# Patient Record
Sex: FEMALE | Race: WHITE | Employment: UNEMPLOYED | URBAN - METROPOLITAN AREA
[De-identification: names, ages, dates, MRNs, and addresses within clinical notes are randomized per-mention and may not be internally consistent; named-entity substitution may affect disease eponyms.]

---

## 2018-04-20 ENCOUNTER — HOSPITAL ENCOUNTER (OUTPATIENT)
Dept: WOMENS IMAGING | Age: 53
Discharge: HOME | End: 2018-04-20
Attending: NURSE PRACTITIONER | Admitting: NURSE PRACTITIONER

## 2018-04-20 ENCOUNTER — OFFICE VISIT (OUTPATIENT)
Dept: FAMILY MEDICINE CLINIC | Age: 53
End: 2018-04-20

## 2018-04-20 VITALS
SYSTOLIC BLOOD PRESSURE: 138 MMHG | OXYGEN SATURATION: 98 % | WEIGHT: 184 LBS | RESPIRATION RATE: 16 BRPM | HEIGHT: 61 IN | BODY MASS INDEX: 34.74 KG/M2 | DIASTOLIC BLOOD PRESSURE: 84 MMHG | HEART RATE: 144 BPM

## 2018-04-20 DIAGNOSIS — R92.8 ABNORMAL MAMMOGRAM: ICD-10-CM

## 2018-04-20 DIAGNOSIS — N64.59 ABNORMAL BREAST EXAM: Primary | ICD-10-CM

## 2018-04-20 DIAGNOSIS — N64.59 OTHER SIGNS AND SYMPTOMS IN BREAST (CODE): ICD-10-CM

## 2018-04-20 DIAGNOSIS — N64.59 ABNORMAL BREAST EXAM: ICD-10-CM

## 2018-04-20 PROCEDURE — 99203 OFFICE O/P NEW LOW 30 MIN: CPT | Performed by: NURSE PRACTITIONER

## 2018-04-20 ASSESSMENT — ENCOUNTER SYMPTOMS
VOMITING: 0
BACK PAIN: 0
SHORTNESS OF BREATH: 0
ABDOMINAL DISTENTION: 0
NAUSEA: 0

## 2018-04-20 ASSESSMENT — PATIENT HEALTH QUESTIONNAIRE - PHQ9
1. LITTLE INTEREST OR PLEASURE IN DOING THINGS: 0
SUM OF ALL RESPONSES TO PHQ QUESTIONS 1-9: 0
2. FEELING DOWN, DEPRESSED OR HOPELESS: 0
SUM OF ALL RESPONSES TO PHQ9 QUESTIONS 1 & 2: 0

## 2018-04-26 ENCOUNTER — OFFICE VISIT (OUTPATIENT)
Dept: SURGERY | Age: 53
End: 2018-04-26

## 2018-04-26 ENCOUNTER — TELEPHONE (OUTPATIENT)
Dept: SURGERY | Age: 53
End: 2018-04-26

## 2018-04-26 VITALS
HEART RATE: 126 BPM | RESPIRATION RATE: 31 BRPM | BODY MASS INDEX: 34.55 KG/M2 | WEIGHT: 183 LBS | SYSTOLIC BLOOD PRESSURE: 178 MMHG | HEIGHT: 61 IN | OXYGEN SATURATION: 99 % | DIASTOLIC BLOOD PRESSURE: 110 MMHG

## 2018-04-26 DIAGNOSIS — N63.0 LUMP OR MASS IN BREAST: Primary | ICD-10-CM

## 2018-04-26 DIAGNOSIS — C50.912 INFLAMMATORY BREAST CANCER, LEFT (HCC): Primary | ICD-10-CM

## 2018-04-26 PROCEDURE — 93702 BIS XTRACELL FLUID ANALYSIS: CPT | Performed by: SURGERY

## 2018-04-26 PROCEDURE — 99204 OFFICE O/P NEW MOD 45 MIN: CPT | Performed by: SURGERY

## 2018-04-26 RX ORDER — SODIUM CHLORIDE, SODIUM LACTATE, POTASSIUM CHLORIDE, CALCIUM CHLORIDE 600; 310; 30; 20 MG/100ML; MG/100ML; MG/100ML; MG/100ML
INJECTION, SOLUTION INTRAVENOUS CONTINUOUS
Status: CANCELLED | OUTPATIENT
Start: 2018-04-26

## 2018-04-27 ENCOUNTER — HOSPITAL ENCOUNTER (OUTPATIENT)
Dept: SURGERY | Age: 53
Discharge: HOME | End: 2018-05-26
Attending: SURGERY | Admitting: SURGERY

## 2018-04-27 ENCOUNTER — TELEPHONE (OUTPATIENT)
Dept: SURGERY | Age: 53
End: 2018-04-27

## 2018-04-27 RX ORDER — SODIUM CHLORIDE 9 MG/ML
INJECTION, SOLUTION INTRAVENOUS ONCE
Status: DISCONTINUED | OUTPATIENT
Start: 2018-04-27 | End: 2018-04-28 | Stop reason: HOSPADM

## 2018-05-01 ENCOUNTER — TELEPHONE (OUTPATIENT)
Dept: SURGERY | Age: 53
End: 2018-05-01

## 2018-05-01 DIAGNOSIS — C50.112 MALIGNANT NEOPLASM OF CENTRAL PORTION OF LEFT FEMALE BREAST, UNSPECIFIED ESTROGEN RECEPTOR STATUS (HCC): Primary | ICD-10-CM

## 2018-05-07 ENCOUNTER — HOSPITAL ENCOUNTER (OUTPATIENT)
Dept: GENERAL RADIOLOGY | Age: 53
Discharge: HOME | End: 2018-05-07
Attending: SURGERY | Admitting: SURGERY

## 2018-05-07 DIAGNOSIS — C50.112 MALIGNANT NEOPLASM OF CENTRAL PORTION OF LEFT FEMALE BREAST, UNSPECIFIED ESTROGEN RECEPTOR STATUS (HCC): ICD-10-CM

## 2018-05-11 ENCOUNTER — HOSPITAL ENCOUNTER (OUTPATIENT)
Dept: GENERAL RADIOLOGY | Age: 53
Discharge: HOME | End: 2018-05-11
Attending: INTERNAL MEDICINE | Admitting: INTERNAL MEDICINE

## 2018-05-11 LAB
ALBUMIN SERPL-MCNC: 3.8 GM/DL (ref 3.4–5)
ALP BLD-CCNC: 58 IU/L (ref 40–128)
ALT SERPL-CCNC: 14 U/L (ref 10–40)
ANION GAP SERPL CALCULATED.3IONS-SCNC: 11 MMOL/L (ref 4–16)
AST SERPL-CCNC: 17 IU/L (ref 15–37)
BANDED NEUTROPHILS ABSOLUTE COUNT: 0.09 K/CU MM
BANDED NEUTROPHILS RELATIVE PERCENT: 1 % (ref 5–11)
BASOPHILS ABSOLUTE: 0.1 K/CU MM
BASOPHILS RELATIVE PERCENT: 1 % (ref 0–1)
BILIRUB SERPL-MCNC: 0.4 MG/DL (ref 0–1)
BUN BLDV-MCNC: 14 MG/DL (ref 6–23)
CALCIUM SERPL-MCNC: 8.8 MG/DL (ref 8.3–10.6)
CHLORIDE BLD-SCNC: 104 MMOL/L (ref 99–110)
CO2: 26 MMOL/L (ref 21–32)
CREAT SERPL-MCNC: 0.6 MG/DL (ref 0.6–1.1)
DIFFERENTIAL TYPE: ABNORMAL
EOSINOPHILS ABSOLUTE: 0.4 K/CU MM
EOSINOPHILS RELATIVE PERCENT: 4 % (ref 0–3)
GFR AFRICAN AMERICAN: >60 ML/MIN/1.73M2
GFR NON-AFRICAN AMERICAN: >60 ML/MIN/1.73M2
GLUCOSE FASTING: 86 MG/DL (ref 70–99)
HCT VFR BLD CALC: 38.8 % (ref 37–47)
HEMOGLOBIN: 12.7 GM/DL (ref 12.5–16)
LYMPHOCYTES ABSOLUTE: 3.6 K/CU MM
LYMPHOCYTES RELATIVE PERCENT: 40 % (ref 24–44)
MCH RBC QN AUTO: 30.2 PG (ref 27–31)
MCHC RBC AUTO-ENTMCNC: 32.7 % (ref 32–36)
MCV RBC AUTO: 92.2 FL (ref 78–100)
MONOCYTES ABSOLUTE: 0.5 K/CU MM
MONOCYTES RELATIVE PERCENT: 6 % (ref 0–4)
PDW BLD-RTO: 13 % (ref 11.7–14.9)
PLATELET # BLD: 351 K/CU MM (ref 140–440)
PMV BLD AUTO: 9.3 FL (ref 7.5–11.1)
POLYCHROMASIA: ABNORMAL
POTASSIUM SERPL-SCNC: 4.4 MMOL/L (ref 3.5–5.1)
RBC # BLD: 4.21 M/CU MM (ref 4.2–5.4)
SEGMENTED NEUTROPHILS ABSOLUTE COUNT: 4.2 K/CU MM
SEGMENTED NEUTROPHILS RELATIVE PERCENT: 48 % (ref 36–66)
SODIUM BLD-SCNC: 141 MMOL/L (ref 135–145)
TOTAL PROTEIN: 6.6 GM/DL (ref 6.4–8.2)
WBC # BLD: 8.9 K/CU MM (ref 4–10.5)
WBC # BLD: ABNORMAL 10*3/UL

## 2018-05-13 LAB — CA 27.29: 61 U/ML

## 2018-05-17 ENCOUNTER — OFFICE VISIT (OUTPATIENT)
Dept: SURGERY | Age: 53
End: 2018-05-17

## 2018-05-17 VITALS — RESPIRATION RATE: 20 BRPM | DIASTOLIC BLOOD PRESSURE: 99 MMHG | SYSTOLIC BLOOD PRESSURE: 187 MMHG | HEART RATE: 110 BPM

## 2018-05-17 DIAGNOSIS — Z09 POSTOP CHECK: ICD-10-CM

## 2018-05-17 DIAGNOSIS — C50.912 LOBULAR CARCINOMA OF LEFT BREAST (HCC): Primary | ICD-10-CM

## 2018-05-17 PROCEDURE — 99024 POSTOP FOLLOW-UP VISIT: CPT | Performed by: SURGERY

## 2018-06-01 ENCOUNTER — HOSPITAL ENCOUNTER (OUTPATIENT)
Dept: WOMENS IMAGING | Age: 53
Discharge: HOME | End: 2018-06-01
Attending: INTERNAL MEDICINE | Admitting: INTERNAL MEDICINE

## 2018-06-01 DIAGNOSIS — Z78.0 ASYMPTOMATIC MENOPAUSAL STATE: ICD-10-CM

## 2018-06-01 DIAGNOSIS — Z78.0 ASYMPTOMATIC AGE-RELATED POSTMENOPAUSAL STATE: ICD-10-CM

## 2018-06-08 ENCOUNTER — HOSPITAL ENCOUNTER (OUTPATIENT)
Dept: INFUSION THERAPY | Age: 53
Discharge: HOME | End: 2018-06-30
Attending: INTERNAL MEDICINE | Admitting: INTERNAL MEDICINE

## 2018-06-08 VITALS — SYSTOLIC BLOOD PRESSURE: 170 MMHG | DIASTOLIC BLOOD PRESSURE: 87 MMHG | HEART RATE: 92 BPM | RESPIRATION RATE: 20 BRPM

## 2018-06-08 RX ORDER — ANASTROZOLE 1 MG/1
1 TABLET ORAL DAILY
COMMUNITY
End: 2020-01-17 | Stop reason: ALTCHOICE

## 2018-06-08 ASSESSMENT — PAIN SCALES - GENERAL: PAINLEVEL_OUTOF10: 0

## 2018-06-08 NOTE — DISCHARGE SUMMARY
Home instructions given with understanding. Discharged wqlkingto the front entrance per self in good condition to leave per private auto.

## 2018-06-08 NOTE — PLAN OF CARE
Arrived at Outpatient Infusion Unit for Lupron. Oriented to room #1 with understanding. Plan of care discussed and understood.

## 2018-06-18 ENCOUNTER — HOSPITAL ENCOUNTER (OUTPATIENT)
Dept: GENERAL RADIOLOGY | Age: 53
Discharge: HOME | End: 2018-06-30
Attending: INTERNAL MEDICINE | Admitting: INTERNAL MEDICINE

## 2018-06-18 LAB
BASOPHILS ABSOLUTE: 0 K/CU MM
BASOPHILS RELATIVE PERCENT: 1 % (ref 0–1)
DIFFERENTIAL TYPE: ABNORMAL
EOSINOPHILS ABSOLUTE: 0 K/CU MM
EOSINOPHILS RELATIVE PERCENT: 1 % (ref 0–3)
HCT VFR BLD CALC: 39.3 % (ref 37–47)
HEMOGLOBIN: 12.4 GM/DL (ref 12.5–16)
LYMPHOCYTES ABSOLUTE: 1.8 K/CU MM
LYMPHOCYTES RELATIVE PERCENT: 52 % (ref 24–44)
MCH RBC QN AUTO: 30 PG (ref 27–31)
MCHC RBC AUTO-ENTMCNC: 31.6 % (ref 32–36)
MCV RBC AUTO: 94.9 FL (ref 78–100)
MONOCYTES ABSOLUTE: 0.2 K/CU MM
MONOCYTES RELATIVE PERCENT: 7 % (ref 0–4)
PDW BLD-RTO: 12.1 % (ref 11.7–14.9)
PLATELET # BLD: 273 K/CU MM (ref 140–440)
PMV BLD AUTO: 9.1 FL (ref 7.5–11.1)
RBC # BLD: 4.14 M/CU MM (ref 4.2–5.4)
SEGMENTED NEUTROPHILS ABSOLUTE COUNT: 1.2 K/CU MM
SEGMENTED NEUTROPHILS RELATIVE PERCENT: 39 % (ref 36–66)
WBC # BLD: 3.2 K/CU MM (ref 4–10.5)
WBC # BLD: ABNORMAL 10*3/UL

## 2018-07-01 ENCOUNTER — HOSPITAL ENCOUNTER (OUTPATIENT)
Dept: GENERAL RADIOLOGY | Age: 53
Discharge: HOME | End: 2018-07-31
Attending: INTERNAL MEDICINE | Admitting: INTERNAL MEDICINE

## 2018-07-01 ENCOUNTER — HOSPITAL ENCOUNTER (OUTPATIENT)
Dept: OTHER | Age: 53
Discharge: HOME | End: 2018-07-31
Attending: INTERNAL MEDICINE | Admitting: INTERNAL MEDICINE

## 2018-07-02 LAB
BASOPHILS ABSOLUTE: 0 K/CU MM
BASOPHILS RELATIVE PERCENT: 1 % (ref 0–1)
DIFFERENTIAL TYPE: ABNORMAL
HCT VFR BLD CALC: 34.5 % (ref 37–47)
HEMOGLOBIN: 11.4 GM/DL (ref 12.5–16)
LYMPHOCYTES ABSOLUTE: 2.3 K/CU MM
LYMPHOCYTES RELATIVE PERCENT: 58 % (ref 24–44)
MCH RBC QN AUTO: 31.3 PG (ref 27–31)
MCHC RBC AUTO-ENTMCNC: 33 % (ref 32–36)
MCV RBC AUTO: 94.8 FL (ref 78–100)
MONOCYTES ABSOLUTE: 0.8 K/CU MM
MONOCYTES RELATIVE PERCENT: 21 % (ref 0–4)
PDW BLD-RTO: 13.4 % (ref 11.7–14.9)
PLATELET # BLD: 200 K/CU MM (ref 140–440)
PMV BLD AUTO: 8.6 FL (ref 7.5–11.1)
POLYCHROMASIA: ABNORMAL
RBC # BLD: 3.64 M/CU MM (ref 4.2–5.4)
SEGMENTED NEUTROPHILS ABSOLUTE COUNT: 0.8 K/CU MM
SEGMENTED NEUTROPHILS RELATIVE PERCENT: 20 % (ref 36–66)
WBC # BLD: 3.9 K/CU MM (ref 4–10.5)
WBC # BLD: ABNORMAL 10*3/UL

## 2018-07-06 ENCOUNTER — HOSPITAL ENCOUNTER (OUTPATIENT)
Dept: INFUSION THERAPY | Age: 53
Discharge: HOME | End: 2018-07-06
Attending: INTERNAL MEDICINE

## 2018-07-06 VITALS
TEMPERATURE: 97.5 F | DIASTOLIC BLOOD PRESSURE: 85 MMHG | RESPIRATION RATE: 14 BRPM | OXYGEN SATURATION: 100 % | SYSTOLIC BLOOD PRESSURE: 169 MMHG | HEART RATE: 106 BPM

## 2018-07-06 RX ORDER — AMLODIPINE BESYLATE 5 MG/1
10 TABLET ORAL DAILY
COMMUNITY
End: 2020-01-01 | Stop reason: SDUPTHER

## 2018-07-06 RX ORDER — LORATADINE 10 MG/1
10 TABLET ORAL DAILY
COMMUNITY
End: 2020-03-13 | Stop reason: ALTCHOICE

## 2018-07-06 ASSESSMENT — PAIN SCALES - GENERAL: PAINLEVEL_OUTOF10: 0

## 2018-07-06 NOTE — DISCHARGE SUMMARY
Tolerated njection well. Home instructions given with understanding. Discharged walking per self to the front entrance to leave per private auto.

## 2018-07-30 LAB
BANDED NEUTROPHILS ABSOLUTE COUNT: 0.14 K/CU MM
BANDED NEUTROPHILS RELATIVE PERCENT: 3 % (ref 5–11)
BASOPHILS ABSOLUTE: 0 K/CU MM
BASOPHILS RELATIVE PERCENT: 1 % (ref 0–1)
DIFFERENTIAL TYPE: ABNORMAL
EOSINOPHILS ABSOLUTE: 0.1 K/CU MM
EOSINOPHILS RELATIVE PERCENT: 2 % (ref 0–3)
HCT VFR BLD CALC: 34.4 % (ref 37–47)
HEMOGLOBIN: 11 GM/DL (ref 12.5–16)
LYMPHOCYTES ABSOLUTE: 2 K/CU MM
LYMPHOCYTES RELATIVE PERCENT: 41 % (ref 24–44)
MCH RBC QN AUTO: 31 PG (ref 27–31)
MCHC RBC AUTO-ENTMCNC: 32 % (ref 32–36)
MCV RBC AUTO: 96.9 FL (ref 78–100)
MONOCYTES ABSOLUTE: 0.6 K/CU MM
MONOCYTES RELATIVE PERCENT: 14 % (ref 0–4)
PDW BLD-RTO: 15 % (ref 11.7–14.9)
PLATELET # BLD: 245 K/CU MM (ref 140–440)
PMV BLD AUTO: 8.8 FL (ref 7.5–11.1)
POLYCHROMASIA: ABNORMAL
RBC # BLD: 3.55 M/CU MM (ref 4.2–5.4)
SEGMENTED NEUTROPHILS ABSOLUTE COUNT: 1.8 K/CU MM
SEGMENTED NEUTROPHILS RELATIVE PERCENT: 39 % (ref 36–66)
WBC # BLD: 4.6 K/CU MM (ref 4–10.5)

## 2018-08-01 ENCOUNTER — HOSPITAL ENCOUNTER (OUTPATIENT)
Dept: OTHER | Age: 53
Discharge: HOME | End: 2018-08-31
Attending: INTERNAL MEDICINE | Admitting: INTERNAL MEDICINE

## 2018-08-01 ENCOUNTER — HOSPITAL ENCOUNTER (OUTPATIENT)
Dept: GENERAL RADIOLOGY | Age: 53
Discharge: HOME | End: 2018-08-31
Attending: INTERNAL MEDICINE | Admitting: INTERNAL MEDICINE

## 2018-08-03 ENCOUNTER — HOSPITAL ENCOUNTER (OUTPATIENT)
Dept: INFUSION THERAPY | Age: 53
Discharge: HOME | End: 2018-08-03
Attending: INTERNAL MEDICINE

## 2018-08-03 VITALS
SYSTOLIC BLOOD PRESSURE: 143 MMHG | RESPIRATION RATE: 16 BRPM | BODY MASS INDEX: 33.99 KG/M2 | HEIGHT: 61 IN | WEIGHT: 180 LBS | DIASTOLIC BLOOD PRESSURE: 86 MMHG | HEART RATE: 104 BPM | TEMPERATURE: 99 F

## 2018-08-31 ENCOUNTER — HOSPITAL ENCOUNTER (OUTPATIENT)
Dept: INFUSION THERAPY | Age: 53
Discharge: HOME | End: 2018-08-31
Attending: INTERNAL MEDICINE

## 2018-08-31 LAB
BANDED NEUTROPHILS ABSOLUTE COUNT: 0.14 K/CU MM
BANDED NEUTROPHILS RELATIVE PERCENT: 4 % (ref 5–11)
BASOPHILS ABSOLUTE: 0 K/CU MM
BASOPHILS RELATIVE PERCENT: 1 % (ref 0–1)
DIFFERENTIAL TYPE: ABNORMAL
EOSINOPHILS ABSOLUTE: 0.1 K/CU MM
EOSINOPHILS RELATIVE PERCENT: 2 % (ref 0–3)
HCT VFR BLD CALC: 32.6 % (ref 37–47)
HEMOGLOBIN: 11 GM/DL (ref 12.5–16)
LYMPHOCYTES ABSOLUTE: 1.3 K/CU MM
LYMPHOCYTES RELATIVE PERCENT: 36 % (ref 24–44)
MCH RBC QN AUTO: 32.9 PG (ref 27–31)
MCHC RBC AUTO-ENTMCNC: 33.7 % (ref 32–36)
MCV RBC AUTO: 97.6 FL (ref 78–100)
MONOCYTES ABSOLUTE: 0.5 K/CU MM
MONOCYTES RELATIVE PERCENT: 13 % (ref 0–4)
PDW BLD-RTO: 15.3 % (ref 11.7–14.9)
PLATELET # BLD: 297 K/CU MM (ref 140–440)
PMV BLD AUTO: 9 FL (ref 7.5–11.1)
POLYCHROMASIA: ABNORMAL
RBC # BLD: 3.34 M/CU MM (ref 4.2–5.4)
RBC # BLD: ABNORMAL 10*6/UL
SEGMENTED NEUTROPHILS ABSOLUTE COUNT: 1.5 K/CU MM
SEGMENTED NEUTROPHILS RELATIVE PERCENT: 44 % (ref 36–66)
WBC # BLD: 3.5 K/CU MM (ref 4–10.5)
WBC # BLD: ABNORMAL 10*3/UL

## 2018-08-31 ASSESSMENT — PAIN SCALES - GENERAL: PAINLEVEL_OUTOF10: 0

## 2018-08-31 NOTE — PLAN OF CARE
Pt ambulated to unit for Outpatient lupron injection. Agreeable to receive injection today. Denies any pain or problems. Voiced understanding of plan of care.

## 2018-09-01 ENCOUNTER — HOSPITAL ENCOUNTER (OUTPATIENT)
Dept: OTHER | Age: 53
Discharge: HOME | End: 2018-09-01
Attending: INTERNAL MEDICINE | Admitting: INTERNAL MEDICINE

## 2018-09-07 ENCOUNTER — HOSPITAL ENCOUNTER (OUTPATIENT)
Dept: NUCLEAR MEDICINE | Age: 53
Discharge: HOME | End: 2018-09-07
Attending: INTERNAL MEDICINE | Admitting: INTERNAL MEDICINE

## 2018-09-07 DIAGNOSIS — C50.112 MALIGNANT NEOPLASM OF CENTRAL PORTION OF LEFT FEMALE BREAST (HCC): ICD-10-CM

## 2018-09-07 DIAGNOSIS — C50.112 MALIGNANT NEOPLASM OF CENTRAL PORTION OF LEFT FEMALE BREAST, UNSPECIFIED ESTROGEN RECEPTOR STATUS (HCC): ICD-10-CM

## 2018-09-07 LAB
ALBUMIN SERPL-MCNC: 4.2 GM/DL (ref 3.4–5)
ALP BLD-CCNC: 84 IU/L (ref 40–129)
ALT SERPL-CCNC: 15 U/L (ref 10–40)
ANION GAP SERPL CALCULATED.3IONS-SCNC: 11 MMOL/L (ref 4–16)
AST SERPL-CCNC: 24 IU/L (ref 15–37)
BASOPHILS ABSOLUTE: 0 K/CU MM
BASOPHILS RELATIVE PERCENT: 1.5 % (ref 0–1)
BILIRUB SERPL-MCNC: 0.6 MG/DL (ref 0–1)
BUN BLDV-MCNC: 13 MG/DL (ref 6–23)
CALCIUM SERPL-MCNC: 10 MG/DL (ref 8.3–10.6)
CHLORIDE BLD-SCNC: 105 MMOL/L (ref 99–110)
CO2: 26 MMOL/L (ref 21–32)
CREAT SERPL-MCNC: 0.7 MG/DL (ref 0.6–1.1)
DIFFERENTIAL TYPE: ABNORMAL
EOSINOPHILS ABSOLUTE: 0 K/CU MM
EOSINOPHILS RELATIVE PERCENT: 1.1 % (ref 0–3)
GFR AFRICAN AMERICAN: >60 ML/MIN/1.73M2
GFR NON-AFRICAN AMERICAN: >60 ML/MIN/1.73M2
GLUCOSE BLD-MCNC: 109 MG/DL (ref 70–99)
HCT VFR BLD CALC: 34.7 % (ref 37–47)
HEMOGLOBIN: 11.6 GM/DL (ref 12.5–16)
IMMATURE NEUTROPHIL %: 0.4 % (ref 0–0.43)
LYMPHOCYTES ABSOLUTE: 1.1 K/CU MM
LYMPHOCYTES RELATIVE PERCENT: 42.9 % (ref 24–44)
MCH RBC QN AUTO: 32.9 PG (ref 27–31)
MCHC RBC AUTO-ENTMCNC: 33.4 % (ref 32–36)
MCV RBC AUTO: 98.3 FL (ref 78–100)
MONOCYTES ABSOLUTE: 0.3 K/CU MM
MONOCYTES RELATIVE PERCENT: 9.4 % (ref 0–4)
NUCLEATED RBC %: 0 %
PDW BLD-RTO: 14.9 % (ref 11.7–14.9)
PLATELET # BLD: 245 K/CU MM (ref 140–440)
PMV BLD AUTO: 8.3 FL (ref 7.5–11.1)
POTASSIUM SERPL-SCNC: 4.1 MMOL/L (ref 3.5–5.1)
RBC # BLD: 3.53 M/CU MM (ref 4.2–5.4)
SEGMENTED NEUTROPHILS ABSOLUTE COUNT: 1.2 K/CU MM
SEGMENTED NEUTROPHILS RELATIVE PERCENT: 44.7 % (ref 36–66)
SODIUM BLD-SCNC: 142 MMOL/L (ref 135–145)
TOTAL IMMATURE NEUTOROPHIL: 0.01 K/CU MM
TOTAL NUCLEATED RBC: 0 K/CU MM
TOTAL PROTEIN: 7.4 GM/DL (ref 6.4–8.2)
WBC # BLD: 2.7 K/CU MM (ref 4–10.5)

## 2018-09-07 RX ORDER — TC 99M MEDRONATE 20 MG/10ML
28.1 INJECTION, POWDER, LYOPHILIZED, FOR SOLUTION INTRAVENOUS
Status: COMPLETED | OUTPATIENT
Start: 2018-09-07 | End: 2018-09-07

## 2018-09-07 RX ADMIN — TC 99M MEDRONATE 28.1 MILLICURIE: 20 INJECTION, POWDER, LYOPHILIZED, FOR SOLUTION INTRAVENOUS at 10:15

## 2018-09-09 LAB — CA 27.29: 109.1 U/ML

## 2018-09-28 ENCOUNTER — HOSPITAL ENCOUNTER (OUTPATIENT)
Dept: INFUSION THERAPY | Age: 53
Setting detail: INFUSION SERIES
Discharge: HOME | End: 2018-09-28
Payer: COMMERCIAL

## 2018-09-28 VITALS
RESPIRATION RATE: 16 BRPM | DIASTOLIC BLOOD PRESSURE: 86 MMHG | TEMPERATURE: 98.3 F | HEART RATE: 92 BPM | SYSTOLIC BLOOD PRESSURE: 131 MMHG

## 2018-09-28 PROCEDURE — 6360000002 HC RX W HCPCS: Performed by: INTERNAL MEDICINE

## 2018-09-28 PROCEDURE — 96401 CHEMO ANTI-NEOPL SQ/IM: CPT

## 2018-09-28 RX ADMIN — LEUPROLIDE ACETATE 7.5 MG: KIT at 09:35

## 2018-09-28 NOTE — DISCHARGE SUMMARY
Tolerated Leuprolide Injection well. Discharge instructions explained written copy given. Understanding verbalized. Discharged home. Down to private auto per self.

## 2018-09-28 NOTE — PLAN OF CARE
Ambulatory to unit room 6 for Injection. Orientated to unit. Procedure and plan of care explained. Questions answered. Understanding verbalized.

## 2018-10-12 ENCOUNTER — HOSPITAL ENCOUNTER (OUTPATIENT)
Age: 53
Discharge: HOME | End: 2018-10-12
Payer: COMMERCIAL

## 2018-10-12 LAB
ALBUMIN SERPL-MCNC: 3.8 GM/DL (ref 3.4–5)
ALP BLD-CCNC: 90 IU/L (ref 40–128)
ALT SERPL-CCNC: 13 U/L (ref 10–40)
ANION GAP SERPL CALCULATED.3IONS-SCNC: 14 MMOL/L (ref 4–16)
AST SERPL-CCNC: 24 IU/L (ref 15–37)
BASOPHILS ABSOLUTE: 0.1 K/CU MM
BASOPHILS RELATIVE PERCENT: 2 % (ref 0–1)
BILIRUB SERPL-MCNC: 0.4 MG/DL (ref 0–1)
BUN BLDV-MCNC: 15 MG/DL (ref 6–23)
CALCIUM SERPL-MCNC: 8.9 MG/DL (ref 8.3–10.6)
CHLORIDE BLD-SCNC: 102 MMOL/L (ref 99–110)
CO2: 24 MMOL/L (ref 21–32)
CREAT SERPL-MCNC: 0.7 MG/DL (ref 0.6–1.1)
DIFFERENTIAL TYPE: ABNORMAL
EOSINOPHILS ABSOLUTE: 0 K/CU MM
EOSINOPHILS RELATIVE PERCENT: 1 % (ref 0–3)
GFR AFRICAN AMERICAN: >60 ML/MIN/1.73M2
GFR NON-AFRICAN AMERICAN: >60 ML/MIN/1.73M2
GLUCOSE FASTING: 146 MG/DL (ref 70–99)
HCT VFR BLD CALC: 34.2 % (ref 37–47)
HEMOGLOBIN: 11.2 GM/DL (ref 12.5–16)
LYMPHOCYTES ABSOLUTE: 1.7 K/CU MM
LYMPHOCYTES RELATIVE PERCENT: 51 % (ref 24–44)
MAGNESIUM: 1.9 MG/DL (ref 1.8–2.4)
MCH RBC QN AUTO: 33.9 PG (ref 27–31)
MCHC RBC AUTO-ENTMCNC: 32.7 % (ref 32–36)
MCV RBC AUTO: 103.6 FL (ref 78–100)
MONOCYTES ABSOLUTE: 0.1 K/CU MM
MONOCYTES RELATIVE PERCENT: 3 % (ref 0–4)
PDW BLD-RTO: 14 % (ref 11.7–14.9)
PLATELET # BLD: 238 K/CU MM (ref 140–440)
PMV BLD AUTO: 8.7 FL (ref 7.5–11.1)
POTASSIUM SERPL-SCNC: 4 MMOL/L (ref 3.5–5.1)
RBC # BLD: 3.3 M/CU MM (ref 4.2–5.4)
SEGMENTED NEUTROPHILS ABSOLUTE COUNT: 1.4 K/CU MM
SEGMENTED NEUTROPHILS RELATIVE PERCENT: 43 % (ref 36–66)
SODIUM BLD-SCNC: 140 MMOL/L (ref 135–145)
TOTAL PROTEIN: 6.8 GM/DL (ref 6.4–8.2)
WBC # BLD: 3.3 K/CU MM (ref 4–10.5)

## 2018-10-12 PROCEDURE — 36415 COLL VENOUS BLD VENIPUNCTURE: CPT

## 2018-10-12 PROCEDURE — 83735 ASSAY OF MAGNESIUM: CPT

## 2018-10-12 PROCEDURE — 85027 COMPLETE CBC AUTOMATED: CPT

## 2018-10-12 PROCEDURE — 85007 BL SMEAR W/DIFF WBC COUNT: CPT

## 2018-10-12 PROCEDURE — 80053 COMPREHEN METABOLIC PANEL: CPT

## 2018-10-12 PROCEDURE — 86300 IMMUNOASSAY TUMOR CA 15-3: CPT

## 2018-10-14 LAB — CA 27.29: 111 U/ML

## 2018-10-26 ENCOUNTER — HOSPITAL ENCOUNTER (OUTPATIENT)
Dept: ULTRASOUND IMAGING | Age: 53
Discharge: HOME | End: 2018-10-26
Payer: COMMERCIAL

## 2018-10-26 ENCOUNTER — HOSPITAL ENCOUNTER (OUTPATIENT)
Dept: INFUSION THERAPY | Age: 53
Setting detail: INFUSION SERIES
Discharge: HOME | End: 2018-10-26
Payer: COMMERCIAL

## 2018-10-26 VITALS
DIASTOLIC BLOOD PRESSURE: 87 MMHG | RESPIRATION RATE: 16 BRPM | BODY MASS INDEX: 34.36 KG/M2 | WEIGHT: 175 LBS | HEART RATE: 100 BPM | HEIGHT: 60 IN | SYSTOLIC BLOOD PRESSURE: 142 MMHG | OXYGEN SATURATION: 98 %

## 2018-10-26 DIAGNOSIS — M79.89 SWELLING OF LIMB: ICD-10-CM

## 2018-10-26 DIAGNOSIS — C50.112 MALIGNANT NEOPLASM OF CENTRAL PORTION OF LEFT FEMALE BREAST, UNSPECIFIED ESTROGEN RECEPTOR STATUS (HCC): ICD-10-CM

## 2018-10-26 PROCEDURE — 93971 EXTREMITY STUDY: CPT

## 2018-10-26 PROCEDURE — 6360000002 HC RX W HCPCS: Performed by: INTERNAL MEDICINE

## 2018-10-26 PROCEDURE — 96402 CHEMO HORMON ANTINEOPL SQ/IM: CPT

## 2018-10-26 RX ADMIN — LEUPROLIDE ACETATE 7.5 MG: KIT at 08:40

## 2018-10-26 NOTE — DISCHARGE SUMMARY
Tolerated Leuprollide well. Discharge instructions explained written copy given. Understanding verbalized. Discharged home. Down to private auto per self.

## 2018-10-26 NOTE — PLAN OF CARE
Ambulatory to unit room 3 for LEOPROLIDE. Orientated to unit. Procedure and plan of care explained. Questions answered. Understanding verbalized.

## 2018-11-15 ENCOUNTER — HOSPITAL ENCOUNTER (OUTPATIENT)
Dept: PET IMAGING | Age: 53
Discharge: HOME | End: 2018-11-15
Payer: COMMERCIAL

## 2018-11-15 DIAGNOSIS — C50.112 MALIGNANT NEOPLASM OF CENTRAL PORTION OF LEFT FEMALE BREAST, UNSPECIFIED ESTROGEN RECEPTOR STATUS (HCC): ICD-10-CM

## 2018-11-15 PROCEDURE — 78815 PET IMAGE W/CT SKULL-THIGH: CPT

## 2018-11-15 PROCEDURE — 3430000000 HC RX DIAGNOSTIC RADIOPHARMACEUTICAL: Performed by: INTERNAL MEDICINE

## 2018-11-15 PROCEDURE — A9552 F18 FDG: HCPCS | Performed by: INTERNAL MEDICINE

## 2018-11-15 RX ORDER — FLUDEOXYGLUCOSE F 18 200 MCI/ML
13.02 INJECTION, SOLUTION INTRAVENOUS
Status: COMPLETED | OUTPATIENT
Start: 2018-11-15 | End: 2018-11-15

## 2018-11-15 RX ADMIN — FLUDEOXYGLUCOSE F 18 13.02 MILLICURIE: 200 INJECTION, SOLUTION INTRAVENOUS at 11:41

## 2018-11-23 ENCOUNTER — HOSPITAL ENCOUNTER (OUTPATIENT)
Dept: INFUSION THERAPY | Age: 53
Setting detail: INFUSION SERIES
Discharge: HOME | End: 2018-11-23
Payer: COMMERCIAL

## 2018-11-23 ENCOUNTER — HOSPITAL ENCOUNTER (OUTPATIENT)
Age: 53
Discharge: HOME | End: 2018-11-23
Payer: COMMERCIAL

## 2018-11-23 VITALS
TEMPERATURE: 98.7 F | HEART RATE: 97 BPM | DIASTOLIC BLOOD PRESSURE: 77 MMHG | RESPIRATION RATE: 16 BRPM | SYSTOLIC BLOOD PRESSURE: 141 MMHG

## 2018-11-23 LAB
ALBUMIN SERPL-MCNC: 3.9 GM/DL (ref 3.4–5)
ALP BLD-CCNC: 101 IU/L (ref 40–129)
ALT SERPL-CCNC: 46 U/L (ref 10–40)
ANION GAP SERPL CALCULATED.3IONS-SCNC: 10 MMOL/L (ref 4–16)
ANISOCYTOSIS: ABNORMAL
AST SERPL-CCNC: 28 IU/L (ref 15–37)
BANDED NEUTROPHILS ABSOLUTE COUNT: 0.07 K/CU MM
BANDED NEUTROPHILS RELATIVE PERCENT: 2 % (ref 5–11)
BASOPHILS ABSOLUTE: 0 K/CU MM
BASOPHILS RELATIVE PERCENT: 1 % (ref 0–1)
BILIRUB SERPL-MCNC: 0.1 MG/DL (ref 0–1)
BUN BLDV-MCNC: 12 MG/DL (ref 6–23)
CALCIUM SERPL-MCNC: 9 MG/DL (ref 8.3–10.6)
CHLORIDE BLD-SCNC: 106 MMOL/L (ref 99–110)
CO2: 26 MMOL/L (ref 21–32)
CREAT SERPL-MCNC: 0.7 MG/DL (ref 0.6–1.1)
DIFFERENTIAL TYPE: ABNORMAL
EOSINOPHILS ABSOLUTE: 0.1 K/CU MM
EOSINOPHILS RELATIVE PERCENT: 4 % (ref 0–3)
GFR AFRICAN AMERICAN: >60 ML/MIN/1.73M2
GFR NON-AFRICAN AMERICAN: >60 ML/MIN/1.73M2
GLUCOSE BLD-MCNC: 113 MG/DL (ref 70–99)
HCT VFR BLD CALC: 35.1 % (ref 37–47)
HEMOGLOBIN: 11.1 GM/DL (ref 12.5–16)
LYMPHOCYTES ABSOLUTE: 1.3 K/CU MM
LYMPHOCYTES RELATIVE PERCENT: 34 % (ref 24–44)
MACROCYTES: ABNORMAL
MAGNESIUM: 2.1 MG/DL (ref 1.8–2.4)
MCH RBC QN AUTO: 33.6 PG (ref 27–31)
MCHC RBC AUTO-ENTMCNC: 31.6 % (ref 32–36)
MCV RBC AUTO: 106.4 FL (ref 78–100)
MONOCYTES ABSOLUTE: 0.5 K/CU MM
MONOCYTES RELATIVE PERCENT: 13 % (ref 0–4)
PDW BLD-RTO: 14.7 % (ref 11.7–14.9)
PLATELET # BLD: 392 K/CU MM (ref 140–440)
PMV BLD AUTO: 8.7 FL (ref 7.5–11.1)
POLYCHROMASIA: ABNORMAL
POTASSIUM SERPL-SCNC: 4.9 MMOL/L (ref 3.5–5.1)
RBC # BLD: 3.3 M/CU MM (ref 4.2–5.4)
SEGMENTED NEUTROPHILS ABSOLUTE COUNT: 1.7 K/CU MM
SEGMENTED NEUTROPHILS RELATIVE PERCENT: 46 % (ref 36–66)
SODIUM BLD-SCNC: 142 MMOL/L (ref 135–145)
TOTAL PROTEIN: 7 GM/DL (ref 6.4–8.2)
WBC # BLD: 3.7 K/CU MM (ref 4–10.5)
WBC # BLD: ABNORMAL 10*3/UL

## 2018-11-23 PROCEDURE — 6360000002 HC RX W HCPCS: Performed by: INTERNAL MEDICINE

## 2018-11-23 PROCEDURE — 85007 BL SMEAR W/DIFF WBC COUNT: CPT

## 2018-11-23 PROCEDURE — 36415 COLL VENOUS BLD VENIPUNCTURE: CPT

## 2018-11-23 PROCEDURE — 80053 COMPREHEN METABOLIC PANEL: CPT

## 2018-11-23 PROCEDURE — 85027 COMPLETE CBC AUTOMATED: CPT

## 2018-11-23 PROCEDURE — 83735 ASSAY OF MAGNESIUM: CPT

## 2018-11-23 PROCEDURE — 96402 CHEMO HORMON ANTINEOPL SQ/IM: CPT

## 2018-11-23 PROCEDURE — 99211 OFF/OP EST MAY X REQ PHY/QHP: CPT

## 2018-11-23 RX ADMIN — LEUPROLIDE ACETATE 7.5 MG: KIT at 08:23

## 2018-11-23 NOTE — PROGRESS NOTES
Pt given Leupron injection. Tolerated without incidence. Monitored for 15 min post injection. No side effects noted.

## 2018-11-23 NOTE — PROGRESS NOTES
Pt taken to room 02 for injection. Pt oriented to room, call light, bed/chair controls, TV, pt voiced understanding. Plan of care explained to pt, pt voiced understanding.

## 2018-12-21 ENCOUNTER — HOSPITAL ENCOUNTER (OUTPATIENT)
Dept: INFUSION THERAPY | Age: 53
Setting detail: INFUSION SERIES
Discharge: HOME | End: 2018-12-21
Payer: COMMERCIAL

## 2018-12-21 ENCOUNTER — HOSPITAL ENCOUNTER (OUTPATIENT)
Age: 53
Discharge: HOME | End: 2018-12-21
Payer: COMMERCIAL

## 2018-12-21 VITALS
RESPIRATION RATE: 16 BRPM | DIASTOLIC BLOOD PRESSURE: 83 MMHG | OXYGEN SATURATION: 98 % | HEART RATE: 102 BPM | TEMPERATURE: 97.7 F | SYSTOLIC BLOOD PRESSURE: 151 MMHG

## 2018-12-21 LAB
ALBUMIN SERPL-MCNC: 4 GM/DL (ref 3.4–5)
ALP BLD-CCNC: 118 IU/L (ref 40–128)
ALT SERPL-CCNC: 24 U/L (ref 10–40)
ANION GAP SERPL CALCULATED.3IONS-SCNC: 14 MMOL/L (ref 4–16)
AST SERPL-CCNC: 29 IU/L (ref 15–37)
BASOPHILS ABSOLUTE: 0.1 K/CU MM
BASOPHILS RELATIVE PERCENT: 1.3 % (ref 0–1)
BILIRUB SERPL-MCNC: 0.2 MG/DL (ref 0–1)
BUN BLDV-MCNC: 14 MG/DL (ref 6–23)
CALCIUM SERPL-MCNC: 8.9 MG/DL (ref 8.3–10.6)
CHLORIDE BLD-SCNC: 102 MMOL/L (ref 99–110)
CO2: 24 MMOL/L (ref 21–32)
CREAT SERPL-MCNC: 0.7 MG/DL (ref 0.6–1.1)
DIFFERENTIAL TYPE: ABNORMAL
EOSINOPHILS ABSOLUTE: 0.1 K/CU MM
EOSINOPHILS RELATIVE PERCENT: 2.7 % (ref 0–3)
GFR AFRICAN AMERICAN: >60 ML/MIN/1.73M2
GFR NON-AFRICAN AMERICAN: >60 ML/MIN/1.73M2
GLUCOSE BLD-MCNC: 87 MG/DL (ref 70–99)
HCT VFR BLD CALC: 35.8 % (ref 37–47)
HEMOGLOBIN: 11.2 GM/DL (ref 12.5–16)
IMMATURE NEUTROPHIL %: 0.3 % (ref 0–0.43)
LYMPHOCYTES ABSOLUTE: 1.5 K/CU MM
LYMPHOCYTES RELATIVE PERCENT: 38.7 % (ref 24–44)
MCH RBC QN AUTO: 33.3 PG (ref 27–31)
MCHC RBC AUTO-ENTMCNC: 31.3 % (ref 32–36)
MCV RBC AUTO: 106.5 FL (ref 78–100)
MONOCYTES ABSOLUTE: 0.5 K/CU MM
MONOCYTES RELATIVE PERCENT: 13.1 % (ref 0–4)
PDW BLD-RTO: 14.3 % (ref 11.7–14.9)
PLATELET # BLD: 413 K/CU MM (ref 140–440)
PMV BLD AUTO: 8.5 FL (ref 7.5–11.1)
POTASSIUM SERPL-SCNC: 4.4 MMOL/L (ref 3.5–5.1)
RBC # BLD: 3.36 M/CU MM (ref 4.2–5.4)
SEGMENTED NEUTROPHILS ABSOLUTE COUNT: 1.6 K/CU MM
SEGMENTED NEUTROPHILS RELATIVE PERCENT: 43.9 % (ref 36–66)
SODIUM BLD-SCNC: 140 MMOL/L (ref 135–145)
TOTAL IMMATURE NEUTOROPHIL: 0.01 K/CU MM
TOTAL PROTEIN: 7 GM/DL (ref 6.4–8.2)
WBC # BLD: 3.8 K/CU MM (ref 4–10.5)

## 2018-12-21 PROCEDURE — 6360000002 HC RX W HCPCS: Performed by: INTERNAL MEDICINE

## 2018-12-21 PROCEDURE — 99211 OFF/OP EST MAY X REQ PHY/QHP: CPT

## 2018-12-21 PROCEDURE — 80053 COMPREHEN METABOLIC PANEL: CPT

## 2018-12-21 PROCEDURE — 96372 THER/PROPH/DIAG INJ SC/IM: CPT

## 2018-12-21 PROCEDURE — 96365 THER/PROPH/DIAG IV INF INIT: CPT

## 2018-12-21 PROCEDURE — 2580000003 HC RX 258: Performed by: INTERNAL MEDICINE

## 2018-12-21 PROCEDURE — 36415 COLL VENOUS BLD VENIPUNCTURE: CPT

## 2018-12-21 PROCEDURE — 85025 COMPLETE CBC W/AUTO DIFF WBC: CPT

## 2018-12-21 RX ADMIN — ZOLEDRONIC ACID 4 MG: 0.8 INJECTION, SOLUTION, CONCENTRATE INTRAVENOUS at 09:31

## 2018-12-21 RX ADMIN — LEUPROLIDE ACETATE 7.5 MG: KIT at 10:06

## 2018-12-21 NOTE — DISCHARGE SUMMARY
Pt arrived for Lupron injection and Zometa. Tolerated both without any side effects. Discharge instructions given and voiced understanding. To private auto per self.

## 2019-01-18 ENCOUNTER — HOSPITAL ENCOUNTER (OUTPATIENT)
Dept: INFUSION THERAPY | Age: 54
Setting detail: INFUSION SERIES
Discharge: HOME | End: 2019-01-18
Payer: COMMERCIAL

## 2019-01-18 VITALS
WEIGHT: 180 LBS | BODY MASS INDEX: 33.99 KG/M2 | RESPIRATION RATE: 14 BRPM | HEART RATE: 103 BPM | OXYGEN SATURATION: 99 % | DIASTOLIC BLOOD PRESSURE: 86 MMHG | TEMPERATURE: 98.7 F | SYSTOLIC BLOOD PRESSURE: 136 MMHG | HEIGHT: 61 IN

## 2019-01-18 PROCEDURE — 6360000002 HC RX W HCPCS: Performed by: INTERNAL MEDICINE

## 2019-01-18 PROCEDURE — 96402 CHEMO HORMON ANTINEOPL SQ/IM: CPT

## 2019-01-18 PROCEDURE — 96413 CHEMO IV INFUSION 1 HR: CPT

## 2019-01-18 PROCEDURE — 2580000003 HC RX 258: Performed by: INTERNAL MEDICINE

## 2019-01-18 PROCEDURE — 99211 OFF/OP EST MAY X REQ PHY/QHP: CPT

## 2019-01-18 RX ORDER — 0.9 % SODIUM CHLORIDE 0.9 %
10 VIAL (ML) INJECTION PRN
Status: DISCONTINUED | OUTPATIENT
Start: 2019-01-18 | End: 2019-01-19 | Stop reason: HOSPADM

## 2019-01-18 RX ADMIN — SODIUM CHLORIDE 4 MG: 9 INJECTION, SOLUTION INTRAVENOUS at 10:10

## 2019-01-18 RX ADMIN — LEUPROLIDE ACETATE 7.5 MG: KIT at 10:19

## 2019-01-18 ASSESSMENT — PAIN SCALES - GENERAL: PAINLEVEL_OUTOF10: 0

## 2019-01-25 ENCOUNTER — HOSPITAL ENCOUNTER (OUTPATIENT)
Age: 54
Discharge: HOME | End: 2019-01-25
Payer: COMMERCIAL

## 2019-01-25 LAB
ALBUMIN SERPL-MCNC: 4.2 GM/DL (ref 3.4–5)
ALP BLD-CCNC: 107 IU/L (ref 40–128)
ALT SERPL-CCNC: 14 U/L (ref 10–40)
ANION GAP SERPL CALCULATED.3IONS-SCNC: 12 MMOL/L (ref 4–16)
AST SERPL-CCNC: 28 IU/L (ref 15–37)
BANDED NEUTROPHILS ABSOLUTE COUNT: 0.03 K/CU MM
BANDED NEUTROPHILS RELATIVE PERCENT: 1 % (ref 5–11)
BASOPHILS ABSOLUTE: 0.1 K/CU MM
BASOPHILS RELATIVE PERCENT: 2 % (ref 0–1)
BILIRUB SERPL-MCNC: 0.3 MG/DL (ref 0–1)
BUN BLDV-MCNC: 15 MG/DL (ref 6–23)
CALCIUM SERPL-MCNC: 8.7 MG/DL (ref 8.3–10.6)
CHLORIDE BLD-SCNC: 107 MMOL/L (ref 99–110)
CO2: 26 MMOL/L (ref 21–32)
CREAT SERPL-MCNC: 0.8 MG/DL (ref 0.6–1.1)
DIFFERENTIAL TYPE: ABNORMAL
EOSINOPHILS ABSOLUTE: 0.1 K/CU MM
EOSINOPHILS RELATIVE PERCENT: 4 % (ref 0–3)
GFR AFRICAN AMERICAN: >60 ML/MIN/1.73M2
GFR NON-AFRICAN AMERICAN: >60 ML/MIN/1.73M2
GLUCOSE BLD-MCNC: 100 MG/DL (ref 70–99)
HCT VFR BLD CALC: 37.1 % (ref 37–47)
HEMOGLOBIN: 11.6 GM/DL (ref 12.5–16)
LYMPHOCYTES ABSOLUTE: 1.5 K/CU MM
LYMPHOCYTES RELATIVE PERCENT: 53 % (ref 24–44)
MCH RBC QN AUTO: 32.6 PG (ref 27–31)
MCHC RBC AUTO-ENTMCNC: 31.3 % (ref 32–36)
MCV RBC AUTO: 104.2 FL (ref 78–100)
MONOCYTES ABSOLUTE: 0.1 K/CU MM
MONOCYTES RELATIVE PERCENT: 5 % (ref 0–4)
PDW BLD-RTO: 13.6 % (ref 11.7–14.9)
PLATELET # BLD: 309 K/CU MM (ref 140–440)
PMV BLD AUTO: 8.5 FL (ref 7.5–11.1)
POTASSIUM SERPL-SCNC: 4.2 MMOL/L (ref 3.5–5.1)
RBC # BLD: 3.56 M/CU MM (ref 4.2–5.4)
RBC # BLD: ABNORMAL 10*6/UL
SEGMENTED NEUTROPHILS ABSOLUTE COUNT: 1 K/CU MM
SEGMENTED NEUTROPHILS RELATIVE PERCENT: 35 % (ref 36–66)
SODIUM BLD-SCNC: 145 MMOL/L (ref 135–145)
TOTAL PROTEIN: 7.1 GM/DL (ref 6.4–8.2)
WBC # BLD: 2.8 K/CU MM (ref 4–10.5)
WBC # BLD: ABNORMAL 10*3/UL

## 2019-01-25 PROCEDURE — 36415 COLL VENOUS BLD VENIPUNCTURE: CPT

## 2019-01-25 PROCEDURE — 80053 COMPREHEN METABOLIC PANEL: CPT

## 2019-01-25 PROCEDURE — 85007 BL SMEAR W/DIFF WBC COUNT: CPT

## 2019-01-25 PROCEDURE — 85027 COMPLETE CBC AUTOMATED: CPT

## 2019-02-15 ENCOUNTER — HOSPITAL ENCOUNTER (OUTPATIENT)
Dept: INFUSION THERAPY | Age: 54
Setting detail: INFUSION SERIES
Discharge: HOME | End: 2019-02-15
Payer: COMMERCIAL

## 2019-02-15 VITALS
HEART RATE: 99 BPM | RESPIRATION RATE: 16 BRPM | HEIGHT: 61 IN | TEMPERATURE: 98.1 F | OXYGEN SATURATION: 100 % | DIASTOLIC BLOOD PRESSURE: 92 MMHG | BODY MASS INDEX: 33.99 KG/M2 | WEIGHT: 180 LBS | SYSTOLIC BLOOD PRESSURE: 122 MMHG

## 2019-02-15 PROCEDURE — 96365 THER/PROPH/DIAG IV INF INIT: CPT

## 2019-02-15 PROCEDURE — 2580000003 HC RX 258: Performed by: INTERNAL MEDICINE

## 2019-02-15 PROCEDURE — 6360000002 HC RX W HCPCS: Performed by: INTERNAL MEDICINE

## 2019-02-15 PROCEDURE — 99211 OFF/OP EST MAY X REQ PHY/QHP: CPT

## 2019-02-15 PROCEDURE — 96372 THER/PROPH/DIAG INJ SC/IM: CPT

## 2019-02-15 RX ADMIN — ZOLEDRONIC ACID 4 MG: 0.8 INJECTION, SOLUTION, CONCENTRATE INTRAVENOUS at 09:20

## 2019-02-15 RX ADMIN — LEUPROLIDE ACETATE 7.5 MG: KIT at 09:55

## 2019-02-15 ASSESSMENT — PAIN SCALES - GENERAL: PAINLEVEL_OUTOF10: 0

## 2019-02-15 NOTE — DISCHARGE SUMMARY
Tolerated IV Zometa and Leupron injection. Discharge instructions given and voiced understanding. Will return on March 15th at 0900 for next injection and infusion. Pt agreeable for date and time.

## 2019-03-08 ENCOUNTER — HOSPITAL ENCOUNTER (OUTPATIENT)
Age: 54
Discharge: HOME | End: 2019-03-08
Payer: COMMERCIAL

## 2019-03-08 LAB
ALBUMIN SERPL-MCNC: 3.8 GM/DL (ref 3.4–5)
ALP BLD-CCNC: 93 IU/L (ref 40–128)
ALT SERPL-CCNC: 14 U/L (ref 10–40)
ANION GAP SERPL CALCULATED.3IONS-SCNC: 15 MMOL/L (ref 4–16)
AST SERPL-CCNC: 30 IU/L (ref 15–37)
BASOPHILS ABSOLUTE: 0.1 K/CU MM
BASOPHILS RELATIVE PERCENT: 2 % (ref 0–1)
BILIRUB SERPL-MCNC: 0.4 MG/DL (ref 0–1)
BUN BLDV-MCNC: 14 MG/DL (ref 6–23)
CALCIUM SERPL-MCNC: 8.7 MG/DL (ref 8.3–10.6)
CHLORIDE BLD-SCNC: 102 MMOL/L (ref 99–110)
CO2: 24 MMOL/L (ref 21–32)
CREAT SERPL-MCNC: 0.6 MG/DL (ref 0.6–1.1)
DIFFERENTIAL TYPE: ABNORMAL
EOSINOPHILS ABSOLUTE: 0.1 K/CU MM
EOSINOPHILS RELATIVE PERCENT: 2 % (ref 0–3)
GFR AFRICAN AMERICAN: >60 ML/MIN/1.73M2
GFR NON-AFRICAN AMERICAN: >60 ML/MIN/1.73M2
GLUCOSE BLD-MCNC: 169 MG/DL (ref 70–99)
HCT VFR BLD CALC: 37.1 % (ref 37–47)
HEMOGLOBIN: 11.6 GM/DL (ref 12.5–16)
LYMPHOCYTES ABSOLUTE: 1.2 K/CU MM
LYMPHOCYTES RELATIVE PERCENT: 29 % (ref 24–44)
MCH RBC QN AUTO: 32.3 PG (ref 27–31)
MCHC RBC AUTO-ENTMCNC: 31.3 % (ref 32–36)
MCV RBC AUTO: 103.3 FL (ref 78–100)
MONOCYTES ABSOLUTE: 0.1 K/CU MM
MONOCYTES RELATIVE PERCENT: 2 % (ref 0–4)
OVALOCYTES: ABNORMAL
PDW BLD-RTO: 13.4 % (ref 11.7–14.9)
PLATELET # BLD: 439 K/CU MM (ref 140–440)
PMV BLD AUTO: 8.7 FL (ref 7.5–11.1)
POTASSIUM SERPL-SCNC: 4.2 MMOL/L (ref 3.5–5.1)
RBC # BLD: 3.59 M/CU MM (ref 4.2–5.4)
SEGMENTED NEUTROPHILS ABSOLUTE COUNT: 2.6 K/CU MM
SEGMENTED NEUTROPHILS RELATIVE PERCENT: 65 % (ref 36–66)
SODIUM BLD-SCNC: 141 MMOL/L (ref 135–145)
TOTAL PROTEIN: 6.7 GM/DL (ref 6.4–8.2)
WBC # BLD: 4.1 K/CU MM (ref 4–10.5)

## 2019-03-08 PROCEDURE — 85027 COMPLETE CBC AUTOMATED: CPT

## 2019-03-08 PROCEDURE — 85007 BL SMEAR W/DIFF WBC COUNT: CPT

## 2019-03-08 PROCEDURE — 36415 COLL VENOUS BLD VENIPUNCTURE: CPT

## 2019-03-08 PROCEDURE — 86300 IMMUNOASSAY TUMOR CA 15-3: CPT

## 2019-03-08 PROCEDURE — 80053 COMPREHEN METABOLIC PANEL: CPT

## 2019-03-11 LAB
CA 27.29: 121.9 U/ML (ref 0–40)
CA 27.29: ABNORMAL U/ML (ref 0–40)

## 2019-03-15 ENCOUNTER — HOSPITAL ENCOUNTER (OUTPATIENT)
Dept: INFUSION THERAPY | Age: 54
Setting detail: INFUSION SERIES
Discharge: HOME | End: 2019-03-15
Payer: COMMERCIAL

## 2019-03-15 VITALS
RESPIRATION RATE: 16 BRPM | OXYGEN SATURATION: 100 % | SYSTOLIC BLOOD PRESSURE: 123 MMHG | DIASTOLIC BLOOD PRESSURE: 75 MMHG | HEART RATE: 87 BPM | TEMPERATURE: 97.9 F

## 2019-03-15 PROCEDURE — 96374 THER/PROPH/DIAG INJ IV PUSH: CPT

## 2019-03-15 PROCEDURE — 96372 THER/PROPH/DIAG INJ SC/IM: CPT

## 2019-03-15 PROCEDURE — 6360000002 HC RX W HCPCS: Performed by: INTERNAL MEDICINE

## 2019-03-15 PROCEDURE — 2580000003 HC RX 258: Performed by: INTERNAL MEDICINE

## 2019-03-15 RX ADMIN — LEUPROLIDE ACETATE 7.5 MG: KIT at 09:42

## 2019-03-15 RX ADMIN — ZOLEDRONIC ACID 4 MG: 0.8 INJECTION, SOLUTION, CONCENTRATE INTRAVENOUS at 09:52

## 2019-03-15 ASSESSMENT — PAIN SCALES - GENERAL: PAINLEVEL_OUTOF10: 0

## 2019-03-15 NOTE — PROGRESS NOTES
Reviewed discharge instructions, voiced understanding, and copies of AVS given to take home. Pt tolerated infusion and injection well, with no s/s of an allergic reaction. Pt to private auto via steady gait.

## 2019-04-05 ENCOUNTER — HOSPITAL ENCOUNTER (OUTPATIENT)
Age: 54
Discharge: HOME | End: 2019-04-05
Payer: COMMERCIAL

## 2019-04-05 LAB
BANDED NEUTROPHILS ABSOLUTE COUNT: 0.23 K/CU MM
BANDED NEUTROPHILS RELATIVE PERCENT: 5 % (ref 5–11)
DIFFERENTIAL TYPE: ABNORMAL
HCT VFR BLD CALC: 33.8 % (ref 37–47)
HEMOGLOBIN: 10.9 GM/DL (ref 12.5–16)
LYMPHOCYTES ABSOLUTE: 1.1 K/CU MM
LYMPHOCYTES RELATIVE PERCENT: 25 % (ref 24–44)
MCH RBC QN AUTO: 32.1 PG (ref 27–31)
MCHC RBC AUTO-ENTMCNC: 32.2 % (ref 32–36)
MCV RBC AUTO: 99.4 FL (ref 78–100)
MONOCYTES ABSOLUTE: 0.3 K/CU MM
MONOCYTES RELATIVE PERCENT: 7 % (ref 0–4)
PDW BLD-RTO: 14.1 % (ref 11.7–14.9)
PLATELET # BLD: 510 K/CU MM (ref 140–440)
PMV BLD AUTO: 8.8 FL (ref 7.5–11.1)
RBC # BLD: 3.4 M/CU MM (ref 4.2–5.4)
SEGMENTED NEUTROPHILS ABSOLUTE COUNT: 2.9 K/CU MM
SEGMENTED NEUTROPHILS RELATIVE PERCENT: 63 % (ref 36–66)
WBC # BLD: 4.5 K/CU MM (ref 4–10.5)

## 2019-04-05 PROCEDURE — 85027 COMPLETE CBC AUTOMATED: CPT

## 2019-04-05 PROCEDURE — 36415 COLL VENOUS BLD VENIPUNCTURE: CPT

## 2019-04-05 PROCEDURE — 85007 BL SMEAR W/DIFF WBC COUNT: CPT

## 2019-04-12 ENCOUNTER — HOSPITAL ENCOUNTER (OUTPATIENT)
Dept: INFUSION THERAPY | Age: 54
Setting detail: INFUSION SERIES
Discharge: HOME | End: 2019-04-12
Payer: COMMERCIAL

## 2019-04-12 VITALS
SYSTOLIC BLOOD PRESSURE: 136 MMHG | DIASTOLIC BLOOD PRESSURE: 79 MMHG | RESPIRATION RATE: 12 BRPM | TEMPERATURE: 98.9 F | BODY MASS INDEX: 33.99 KG/M2 | WEIGHT: 180 LBS | HEIGHT: 61 IN | HEART RATE: 86 BPM

## 2019-04-12 LAB
CREAT SERPL-MCNC: 0.6 MG/DL (ref 0.6–1.1)
GFR AFRICAN AMERICAN: >60 ML/MIN/1.73M2
GFR NON-AFRICAN AMERICAN: >60 ML/MIN/1.73M2
REASON FOR REJECTION: NORMAL
REJECTED TEST: NORMAL
REJECTED TEST: NORMAL

## 2019-04-12 PROCEDURE — 96402 CHEMO HORMON ANTINEOPL SQ/IM: CPT

## 2019-04-12 PROCEDURE — 6360000002 HC RX W HCPCS: Performed by: INTERNAL MEDICINE

## 2019-04-12 PROCEDURE — 2580000003 HC RX 258: Performed by: INTERNAL MEDICINE

## 2019-04-12 PROCEDURE — 80053 COMPREHEN METABOLIC PANEL: CPT

## 2019-04-12 PROCEDURE — 96413 CHEMO IV INFUSION 1 HR: CPT

## 2019-04-12 PROCEDURE — 82565 ASSAY OF CREATININE: CPT

## 2019-04-12 PROCEDURE — 99211 OFF/OP EST MAY X REQ PHY/QHP: CPT

## 2019-04-12 RX ORDER — SODIUM CHLORIDE 0.9 % (FLUSH) 0.9 %
10 SYRINGE (ML) INJECTION PRN
Status: DISCONTINUED | OUTPATIENT
Start: 2019-04-12 | End: 2019-04-13 | Stop reason: HOSPADM

## 2019-04-12 RX ADMIN — LEUPROLIDE ACETATE 7.5 MG: KIT at 09:35

## 2019-04-12 RX ADMIN — ZOLEDRONIC ACID 4 MG: 0.8 INJECTION, SOLUTION, CONCENTRATE INTRAVENOUS at 11:00

## 2019-04-12 RX ADMIN — SODIUM CHLORIDE, PRESERVATIVE FREE 10 ML: 5 INJECTION INTRAVENOUS at 11:25

## 2019-04-12 NOTE — PLAN OF CARE
Ambulatory to unit room 2 For Zometa and Leuprolide. Reorientated to unit. Procedure and plan of care explained. Questions answered. Understanding verbalized.

## 2019-04-12 NOTE — DISCHARGE SUMMARY
Tolerated Meds well. Discharge instructions explained written copy given. Understanding verbalized. Discharged home. Down to private auto per self.

## 2019-05-10 ENCOUNTER — HOSPITAL ENCOUNTER (OUTPATIENT)
Dept: INFUSION THERAPY | Age: 54
Setting detail: INFUSION SERIES
Discharge: HOME | End: 2019-05-10
Payer: COMMERCIAL

## 2019-05-10 VITALS
TEMPERATURE: 98.6 F | HEART RATE: 89 BPM | RESPIRATION RATE: 14 BRPM | DIASTOLIC BLOOD PRESSURE: 84 MMHG | SYSTOLIC BLOOD PRESSURE: 152 MMHG | OXYGEN SATURATION: 98 %

## 2019-05-10 PROCEDURE — 96401 CHEMO ANTI-NEOPL SQ/IM: CPT

## 2019-05-10 PROCEDURE — 2580000003 HC RX 258: Performed by: INTERNAL MEDICINE

## 2019-05-10 PROCEDURE — 6360000002 HC RX W HCPCS: Performed by: INTERNAL MEDICINE

## 2019-05-10 PROCEDURE — 96365 THER/PROPH/DIAG IV INF INIT: CPT

## 2019-05-10 PROCEDURE — 99211 OFF/OP EST MAY X REQ PHY/QHP: CPT

## 2019-05-10 RX ADMIN — ZOLEDRONIC ACID 4 MG: 4 INJECTION, SOLUTION, CONCENTRATE INTRAVENOUS at 09:34

## 2019-05-10 RX ADMIN — LEUPROLIDE ACETATE 7.5 MG: KIT at 09:33

## 2019-05-10 NOTE — DISCHARGE SUMMARY
Reviewed discharge instructions, voiced understanding, and copies of AVS given to take home. Pt tolerated Zometa infusion and Leupron injection well, with no s/s of an allergic reaction. Pt to private auto via ambulation per self.

## 2019-05-11 ENCOUNTER — HOSPITAL ENCOUNTER (OUTPATIENT)
Age: 54
Discharge: HOME | End: 2019-05-11
Payer: COMMERCIAL

## 2019-05-11 LAB
ATYPICAL LYMPHOCYTE ABSOLUTE COUNT: ABNORMAL
BASOPHILS ABSOLUTE: 0 K/CU MM
BASOPHILS RELATIVE PERCENT: 1 % (ref 0–1)
DIFFERENTIAL TYPE: ABNORMAL
EOSINOPHILS ABSOLUTE: 0.1 K/CU MM
EOSINOPHILS RELATIVE PERCENT: 5 % (ref 0–3)
HCT VFR BLD CALC: 33.4 % (ref 37–47)
HEMOGLOBIN: 10.7 GM/DL (ref 12.5–16)
LYMPHOCYTES ABSOLUTE: 1.5 K/CU MM
LYMPHOCYTES RELATIVE PERCENT: 63 % (ref 24–44)
MCH RBC QN AUTO: 32.6 PG (ref 27–31)
MCHC RBC AUTO-ENTMCNC: 32 % (ref 32–36)
MCV RBC AUTO: 101.8 FL (ref 78–100)
MONOCYTES ABSOLUTE: 0.1 K/CU MM
MONOCYTES RELATIVE PERCENT: 5 % (ref 0–4)
OVALOCYTES: ABNORMAL
PDW BLD-RTO: 14.7 % (ref 11.7–14.9)
PLATELET # BLD: 368 K/CU MM (ref 140–440)
PMV BLD AUTO: 8.7 FL (ref 7.5–11.1)
RBC # BLD: 3.28 M/CU MM (ref 4.2–5.4)
RBC # BLD: ABNORMAL 10*6/UL
SEGMENTED NEUTROPHILS ABSOLUTE COUNT: 0.6 K/CU MM
SEGMENTED NEUTROPHILS RELATIVE PERCENT: 26 % (ref 36–66)
WBC # BLD: 2.3 K/CU MM (ref 4–10.5)
WBC # BLD: ABNORMAL 10*3/UL

## 2019-05-11 PROCEDURE — 85027 COMPLETE CBC AUTOMATED: CPT

## 2019-05-11 PROCEDURE — 85007 BL SMEAR W/DIFF WBC COUNT: CPT

## 2019-05-11 PROCEDURE — 36415 COLL VENOUS BLD VENIPUNCTURE: CPT

## 2019-05-22 ENCOUNTER — HOSPITAL ENCOUNTER (OUTPATIENT)
Age: 54
Discharge: HOME | End: 2019-05-22
Payer: COMMERCIAL

## 2019-05-22 LAB
BASOPHILS ABSOLUTE: 0 K/CU MM
BASOPHILS RELATIVE PERCENT: 1.2 % (ref 0–1)
DIFFERENTIAL TYPE: ABNORMAL
EOSINOPHILS ABSOLUTE: 0.1 K/CU MM
EOSINOPHILS RELATIVE PERCENT: 1.5 % (ref 0–3)
HCT VFR BLD CALC: 34.5 % (ref 37–47)
HEMOGLOBIN: 11 GM/DL (ref 12.5–16)
IMMATURE NEUTROPHIL %: 0.3 % (ref 0–0.43)
LYMPHOCYTES ABSOLUTE: 1.3 K/CU MM
LYMPHOCYTES RELATIVE PERCENT: 37 % (ref 24–44)
MCH RBC QN AUTO: 32.4 PG (ref 27–31)
MCHC RBC AUTO-ENTMCNC: 31.9 % (ref 32–36)
MCV RBC AUTO: 101.8 FL (ref 78–100)
MONOCYTES ABSOLUTE: 0.5 K/CU MM
MONOCYTES RELATIVE PERCENT: 15.1 % (ref 0–4)
NUCLEATED RBC %: 0 %
PDW BLD-RTO: 15.5 % (ref 11.7–14.9)
PLATELET # BLD: 304 K/CU MM (ref 140–440)
PMV BLD AUTO: 8.8 FL (ref 7.5–11.1)
RBC # BLD: 3.39 M/CU MM (ref 4.2–5.4)
SEGMENTED NEUTROPHILS ABSOLUTE COUNT: 1.5 K/CU MM
SEGMENTED NEUTROPHILS RELATIVE PERCENT: 44.9 % (ref 36–66)
TOTAL IMMATURE NEUTOROPHIL: 0.01 K/CU MM
TOTAL NUCLEATED RBC: 0 K/CU MM
WBC # BLD: 3.4 K/CU MM (ref 4–10.5)

## 2019-05-22 PROCEDURE — 85025 COMPLETE CBC W/AUTO DIFF WBC: CPT

## 2019-05-22 PROCEDURE — 36415 COLL VENOUS BLD VENIPUNCTURE: CPT

## 2019-06-07 ENCOUNTER — HOSPITAL ENCOUNTER (OUTPATIENT)
Dept: INFUSION THERAPY | Age: 54
Setting detail: INFUSION SERIES
Discharge: HOME | End: 2019-06-07
Payer: COMMERCIAL

## 2019-06-07 VITALS
WEIGHT: 173 LBS | BODY MASS INDEX: 32.66 KG/M2 | DIASTOLIC BLOOD PRESSURE: 69 MMHG | HEIGHT: 61 IN | SYSTOLIC BLOOD PRESSURE: 125 MMHG | HEART RATE: 93 BPM | OXYGEN SATURATION: 100 % | TEMPERATURE: 98.9 F | RESPIRATION RATE: 14 BRPM

## 2019-06-07 PROCEDURE — 96372 THER/PROPH/DIAG INJ SC/IM: CPT

## 2019-06-07 PROCEDURE — 2580000003 HC RX 258: Performed by: INTERNAL MEDICINE

## 2019-06-07 PROCEDURE — 6360000002 HC RX W HCPCS: Performed by: INTERNAL MEDICINE

## 2019-06-07 PROCEDURE — 99211 OFF/OP EST MAY X REQ PHY/QHP: CPT

## 2019-06-07 PROCEDURE — 96365 THER/PROPH/DIAG IV INF INIT: CPT

## 2019-06-07 RX ORDER — SODIUM CHLORIDE 0.9 % (FLUSH) 0.9 %
10 SYRINGE (ML) INJECTION PRN
Status: ACTIVE | OUTPATIENT
Start: 2019-06-07 | End: 2019-06-07

## 2019-06-07 RX ADMIN — ZOLEDRONIC ACID 4 MG: 0.8 INJECTION, SOLUTION, CONCENTRATE INTRAVENOUS at 09:56

## 2019-06-07 RX ADMIN — SODIUM CHLORIDE, PRESERVATIVE FREE 10 ML: 5 INJECTION INTRAVENOUS at 10:35

## 2019-06-07 RX ADMIN — LEUPROLIDE ACETATE 7.5 MG: KIT at 10:15

## 2019-06-07 NOTE — DISCHARGE SUMMARY
Reviewed discharge instructions, voiced understanding, and copies of AVS given to take home. Pt tolerated Leupron/Zometa well, with no s/s of an allergic reaction. Pt to private auto via ambulation.

## 2019-06-21 ENCOUNTER — HOSPITAL ENCOUNTER (OUTPATIENT)
Dept: NUCLEAR MEDICINE | Age: 54
Discharge: HOME | End: 2019-06-21
Payer: COMMERCIAL

## 2019-06-21 DIAGNOSIS — C79.81 SECONDARY MALIGNANT NEOPLASM OF BREAST (HCC): ICD-10-CM

## 2019-06-21 PROCEDURE — 78306 BONE IMAGING WHOLE BODY: CPT

## 2019-06-21 PROCEDURE — 3430000000 HC RX DIAGNOSTIC RADIOPHARMACEUTICAL: Performed by: NURSE PRACTITIONER

## 2019-06-21 PROCEDURE — A9503 TC99M MEDRONATE: HCPCS | Performed by: NURSE PRACTITIONER

## 2019-06-21 RX ORDER — TC 99M MEDRONATE 20 MG/10ML
25 INJECTION, POWDER, LYOPHILIZED, FOR SOLUTION INTRAVENOUS
Status: COMPLETED | OUTPATIENT
Start: 2019-06-21 | End: 2019-06-21

## 2019-06-21 RX ADMIN — TC 99M MEDRONATE 25 MILLICURIE: 20 INJECTION, POWDER, LYOPHILIZED, FOR SOLUTION INTRAVENOUS at 09:50

## 2019-06-28 ENCOUNTER — HOSPITAL ENCOUNTER (OUTPATIENT)
Age: 54
Discharge: HOME | End: 2019-06-28
Payer: COMMERCIAL

## 2019-06-28 LAB
BASOPHILS ABSOLUTE: 0.1 K/CU MM
BASOPHILS RELATIVE PERCENT: 3 % (ref 0–1)
DIFFERENTIAL TYPE: ABNORMAL
EOSINOPHILS ABSOLUTE: 0 K/CU MM
EOSINOPHILS RELATIVE PERCENT: 1 % (ref 0–3)
HCT VFR BLD CALC: 34.9 % (ref 37–47)
HEMOGLOBIN: 11 GM/DL (ref 12.5–16)
LYMPHOCYTES ABSOLUTE: 0.7 K/CU MM
LYMPHOCYTES RELATIVE PERCENT: 17 % (ref 24–44)
MACROCYTES: ABNORMAL
MCH RBC QN AUTO: 32.8 PG (ref 27–31)
MCHC RBC AUTO-ENTMCNC: 31.5 % (ref 32–36)
MCV RBC AUTO: 104.2 FL (ref 78–100)
MONOCYTES ABSOLUTE: 0.2 K/CU MM
MONOCYTES RELATIVE PERCENT: 6 % (ref 0–4)
PDW BLD-RTO: 14.5 % (ref 11.7–14.9)
PLATELET # BLD: 467 K/CU MM (ref 140–440)
PMV BLD AUTO: 9 FL (ref 7.5–11.1)
RBC # BLD: 3.35 M/CU MM (ref 4.2–5.4)
SEGMENTED NEUTROPHILS ABSOLUTE COUNT: 3 K/CU MM
SEGMENTED NEUTROPHILS RELATIVE PERCENT: 73 % (ref 36–66)
WBC # BLD: 4 K/CU MM (ref 4–10.5)

## 2019-06-28 PROCEDURE — 85027 COMPLETE CBC AUTOMATED: CPT

## 2019-06-28 PROCEDURE — 85007 BL SMEAR W/DIFF WBC COUNT: CPT

## 2019-06-28 PROCEDURE — 36415 COLL VENOUS BLD VENIPUNCTURE: CPT

## 2019-07-05 ENCOUNTER — HOSPITAL ENCOUNTER (OUTPATIENT)
Dept: INFUSION THERAPY | Age: 54
Setting detail: INFUSION SERIES
Discharge: HOME | End: 2019-07-05
Payer: COMMERCIAL

## 2019-07-05 VITALS
SYSTOLIC BLOOD PRESSURE: 134 MMHG | DIASTOLIC BLOOD PRESSURE: 74 MMHG | HEART RATE: 99 BPM | OXYGEN SATURATION: 99 % | TEMPERATURE: 99.7 F | RESPIRATION RATE: 14 BRPM

## 2019-07-05 LAB
CREAT SERPL-MCNC: 0.6 MG/DL (ref 0.6–1.1)
GFR AFRICAN AMERICAN: >60 ML/MIN/1.73M2
GFR NON-AFRICAN AMERICAN: >60 ML/MIN/1.73M2

## 2019-07-05 PROCEDURE — 96365 THER/PROPH/DIAG IV INF INIT: CPT

## 2019-07-05 PROCEDURE — 96372 THER/PROPH/DIAG INJ SC/IM: CPT

## 2019-07-05 PROCEDURE — 99211 OFF/OP EST MAY X REQ PHY/QHP: CPT

## 2019-07-05 PROCEDURE — 82565 ASSAY OF CREATININE: CPT

## 2019-07-05 PROCEDURE — 6360000002 HC RX W HCPCS: Performed by: INTERNAL MEDICINE

## 2019-07-05 PROCEDURE — 2580000003 HC RX 258: Performed by: INTERNAL MEDICINE

## 2019-07-05 RX ADMIN — LEUPROLIDE ACETATE 7.5 MG: KIT at 09:45

## 2019-07-05 RX ADMIN — ZOLEDRONIC ACID 4 MG: 4 INJECTION, SOLUTION, CONCENTRATE INTRAVENOUS at 10:10

## 2019-07-05 NOTE — PROGRESS NOTES
Reviewed discharge instructions, voiced understanding, and copies of AVS given to take home. Pt tolerated Zometa and Leupron well, with no s/s of an allergic reaction. Pt to private auto via ambulation. Agreeable to return on Aug. 2nd, 2019.

## 2019-08-02 ENCOUNTER — HOSPITAL ENCOUNTER (OUTPATIENT)
Dept: INFUSION THERAPY | Age: 54
Setting detail: INFUSION SERIES
Discharge: HOME | End: 2019-08-02
Payer: COMMERCIAL

## 2019-08-02 VITALS
RESPIRATION RATE: 16 BRPM | OXYGEN SATURATION: 100 % | SYSTOLIC BLOOD PRESSURE: 143 MMHG | DIASTOLIC BLOOD PRESSURE: 77 MMHG | HEART RATE: 100 BPM | TEMPERATURE: 99.2 F

## 2019-08-02 PROCEDURE — 96372 THER/PROPH/DIAG INJ SC/IM: CPT

## 2019-08-02 PROCEDURE — 99211 OFF/OP EST MAY X REQ PHY/QHP: CPT

## 2019-08-02 PROCEDURE — 96365 THER/PROPH/DIAG IV INF INIT: CPT

## 2019-08-02 PROCEDURE — 6360000002 HC RX W HCPCS: Performed by: INTERNAL MEDICINE

## 2019-08-02 PROCEDURE — 2580000003 HC RX 258: Performed by: INTERNAL MEDICINE

## 2019-08-02 RX ORDER — SODIUM CHLORIDE 0.9 % (FLUSH) 0.9 %
10 SYRINGE (ML) INJECTION PRN
Status: ACTIVE | OUTPATIENT
Start: 2019-08-02 | End: 2019-08-02

## 2019-08-02 RX ADMIN — ZOLEDRONIC ACID 4 MG: 0.8 INJECTION, SOLUTION, CONCENTRATE INTRAVENOUS at 09:20

## 2019-08-02 RX ADMIN — LEUPROLIDE ACETATE 7.5 MG: KIT at 09:14

## 2019-08-02 NOTE — PROGRESS NOTES
Reviewed discharge instructions, voiced understanding, and copies of AVS given to take home. Pt tolerated Leupron injection and zometa infusion well, with no s/s of an allergic reaction. Pt to private auto via ambulation per self.

## 2019-08-14 ENCOUNTER — HOSPITAL ENCOUNTER (OUTPATIENT)
Age: 54
Discharge: HOME | End: 2019-08-14
Payer: COMMERCIAL

## 2019-08-14 LAB
ALBUMIN SERPL-MCNC: 4 GM/DL (ref 3.4–5)
ALP BLD-CCNC: 95 IU/L (ref 40–128)
ALT SERPL-CCNC: 11 U/L (ref 10–40)
ANION GAP SERPL CALCULATED.3IONS-SCNC: 11 MMOL/L (ref 4–16)
AST SERPL-CCNC: 36 IU/L (ref 15–37)
BASOPHILS ABSOLUTE: 0 K/CU MM
BASOPHILS RELATIVE PERCENT: 1.3 % (ref 0–1)
BILIRUB SERPL-MCNC: 0.3 MG/DL (ref 0–1)
BUN BLDV-MCNC: 15 MG/DL (ref 6–23)
CALCIUM SERPL-MCNC: 9 MG/DL (ref 8.3–10.6)
CHLORIDE BLD-SCNC: 106 MMOL/L (ref 99–110)
CO2: 25 MMOL/L (ref 21–32)
COMMENT: ABNORMAL
CREAT SERPL-MCNC: 0.6 MG/DL (ref 0.6–1.1)
DIFFERENTIAL TYPE: ABNORMAL
DIFFERENTIAL TYPE: ABNORMAL
EOSINOPHILS ABSOLUTE: 0.1 K/CU MM
EOSINOPHILS RELATIVE PERCENT: 2.9 % (ref 0–3)
GFR AFRICAN AMERICAN: >60 ML/MIN/1.73M2
GFR NON-AFRICAN AMERICAN: >60 ML/MIN/1.73M2
GLUCOSE BLD-MCNC: 103 MG/DL (ref 70–99)
HCT VFR BLD CALC: 32 % (ref 37–47)
HEMOGLOBIN: 10.2 GM/DL (ref 12.5–16)
IMMATURE NEUTROPHIL %: 0 % (ref 0–0.43)
LYMPHOCYTES ABSOLUTE: 1.2 K/CU MM
LYMPHOCYTES ABSOLUTE: ABNORMAL K/CU MM
LYMPHOCYTES RELATIVE PERCENT: 40.1 % (ref 24–44)
MACROCYTES: ABNORMAL
MCH RBC QN AUTO: 32.8 PG (ref 27–31)
MCHC RBC AUTO-ENTMCNC: 31.9 % (ref 32–36)
MCV RBC AUTO: 102.9 FL (ref 78–100)
MONOCYTES ABSOLUTE: 0.5 K/CU MM
MONOCYTES RELATIVE PERCENT: 15.4 % (ref 0–4)
NUCLEATED RBC %: 0 %
PDW BLD-RTO: 15.2 % (ref 11.7–14.9)
PLATELET # BLD: 267 K/CU MM (ref 140–440)
PMV BLD AUTO: 8.8 FL (ref 7.5–11.1)
POTASSIUM SERPL-SCNC: 3.9 MMOL/L (ref 3.5–5.1)
RBC # BLD: 3.11 M/CU MM (ref 4.2–5.4)
SEGMENTED NEUTROPHILS ABSOLUTE COUNT: 1.3 K/CU MM
SEGMENTED NEUTROPHILS RELATIVE PERCENT: 40.3 % (ref 36–66)
SODIUM BLD-SCNC: 142 MMOL/L (ref 135–145)
TOTAL IMMATURE NEUTOROPHIL: 0 K/CU MM
TOTAL NUCLEATED RBC: 0 K/CU MM
TOTAL PROTEIN: 6.9 GM/DL (ref 6.4–8.2)
WBC # BLD: 3.1 K/CU MM (ref 4–10.5)

## 2019-08-14 PROCEDURE — 85007 BL SMEAR W/DIFF WBC COUNT: CPT

## 2019-08-14 PROCEDURE — 86300 IMMUNOASSAY TUMOR CA 15-3: CPT

## 2019-08-14 PROCEDURE — 80053 COMPREHEN METABOLIC PANEL: CPT

## 2019-08-14 PROCEDURE — 36415 COLL VENOUS BLD VENIPUNCTURE: CPT

## 2019-08-14 PROCEDURE — 85027 COMPLETE CBC AUTOMATED: CPT

## 2019-08-16 LAB
CA 27.29: 166 U/ML (ref 0–40)
CA 27.29: ABNORMAL U/ML (ref 0–40)

## 2019-08-26 ENCOUNTER — HOSPITAL ENCOUNTER (OUTPATIENT)
Dept: PET IMAGING | Age: 54
Discharge: HOME | End: 2019-08-26
Payer: COMMERCIAL

## 2019-08-26 DIAGNOSIS — C50.112 CANCER OF CENTRAL PORTION OF LEFT BREAST (HCC): ICD-10-CM

## 2019-08-26 PROCEDURE — 3430000000 HC RX DIAGNOSTIC RADIOPHARMACEUTICAL: Performed by: NURSE PRACTITIONER

## 2019-08-26 PROCEDURE — 78815 PET IMAGE W/CT SKULL-THIGH: CPT

## 2019-08-26 PROCEDURE — A9552 F18 FDG: HCPCS | Performed by: NURSE PRACTITIONER

## 2019-08-26 PROCEDURE — 2580000003 HC RX 258: Performed by: NURSE PRACTITIONER

## 2019-08-26 RX ORDER — SODIUM CHLORIDE 0.9 % (FLUSH) 0.9 %
10 SYRINGE (ML) INJECTION PRN
Status: DISCONTINUED | OUTPATIENT
Start: 2019-08-26 | End: 2019-08-27 | Stop reason: HOSPADM

## 2019-08-26 RX ORDER — FLUDEOXYGLUCOSE F 18 200 MCI/ML
14.17 INJECTION, SOLUTION INTRAVENOUS
Status: COMPLETED | OUTPATIENT
Start: 2019-08-26 | End: 2019-08-26

## 2019-08-26 RX ADMIN — SODIUM CHLORIDE, PRESERVATIVE FREE 10 ML: 5 INJECTION INTRAVENOUS at 11:02

## 2019-08-26 RX ADMIN — FLUDEOXYGLUCOSE F 18 14.17 MILLICURIE: 200 INJECTION, SOLUTION INTRAVENOUS at 11:02

## 2019-08-30 ENCOUNTER — HOSPITAL ENCOUNTER (OUTPATIENT)
Dept: INFUSION THERAPY | Age: 54
Setting detail: INFUSION SERIES
Discharge: HOME | End: 2019-08-30
Payer: COMMERCIAL

## 2019-08-30 VITALS
DIASTOLIC BLOOD PRESSURE: 69 MMHG | SYSTOLIC BLOOD PRESSURE: 126 MMHG | HEART RATE: 92 BPM | RESPIRATION RATE: 14 BRPM | TEMPERATURE: 98.1 F | OXYGEN SATURATION: 99 %

## 2019-08-30 PROCEDURE — 6360000002 HC RX W HCPCS: Performed by: INTERNAL MEDICINE

## 2019-08-30 PROCEDURE — 2580000003 HC RX 258: Performed by: INTERNAL MEDICINE

## 2019-08-30 RX ORDER — SODIUM CHLORIDE 0.9 % (FLUSH) 0.9 %
10 SYRINGE (ML) INJECTION PRN
Status: DISCONTINUED | OUTPATIENT
Start: 2019-08-30 | End: 2019-08-31 | Stop reason: HOSPADM

## 2019-08-30 RX ADMIN — SODIUM CHLORIDE, PRESERVATIVE FREE 10 ML: 5 INJECTION INTRAVENOUS at 10:34

## 2019-08-30 RX ADMIN — ZOLEDRONIC ACID 4 MG: 0.8 INJECTION, SOLUTION, CONCENTRATE INTRAVENOUS at 10:09

## 2019-08-30 RX ADMIN — LEUPROLIDE ACETATE 7.5 MG: KIT at 09:32

## 2019-08-30 NOTE — PROGRESS NOTES
Reviewed discharge instructions, voiced understanding, and copies of AVS given to take home. Pt tolerated Zometa and Leupron injection well, with no s/s of an allergic reaction. Pt to private auto via ambulation.

## 2019-09-09 ENCOUNTER — HOSPITAL ENCOUNTER (OUTPATIENT)
Age: 54
Setting detail: SPECIMEN
Discharge: HOME | End: 2019-09-09
Payer: COMMERCIAL

## 2019-09-09 LAB
ALBUMIN SERPL-MCNC: 4 GM/DL (ref 3.4–5)
ALP BLD-CCNC: 100 IU/L (ref 40–128)
ALT SERPL-CCNC: 13 U/L (ref 10–40)
ANION GAP SERPL CALCULATED.3IONS-SCNC: 14 MMOL/L (ref 4–16)
AST SERPL-CCNC: 45 IU/L (ref 15–37)
BILIRUB SERPL-MCNC: 0.3 MG/DL (ref 0–1)
BUN BLDV-MCNC: 13 MG/DL (ref 6–23)
CALCIUM SERPL-MCNC: 9.2 MG/DL (ref 8.3–10.6)
CHLORIDE BLD-SCNC: 104 MMOL/L (ref 99–110)
CO2: 24 MMOL/L (ref 21–32)
CREAT SERPL-MCNC: 0.6 MG/DL (ref 0.6–1.1)
GFR AFRICAN AMERICAN: >60 ML/MIN/1.73M2
GFR NON-AFRICAN AMERICAN: >60 ML/MIN/1.73M2
GLUCOSE BLD-MCNC: 109 MG/DL (ref 70–99)
POTASSIUM SERPL-SCNC: 4.1 MMOL/L (ref 3.5–5.1)
SODIUM BLD-SCNC: 142 MMOL/L (ref 135–145)
TOTAL PROTEIN: 7.1 GM/DL (ref 6.4–8.2)

## 2019-09-09 PROCEDURE — 80053 COMPREHEN METABOLIC PANEL: CPT

## 2019-09-27 ENCOUNTER — HOSPITAL ENCOUNTER (OUTPATIENT)
Dept: INFUSION THERAPY | Age: 54
Setting detail: INFUSION SERIES
Discharge: HOME | End: 2019-09-27
Payer: COMMERCIAL

## 2019-09-27 VITALS
TEMPERATURE: 98.9 F | RESPIRATION RATE: 16 BRPM | DIASTOLIC BLOOD PRESSURE: 71 MMHG | SYSTOLIC BLOOD PRESSURE: 135 MMHG | HEART RATE: 95 BPM | OXYGEN SATURATION: 99 %

## 2019-09-27 PROCEDURE — 96365 THER/PROPH/DIAG IV INF INIT: CPT

## 2019-09-27 PROCEDURE — 96402 CHEMO HORMON ANTINEOPL SQ/IM: CPT

## 2019-09-27 PROCEDURE — 99211 OFF/OP EST MAY X REQ PHY/QHP: CPT

## 2019-09-27 PROCEDURE — 2580000003 HC RX 258: Performed by: INTERNAL MEDICINE

## 2019-09-27 PROCEDURE — 6360000002 HC RX W HCPCS: Performed by: INTERNAL MEDICINE

## 2019-09-27 RX ORDER — SODIUM CHLORIDE 0.9 % (FLUSH) 0.9 %
10 SYRINGE (ML) INJECTION PRN
Status: DISCONTINUED | OUTPATIENT
Start: 2019-09-27 | End: 2019-09-28 | Stop reason: HOSPADM

## 2019-09-27 RX ADMIN — Medication 10 ML: at 09:28

## 2019-09-27 RX ADMIN — Medication 10 ML: at 09:55

## 2019-09-27 RX ADMIN — LEUPROLIDE ACETATE 7.5 MG: KIT at 09:12

## 2019-09-27 RX ADMIN — ZOLEDRONIC ACID 4 MG: 0.8 INJECTION, SOLUTION, CONCENTRATE INTRAVENOUS at 09:32

## 2019-09-27 NOTE — PROGRESS NOTES
Reviewed discharge instructions, voiced understanding, and copies of AVS given to take home. Pt tolerated Zometa infusion along with Leupron injection well, with no s/s of an allergic reaction. Pt to private auto via ambulation.

## 2019-10-25 ENCOUNTER — HOSPITAL ENCOUNTER (OUTPATIENT)
Dept: INFUSION THERAPY | Age: 54
Setting detail: INFUSION SERIES
Discharge: HOME | End: 2019-10-25
Payer: COMMERCIAL

## 2019-10-25 VITALS
HEART RATE: 96 BPM | RESPIRATION RATE: 14 BRPM | OXYGEN SATURATION: 100 % | TEMPERATURE: 98.6 F | DIASTOLIC BLOOD PRESSURE: 75 MMHG | SYSTOLIC BLOOD PRESSURE: 118 MMHG

## 2019-10-25 PROCEDURE — 2580000003 HC RX 258: Performed by: INTERNAL MEDICINE

## 2019-10-25 PROCEDURE — 6360000002 HC RX W HCPCS: Performed by: INTERNAL MEDICINE

## 2019-10-25 PROCEDURE — 96413 CHEMO IV INFUSION 1 HR: CPT

## 2019-10-25 PROCEDURE — 99211 OFF/OP EST MAY X REQ PHY/QHP: CPT

## 2019-10-25 PROCEDURE — 96402 CHEMO HORMON ANTINEOPL SQ/IM: CPT

## 2019-10-25 RX ORDER — 0.9 % SODIUM CHLORIDE 0.9 %
10 VIAL (ML) INJECTION PRN
Status: DISCONTINUED | OUTPATIENT
Start: 2019-10-25 | End: 2019-10-26 | Stop reason: HOSPADM

## 2019-10-25 RX ADMIN — Medication 10 ML: at 09:55

## 2019-10-25 RX ADMIN — LEUPROLIDE ACETATE 7.5 MG: KIT at 10:00

## 2019-10-25 RX ADMIN — ZOLEDRONIC ACID 4 MG: 0.8 INJECTION, SOLUTION, CONCENTRATE INTRAVENOUS at 09:25

## 2019-10-25 NOTE — DISCHARGE SUMMARY
Tolerated Injection and Infuuion well. Discharge instructions explained written copy given. Understanding verbalized. Discharged home. Down to private auto per self.

## 2019-11-22 ENCOUNTER — HOSPITAL ENCOUNTER (OUTPATIENT)
Dept: INFUSION THERAPY | Age: 54
Setting detail: INFUSION SERIES
Discharge: HOME | End: 2019-11-22
Payer: COMMERCIAL

## 2019-11-22 VITALS
TEMPERATURE: 98.4 F | WEIGHT: 169 LBS | OXYGEN SATURATION: 100 % | BODY MASS INDEX: 29.95 KG/M2 | DIASTOLIC BLOOD PRESSURE: 63 MMHG | HEIGHT: 63 IN | HEART RATE: 87 BPM | SYSTOLIC BLOOD PRESSURE: 106 MMHG | RESPIRATION RATE: 14 BRPM

## 2019-11-22 LAB
ALBUMIN SERPL-MCNC: 3.6 GM/DL (ref 3.4–5)
ALP BLD-CCNC: 133 IU/L (ref 40–129)
ALT SERPL-CCNC: 9 U/L (ref 10–40)
ANION GAP SERPL CALCULATED.3IONS-SCNC: 14 MMOL/L (ref 4–16)
AST SERPL-CCNC: 37 IU/L (ref 15–37)
BILIRUB SERPL-MCNC: 0.3 MG/DL (ref 0–1)
BUN BLDV-MCNC: 15 MG/DL (ref 6–23)
CALCIUM SERPL-MCNC: 8.8 MG/DL (ref 8.3–10.6)
CHLORIDE BLD-SCNC: 103 MMOL/L (ref 99–110)
CO2: 23 MMOL/L (ref 21–32)
CREAT SERPL-MCNC: 0.6 MG/DL (ref 0.6–1.1)
GFR AFRICAN AMERICAN: >60 ML/MIN/1.73M2
GFR NON-AFRICAN AMERICAN: >60 ML/MIN/1.73M2
GLUCOSE BLD-MCNC: 100 MG/DL (ref 70–99)
POTASSIUM SERPL-SCNC: 3.9 MMOL/L (ref 3.5–5.1)
SODIUM BLD-SCNC: 140 MMOL/L (ref 135–145)
TOTAL PROTEIN: 6.9 GM/DL (ref 6.4–8.2)

## 2019-11-22 PROCEDURE — 6360000002 HC RX W HCPCS: Performed by: INTERNAL MEDICINE

## 2019-11-22 PROCEDURE — 96365 THER/PROPH/DIAG IV INF INIT: CPT

## 2019-11-22 PROCEDURE — 99211 OFF/OP EST MAY X REQ PHY/QHP: CPT

## 2019-11-22 PROCEDURE — 2580000003 HC RX 258: Performed by: INTERNAL MEDICINE

## 2019-11-22 PROCEDURE — 80053 COMPREHEN METABOLIC PANEL: CPT

## 2019-11-22 PROCEDURE — 96402 CHEMO HORMON ANTINEOPL SQ/IM: CPT

## 2019-11-22 RX ADMIN — ZOLEDRONIC ACID 4 MG: 0.8 INJECTION, SOLUTION, CONCENTRATE INTRAVENOUS at 10:25

## 2019-11-22 RX ADMIN — LEUPROLIDE ACETATE 7.5 MG: KIT at 10:40

## 2019-11-22 NOTE — PROGRESS NOTES
CALLED RESULTS OF CREATININE AND BUN TO [facility] IN PHARMACY. OK TO [address] [place] [address].

## 2019-12-06 ENCOUNTER — HOSPITAL ENCOUNTER (OUTPATIENT)
Age: 54
Setting detail: SPECIMEN
Discharge: HOME | End: 2019-12-06
Payer: COMMERCIAL

## 2019-12-06 LAB
ALBUMIN SERPL-MCNC: 3.9 GM/DL (ref 3.4–5)
ALP BLD-CCNC: 141 IU/L (ref 40–129)
ALT SERPL-CCNC: 9 U/L (ref 10–40)
ANION GAP SERPL CALCULATED.3IONS-SCNC: 14 MMOL/L (ref 4–16)
ANISOCYTOSIS: ABNORMAL
AST SERPL-CCNC: 41 IU/L (ref 15–37)
BASOPHILS ABSOLUTE: 0 K/CU MM
BASOPHILS RELATIVE PERCENT: 1 % (ref 0–1)
BILIRUB SERPL-MCNC: 0.3 MG/DL (ref 0–1)
BUN BLDV-MCNC: 9 MG/DL (ref 6–23)
CALCIUM SERPL-MCNC: 8.8 MG/DL (ref 8.3–10.6)
CHLORIDE BLD-SCNC: 102 MMOL/L (ref 99–110)
CO2: 25 MMOL/L (ref 21–32)
CREAT SERPL-MCNC: 0.5 MG/DL (ref 0.6–1.1)
DIFFERENTIAL TYPE: ABNORMAL
GFR AFRICAN AMERICAN: >60 ML/MIN/1.73M2
GFR NON-AFRICAN AMERICAN: >60 ML/MIN/1.73M2
GLUCOSE BLD-MCNC: 94 MG/DL (ref 70–99)
HCT VFR BLD CALC: 32.4 % (ref 37–47)
HEMOGLOBIN: 10.1 GM/DL (ref 12.5–16)
LYMPHOCYTES ABSOLUTE: 1.7 K/CU MM
LYMPHOCYTES RELATIVE PERCENT: 57 % (ref 24–44)
MCH RBC QN AUTO: 30.9 PG (ref 27–31)
MCHC RBC AUTO-ENTMCNC: 31.2 % (ref 32–36)
MCV RBC AUTO: 99.1 FL (ref 78–100)
MONOCYTES ABSOLUTE: 0.6 K/CU MM
MONOCYTES RELATIVE PERCENT: 20 % (ref 0–4)
PDW BLD-RTO: 17 % (ref 11.7–14.9)
PLATELET # BLD: 389 K/CU MM (ref 140–440)
PMV BLD AUTO: 9.3 FL (ref 7.5–11.1)
POLYCHROMASIA: ABNORMAL
POTASSIUM SERPL-SCNC: 4.4 MMOL/L (ref 3.5–5.1)
RBC # BLD: 3.27 M/CU MM (ref 4.2–5.4)
SEGMENTED NEUTROPHILS ABSOLUTE COUNT: 0.6 K/CU MM
SEGMENTED NEUTROPHILS RELATIVE PERCENT: 22 % (ref 36–66)
SODIUM BLD-SCNC: 141 MMOL/L (ref 135–145)
TOTAL PROTEIN: 7.2 GM/DL (ref 6.4–8.2)
WBC # BLD: 2.9 K/CU MM (ref 4–10.5)
WBC # BLD: ABNORMAL 10*3/UL

## 2019-12-06 PROCEDURE — 80053 COMPREHEN METABOLIC PANEL: CPT

## 2019-12-06 PROCEDURE — 85027 COMPLETE CBC AUTOMATED: CPT

## 2019-12-06 PROCEDURE — 85007 BL SMEAR W/DIFF WBC COUNT: CPT

## 2019-12-06 PROCEDURE — 86300 IMMUNOASSAY TUMOR CA 15-3: CPT

## 2019-12-08 LAB
CA 27.29: 199.9 U/ML (ref 0–40)
CA 27.29: ABNORMAL U/ML (ref 0–40)

## 2019-12-11 ENCOUNTER — HOSPITAL ENCOUNTER (OUTPATIENT)
Age: 54
Setting detail: SPECIMEN
Discharge: HOME | End: 2019-12-11
Payer: COMMERCIAL

## 2019-12-11 LAB
ALBUMIN SERPL-MCNC: 3.9 GM/DL (ref 3.4–5)
ALP BLD-CCNC: 148 IU/L (ref 40–128)
ALT SERPL-CCNC: 29 U/L (ref 10–40)
ANION GAP SERPL CALCULATED.3IONS-SCNC: 14 MMOL/L (ref 4–16)
AST SERPL-CCNC: 61 IU/L (ref 15–37)
BILIRUB SERPL-MCNC: 0.2 MG/DL (ref 0–1)
BUN BLDV-MCNC: 12 MG/DL (ref 6–23)
CALCIUM SERPL-MCNC: 8.6 MG/DL (ref 8.3–10.6)
CHLORIDE BLD-SCNC: 99 MMOL/L (ref 99–110)
CHOLESTEROL: 135 MG/DL
CO2: 23 MMOL/L (ref 21–32)
CREAT SERPL-MCNC: 0.5 MG/DL (ref 0.6–1.1)
GFR AFRICAN AMERICAN: >60 ML/MIN/1.73M2
GFR NON-AFRICAN AMERICAN: >60 ML/MIN/1.73M2
GLUCOSE BLD-MCNC: 118 MG/DL (ref 70–99)
HDLC SERPL-MCNC: 54 MG/DL
LDL CHOLESTEROL DIRECT: 65 MG/DL
POTASSIUM SERPL-SCNC: 4 MMOL/L (ref 3.5–5.1)
SODIUM BLD-SCNC: 136 MMOL/L (ref 135–145)
TOTAL PROTEIN: 7.3 GM/DL (ref 6.4–8.2)
TRIGL SERPL-MCNC: 74 MG/DL

## 2019-12-11 PROCEDURE — 83721 ASSAY OF BLOOD LIPOPROTEIN: CPT

## 2019-12-11 PROCEDURE — 80053 COMPREHEN METABOLIC PANEL: CPT

## 2019-12-11 PROCEDURE — 80061 LIPID PANEL: CPT

## 2019-12-20 ENCOUNTER — HOSPITAL ENCOUNTER (OUTPATIENT)
Dept: INFUSION THERAPY | Age: 54
Setting detail: INFUSION SERIES
Discharge: HOME | End: 2019-12-20
Payer: COMMERCIAL

## 2019-12-20 VITALS
OXYGEN SATURATION: 97 % | HEART RATE: 96 BPM | DIASTOLIC BLOOD PRESSURE: 73 MMHG | WEIGHT: 169 LBS | TEMPERATURE: 98.8 F | HEIGHT: 63 IN | RESPIRATION RATE: 12 BRPM | BODY MASS INDEX: 29.95 KG/M2 | SYSTOLIC BLOOD PRESSURE: 127 MMHG

## 2019-12-20 PROCEDURE — 6360000002 HC RX W HCPCS: Performed by: INTERNAL MEDICINE

## 2019-12-20 PROCEDURE — 99211 OFF/OP EST MAY X REQ PHY/QHP: CPT

## 2019-12-20 PROCEDURE — 2580000003 HC RX 258: Performed by: INTERNAL MEDICINE

## 2019-12-20 PROCEDURE — 96413 CHEMO IV INFUSION 1 HR: CPT

## 2019-12-20 PROCEDURE — 96402 CHEMO HORMON ANTINEOPL SQ/IM: CPT

## 2019-12-20 RX ORDER — 0.9 % SODIUM CHLORIDE 0.9 %
10 VIAL (ML) INJECTION PRN
Status: DISCONTINUED | OUTPATIENT
Start: 2019-12-20 | End: 2019-12-21 | Stop reason: HOSPADM

## 2019-12-20 RX ADMIN — ZOLEDRONIC ACID 4 MG: 0.8 INJECTION, SOLUTION, CONCENTRATE INTRAVENOUS at 10:50

## 2019-12-20 RX ADMIN — Medication 10 ML: at 11:20

## 2019-12-20 RX ADMIN — LEUPROLIDE ACETATE 7.5 MG: KIT at 10:20

## 2019-12-20 RX ADMIN — Medication 10 ML: at 09:10

## 2019-12-20 NOTE — PLAN OF CARE
Ambulatory to unit room 2. Reorientated to unit. Procedure and plan of care explained. Questions answered. Understanding verbalized.

## 2019-12-20 NOTE — DISCHARGE SUMMARY
Tolerated medications well. Reviewed discharge instructions, voiced understanding, and copies of AVS given to take home.     Orders Placed This Encounter   Medications    sodium chloride (PF) 0.9 % injection 10 mL    leuprolide (LUPRON) injection 7.5 mg    zoledronic acid (ZOMETA) 4 mg in sodium chloride 0.9 % 100 mL IVPB

## 2020-01-01 ENCOUNTER — APPOINTMENT (OUTPATIENT)
Dept: INFUSION THERAPY | Age: 55
End: 2020-01-01
Payer: COMMERCIAL

## 2020-01-01 ENCOUNTER — HOSPITAL ENCOUNTER (OUTPATIENT)
Dept: CT IMAGING | Age: 55
Discharge: HOME | End: 2020-10-05
Payer: COMMERCIAL

## 2020-01-01 ENCOUNTER — HOSPITAL ENCOUNTER (OUTPATIENT)
Dept: INFUSION THERAPY | Age: 55
Setting detail: INFUSION SERIES
End: 2020-01-01
Payer: COMMERCIAL

## 2020-01-01 ENCOUNTER — TELEPHONE (OUTPATIENT)
Dept: ONCOLOGY | Age: 55
End: 2020-01-01

## 2020-01-01 ENCOUNTER — HOSPITAL ENCOUNTER (OUTPATIENT)
Dept: INFUSION THERAPY | Age: 55
Setting detail: INFUSION SERIES
Discharge: HOME | End: 2020-11-25
Payer: COMMERCIAL

## 2020-01-01 ENCOUNTER — CLINICAL DOCUMENTATION (OUTPATIENT)
Dept: ONCOLOGY | Age: 55
End: 2020-01-01

## 2020-01-01 ENCOUNTER — HOSPITAL ENCOUNTER (OUTPATIENT)
Dept: INFUSION THERAPY | Age: 55
Setting detail: INFUSION SERIES
Discharge: HOME | End: 2020-09-25
Payer: COMMERCIAL

## 2020-01-01 ENCOUNTER — HOSPITAL ENCOUNTER (OUTPATIENT)
Dept: INFUSION THERAPY | Age: 55
Setting detail: INFUSION SERIES
Discharge: HOME | End: 2020-12-30
Payer: COMMERCIAL

## 2020-01-01 ENCOUNTER — OFFICE VISIT (OUTPATIENT)
Dept: ONCOLOGY | Age: 55
End: 2020-01-01
Payer: COMMERCIAL

## 2020-01-01 ENCOUNTER — CLINICAL DOCUMENTATION (OUTPATIENT)
Dept: CASE MANAGEMENT | Age: 55
End: 2020-01-01

## 2020-01-01 ENCOUNTER — HOSPITAL ENCOUNTER (OUTPATIENT)
Dept: INFUSION THERAPY | Age: 55
Setting detail: INFUSION SERIES
Discharge: HOME | End: 2020-11-20

## 2020-01-01 ENCOUNTER — HOSPITAL ENCOUNTER (OUTPATIENT)
Age: 55
Discharge: HOME | End: 2020-10-05
Payer: COMMERCIAL

## 2020-01-01 ENCOUNTER — HOSPITAL ENCOUNTER (OUTPATIENT)
Dept: INFUSION THERAPY | Age: 55
Discharge: HOME | End: 2020-10-13
Payer: COMMERCIAL

## 2020-01-01 ENCOUNTER — HOSPITAL ENCOUNTER (OUTPATIENT)
Dept: INFUSION THERAPY | Age: 55
Setting detail: INFUSION SERIES
Discharge: HOME | End: 2020-08-28
Payer: COMMERCIAL

## 2020-01-01 ENCOUNTER — HOSPITAL ENCOUNTER (OUTPATIENT)
Dept: INFUSION THERAPY | Age: 55
Setting detail: INFUSION SERIES
Discharge: HOME | End: 2020-10-23
Payer: COMMERCIAL

## 2020-01-01 ENCOUNTER — HOSPITAL ENCOUNTER (OUTPATIENT)
Dept: INFUSION THERAPY | Age: 55
Discharge: HOME | End: 2020-09-15
Payer: COMMERCIAL

## 2020-01-01 VITALS
OXYGEN SATURATION: 98 % | TEMPERATURE: 98.6 F | DIASTOLIC BLOOD PRESSURE: 72 MMHG | HEART RATE: 96 BPM | RESPIRATION RATE: 16 BRPM | SYSTOLIC BLOOD PRESSURE: 117 MMHG

## 2020-01-01 VITALS
OXYGEN SATURATION: 99 % | RESPIRATION RATE: 14 BRPM | TEMPERATURE: 97.9 F | HEART RATE: 128 BPM | DIASTOLIC BLOOD PRESSURE: 69 MMHG | SYSTOLIC BLOOD PRESSURE: 143 MMHG

## 2020-01-01 VITALS
HEIGHT: 61 IN | SYSTOLIC BLOOD PRESSURE: 127 MMHG | DIASTOLIC BLOOD PRESSURE: 82 MMHG | TEMPERATURE: 98.7 F | WEIGHT: 137.2 LBS | RESPIRATION RATE: 18 BRPM | BODY MASS INDEX: 25.9 KG/M2 | OXYGEN SATURATION: 95 % | HEART RATE: 147 BPM

## 2020-01-01 VITALS
TEMPERATURE: 100.2 F | DIASTOLIC BLOOD PRESSURE: 69 MMHG | HEART RATE: 131 BPM | SYSTOLIC BLOOD PRESSURE: 129 MMHG | OXYGEN SATURATION: 98 % | RESPIRATION RATE: 14 BRPM

## 2020-01-01 VITALS
BODY MASS INDEX: 24.35 KG/M2 | HEART RATE: 102 BPM | SYSTOLIC BLOOD PRESSURE: 120 MMHG | HEIGHT: 61 IN | OXYGEN SATURATION: 98 % | RESPIRATION RATE: 16 BRPM | WEIGHT: 129 LBS | TEMPERATURE: 98.8 F | DIASTOLIC BLOOD PRESSURE: 69 MMHG

## 2020-01-01 VITALS
OXYGEN SATURATION: 100 % | RESPIRATION RATE: 16 BRPM | DIASTOLIC BLOOD PRESSURE: 79 MMHG | HEART RATE: 131 BPM | SYSTOLIC BLOOD PRESSURE: 148 MMHG | TEMPERATURE: 98.2 F

## 2020-01-01 VITALS
SYSTOLIC BLOOD PRESSURE: 127 MMHG | RESPIRATION RATE: 14 BRPM | HEART RATE: 106 BPM | OXYGEN SATURATION: 100 % | TEMPERATURE: 98.4 F | DIASTOLIC BLOOD PRESSURE: 65 MMHG

## 2020-01-01 VITALS
DIASTOLIC BLOOD PRESSURE: 66 MMHG | WEIGHT: 132.8 LBS | TEMPERATURE: 98.9 F | RESPIRATION RATE: 20 BRPM | SYSTOLIC BLOOD PRESSURE: 146 MMHG | BODY MASS INDEX: 25.07 KG/M2 | HEART RATE: 145 BPM | HEIGHT: 61 IN | OXYGEN SATURATION: 96 %

## 2020-01-01 DIAGNOSIS — C79.81 SECONDARY MALIGNANT NEOPLASM OF BREAST (HCC): ICD-10-CM

## 2020-01-01 DIAGNOSIS — C50.112 MALIGNANT NEOPLASM OF CENTRAL PORTION OF LEFT FEMALE BREAST, UNSPECIFIED ESTROGEN RECEPTOR STATUS (HCC): Primary | ICD-10-CM

## 2020-01-01 DIAGNOSIS — C50.112 MALIGNANT NEOPLASM OF CENTRAL PORTION OF LEFT FEMALE BREAST, UNSPECIFIED ESTROGEN RECEPTOR STATUS (HCC): ICD-10-CM

## 2020-01-01 DIAGNOSIS — E86.0 DEHYDRATION: ICD-10-CM

## 2020-01-01 LAB
ALBUMIN SERPL-MCNC: 2.8 GM/DL (ref 3.4–5)
ALBUMIN SERPL-MCNC: 3.3 GM/DL (ref 3.4–5)
ALBUMIN SERPL-MCNC: 3.7 GM/DL (ref 3.4–5)
ALBUMIN SERPL-MCNC: 3.7 GM/DL (ref 3.4–5)
ALBUMIN SERPL-MCNC: 3.8 GM/DL (ref 3.4–5)
ALP BLD-CCNC: 101 IU/L (ref 40–129)
ALP BLD-CCNC: 66 IU/L (ref 40–129)
ALP BLD-CCNC: 66 IU/L (ref 40–129)
ALP BLD-CCNC: 68 IU/L (ref 40–129)
ALP BLD-CCNC: 82 IU/L (ref 40–128)
ALT SERPL-CCNC: 12 U/L (ref 10–40)
ALT SERPL-CCNC: 14 U/L (ref 10–40)
ALT SERPL-CCNC: 16 U/L (ref 10–40)
ALT SERPL-CCNC: 18 U/L (ref 10–40)
ALT SERPL-CCNC: 27 U/L (ref 10–40)
ANION GAP SERPL CALCULATED.3IONS-SCNC: 15 MMOL/L (ref 4–16)
ANION GAP SERPL CALCULATED.3IONS-SCNC: 16 MMOL/L (ref 4–16)
ANION GAP SERPL CALCULATED.3IONS-SCNC: 16 MMOL/L (ref 4–16)
ANION GAP SERPL CALCULATED.3IONS-SCNC: 19 MMOL/L (ref 4–16)
ANION GAP SERPL CALCULATED.3IONS-SCNC: 21 MMOL/L (ref 4–16)
AST SERPL-CCNC: 42 IU/L (ref 15–37)
AST SERPL-CCNC: 56 IU/L (ref 15–37)
AST SERPL-CCNC: 64 IU/L (ref 15–37)
AST SERPL-CCNC: 80 IU/L (ref 15–37)
AST SERPL-CCNC: 90 IU/L (ref 15–37)
BASOPHILS ABSOLUTE: 0 K/CU MM
BASOPHILS RELATIVE PERCENT: 0.3 % (ref 0–1)
BILIRUB SERPL-MCNC: 0.1 MG/DL (ref 0–1)
BILIRUB SERPL-MCNC: 0.2 MG/DL (ref 0–1)
BILIRUB SERPL-MCNC: 0.3 MG/DL (ref 0–1)
BUN BLDV-MCNC: 11 MG/DL (ref 6–23)
BUN BLDV-MCNC: 13 MG/DL (ref 6–23)
BUN BLDV-MCNC: 17 MG/DL (ref 6–23)
BUN BLDV-MCNC: 17 MG/DL (ref 6–23)
BUN BLDV-MCNC: 21 MG/DL (ref 6–23)
CA 27.29: 409.2 U/ML (ref 0–40)
CALCIUM SERPL-MCNC: 7.7 MG/DL (ref 8.3–10.6)
CALCIUM SERPL-MCNC: 8.1 MG/DL (ref 8.3–10.6)
CALCIUM SERPL-MCNC: 8.6 MG/DL (ref 8.3–10.6)
CALCIUM SERPL-MCNC: 8.7 MG/DL (ref 8.3–10.6)
CALCIUM SERPL-MCNC: 9.1 MG/DL (ref 8.3–10.6)
CHLORIDE BLD-SCNC: 100 MMOL/L (ref 99–110)
CHLORIDE BLD-SCNC: 103 MMOL/L (ref 99–110)
CHLORIDE BLD-SCNC: 105 MMOL/L (ref 99–110)
CHLORIDE BLD-SCNC: 105 MMOL/L (ref 99–110)
CHLORIDE BLD-SCNC: 94 MMOL/L (ref 99–110)
CO2: 20 MMOL/L (ref 21–32)
CO2: 22 MMOL/L (ref 21–32)
CO2: 22 MMOL/L (ref 21–32)
CO2: 23 MMOL/L (ref 21–32)
CO2: 23 MMOL/L (ref 21–32)
CREAT SERPL-MCNC: 0.7 MG/DL (ref 0.6–1.1)
CREAT SERPL-MCNC: 0.8 MG/DL (ref 0.6–1.1)
CREAT SERPL-MCNC: 1.2 MG/DL (ref 0.6–1.1)
CREAT SERPL-MCNC: 1.4 MG/DL (ref 0.6–1.1)
CREAT SERPL-MCNC: 1.5 MG/DL (ref 0.6–1.1)
DIFFERENTIAL TYPE: ABNORMAL
EOSINOPHILS ABSOLUTE: 0.1 K/CU MM
EOSINOPHILS RELATIVE PERCENT: 1 % (ref 0–3)
FERRITIN: 485 NG/ML (ref 15–150)
GFR AFRICAN AMERICAN: 44 ML/MIN/1.73M2
GFR AFRICAN AMERICAN: 48 ML/MIN/1.73M2
GFR AFRICAN AMERICAN: 57 ML/MIN/1.73M2
GFR AFRICAN AMERICAN: >60 ML/MIN/1.73M2
GFR AFRICAN AMERICAN: >60 ML/MIN/1.73M2
GFR NON-AFRICAN AMERICAN: 36 ML/MIN/1.73M2
GFR NON-AFRICAN AMERICAN: 39 ML/MIN/1.73M2
GFR NON-AFRICAN AMERICAN: 47 ML/MIN/1.73M2
GFR NON-AFRICAN AMERICAN: >60 ML/MIN/1.73M2
GFR NON-AFRICAN AMERICAN: >60 ML/MIN/1.73M2
GLUCOSE BLD-MCNC: 112 MG/DL (ref 70–99)
GLUCOSE BLD-MCNC: 116 MG/DL (ref 70–99)
GLUCOSE BLD-MCNC: 116 MG/DL (ref 70–99)
GLUCOSE BLD-MCNC: 137 MG/DL (ref 70–99)
GLUCOSE BLD-MCNC: 192 MG/DL (ref 70–99)
HCT VFR BLD CALC: 28 % (ref 37–47)
HEMOGLOBIN: 8.2 GM/DL (ref 12.5–16)
IMMATURE NEUTROPHIL %: 0.3 % (ref 0–0.43)
IRON: 25 UG/DL (ref 37–145)
LYMPHOCYTES ABSOLUTE: 1.8 K/CU MM
LYMPHOCYTES RELATIVE PERCENT: 30.3 % (ref 24–44)
MAGNESIUM: 2.2 MG/DL (ref 1.8–2.4)
MCH RBC QN AUTO: 21.5 PG (ref 27–31)
MCHC RBC AUTO-ENTMCNC: 29.3 % (ref 32–36)
MCV RBC AUTO: 73.3 FL (ref 78–100)
MONOCYTES ABSOLUTE: 0.5 K/CU MM
MONOCYTES RELATIVE PERCENT: 9 % (ref 0–4)
PCT TRANSFERRIN: 15 % (ref 10–44)
PDW BLD-RTO: 19.4 % (ref 11.7–14.9)
PLATELET # BLD: 542 K/CU MM (ref 140–440)
PMV BLD AUTO: 8.4 FL (ref 7.5–11.1)
POTASSIUM SERPL-SCNC: 2.8 MMOL/L (ref 3.5–5.1)
POTASSIUM SERPL-SCNC: 3.3 MMOL/L (ref 3.5–5.1)
POTASSIUM SERPL-SCNC: 3.6 MMOL/L (ref 3.5–5.1)
POTASSIUM SERPL-SCNC: 4 MMOL/L (ref 3.5–5.1)
POTASSIUM SERPL-SCNC: 4.1 MMOL/L (ref 3.5–5.1)
RBC # BLD: 3.82 M/CU MM (ref 4.2–5.4)
SEGMENTED NEUTROPHILS ABSOLUTE COUNT: 3.4 K/CU MM
SEGMENTED NEUTROPHILS RELATIVE PERCENT: 59.1 % (ref 36–66)
SODIUM BLD-SCNC: 135 MMOL/L (ref 135–145)
SODIUM BLD-SCNC: 139 MMOL/L (ref 135–145)
SODIUM BLD-SCNC: 140 MMOL/L (ref 135–145)
SODIUM BLD-SCNC: 144 MMOL/L (ref 135–145)
SODIUM BLD-SCNC: 146 MMOL/L (ref 135–145)
TOTAL IMMATURE NEUTOROPHIL: 0.02 K/CU MM
TOTAL IRON BINDING CAPACITY: 165 UG/DL (ref 250–450)
TOTAL PROTEIN: 5.7 GM/DL (ref 6.4–8.2)
TOTAL PROTEIN: 6.3 GM/DL (ref 6.4–8.2)
TOTAL PROTEIN: 6.6 GM/DL (ref 6.4–8.2)
TOTAL PROTEIN: 6.9 GM/DL (ref 6.4–8.2)
TOTAL PROTEIN: 7.5 GM/DL (ref 6.4–8.2)
UNSATURATED IRON BINDING CAPACITY: 140 UG/DL (ref 110–370)
WBC # BLD: 5.8 K/CU MM (ref 4–10.5)

## 2020-01-01 PROCEDURE — 2580000003 HC RX 258: Performed by: INTERNAL MEDICINE

## 2020-01-01 PROCEDURE — 86300 IMMUNOASSAY TUMOR CA 15-3: CPT

## 2020-01-01 PROCEDURE — 83550 IRON BINDING TEST: CPT

## 2020-01-01 PROCEDURE — 71250 CT THORAX DX C-: CPT

## 2020-01-01 PROCEDURE — 6360000002 HC RX W HCPCS: Performed by: INTERNAL MEDICINE

## 2020-01-01 PROCEDURE — 96417 CHEMO IV INFUS EACH ADDL SEQ: CPT

## 2020-01-01 PROCEDURE — 99213 OFFICE O/P EST LOW 20 MIN: CPT | Performed by: INTERNAL MEDICINE

## 2020-01-01 PROCEDURE — 96361 HYDRATE IV INFUSION ADD-ON: CPT

## 2020-01-01 PROCEDURE — 99211 OFF/OP EST MAY X REQ PHY/QHP: CPT

## 2020-01-01 PROCEDURE — 96375 TX/PRO/DX INJ NEW DRUG ADDON: CPT

## 2020-01-01 PROCEDURE — 96365 THER/PROPH/DIAG IV INF INIT: CPT

## 2020-01-01 PROCEDURE — 6360000004 HC RX CONTRAST MEDICATION: Performed by: INTERNAL MEDICINE

## 2020-01-01 PROCEDURE — 96402 CHEMO HORMON ANTINEOPL SQ/IM: CPT

## 2020-01-01 PROCEDURE — 83735 ASSAY OF MAGNESIUM: CPT

## 2020-01-01 PROCEDURE — 82728 ASSAY OF FERRITIN: CPT

## 2020-01-01 PROCEDURE — 99214 OFFICE O/P EST MOD 30 MIN: CPT | Performed by: INTERNAL MEDICINE

## 2020-01-01 PROCEDURE — 96372 THER/PROPH/DIAG INJ SC/IM: CPT

## 2020-01-01 PROCEDURE — 74176 CT ABD & PELVIS W/O CONTRAST: CPT

## 2020-01-01 PROCEDURE — 80053 COMPREHEN METABOLIC PANEL: CPT

## 2020-01-01 PROCEDURE — 96360 HYDRATION IV INFUSION INIT: CPT

## 2020-01-01 PROCEDURE — 83540 ASSAY OF IRON: CPT

## 2020-01-01 PROCEDURE — 36415 COLL VENOUS BLD VENIPUNCTURE: CPT

## 2020-01-01 PROCEDURE — 85025 COMPLETE CBC W/AUTO DIFF WBC: CPT

## 2020-01-01 PROCEDURE — 96374 THER/PROPH/DIAG INJ IV PUSH: CPT

## 2020-01-01 RX ORDER — DIPHENHYDRAMINE HYDROCHLORIDE 50 MG/ML
50 INJECTION INTRAMUSCULAR; INTRAVENOUS ONCE
Status: CANCELLED | OUTPATIENT
Start: 2020-01-01 | End: 2020-01-01

## 2020-01-01 RX ORDER — SODIUM CHLORIDE 0.9 % (FLUSH) 0.9 %
5 SYRINGE (ML) INJECTION PRN
Status: CANCELLED | OUTPATIENT
Start: 2020-01-01

## 2020-01-01 RX ORDER — DEXAMETHASONE SODIUM PHOSPHATE 10 MG/ML
8 INJECTION, SOLUTION INTRAMUSCULAR; INTRAVENOUS ONCE
Status: COMPLETED | OUTPATIENT
Start: 2020-01-01 | End: 2020-01-01

## 2020-01-01 RX ORDER — METHYLPREDNISOLONE SODIUM SUCCINATE 125 MG/2ML
125 INJECTION, POWDER, LYOPHILIZED, FOR SOLUTION INTRAMUSCULAR; INTRAVENOUS ONCE
Status: CANCELLED | OUTPATIENT
Start: 2020-01-01 | End: 2020-01-01

## 2020-01-01 RX ORDER — EPINEPHRINE 1 MG/ML
0.3 INJECTION, SOLUTION, CONCENTRATE INTRAVENOUS PRN
Status: CANCELLED | OUTPATIENT
Start: 2020-01-01

## 2020-01-01 RX ORDER — SODIUM CHLORIDE 9 MG/ML
INJECTION, SOLUTION INTRAVENOUS CONTINUOUS
Status: CANCELLED | OUTPATIENT
Start: 2020-01-01

## 2020-01-01 RX ORDER — SODIUM CHLORIDE 0.9 % (FLUSH) 0.9 %
10 SYRINGE (ML) INJECTION PRN
Status: CANCELLED | OUTPATIENT
Start: 2020-01-01

## 2020-01-01 RX ORDER — 0.9 % SODIUM CHLORIDE 0.9 %
1000 INTRAVENOUS SOLUTION INTRAVENOUS ONCE
Status: CANCELLED | OUTPATIENT
Start: 2020-01-01

## 2020-01-01 RX ORDER — PROMETHAZINE HYDROCHLORIDE 25 MG/ML
12.5 INJECTION, SOLUTION INTRAMUSCULAR; INTRAVENOUS ONCE
Status: COMPLETED | OUTPATIENT
Start: 2020-01-01 | End: 2020-01-01

## 2020-01-01 RX ORDER — EVEROLIMUS 10 MG/1
10 TABLET ORAL DAILY
Qty: 28 TABLET | Refills: 6 | Status: SHIPPED | OUTPATIENT
Start: 2020-01-01 | End: 2020-01-01 | Stop reason: SDUPTHER

## 2020-01-01 RX ORDER — HEPARIN SODIUM (PORCINE) LOCK FLUSH IV SOLN 100 UNIT/ML 100 UNIT/ML
500 SOLUTION INTRAVENOUS PRN
Status: CANCELLED | OUTPATIENT
Start: 2020-01-01

## 2020-01-01 RX ORDER — SODIUM CHLORIDE 9 MG/ML
INJECTION, SOLUTION INTRAVENOUS ONCE
Status: COMPLETED | OUTPATIENT
Start: 2020-01-01 | End: 2020-01-01

## 2020-01-01 RX ORDER — PROMETHAZINE HYDROCHLORIDE 50 MG/ML
12.5 INJECTION, SOLUTION INTRAMUSCULAR; INTRAVENOUS ONCE
Status: CANCELLED
Start: 2020-01-01

## 2020-01-01 RX ORDER — SODIUM CHLORIDE 9 MG/ML
20 INJECTION, SOLUTION INTRAVENOUS ONCE
Status: CANCELLED | OUTPATIENT
Start: 2020-01-01

## 2020-01-01 RX ORDER — SODIUM CHLORIDE 0.9 % (FLUSH) 0.9 %
10 SYRINGE (ML) INJECTION PRN
Status: DISCONTINUED | OUTPATIENT
Start: 2020-01-01 | End: 2020-01-01 | Stop reason: HOSPADM

## 2020-01-01 RX ORDER — TRAMADOL HYDROCHLORIDE 50 MG/1
50 TABLET ORAL EVERY 8 HOURS PRN
Qty: 45 TABLET | Refills: 0 | Status: SHIPPED | OUTPATIENT
Start: 2020-01-01 | End: 2021-01-01

## 2020-01-01 RX ORDER — PROMETHAZINE HYDROCHLORIDE 25 MG/1
25 TABLET ORAL EVERY 6 HOURS PRN
Qty: 30 TABLET | Refills: 0 | Status: SHIPPED | OUTPATIENT
Start: 2020-01-01 | End: 2020-01-01

## 2020-01-01 RX ORDER — LORAZEPAM 0.5 MG/1
0.5 TABLET ORAL NIGHTLY PRN
Qty: 30 TABLET | Refills: 0 | Status: SHIPPED | OUTPATIENT
Start: 2020-01-01 | End: 2020-01-01

## 2020-01-01 RX ORDER — METHYLPREDNISOLONE SODIUM SUCCINATE 125 MG/2ML
125 INJECTION, POWDER, LYOPHILIZED, FOR SOLUTION INTRAMUSCULAR; INTRAVENOUS ONCE
Status: CANCELLED | OUTPATIENT
Start: 2020-01-01

## 2020-01-01 RX ORDER — EVEROLIMUS 10 MG/1
10 TABLET ORAL DAILY
Qty: 28 TABLET | Refills: 6 | Status: SHIPPED
Start: 2020-01-01 | End: 2021-01-01 | Stop reason: ALTCHOICE

## 2020-01-01 RX ORDER — ONDANSETRON 4 MG/1
4 TABLET, ORALLY DISINTEGRATING ORAL EVERY 8 HOURS PRN
Qty: 30 TABLET | Refills: 2 | Status: SHIPPED | OUTPATIENT
Start: 2020-01-01 | End: 2021-01-01 | Stop reason: ALTCHOICE

## 2020-01-01 RX ORDER — DEXAMETHASONE SODIUM PHOSPHATE 10 MG/ML
8 INJECTION, SOLUTION INTRAMUSCULAR; INTRAVENOUS ONCE
Status: CANCELLED
Start: 2020-01-01

## 2020-01-01 RX ORDER — EXEMESTANE 25 MG/1
25 TABLET ORAL DAILY
Qty: 30 TABLET | Refills: 3 | Status: SHIPPED | OUTPATIENT
Start: 2020-01-01 | End: 2021-01-01 | Stop reason: ALTCHOICE

## 2020-01-01 RX ORDER — SODIUM CHLORIDE 9 MG/ML
20 INJECTION, SOLUTION INTRAVENOUS ONCE
Status: CANCELLED | OUTPATIENT
Start: 2020-01-01 | End: 2020-01-01

## 2020-01-01 RX ORDER — AMLODIPINE BESYLATE 5 MG/1
10 TABLET ORAL DAILY
Qty: 30 TABLET | Refills: 3 | Status: SHIPPED | OUTPATIENT
Start: 2020-01-01 | End: 2021-01-01

## 2020-01-01 RX ORDER — EVEROLIMUS 10 MG/1
TABLET ORAL
COMMUNITY
Start: 2020-01-01 | End: 2020-01-01 | Stop reason: SDUPTHER

## 2020-01-01 RX ORDER — DIPHENHYDRAMINE HYDROCHLORIDE 50 MG/ML
50 INJECTION INTRAMUSCULAR; INTRAVENOUS ONCE
Status: CANCELLED | OUTPATIENT
Start: 2020-01-01

## 2020-01-01 RX ORDER — LORAZEPAM 0.5 MG/1
0.5 TABLET ORAL NIGHTLY PRN
Qty: 30 TABLET | Refills: 0 | Status: SHIPPED | OUTPATIENT
Start: 2020-01-01 | End: 2021-01-01 | Stop reason: SDUPTHER

## 2020-01-01 RX ORDER — PROMETHAZINE HYDROCHLORIDE 25 MG/1
TABLET ORAL
Qty: 30 TABLET | Refills: 0 | Status: SHIPPED | OUTPATIENT
Start: 2020-01-01

## 2020-01-01 RX ORDER — 0.9 % SODIUM CHLORIDE 0.9 %
1000 INTRAVENOUS SOLUTION INTRAVENOUS ONCE
Status: COMPLETED | OUTPATIENT
Start: 2020-01-01 | End: 2020-01-01

## 2020-01-01 RX ORDER — POTASSIUM CHLORIDE 20 MEQ/1
20 TABLET, EXTENDED RELEASE ORAL 3 TIMES DAILY
Qty: 15 TABLET | Refills: 0 | Status: SHIPPED | OUTPATIENT
Start: 2020-01-01 | End: 2020-01-01 | Stop reason: ALTCHOICE

## 2020-01-01 RX ADMIN — ZOLEDRONIC ACID 4 MG: 4 INJECTION, SOLUTION, CONCENTRATE INTRAVENOUS at 09:16

## 2020-01-01 RX ADMIN — IOHEXOL 50 ML: 240 INJECTION, SOLUTION INTRATHECAL; INTRAVASCULAR; INTRAVENOUS; ORAL at 12:24

## 2020-01-01 RX ADMIN — SODIUM CHLORIDE: 9 INJECTION, SOLUTION INTRAVENOUS at 15:35

## 2020-01-01 RX ADMIN — LEUPROLIDE ACETATE 7.5 MG: KIT at 10:35

## 2020-01-01 RX ADMIN — ZOLEDRONIC ACID 4 MG: 0.8 INJECTION, SOLUTION, CONCENTRATE INTRAVENOUS at 10:47

## 2020-01-01 RX ADMIN — LEUPROLIDE ACETATE 7.5 MG: KIT at 09:28

## 2020-01-01 RX ADMIN — LEUPROLIDE ACETATE 7.5 MG: KIT at 10:50

## 2020-01-01 RX ADMIN — ZOLEDRONIC ACID 4 MG: 0.8 INJECTION, SOLUTION, CONCENTRATE INTRAVENOUS at 10:45

## 2020-01-01 RX ADMIN — DEXAMETHASONE SODIUM PHOSPHATE: 10 INJECTION, SOLUTION INTRAMUSCULAR; INTRAVENOUS at 11:08

## 2020-01-01 RX ADMIN — ZOLEDRONIC ACID 3 MG: 0.8 INJECTION, SOLUTION, CONCENTRATE INTRAVENOUS at 16:04

## 2020-01-01 RX ADMIN — LEUPROLIDE ACETATE 7.5 MG: KIT at 09:24

## 2020-01-01 RX ADMIN — LEUPROLIDE ACETATE 7.5 MG: KIT at 15:12

## 2020-01-01 RX ADMIN — SODIUM CHLORIDE, PRESERVATIVE FREE 10 ML: 5 INJECTION INTRAVENOUS at 09:38

## 2020-01-01 RX ADMIN — PROMETHAZINE HYDROCHLORIDE 12.5 MG: 25 INJECTION INTRAMUSCULAR; INTRAVENOUS at 11:10

## 2020-01-01 RX ADMIN — SODIUM CHLORIDE 1000 ML: 9 INJECTION, SOLUTION INTRAVENOUS at 10:10

## 2020-01-01 ASSESSMENT — PATIENT HEALTH QUESTIONNAIRE - PHQ9
SUM OF ALL RESPONSES TO PHQ QUESTIONS 1-9: 0
SUM OF ALL RESPONSES TO PHQ QUESTIONS 1-9: 0
1. LITTLE INTEREST OR PLEASURE IN DOING THINGS: 0
2. FEELING DOWN, DEPRESSED OR HOPELESS: 0
SUM OF ALL RESPONSES TO PHQ9 QUESTIONS 1 & 2: 0
SUM OF ALL RESPONSES TO PHQ9 QUESTIONS 1 & 2: 0
1. LITTLE INTEREST OR PLEASURE IN DOING THINGS: 0
2. FEELING DOWN, DEPRESSED OR HOPELESS: 0
SUM OF ALL RESPONSES TO PHQ QUESTIONS 1-9: 0
SUM OF ALL RESPONSES TO PHQ QUESTIONS 1-9: 0

## 2020-01-03 ENCOUNTER — HOSPITAL ENCOUNTER (OUTPATIENT)
Dept: CT IMAGING | Age: 55
Discharge: HOME | End: 2020-01-03
Payer: COMMERCIAL

## 2020-01-03 ENCOUNTER — HOSPITAL ENCOUNTER (OUTPATIENT)
Dept: NUCLEAR MEDICINE | Age: 55
Discharge: HOME | End: 2020-01-03
Payer: COMMERCIAL

## 2020-01-03 PROCEDURE — 3430000000 HC RX DIAGNOSTIC RADIOPHARMACEUTICAL

## 2020-01-03 PROCEDURE — 71260 CT THORAX DX C+: CPT

## 2020-01-03 PROCEDURE — 6360000004 HC RX CONTRAST MEDICATION: Performed by: INTERNAL MEDICINE

## 2020-01-03 PROCEDURE — 3430000000 HC RX DIAGNOSTIC RADIOPHARMACEUTICAL: Performed by: INTERNAL MEDICINE

## 2020-01-03 PROCEDURE — 78306 BONE IMAGING WHOLE BODY: CPT

## 2020-01-03 PROCEDURE — A9503 TC99M MEDRONATE: HCPCS | Performed by: INTERNAL MEDICINE

## 2020-01-03 PROCEDURE — 74160 CT ABDOMEN W/CONTRAST: CPT

## 2020-01-03 PROCEDURE — A9503 TC99M MEDRONATE: HCPCS

## 2020-01-03 RX ORDER — TC 99M MEDRONATE 20 MG/10ML
26.5 INJECTION, POWDER, LYOPHILIZED, FOR SOLUTION INTRAVENOUS
Status: COMPLETED | OUTPATIENT
Start: 2020-01-03 | End: 2020-01-03

## 2020-01-03 RX ADMIN — IOPAMIDOL 80 ML: 755 INJECTION, SOLUTION INTRAVENOUS at 12:25

## 2020-01-03 RX ADMIN — TC 99M MEDRONATE 26.5 MILLICURIE: 20 INJECTION, POWDER, LYOPHILIZED, FOR SOLUTION INTRAVENOUS at 09:58

## 2020-01-03 RX ADMIN — IOHEXOL 50 ML: 240 INJECTION, SOLUTION INTRATHECAL; INTRAVASCULAR; INTRAVENOUS; ORAL at 12:25

## 2020-01-10 ENCOUNTER — HOSPITAL ENCOUNTER (OUTPATIENT)
Dept: INFUSION THERAPY | Age: 55
Discharge: HOME | End: 2020-01-10
Payer: COMMERCIAL

## 2020-01-10 LAB
ALBUMIN SERPL-MCNC: 4 GM/DL (ref 3.4–5)
ALP BLD-CCNC: 168 IU/L (ref 40–128)
ALT SERPL-CCNC: 8 U/L (ref 10–40)
ANION GAP SERPL CALCULATED.3IONS-SCNC: 18 MMOL/L (ref 4–16)
AST SERPL-CCNC: 57 IU/L (ref 15–37)
BASOPHILS ABSOLUTE: 0.1 K/CU MM
BASOPHILS RELATIVE PERCENT: 1.8 % (ref 0–1)
BILIRUB SERPL-MCNC: 0.3 MG/DL (ref 0–1)
BUN BLDV-MCNC: 11 MG/DL (ref 6–23)
CALCIUM SERPL-MCNC: 9 MG/DL (ref 8.3–10.6)
CHLORIDE BLD-SCNC: 102 MMOL/L (ref 99–110)
CO2: 20 MMOL/L (ref 21–32)
CREAT SERPL-MCNC: 0.5 MG/DL (ref 0.6–1.1)
DIFFERENTIAL TYPE: ABNORMAL
EOSINOPHILS ABSOLUTE: 0.1 K/CU MM
EOSINOPHILS RELATIVE PERCENT: 1.2 % (ref 0–3)
GFR AFRICAN AMERICAN: >60 ML/MIN/1.73M2
GFR NON-AFRICAN AMERICAN: >60 ML/MIN/1.73M2
GLUCOSE BLD-MCNC: 108 MG/DL (ref 70–99)
HCT VFR BLD CALC: 32 % (ref 37–47)
HEMOGLOBIN: 10.3 GM/DL (ref 12.5–16)
LYMPHOCYTES ABSOLUTE: 1.7 K/CU MM
LYMPHOCYTES RELATIVE PERCENT: 32.9 % (ref 24–44)
MCH RBC QN AUTO: 30.3 PG (ref 27–31)
MCHC RBC AUTO-ENTMCNC: 32.2 % (ref 32–36)
MCV RBC AUTO: 94.1 FL (ref 78–100)
MONOCYTES ABSOLUTE: 1.3 K/CU MM
MONOCYTES RELATIVE PERCENT: 26 % (ref 0–4)
PDW BLD-RTO: 18 % (ref 11.7–14.9)
PLATELET # BLD: 454 K/CU MM (ref 140–440)
PMV BLD AUTO: 8.3 FL (ref 7.5–11.1)
POTASSIUM SERPL-SCNC: 4 MMOL/L (ref 3.5–5.1)
RBC # BLD: 3.4 M/CU MM (ref 4.2–5.4)
SEGMENTED NEUTROPHILS ABSOLUTE COUNT: 1.9 K/CU MM
SEGMENTED NEUTROPHILS RELATIVE PERCENT: 38.1 % (ref 36–66)
SODIUM BLD-SCNC: 140 MMOL/L (ref 135–145)
TOTAL PROTEIN: 7.6 GM/DL (ref 6.4–8.2)
WBC # BLD: 5.1 K/CU MM (ref 4–10.5)

## 2020-01-10 PROCEDURE — 85025 COMPLETE CBC W/AUTO DIFF WBC: CPT

## 2020-01-10 PROCEDURE — 80053 COMPREHEN METABOLIC PANEL: CPT

## 2020-01-10 PROCEDURE — 36415 COLL VENOUS BLD VENIPUNCTURE: CPT

## 2020-01-17 ENCOUNTER — HOSPITAL ENCOUNTER (OUTPATIENT)
Dept: INFUSION THERAPY | Age: 55
Setting detail: INFUSION SERIES
Discharge: HOME | End: 2020-01-17
Payer: COMMERCIAL

## 2020-01-17 VITALS
TEMPERATURE: 98.1 F | DIASTOLIC BLOOD PRESSURE: 72 MMHG | RESPIRATION RATE: 14 BRPM | WEIGHT: 165 LBS | HEIGHT: 61 IN | OXYGEN SATURATION: 100 % | HEART RATE: 102 BPM | SYSTOLIC BLOOD PRESSURE: 121 MMHG | BODY MASS INDEX: 31.15 KG/M2

## 2020-01-17 PROCEDURE — 6360000002 HC RX W HCPCS: Performed by: INTERNAL MEDICINE

## 2020-01-17 PROCEDURE — 99211 OFF/OP EST MAY X REQ PHY/QHP: CPT

## 2020-01-17 PROCEDURE — 96402 CHEMO HORMON ANTINEOPL SQ/IM: CPT

## 2020-01-17 PROCEDURE — 96365 THER/PROPH/DIAG IV INF INIT: CPT

## 2020-01-17 PROCEDURE — 2580000003 HC RX 258: Performed by: INTERNAL MEDICINE

## 2020-01-17 RX ORDER — EVEROLIMUS 10 MG/1
10 TABLET ORAL DAILY
COMMUNITY
Start: 2020-01-10 | End: 2020-02-07

## 2020-01-17 RX ORDER — EXEMESTANE 25 MG/1
25 TABLET ORAL DAILY
COMMUNITY
End: 2020-01-01 | Stop reason: SDUPTHER

## 2020-01-17 RX ADMIN — ZOLEDRONIC ACID 4 MG: 0.8 INJECTION, SOLUTION, CONCENTRATE INTRAVENOUS at 09:30

## 2020-01-17 RX ADMIN — LEUPROLIDE ACETATE 7.5 MG: KIT at 09:41

## 2020-01-17 NOTE — PROGRESS NOTES
Tolerated Zometa and Leupron injection well. Reviewed discharge instructions, voiced understanding, and copies of AVS given to take home. Next appt given. Pt to exit via ambulation.     Orders Placed This Encounter   Medications    zoledronic acid (ZOMETA) 4 mg in sodium chloride 0.9 % 100 mL IVPB    leuprolide (LUPRON) injection 7.5 mg

## 2020-02-14 ENCOUNTER — HOSPITAL ENCOUNTER (OUTPATIENT)
Dept: INFUSION THERAPY | Age: 55
Setting detail: INFUSION SERIES
Discharge: HOME | End: 2020-02-14
Payer: COMMERCIAL

## 2020-02-14 VITALS
RESPIRATION RATE: 14 BRPM | SYSTOLIC BLOOD PRESSURE: 106 MMHG | TEMPERATURE: 98.1 F | DIASTOLIC BLOOD PRESSURE: 68 MMHG | OXYGEN SATURATION: 99 % | HEART RATE: 95 BPM

## 2020-02-14 LAB
ALBUMIN SERPL-MCNC: 4.1 GM/DL (ref 3.4–5)
ALP BLD-CCNC: 135 IU/L (ref 40–129)
ALT SERPL-CCNC: 120 U/L (ref 10–40)
ANION GAP SERPL CALCULATED.3IONS-SCNC: 15 MMOL/L (ref 4–16)
AST SERPL-CCNC: 74 IU/L (ref 15–37)
BILIRUB SERPL-MCNC: 0.3 MG/DL (ref 0–1)
BUN BLDV-MCNC: 11 MG/DL (ref 6–23)
CALCIUM SERPL-MCNC: 8.4 MG/DL (ref 8.3–10.6)
CHLORIDE BLD-SCNC: 107 MMOL/L (ref 99–110)
CO2: 22 MMOL/L (ref 21–32)
CREAT SERPL-MCNC: 0.5 MG/DL (ref 0.6–1.1)
GFR AFRICAN AMERICAN: >60 ML/MIN/1.73M2
GFR NON-AFRICAN AMERICAN: >60 ML/MIN/1.73M2
GLUCOSE BLD-MCNC: 128 MG/DL (ref 70–99)
POTASSIUM SERPL-SCNC: 3.8 MMOL/L (ref 3.5–5.1)
SODIUM BLD-SCNC: 144 MMOL/L (ref 135–145)
TOTAL PROTEIN: 7.2 GM/DL (ref 6.4–8.2)

## 2020-02-14 PROCEDURE — 6360000002 HC RX W HCPCS: Performed by: INTERNAL MEDICINE

## 2020-02-14 PROCEDURE — 96360 HYDRATION IV INFUSION INIT: CPT

## 2020-02-14 PROCEDURE — 96402 CHEMO HORMON ANTINEOPL SQ/IM: CPT

## 2020-02-14 PROCEDURE — 2580000003 HC RX 258: Performed by: INTERNAL MEDICINE

## 2020-02-14 PROCEDURE — 99211 OFF/OP EST MAY X REQ PHY/QHP: CPT

## 2020-02-14 PROCEDURE — 80053 COMPREHEN METABOLIC PANEL: CPT

## 2020-02-14 RX ORDER — SODIUM CHLORIDE 0.9 % (FLUSH) 0.9 %
10 SYRINGE (ML) INJECTION PRN
Status: DISCONTINUED | OUTPATIENT
Start: 2020-02-14 | End: 2020-02-15 | Stop reason: HOSPADM

## 2020-02-14 RX ADMIN — ZOLEDRONIC ACID 4 MG: 0.8 INJECTION, SOLUTION, CONCENTRATE INTRAVENOUS at 09:59

## 2020-02-14 RX ADMIN — LEUPROLIDE ACETATE 7.5 MG: KIT at 10:01

## 2020-02-14 NOTE — PROGRESS NOTES
Prior to discharge, the After Visit Summary Discharge Instructions were reviewed with the patient. She was offered a printed version of the AVS, but declined the offer.      Orders Placed This Encounter   Medications    leuprolide (LUPRON) injection 7.5 mg    zoledronic acid (ZOMETA) 4 mg in sodium chloride 0.9 % 100 mL IVPB    sodium chloride flush 0.9 % injection 10 mL

## 2020-02-28 ENCOUNTER — HOSPITAL ENCOUNTER (OUTPATIENT)
Dept: INFUSION THERAPY | Age: 55
Discharge: HOME | End: 2020-02-28
Payer: COMMERCIAL

## 2020-02-28 LAB
ALBUMIN SERPL-MCNC: 4.1 GM/DL (ref 3.4–5)
ALP BLD-CCNC: 101 IU/L (ref 40–128)
ALT SERPL-CCNC: 59 U/L (ref 10–40)
ANION GAP SERPL CALCULATED.3IONS-SCNC: 16 MMOL/L (ref 4–16)
AST SERPL-CCNC: 49 IU/L (ref 15–37)
BASOPHILS ABSOLUTE: 0 K/CU MM
BASOPHILS RELATIVE PERCENT: 0.6 % (ref 0–1)
BILIRUB SERPL-MCNC: 0.2 MG/DL (ref 0–1)
BUN BLDV-MCNC: 15 MG/DL (ref 6–23)
CALCIUM SERPL-MCNC: 8.8 MG/DL (ref 8.3–10.6)
CHLORIDE BLD-SCNC: 105 MMOL/L (ref 99–110)
CO2: 22 MMOL/L (ref 21–32)
CREAT SERPL-MCNC: 0.7 MG/DL (ref 0.6–1.1)
DIFFERENTIAL TYPE: ABNORMAL
EOSINOPHILS ABSOLUTE: 0.2 K/CU MM
EOSINOPHILS RELATIVE PERCENT: 3.4 % (ref 0–3)
GFR AFRICAN AMERICAN: >60 ML/MIN/1.73M2
GFR NON-AFRICAN AMERICAN: >60 ML/MIN/1.73M2
GLUCOSE BLD-MCNC: 142 MG/DL (ref 70–99)
HCT VFR BLD CALC: 35 % (ref 37–47)
HEMOGLOBIN: 11 GM/DL (ref 12.5–16)
LYMPHOCYTES ABSOLUTE: 1.4 K/CU MM
LYMPHOCYTES RELATIVE PERCENT: 28.8 % (ref 24–44)
MCH RBC QN AUTO: 27.2 PG (ref 27–31)
MCHC RBC AUTO-ENTMCNC: 31.4 % (ref 32–36)
MCV RBC AUTO: 86.4 FL (ref 78–100)
MONOCYTES ABSOLUTE: 0.6 K/CU MM
MONOCYTES RELATIVE PERCENT: 12.3 % (ref 0–4)
PDW BLD-RTO: 18.1 % (ref 11.7–14.9)
PLATELET # BLD: 356 K/CU MM (ref 140–440)
PMV BLD AUTO: 8.7 FL (ref 7.5–11.1)
POTASSIUM SERPL-SCNC: 4.1 MMOL/L (ref 3.5–5.1)
RBC # BLD: 4.05 M/CU MM (ref 4.2–5.4)
SEGMENTED NEUTROPHILS ABSOLUTE COUNT: 2.7 K/CU MM
SEGMENTED NEUTROPHILS RELATIVE PERCENT: 54.9 % (ref 36–66)
SODIUM BLD-SCNC: 143 MMOL/L (ref 135–145)
TOTAL PROTEIN: 7.1 GM/DL (ref 6.4–8.2)
WBC # BLD: 5 K/CU MM (ref 4–10.5)

## 2020-02-28 PROCEDURE — 36415 COLL VENOUS BLD VENIPUNCTURE: CPT

## 2020-02-28 PROCEDURE — 80053 COMPREHEN METABOLIC PANEL: CPT

## 2020-02-28 PROCEDURE — 85025 COMPLETE CBC W/AUTO DIFF WBC: CPT

## 2020-03-13 ENCOUNTER — HOSPITAL ENCOUNTER (OUTPATIENT)
Dept: INFUSION THERAPY | Age: 55
Setting detail: INFUSION SERIES
Discharge: HOME | End: 2020-03-13
Payer: COMMERCIAL

## 2020-03-13 VITALS
OXYGEN SATURATION: 100 % | RESPIRATION RATE: 16 BRPM | HEART RATE: 94 BPM | DIASTOLIC BLOOD PRESSURE: 70 MMHG | SYSTOLIC BLOOD PRESSURE: 130 MMHG | TEMPERATURE: 97.4 F

## 2020-03-13 PROCEDURE — 6360000002 HC RX W HCPCS: Performed by: INTERNAL MEDICINE

## 2020-03-13 RX ORDER — CETIRIZINE HYDROCHLORIDE 10 MG/1
10 TABLET ORAL DAILY
COMMUNITY

## 2020-03-13 RX ORDER — ZOLEDRONIC ACID 0.04 MG/ML
4 INJECTION, SOLUTION INTRAVENOUS ONCE
Status: COMPLETED | OUTPATIENT
Start: 2020-03-13 | End: 2020-03-13

## 2020-03-13 RX ADMIN — ZOLEDRONIC ACID 4 MG: 0.04 INJECTION, SOLUTION INTRAVENOUS at 09:58

## 2020-03-13 RX ADMIN — LEUPROLIDE ACETATE 7.5 MG: KIT at 09:22

## 2020-03-13 NOTE — PROGRESS NOTES
Prior to discharge, the After Visit Summary Discharge Instructions were reviewed with the patient. She was offered a printed version of the AVS, but declined the offer. Recurrent appointment, pt tolerated infusion well. Pt discharged home. Pt to exit via ambulation.     Orders Placed This Encounter   Medications    leuprolide (LUPRON) injection 7.5 mg    DISCONTD: zoledronic acid (ZOMETA) 4 mg in sodium chloride 0.9 % 100 mL IVPB    zoledronic acid (ZOMETA) 4 mg/100 mL infusion

## 2020-04-01 PROBLEM — I10 ESSENTIAL HYPERTENSION: Status: ACTIVE | Noted: 2020-04-01

## 2020-04-01 PROBLEM — C79.51 SECONDARY CANCER OF BONE (HCC): Status: ACTIVE | Noted: 2020-04-01

## 2020-04-10 ENCOUNTER — HOSPITAL ENCOUNTER (OUTPATIENT)
Dept: INFUSION THERAPY | Age: 55
Setting detail: INFUSION SERIES
Discharge: HOME | End: 2020-04-10
Payer: COMMERCIAL

## 2020-04-10 VITALS
RESPIRATION RATE: 14 BRPM | TEMPERATURE: 96.4 F | HEART RATE: 91 BPM | SYSTOLIC BLOOD PRESSURE: 138 MMHG | DIASTOLIC BLOOD PRESSURE: 72 MMHG | OXYGEN SATURATION: 99 %

## 2020-04-10 LAB
ALBUMIN SERPL-MCNC: 4.2 GM/DL (ref 3.4–5)
ALP BLD-CCNC: 77 IU/L (ref 40–129)
ALT SERPL-CCNC: 48 U/L (ref 10–40)
ANION GAP SERPL CALCULATED.3IONS-SCNC: 16 MMOL/L (ref 4–16)
AST SERPL-CCNC: 50 IU/L (ref 15–37)
BILIRUB SERPL-MCNC: 0.3 MG/DL (ref 0–1)
BUN BLDV-MCNC: 13 MG/DL (ref 6–23)
CALCIUM SERPL-MCNC: 8.6 MG/DL (ref 8.3–10.6)
CHLORIDE BLD-SCNC: 97 MMOL/L (ref 99–110)
CO2: 20 MMOL/L (ref 21–32)
CREAT SERPL-MCNC: 0.6 MG/DL (ref 0.6–1.1)
GFR AFRICAN AMERICAN: >60 ML/MIN/1.73M2
GFR NON-AFRICAN AMERICAN: >60 ML/MIN/1.73M2
GLUCOSE BLD-MCNC: 116 MG/DL (ref 70–99)
POTASSIUM SERPL-SCNC: 3.6 MMOL/L (ref 3.5–5.1)
SODIUM BLD-SCNC: 133 MMOL/L (ref 135–145)
TOTAL PROTEIN: 7.1 GM/DL (ref 6.4–8.2)

## 2020-04-10 PROCEDURE — 2580000003 HC RX 258: Performed by: INTERNAL MEDICINE

## 2020-04-10 PROCEDURE — 96402 CHEMO HORMON ANTINEOPL SQ/IM: CPT

## 2020-04-10 PROCEDURE — 80053 COMPREHEN METABOLIC PANEL: CPT

## 2020-04-10 PROCEDURE — 6360000002 HC RX W HCPCS: Performed by: INTERNAL MEDICINE

## 2020-04-10 PROCEDURE — 99211 OFF/OP EST MAY X REQ PHY/QHP: CPT

## 2020-04-10 PROCEDURE — 96365 THER/PROPH/DIAG IV INF INIT: CPT

## 2020-04-10 RX ORDER — SODIUM CHLORIDE 0.9 % (FLUSH) 0.9 %
10 SYRINGE (ML) INJECTION PRN
Status: DISCONTINUED | OUTPATIENT
Start: 2020-04-10 | End: 2020-04-11 | Stop reason: HOSPADM

## 2020-04-10 RX ADMIN — ZOLEDRONIC ACID 4 MG: 4 INJECTION, SOLUTION, CONCENTRATE INTRAVENOUS at 09:56

## 2020-04-10 RX ADMIN — LEUPROLIDE ACETATE 7.5 MG: KIT at 10:40

## 2020-04-10 NOTE — DISCHARGE SUMMARY
Tolerated Transfusion well. Reviewed discharge instructions, understanding verbalized. Report called to Catherine at [facility]. Copies of AVS given to take home. Patient discharged with Med Trans. To exit per wheelchair.     Orders Placed This Encounter   Medications    zoledronic acid (ZOMETA) 4 mg in sodium chloride 0.9 % 100 mL IVPB    leuprolide (LUPRON) injection 7.5 mg    sodium chloride flush 0.9 % injection 10 mL

## 2020-05-07 ENCOUNTER — HOSPITAL ENCOUNTER (OUTPATIENT)
Dept: PET IMAGING | Age: 55
Discharge: HOME | End: 2020-05-07
Payer: COMMERCIAL

## 2020-05-07 PROCEDURE — 78815 PET IMAGE W/CT SKULL-THIGH: CPT

## 2020-05-07 PROCEDURE — A9552 F18 FDG: HCPCS | Performed by: INTERNAL MEDICINE

## 2020-05-07 PROCEDURE — 2580000003 HC RX 258: Performed by: INTERNAL MEDICINE

## 2020-05-07 PROCEDURE — 3430000000 HC RX DIAGNOSTIC RADIOPHARMACEUTICAL: Performed by: INTERNAL MEDICINE

## 2020-05-07 RX ORDER — SODIUM CHLORIDE 0.9 % (FLUSH) 0.9 %
10 SYRINGE (ML) INJECTION PRN
Status: COMPLETED | OUTPATIENT
Start: 2020-05-07 | End: 2020-05-07

## 2020-05-07 RX ORDER — FLUDEOXYGLUCOSE F 18 200 MCI/ML
14.3 INJECTION, SOLUTION INTRAVENOUS
Status: COMPLETED | OUTPATIENT
Start: 2020-05-07 | End: 2020-05-07

## 2020-05-07 RX ADMIN — SODIUM CHLORIDE, PRESERVATIVE FREE 10 ML: 5 INJECTION INTRAVENOUS at 08:20

## 2020-05-07 RX ADMIN — FLUDEOXYGLUCOSE F 18 14.3 MILLICURIE: 200 INJECTION, SOLUTION INTRAVENOUS at 08:20

## 2020-05-08 ENCOUNTER — HOSPITAL ENCOUNTER (OUTPATIENT)
Dept: INFUSION THERAPY | Age: 55
Setting detail: INFUSION SERIES
Discharge: HOME | End: 2020-05-08
Payer: COMMERCIAL

## 2020-05-08 VITALS
HEART RATE: 85 BPM | TEMPERATURE: 97.5 F | SYSTOLIC BLOOD PRESSURE: 117 MMHG | DIASTOLIC BLOOD PRESSURE: 62 MMHG | RESPIRATION RATE: 14 BRPM

## 2020-05-08 LAB
ALBUMIN SERPL-MCNC: 3.7 GM/DL (ref 3.4–5)
ALP BLD-CCNC: 65 IU/L (ref 40–128)
ALT SERPL-CCNC: 40 U/L (ref 10–40)
ANION GAP SERPL CALCULATED.3IONS-SCNC: 13 MMOL/L (ref 4–16)
AST SERPL-CCNC: 40 IU/L (ref 15–37)
BILIRUB SERPL-MCNC: 0.2 MG/DL (ref 0–1)
BUN BLDV-MCNC: 16 MG/DL (ref 6–23)
CALCIUM SERPL-MCNC: 8.9 MG/DL (ref 8.3–10.6)
CHLORIDE BLD-SCNC: 107 MMOL/L (ref 99–110)
CO2: 22 MMOL/L (ref 21–32)
CREAT SERPL-MCNC: 0.7 MG/DL (ref 0.6–1.1)
GFR AFRICAN AMERICAN: >60 ML/MIN/1.73M2
GFR NON-AFRICAN AMERICAN: >60 ML/MIN/1.73M2
GLUCOSE BLD-MCNC: 120 MG/DL (ref 70–99)
POTASSIUM SERPL-SCNC: 4 MMOL/L (ref 3.5–5.1)
SODIUM BLD-SCNC: 142 MMOL/L (ref 135–145)
TOTAL PROTEIN: 6.5 GM/DL (ref 6.4–8.2)

## 2020-05-08 PROCEDURE — 6360000002 HC RX W HCPCS: Performed by: INTERNAL MEDICINE

## 2020-05-08 PROCEDURE — 2580000003 HC RX 258: Performed by: INTERNAL MEDICINE

## 2020-05-08 PROCEDURE — 96365 THER/PROPH/DIAG IV INF INIT: CPT

## 2020-05-08 PROCEDURE — 99211 OFF/OP EST MAY X REQ PHY/QHP: CPT

## 2020-05-08 PROCEDURE — 96402 CHEMO HORMON ANTINEOPL SQ/IM: CPT

## 2020-05-08 PROCEDURE — 80053 COMPREHEN METABOLIC PANEL: CPT

## 2020-05-08 RX ADMIN — ZOLEDRONIC ACID 4 MG: 4 INJECTION, SOLUTION, CONCENTRATE INTRAVENOUS at 09:40

## 2020-05-08 RX ADMIN — LEUPROLIDE ACETATE 7.5 MG: KIT at 10:00

## 2020-05-08 NOTE — PROGRESS NOTES
IV discontinued. DSD applied. Pt tolerated well. Discharged instructions given to pt, pt voiced understanding. Pt discharged via ambulaory by self to exit.

## 2020-05-08 NOTE — PROGRESS NOTES
Pt taken to room 01 for chemo meds. Pt oriented to room, call light, bed/chair controls, TV, pt voiced understanding. Plan of care explained to pt, pt voiced understanding.

## 2020-05-15 ENCOUNTER — HOSPITAL ENCOUNTER (OUTPATIENT)
Dept: INFUSION THERAPY | Age: 55
Discharge: HOME | End: 2020-05-15
Payer: COMMERCIAL

## 2020-05-15 PROCEDURE — 99211 OFF/OP EST MAY X REQ PHY/QHP: CPT

## 2020-06-05 ENCOUNTER — HOSPITAL ENCOUNTER (OUTPATIENT)
Dept: INFUSION THERAPY | Age: 55
Setting detail: INFUSION SERIES
Discharge: HOME | End: 2020-06-05
Payer: COMMERCIAL

## 2020-06-05 VITALS
OXYGEN SATURATION: 99 % | TEMPERATURE: 98 F | RESPIRATION RATE: 16 BRPM | SYSTOLIC BLOOD PRESSURE: 125 MMHG | BODY MASS INDEX: 26.43 KG/M2 | DIASTOLIC BLOOD PRESSURE: 67 MMHG | HEIGHT: 61 IN | WEIGHT: 140 LBS | HEART RATE: 88 BPM

## 2020-06-05 LAB
ALBUMIN SERPL-MCNC: 4.1 GM/DL (ref 3.4–5)
ALP BLD-CCNC: 70 IU/L (ref 40–129)
ALT SERPL-CCNC: 51 U/L (ref 10–40)
ANION GAP SERPL CALCULATED.3IONS-SCNC: 17 MMOL/L (ref 4–16)
AST SERPL-CCNC: 54 IU/L (ref 15–37)
BASOPHILS ABSOLUTE: 0 K/CU MM
BASOPHILS RELATIVE PERCENT: 0.5 % (ref 0–1)
BILIRUB SERPL-MCNC: 0.4 MG/DL (ref 0–1)
BUN BLDV-MCNC: 9 MG/DL (ref 6–23)
CALCIUM SERPL-MCNC: 8.3 MG/DL (ref 8.3–10.6)
CHLORIDE BLD-SCNC: 104 MMOL/L (ref 99–110)
CO2: 22 MMOL/L (ref 21–32)
CREAT SERPL-MCNC: 0.7 MG/DL (ref 0.6–1.1)
DIFFERENTIAL TYPE: ABNORMAL
EOSINOPHILS ABSOLUTE: 0.1 K/CU MM
EOSINOPHILS RELATIVE PERCENT: 1.5 % (ref 0–3)
GFR AFRICAN AMERICAN: >60 ML/MIN/1.73M2
GFR NON-AFRICAN AMERICAN: >60 ML/MIN/1.73M2
GLUCOSE BLD-MCNC: 107 MG/DL (ref 70–99)
HCT VFR BLD CALC: 32.5 % (ref 37–47)
HEMOGLOBIN: 9.8 GM/DL (ref 12.5–16)
IMMATURE NEUTROPHIL %: 0.2 % (ref 0–0.43)
LYMPHOCYTES ABSOLUTE: 1.9 K/CU MM
LYMPHOCYTES RELATIVE PERCENT: 31.6 % (ref 24–44)
MCH RBC QN AUTO: 23.2 PG (ref 27–31)
MCHC RBC AUTO-ENTMCNC: 30.2 % (ref 32–36)
MCV RBC AUTO: 76.8 FL (ref 78–100)
MONOCYTES ABSOLUTE: 0.6 K/CU MM
MONOCYTES RELATIVE PERCENT: 9.7 % (ref 0–4)
NUCLEATED RBC %: 0 %
PDW BLD-RTO: 15.9 % (ref 11.7–14.9)
PLATELET # BLD: 375 K/CU MM (ref 140–440)
PMV BLD AUTO: 8.7 FL (ref 7.5–11.1)
POTASSIUM SERPL-SCNC: 3.8 MMOL/L (ref 3.5–5.1)
RBC # BLD: 4.23 M/CU MM (ref 4.2–5.4)
SEGMENTED NEUTROPHILS ABSOLUTE COUNT: 3.3 K/CU MM
SEGMENTED NEUTROPHILS RELATIVE PERCENT: 56.5 % (ref 36–66)
SODIUM BLD-SCNC: 143 MMOL/L (ref 135–145)
TOTAL IMMATURE NEUTOROPHIL: 0.01 K/CU MM
TOTAL NUCLEATED RBC: 0 K/CU MM
TOTAL PROTEIN: 7.2 GM/DL (ref 6.4–8.2)
WBC # BLD: 5.9 K/CU MM (ref 4–10.5)

## 2020-06-05 PROCEDURE — 96402 CHEMO HORMON ANTINEOPL SQ/IM: CPT

## 2020-06-05 PROCEDURE — 2580000003 HC RX 258: Performed by: INTERNAL MEDICINE

## 2020-06-05 PROCEDURE — 99211 OFF/OP EST MAY X REQ PHY/QHP: CPT

## 2020-06-05 PROCEDURE — 80053 COMPREHEN METABOLIC PANEL: CPT

## 2020-06-05 PROCEDURE — 6360000002 HC RX W HCPCS: Performed by: INTERNAL MEDICINE

## 2020-06-05 PROCEDURE — 96365 THER/PROPH/DIAG IV INF INIT: CPT

## 2020-06-05 PROCEDURE — 85025 COMPLETE CBC W/AUTO DIFF WBC: CPT

## 2020-06-05 PROCEDURE — 96372 THER/PROPH/DIAG INJ SC/IM: CPT

## 2020-06-05 RX ORDER — SODIUM CHLORIDE 0.9 % (FLUSH) 0.9 %
10 SYRINGE (ML) INJECTION PRN
Status: DISCONTINUED | OUTPATIENT
Start: 2020-06-05 | End: 2020-06-06 | Stop reason: HOSPADM

## 2020-06-05 RX ADMIN — SODIUM CHLORIDE, PRESERVATIVE FREE 10 ML: 5 INJECTION INTRAVENOUS at 09:10

## 2020-06-05 RX ADMIN — LEUPROLIDE ACETATE 7.5 MG: KIT at 10:15

## 2020-06-05 RX ADMIN — ZOLEDRONIC ACID 4 MG: 4 INJECTION, SOLUTION, CONCENTRATE INTRAVENOUS at 09:15

## 2020-06-05 RX ADMIN — SODIUM CHLORIDE, PRESERVATIVE FREE 10 ML: 5 INJECTION INTRAVENOUS at 10:10

## 2020-06-05 NOTE — DISCHARGE SUMMARY
Tolerated Infusion and Injection well. Reviewed discharge instructions, understanding verbalized. Copies of AVS given to take home. Patient discharged home. Down to exit per self.     Orders Placed This Encounter   Medications    sodium chloride flush 0.9 % injection 10 mL    leuprolide (LUPRON) injection 7.5 mg    zoledronic acid (ZOMETA) 4 mg in sodium chloride 0.9 % 100 mL IVPB

## 2020-06-24 ENCOUNTER — HOSPITAL ENCOUNTER (OUTPATIENT)
Age: 55
Discharge: HOME | End: 2020-06-24
Payer: COMMERCIAL

## 2020-06-24 LAB
DIFFERENTIAL TYPE: ABNORMAL
EOSINOPHILS ABSOLUTE: 0.2 K/CU MM
EOSINOPHILS RELATIVE PERCENT: 3 % (ref 0–3)
FERRITIN: 278 NG/ML (ref 15–150)
HCT VFR BLD CALC: 32.1 % (ref 37–47)
HEMOGLOBIN: 9.5 GM/DL (ref 12.5–16)
IRON: 38 UG/DL (ref 37–145)
LYMPHOCYTES ABSOLUTE: 1.3 K/CU MM
LYMPHOCYTES RELATIVE PERCENT: 22 % (ref 24–44)
MCH RBC QN AUTO: 23 PG (ref 27–31)
MCHC RBC AUTO-ENTMCNC: 29.6 % (ref 32–36)
MCV RBC AUTO: 77.7 FL (ref 78–100)
MONOCYTES ABSOLUTE: 0.5 K/CU MM
MONOCYTES RELATIVE PERCENT: 8 % (ref 0–4)
PCT TRANSFERRIN: 18 % (ref 10–44)
PDW BLD-RTO: 16.6 % (ref 11.7–14.9)
PLATELET # BLD: 494 K/CU MM (ref 140–440)
PMV BLD AUTO: 8.6 FL (ref 7.5–11.1)
RBC # BLD: 4.13 M/CU MM (ref 4.2–5.4)
SEGMENTED NEUTROPHILS ABSOLUTE COUNT: 4.1 K/CU MM
SEGMENTED NEUTROPHILS RELATIVE PERCENT: 67 % (ref 36–66)
TOTAL IRON BINDING CAPACITY: 211 UG/DL (ref 250–450)
UNSATURATED IRON BINDING CAPACITY: 173 UG/DL (ref 110–370)
WBC # BLD: 6.1 K/CU MM (ref 4–10.5)

## 2020-06-24 PROCEDURE — 82728 ASSAY OF FERRITIN: CPT

## 2020-06-24 PROCEDURE — 83550 IRON BINDING TEST: CPT

## 2020-06-24 PROCEDURE — 36415 COLL VENOUS BLD VENIPUNCTURE: CPT

## 2020-06-24 PROCEDURE — 83540 ASSAY OF IRON: CPT

## 2020-06-24 PROCEDURE — 85007 BL SMEAR W/DIFF WBC COUNT: CPT

## 2020-06-24 PROCEDURE — 85027 COMPLETE CBC AUTOMATED: CPT

## 2020-07-03 ENCOUNTER — HOSPITAL ENCOUNTER (OUTPATIENT)
Dept: INFUSION THERAPY | Age: 55
Setting detail: INFUSION SERIES
Discharge: HOME | End: 2020-07-03
Payer: COMMERCIAL

## 2020-07-03 LAB
ALBUMIN SERPL-MCNC: 3.8 GM/DL (ref 3.4–5)
ALP BLD-CCNC: 54 IU/L (ref 40–129)
ALT SERPL-CCNC: 24 U/L (ref 10–40)
ANION GAP SERPL CALCULATED.3IONS-SCNC: 13 MMOL/L (ref 4–16)
AST SERPL-CCNC: 41 IU/L (ref 15–37)
BILIRUB SERPL-MCNC: 0.2 MG/DL (ref 0–1)
BUN BLDV-MCNC: 15 MG/DL (ref 6–23)
CALCIUM SERPL-MCNC: 8.9 MG/DL (ref 8.3–10.6)
CHLORIDE BLD-SCNC: 103 MMOL/L (ref 99–110)
CO2: 22 MMOL/L (ref 21–32)
CREAT SERPL-MCNC: 0.7 MG/DL (ref 0.6–1.1)
GFR AFRICAN AMERICAN: >60 ML/MIN/1.73M2
GFR NON-AFRICAN AMERICAN: >60 ML/MIN/1.73M2
GLUCOSE BLD-MCNC: 107 MG/DL (ref 70–99)
POTASSIUM SERPL-SCNC: 4.3 MMOL/L (ref 3.5–5.1)
SODIUM BLD-SCNC: 138 MMOL/L (ref 135–145)
TOTAL PROTEIN: 7.1 GM/DL (ref 6.4–8.2)

## 2020-07-03 PROCEDURE — 6360000002 HC RX W HCPCS: Performed by: INTERNAL MEDICINE

## 2020-07-03 PROCEDURE — 99211 OFF/OP EST MAY X REQ PHY/QHP: CPT

## 2020-07-03 PROCEDURE — 80053 COMPREHEN METABOLIC PANEL: CPT

## 2020-07-03 PROCEDURE — 2580000003 HC RX 258: Performed by: INTERNAL MEDICINE

## 2020-07-03 PROCEDURE — 96365 THER/PROPH/DIAG IV INF INIT: CPT

## 2020-07-03 PROCEDURE — 96402 CHEMO HORMON ANTINEOPL SQ/IM: CPT

## 2020-07-03 RX ADMIN — ZOLEDRONIC ACID 4 MG: 0.8 INJECTION, SOLUTION, CONCENTRATE INTRAVENOUS at 09:25

## 2020-07-03 RX ADMIN — LEUPROLIDE ACETATE 7.5 MG: KIT at 09:28

## 2020-07-03 NOTE — PROGRESS NOTES
Pt taken to room 01 for injection and zometa. Pt oriented to room, call light, bed/chair controls, TV, pt voiced understanding. Plan of care explained to pt, pt voiced understanding.

## 2020-07-31 ENCOUNTER — HOSPITAL ENCOUNTER (OUTPATIENT)
Dept: INFUSION THERAPY | Age: 55
Setting detail: INFUSION SERIES
Discharge: HOME | End: 2020-07-31
Payer: COMMERCIAL

## 2020-07-31 VITALS
TEMPERATURE: 98.2 F | OXYGEN SATURATION: 100 % | SYSTOLIC BLOOD PRESSURE: 136 MMHG | DIASTOLIC BLOOD PRESSURE: 76 MMHG | HEART RATE: 112 BPM | RESPIRATION RATE: 16 BRPM

## 2020-07-31 DIAGNOSIS — C79.81 SECONDARY MALIGNANT NEOPLASM OF BREAST (HCC): ICD-10-CM

## 2020-07-31 DIAGNOSIS — C50.112 MALIGNANT NEOPLASM OF CENTRAL PORTION OF LEFT FEMALE BREAST, UNSPECIFIED ESTROGEN RECEPTOR STATUS (HCC): ICD-10-CM

## 2020-07-31 LAB
ALBUMIN SERPL-MCNC: 3.9 GM/DL (ref 3.4–5)
ALP BLD-CCNC: 74 IU/L (ref 40–129)
ALT SERPL-CCNC: 19 U/L (ref 10–40)
ANION GAP SERPL CALCULATED.3IONS-SCNC: 17 MMOL/L (ref 4–16)
AST SERPL-CCNC: 70 IU/L (ref 15–37)
BILIRUB SERPL-MCNC: 0.2 MG/DL (ref 0–1)
BUN BLDV-MCNC: 15 MG/DL (ref 6–23)
CALCIUM SERPL-MCNC: 8.8 MG/DL (ref 8.3–10.6)
CHLORIDE BLD-SCNC: 104 MMOL/L (ref 99–110)
CO2: 21 MMOL/L (ref 21–32)
CREAT SERPL-MCNC: 0.7 MG/DL (ref 0.6–1.1)
GFR AFRICAN AMERICAN: >60 ML/MIN/1.73M2
GFR NON-AFRICAN AMERICAN: >60 ML/MIN/1.73M2
GLUCOSE BLD-MCNC: 121 MG/DL (ref 70–99)
POTASSIUM SERPL-SCNC: 3.7 MMOL/L (ref 3.5–5.1)
SODIUM BLD-SCNC: 142 MMOL/L (ref 135–145)
TOTAL PROTEIN: 7.3 GM/DL (ref 6.4–8.2)

## 2020-07-31 PROCEDURE — 96402 CHEMO HORMON ANTINEOPL SQ/IM: CPT

## 2020-07-31 PROCEDURE — 99211 OFF/OP EST MAY X REQ PHY/QHP: CPT

## 2020-07-31 PROCEDURE — 96365 THER/PROPH/DIAG IV INF INIT: CPT

## 2020-07-31 PROCEDURE — 2580000003 HC RX 258: Performed by: INTERNAL MEDICINE

## 2020-07-31 PROCEDURE — 80053 COMPREHEN METABOLIC PANEL: CPT

## 2020-07-31 PROCEDURE — 6360000002 HC RX W HCPCS: Performed by: INTERNAL MEDICINE

## 2020-07-31 RX ORDER — SODIUM CHLORIDE 0.9 % (FLUSH) 0.9 %
10 SYRINGE (ML) INJECTION PRN
Status: DISCONTINUED | OUTPATIENT
Start: 2020-07-31 | End: 2020-08-01 | Stop reason: HOSPADM

## 2020-07-31 RX ADMIN — ZOLEDRONIC ACID 4 MG: 0.8 INJECTION, SOLUTION, CONCENTRATE INTRAVENOUS at 09:15

## 2020-07-31 RX ADMIN — LEUPROLIDE ACETATE 7.5 MG: KIT at 09:24

## 2020-08-28 NOTE — PROGRESS NOTES
Prior to discharge, the After Visit Summary Discharge Instructions were reviewed with the patient. She was offered a printed version of the AVS, but declined the offer. Recurrent appointment, pt tolerated infusion well. Pt discharged home. Pt to exit via ambulation.     Orders Placed This Encounter   Medications    leuprolide (LUPRON) injection 7.5 mg    zoledronic acid (ZOMETA) 4 mg in sodium chloride 0.9 % 100 mL IVPB

## 2020-09-14 NOTE — PROGRESS NOTES
Patient Name: Ahmet Castro  Patient : 1965  Patient MRN: [id number]     Primary Oncologist: Kal Del Toro MD  Referring Provider: CINDY Uriostegui CNP     Date of Service: 9/15/2020      Chief Complaint:   Chief Complaint   Patient presents with    Follow-up     She came in for follow up visit. Patient Active Problem List:     Left breast mass     Secondary cancer of bone [facility])     Essential hypertension     Malignant neoplasm of central portion of left female breast [facility])     Secondary malignant neoplasm of breast ([facility] [address]     HPI:   Jose Colmenares is a pleasant 53-year-old  female patient who initially noted left breast swelling and pain for a few months. She had mammogram at age of 30. She went for mammogram which showed abnormal left breast. Diagnostic bilateral mammogram on 2018 with ultrasound showed  findings concerning for inflammatory breast cancer with prominent left axillary lymph nodes, benign right mammogram.  Baseline CBC and CMP were within normal limits. CA 27-29 was 61. PET CT scan in May 27, 2457 showed metabolic activity within left breast with metabolically active skin thickening concerning for inflammatory breast cancer, metabolically active left axillary lymph node metastases and skeletal metastasis. 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 She did not have visceral disease. We discussed about potential therapy. I put her on Lupron plus Arimidex and Palbociclib. She had menstruation in May 2018. She started lupron, arimidex and Ibrance on May 30, 2018.   Bone density in 2018 showed  LUMBAR SPINE: The bone mineral density in the lumbar spine including the L1 through L4 levels is measured at 1.355 g/cm2, which corresponds to a T-score of 1.5 and a Z-score of 1.4. This is within the normal range by WHO criteria. LEFT HIP: The bone mineral density in the total hip is measured at 0.972 g/cm2 corresponding to a T-score of -0.3 and a Z-score of -0.2. This is within the normal range by WHO criteria. The bone mineral density of the femoral neck is measured at 0.802 g/cm2 corresponding to a T-score of -1.7 and a Z-score of -1.2. This is within the osteopenia range by WHO criteria. IMPRESSION: Osteopenia by WHO criteria. Bone scan in September 2018 showed stable findings. On October 26, 2018 she was found to have RLE DVT. PET CT scan in November 2018 revealed:  1. Overall improvement of left axillary adenopathy and FDG avidity. There is persistent skin thickening of the left breast with associated FDG uptake in the retroareolar region and left breast tissue which is not significantly changed since the prior exam. 2. Overall progression of hypermetabolic skeletal osseous metastases when compared to the PET-CT from 05/07/2018. We discussed about the potential side effects of bisphosphonates including renal failure and Jaw necrosis. She switched lovenox to xarelto in January 2019. Bone scan in June 2019 compared to bone scan in September 2018 showed progression of disease. CA 27-29 has continued to increase. We will obtain PET CT to evaluate. Clinically she felt left breast lump continuing to get smaller. PET CT scan on August 26, 2019 showed :  1. New focus of intense uptake lateral to the left axilla and anterior to the shoulder musculature. 2. However there is improvement of disease within the left breast and left axilla and decreased uptake within the widespread osseous metastatic disease. In December 2019 CA 17-29 continues to rise. CT chest, abdomen and bone scan in January 2020 showed progression local and bone disease. She will start lupron, aromasin and afinitor on January 10, 2020. I advise to monitor blood pressure.   May 7, 2020 PET CT scan showed favorable response to chemotherapy. The mass anterior to the left shoulder musculature has significantly decreased in size and degree of uptake. Small nodules anterior to the mass extending to the skin demonstrates mild uptake and likely represents residual disease. Left axillary lymph nodes have decreased in size. Uptake in the left subareolar region has decreased. Diffuse osseous metastatic disease demonstrates decreased uptake. On September 15, 2020 she came in for follow-up visit. Labs in 2020 were reviewed. The concern of the COVID-19 and her job, she requests a letter of work until 2021. She is agreeable to a follow-up imaging study, CT chest and abdomen prior to next office visit. I will have repeat blood tests including iron study prior to next office visit. She complains of mouth sores in the last 2 weeks which has gotten better with mouth rinsing. She is feeling more tired. She has intermittent nausea. I will give a prescription of Zofran. She denied any headache or dizziness. Denied any melena or hematochezia. No chest pain or shortness of breath. She denied any acute bone pain. No fever or chills. No depression. She has panoramic study of the jaw which was negative for osteonecrosis. She has mild numbness to the right lower jaw and she will pay attention to this. Past Medical History  Allergies, asthma, depression, abnormal left breast mammogram. DVT. Surgical History  Appendectomy in .  . Medications   Reviewed as per chart. Allergies:  Reviewed as per chart. Social History  Denied any history of smoking. She drinks alcohol occasionally. No illicit drug use. She has 2 children and one sister. Family History  Mother: Diabetes, Hypertension. 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 \"     Vital Signs:  /82 (Site: Right Upper Arm, Position: Sitting, Cuff Size: Medium Adult)   Pulse 147   Temp 98.7 °F (37.1 °C) (Infrared)   Resp 18   Ht 5' 1\" (1.549 m)   Wt 137 lb 3.2 oz (62.2 kg)   SpO2 95%   BMI 25.92 kg/m²     Physical Exam:  CONSTITUTIONAL: awake, alert, cooperative, no apparent distress   EYES: pupils equal, round and reactive to light, sclera clear and conjunctiva normal  ENT: Normocephalic, without obvious abnormality, atraumatic  NECK: supple, symmetrical, no jugular venous distension and no carotid bruits   HEMATOLOGIC/LYMPHATIC: no cervical, supraclavicular or axillary lymphadenopathy   LUNGS: no increased work of breathing and clear to auscultation   BREAST: thick skin of left breast with induration. No signs of infection. No lumps to right breast or right axilla. CARDIOVASCULAR: regular rate and rhythm, normal S1 and S2, no murmur noted  ABDOMEN: normal bowel sounds x 4, soft, non-distended, non-tender, no masses palpated, no hepatosplenomegaly   MUSCULOSKELETAL: full range of motion noted, tone is normal  NEUROLOGIC: awake, alert, oriented to name, place and time. Motor skills grossly intact. SKIN: Normal skin color, texture, turgor and no jaundice. appears intact   EXTREMITIES: no LE edema. No Cyanosis.     Labs:  Hematology:  Lab Results   Component Value Date    WBC 6.1 06/24/2020    RBC 4.13 (L) 06/24/2020    HGB 9.5 (L) 06/24/2020    HCT 32.1 (L) 2020    MCV 77.7 (L) 2020    MCH 23.0 (L) 2020    MCHC 29.6 (L) 2020    RDW 16.6 (H) 2020     (H) 2020    MPV 8.6 2020    BANDSPCT 5 2019    SEGSPCT 67.0 (H) 2020    EOSRELPCT 3.0 2020    BASOPCT 0.5 2020    LYMPHOPCT 22.0 (L) 2020    MONOPCT 8.0 (H) 2020    BANDABS 0.23 2019    SEGSABS 4.1 2020    EOSABS 0.2 2020    BASOSABS 0.0 2020    LYMPHSABS 1.3 2020    MONOSABS 0.5 2020    DIFFTYPE MANUAL DIFFERENTIAL 2020    ANISOCYTOSIS 1+ 2019    POLYCHROM 1+ 2019    WBCMORP OCCASIONAL  ATYPICAL LYMPH(S) NOTED   2019     Chemistry:  Lab Results   Component Value Date     2020    K 4.1 2020     2020    CO2 23 2020    BUN 13 2020    CREATININE 0.7 2020    GLUCOSE 137 (H) 2020    CALCIUM 8.6 2020    PROT 6.6 2020    LABALBU 3.7 2020    BILITOT 0.1 2020    ALKPHOS 66 2020    AST 42 (H) 2020    ALT 16 2020    LABGLOM >60 2020    GFRAA >60 2020    MG 2.1 2018     Immunology:  Lab Results   Component Value Date    PROT 6.6 2020     Tumor Markers:  Lab Results   Component Value Date    LABCA2 199.9 (H) 2019    LABCA2 (H) 2019     (NOTE)  INTERPRETIVE INFORMATION: Cancer Antigen 27.29  The CA 27.29 assay is intended for use in monitorin) disease   progression and/or response to therapy in patients with metastatic   disease, and 2) disease recurrence in patients treated previously   for stages II or III breast carcinoma who are clinically free of   the disease. Serial testing in patients who are clinically free of   disease should be used in conjunction with other clinical methods   for early detection of cancer recurrence.   Limitations: Patients with confirmed breast carcinoma frequently   have CA 27.29 assay values in the same range as healthy individuals. Elevations may also be observed in patients with non   malignant disease. Results of this test must always be interpreted   in the context of morphologic and other relevant data, and should   not be used alone for a diagnosis of malignancy. Methodology: Siemens Advia Centaur CA 27.29 chemiluminescent   immunoassay was used. Results obtained with different assay   methods or kits cannot be used interchangeably. Performed by [facility] [address], [address] [phone]  www. Linh Guy MD, Lab. Director        Observations:  No data recorded      Assessment & Plan:    1. She has inflammatory left breast cancer. PET/CT scan showed metastatic disease to bone, left axillary lymph node, and left breasts with the skin involvement. At present time she does not have physical disease. CBC and CMP were within normal limits. CA 27-29 was elevated. I put her on palliative hormonal therapy plus Palbociclib. She started arimidex and Ibrance on June 4, 2018 and monthly lupron injection on June 8, 2018. Bone scan in September 2018 showed stable findings. After reviewing the PET CT scan in November 2018, I put her on Zometa monthly. Bone scan in July 2019 was reviewed and showed progression of the disease. PET scan in August 2019 showed grossly response to the therapy. CT chest/abdomen and bone scan due to rising CA27-29 in January 2020 showed progression of disease. She started aromasin, afinitor and lupron on January 10, 2020. She understands the potential side effects of afinitor. PET CT scan May 2020 showed treatment response. We will continue with the current regimen. I plan to have follow-up imaging study prior to next office visit. I gave refill of her medication. 2. Hypertension: I advise to monitor BP and has low sodium diet. I gave her refill of Norvasc. Blood pressure is better controlled. 3. Seasonal allergy.  I advise to take Claritin and gave her flonase. 4. She was found to have right lower extremity DVT in October 2019. She is on Xarelto. no signs of bleeding. RTC in 4 weeks or sooner. All of her question has been answered for today. Recent imaging and labs were reviewed and discussed with the patient.       Electronically signed by Burke Cuevas MD on 9/14/20 at 7:23 AM EDT

## 2020-09-22 NOTE — TELEPHONE ENCOUNTER
Spoke with patient regarding her CT scan on 10.05.2020 at [facility] arr 0930. Patient was given prep and stated understanding.

## 2020-10-07 NOTE — PROGRESS NOTES
Patient Name: Jeannine Delaney  Patient : 1965  Patient MRN: [id number]     Primary Oncologist: Whitney Hilliard MD  Referring Provider: CINDY Sarabia CNP     Date of Service: 10/13/2020      Chief Complaint:   Chief Complaint   Patient presents with    Follow-up    Results     ct scan     She came in for follow up visit. Patient Active Problem List:     Left breast mass     Secondary cancer of bone [facility])     Essential hypertension     Malignant neoplasm of central portion of left female breast [facility])     Secondary malignant neoplasm of breast ([address]     HPI:   Taurus Paez is a pleasant 22-year-old  female patient who initially noted left breast swelling and pain for a few months. She had mammogram at age of 30. She went for mammogram which showed abnormal left breast. Diagnostic bilateral mammogram on 2018 with ultrasound showed  findings concerning for inflammatory breast cancer with prominent left axillary lymph nodes, benign right mammogram.  Baseline CBC and CMP were within normal limits. CA 27-29 was 61. PET CT scan in May 27, 6066 showed metabolic activity within left breast with metabolically active skin thickening concerning for inflammatory breast cancer, metabolically active left axillary lymph node metastases and skeletal metastasis. Pathology report from the left breast biopsy in May 2018 showed infiltrating pleomorphic lobular carcinoma involving skin and breast tissue nuclear grade 2, ER 90+% moderate intensity, NE 8% with positive, HER-2/brandon negative by FISH and IHC. She did not have visceral disease. We discussed about potential therapy. I put her on Lupron plus Arimidex and Palbociclib. She had menstruation in May 2018. She started lupron, arimidex and Ibrance on May 30, 2018.   Bone density in 2018 showed  LUMBAR SPINE: The bone mineral density in the lumbar spine including the L1 through L4 levels is measured at 1.355 g/cm2, which corresponds to a T-score of 1.5 and a Z-score of 1.4. This is within the normal range by WHO criteria. LEFT HIP: The bone mineral density in the total hip is measured at 0.972 g/cm2 corresponding to a T-score of -0.3 and a Z-score of -0.2. This is within the normal range by WHO criteria. The bone mineral density of the femoral neck is measured at 0.802 g/cm2 corresponding to a T-score of -1.7 and a Z-score of -1.2. This is within the osteopenia range by WHO criteria. IMPRESSION: Osteopenia by WHO criteria. Bone scan in September 2018 showed stable findings. On October 26, 2018 she was found to have RLE DVT. PET CT scan in November 2018 revealed:  1. Overall improvement of left axillary adenopathy and FDG avidity. There is persistent skin thickening of the left breast with associated FDG uptake in the retroareolar region and left breast tissue which is not significantly changed since the prior exam. 2. Overall progression of hypermetabolic skeletal osseous metastases when compared to the PET-CT from 05/07/2018. We discussed about the potential side effects of bisphosphonates including renal failure and Jaw necrosis. She switched lovenox to xarelto in January 2019. Bone scan in June 2019 compared to bone scan in September 2018 showed progression of disease. CA 27-29 has continued to increase. We will obtain PET CT to evaluate. Clinically she felt left breast lump continuing to get smaller. PET CT scan on August 26, 2019 showed :  1. New focus of intense uptake lateral to the left axilla and anterior to the shoulder musculature. 2. However there is improvement of disease within the left breast and left axilla and decreased uptake within the widespread osseous metastatic disease. In December 2019 CA 17-29 continued to rise. CT chest, abdomen and bone scan in January 2020 showed progression local and bone disease. She will start lupron, aromasin and afinitor on January 10, 2020. I advise to monitor blood pressure.   May 7, 2020 PET CT scan showed favorable response to chemotherapy. The mass anterior to the left shoulder musculature has significantly decreased in size and degree of uptake. Small nodules anterior to the mass extending to the skin demonstrates mild uptake and likely represents residual disease. Left axillary lymph nodes have decreased in size. Uptake in the left subareolar region has decreased. Diffuse osseous metastatic disease demonstrates decreased uptake. Labs in 2020 were reviewed. On 2020 she came in for follow-up visit. CT chest, abdomen pelvis on 2020 showed  Decreased size of the soft tissue mass anterior to the left shoulder    musculature compared to prior PET-CT and CT suggesting response to therapy.         Decreased size of left axillary and subpectoral lymphadenopathy also    suggesting response to therapy.         Stable sclerotic metastatic disease noted throughout the visualized osseous    skeleton.         No evidence of new metastatic disease in the chest, abdomen or pelvis. CA 27-29 on 2020 was 409.2. Agreeable to continue with the present treatment. She denied any headache or dizziness. Denied any melena or hematochezia. No chest pain or shortness of breath. She denied any acute bone pain. No fever or chills. No depression. She has panoramic study of the jaw which was negative for osteonecrosis. She has insomnia and anxiety. She is agreeable to restart low-dose Ativan as needed. She had ingrowing toenail of the left toe with has improved with antibiotic. Past Medical History  Allergies, asthma, depression, abnormal left breast mammogram. DVT. Surgical History  Appendectomy in .  . Medications   Reviewed as per chart. Allergies:  Reviewed as per chart. Social History  Denied any history of smoking. She drinks alcohol occasionally. No illicit drug use. She has 2 children and one sister.     Family History  Mother: turgor and no jaundice. appears intact   EXTREMITIES: no LE edema. No Cyanosis. Labs:  Hematology:  Lab Results   Component Value Date    WBC 5.8 10/05/2020    RBC 3.82 (L) 10/05/2020    HGB 8.2 (L) 10/05/2020    HCT 28.0 (L) 10/05/2020    MCV 73.3 (L) 10/05/2020    MCH 21.5 (L) 10/05/2020    MCHC 29.3 (L) 10/05/2020    RDW 19.4 (H) 10/05/2020     (H) 10/05/2020    MPV 8.4 10/05/2020    BANDSPCT 5 04/05/2019    SEGSPCT 59.1 10/05/2020    EOSRELPCT 1.0 10/05/2020    BASOPCT 0.3 10/05/2020    LYMPHOPCT 30.3 10/05/2020    MONOPCT 9.0 (H) 10/05/2020    BANDABS 0.23 04/05/2019    SEGSABS 3.4 10/05/2020    EOSABS 0.1 10/05/2020    BASOSABS 0.0 10/05/2020    LYMPHSABS 1.8 10/05/2020    MONOSABS 0.5 10/05/2020    DIFFTYPE AUTOMATED DIFFERENTIAL 10/05/2020    ANISOCYTOSIS 1+ 12/06/2019    POLYCHROM 1+ 12/06/2019    WBCMORP OCCASIONAL  ATYPICAL LYMPH(S) NOTED   12/06/2019     Chemistry:  Lab Results   Component Value Date     (H) 10/05/2020    K 3.6 10/05/2020     10/05/2020    CO2 22 10/05/2020    BUN 17 10/05/2020    CREATININE 0.8 10/05/2020    GLUCOSE 112 (H) 10/05/2020    CALCIUM 9.1 10/05/2020    PROT 6.9 10/05/2020    LABALBU 3.8 10/05/2020    BILITOT 0.2 10/05/2020    ALKPHOS 68 10/05/2020    AST 64 (H) 10/05/2020    ALT 18 10/05/2020    LABGLOM >60 10/05/2020    GFRAA >60 10/05/2020    MG 2.1 11/23/2018     Immunology:  Lab Results   Component Value Date    PROT 6.9 10/05/2020     Tumor Markers:  Lab Results   Component Value Date    LABCA2 409.2 (H) 10/05/2020      Observations:  PHQ-9 Total Score: 0 (10/13/2020  1:14 PM)        Assessment & Plan:    1. She has inflammatory left breast cancer. PET/CT scan showed metastatic disease to bone, left axillary lymph node, and left breasts with the skin involvement. At present time she does not have physical disease. CBC and CMP were within normal limits. CA 27-29 was elevated. I put her on palliative hormonal therapy plus Palbociclib.  She started arimidex and Ibrance on June 4, 2018 and monthly lupron injection on June 8, 2018. Bone scan in September 2018 showed stable findings. After reviewing the PET CT scan in November 2018, I put her on Zometa monthly. Bone scan in July 2019 was reviewed and showed progression of the disease. PET scan in August 2019 showed grossly response to the therapy. CT chest/abdomen and bone scan due to rising CA27-29 in January 2020 showed progression of disease. She started aromasin, afinitor and lupron on January 10, 2020. She understands the potential side effects of afinitor. PET CT scan May 2020 showed treatment response. CT chest, abdomen pelvis in October 2020 showed response and no evidence of new metastatic disease. Agreeable to continue with Lupron, exemestane, Zometa and Afinitor. May consider follow-up imaging study in 3 to 4 months. 2. Hypertension: I advise to monitor BP and has low sodium diet. I gave her refill of Norvasc. Blood pressure is stable. 3. Seasonal allergy. I advise to take Claritin and gave her flonase. I recommend flu shot. 4. She was found to have right lower extremity DVT in October 2019. She is on Xarelto. no signs of bleeding. RTC in 8 weeks or sooner. All of her question has been answered for today. Recent imaging and labs were reviewed and discussed with the patient.       Electronically signed by Michelle Madden MD on 9/14/20 at 7:23 AM EDT

## 2020-10-13 NOTE — PROGRESS NOTES
MA Rooming Questions  Patient: Jes Hopkins  MRN: [id number]    Date: 10/13/2020        1. Do you have any new issues?   no         2. Do you need any refills on medications?    no    3. Have you had any imaging done since your last visit? yes - ct scan    4. Have you been hospitalized or seen in the emergency room since your last visit here?   no    5. Did the patient have a depression screening completed today?  Yes    PHQ-9 Total Score: 0 (10/13/2020  1:14 PM)       PHQ-9 Given to (if applicable):               PHQ-9 Score (if applicable):                     [] Positive     []  Negative              Does question #9 need addressed (if applicable)                     [] Yes    []  No               Dalia Pitts MA

## 2020-10-23 NOTE — PROGRESS NOTES
Prior to discharge, the After Visit Summary Discharge Instructions were reviewed with the patient. She was offered a printed version of the AVS, but declined the offer. Recurrent appointment, pt tolerated infusion well. Pt discharged home. Pt to exit via steady gait.     Orders Placed This Encounter   Medications    leuprolide (LUPRON) injection 7.5 mg    zoledronic acid (ZOMETA) 4 mg in sodium chloride 0.9 % 100 mL IVPB

## 2020-11-17 NOTE — TELEPHONE ENCOUNTER
Called to let patient's daughter know that her [place] paperwork was completed, spoke to patient, she asked if we could fax it, paperwork faxed and confirm rec'd

## 2020-11-18 NOTE — PROGRESS NOTES
Received a PA denial for patient's Afinitor 10 mg tablets from [facility]. [address] to discuss and informed that patient's insurance had . [facility] stated that they reached out to patient and were informed that she now has [employer] health benefit insurance but this card/information is not entered into Epic. [facility] states that this insurance is out of network for them. Rx sent to [facility] pharmacy. Spoke to staff at [address] and they are going to call patient to obtain current insurance information.

## 2020-11-20 PROBLEM — E86.0 DEHYDRATION: Status: ACTIVE | Noted: 2020-01-01

## 2020-11-20 NOTE — TELEPHONE ENCOUNTER
Early Number from [facility] [place] called to report that Pt is having a lot of nausea and unable to keep anything down. She reported that the zofran isn't helping. Consulted Dr Randy Bhatti who ordered a liter of IV hydration, 8 mg of dex and 12.5 mg of promethazine. Verbal order reviewed with Sawyer Roberto RN at [facility] [place]. Asked to relay message to patient to increase zofran to 8 mg tid prn and increase fluid intake. Pt to call on Monday if not any better.

## 2020-11-20 NOTE — PROGRESS NOTES
Pt taken to room 3, oriented to room, bed/chair controls, and call light. Needs met at present. Call light in reach. Pt not feeling well today. Denies cough at present. C/o nausea at present and is tachycardic. Reports unable to keep anything down d/t nausea. Called Dr. Nella Hampton and orders received to give IV fluids along with dex and phenergan IV. Pt agreeable for POC.

## 2020-11-23 NOTE — TELEPHONE ENCOUNTER
Returned call to patient. She states that she has been able to keep fluids down but is still having nausea at times and Zofran is not effective. States that she is also having increased bone pain and that tylenol is not effective. Informed Dr Jose Cruz Mehta and he ordered phenergan 25 mg every 6 hours as needed for N/V and ordered Tramadol for pain. Informed patient and she verbalized understanding of orders and plan of care.

## 2020-11-25 NOTE — PROGRESS NOTES
Discharge instructions given to pt, pt denies wanting a copy. pts was instructed to  pot tables asap and start them. Script being called in from cancer center to [facility] on [address]. Pt voiced understanding of info given.

## 2020-11-25 NOTE — PROGRESS NOTES
IV discontinued. DSD applied. Pt tolerated well. Discharged instructions given to pt, pt voiced understanding. Pt discharged via wheelchair by nurse to exit.

## 2020-11-25 NOTE — PROGRESS NOTES
Pt taken to room 02 for chemo. Pt oriented to room, call light, bed/chair controls, TV, pt voiced understanding. Plan of care explained to pt, pt voiced understanding.

## 2020-11-25 NOTE — TELEPHONE ENCOUNTER
Call received from outpt infusion center [facility]- RN) that pt has an alert K+ of 2.8. Dr Anusha Rob notified and ordered 20 meq of KCL TID x 5 days. Deena Elliott will review with pt. Script sent to pharmacy.

## 2020-11-25 NOTE — PROGRESS NOTES
K+ 2.8, Creatinine: 1.5. April, RN, at Dr. Ana Iniguez office notified.      Pt notified that she will need to  RX for K+ at [facility] and take TID x 5 days

## 2020-12-11 NOTE — PROGRESS NOTES
Spoke with several people today in regards to patient's prior auth for the Afinitor. Spoke with [facility] who could not fill and then sent to Ruth Hermelinda (spoke with Ashley Su) who then switched me to [facility] at [employer]. She states that Ruben Corona, one of their pharmacist, was able to get international assistance for patient through [facility] based out of [place] Their  is Hosea Vega phone number [phone] and fax [phone]. Script printed and signed per Dr. Dereck Allan and faxed to [facility] at [facility]. She stated they would be able to send out a one-month supply to patient immediately. Kindra from [employer] called and asked if a form could be filled out and submitted to the [employer] which would provide the patient with one year supply of the drug with no co-pays if it is approved. Form faxed to [employer] at [phone] - confirmation number is [id number].

## 2020-12-30 NOTE — PROGRESS NOTES
Talked with Dr. Harvey Barajas re: pt's last creatinine level. Per Dr. Harvey Barajas, order received to draw CMP and Mag level. If pt's creatinine level still elevated, give 500 ml 0.9 normal saline over 1 hour.

## 2020-12-30 NOTE — PROGRESS NOTES
Creatinine level relayed to Roro Barroso, [facility]. Zometa dose adjusted. Pharmacy called for mixing, Kym Bahena notified per this nurse.

## 2020-12-30 NOTE — PROGRESS NOTES
Prior to discharge, the After Visit Summary Discharge Instructions were reviewed with the patient. She was offered a printed version of the AVS, but declined the offer. Recurrent appointment, pt tolerated infusion well. Pt discharged HOME. Pt to exit via [facility].     Orders Placed This Encounter   Medications    0.9 % sodium chloride infusion    zoledronic acid (ZOMETA) 3 mg in sodium chloride 0.9 % 100 mL IVPB    sodium chloride flush 0.9 % injection 10 mL

## 2020-12-31 NOTE — PROGRESS NOTES
Patient Name: Diana Arcos  Patient : 1965  Patient MRN: [id number]     Primary Oncologist: Edmundo Lynne MD  Referring Provider: CINDY Franz CNP     Date of Service: 2021      Chief Complaint:   Chief Complaint   Patient presents with   [address]     She came in for follow up visit. Patient Active Problem List:     Left breast mass     Secondary cancer of bone [facility])     Essential hypertension     Malignant neoplasm of central portion of left female breast ([facility] [address]     Secondary malignant neoplasm of breast ([facility] [address]     HPI:   Diana Arcos is a pleasant 61-year-old  female patient who initially noted left breast swelling and pain for a few months. She had mammogram at age of 30. She went for mammogram which showed abnormal left breast. Diagnostic bilateral mammogram on 2018 with ultrasound showed  findings concerning for inflammatory breast cancer with prominent left axillary lymph nodes, benign right mammogram.  Baseline CBC and CMP were within normal limits. CA 27-29 was 61. PET CT scan in May 27, 9525 showed metabolic activity within left breast with metabolically active skin thickening concerning for inflammatory breast cancer, metabolically active left axillary lymph node metastases and skeletal metastasis. 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 She did not have visceral disease. We discussed about potential therapy. I put her on Lupron plus Arimidex and Palbociclib. She had menstruation in May 2018. She started lupron, arimidex and Ibrance on May 30, 2018.   Bone density in 2018 showed  LUMBAR SPINE: The bone mineral density in the lumbar spine including the L1 through L4 levels is measured at 1.355 g/cm2, which corresponds to a T-score of 1.5 and a Z-score of 1.4. This is within the normal range by WHO criteria. LEFT HIP: The bone mineral density in the total hip is measured at 0.972 g/cm2 corresponding to a T-score of -0.3 and a Z-score of -0.2. This is within the normal range by WHO criteria. The bone mineral density of the femoral neck is measured at 0.802 g/cm2 corresponding to a T-score of -1.7 and a Z-score of -1.2. This is within the osteopenia range by WHO criteria. IMPRESSION: Osteopenia by WHO criteria. Bone scan in September 2018 showed stable findings. On October 26, 2018 she was found to have RLE DVT. PET CT scan in November 2018 revealed:  1. Overall improvement of left axillary adenopathy and FDG avidity. There is persistent skin thickening of the left breast with associated FDG uptake in the retroareolar region and left breast tissue which is not significantly changed since the prior exam. 2. Overall progression of hypermetabolic skeletal osseous metastases when compared to the PET-CT from 05/07/2018. We discussed about the potential side effects of bisphosphonates including renal failure and Jaw necrosis. She switched lovenox to xarelto in January 2019. Bone scan in June 2019 compared to bone scan in September 2018 showed progression of disease. CA 27-29 has continued to increase. We will obtain PET CT to evaluate. Clinically she felt left breast lump continuing to get smaller. PET CT scan on August 26, 2019 showed :  1. New focus of intense uptake lateral to the left axilla and anterior to the shoulder musculature. 2. However there is improvement of disease within the left breast and left axilla and decreased uptake within the widespread osseous metastatic disease. In December 2019 CA 17-29 continued to rise. CT chest, abdomen and bone scan in January 2020 showed progression local and bone disease. She started lupron, aromasin and afinitor on January 10, 2020. I advise to monitor blood pressure.   On May 7, 2020 PET CT scan showed breast mammogram. DVT. Surgical History  Appendectomy in .  . Social History  Denied any history of smoking. She drinks alcohol occasionally. No illicit drug use. She has 2 children and one sister. Family History  Mother: Diabetes, Hypertension. Current Therapy   Palbociclib + Anastrozole + Leuprolide Q28D Cycle Length: 28 Number Cycles: 20 Start: C1D1 on 2018 Assoc Dx: Female inflammatory breast cancer LOT: 1st Line Metastatic or Recurrent Stage: IV 2018 C1 D1  Zoledronic acid (Zometa) Q28D Cycle Length: 28 Number Cycles: 24 Start: Cindi London on 2018 Assoc Dx: Secondary bone cancer LOT: 1st Line Metastatic or Recurrent 2020 C8 D1  Everolimus + Exemestane + Leuprolide Q28D Cycle Length: 28 Number Cycles: 6 Start: C1D1 on 2019 Assoc Dx: Female inflammatory breast cancer LOT: 2nd Line Metastatic Stage: IV Treatment Intent: [id number]/11/2019 C1 D1     Review of Systems: \"Per interval history; otherwise 10 point ROS is negative. \"     Vital Signs:  /65 (Site: Right Upper Arm, Position: Sitting, Cuff Size: Large Adult)   Pulse 100   Temp 97.9 °F (36.6 °C) (Infrared)   Resp 16   Ht 5' 1\" (1.549 m)   Wt 113 lb 6.4 oz (51.4 kg)   SpO2 98%   BMI 21.43 kg/m²     Physical Exam:  CONSTITUTIONAL: awake, alert, cooperative, no apparent distress   EYES: pupils equal, round and reactive to light, sclera clear and conjunctiva pallor. ENT: Normocephalic, without obvious abnormality, atraumatic  NECK: supple, symmetrical, no jugular venous distension and no carotid bruits   HEMATOLOGIC/LYMPHATIC: no cervical, supraclavicular or axillary lymphadenopathy   LUNGS: no increased work of breathing and clear to auscultation   BREAST: thick skin of left breast with induration. No signs of infection.  No lumps to right breast.  CARDIOVASCULAR: regular rate and rhythm, normal S1 and S2, no murmur noted  ABDOMEN: normal bowel sounds x 4, soft, non-distended, non-tender, no masses palpated, no hepatosplenomegaly   MUSCULOSKELETAL: full range of motion noted, tone is normal  NEUROLOGIC: awake, alert, oriented to name, place and time. Motor skills grossly intact. CN II - XII grossly intact. SKIN: No jaundice. appears intact   EXTREMITIES: no LE edema. No cyanosis. Labs:  Hematology:  Lab Results   Component Value Date    WBC 5.4 01/07/2021    RBC 3.00 (L) 01/07/2021    HGB 6.6 (LL) 01/07/2021    HCT 22.8 (L) 01/07/2021    MCV 76.0 (L) 01/07/2021    MCH 22.0 (L) 01/07/2021    MCHC 28.9 (L) 01/07/2021    RDW 23.8 (H) 01/07/2021     (H) 01/07/2021    MPV 8.1 01/07/2021    BANDSPCT 5 04/05/2019    SEGSPCT 63.2 01/07/2021    EOSRELPCT 1.1 01/07/2021    BASOPCT 0.4 01/07/2021    LYMPHOPCT 27.5 01/07/2021    MONOPCT 7.8 (H) 01/07/2021    BANDABS 0.23 04/05/2019    SEGSABS 3.4 01/07/2021    EOSABS 0.1 01/07/2021    BASOSABS 0.0 01/07/2021    LYMPHSABS 1.5 01/07/2021    MONOSABS 0.4 01/07/2021    DIFFTYPE AUTOMATED DIFFERENTIAL 01/07/2021    ANISOCYTOSIS 1+ 12/06/2019    POLYCHROM 1+ 12/06/2019    WBCMORP OCCASIONAL  ATYPICAL LYMPH(S) NOTED   12/06/2019     Chemistry:  Lab Results   Component Value Date     12/30/2020    K 4.0 12/30/2020     12/30/2020    CO2 20 (L) 12/30/2020    BUN 17 12/30/2020    CREATININE 1.4 (H) 12/30/2020    GLUCOSE 116 (H) 12/30/2020    CALCIUM 8.7 12/30/2020    PROT 7.5 12/30/2020    LABALBU 3.7 12/30/2020    BILITOT 0.1 12/30/2020    ALKPHOS 101 12/30/2020    AST 80 (H) 12/30/2020    ALT 27 12/30/2020    LABGLOM 39 (L) 12/30/2020    GFRAA 48 (L) 12/30/2020    MG 2.2 12/30/2020     Immunology:  Lab Results   Component Value Date    PROT 7.5 12/30/2020     Tumor Markers:  Lab Results   Component Value Date    LABCA2 409.2 (H) 10/05/2020      Observations:  PHQ-9 Total Score: 0 (1/7/2021 10:42 AM)        Assessment & Plan:    1. She has inflammatory left breast cancer.  PET/CT scan showed metastatic disease to bone, left axillary lymph node, and left breasts with the skin involvement. At present time she does not have physical disease. CBC and CMP were within normal limits. CA 27-29 was elevated. I put her on palliative hormonal therapy plus Palbociclib. She started arimidex and Ibrance on June 4, 2018 and monthly lupron injection on June 8, 2018. Bone scan in September 2018 showed stable findings. After reviewing the PET CT scan in November 2018, I put her on Zometa monthly. Bone scan in July 2019 was reviewed and showed progression of the disease. PET scan in August 2019 showed grossly response to the therapy. CT chest/abdomen and bone scan due to rising CA27-29 in January 2020 showed progression of disease. She started aromasin, afinitor and lupron on January 10, 2020. She understands the potential side effects of afinitor. PET CT scan May 2020 showed treatment response. CT chest, abdomen pelvis in October 2020 showed response and no evidence of new metastatic disease. She is agreeable to continue with Lupron, exemestane, Zometa and Afinitor. I ordered follow up imaging study. I changed zometa to every 3 months. 2. Hypertension: I advise to monitor BP and has low sodium diet. I gave her refill of Norvasc. Blood pressure is stable. 3. Seasonal allergy. I advise to take Claritin and gave her flonase. I recommend flu shot. 4. She was found to have right lower extremity DVT in October 2019. She is on Eliquis. no signs of bleeding. 5. She has multifactorial anemia. I will check FOB, give one unit PRBC. She likely reactive thrombocytosis. I will order JAK2 with reflex. RTC in 2 weeks or sooner. All of her question has been answered for today. Recent imaging and labs were reviewed and discussed with the patient.       Electronically signed by Vivienne Armenta MD on 9/14/20 at 7:23 AM EDT

## 2021-01-01 ENCOUNTER — CLINICAL DOCUMENTATION (OUTPATIENT)
Dept: RADIATION ONCOLOGY | Age: 56
End: 2021-01-01

## 2021-01-01 ENCOUNTER — TELEPHONE (OUTPATIENT)
Dept: ONCOLOGY | Age: 56
End: 2021-01-01

## 2021-01-01 ENCOUNTER — CLINICAL DOCUMENTATION (OUTPATIENT)
Dept: CASE MANAGEMENT | Age: 56
End: 2021-01-01

## 2021-01-01 ENCOUNTER — HOSPITAL ENCOUNTER (OUTPATIENT)
Dept: CT IMAGING | Age: 56
Discharge: HOME | End: 2021-07-28
Payer: COMMERCIAL

## 2021-01-01 ENCOUNTER — CARE COORDINATION (OUTPATIENT)
Dept: CARE COORDINATION | Age: 56
End: 2021-01-01

## 2021-01-01 ENCOUNTER — HOSPITAL ENCOUNTER (INPATIENT)
Age: 56
LOS: 4 days | Discharge: HOME | DRG: 871 | End: 2021-05-14
Attending: EMERGENCY MEDICINE | Admitting: INTERNAL MEDICINE
Payer: COMMERCIAL

## 2021-01-01 ENCOUNTER — HOSPITAL ENCOUNTER (OUTPATIENT)
Dept: INFUSION THERAPY | Age: 56
Discharge: HOME | End: 2021-04-19
Payer: COMMERCIAL

## 2021-01-01 ENCOUNTER — OFFICE VISIT (OUTPATIENT)
Dept: ONCOLOGY | Age: 56
End: 2021-01-01
Payer: COMMERCIAL

## 2021-01-01 ENCOUNTER — APPOINTMENT (OUTPATIENT)
Dept: GENERAL RADIOLOGY | Age: 56
DRG: 982 | End: 2021-01-01
Attending: INTERNAL MEDICINE
Payer: COMMERCIAL

## 2021-01-01 ENCOUNTER — HOSPITAL ENCOUNTER (OUTPATIENT)
Dept: INFUSION THERAPY | Age: 56
Discharge: HOME | End: 2021-01-25
Payer: COMMERCIAL

## 2021-01-01 ENCOUNTER — ANESTHESIA EVENT (OUTPATIENT)
Dept: OPERATING ROOM | Age: 56
End: 2021-01-01
Payer: COMMERCIAL

## 2021-01-01 ENCOUNTER — HOSPITAL ENCOUNTER (OUTPATIENT)
Dept: INFUSION THERAPY | Age: 56
Discharge: HOME | End: 2021-06-23
Payer: COMMERCIAL

## 2021-01-01 ENCOUNTER — HOSPITAL ENCOUNTER (OUTPATIENT)
Dept: INFUSION THERAPY | Age: 56
Discharge: HOME | End: 2021-07-07
Payer: COMMERCIAL

## 2021-01-01 ENCOUNTER — HOSPITAL ENCOUNTER (OUTPATIENT)
Dept: INFUSION THERAPY | Age: 56
Discharge: HOME | End: 2021-08-02
Payer: COMMERCIAL

## 2021-01-01 ENCOUNTER — HOSPITAL ENCOUNTER (INPATIENT)
Age: 56
LOS: 11 days | Discharge: HOME | DRG: 982 | End: 2021-08-14
Attending: INTERNAL MEDICINE | Admitting: INTERNAL MEDICINE
Payer: COMMERCIAL

## 2021-01-01 ENCOUNTER — TELEPHONE (OUTPATIENT)
Dept: FAMILY MEDICINE CLINIC | Age: 56
End: 2021-01-01

## 2021-01-01 ENCOUNTER — APPOINTMENT (OUTPATIENT)
Dept: CT IMAGING | Age: 56
DRG: 660 | End: 2021-01-01
Payer: COMMERCIAL

## 2021-01-01 ENCOUNTER — HOSPITAL ENCOUNTER (OUTPATIENT)
Age: 56
Setting detail: SPECIMEN
Discharge: HOME | End: 2021-03-11
Payer: COMMERCIAL

## 2021-01-01 ENCOUNTER — HOSPITAL ENCOUNTER (OUTPATIENT)
Dept: INFUSION THERAPY | Age: 56
Setting detail: INFUSION SERIES
Discharge: HOME | End: 2021-07-01
Payer: COMMERCIAL

## 2021-01-01 ENCOUNTER — TELEPHONE (OUTPATIENT)
Dept: SURGERY | Age: 56
End: 2021-01-01

## 2021-01-01 ENCOUNTER — APPOINTMENT (OUTPATIENT)
Dept: MRI IMAGING | Age: 56
DRG: 597 | End: 2021-01-01
Payer: COMMERCIAL

## 2021-01-01 ENCOUNTER — APPOINTMENT (OUTPATIENT)
Dept: INTERVENTIONAL RADIOLOGY/VASCULAR | Age: 56
DRG: 853 | End: 2021-01-01
Payer: COMMERCIAL

## 2021-01-01 ENCOUNTER — HOSPITAL ENCOUNTER (INPATIENT)
Age: 56
LOS: 3 days | Discharge: HOSPICE | DRG: 597 | End: 2021-08-25
Attending: EMERGENCY MEDICINE | Admitting: INTERNAL MEDICINE
Payer: COMMERCIAL

## 2021-01-01 ENCOUNTER — APPOINTMENT (OUTPATIENT)
Dept: GENERAL RADIOLOGY | Age: 56
End: 2021-01-01
Attending: SURGERY
Payer: COMMERCIAL

## 2021-01-01 ENCOUNTER — HOSPITAL ENCOUNTER (OUTPATIENT)
Dept: GENERAL RADIOLOGY | Age: 56
Discharge: HOME | End: 2021-08-03
Payer: COMMERCIAL

## 2021-01-01 ENCOUNTER — HOSPITAL ENCOUNTER (OUTPATIENT)
Age: 56
Setting detail: OUTPATIENT SURGERY
DRG: 982 | End: 2021-01-01
Attending: SPECIALIST | Admitting: SPECIALIST
Payer: COMMERCIAL

## 2021-01-01 ENCOUNTER — ANESTHESIA (OUTPATIENT)
Dept: OPERATING ROOM | Age: 56
DRG: 982 | End: 2021-01-01
Payer: COMMERCIAL

## 2021-01-01 ENCOUNTER — ANESTHESIA EVENT (OUTPATIENT)
Dept: OPERATING ROOM | Age: 56
DRG: 982 | End: 2021-01-01
Payer: COMMERCIAL

## 2021-01-01 ENCOUNTER — CLINICAL DOCUMENTATION (OUTPATIENT)
Dept: ONCOLOGY | Age: 56
End: 2021-01-01

## 2021-01-01 ENCOUNTER — ANESTHESIA EVENT (OUTPATIENT)
Dept: OPERATING ROOM | Age: 56
DRG: 853 | End: 2021-01-01
Payer: COMMERCIAL

## 2021-01-01 ENCOUNTER — CARE COORDINATION (OUTPATIENT)
Dept: CASE MANAGEMENT | Age: 56
End: 2021-01-01

## 2021-01-01 ENCOUNTER — HOSPITAL ENCOUNTER (OUTPATIENT)
Dept: INFUSION THERAPY | Age: 56
Discharge: HOME | End: 2021-02-15
Payer: COMMERCIAL

## 2021-01-01 ENCOUNTER — HOSPITAL ENCOUNTER (OUTPATIENT)
Dept: INFUSION THERAPY | Age: 56
Discharge: HOME | End: 2021-06-16
Payer: COMMERCIAL

## 2021-01-01 ENCOUNTER — HOSPITAL ENCOUNTER (OUTPATIENT)
Dept: INFUSION THERAPY | Age: 56
Discharge: HOME | End: 2021-03-16
Payer: COMMERCIAL

## 2021-01-01 ENCOUNTER — APPOINTMENT (OUTPATIENT)
Dept: GENERAL RADIOLOGY | Age: 56
DRG: 871 | End: 2021-01-01
Payer: COMMERCIAL

## 2021-01-01 ENCOUNTER — APPOINTMENT (OUTPATIENT)
Dept: CT IMAGING | Age: 56
DRG: 597 | End: 2021-01-01
Payer: COMMERCIAL

## 2021-01-01 ENCOUNTER — HOSPITAL ENCOUNTER (OUTPATIENT)
Dept: INFUSION THERAPY | Age: 56
Discharge: HOME | End: 2021-06-28
Payer: COMMERCIAL

## 2021-01-01 ENCOUNTER — APPOINTMENT (OUTPATIENT)
Dept: GENERAL RADIOLOGY | Age: 56
DRG: 853 | End: 2021-01-01
Payer: COMMERCIAL

## 2021-01-01 ENCOUNTER — ANESTHESIA (OUTPATIENT)
Dept: OPERATING ROOM | Age: 56
DRG: 853 | End: 2021-01-01
Payer: COMMERCIAL

## 2021-01-01 ENCOUNTER — APPOINTMENT (OUTPATIENT)
Dept: INTERVENTIONAL RADIOLOGY/VASCULAR | Age: 56
DRG: 871 | End: 2021-01-01
Payer: COMMERCIAL

## 2021-01-01 ENCOUNTER — HOSPITAL ENCOUNTER (OUTPATIENT)
Dept: INFUSION THERAPY | Age: 56
Setting detail: INFUSION SERIES
Discharge: HOME | End: 2021-06-03
Payer: COMMERCIAL

## 2021-01-01 ENCOUNTER — APPOINTMENT (OUTPATIENT)
Dept: GENERAL RADIOLOGY | Age: 56
DRG: 660 | End: 2021-01-01
Payer: COMMERCIAL

## 2021-01-01 ENCOUNTER — HOSPITAL ENCOUNTER (OUTPATIENT)
Dept: INFUSION THERAPY | Age: 56
Discharge: HOME | End: 2021-05-24
Payer: COMMERCIAL

## 2021-01-01 ENCOUNTER — HOSPITAL ENCOUNTER (OUTPATIENT)
Dept: INFUSION THERAPY | Age: 56
Discharge: HOME | End: 2021-07-28
Payer: COMMERCIAL

## 2021-01-01 ENCOUNTER — HOSPITAL ENCOUNTER (OUTPATIENT)
Dept: NON INVASIVE DIAGNOSTICS | Age: 56
Discharge: HOME | End: 2021-03-10
Payer: COMMERCIAL

## 2021-01-01 ENCOUNTER — HOSPITAL ENCOUNTER (OUTPATIENT)
Dept: SURGERY | Age: 56
Discharge: HOME | End: 2021-07-29
Payer: COMMERCIAL

## 2021-01-01 ENCOUNTER — HOSPITAL ENCOUNTER (INPATIENT)
Age: 56
LOS: 5 days | Discharge: HOME HEALTH | DRG: 853 | End: 2021-03-27
Attending: EMERGENCY MEDICINE | Admitting: INTERNAL MEDICINE
Payer: COMMERCIAL

## 2021-01-01 ENCOUNTER — HOSPITAL ENCOUNTER (OUTPATIENT)
Dept: INFUSION THERAPY | Age: 56
Discharge: HOME | End: 2021-07-21
Payer: COMMERCIAL

## 2021-01-01 ENCOUNTER — HOSPITAL ENCOUNTER (INPATIENT)
Age: 56
LOS: 1 days | DRG: 951 | End: 2021-08-26
Attending: FAMILY MEDICINE | Admitting: FAMILY MEDICINE
Payer: COMMERCIAL

## 2021-01-01 ENCOUNTER — HOSPITAL ENCOUNTER (OUTPATIENT)
Dept: INFUSION THERAPY | Age: 56
Discharge: HOME | End: 2021-03-29
Payer: COMMERCIAL

## 2021-01-01 ENCOUNTER — APPOINTMENT (OUTPATIENT)
Dept: GENERAL RADIOLOGY | Age: 56
DRG: 597 | End: 2021-01-01
Payer: COMMERCIAL

## 2021-01-01 ENCOUNTER — APPOINTMENT (OUTPATIENT)
Dept: ULTRASOUND IMAGING | Age: 56
DRG: 597 | End: 2021-01-01
Payer: COMMERCIAL

## 2021-01-01 ENCOUNTER — HOSPITAL ENCOUNTER (OUTPATIENT)
Dept: INFUSION THERAPY | Age: 56
Discharge: HOME | End: 2021-04-26
Payer: COMMERCIAL

## 2021-01-01 ENCOUNTER — HOSPITAL ENCOUNTER (OUTPATIENT)
Age: 56
Setting detail: OUTPATIENT SURGERY
Discharge: HOME | End: 2021-03-15
Attending: SURGERY | Admitting: SURGERY
Payer: COMMERCIAL

## 2021-01-01 ENCOUNTER — ANESTHESIA EVENT (OUTPATIENT)
Dept: OPERATING ROOM | Age: 56
DRG: 660 | End: 2021-01-01
Payer: COMMERCIAL

## 2021-01-01 ENCOUNTER — HOSPITAL ENCOUNTER (OUTPATIENT)
Dept: INFUSION THERAPY | Age: 56
Discharge: HOME | End: 2021-04-05
Payer: COMMERCIAL

## 2021-01-01 ENCOUNTER — ANESTHESIA (OUTPATIENT)
Dept: OPERATING ROOM | Age: 56
End: 2021-01-01
Payer: COMMERCIAL

## 2021-01-01 ENCOUNTER — TELEPHONE (OUTPATIENT)
Dept: INFUSION THERAPY | Age: 56
End: 2021-01-01

## 2021-01-01 ENCOUNTER — HOSPITAL ENCOUNTER (EMERGENCY)
Age: 56
Discharge: HOME | End: 2021-03-20
Payer: COMMERCIAL

## 2021-01-01 ENCOUNTER — HOSPITAL ENCOUNTER (OUTPATIENT)
Dept: INTERVENTIONAL RADIOLOGY/VASCULAR | Age: 56
Discharge: HOME | End: 2021-08-03
Payer: COMMERCIAL

## 2021-01-01 ENCOUNTER — HOSPITAL ENCOUNTER (OUTPATIENT)
Dept: INFUSION THERAPY | Age: 56
Discharge: HOME | End: 2021-04-12
Payer: COMMERCIAL

## 2021-01-01 ENCOUNTER — APPOINTMENT (OUTPATIENT)
Dept: ULTRASOUND IMAGING | Age: 56
DRG: 853 | End: 2021-01-01
Payer: COMMERCIAL

## 2021-01-01 ENCOUNTER — HOSPITAL ENCOUNTER (INPATIENT)
Age: 56
LOS: 4 days | Discharge: HOME | DRG: 660 | End: 2021-06-14
Attending: INTERNAL MEDICINE | Admitting: INTERNAL MEDICINE
Payer: COMMERCIAL

## 2021-01-01 ENCOUNTER — HOSPITAL ENCOUNTER (OUTPATIENT)
Dept: INFUSION THERAPY | Age: 56
Discharge: HOME | End: 2021-03-22
Payer: COMMERCIAL

## 2021-01-01 ENCOUNTER — HOSPITAL ENCOUNTER (OUTPATIENT)
Dept: INFUSION THERAPY | Age: 56
Discharge: HOME | End: 2021-01-07
Payer: COMMERCIAL

## 2021-01-01 ENCOUNTER — HOSPITAL ENCOUNTER (OUTPATIENT)
Dept: CT IMAGING | Age: 56
Discharge: HOME | End: 2021-08-03
Payer: COMMERCIAL

## 2021-01-01 ENCOUNTER — HOSPITAL ENCOUNTER (OUTPATIENT)
Dept: INFUSION THERAPY | Age: 56
Discharge: HOME | End: 2021-07-14
Payer: COMMERCIAL

## 2021-01-01 ENCOUNTER — APPOINTMENT (OUTPATIENT)
Dept: CT IMAGING | Age: 56
DRG: 871 | End: 2021-01-01
Payer: COMMERCIAL

## 2021-01-01 ENCOUNTER — VIRTUAL VISIT (OUTPATIENT)
Dept: FAMILY MEDICINE CLINIC | Age: 56
End: 2021-01-01
Payer: COMMERCIAL

## 2021-01-01 ENCOUNTER — HOSPITAL ENCOUNTER (OUTPATIENT)
Dept: INFUSION THERAPY | Age: 56
Setting detail: INFUSION SERIES
Discharge: HOME | End: 2021-03-17
Payer: COMMERCIAL

## 2021-01-01 ENCOUNTER — HOSPITAL ENCOUNTER (OUTPATIENT)
Dept: INFUSION THERAPY | Age: 56
Setting detail: INFUSION SERIES
Discharge: HOME | End: 2021-02-17
Payer: COMMERCIAL

## 2021-01-01 ENCOUNTER — ANESTHESIA (OUTPATIENT)
Dept: OPERATING ROOM | Age: 56
DRG: 660 | End: 2021-01-01
Payer: COMMERCIAL

## 2021-01-01 ENCOUNTER — HOSPITAL ENCOUNTER (OUTPATIENT)
Dept: INFUSION THERAPY | Age: 56
Discharge: HOME | End: 2021-06-09
Payer: COMMERCIAL

## 2021-01-01 ENCOUNTER — APPOINTMENT (OUTPATIENT)
Dept: CT IMAGING | Age: 56
DRG: 853 | End: 2021-01-01
Payer: COMMERCIAL

## 2021-01-01 ENCOUNTER — OFFICE VISIT (OUTPATIENT)
Dept: SURGERY | Age: 56
End: 2021-01-01
Payer: COMMERCIAL

## 2021-01-01 ENCOUNTER — NURSE ONLY (OUTPATIENT)
Dept: SURGERY | Age: 56
End: 2021-01-01

## 2021-01-01 ENCOUNTER — HOSPITAL ENCOUNTER (OUTPATIENT)
Dept: INFUSION THERAPY | Age: 56
Setting detail: INFUSION SERIES
Discharge: HOME | End: 2021-03-30
Payer: COMMERCIAL

## 2021-01-01 ENCOUNTER — HOSPITAL ENCOUNTER (OUTPATIENT)
Age: 56
Setting detail: SPECIMEN
Discharge: HOME | End: 2021-01-08
Payer: COMMERCIAL

## 2021-01-01 ENCOUNTER — HOSPITAL ENCOUNTER (OUTPATIENT)
Dept: INFUSION THERAPY | Age: 56
Setting detail: INFUSION SERIES
Discharge: HOME | End: 2021-01-08
Payer: COMMERCIAL

## 2021-01-01 ENCOUNTER — HOSPITAL ENCOUNTER (OUTPATIENT)
Dept: INFUSION THERAPY | Age: 56
Setting detail: INFUSION SERIES
Discharge: HOME | End: 2021-01-27
Payer: COMMERCIAL

## 2021-01-01 ENCOUNTER — HOSPITAL ENCOUNTER (OUTPATIENT)
Dept: INFUSION THERAPY | Age: 56
Discharge: HOME | End: 2021-05-17
Payer: COMMERCIAL

## 2021-01-01 ENCOUNTER — HOSPITAL ENCOUNTER (OUTPATIENT)
Dept: INFUSION THERAPY | Age: 56
Discharge: HOME | End: 2021-06-02
Payer: COMMERCIAL

## 2021-01-01 ENCOUNTER — APPOINTMENT (OUTPATIENT)
Dept: CT IMAGING | Age: 56
DRG: 982 | End: 2021-01-01
Attending: INTERNAL MEDICINE
Payer: COMMERCIAL

## 2021-01-01 ENCOUNTER — HOSPITAL ENCOUNTER (OUTPATIENT)
Dept: CT IMAGING | Age: 56
Discharge: HOME | End: 2021-01-20
Payer: COMMERCIAL

## 2021-01-01 ENCOUNTER — HOSPITAL ENCOUNTER (OUTPATIENT)
Dept: INFUSION THERAPY | Age: 56
Discharge: HOME | End: 2021-06-30
Payer: COMMERCIAL

## 2021-01-01 VITALS
DIASTOLIC BLOOD PRESSURE: 69 MMHG | WEIGHT: 131 LBS | TEMPERATURE: 99.6 F | SYSTOLIC BLOOD PRESSURE: 134 MMHG | HEIGHT: 61 IN | BODY MASS INDEX: 24.73 KG/M2 | HEART RATE: 105 BPM | OXYGEN SATURATION: 99 %

## 2021-01-01 VITALS
HEART RATE: 101 BPM | BODY MASS INDEX: 27.08 KG/M2 | TEMPERATURE: 98.1 F | DIASTOLIC BLOOD PRESSURE: 88 MMHG | WEIGHT: 143.3 LBS | OXYGEN SATURATION: 97 % | SYSTOLIC BLOOD PRESSURE: 128 MMHG

## 2021-01-01 VITALS
RESPIRATION RATE: 18 BRPM | TEMPERATURE: 98.3 F | DIASTOLIC BLOOD PRESSURE: 78 MMHG | SYSTOLIC BLOOD PRESSURE: 148 MMHG | WEIGHT: 120 LBS | HEIGHT: 61 IN | OXYGEN SATURATION: 99 % | HEART RATE: 120 BPM | BODY MASS INDEX: 22.66 KG/M2

## 2021-01-01 VITALS
RESPIRATION RATE: 20 BRPM | HEART RATE: 132 BPM | WEIGHT: 122.6 LBS | TEMPERATURE: 97.6 F | OXYGEN SATURATION: 96 % | HEIGHT: 61 IN | BODY MASS INDEX: 23.15 KG/M2 | SYSTOLIC BLOOD PRESSURE: 141 MMHG | DIASTOLIC BLOOD PRESSURE: 85 MMHG

## 2021-01-01 VITALS
OXYGEN SATURATION: 93 % | WEIGHT: 142.7 LBS | HEIGHT: 61 IN | RESPIRATION RATE: 16 BRPM | SYSTOLIC BLOOD PRESSURE: 131 MMHG | TEMPERATURE: 98.6 F | DIASTOLIC BLOOD PRESSURE: 79 MMHG | BODY MASS INDEX: 26.94 KG/M2 | HEART RATE: 105 BPM

## 2021-01-01 VITALS
OXYGEN SATURATION: 98 % | RESPIRATION RATE: 16 BRPM | WEIGHT: 111.2 LBS | HEIGHT: 61 IN | SYSTOLIC BLOOD PRESSURE: 154 MMHG | BODY MASS INDEX: 20.99 KG/M2 | DIASTOLIC BLOOD PRESSURE: 86 MMHG | TEMPERATURE: 96.7 F | HEART RATE: 168 BPM

## 2021-01-01 VITALS
BODY MASS INDEX: 26.43 KG/M2 | WEIGHT: 140 LBS | SYSTOLIC BLOOD PRESSURE: 92 MMHG | RESPIRATION RATE: 16 BRPM | DIASTOLIC BLOOD PRESSURE: 44 MMHG | HEART RATE: 108 BPM | TEMPERATURE: 103 F | OXYGEN SATURATION: 92 % | HEIGHT: 61 IN

## 2021-01-01 VITALS
DIASTOLIC BLOOD PRESSURE: 79 MMHG | HEART RATE: 101 BPM | SYSTOLIC BLOOD PRESSURE: 131 MMHG | OXYGEN SATURATION: 97 % | RESPIRATION RATE: 20 BRPM | WEIGHT: 132.5 LBS | TEMPERATURE: 98.1 F | BODY MASS INDEX: 25.02 KG/M2 | HEIGHT: 61 IN

## 2021-01-01 VITALS
TEMPERATURE: 97.3 F | OXYGEN SATURATION: 93 % | SYSTOLIC BLOOD PRESSURE: 132 MMHG | DIASTOLIC BLOOD PRESSURE: 71 MMHG | WEIGHT: 127.4 LBS | HEART RATE: 105 BPM | BODY MASS INDEX: 24.05 KG/M2 | RESPIRATION RATE: 16 BRPM | HEIGHT: 61 IN

## 2021-01-01 VITALS
SYSTOLIC BLOOD PRESSURE: 115 MMHG | HEART RATE: 90 BPM | RESPIRATION RATE: 16 BRPM | TEMPERATURE: 97.7 F | DIASTOLIC BLOOD PRESSURE: 73 MMHG | OXYGEN SATURATION: 98 %

## 2021-01-01 VITALS
RESPIRATION RATE: 23 BRPM | OXYGEN SATURATION: 92 % | HEIGHT: 61 IN | WEIGHT: 148.4 LBS | BODY MASS INDEX: 28.02 KG/M2 | HEART RATE: 103 BPM | TEMPERATURE: 97.9 F | DIASTOLIC BLOOD PRESSURE: 51 MMHG | SYSTOLIC BLOOD PRESSURE: 105 MMHG

## 2021-01-01 VITALS
HEART RATE: 101 BPM | HEART RATE: 136 BPM | DIASTOLIC BLOOD PRESSURE: 89 MMHG | OXYGEN SATURATION: 100 % | BODY MASS INDEX: 26.43 KG/M2 | WEIGHT: 120 LBS | TEMPERATURE: 98.1 F | RESPIRATION RATE: 17 BRPM | OXYGEN SATURATION: 99 % | BODY MASS INDEX: 22.66 KG/M2 | DIASTOLIC BLOOD PRESSURE: 77 MMHG | WEIGHT: 140 LBS | HEIGHT: 61 IN | SYSTOLIC BLOOD PRESSURE: 157 MMHG | HEIGHT: 61 IN | SYSTOLIC BLOOD PRESSURE: 147 MMHG | TEMPERATURE: 98.2 F

## 2021-01-01 VITALS
HEART RATE: 102 BPM | SYSTOLIC BLOOD PRESSURE: 116 MMHG | DIASTOLIC BLOOD PRESSURE: 48 MMHG | OXYGEN SATURATION: 97 % | BODY MASS INDEX: 26.43 KG/M2 | HEIGHT: 61 IN | RESPIRATION RATE: 18 BRPM | TEMPERATURE: 98.4 F | WEIGHT: 140 LBS

## 2021-01-01 VITALS
WEIGHT: 113.4 LBS | TEMPERATURE: 97.9 F | DIASTOLIC BLOOD PRESSURE: 65 MMHG | HEIGHT: 61 IN | OXYGEN SATURATION: 98 % | HEART RATE: 100 BPM | BODY MASS INDEX: 21.41 KG/M2 | RESPIRATION RATE: 16 BRPM | SYSTOLIC BLOOD PRESSURE: 129 MMHG

## 2021-01-01 VITALS
HEART RATE: 122 BPM | OXYGEN SATURATION: 99 % | DIASTOLIC BLOOD PRESSURE: 74 MMHG | SYSTOLIC BLOOD PRESSURE: 140 MMHG | TEMPERATURE: 98.2 F | RESPIRATION RATE: 17 BRPM

## 2021-01-01 VITALS
WEIGHT: 120 LBS | HEIGHT: 61 IN | BODY MASS INDEX: 22.66 KG/M2 | RESPIRATION RATE: 18 BRPM | SYSTOLIC BLOOD PRESSURE: 114 MMHG | HEART RATE: 112 BPM | OXYGEN SATURATION: 98 % | TEMPERATURE: 98.6 F | DIASTOLIC BLOOD PRESSURE: 71 MMHG

## 2021-01-01 VITALS
BODY MASS INDEX: 26.17 KG/M2 | RESPIRATION RATE: 16 BRPM | DIASTOLIC BLOOD PRESSURE: 88 MMHG | HEIGHT: 61 IN | TEMPERATURE: 98.4 F | WEIGHT: 138.6 LBS | OXYGEN SATURATION: 98 % | SYSTOLIC BLOOD PRESSURE: 143 MMHG | HEART RATE: 101 BPM

## 2021-01-01 VITALS
HEART RATE: 139 BPM | WEIGHT: 109.4 LBS | DIASTOLIC BLOOD PRESSURE: 93 MMHG | TEMPERATURE: 97 F | RESPIRATION RATE: 20 BRPM | BODY MASS INDEX: 20.67 KG/M2 | SYSTOLIC BLOOD PRESSURE: 161 MMHG

## 2021-01-01 VITALS
OXYGEN SATURATION: 99 % | HEART RATE: 87 BPM | BODY MASS INDEX: 23.41 KG/M2 | WEIGHT: 124 LBS | TEMPERATURE: 99.4 F | HEIGHT: 61 IN | SYSTOLIC BLOOD PRESSURE: 125 MMHG | DIASTOLIC BLOOD PRESSURE: 68 MMHG | RESPIRATION RATE: 16 BRPM

## 2021-01-01 VITALS
HEIGHT: 61 IN | HEIGHT: 61 IN | RESPIRATION RATE: 16 BRPM | TEMPERATURE: 97.9 F | WEIGHT: 124 LBS | BODY MASS INDEX: 23.41 KG/M2 | SYSTOLIC BLOOD PRESSURE: 108 MMHG | DIASTOLIC BLOOD PRESSURE: 64 MMHG | DIASTOLIC BLOOD PRESSURE: 87 MMHG | HEART RATE: 92 BPM | SYSTOLIC BLOOD PRESSURE: 159 MMHG | BODY MASS INDEX: 26.76 KG/M2 | OXYGEN SATURATION: 96 % | RESPIRATION RATE: 18 BRPM | OXYGEN SATURATION: 97 % | TEMPERATURE: 96.8 F | HEART RATE: 94 BPM

## 2021-01-01 VITALS
TEMPERATURE: 96.9 F | OXYGEN SATURATION: 98 % | WEIGHT: 132 LBS | DIASTOLIC BLOOD PRESSURE: 65 MMHG | SYSTOLIC BLOOD PRESSURE: 132 MMHG | BODY MASS INDEX: 24.92 KG/M2 | HEIGHT: 61 IN | HEART RATE: 94 BPM

## 2021-01-01 VITALS
BODY MASS INDEX: 22.31 KG/M2 | HEART RATE: 72 BPM | RESPIRATION RATE: 16 BRPM | OXYGEN SATURATION: 100 % | DIASTOLIC BLOOD PRESSURE: 65 MMHG | HEIGHT: 61 IN | TEMPERATURE: 98.2 F | WEIGHT: 118.2 LBS | SYSTOLIC BLOOD PRESSURE: 114 MMHG

## 2021-01-01 VITALS
SYSTOLIC BLOOD PRESSURE: 137 MMHG | OXYGEN SATURATION: 100 % | RESPIRATION RATE: 13 BRPM | DIASTOLIC BLOOD PRESSURE: 77 MMHG

## 2021-01-01 VITALS
OXYGEN SATURATION: 100 % | TEMPERATURE: 98.6 F | SYSTOLIC BLOOD PRESSURE: 155 MMHG | DIASTOLIC BLOOD PRESSURE: 99 MMHG | RESPIRATION RATE: 17 BRPM

## 2021-01-01 VITALS
SYSTOLIC BLOOD PRESSURE: 133 MMHG | OXYGEN SATURATION: 100 % | RESPIRATION RATE: 18 BRPM | DIASTOLIC BLOOD PRESSURE: 87 MMHG

## 2021-01-01 VITALS
SYSTOLIC BLOOD PRESSURE: 144 MMHG | TEMPERATURE: 98.3 F | HEART RATE: 139 BPM | DIASTOLIC BLOOD PRESSURE: 80 MMHG | RESPIRATION RATE: 16 BRPM | OXYGEN SATURATION: 95 %

## 2021-01-01 VITALS — SYSTOLIC BLOOD PRESSURE: 42 MMHG | TEMPERATURE: 100.7 F | DIASTOLIC BLOOD PRESSURE: 24 MMHG | OXYGEN SATURATION: 99 %

## 2021-01-01 VITALS
TEMPERATURE: 98.2 F | OXYGEN SATURATION: 99 % | DIASTOLIC BLOOD PRESSURE: 77 MMHG | SYSTOLIC BLOOD PRESSURE: 147 MMHG | HEART RATE: 136 BPM

## 2021-01-01 VITALS
OXYGEN SATURATION: 95 % | DIASTOLIC BLOOD PRESSURE: 55 MMHG | WEIGHT: 118 LBS | HEART RATE: 102 BPM | TEMPERATURE: 97.9 F | SYSTOLIC BLOOD PRESSURE: 107 MMHG | HEIGHT: 61 IN | BODY MASS INDEX: 22.28 KG/M2

## 2021-01-01 VITALS
SYSTOLIC BLOOD PRESSURE: 139 MMHG | TEMPERATURE: 98.6 F | OXYGEN SATURATION: 100 % | DIASTOLIC BLOOD PRESSURE: 83 MMHG | HEART RATE: 104 BPM | RESPIRATION RATE: 16 BRPM

## 2021-01-01 VITALS
SYSTOLIC BLOOD PRESSURE: 141 MMHG | TEMPERATURE: 97.6 F | RESPIRATION RATE: 20 BRPM | HEART RATE: 132 BPM | HEIGHT: 61 IN | BODY MASS INDEX: 23.03 KG/M2 | DIASTOLIC BLOOD PRESSURE: 85 MMHG | OXYGEN SATURATION: 96 % | WEIGHT: 122 LBS

## 2021-01-01 VITALS
SYSTOLIC BLOOD PRESSURE: 144 MMHG | DIASTOLIC BLOOD PRESSURE: 73 MMHG | HEART RATE: 110 BPM | OXYGEN SATURATION: 91 % | TEMPERATURE: 98 F

## 2021-01-01 VITALS
DIASTOLIC BLOOD PRESSURE: 55 MMHG | OXYGEN SATURATION: 97 % | SYSTOLIC BLOOD PRESSURE: 103 MMHG | RESPIRATION RATE: 9 BRPM

## 2021-01-01 VITALS
HEART RATE: 98 BPM | TEMPERATURE: 98.2 F | RESPIRATION RATE: 18 BRPM | OXYGEN SATURATION: 98 % | DIASTOLIC BLOOD PRESSURE: 81 MMHG | SYSTOLIC BLOOD PRESSURE: 119 MMHG

## 2021-01-01 VITALS
WEIGHT: 118 LBS | OXYGEN SATURATION: 99 % | RESPIRATION RATE: 16 BRPM | DIASTOLIC BLOOD PRESSURE: 67 MMHG | HEIGHT: 61 IN | BODY MASS INDEX: 22.28 KG/M2 | SYSTOLIC BLOOD PRESSURE: 130 MMHG | HEART RATE: 90 BPM | TEMPERATURE: 99.7 F

## 2021-01-01 VITALS
DIASTOLIC BLOOD PRESSURE: 87 MMHG | RESPIRATION RATE: 16 BRPM | HEIGHT: 61 IN | SYSTOLIC BLOOD PRESSURE: 159 MMHG | BODY MASS INDEX: 23.41 KG/M2 | HEART RATE: 92 BPM | TEMPERATURE: 97.9 F | OXYGEN SATURATION: 97 % | WEIGHT: 124 LBS

## 2021-01-01 VITALS
BODY MASS INDEX: 24.39 KG/M2 | DIASTOLIC BLOOD PRESSURE: 81 MMHG | WEIGHT: 129.19 LBS | HEART RATE: 87 BPM | OXYGEN SATURATION: 95 % | TEMPERATURE: 97.9 F | HEIGHT: 61 IN | RESPIRATION RATE: 16 BRPM | SYSTOLIC BLOOD PRESSURE: 154 MMHG

## 2021-01-01 VITALS
OXYGEN SATURATION: 98 % | WEIGHT: 109 LBS | HEIGHT: 61 IN | RESPIRATION RATE: 16 BRPM | SYSTOLIC BLOOD PRESSURE: 156 MMHG | TEMPERATURE: 99 F | DIASTOLIC BLOOD PRESSURE: 102 MMHG | HEART RATE: 126 BPM | BODY MASS INDEX: 20.58 KG/M2

## 2021-01-01 VITALS
HEART RATE: 101 BPM | OXYGEN SATURATION: 96 % | SYSTOLIC BLOOD PRESSURE: 138 MMHG | TEMPERATURE: 98 F | DIASTOLIC BLOOD PRESSURE: 68 MMHG | WEIGHT: 130 LBS | BODY MASS INDEX: 24.55 KG/M2 | HEIGHT: 61 IN

## 2021-01-01 VITALS
HEIGHT: 61 IN | BODY MASS INDEX: 21.79 KG/M2 | DIASTOLIC BLOOD PRESSURE: 80 MMHG | SYSTOLIC BLOOD PRESSURE: 141 MMHG | TEMPERATURE: 98.9 F | WEIGHT: 115.4 LBS | RESPIRATION RATE: 16 BRPM | OXYGEN SATURATION: 99 % | HEART RATE: 150 BPM

## 2021-01-01 VITALS
OXYGEN SATURATION: 99 % | SYSTOLIC BLOOD PRESSURE: 130 MMHG | HEART RATE: 90 BPM | BODY MASS INDEX: 22.28 KG/M2 | DIASTOLIC BLOOD PRESSURE: 67 MMHG | RESPIRATION RATE: 16 BRPM | HEIGHT: 61 IN | WEIGHT: 118 LBS | TEMPERATURE: 99.7 F

## 2021-01-01 VITALS
TEMPERATURE: 99.3 F | HEART RATE: 123 BPM | OXYGEN SATURATION: 93 % | DIASTOLIC BLOOD PRESSURE: 96 MMHG | RESPIRATION RATE: 16 BRPM | SYSTOLIC BLOOD PRESSURE: 150 MMHG

## 2021-01-01 VITALS
RESPIRATION RATE: 16 BRPM | SYSTOLIC BLOOD PRESSURE: 142 MMHG | DIASTOLIC BLOOD PRESSURE: 76 MMHG | TEMPERATURE: 98.2 F | OXYGEN SATURATION: 100 % | HEART RATE: 118 BPM

## 2021-01-01 VITALS
DIASTOLIC BLOOD PRESSURE: 69 MMHG | OXYGEN SATURATION: 97 % | SYSTOLIC BLOOD PRESSURE: 106 MMHG | RESPIRATION RATE: 1 BRPM

## 2021-01-01 VITALS
RESPIRATION RATE: 16 BRPM | OXYGEN SATURATION: 95 % | DIASTOLIC BLOOD PRESSURE: 103 MMHG | SYSTOLIC BLOOD PRESSURE: 160 MMHG | TEMPERATURE: 98.8 F | HEART RATE: 95 BPM

## 2021-01-01 VITALS
SYSTOLIC BLOOD PRESSURE: 143 MMHG | RESPIRATION RATE: 16 BRPM | OXYGEN SATURATION: 99 % | HEART RATE: 102 BPM | DIASTOLIC BLOOD PRESSURE: 88 MMHG | TEMPERATURE: 97.3 F

## 2021-01-01 VITALS
HEART RATE: 120 BPM | OXYGEN SATURATION: 99 % | RESPIRATION RATE: 16 BRPM | SYSTOLIC BLOOD PRESSURE: 143 MMHG | TEMPERATURE: 98.7 F | DIASTOLIC BLOOD PRESSURE: 80 MMHG

## 2021-01-01 DIAGNOSIS — U07.1 COVID-19: Primary | ICD-10-CM

## 2021-01-01 DIAGNOSIS — R65.20 SEVERE SEPSIS WITH ACUTE ORGAN DYSFUNCTION (HCC): ICD-10-CM

## 2021-01-01 DIAGNOSIS — E87.5 HYPERKALEMIA: ICD-10-CM

## 2021-01-01 DIAGNOSIS — Z17.0 MALIGNANT NEOPLASM OF CENTRAL PORTION OF LEFT BREAST IN FEMALE, ESTROGEN RECEPTOR POSITIVE (HCC): ICD-10-CM

## 2021-01-01 DIAGNOSIS — D50.9 IRON DEFICIENCY ANEMIA, UNSPECIFIED IRON DEFICIENCY ANEMIA TYPE: ICD-10-CM

## 2021-01-01 DIAGNOSIS — C50.112 MALIGNANT NEOPLASM OF CENTRAL PORTION OF LEFT BREAST IN FEMALE, ESTROGEN RECEPTOR POSITIVE (HCC): ICD-10-CM

## 2021-01-01 DIAGNOSIS — Z17.0 MALIGNANT NEOPLASM OF CENTRAL PORTION OF LEFT BREAST IN FEMALE, ESTROGEN RECEPTOR POSITIVE (HCC): Primary | ICD-10-CM

## 2021-01-01 DIAGNOSIS — N18.9 CHRONIC KIDNEY DISEASE, UNSPECIFIED CKD STAGE: Primary | ICD-10-CM

## 2021-01-01 DIAGNOSIS — I10 ESSENTIAL HYPERTENSION: ICD-10-CM

## 2021-01-01 DIAGNOSIS — C79.51 SECONDARY CANCER OF BONE (HCC): Primary | ICD-10-CM

## 2021-01-01 DIAGNOSIS — C50.919 MALIGNANT NEOPLASM OF BREAST IN FEMALE, ESTROGEN RECEPTOR POSITIVE, UNSPECIFIED LATERALITY, UNSPECIFIED SITE OF BREAST (HCC): Primary | ICD-10-CM

## 2021-01-01 DIAGNOSIS — C50.919 MALIGNANT NEOPLASM OF BREAST IN FEMALE, ESTROGEN RECEPTOR POSITIVE, UNSPECIFIED LATERALITY, UNSPECIFIED SITE OF BREAST (HCC): ICD-10-CM

## 2021-01-01 DIAGNOSIS — C50.112 MALIGNANT NEOPLASM OF CENTRAL PORTION OF LEFT FEMALE BREAST, UNSPECIFIED ESTROGEN RECEPTOR STATUS (HCC): ICD-10-CM

## 2021-01-01 DIAGNOSIS — C50.919 METASTATIC BREAST CARCINOMA (HCC): ICD-10-CM

## 2021-01-01 DIAGNOSIS — M79.89 SWELLING OF BOTH LOWER EXTREMITIES: ICD-10-CM

## 2021-01-01 DIAGNOSIS — A41.9 SEVERE SEPSIS WITH ACUTE ORGAN DYSFUNCTION (HCC): ICD-10-CM

## 2021-01-01 DIAGNOSIS — E86.0 DEHYDRATION: ICD-10-CM

## 2021-01-01 DIAGNOSIS — C50.919 METASTATIC BREAST CANCER (HCC): ICD-10-CM

## 2021-01-01 DIAGNOSIS — C50.112 MALIGNANT NEOPLASM OF CENTRAL PORTION OF LEFT BREAST IN FEMALE, ESTROGEN RECEPTOR POSITIVE (HCC): Primary | ICD-10-CM

## 2021-01-01 DIAGNOSIS — C50.919 METASTATIC BREAST CARCINOMA (HCC): Primary | ICD-10-CM

## 2021-01-01 DIAGNOSIS — C79.81 SECONDARY MALIGNANT NEOPLASM OF BREAST (HCC): ICD-10-CM

## 2021-01-01 DIAGNOSIS — J96.01 ACUTE RESPIRATORY FAILURE WITH HYPOXIA (HCC): Primary | ICD-10-CM

## 2021-01-01 DIAGNOSIS — Z17.0 MALIGNANT NEOPLASM OF BREAST IN FEMALE, ESTROGEN RECEPTOR POSITIVE, UNSPECIFIED LATERALITY, UNSPECIFIED SITE OF BREAST (HCC): Primary | ICD-10-CM

## 2021-01-01 DIAGNOSIS — Z17.0 MALIGNANT NEOPLASM OF BREAST IN FEMALE, ESTROGEN RECEPTOR POSITIVE, UNSPECIFIED LATERALITY, UNSPECIFIED SITE OF BREAST (HCC): ICD-10-CM

## 2021-01-01 DIAGNOSIS — Z79.899 IMMUNOCOMPROMISED STATE DUE TO DRUG THERAPY (HCC): ICD-10-CM

## 2021-01-01 DIAGNOSIS — R00.0 SINUS TACHYCARDIA: ICD-10-CM

## 2021-01-01 DIAGNOSIS — D64.9 ANEMIA, UNSPECIFIED TYPE: ICD-10-CM

## 2021-01-01 DIAGNOSIS — D50.9 IRON DEFICIENCY ANEMIA, UNSPECIFIED IRON DEFICIENCY ANEMIA TYPE: Primary | ICD-10-CM

## 2021-01-01 DIAGNOSIS — R53.83 FATIGUE, UNSPECIFIED TYPE: ICD-10-CM

## 2021-01-01 DIAGNOSIS — N17.0 ACUTE RENAL FAILURE WITH TUBULAR NECROSIS (HCC): ICD-10-CM

## 2021-01-01 DIAGNOSIS — R00.0 TACHYCARDIA: Primary | ICD-10-CM

## 2021-01-01 DIAGNOSIS — N17.9 AKI (ACUTE KIDNEY INJURY) (HCC): Primary | ICD-10-CM

## 2021-01-01 DIAGNOSIS — C79.51 SECONDARY CANCER OF BONE (HCC): ICD-10-CM

## 2021-01-01 DIAGNOSIS — J90 PLEURAL EFFUSION ON LEFT: Primary | ICD-10-CM

## 2021-01-01 DIAGNOSIS — J30.2 SEASONAL ALLERGIES: ICD-10-CM

## 2021-01-01 DIAGNOSIS — C50.112 MALIGNANT NEOPLASM OF CENTRAL PORTION OF LEFT FEMALE BREAST, UNSPECIFIED ESTROGEN RECEPTOR STATUS (HCC): Primary | ICD-10-CM

## 2021-01-01 DIAGNOSIS — N17.9 AKI (ACUTE KIDNEY INJURY) (HCC): ICD-10-CM

## 2021-01-01 DIAGNOSIS — I21.4 NSTEMI (NON-ST ELEVATED MYOCARDIAL INFARCTION) (HCC): ICD-10-CM

## 2021-01-01 DIAGNOSIS — Z71.89 ADVANCE DIRECTIVE DISCUSSED WITH PATIENT: ICD-10-CM

## 2021-01-01 DIAGNOSIS — R53.81 DEBILITY: ICD-10-CM

## 2021-01-01 DIAGNOSIS — N30.01 ACUTE CYSTITIS WITH HEMATURIA: ICD-10-CM

## 2021-01-01 DIAGNOSIS — Z01.818 PRE-OP TESTING: Primary | ICD-10-CM

## 2021-01-01 DIAGNOSIS — R06.02 SHORTNESS OF BREATH: Primary | ICD-10-CM

## 2021-01-01 DIAGNOSIS — R77.8 ELEVATED TROPONIN: ICD-10-CM

## 2021-01-01 DIAGNOSIS — J90 PLEURAL EFFUSION: Primary | ICD-10-CM

## 2021-01-01 DIAGNOSIS — N13.30 BILATERAL HYDRONEPHROSIS: ICD-10-CM

## 2021-01-01 DIAGNOSIS — D64.9 ANEMIA, UNSPECIFIED TYPE: Primary | ICD-10-CM
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
## 2021-01-01 DIAGNOSIS — J90 PLEURAL EFFUSION: ICD-10-CM

## 2021-01-01 DIAGNOSIS — R00.0 TACHYCARDIA: ICD-10-CM

## 2021-01-01 DIAGNOSIS — J18.9 MULTIFOCAL PNEUMONIA: ICD-10-CM

## 2021-01-01 DIAGNOSIS — R73.9 HYPERGLYCEMIA: ICD-10-CM

## 2021-01-01 DIAGNOSIS — K21.9 GASTROESOPHAGEAL REFLUX DISEASE, UNSPECIFIED WHETHER ESOPHAGITIS PRESENT: ICD-10-CM

## 2021-01-01 DIAGNOSIS — N13.30 HYDRONEPHROSIS, UNSPECIFIED HYDRONEPHROSIS TYPE: ICD-10-CM

## 2021-01-01 DIAGNOSIS — N63.20 LEFT BREAST MASS: Primary | ICD-10-CM

## 2021-01-01 DIAGNOSIS — R65.10 SIRS (SYSTEMIC INFLAMMATORY RESPONSE SYNDROME) (HCC): ICD-10-CM

## 2021-01-01 DIAGNOSIS — U07.1 COVID-19: ICD-10-CM

## 2021-01-01 DIAGNOSIS — N63.20 LEFT BREAST MASS: ICD-10-CM

## 2021-01-01 DIAGNOSIS — D84.821 IMMUNOCOMPROMISED STATE DUE TO DRUG THERAPY (HCC): ICD-10-CM

## 2021-01-01 DIAGNOSIS — R31.9 URINARY TRACT INFECTION WITH HEMATURIA, SITE UNSPECIFIED: ICD-10-CM

## 2021-01-01 DIAGNOSIS — J90 PLEURAL EFFUSION ON LEFT: ICD-10-CM

## 2021-01-01 DIAGNOSIS — N83.8 OVARIAN MASS, LEFT: ICD-10-CM

## 2021-01-01 DIAGNOSIS — D72.829 LEUKOCYTOSIS, UNSPECIFIED TYPE: ICD-10-CM

## 2021-01-01 DIAGNOSIS — E87.20 LACTIC ACIDOSIS: ICD-10-CM

## 2021-01-01 DIAGNOSIS — N39.0 URINARY TRACT INFECTION WITH HEMATURIA, SITE UNSPECIFIED: ICD-10-CM

## 2021-01-01 DIAGNOSIS — K90.9 INTESTINAL MALABSORPTION, UNSPECIFIED TYPE: ICD-10-CM

## 2021-01-01 DIAGNOSIS — Z96.0 STATUS POST PLACEMENT OF URETERAL STENT: ICD-10-CM

## 2021-01-01 LAB
ABO/RH: NORMAL
ALBUMIN FLUID: 2.3 GM/DL
ALBUMIN SERPL-MCNC: 2.3 GM/DL (ref 3.4–5)
ALBUMIN SERPL-MCNC: 2.3 GM/DL (ref 3.4–5)
ALBUMIN SERPL-MCNC: 2.5 GM/DL (ref 3.4–5)
ALBUMIN SERPL-MCNC: 2.6 GM/DL (ref 3.4–5)
ALBUMIN SERPL-MCNC: 2.7 GM/DL (ref 3.4–5)
ALBUMIN SERPL-MCNC: 2.8 GM/DL (ref 3.4–5)
ALBUMIN SERPL-MCNC: 2.9 GM/DL (ref 3.4–5)
ALBUMIN SERPL-MCNC: 3 GM/DL (ref 3.4–5)
ALBUMIN SERPL-MCNC: 3.1 GM/DL (ref 3.4–5)
ALBUMIN SERPL-MCNC: 3.2 GM/DL (ref 3.4–5)
ALBUMIN SERPL-MCNC: 3.3 GM/DL (ref 3.4–5)
ALBUMIN SERPL-MCNC: 3.4 GM/DL (ref 3.4–5)
ALBUMIN SERPL-MCNC: 3.4 GM/DL (ref 3.4–5)
ALBUMIN SERPL-MCNC: 3.5 GM/DL (ref 3.4–5)
ALBUMIN SERPL-MCNC: 3.5 GM/DL (ref 3.4–5)
ALBUMIN SERPL-MCNC: 3.6 GM/DL (ref 3.4–5)
ALBUMIN SERPL-MCNC: 3.6 GM/DL (ref 3.4–5)
ALP BLD-CCNC: 100 IU/L (ref 40–129)
ALP BLD-CCNC: 103 IU/L (ref 40–128)
ALP BLD-CCNC: 108 IU/L (ref 40–128)
ALP BLD-CCNC: 113 IU/L (ref 40–129)
ALP BLD-CCNC: 120 IU/L (ref 40–129)
ALP BLD-CCNC: 124 IU/L (ref 40–128)
ALP BLD-CCNC: 125 IU/L (ref 40–129)
ALP BLD-CCNC: 128 IU/L (ref 40–128)
ALP BLD-CCNC: 159 IU/L (ref 40–129)
ALP BLD-CCNC: 192 IU/L (ref 40–128)
ALP BLD-CCNC: 193 IU/L (ref 40–128)
ALP BLD-CCNC: 61 IU/L (ref 40–128)
ALP BLD-CCNC: 62 IU/L (ref 40–129)
ALP BLD-CCNC: 62 IU/L (ref 40–129)
ALP BLD-CCNC: 63 IU/L (ref 40–128)
ALP BLD-CCNC: 63 IU/L (ref 40–128)
ALP BLD-CCNC: 64 IU/L (ref 40–128)
ALP BLD-CCNC: 65 IU/L (ref 40–128)
ALP BLD-CCNC: 66 IU/L (ref 40–128)
ALP BLD-CCNC: 70 IU/L (ref 40–128)
ALP BLD-CCNC: 71 IU/L (ref 40–128)
ALP BLD-CCNC: 72 IU/L (ref 40–128)
ALP BLD-CCNC: 72 IU/L (ref 40–129)
ALP BLD-CCNC: 73 IU/L (ref 40–129)
ALP BLD-CCNC: 77 IU/L (ref 40–128)
ALP BLD-CCNC: 80 IU/L (ref 40–128)
ALP BLD-CCNC: 80 IU/L (ref 40–128)
ALP BLD-CCNC: 81 IU/L (ref 40–128)
ALP BLD-CCNC: 82 IU/L (ref 40–129)
ALP BLD-CCNC: 83 IU/L (ref 40–128)
ALP BLD-CCNC: 87 IU/L (ref 40–128)
ALP BLD-CCNC: 87 IU/L (ref 40–128)
ALP BLD-CCNC: 92 IU/L (ref 40–128)
ALP BLD-CCNC: 94 IU/L (ref 40–128)
ALP BLD-CCNC: 98 IU/L (ref 40–128)
ALT SERPL-CCNC: 11 U/L (ref 10–40)
ALT SERPL-CCNC: 12 U/L (ref 10–40)
ALT SERPL-CCNC: 12 U/L (ref 10–40)
ALT SERPL-CCNC: 14 U/L (ref 10–40)
ALT SERPL-CCNC: 5 U/L (ref 10–40)
ALT SERPL-CCNC: 6 U/L (ref 10–40)
ALT SERPL-CCNC: 7 U/L (ref 10–40)
ALT SERPL-CCNC: 8 U/L (ref 10–40)
ALT SERPL-CCNC: 9 U/L (ref 10–40)
ALT SERPL-CCNC: 9 U/L (ref 10–40)
ALT SERPL-CCNC: <5 U/L (ref 10–40)
ANION GAP SERPL CALCULATED.3IONS-SCNC: 10 MMOL/L (ref 4–16)
ANION GAP SERPL CALCULATED.3IONS-SCNC: 12 MMOL/L (ref 4–16)
ANION GAP SERPL CALCULATED.3IONS-SCNC: 14 MMOL/L (ref 4–16)
ANION GAP SERPL CALCULATED.3IONS-SCNC: 15 MMOL/L (ref 4–16)
ANION GAP SERPL CALCULATED.3IONS-SCNC: 16 MMOL/L (ref 4–16)
ANION GAP SERPL CALCULATED.3IONS-SCNC: 17 MMOL/L (ref 4–16)
ANION GAP SERPL CALCULATED.3IONS-SCNC: 18 MMOL/L (ref 4–16)
ANION GAP SERPL CALCULATED.3IONS-SCNC: 19 MMOL/L (ref 4–16)
ANION GAP SERPL CALCULATED.3IONS-SCNC: 20 MMOL/L (ref 4–16)
ANION GAP SERPL CALCULATED.3IONS-SCNC: 20 MMOL/L (ref 4–16)
ANION GAP SERPL CALCULATED.3IONS-SCNC: 21 MMOL/L (ref 4–16)
ANION GAP SERPL CALCULATED.3IONS-SCNC: 21 MMOL/L (ref 4–16)
ANION GAP SERPL CALCULATED.3IONS-SCNC: 22 MMOL/L (ref 4–16)
ANION GAP SERPL CALCULATED.3IONS-SCNC: 22 MMOL/L (ref 4–16)
ANION GAP SERPL CALCULATED.3IONS-SCNC: 23 MMOL/L (ref 4–16)
ANION GAP SERPL CALCULATED.3IONS-SCNC: 25 MMOL/L (ref 4–16)
ANISOCYTOSIS: ABNORMAL
ANTIBODY SCREEN: NEGATIVE
APTT: 102.9 SECONDS (ref 25.1–37.1)
APTT: 107.1 SECONDS (ref 25.1–37.1)
APTT: 29 SECONDS (ref 25.1–37.1)
APTT: 32.6 SECONDS (ref 25.1–37.1)
APTT: 34.8 SECONDS (ref 25.1–37.1)
AST SERPL-CCNC: 29 IU/L (ref 15–37)
AST SERPL-CCNC: 30 IU/L (ref 15–37)
AST SERPL-CCNC: 32 IU/L (ref 15–37)
AST SERPL-CCNC: 34 IU/L (ref 15–37)
AST SERPL-CCNC: 35 IU/L (ref 15–37)
AST SERPL-CCNC: 37 IU/L (ref 15–37)
AST SERPL-CCNC: 37 IU/L (ref 15–37)
AST SERPL-CCNC: 38 IU/L (ref 15–37)
AST SERPL-CCNC: 38 IU/L (ref 15–37)
AST SERPL-CCNC: 39 IU/L (ref 15–37)
AST SERPL-CCNC: 40 IU/L (ref 15–37)
AST SERPL-CCNC: 42 IU/L (ref 15–37)
AST SERPL-CCNC: 44 IU/L (ref 15–37)
AST SERPL-CCNC: 46 IU/L (ref 15–37)
AST SERPL-CCNC: 47 IU/L (ref 15–37)
AST SERPL-CCNC: 48 IU/L (ref 15–37)
AST SERPL-CCNC: 49 IU/L (ref 15–37)
AST SERPL-CCNC: 50 IU/L (ref 15–37)
AST SERPL-CCNC: 50 IU/L (ref 15–37)
AST SERPL-CCNC: 51 IU/L (ref 15–37)
AST SERPL-CCNC: 51 IU/L (ref 15–37)
AST SERPL-CCNC: 53 IU/L (ref 15–37)
AST SERPL-CCNC: 54 IU/L (ref 15–37)
AST SERPL-CCNC: 55 IU/L (ref 15–37)
AST SERPL-CCNC: 60 IU/L (ref 15–37)
AST SERPL-CCNC: 63 IU/L (ref 15–37)
AST SERPL-CCNC: 65 IU/L (ref 15–37)
AST SERPL-CCNC: 68 IU/L (ref 15–37)
AST SERPL-CCNC: 73 IU/L (ref 15–37)
AST SERPL-CCNC: 86 IU/L (ref 15–37)
ATYPICAL LYMPHOCYTE ABSOLUTE COUNT: ABNORMAL
BACTERIA: ABNORMAL /HPF
BACTERIA: NEGATIVE /HPF
BANDED NEUTROPHILS ABSOLUTE COUNT: 0.04 K/CU MM
BANDED NEUTROPHILS ABSOLUTE COUNT: 0.05 K/CU MM
BANDED NEUTROPHILS ABSOLUTE COUNT: 0.13 K/CU MM
BANDED NEUTROPHILS ABSOLUTE COUNT: 0.32 K/CU MM
BANDED NEUTROPHILS ABSOLUTE COUNT: 0.33 K/CU MM
BANDED NEUTROPHILS ABSOLUTE COUNT: 0.39 K/CU MM
BANDED NEUTROPHILS ABSOLUTE COUNT: 0.49 K/CU MM
BANDED NEUTROPHILS ABSOLUTE COUNT: 0.61 K/CU MM
BANDED NEUTROPHILS RELATIVE PERCENT: 1 % (ref 5–11)
BANDED NEUTROPHILS RELATIVE PERCENT: 1 % (ref 5–11)
BANDED NEUTROPHILS RELATIVE PERCENT: 11 % (ref 5–11)
BANDED NEUTROPHILS RELATIVE PERCENT: 17 % (ref 5–11)
BANDED NEUTROPHILS RELATIVE PERCENT: 2 % (ref 5–11)
BANDED NEUTROPHILS RELATIVE PERCENT: 5 % (ref 5–11)
BANDED NEUTROPHILS RELATIVE PERCENT: 5 % (ref 5–11)
BANDED NEUTROPHILS RELATIVE PERCENT: 7 % (ref 5–11)
BASE EXCESS: 10 (ref 0–2.4)
BASE EXCESS: 13 (ref 0–2.4)
BASE EXCESS: 9 (ref 0–2.4)
BASOPHILS ABSOLUTE: 0 K/CU MM
BASOPHILS ABSOLUTE: 0.1 K/CU MM
BASOPHILS RELATIVE PERCENT: 0.1 % (ref 0–1)
BASOPHILS RELATIVE PERCENT: 0.1 % (ref 0–1)
BASOPHILS RELATIVE PERCENT: 0.2 % (ref 0–1)
BASOPHILS RELATIVE PERCENT: 0.3 % (ref 0–1)
BASOPHILS RELATIVE PERCENT: 0.4 % (ref 0–1)
BASOPHILS RELATIVE PERCENT: 0.4 % (ref 0–1)
BASOPHILS RELATIVE PERCENT: 0.5 % (ref 0–1)
BASOPHILS RELATIVE PERCENT: 0.5 % (ref 0–1)
BASOPHILS RELATIVE PERCENT: 0.6 % (ref 0–1)
BASOPHILS RELATIVE PERCENT: 0.6 % (ref 0–1)
BASOPHILS RELATIVE PERCENT: 0.8 % (ref 0–1)
BASOPHILS RELATIVE PERCENT: 1 % (ref 0–1)
BASOPHILS RELATIVE PERCENT: 1 % (ref 0–1)
BASOPHILS RELATIVE PERCENT: 1.1 % (ref 0–1)
BASOPHILS RELATIVE PERCENT: 1.1 % (ref 0–1)
BASOPHILS RELATIVE PERCENT: 1.4 % (ref 0–1)
BASOPHILS RELATIVE PERCENT: 1.6 % (ref 0–1)
BASOPHILS RELATIVE PERCENT: 1.8 % (ref 0–1)
BASOPHILS RELATIVE PERCENT: 2.2 % (ref 0–1)
BETA-HYDROXYBUTYRATE: 2.2 MG/DL (ref 0–3)
BILIRUB SERPL-MCNC: 0.1 MG/DL (ref 0–1)
BILIRUB SERPL-MCNC: 0.2 MG/DL (ref 0–1)
BILIRUB SERPL-MCNC: 0.3 MG/DL (ref 0–1)
BILIRUB SERPL-MCNC: 0.3 MG/DL (ref 0–1)
BILIRUBIN URINE: NEGATIVE MG/DL
BLOOD, URINE: ABNORMAL
BUN BLDV-MCNC: 132 MG/DL (ref 6–23)
BUN BLDV-MCNC: 132 MG/DL (ref 6–23)
BUN BLDV-MCNC: 17 MG/DL (ref 6–23)
BUN BLDV-MCNC: 20 MG/DL (ref 6–23)
BUN BLDV-MCNC: 21 MG/DL (ref 6–23)
BUN BLDV-MCNC: 26 MG/DL (ref 6–23)
BUN BLDV-MCNC: 28 MG/DL (ref 6–23)
BUN BLDV-MCNC: 29 MG/DL (ref 6–23)
BUN BLDV-MCNC: 29 MG/DL (ref 6–23)
BUN BLDV-MCNC: 35 MG/DL (ref 6–23)
BUN BLDV-MCNC: 41 MG/DL (ref 6–23)
BUN BLDV-MCNC: 41 MG/DL (ref 6–23)
BUN BLDV-MCNC: 44 MG/DL (ref 6–23)
BUN BLDV-MCNC: 46 MG/DL (ref 6–23)
BUN BLDV-MCNC: 47 MG/DL (ref 6–23)
BUN BLDV-MCNC: 48 MG/DL (ref 6–23)
BUN BLDV-MCNC: 51 MG/DL (ref 6–23)
BUN BLDV-MCNC: 51 MG/DL (ref 6–23)
BUN BLDV-MCNC: 52 MG/DL (ref 6–23)
BUN BLDV-MCNC: 52 MG/DL (ref 6–23)
BUN BLDV-MCNC: 53 MG/DL (ref 6–23)
BUN BLDV-MCNC: 55 MG/DL (ref 6–23)
BUN BLDV-MCNC: 56 MG/DL (ref 6–23)
BUN BLDV-MCNC: 57 MG/DL (ref 6–23)
BUN BLDV-MCNC: 59 MG/DL (ref 6–23)
BUN BLDV-MCNC: 59 MG/DL (ref 6–23)
BUN BLDV-MCNC: 61 MG/DL (ref 6–23)
BUN BLDV-MCNC: 61 MG/DL (ref 6–23)
BUN BLDV-MCNC: 64 MG/DL (ref 6–23)
BUN BLDV-MCNC: 64 MG/DL (ref 6–23)
BUN BLDV-MCNC: 65 MG/DL (ref 6–23)
BUN BLDV-MCNC: 65 MG/DL (ref 6–23)
BUN BLDV-MCNC: 68 MG/DL (ref 6–23)
BUN BLDV-MCNC: 68 MG/DL (ref 6–23)
BUN BLDV-MCNC: 69 MG/DL (ref 6–23)
BUN BLDV-MCNC: 69 MG/DL (ref 6–23)
BUN BLDV-MCNC: 70 MG/DL (ref 6–23)
BUN BLDV-MCNC: 71 MG/DL (ref 6–23)
BUN BLDV-MCNC: 72 MG/DL (ref 6–23)
BUN BLDV-MCNC: 74 MG/DL (ref 6–23)
BUN BLDV-MCNC: 75 MG/DL (ref 6–23)
BUN BLDV-MCNC: 76 MG/DL (ref 6–23)
BUN BLDV-MCNC: 77 MG/DL (ref 6–23)
BUN BLDV-MCNC: 77 MG/DL (ref 6–23)
BUN BLDV-MCNC: 79 MG/DL (ref 6–23)
BUN BLDV-MCNC: 82 MG/DL (ref 6–23)
BUN BLDV-MCNC: 82 MG/DL (ref 6–23)
C-REACTIVE PROTEIN, HIGH SENSITIVITY: >300 MG/L
CALCIUM IONIZED: 3.24 MG/DL (ref 4.48–5.28)
CALCIUM IONIZED: 3.28 MG/DL (ref 4.48–5.28)
CALCIUM IONIZED: 3.32 MG/DL (ref 4.48–5.28)
CALCIUM IONIZED: 3.4 MG/DL (ref 4.48–5.28)
CALCIUM IONIZED: 3.4 MG/DL (ref 4.48–5.28)
CALCIUM IONIZED: 4.24 MG/DL (ref 4.48–5.28)
CALCIUM IONIZED: 4.56 MG/DL (ref 4.48–5.28)
CALCIUM SERPL-MCNC: 6.1 MG/DL (ref 8.3–10.6)
CALCIUM SERPL-MCNC: 6.2 MG/DL (ref 8.3–10.6)
CALCIUM SERPL-MCNC: 6.3 MG/DL (ref 8.3–10.6)
CALCIUM SERPL-MCNC: 6.3 MG/DL (ref 8.3–10.6)
CALCIUM SERPL-MCNC: 6.4 MG/DL (ref 8.3–10.6)
CALCIUM SERPL-MCNC: 6.5 MG/DL (ref 8.3–10.6)
CALCIUM SERPL-MCNC: 6.6 MG/DL (ref 8.3–10.6)
CALCIUM SERPL-MCNC: 6.9 MG/DL (ref 8.3–10.6)
CALCIUM SERPL-MCNC: 7 MG/DL (ref 8.3–10.6)
CALCIUM SERPL-MCNC: 7.1 MG/DL (ref 8.3–10.6)
CALCIUM SERPL-MCNC: 7.2 MG/DL (ref 8.3–10.6)
CALCIUM SERPL-MCNC: 7.2 MG/DL (ref 8.3–10.6)
CALCIUM SERPL-MCNC: 7.3 MG/DL (ref 8.3–10.6)
CALCIUM SERPL-MCNC: 7.3 MG/DL (ref 8.3–10.6)
CALCIUM SERPL-MCNC: 7.4 MG/DL (ref 8.3–10.6)
CALCIUM SERPL-MCNC: 7.4 MG/DL (ref 8.3–10.6)
CALCIUM SERPL-MCNC: 7.5 MG/DL (ref 8.3–10.6)
CALCIUM SERPL-MCNC: 7.5 MG/DL (ref 8.3–10.6)
CALCIUM SERPL-MCNC: 7.6 MG/DL (ref 8.3–10.6)
CALCIUM SERPL-MCNC: 7.7 MG/DL (ref 8.3–10.6)
CALCIUM SERPL-MCNC: 7.8 MG/DL (ref 8.3–10.6)
CALCIUM SERPL-MCNC: 7.9 MG/DL (ref 8.3–10.6)
CALCIUM SERPL-MCNC: 7.9 MG/DL (ref 8.3–10.6)
CALCIUM SERPL-MCNC: 8 MG/DL (ref 8.3–10.6)
CALCIUM SERPL-MCNC: 8.1 MG/DL (ref 8.3–10.6)
CALCIUM SERPL-MCNC: 8.1 MG/DL (ref 8.3–10.6)
CALCIUM SERPL-MCNC: 8.2 MG/DL (ref 8.3–10.6)
CALCIUM SERPL-MCNC: 8.3 MG/DL (ref 8.3–10.6)
CALCIUM SERPL-MCNC: 8.5 MG/DL (ref 8.3–10.6)
CALCIUM SERPL-MCNC: 8.5 MG/DL (ref 8.3–10.6)
CALCIUM SERPL-MCNC: 8.6 MG/DL (ref 8.3–10.6)
CALCIUM SERPL-MCNC: 8.6 MG/DL (ref 8.3–10.6)
CALCIUM SERPL-MCNC: 8.7 MG/DL (ref 8.3–10.6)
CALCIUM SERPL-MCNC: 8.8 MG/DL (ref 8.3–10.6)
CALCIUM SERPL-MCNC: 8.9 MG/DL (ref 8.3–10.6)
CALCIUM SERPL-MCNC: 9.3 MG/DL (ref 8.3–10.6)
CALCIUM SERPL-MCNC: 9.6 MG/DL (ref 8.3–10.6)
CALR MUTATION: NORMAL
CARBON MONOXIDE, BLOOD: 1.9 % (ref 0–5)
CHLORIDE BLD-SCNC: 100 MMOL/L (ref 99–110)
CHLORIDE BLD-SCNC: 101 MMOL/L (ref 99–110)
CHLORIDE BLD-SCNC: 101 MMOL/L (ref 99–110)
CHLORIDE BLD-SCNC: 102 MMOL/L (ref 99–110)
CHLORIDE BLD-SCNC: 103 MMOL/L (ref 99–110)
CHLORIDE BLD-SCNC: 104 MMOL/L (ref 99–110)
CHLORIDE BLD-SCNC: 105 MMOL/L (ref 99–110)
CHLORIDE BLD-SCNC: 106 MMOL/L (ref 99–110)
CHLORIDE BLD-SCNC: 107 MMOL/L (ref 99–110)
CHLORIDE BLD-SCNC: 108 MMOL/L (ref 99–110)
CHLORIDE BLD-SCNC: 108 MMOL/L (ref 99–110)
CHLORIDE BLD-SCNC: 109 MMOL/L (ref 99–110)
CHLORIDE BLD-SCNC: 109 MMOL/L (ref 99–110)
CHLORIDE BLD-SCNC: 110 MMOL/L (ref 99–110)
CHLORIDE BLD-SCNC: 92 MMOL/L (ref 99–110)
CHLORIDE BLD-SCNC: 92 MMOL/L (ref 99–110)
CHLORIDE BLD-SCNC: 96 MMOL/L (ref 99–110)
CHLORIDE BLD-SCNC: 97 MMOL/L (ref 99–110)
CHLORIDE BLD-SCNC: 98 MMOL/L (ref 99–110)
CHLORIDE BLD-SCNC: 98 MMOL/L (ref 99–110)
CHLORIDE BLD-SCNC: 99 MMOL/L (ref 99–110)
CLARITY: ABNORMAL
CLARITY: ABNORMAL
CLARITY: CLEAR
CLARITY: CLEAR
CO2 CONTENT: 16.7 MMOL/L (ref 19–24)
CO2: 12 MMOL/L (ref 21–32)
CO2: 13 MMOL/L (ref 21–32)
CO2: 14 MMOL/L (ref 21–32)
CO2: 14 MMOL/L (ref 21–32)
CO2: 16 MMOL/L (ref 21–32)
CO2: 17 MMOL/L (ref 21–32)
CO2: 18 MMOL/L (ref 21–32)
CO2: 19 MMOL/L (ref 21–32)
CO2: 20 MMOL/L (ref 21–32)
CO2: 21 MMOL/L (ref 21–32)
CO2: 22 MMOL/L (ref 21–32)
CO2: 23 MMOL/L (ref 21–32)
CO2: 24 MMOL/L (ref 21–32)
CO2: 25 MMOL/L (ref 21–32)
CO2: 26 MMOL/L (ref 21–32)
COLOR: ABNORMAL
COLOR: YELLOW
COMMENT: ABNORMAL
COMPONENT: NORMAL
CREAT SERPL-MCNC: 0.9 MG/DL (ref 0.6–1.1)
CREAT SERPL-MCNC: 1 MG/DL (ref 0.6–1.1)
CREAT SERPL-MCNC: 1 MG/DL (ref 0.6–1.1)
CREAT SERPL-MCNC: 1.1 MG/DL (ref 0.6–1.1)
CREAT SERPL-MCNC: 1.3 MG/DL (ref 0.6–1.1)
CREAT SERPL-MCNC: 1.3 MG/DL (ref 0.6–1.1)
CREAT SERPL-MCNC: 1.6 MG/DL (ref 0.6–1.1)
CREAT SERPL-MCNC: 1.6 MG/DL (ref 0.6–1.1)
CREAT SERPL-MCNC: 1.8 MG/DL (ref 0.6–1.1)
CREAT SERPL-MCNC: 2.2 MG/DL (ref 0.6–1.1)
CREAT SERPL-MCNC: 2.5 MG/DL (ref 0.6–1.1)
CREAT SERPL-MCNC: 2.6 MG/DL (ref 0.6–1.1)
CREAT SERPL-MCNC: 2.6 MG/DL (ref 0.6–1.1)
CREAT SERPL-MCNC: 2.8 MG/DL (ref 0.6–1.1)
CREAT SERPL-MCNC: 3 MG/DL (ref 0.6–1.1)
CREAT SERPL-MCNC: 3.1 MG/DL (ref 0.6–1.1)
CREAT SERPL-MCNC: 3.2 MG/DL (ref 0.6–1.1)
CREAT SERPL-MCNC: 3.3 MG/DL (ref 0.6–1.1)
CREAT SERPL-MCNC: 3.4 MG/DL (ref 0.6–1.1)
CREAT SERPL-MCNC: 3.5 MG/DL (ref 0.6–1.1)
CREAT SERPL-MCNC: 3.6 MG/DL (ref 0.6–1.1)
CREAT SERPL-MCNC: 3.7 MG/DL (ref 0.6–1.1)
CREAT SERPL-MCNC: 3.8 MG/DL (ref 0.6–1.1)
CREAT SERPL-MCNC: 3.9 MG/DL (ref 0.6–1.1)
CREAT SERPL-MCNC: 4.1 MG/DL (ref 0.6–1.1)
CREAT SERPL-MCNC: 4.3 MG/DL (ref 0.6–1.1)
CREAT SERPL-MCNC: 4.7 MG/DL (ref 0.6–1.1)
CREAT SERPL-MCNC: 5.1 MG/DL (ref 0.6–1.1)
CREAT SERPL-MCNC: 5.2 MG/DL (ref 0.6–1.1)
CREAT SERPL-MCNC: 5.7 MG/DL (ref 0.6–1.1)
CREAT SERPL-MCNC: 5.9 MG/DL (ref 0.6–1.1)
CREAT SERPL-MCNC: 6.2 MG/DL (ref 0.6–1.1)
CREAT SERPL-MCNC: 7.5 MG/DL (ref 0.6–1.1)
CREAT SERPL-MCNC: 7.5 MG/DL (ref 0.6–1.1)
CREATININE URINE: 55.1 MG/DL
CROSSMATCH RESULT: NORMAL
CULTURE: NORMAL
D DIMER: 1771 NG/ML(DDU)
D DIMER: 1876 NG/ML(DDU)
D DIMER: 2537 NG/ML(DDU)
D DIMER: 960 NG/ML(DDU)
DIFFERENTIAL TYPE: ABNORMAL
DOSE AMOUNT: NORMAL
DOSE TIME: NORMAL
EKG ATRIAL RATE: 103 BPM
EKG ATRIAL RATE: 109 BPM
EKG ATRIAL RATE: 115 BPM
EKG ATRIAL RATE: 118 BPM
EKG ATRIAL RATE: 131 BPM
EKG ATRIAL RATE: 220 BPM
EKG ATRIAL RATE: 258 BPM
EKG DIAGNOSIS: NORMAL
EKG P AXIS: 51 DEGREES
EKG P AXIS: 54 DEGREES
EKG P AXIS: 56 DEGREES
EKG P AXIS: 60 DEGREES
EKG P AXIS: 61 DEGREES
EKG P-R INTERVAL: 126 MS
EKG P-R INTERVAL: 130 MS
EKG P-R INTERVAL: 142 MS
EKG P-R INTERVAL: 144 MS
EKG P-R INTERVAL: 146 MS
EKG P-R INTERVAL: 96 MS
EKG Q-T INTERVAL: 136 MS
EKG Q-T INTERVAL: 182 MS
EKG Q-T INTERVAL: 300 MS
EKG Q-T INTERVAL: 310 MS
EKG Q-T INTERVAL: 334 MS
EKG Q-T INTERVAL: 342 MS
EKG Q-T INTERVAL: 370 MS
EKG QRS DURATION: 148 MS
EKG QRS DURATION: 66 MS
EKG QRS DURATION: 68 MS
EKG QRS DURATION: 68 MS
EKG QRS DURATION: 70 MS
EKG QTC CALCULATION (BAZETT): 279 MS
EKG QTC CALCULATION (BAZETT): 370 MS
EKG QTC CALCULATION (BAZETT): 434 MS
EKG QTC CALCULATION (BAZETT): 443 MS
EKG QTC CALCULATION (BAZETT): 460 MS
EKG QTC CALCULATION (BAZETT): 462 MS
EKG QTC CALCULATION (BAZETT): 484 MS
EKG R AXIS: 40 DEGREES
EKG R AXIS: 46 DEGREES
EKG R AXIS: 49 DEGREES
EKG R AXIS: 56 DEGREES
EKG R AXIS: 66 DEGREES
EKG R AXIS: 77 DEGREES
EKG R AXIS: 78 DEGREES
EKG T AXIS: 21 DEGREES
EKG T AXIS: 32 DEGREES
EKG T AXIS: 40 DEGREES
EKG T AXIS: 43 DEGREES
EKG T AXIS: 45 DEGREES
EKG T AXIS: 50 DEGREES
EKG T AXIS: 73 DEGREES
EKG VENTRICULAR RATE: 103 BPM
EKG VENTRICULAR RATE: 109 BPM
EKG VENTRICULAR RATE: 115 BPM
EKG VENTRICULAR RATE: 118 BPM
EKG VENTRICULAR RATE: 131 BPM
EKG VENTRICULAR RATE: 249 BPM
EKG VENTRICULAR RATE: 254 BPM
EOSINOPHILS ABSOLUTE: 0 K/CU MM
EOSINOPHILS ABSOLUTE: 0.1 K/CU MM
EOSINOPHILS ABSOLUTE: 0.2 K/CU MM
EOSINOPHILS ABSOLUTE: 0.2 K/CU MM
EOSINOPHILS RELATIVE PERCENT: 0 % (ref 0–3)
EOSINOPHILS RELATIVE PERCENT: 0.2 % (ref 0–3)
EOSINOPHILS RELATIVE PERCENT: 0.3 % (ref 0–3)
EOSINOPHILS RELATIVE PERCENT: 0.4 % (ref 0–3)
EOSINOPHILS RELATIVE PERCENT: 0.5 % (ref 0–3)
EOSINOPHILS RELATIVE PERCENT: 0.5 % (ref 0–3)
EOSINOPHILS RELATIVE PERCENT: 0.7 % (ref 0–3)
EOSINOPHILS RELATIVE PERCENT: 0.7 % (ref 0–3)
EOSINOPHILS RELATIVE PERCENT: 0.8 % (ref 0–3)
EOSINOPHILS RELATIVE PERCENT: 0.9 % (ref 0–3)
EOSINOPHILS RELATIVE PERCENT: 1 % (ref 0–3)
EOSINOPHILS RELATIVE PERCENT: 1.1 % (ref 0–3)
EOSINOPHILS RELATIVE PERCENT: 1.1 % (ref 0–3)
EOSINOPHILS RELATIVE PERCENT: 1.3 % (ref 0–3)
EOSINOPHILS RELATIVE PERCENT: 1.3 % (ref 0–3)
EOSINOPHILS RELATIVE PERCENT: 1.4 % (ref 0–3)
EOSINOPHILS RELATIVE PERCENT: 1.5 % (ref 0–3)
EOSINOPHILS RELATIVE PERCENT: 1.6 % (ref 0–3)
EOSINOPHILS RELATIVE PERCENT: 1.6 % (ref 0–3)
EOSINOPHILS RELATIVE PERCENT: 1.7 % (ref 0–3)
EOSINOPHILS RELATIVE PERCENT: 2.5 % (ref 0–3)
EOSINOPHILS RELATIVE PERCENT: 4.3 % (ref 0–3)
ERYTHROCYTE SEDIMENTATION RATE: >120 MM/HR (ref 0–30)
ESTIMATED AVERAGE GLUCOSE: 126 MG/DL
FERRITIN: 2211 NG/ML (ref 15–150)
FERRITIN: 711 NG/ML (ref 15–150)
FERRITIN: 730 NG/ML (ref 15–150)
FERRITIN: 878 NG/ML (ref 15–150)
FOLATE: 3.7 NG/ML (ref 3.1–17.5)
FOLATE: 6 NG/ML (ref 3.1–17.5)
GFR AFRICAN AMERICAN: 10 ML/MIN/1.73M2
GFR AFRICAN AMERICAN: 11 ML/MIN/1.73M2
GFR AFRICAN AMERICAN: 12 ML/MIN/1.73M2
GFR AFRICAN AMERICAN: 12 ML/MIN/1.73M2
GFR AFRICAN AMERICAN: 13 ML/MIN/1.73M2
GFR AFRICAN AMERICAN: 14 ML/MIN/1.73M2
GFR AFRICAN AMERICAN: 14 ML/MIN/1.73M2
GFR AFRICAN AMERICAN: 15 ML/MIN/1.73M2
GFR AFRICAN AMERICAN: 16 ML/MIN/1.73M2
GFR AFRICAN AMERICAN: 17 ML/MIN/1.73M2
GFR AFRICAN AMERICAN: 18 ML/MIN/1.73M2
GFR AFRICAN AMERICAN: 19 ML/MIN/1.73M2
GFR AFRICAN AMERICAN: 20 ML/MIN/1.73M2
GFR AFRICAN AMERICAN: 21 ML/MIN/1.73M2
GFR AFRICAN AMERICAN: 23 ML/MIN/1.73M2
GFR AFRICAN AMERICAN: 23 ML/MIN/1.73M2
GFR AFRICAN AMERICAN: 24 ML/MIN/1.73M2
GFR AFRICAN AMERICAN: 28 ML/MIN/1.73M2
GFR AFRICAN AMERICAN: 36 ML/MIN/1.73M2
GFR AFRICAN AMERICAN: 41 ML/MIN/1.73M2
GFR AFRICAN AMERICAN: 41 ML/MIN/1.73M2
GFR AFRICAN AMERICAN: 52 ML/MIN/1.73M2
GFR AFRICAN AMERICAN: 52 ML/MIN/1.73M2
GFR AFRICAN AMERICAN: 56 ML/MIN/1.73M2
GFR AFRICAN AMERICAN: 7 ML/MIN/1.73M2
GFR AFRICAN AMERICAN: 7 ML/MIN/1.73M2
GFR AFRICAN AMERICAN: 9 ML/MIN/1.73M2
GFR AFRICAN AMERICAN: >60 ML/MIN/1.73M2
GFR NON-AFRICAN AMERICAN: 10 ML/MIN/1.73M2
GFR NON-AFRICAN AMERICAN: 10 ML/MIN/1.73M2
GFR NON-AFRICAN AMERICAN: 11 ML/MIN/1.73M2
GFR NON-AFRICAN AMERICAN: 11 ML/MIN/1.73M2
GFR NON-AFRICAN AMERICAN: 12 ML/MIN/1.73M2
GFR NON-AFRICAN AMERICAN: 13 ML/MIN/1.73M2
GFR NON-AFRICAN AMERICAN: 14 ML/MIN/1.73M2
GFR NON-AFRICAN AMERICAN: 15 ML/MIN/1.73M2
GFR NON-AFRICAN AMERICAN: 16 ML/MIN/1.73M2
GFR NON-AFRICAN AMERICAN: 17 ML/MIN/1.73M2
GFR NON-AFRICAN AMERICAN: 18 ML/MIN/1.73M2
GFR NON-AFRICAN AMERICAN: 19 ML/MIN/1.73M2
GFR NON-AFRICAN AMERICAN: 19 ML/MIN/1.73M2
GFR NON-AFRICAN AMERICAN: 20 ML/MIN/1.73M2
GFR NON-AFRICAN AMERICAN: 23 ML/MIN/1.73M2
GFR NON-AFRICAN AMERICAN: 29 ML/MIN/1.73M2
GFR NON-AFRICAN AMERICAN: 34 ML/MIN/1.73M2
GFR NON-AFRICAN AMERICAN: 34 ML/MIN/1.73M2
GFR NON-AFRICAN AMERICAN: 43 ML/MIN/1.73M2
GFR NON-AFRICAN AMERICAN: 43 ML/MIN/1.73M2
GFR NON-AFRICAN AMERICAN: 46 ML/MIN/1.73M2
GFR NON-AFRICAN AMERICAN: 52 ML/MIN/1.73M2
GFR NON-AFRICAN AMERICAN: 58 ML/MIN/1.73M2
GFR NON-AFRICAN AMERICAN: 58 ML/MIN/1.73M2
GFR NON-AFRICAN AMERICAN: 6 ML/MIN/1.73M2
GFR NON-AFRICAN AMERICAN: 6 ML/MIN/1.73M2
GFR NON-AFRICAN AMERICAN: 7 ML/MIN/1.73M2
GFR NON-AFRICAN AMERICAN: 8 ML/MIN/1.73M2
GFR NON-AFRICAN AMERICAN: 9 ML/MIN/1.73M2
GFR NON-AFRICAN AMERICAN: 9 ML/MIN/1.73M2
GFR NON-AFRICAN AMERICAN: >60 ML/MIN/1.73M2
GLUCOSE BLD-MCNC: 100 MG/DL (ref 70–99)
GLUCOSE BLD-MCNC: 101 MG/DL (ref 70–99)
GLUCOSE BLD-MCNC: 102 MG/DL (ref 70–99)
GLUCOSE BLD-MCNC: 102 MG/DL (ref 70–99)
GLUCOSE BLD-MCNC: 104 MG/DL (ref 70–99)
GLUCOSE BLD-MCNC: 104 MG/DL (ref 70–99)
GLUCOSE BLD-MCNC: 106 MG/DL (ref 70–99)
GLUCOSE BLD-MCNC: 108 MG/DL (ref 70–99)
GLUCOSE BLD-MCNC: 109 MG/DL (ref 70–99)
GLUCOSE BLD-MCNC: 110 MG/DL (ref 70–99)
GLUCOSE BLD-MCNC: 110 MG/DL (ref 70–99)
GLUCOSE BLD-MCNC: 111 MG/DL (ref 70–99)
GLUCOSE BLD-MCNC: 111 MG/DL (ref 70–99)
GLUCOSE BLD-MCNC: 113 MG/DL (ref 70–99)
GLUCOSE BLD-MCNC: 119 MG/DL (ref 70–99)
GLUCOSE BLD-MCNC: 120 MG/DL (ref 70–99)
GLUCOSE BLD-MCNC: 120 MG/DL (ref 70–99)
GLUCOSE BLD-MCNC: 121 MG/DL (ref 70–99)
GLUCOSE BLD-MCNC: 121 MG/DL (ref 70–99)
GLUCOSE BLD-MCNC: 123 MG/DL (ref 70–99)
GLUCOSE BLD-MCNC: 123 MG/DL (ref 70–99)
GLUCOSE BLD-MCNC: 125 MG/DL (ref 70–99)
GLUCOSE BLD-MCNC: 126 MG/DL (ref 70–99)
GLUCOSE BLD-MCNC: 127 MG/DL (ref 70–99)
GLUCOSE BLD-MCNC: 128 MG/DL (ref 70–99)
GLUCOSE BLD-MCNC: 129 MG/DL (ref 70–99)
GLUCOSE BLD-MCNC: 132 MG/DL (ref 70–99)
GLUCOSE BLD-MCNC: 133 MG/DL (ref 70–99)
GLUCOSE BLD-MCNC: 135 MG/DL
GLUCOSE BLD-MCNC: 135 MG/DL (ref 70–99)
GLUCOSE BLD-MCNC: 135 MG/DL (ref 70–99)
GLUCOSE BLD-MCNC: 136 MG/DL (ref 70–99)
GLUCOSE BLD-MCNC: 138 MG/DL (ref 70–99)
GLUCOSE BLD-MCNC: 139 MG/DL (ref 70–99)
GLUCOSE BLD-MCNC: 139 MG/DL (ref 70–99)
GLUCOSE BLD-MCNC: 150 MG/DL (ref 70–99)
GLUCOSE BLD-MCNC: 151 MG/DL (ref 70–99)
GLUCOSE BLD-MCNC: 152 MG/DL (ref 70–99)
GLUCOSE BLD-MCNC: 152 MG/DL (ref 70–99)
GLUCOSE BLD-MCNC: 158 MG/DL (ref 70–99)
GLUCOSE BLD-MCNC: 165 MG/DL (ref 70–99)
GLUCOSE BLD-MCNC: 166 MG/DL (ref 70–99)
GLUCOSE BLD-MCNC: 187 MG/DL (ref 70–99)
GLUCOSE BLD-MCNC: 243 MG/DL (ref 70–99)
GLUCOSE BLD-MCNC: 263 MG/DL (ref 70–99)
GLUCOSE BLD-MCNC: 77 MG/DL (ref 70–99)
GLUCOSE BLD-MCNC: 80 MG/DL (ref 70–99)
GLUCOSE BLD-MCNC: 84 MG/DL (ref 70–99)
GLUCOSE BLD-MCNC: 86 MG/DL (ref 70–99)
GLUCOSE BLD-MCNC: 88 MG/DL (ref 70–99)
GLUCOSE BLD-MCNC: 89 MG/DL (ref 70–99)
GLUCOSE BLD-MCNC: 92 MG/DL (ref 70–99)
GLUCOSE BLD-MCNC: 92 MG/DL (ref 70–99)
GLUCOSE BLD-MCNC: 93 MG/DL (ref 70–99)
GLUCOSE BLD-MCNC: 93 MG/DL (ref 70–99)
GLUCOSE BLD-MCNC: 94 MG/DL (ref 70–99)
GLUCOSE BLD-MCNC: 95 MG/DL (ref 70–99)
GLUCOSE BLD-MCNC: 96 MG/DL (ref 70–99)
GLUCOSE BLD-MCNC: 96 MG/DL (ref 70–99)
GLUCOSE, FLUID: 164 MG/DL
GLUCOSE, URINE: >500 MG/DL
GLUCOSE, URINE: NEGATIVE MG/DL
GRAM SMEAR: NORMAL
GRANULAR CASTS: 109 /LPF
HBA1C MFR BLD: 6 % (ref 4.2–6.3)
HBV SURFACE AB TITR SER: 26.69 {TITER}
HBV SURFACE AB TITR SER: 68.12 {TITER}
HCO3 ARTERIAL: 15.8 MMOL/L (ref 18–23)
HCO3 VENOUS: 13.9 MMOL/L (ref 19–25)
HCO3 VENOUS: 14.8 MMOL/L (ref 19–25)
HCT VFR BLD CALC: 21.7 % (ref 37–47)
HCT VFR BLD CALC: 22.3 % (ref 37–47)
HCT VFR BLD CALC: 22.8 % (ref 37–47)
HCT VFR BLD CALC: 23.2 % (ref 37–47)
HCT VFR BLD CALC: 23.3 % (ref 37–47)
HCT VFR BLD CALC: 23.9 % (ref 37–47)
HCT VFR BLD CALC: 24.6 % (ref 37–47)
HCT VFR BLD CALC: 24.8 % (ref 37–47)
HCT VFR BLD CALC: 24.9 % (ref 37–47)
HCT VFR BLD CALC: 25 % (ref 37–47)
HCT VFR BLD CALC: 25.2 % (ref 37–47)
HCT VFR BLD CALC: 25.5 % (ref 37–47)
HCT VFR BLD CALC: 26.1 % (ref 37–47)
HCT VFR BLD CALC: 26.2 % (ref 37–47)
HCT VFR BLD CALC: 26.4 % (ref 37–47)
HCT VFR BLD CALC: 26.5 % (ref 37–47)
HCT VFR BLD CALC: 26.6 % (ref 37–47)
HCT VFR BLD CALC: 26.6 % (ref 37–47)
HCT VFR BLD CALC: 26.8 % (ref 37–47)
HCT VFR BLD CALC: 26.9 % (ref 37–47)
HCT VFR BLD CALC: 27.1 % (ref 37–47)
HCT VFR BLD CALC: 27.2 % (ref 37–47)
HCT VFR BLD CALC: 27.3 % (ref 37–47)
HCT VFR BLD CALC: 27.3 % (ref 37–47)
HCT VFR BLD CALC: 27.4 % (ref 37–47)
HCT VFR BLD CALC: 27.5 % (ref 37–47)
HCT VFR BLD CALC: 28 % (ref 37–47)
HCT VFR BLD CALC: 28.8 % (ref 37–47)
HCT VFR BLD CALC: 28.8 % (ref 37–47)
HCT VFR BLD CALC: 29.1 % (ref 37–47)
HCT VFR BLD CALC: 29.9 % (ref 37–47)
HCT VFR BLD CALC: 30.6 % (ref 37–47)
HCT VFR BLD CALC: 30.6 % (ref 37–47)
HCT VFR BLD CALC: 31.1 % (ref 37–47)
HCT VFR BLD CALC: 31.9 % (ref 37–47)
HCT VFR BLD CALC: 32.6 % (ref 37–47)
HCT VFR BLD CALC: 33.6 % (ref 37–47)
HCT VFR BLD CALC: 33.7 % (ref 37–47)
HEMOCCULT SP1 STL QL: NEGATIVE
HEMOGLOBIN: 10 GM/DL (ref 12.5–16)
HEMOGLOBIN: 10.2 GM/DL (ref 12.5–16)
HEMOGLOBIN: 10.4 GM/DL (ref 12.5–16)
HEMOGLOBIN: 10.6 GM/DL (ref 12.5–16)
HEMOGLOBIN: 6.6 GM/DL (ref 12.5–16)
HEMOGLOBIN: 6.9 GM/DL (ref 12.5–16)
HEMOGLOBIN: 6.9 GM/DL (ref 12.5–16)
HEMOGLOBIN: 7.1 GM/DL (ref 12.5–16)
HEMOGLOBIN: 7.1 GM/DL (ref 12.5–16)
HEMOGLOBIN: 7.3 GM/DL (ref 12.5–16)
HEMOGLOBIN: 7.6 GM/DL (ref 12.5–16)
HEMOGLOBIN: 7.8 GM/DL (ref 12.5–16)
HEMOGLOBIN: 7.9 GM/DL (ref 12.5–16)
HEMOGLOBIN: 8 GM/DL (ref 12.5–16)
HEMOGLOBIN: 8 GM/DL (ref 12.5–16)
HEMOGLOBIN: 8.1 GM/DL (ref 12.5–16)
HEMOGLOBIN: 8.1 GM/DL (ref 12.5–16)
HEMOGLOBIN: 8.2 GM/DL (ref 12.5–16)
HEMOGLOBIN: 8.3 GM/DL (ref 12.5–16)
HEMOGLOBIN: 8.4 GM/DL (ref 12.5–16)
HEMOGLOBIN: 8.5 GM/DL (ref 12.5–16)
HEMOGLOBIN: 8.6 GM/DL (ref 12.5–16)
HEMOGLOBIN: 8.7 GM/DL (ref 12.5–16)
HEMOGLOBIN: 8.8 GM/DL (ref 12.5–16)
HEMOGLOBIN: 9.4 GM/DL (ref 12.5–16)
HEMOGLOBIN: 9.8 GM/DL (ref 12.5–16)
HEMOGLOBIN: 9.9 GM/DL (ref 12.5–16)
HEPATITIS B CORE TOTAL ANTIBODY: NEGATIVE
HEPATITIS B SURFACE ANTIGEN: NON REACTIVE
HEPATITIS B SURFACE ANTIGEN: NON REACTIVE
HIGH SENSITIVE C-REACTIVE PROTEIN: 121.5 MG/L
HIGH SENSITIVE C-REACTIVE PROTEIN: 256.9 MG/L
HIGH SENSITIVE C-REACTIVE PROTEIN: 62.5 MG/L
HYPOCHROMIA: ABNORMAL
IMMATURE NEUTROPHIL %: 1.7 % (ref 0–0.43)
IMMATURE NEUTROPHIL %: 1.8 % (ref 0–0.43)
IMMATURE NEUTROPHIL %: 2.3 % (ref 0–0.43)
IMMATURE NEUTROPHIL %: 2.6 % (ref 0–0.43)
IMMATURE NEUTROPHIL %: 2.8 % (ref 0–0.43)
IMMATURE NEUTROPHIL %: 5.1 % (ref 0–0.43)
IMMATURE NEUTROPHIL %: 8.5 % (ref 0–0.43)
INR BLD: 0.99 INDEX
INR BLD: 1.01 INDEX
INR BLD: 1.02 INDEX
IONIZED CA: 0.81 MMOL/L (ref 1.12–1.32)
IONIZED CA: 0.82 MMOL/L (ref 1.12–1.32)
IONIZED CA: 0.83 MMOL/L (ref 1.12–1.32)
IONIZED CA: 0.85 MMOL/L (ref 1.12–1.32)
IONIZED CA: 0.85 MMOL/L (ref 1.12–1.32)
IONIZED CA: 1.06 MMOL/L (ref 1.12–1.32)
IONIZED CA: 1.14 MMOL/L (ref 1.12–1.32)
IRON: 20 UG/DL (ref 37–145)
IRON: 22 UG/DL (ref 37–145)
IRON: 23 UG/DL (ref 37–145)
JAK2 EXONS 12-15 MUTATION: NORMAL
JAK2 GENE MUTATION QUAL: NORMAL
KETONES, URINE: NEGATIVE MG/DL
LACTATE DEHYDROGENASE, FLUID: 1141 IU/L
LACTATE DEHYDROGENASE: 819 IU/L (ref 120–246)
LACTATE: 1.1 MMOL/L (ref 0.4–2)
LACTATE: 1.3 MMOL/L (ref 0.4–2)
LACTATE: 1.4 MMOL/L (ref 0.4–2)
LACTATE: 2.6 MMOL/L (ref 0.4–2)
LACTATE: 3.5 MMOL/L (ref 0.4–2)
LACTIC ACID, SEPSIS: 3.3 MMOL/L (ref 0.5–1.9)
LACTIC ACID, SEPSIS: 3.5 MMOL/L (ref 0.5–1.9)
LEGIONELLA URINARY AG: NEGATIVE
LEUKOCYTE ESTERASE, URINE: ABNORMAL
LEUKOCYTE ESTERASE, URINE: NEGATIVE
LV EF: 60 %
LVEF MODALITY: NORMAL
LYMPHOCYTES ABSOLUTE: 0.4 K/CU MM
LYMPHOCYTES ABSOLUTE: 0.5 K/CU MM
LYMPHOCYTES ABSOLUTE: 0.6 K/CU MM
LYMPHOCYTES ABSOLUTE: 0.6 K/CU MM
LYMPHOCYTES ABSOLUTE: 0.7 K/CU MM
LYMPHOCYTES ABSOLUTE: 0.8 K/CU MM
LYMPHOCYTES ABSOLUTE: 0.9 K/CU MM
LYMPHOCYTES ABSOLUTE: 1 K/CU MM
LYMPHOCYTES ABSOLUTE: 1.1 K/CU MM
LYMPHOCYTES ABSOLUTE: 1.2 K/CU MM
LYMPHOCYTES ABSOLUTE: 1.3 K/CU MM
LYMPHOCYTES ABSOLUTE: 1.3 K/CU MM
LYMPHOCYTES ABSOLUTE: 1.4 K/CU MM
LYMPHOCYTES ABSOLUTE: 1.5 K/CU MM
LYMPHOCYTES ABSOLUTE: 1.6 K/CU MM
LYMPHOCYTES ABSOLUTE: 1.7 K/CU MM
LYMPHOCYTES ABSOLUTE: 2 K/CU MM
LYMPHOCYTES ABSOLUTE: 2 K/CU MM
LYMPHOCYTES ABSOLUTE: 2.1 K/CU MM
LYMPHOCYTES ABSOLUTE: 2.1 K/CU MM
LYMPHOCYTES ABSOLUTE: 2.4 K/CU MM
LYMPHOCYTES ABSOLUTE: 2.5 K/CU MM
LYMPHOCYTES ABSOLUTE: 3.3 K/CU MM
LYMPHOCYTES RELATIVE PERCENT: 10.1 % (ref 24–44)
LYMPHOCYTES RELATIVE PERCENT: 14.8 % (ref 24–44)
LYMPHOCYTES RELATIVE PERCENT: 16.4 % (ref 24–44)
LYMPHOCYTES RELATIVE PERCENT: 17.1 % (ref 24–44)
LYMPHOCYTES RELATIVE PERCENT: 17.9 % (ref 24–44)
LYMPHOCYTES RELATIVE PERCENT: 18 % (ref 24–44)
LYMPHOCYTES RELATIVE PERCENT: 18.2 % (ref 24–44)
LYMPHOCYTES RELATIVE PERCENT: 19 % (ref 24–44)
LYMPHOCYTES RELATIVE PERCENT: 19.3 % (ref 24–44)
LYMPHOCYTES RELATIVE PERCENT: 20 % (ref 24–44)
LYMPHOCYTES RELATIVE PERCENT: 21.6 % (ref 24–44)
LYMPHOCYTES RELATIVE PERCENT: 22.5 % (ref 24–44)
LYMPHOCYTES RELATIVE PERCENT: 23.1 % (ref 24–44)
LYMPHOCYTES RELATIVE PERCENT: 23.4 % (ref 24–44)
LYMPHOCYTES RELATIVE PERCENT: 24.5 % (ref 24–44)
LYMPHOCYTES RELATIVE PERCENT: 25 % (ref 24–44)
LYMPHOCYTES RELATIVE PERCENT: 25 % (ref 24–44)
LYMPHOCYTES RELATIVE PERCENT: 26 % (ref 24–44)
LYMPHOCYTES RELATIVE PERCENT: 27.5 % (ref 24–44)
LYMPHOCYTES RELATIVE PERCENT: 28 % (ref 24–44)
LYMPHOCYTES RELATIVE PERCENT: 28.4 % (ref 24–44)
LYMPHOCYTES RELATIVE PERCENT: 29.4 % (ref 24–44)
LYMPHOCYTES RELATIVE PERCENT: 29.9 % (ref 24–44)
LYMPHOCYTES RELATIVE PERCENT: 32.5 % (ref 24–44)
LYMPHOCYTES RELATIVE PERCENT: 36.6 % (ref 24–44)
LYMPHOCYTES RELATIVE PERCENT: 37.1 % (ref 24–44)
LYMPHOCYTES RELATIVE PERCENT: 37.9 % (ref 24–44)
LYMPHOCYTES RELATIVE PERCENT: 4.9 % (ref 24–44)
LYMPHOCYTES RELATIVE PERCENT: 42.4 % (ref 24–44)
LYMPHOCYTES RELATIVE PERCENT: 5 % (ref 24–44)
LYMPHOCYTES RELATIVE PERCENT: 5.3 % (ref 24–44)
LYMPHOCYTES RELATIVE PERCENT: 8 % (ref 24–44)
LYMPHOCYTES RELATIVE PERCENT: 9 % (ref 24–44)
LYMPHOCYTES RELATIVE PERCENT: 9.2 % (ref 24–44)
LYMPHOCYTES RELATIVE PERCENT: 9.2 % (ref 24–44)
Lab: NORMAL
MACROCYTES: ABNORMAL
MAGNESIUM: 1.8 MG/DL (ref 1.8–2.4)
MCH RBC QN AUTO: 22 PG (ref 27–31)
MCH RBC QN AUTO: 22 PG (ref 27–31)
MCH RBC QN AUTO: 23 PG (ref 27–31)
MCH RBC QN AUTO: 24.3 PG (ref 27–31)
MCH RBC QN AUTO: 24.6 PG (ref 27–31)
MCH RBC QN AUTO: 24.9 PG (ref 27–31)
MCH RBC QN AUTO: 25.3 PG (ref 27–31)
MCH RBC QN AUTO: 26.4 PG (ref 27–31)
MCH RBC QN AUTO: 27.1 PG (ref 27–31)
MCH RBC QN AUTO: 27.3 PG (ref 27–31)
MCH RBC QN AUTO: 27.4 PG (ref 27–31)
MCH RBC QN AUTO: 27.7 PG (ref 27–31)
MCH RBC QN AUTO: 27.7 PG (ref 27–31)
MCH RBC QN AUTO: 27.9 PG (ref 27–31)
MCH RBC QN AUTO: 27.9 PG (ref 27–31)
MCH RBC QN AUTO: 28 PG (ref 27–31)
MCH RBC QN AUTO: 28 PG (ref 27–31)
MCH RBC QN AUTO: 28.1 PG (ref 27–31)
MCH RBC QN AUTO: 28.1 PG (ref 27–31)
MCH RBC QN AUTO: 28.2 PG (ref 27–31)
MCH RBC QN AUTO: 28.3 PG (ref 27–31)
MCH RBC QN AUTO: 28.4 PG (ref 27–31)
MCH RBC QN AUTO: 28.4 PG (ref 27–31)
MCH RBC QN AUTO: 28.5 PG (ref 27–31)
MCH RBC QN AUTO: 28.6 PG (ref 27–31)
MCH RBC QN AUTO: 28.7 PG (ref 27–31)
MCH RBC QN AUTO: 28.8 PG (ref 27–31)
MCH RBC QN AUTO: 28.9 PG (ref 27–31)
MCH RBC QN AUTO: 29 PG (ref 27–31)
MCH RBC QN AUTO: 29 PG (ref 27–31)
MCH RBC QN AUTO: 29.3 PG (ref 27–31)
MCHC RBC AUTO-ENTMCNC: 28 % (ref 32–36)
MCHC RBC AUTO-ENTMCNC: 28.8 % (ref 32–36)
MCHC RBC AUTO-ENTMCNC: 28.9 % (ref 32–36)
MCHC RBC AUTO-ENTMCNC: 29.2 % (ref 32–36)
MCHC RBC AUTO-ENTMCNC: 29.2 % (ref 32–36)
MCHC RBC AUTO-ENTMCNC: 29.7 % (ref 32–36)
MCHC RBC AUTO-ENTMCNC: 29.7 % (ref 32–36)
MCHC RBC AUTO-ENTMCNC: 29.8 % (ref 32–36)
MCHC RBC AUTO-ENTMCNC: 30 % (ref 32–36)
MCHC RBC AUTO-ENTMCNC: 30.2 % (ref 32–36)
MCHC RBC AUTO-ENTMCNC: 30.3 % (ref 32–36)
MCHC RBC AUTO-ENTMCNC: 30.3 % (ref 32–36)
MCHC RBC AUTO-ENTMCNC: 30.5 % (ref 32–36)
MCHC RBC AUTO-ENTMCNC: 30.6 % (ref 32–36)
MCHC RBC AUTO-ENTMCNC: 30.7 % (ref 32–36)
MCHC RBC AUTO-ENTMCNC: 30.9 % (ref 32–36)
MCHC RBC AUTO-ENTMCNC: 31 % (ref 32–36)
MCHC RBC AUTO-ENTMCNC: 31.2 % (ref 32–36)
MCHC RBC AUTO-ENTMCNC: 31.3 % (ref 32–36)
MCHC RBC AUTO-ENTMCNC: 31.4 % (ref 32–36)
MCHC RBC AUTO-ENTMCNC: 31.5 % (ref 32–36)
MCHC RBC AUTO-ENTMCNC: 31.5 % (ref 32–36)
MCHC RBC AUTO-ENTMCNC: 31.6 % (ref 32–36)
MCHC RBC AUTO-ENTMCNC: 31.7 % (ref 32–36)
MCHC RBC AUTO-ENTMCNC: 31.7 % (ref 32–36)
MCHC RBC AUTO-ENTMCNC: 31.8 % (ref 32–36)
MCHC RBC AUTO-ENTMCNC: 31.8 % (ref 32–36)
MCHC RBC AUTO-ENTMCNC: 32 % (ref 32–36)
MCHC RBC AUTO-ENTMCNC: 32 % (ref 32–36)
MCHC RBC AUTO-ENTMCNC: 32.3 % (ref 32–36)
MCHC RBC AUTO-ENTMCNC: 32.4 % (ref 32–36)
MCHC RBC AUTO-ENTMCNC: 32.9 % (ref 32–36)
MCV RBC AUTO: 72.3 FL (ref 78–100)
MCV RBC AUTO: 73.7 FL (ref 78–100)
MCV RBC AUTO: 74 FL (ref 78–100)
MCV RBC AUTO: 76 FL (ref 78–100)
MCV RBC AUTO: 76 FL (ref 78–100)
MCV RBC AUTO: 79.3 FL (ref 78–100)
MCV RBC AUTO: 81.7 FL (ref 78–100)
MCV RBC AUTO: 85.9 FL (ref 78–100)
MCV RBC AUTO: 87.6 FL (ref 78–100)
MCV RBC AUTO: 88.3 FL (ref 78–100)
MCV RBC AUTO: 88.7 FL (ref 78–100)
MCV RBC AUTO: 89.5 FL (ref 78–100)
MCV RBC AUTO: 89.7 FL (ref 78–100)
MCV RBC AUTO: 89.7 FL (ref 78–100)
MCV RBC AUTO: 90 FL (ref 78–100)
MCV RBC AUTO: 90 FL (ref 78–100)
MCV RBC AUTO: 90.2 FL (ref 78–100)
MCV RBC AUTO: 90.3 FL (ref 78–100)
MCV RBC AUTO: 90.5 FL (ref 78–100)
MCV RBC AUTO: 90.6 FL (ref 78–100)
MCV RBC AUTO: 90.6 FL (ref 78–100)
MCV RBC AUTO: 90.8 FL (ref 78–100)
MCV RBC AUTO: 90.9 FL (ref 78–100)
MCV RBC AUTO: 91.2 FL (ref 78–100)
MCV RBC AUTO: 91.3 FL (ref 78–100)
MCV RBC AUTO: 92.1 FL (ref 78–100)
MCV RBC AUTO: 92.2 FL (ref 78–100)
MCV RBC AUTO: 92.3 FL (ref 78–100)
MCV RBC AUTO: 92.5 FL (ref 78–100)
MCV RBC AUTO: 93.1 FL (ref 78–100)
MCV RBC AUTO: 93.4 FL (ref 78–100)
MCV RBC AUTO: 93.8 FL (ref 78–100)
MCV RBC AUTO: 93.8 FL (ref 78–100)
MCV RBC AUTO: 94.1 FL (ref 78–100)
MCV RBC AUTO: 94.1 FL (ref 78–100)
MCV RBC AUTO: 94.5 FL (ref 78–100)
MCV RBC AUTO: 95 FL (ref 78–100)
MCV RBC AUTO: 95.6 FL (ref 78–100)
MCV RBC AUTO: 96.4 FL (ref 78–100)
MCV RBC AUTO: 96.9 FL (ref 78–100)
METAMYELOCYTES ABSOLUTE COUNT: 0.03 K/CU MM
METAMYELOCYTES PERCENT: 1 %
METHEMOGLOBIN ARTERIAL: 1.2 %
MICROCYTES: ABNORMAL
MONOCYTES ABSOLUTE: 0 K/CU MM
MONOCYTES ABSOLUTE: 0 K/CU MM
MONOCYTES ABSOLUTE: 0.1 K/CU MM
MONOCYTES ABSOLUTE: 0.2 K/CU MM
MONOCYTES ABSOLUTE: 0.2 K/CU MM
MONOCYTES ABSOLUTE: 0.3 K/CU MM
MONOCYTES ABSOLUTE: 0.4 K/CU MM
MONOCYTES ABSOLUTE: 0.5 K/CU MM
MONOCYTES ABSOLUTE: 0.6 K/CU MM
MONOCYTES ABSOLUTE: 0.8 K/CU MM
MONOCYTES ABSOLUTE: 0.9 K/CU MM
MONOCYTES ABSOLUTE: 0.9 K/CU MM
MONOCYTES ABSOLUTE: 1.2 K/CU MM
MONOCYTES ABSOLUTE: 1.4 K/CU MM
MONOCYTES ABSOLUTE: 1.5 K/CU MM
MONOCYTES ABSOLUTE: 1.6 K/CU MM
MONOCYTES RELATIVE PERCENT: 1 % (ref 0–4)
MONOCYTES RELATIVE PERCENT: 1.8 % (ref 0–4)
MONOCYTES RELATIVE PERCENT: 10.2 % (ref 0–4)
MONOCYTES RELATIVE PERCENT: 10.4 % (ref 0–4)
MONOCYTES RELATIVE PERCENT: 10.4 % (ref 0–4)
MONOCYTES RELATIVE PERCENT: 11.4 % (ref 0–4)
MONOCYTES RELATIVE PERCENT: 11.8 % (ref 0–4)
MONOCYTES RELATIVE PERCENT: 11.9 % (ref 0–4)
MONOCYTES RELATIVE PERCENT: 12.3 % (ref 0–4)
MONOCYTES RELATIVE PERCENT: 12.4 % (ref 0–4)
MONOCYTES RELATIVE PERCENT: 13.5 % (ref 0–4)
MONOCYTES RELATIVE PERCENT: 13.9 % (ref 0–4)
MONOCYTES RELATIVE PERCENT: 15.1 % (ref 0–4)
MONOCYTES RELATIVE PERCENT: 15.1 % (ref 0–4)
MONOCYTES RELATIVE PERCENT: 16.9 % (ref 0–4)
MONOCYTES RELATIVE PERCENT: 17.7 % (ref 0–4)
MONOCYTES RELATIVE PERCENT: 2 % (ref 0–4)
MONOCYTES RELATIVE PERCENT: 2 % (ref 0–4)
MONOCYTES RELATIVE PERCENT: 20.7 % (ref 0–4)
MONOCYTES RELATIVE PERCENT: 21.7 % (ref 0–4)
MONOCYTES RELATIVE PERCENT: 3 % (ref 0–4)
MONOCYTES RELATIVE PERCENT: 3.7 % (ref 0–4)
MONOCYTES RELATIVE PERCENT: 4 % (ref 0–4)
MONOCYTES RELATIVE PERCENT: 4.3 % (ref 0–4)
MONOCYTES RELATIVE PERCENT: 4.6 % (ref 0–4)
MONOCYTES RELATIVE PERCENT: 5 % (ref 0–4)
MONOCYTES RELATIVE PERCENT: 7 % (ref 0–4)
MONOCYTES RELATIVE PERCENT: 7 % (ref 0–4)
MONOCYTES RELATIVE PERCENT: 7.2 % (ref 0–4)
MONOCYTES RELATIVE PERCENT: 7.6 % (ref 0–4)
MONOCYTES RELATIVE PERCENT: 7.8 % (ref 0–4)
MONOCYTES RELATIVE PERCENT: 8 % (ref 0–4)
MONOCYTES RELATIVE PERCENT: 8.5 % (ref 0–4)
MONOCYTES RELATIVE PERCENT: 9.3 % (ref 0–4)
MONOCYTES RELATIVE PERCENT: 9.8 % (ref 0–4)
MPL 515 MUTATION: NORMAL
MYELOCYTE PERCENT: 1 %
MYELOCYTE PERCENT: 1 %
MYELOCYTES ABSOLUTE COUNT: 0.03 K/CU MM
MYELOCYTES ABSOLUTE COUNT: 0.05 K/CU MM
NITRITE URINE, QUANTITATIVE: NEGATIVE
NUCLEATED RBC %: 0 %
NUCLEATED RBC %: 0 %
NUCLEATED RBC %: 0.2 %
NUCLEATED RBC %: 0.3 %
NUCLEATED RBC %: 0.7 %
NUCLEATED RBC %: 2.3 %
NUCLEATED RBC %: 2.5 %
NUCLEATED RED BLOOD CELLS: 1
NUCLEATED RED BLOOD CELLS: 2
NUCLEATED RED BLOOD CELLS: 4
O2 SAT, VEN: 91.5 % (ref 50–70)
O2 SAT, VEN: 94.6 % (ref 50–70)
O2 SATURATION: 91.6 % (ref 96–97)
OSMOLALITY URINE: 389 MOSM/KG (ref 50–800)
OVALOCYTES: ABNORMAL
PARATHYROID HORMONE INTACT: 459 PG/ML (ref 15–65)
PCO2 ARTERIAL: 30 MMHG (ref 32–45)
PCO2, VEN: 30 MMHG (ref 38–52)
PCO2, VEN: 34 MMHG (ref 38–52)
PCT TRANSFERRIN: 13 % (ref 10–44)
PCT TRANSFERRIN: 15 % (ref 10–44)
PCT TRANSFERRIN: 19 % (ref 10–44)
PDW BLD-RTO: 18.6 % (ref 11.7–14.9)
PDW BLD-RTO: 19 % (ref 11.7–14.9)
PDW BLD-RTO: 19.2 % (ref 11.7–14.9)
PDW BLD-RTO: 19.3 % (ref 11.7–14.9)
PDW BLD-RTO: 19.4 % (ref 11.7–14.9)
PDW BLD-RTO: 19.4 % (ref 11.7–14.9)
PDW BLD-RTO: 19.5 % (ref 11.7–14.9)
PDW BLD-RTO: 19.6 % (ref 11.7–14.9)
PDW BLD-RTO: 19.7 % (ref 11.7–14.9)
PDW BLD-RTO: 19.8 % (ref 11.7–14.9)
PDW BLD-RTO: 19.9 % (ref 11.7–14.9)
PDW BLD-RTO: 20 % (ref 11.7–14.9)
PDW BLD-RTO: 20.1 % (ref 11.7–14.9)
PDW BLD-RTO: 20.2 % (ref 11.7–14.9)
PDW BLD-RTO: 20.2 % (ref 11.7–14.9)
PDW BLD-RTO: 20.3 % (ref 11.7–14.9)
PDW BLD-RTO: 20.7 % (ref 11.7–14.9)
PDW BLD-RTO: 20.7 % (ref 11.7–14.9)
PDW BLD-RTO: 20.8 % (ref 11.7–14.9)
PDW BLD-RTO: 21 % (ref 11.7–14.9)
PDW BLD-RTO: 21.2 % (ref 11.7–14.9)
PDW BLD-RTO: 22 % (ref 11.7–14.9)
PDW BLD-RTO: 22.1 % (ref 11.7–14.9)
PDW BLD-RTO: 22.2 % (ref 11.7–14.9)
PDW BLD-RTO: 22.6 % (ref 11.7–14.9)
PDW BLD-RTO: 23.1 % (ref 11.7–14.9)
PDW BLD-RTO: 23.2 % (ref 11.7–14.9)
PDW BLD-RTO: 23.8 % (ref 11.7–14.9)
PDW BLD-RTO: 24.4 % (ref 11.7–14.9)
PDW BLD-RTO: 24.6 % (ref 11.7–14.9)
PDW BLD-RTO: 25.2 % (ref 11.7–14.9)
PDW BLD-RTO: 25.2 % (ref 11.7–14.9)
PDW BLD-RTO: 25.4 % (ref 11.7–14.9)
PDW BLD-RTO: 26.2 % (ref 11.7–14.9)
PDW BLD-RTO: 27.2 % (ref 11.7–14.9)
PDW BLD-RTO: 27.6 % (ref 11.7–14.9)
PDW BLD-RTO: 28.1 % (ref 11.7–14.9)
PH BLOOD: 7.33 (ref 7.34–7.45)
PH VENOUS: 7.22 (ref 7.32–7.42)
PH VENOUS: 7.3 (ref 7.32–7.42)
PH, URINE: 5 (ref 5–8)
PH, URINE: 5 (ref 5–8)
PH, URINE: 6 (ref 5–8)
PH, URINE: 6 (ref 5–8)
PHOSPHORUS: 5.2 MG/DL (ref 2.5–4.9)
PLATELET # BLD: 263 K/CU MM (ref 140–440)
PLATELET # BLD: 268 K/CU MM (ref 140–440)
PLATELET # BLD: 283 K/CU MM (ref 140–440)
PLATELET # BLD: 289 K/CU MM (ref 140–440)
PLATELET # BLD: 291 K/CU MM (ref 140–440)
PLATELET # BLD: 294 K/CU MM (ref 140–440)
PLATELET # BLD: 296 K/CU MM (ref 140–440)
PLATELET # BLD: 300 K/CU MM (ref 140–440)
PLATELET # BLD: 303 K/CU MM (ref 140–440)
PLATELET # BLD: 311 K/CU MM (ref 140–440)
PLATELET # BLD: 311 K/CU MM (ref 140–440)
PLATELET # BLD: 322 K/CU MM (ref 140–440)
PLATELET # BLD: 324 K/CU MM (ref 140–440)
PLATELET # BLD: 339 K/CU MM (ref 140–440)
PLATELET # BLD: 342 K/CU MM (ref 140–440)
PLATELET # BLD: 344 K/CU MM (ref 140–440)
PLATELET # BLD: 350 K/CU MM (ref 140–440)
PLATELET # BLD: 354 K/CU MM (ref 140–440)
PLATELET # BLD: 363 K/CU MM (ref 140–440)
PLATELET # BLD: 388 K/CU MM (ref 140–440)
PLATELET # BLD: 394 K/CU MM (ref 140–440)
PLATELET # BLD: 396 K/CU MM (ref 140–440)
PLATELET # BLD: 397 K/CU MM (ref 140–440)
PLATELET # BLD: 419 K/CU MM (ref 140–440)
PLATELET # BLD: 421 K/CU MM (ref 140–440)
PLATELET # BLD: 429 K/CU MM (ref 140–440)
PLATELET # BLD: 437 K/CU MM (ref 140–440)
PLATELET # BLD: 442 K/CU MM (ref 140–440)
PLATELET # BLD: 446 K/CU MM (ref 140–440)
PLATELET # BLD: 447 K/CU MM (ref 140–440)
PLATELET # BLD: 451 K/CU MM (ref 140–440)
PLATELET # BLD: 457 K/CU MM (ref 140–440)
PLATELET # BLD: 465 K/CU MM (ref 140–440)
PLATELET # BLD: 471 K/CU MM (ref 140–440)
PLATELET # BLD: 494 K/CU MM (ref 140–440)
PLATELET # BLD: 495 K/CU MM (ref 140–440)
PLATELET # BLD: 548 K/CU MM (ref 140–440)
PLATELET # BLD: 571 K/CU MM (ref 140–440)
PLATELET # BLD: 612 K/CU MM (ref 140–440)
PLATELET # BLD: 666 K/CU MM (ref 140–440)
PLATELET # BLD: 692 K/CU MM (ref 140–440)
PLATELET # BLD: 697 K/CU MM (ref 140–440)
PLATELET # BLD: 760 K/CU MM (ref 140–440)
PLATELET # BLD: 921 K/CU MM (ref 140–440)
PLT MORPHOLOGY: ABNORMAL
PLT MORPHOLOGY: ADEQUATE
PMV BLD AUTO: 7.5 FL (ref 7.5–11.1)
PMV BLD AUTO: 7.8 FL (ref 7.5–11.1)
PMV BLD AUTO: 7.8 FL (ref 7.5–11.1)
PMV BLD AUTO: 7.9 FL (ref 7.5–11.1)
PMV BLD AUTO: 8 FL (ref 7.5–11.1)
PMV BLD AUTO: 8.1 FL (ref 7.5–11.1)
PMV BLD AUTO: 8.2 FL (ref 7.5–11.1)
PMV BLD AUTO: 8.3 FL (ref 7.5–11.1)
PMV BLD AUTO: 8.4 FL (ref 7.5–11.1)
PMV BLD AUTO: 8.5 FL (ref 7.5–11.1)
PMV BLD AUTO: 8.6 FL (ref 7.5–11.1)
PMV BLD AUTO: 8.9 FL (ref 7.5–11.1)
PMV BLD AUTO: 8.9 FL (ref 7.5–11.1)
PMV BLD AUTO: 9 FL (ref 7.5–11.1)
PMV BLD AUTO: 9.1 FL (ref 7.5–11.1)
PMV BLD AUTO: 9.2 FL (ref 7.5–11.1)
PMV BLD AUTO: 9.3 FL (ref 7.5–11.1)
PMV BLD AUTO: 9.3 FL (ref 7.5–11.1)
PMV BLD AUTO: 9.4 FL (ref 7.5–11.1)
PO2 ARTERIAL: 65 MMHG (ref 75–100)
PO2, VEN: 169 MMHG (ref 28–48)
PO2, VEN: 77 MMHG (ref 28–48)
POC BUN: 18 MG/DL (ref 8–26)
POC BUN: 39 MG/DL (ref 8–26)
POC BUN: 47 MG/DL (ref 8–26)
POC BUN: 48 MG/DL (ref 8–26)
POC BUN: 51 MG/DL (ref 8–26)
POC BUN: 57 MG/DL (ref 8–26)
POC CALCIUM: 0.99 MMOL/L (ref 1.12–1.32)
POC CALCIUM: 1.01 MMOL/L (ref 1.12–1.32)
POC CALCIUM: 1.1 MMOL/L (ref 1.12–1.32)
POC CALCIUM: 1.11 MMOL/L (ref 1.12–1.32)
POC CALCIUM: 1.15 MMOL/L (ref 1.12–1.32)
POC CALCIUM: 1.16 MMOL/L (ref 1.12–1.32)
POC CHLORIDE: 108 MMOL/L (ref 98–109)
POC CHLORIDE: 108 MMOL/L (ref 98–109)
POC CHLORIDE: 109 MMOL/L (ref 98–109)
POC CHLORIDE: 110 MMOL/L (ref 98–109)
POC CHLORIDE: 111 MMOL/L (ref 98–109)
POC CHLORIDE: 115 MMOL/L (ref 98–109)
POC CO2: 18 MMOL/L (ref 21–32)
POC CO2: 19 MMOL/L (ref 21–32)
POC CO2: 20 MMOL/L (ref 21–32)
POC CO2: 21 MMOL/L (ref 21–32)
POC CO2: 21 MMOL/L (ref 21–32)
POC CO2: 23 MMOL/L (ref 21–32)
POC CREATININE: 1.2 MG/DL (ref 0.6–1.1)
POC CREATININE: 2.9 MG/DL (ref 0.6–1.1)
POC CREATININE: 3.6 MG/DL (ref 0.6–1.1)
POC CREATININE: 3.7 MG/DL (ref 0.6–1.1)
POC CREATININE: 4.1 MG/DL (ref 0.6–1.1)
POC CREATININE: 4.7 MG/DL (ref 0.6–1.1)
POLYCHROMASIA: ABNORMAL
POTASSIUM SERPL-SCNC: 3.5 MMOL/L (ref 3.5–5.1)
POTASSIUM SERPL-SCNC: 3.6 MMOL/L (ref 3.5–5.1)
POTASSIUM SERPL-SCNC: 3.7 MMOL/L (ref 3.5–5.1)
POTASSIUM SERPL-SCNC: 3.8 MMOL/L (ref 3.5–4.5)
POTASSIUM SERPL-SCNC: 3.9 MMOL/L (ref 3.5–4.5)
POTASSIUM SERPL-SCNC: 3.9 MMOL/L (ref 3.5–5.1)
POTASSIUM SERPL-SCNC: 3.9 MMOL/L (ref 3.5–5.1)
POTASSIUM SERPL-SCNC: 4 MMOL/L (ref 3.5–5.1)
POTASSIUM SERPL-SCNC: 4.1 MMOL/L (ref 3.5–5.1)
POTASSIUM SERPL-SCNC: 4.2 MMOL/L (ref 3.5–5.1)
POTASSIUM SERPL-SCNC: 4.3 MMOL/L (ref 3.5–5.1)
POTASSIUM SERPL-SCNC: 4.4 MMOL/L (ref 3.5–5.1)
POTASSIUM SERPL-SCNC: 4.5 MMOL/L (ref 3.5–4.5)
POTASSIUM SERPL-SCNC: 4.5 MMOL/L (ref 3.5–5.1)
POTASSIUM SERPL-SCNC: 4.6 MMOL/L (ref 3.5–5.1)
POTASSIUM SERPL-SCNC: 4.7 MMOL/L (ref 3.5–4.5)
POTASSIUM SERPL-SCNC: 4.7 MMOL/L (ref 3.5–5.1)
POTASSIUM SERPL-SCNC: 4.8 MMOL/L (ref 3.5–5.1)
POTASSIUM SERPL-SCNC: 4.9 MMOL/L (ref 3.5–5.1)
POTASSIUM SERPL-SCNC: 5 MMOL/L (ref 3.5–5.1)
POTASSIUM SERPL-SCNC: 5 MMOL/L (ref 3.5–5.1)
POTASSIUM SERPL-SCNC: 5.1 MMOL/L (ref 3.5–5.1)
POTASSIUM SERPL-SCNC: 5.2 MMOL/L (ref 3.5–5.1)
POTASSIUM SERPL-SCNC: 5.3 MMOL/L (ref 3.5–5.1)
POTASSIUM SERPL-SCNC: 5.4 MMOL/L (ref 3.5–5.1)
POTASSIUM SERPL-SCNC: 5.5 MMOL/L (ref 3.5–5.1)
POTASSIUM SERPL-SCNC: 5.6 MMOL/L (ref 3.5–5.1)
POTASSIUM SERPL-SCNC: 5.7 MMOL/L (ref 3.5–5.1)
POTASSIUM SERPL-SCNC: 6.2 MMOL/L (ref 3.5–5.1)
POTASSIUM SERPL-SCNC: 6.2 MMOL/L (ref 3.5–5.1)
POTASSIUM SERPL-SCNC: 6.6 MMOL/L (ref 3.5–5.1)
PREALBUMIN: 18 MG/DL (ref 20–40)
PRO-BNP: 1511 PG/ML
PRO-BNP: 4215 PG/ML
PRO-BNP: 4230 PG/ML
PROCALCITONIN: 0.47
PROCALCITONIN: 0.47
PROCALCITONIN: 0.84
PROCALCITONIN: 4.87
PROMYELOCYTES ABSOLUTE COUNT: 0.05 K/CU MM
PROMYELOCYTES PERCENT: 1 %
PROTEIN FLUID: 3.5 GM/DL
PROTEIN UA: 100 MG/DL
PROTEIN UA: 30 MG/DL
PROTHROMBIN TIME: 12 SECONDS (ref 11.7–14.5)
PROTHROMBIN TIME: 12.3 SECONDS (ref 11.7–14.5)
PROTHROMBIN TIME: 13 SECONDS (ref 11.7–14.5)
RBC # BLD: 2.53 M/CU MM (ref 4.2–5.4)
RBC # BLD: 2.56 M/CU MM (ref 4.2–5.4)
RBC # BLD: 2.71 M/CU MM (ref 4.2–5.4)
RBC # BLD: 2.72 M/CU MM (ref 4.2–5.4)
RBC # BLD: 2.73 M/CU MM (ref 4.2–5.4)
RBC # BLD: 2.74 M/CU MM (ref 4.2–5.4)
RBC # BLD: 2.76 M/CU MM (ref 4.2–5.4)
RBC # BLD: 2.77 M/CU MM (ref 4.2–5.4)
RBC # BLD: 2.78 M/CU MM (ref 4.2–5.4)
RBC # BLD: 2.8 M/CU MM (ref 4.2–5.4)
RBC # BLD: 2.81 M/CU MM (ref 4.2–5.4)
RBC # BLD: 2.88 M/CU MM (ref 4.2–5.4)
RBC # BLD: 2.89 M/CU MM (ref 4.2–5.4)
RBC # BLD: 2.9 M/CU MM (ref 4.2–5.4)
RBC # BLD: 2.9 M/CU MM (ref 4.2–5.4)
RBC # BLD: 2.93 M/CU MM (ref 4.2–5.4)
RBC # BLD: 2.94 M/CU MM (ref 4.2–5.4)
RBC # BLD: 2.95 M/CU MM (ref 4.2–5.4)
RBC # BLD: 2.96 M/CU MM (ref 4.2–5.4)
RBC # BLD: 2.96 M/CU MM (ref 4.2–5.4)
RBC # BLD: 3 M/CU MM (ref 4.2–5.4)
RBC # BLD: 3.02 M/CU MM (ref 4.2–5.4)
RBC # BLD: 3.06 M/CU MM (ref 4.2–5.4)
RBC # BLD: 3.07 M/CU MM (ref 4.2–5.4)
RBC # BLD: 3.08 M/CU MM (ref 4.2–5.4)
RBC # BLD: 3.14 M/CU MM (ref 4.2–5.4)
RBC # BLD: 3.21 M/CU MM (ref 4.2–5.4)
RBC # BLD: 3.21 M/CU MM (ref 4.2–5.4)
RBC # BLD: 3.23 M/CU MM (ref 4.2–5.4)
RBC # BLD: 3.26 M/CU MM (ref 4.2–5.4)
RBC # BLD: 3.29 M/CU MM (ref 4.2–5.4)
RBC # BLD: 3.34 M/CU MM (ref 4.2–5.4)
RBC # BLD: 3.38 M/CU MM (ref 4.2–5.4)
RBC # BLD: 3.4 M/CU MM (ref 4.2–5.4)
RBC # BLD: 3.45 M/CU MM (ref 4.2–5.4)
RBC # BLD: 3.52 M/CU MM (ref 4.2–5.4)
RBC # BLD: 3.54 M/CU MM (ref 4.2–5.4)
RBC # BLD: 3.65 M/CU MM (ref 4.2–5.4)
RBC # BLD: 3.65 M/CU MM (ref 4.2–5.4)
RBC URINE: 1 /HPF (ref 0–6)
RBC URINE: 1984 /HPF (ref 0–6)
RBC URINE: 2185 /HPF (ref 0–6)
RBC URINE: 26 /HPF (ref 0–6)
SARS-COV-2, NAAT: NOT DETECTED
SARS-COV-2: NOT DETECTED
SEGMENTED NEUTROPHILS ABSOLUTE COUNT: 0.9 K/CU MM
SEGMENTED NEUTROPHILS ABSOLUTE COUNT: 1.8 K/CU MM
SEGMENTED NEUTROPHILS ABSOLUTE COUNT: 10.5 K/CU MM
SEGMENTED NEUTROPHILS ABSOLUTE COUNT: 10.8 K/CU MM
SEGMENTED NEUTROPHILS ABSOLUTE COUNT: 14 K/CU MM
SEGMENTED NEUTROPHILS ABSOLUTE COUNT: 15.7 K/CU MM
SEGMENTED NEUTROPHILS ABSOLUTE COUNT: 17.6 K/CU MM
SEGMENTED NEUTROPHILS ABSOLUTE COUNT: 2.1 K/CU MM
SEGMENTED NEUTROPHILS ABSOLUTE COUNT: 2.3 K/CU MM
SEGMENTED NEUTROPHILS ABSOLUTE COUNT: 2.4 K/CU MM
SEGMENTED NEUTROPHILS ABSOLUTE COUNT: 2.6 K/CU MM
SEGMENTED NEUTROPHILS ABSOLUTE COUNT: 2.7 K/CU MM
SEGMENTED NEUTROPHILS ABSOLUTE COUNT: 2.7 K/CU MM
SEGMENTED NEUTROPHILS ABSOLUTE COUNT: 2.8 K/CU MM
SEGMENTED NEUTROPHILS ABSOLUTE COUNT: 3 K/CU MM
SEGMENTED NEUTROPHILS ABSOLUTE COUNT: 3.4 K/CU MM
SEGMENTED NEUTROPHILS ABSOLUTE COUNT: 3.4 K/CU MM
SEGMENTED NEUTROPHILS ABSOLUTE COUNT: 3.5 K/CU MM
SEGMENTED NEUTROPHILS ABSOLUTE COUNT: 3.6 K/CU MM
SEGMENTED NEUTROPHILS ABSOLUTE COUNT: 3.6 K/CU MM
SEGMENTED NEUTROPHILS ABSOLUTE COUNT: 3.7 K/CU MM
SEGMENTED NEUTROPHILS ABSOLUTE COUNT: 3.7 K/CU MM
SEGMENTED NEUTROPHILS ABSOLUTE COUNT: 3.8 K/CU MM
SEGMENTED NEUTROPHILS ABSOLUTE COUNT: 3.9 K/CU MM
SEGMENTED NEUTROPHILS ABSOLUTE COUNT: 4.5 K/CU MM
SEGMENTED NEUTROPHILS ABSOLUTE COUNT: 4.8 K/CU MM
SEGMENTED NEUTROPHILS ABSOLUTE COUNT: 5.1 K/CU MM
SEGMENTED NEUTROPHILS ABSOLUTE COUNT: 5.5 K/CU MM
SEGMENTED NEUTROPHILS ABSOLUTE COUNT: 5.8 K/CU MM
SEGMENTED NEUTROPHILS ABSOLUTE COUNT: 6 K/CU MM
SEGMENTED NEUTROPHILS ABSOLUTE COUNT: 7.4 K/CU MM
SEGMENTED NEUTROPHILS ABSOLUTE COUNT: 8.2 K/CU MM
SEGMENTED NEUTROPHILS ABSOLUTE COUNT: 9 K/CU MM
SEGMENTED NEUTROPHILS RELATIVE PERCENT: 34.5 % (ref 36–66)
SEGMENTED NEUTROPHILS RELATIVE PERCENT: 41 % (ref 36–66)
SEGMENTED NEUTROPHILS RELATIVE PERCENT: 43.9 % (ref 36–66)
SEGMENTED NEUTROPHILS RELATIVE PERCENT: 44 % (ref 36–66)
SEGMENTED NEUTROPHILS RELATIVE PERCENT: 51.7 % (ref 36–66)
SEGMENTED NEUTROPHILS RELATIVE PERCENT: 52.6 % (ref 36–66)
SEGMENTED NEUTROPHILS RELATIVE PERCENT: 53.5 % (ref 36–66)
SEGMENTED NEUTROPHILS RELATIVE PERCENT: 54.8 % (ref 36–66)
SEGMENTED NEUTROPHILS RELATIVE PERCENT: 57.6 % (ref 36–66)
SEGMENTED NEUTROPHILS RELATIVE PERCENT: 59.9 % (ref 36–66)
SEGMENTED NEUTROPHILS RELATIVE PERCENT: 60.3 % (ref 36–66)
SEGMENTED NEUTROPHILS RELATIVE PERCENT: 61 % (ref 36–66)
SEGMENTED NEUTROPHILS RELATIVE PERCENT: 62.4 % (ref 36–66)
SEGMENTED NEUTROPHILS RELATIVE PERCENT: 63.2 % (ref 36–66)
SEGMENTED NEUTROPHILS RELATIVE PERCENT: 64.8 % (ref 36–66)
SEGMENTED NEUTROPHILS RELATIVE PERCENT: 66 % (ref 36–66)
SEGMENTED NEUTROPHILS RELATIVE PERCENT: 67 % (ref 36–66)
SEGMENTED NEUTROPHILS RELATIVE PERCENT: 68 % (ref 36–66)
SEGMENTED NEUTROPHILS RELATIVE PERCENT: 68.8 % (ref 36–66)
SEGMENTED NEUTROPHILS RELATIVE PERCENT: 69 % (ref 36–66)
SEGMENTED NEUTROPHILS RELATIVE PERCENT: 69.2 % (ref 36–66)
SEGMENTED NEUTROPHILS RELATIVE PERCENT: 73 % (ref 36–66)
SEGMENTED NEUTROPHILS RELATIVE PERCENT: 73 % (ref 36–66)
SEGMENTED NEUTROPHILS RELATIVE PERCENT: 74.3 % (ref 36–66)
SEGMENTED NEUTROPHILS RELATIVE PERCENT: 75 % (ref 36–66)
SEGMENTED NEUTROPHILS RELATIVE PERCENT: 75.3 % (ref 36–66)
SEGMENTED NEUTROPHILS RELATIVE PERCENT: 76.2 % (ref 36–66)
SEGMENTED NEUTROPHILS RELATIVE PERCENT: 76.9 % (ref 36–66)
SEGMENTED NEUTROPHILS RELATIVE PERCENT: 78.6 % (ref 36–66)
SEGMENTED NEUTROPHILS RELATIVE PERCENT: 79.6 % (ref 36–66)
SEGMENTED NEUTROPHILS RELATIVE PERCENT: 80 % (ref 36–66)
SEGMENTED NEUTROPHILS RELATIVE PERCENT: 82.9 % (ref 36–66)
SEGMENTED NEUTROPHILS RELATIVE PERCENT: 88.5 % (ref 36–66)
SEGMENTED NEUTROPHILS RELATIVE PERCENT: 90.4 % (ref 36–66)
SEGMENTED NEUTROPHILS RELATIVE PERCENT: 91 % (ref 36–66)
SODIUM BLD-SCNC: 129 MMOL/L (ref 135–145)
SODIUM BLD-SCNC: 129 MMOL/L (ref 135–145)
SODIUM BLD-SCNC: 132 MMOL/L (ref 135–145)
SODIUM BLD-SCNC: 133 MMOL/L (ref 135–145)
SODIUM BLD-SCNC: 134 MMOL/L (ref 135–145)
SODIUM BLD-SCNC: 135 MMOL/L (ref 135–145)
SODIUM BLD-SCNC: 136 MMOL/L (ref 135–145)
SODIUM BLD-SCNC: 137 MMOL/L (ref 135–145)
SODIUM BLD-SCNC: 138 MMOL/L (ref 135–145)
SODIUM BLD-SCNC: 139 MMOL/L (ref 135–145)
SODIUM BLD-SCNC: 139 MMOL/L (ref 138–146)
SODIUM BLD-SCNC: 140 MMOL/L (ref 135–145)
SODIUM BLD-SCNC: 140 MMOL/L (ref 138–146)
SODIUM BLD-SCNC: 141 MMOL/L (ref 135–145)
SODIUM BLD-SCNC: 141 MMOL/L (ref 138–146)
SODIUM BLD-SCNC: 142 MMOL/L (ref 135–145)
SODIUM BLD-SCNC: 143 MMOL/L (ref 135–145)
SODIUM BLD-SCNC: 144 MMOL/L (ref 135–145)
SODIUM BLD-SCNC: 145 MMOL/L (ref 135–145)
SODIUM BLD-SCNC: 146 MMOL/L (ref 138–146)
SODIUM URINE: 28 MMOL/L (ref 35–167)
SOURCE FLUID: NORMAL
SOURCE, BLOOD GAS: ABNORMAL
SOURCE: NORMAL
SPECIFIC GRAVITY UA: 1.01 (ref 1–1.03)
SPECIFIC GRAVITY UA: 1.02 (ref 1–1.03)
SPECIMEN: NORMAL
SQUAMOUS EPITHELIAL: <1 /HPF
STATUS: NORMAL
STREP PNEUMONIAE ANTIGEN: NORMAL
TOTAL CK: 128 IU/L (ref 26–140)
TOTAL CK: 143 IU/L (ref 26–140)
TOTAL CK: 60 IU/L (ref 26–140)
TOTAL CK: 79 IU/L (ref 26–140)
TOTAL IMMATURE NEUTOROPHIL: 0.08 K/CU MM
TOTAL IMMATURE NEUTOROPHIL: 0.12 K/CU MM
TOTAL IMMATURE NEUTOROPHIL: 0.16 K/CU MM
TOTAL IMMATURE NEUTOROPHIL: 0.19 K/CU MM
TOTAL IMMATURE NEUTOROPHIL: 0.34 K/CU MM
TOTAL IMMATURE NEUTOROPHIL: 0.35 K/CU MM
TOTAL IMMATURE NEUTOROPHIL: 0.43 K/CU MM
TOTAL IRON BINDING CAPACITY: 124 UG/DL (ref 250–450)
TOTAL IRON BINDING CAPACITY: 148 UG/DL (ref 250–450)
TOTAL IRON BINDING CAPACITY: 159 UG/DL (ref 250–450)
TOTAL NUCLEATED RBC: 0 K/CU MM
TOTAL NUCLEATED RBC: 0.1 K/CU MM
TOTAL PROTEIN: 4.4 GM/DL (ref 6.4–8.2)
TOTAL PROTEIN: 4.6 GM/DL (ref 6.4–8.2)
TOTAL PROTEIN: 4.7 GM/DL (ref 6.4–8.2)
TOTAL PROTEIN: 4.7 GM/DL (ref 6.4–8.2)
TOTAL PROTEIN: 4.9 GM/DL (ref 6.4–8.2)
TOTAL PROTEIN: 5 GM/DL (ref 6.4–8.2)
TOTAL PROTEIN: 5.1 GM/DL (ref 6.4–8.2)
TOTAL PROTEIN: 5.3 GM/DL (ref 6.4–8.2)
TOTAL PROTEIN: 5.4 GM/DL (ref 6.4–8.2)
TOTAL PROTEIN: 5.5 GM/DL (ref 6.4–8.2)
TOTAL PROTEIN: 5.6 GM/DL (ref 6.4–8.2)
TOTAL PROTEIN: 5.6 GM/DL (ref 6.4–8.2)
TOTAL PROTEIN: 5.7 GM/DL (ref 6.4–8.2)
TOTAL PROTEIN: 5.8 GM/DL (ref 6.4–8.2)
TOTAL PROTEIN: 5.9 GM/DL (ref 6.4–8.2)
TOTAL PROTEIN: 6 GM/DL (ref 6.4–8.2)
TOTAL PROTEIN: 6.1 GM/DL (ref 6.4–8.2)
TOTAL PROTEIN: 6.7 GM/DL (ref 6.4–8.2)
TOTAL PROTEIN: 6.9 GM/DL (ref 6.4–8.2)
TOTAL PROTEIN: 7.1 GM/DL (ref 6.4–8.2)
TOTAL PROTEIN: 7.2 GM/DL (ref 6.4–8.2)
TOTAL PROTEIN: 7.4 GM/DL (ref 6.4–8.2)
TRANSFUSION STATUS: NORMAL
TRANSITIONAL EPITHELIAL: <1 /HPF
TRICHOMONAS: ABNORMAL /HPF
TROPONIN T: 0.01 NG/ML
TROPONIN T: 0.01 NG/ML
TROPONIN T: 0.04 NG/ML
TROPONIN T: 0.04 NG/ML
TROPONIN T: <0.01 NG/ML
TROPONIN T: <0.01 NG/ML
TSH HIGH SENSITIVITY: <0.01 UIU/ML (ref 0.27–4.2)
UNIT DIVISION: 0
UNIT NUMBER: NORMAL
UNSATURATED IRON BINDING CAPACITY: 101 UG/DL (ref 110–370)
UNSATURATED IRON BINDING CAPACITY: 126 UG/DL (ref 110–370)
UNSATURATED IRON BINDING CAPACITY: 139 UG/DL (ref 110–370)
UROBILINOGEN, URINE: NEGATIVE MG/DL (ref 0.2–1)
VANCOMYCIN RANDOM: 16.1 UG/ML
VANCOMYCIN RANDOM: 20.9 UG/ML
VANCOMYCIN TROUGH: 13.7 UG/ML (ref 10–20)
VITAMIN B-12: 364.6 PG/ML (ref 211–911)
VITAMIN B-12: 549.7 PG/ML (ref 211–911)
VITAMIN D 25-HYDROXY: 19.08 NG/ML
VITAMIN D 25-HYDROXY: 21.8 NG/ML
WBC # BLD: 10 K/CU MM (ref 4–10.5)
WBC # BLD: 10 K/CU MM (ref 4–10.5)
WBC # BLD: 12 K/CU MM (ref 4–10.5)
WBC # BLD: 13 K/CU MM (ref 4–10.5)
WBC # BLD: 13.6 K/CU MM (ref 4–10.5)
WBC # BLD: 13.7 K/CU MM (ref 4–10.5)
WBC # BLD: 15.1 K/CU MM (ref 4–10.5)
WBC # BLD: 15.5 K/CU MM (ref 4–10.5)
WBC # BLD: 15.9 K/CU MM (ref 4–10.5)
WBC # BLD: 18.9 K/CU MM (ref 4–10.5)
WBC # BLD: 19.4 K/CU MM (ref 4–10.5)
WBC # BLD: 2.5 K/CU MM (ref 4–10.5)
WBC # BLD: 2.9 K/CU MM (ref 4–10.5)
WBC # BLD: 3.5 K/CU MM (ref 4–10.5)
WBC # BLD: 3.8 K/CU MM (ref 4–10.5)
WBC # BLD: 4 K/CU MM (ref 4–10.5)
WBC # BLD: 4 K/CU MM (ref 4–10.5)
WBC # BLD: 4.1 K/CU MM (ref 4–10.5)
WBC # BLD: 4.3 K/CU MM (ref 4–10.5)
WBC # BLD: 4.5 K/CU MM (ref 4–10.5)
WBC # BLD: 4.6 K/CU MM (ref 4–10.5)
WBC # BLD: 4.7 K/CU MM (ref 4–10.5)
WBC # BLD: 5.2 K/CU MM (ref 4–10.5)
WBC # BLD: 5.4 K/CU MM (ref 4–10.5)
WBC # BLD: 5.4 K/CU MM (ref 4–10.5)
WBC # BLD: 5.5 K/CU MM (ref 4–10.5)
WBC # BLD: 5.8 K/CU MM (ref 4–10.5)
WBC # BLD: 6.3 K/CU MM (ref 4–10.5)
WBC # BLD: 6.5 K/CU MM (ref 4–10.5)
WBC # BLD: 6.6 K/CU MM (ref 4–10.5)
WBC # BLD: 6.6 K/CU MM (ref 4–10.5)
WBC # BLD: 6.7 K/CU MM (ref 4–10.5)
WBC # BLD: 6.8 K/CU MM (ref 4–10.5)
WBC # BLD: 7 K/CU MM (ref 4–10.5)
WBC # BLD: 7.2 K/CU MM (ref 4–10.5)
WBC # BLD: 7.7 K/CU MM (ref 4–10.5)
WBC # BLD: 7.9 K/CU MM (ref 4–10.5)
WBC # BLD: 8.9 K/CU MM (ref 4–10.5)
WBC # BLD: 9.3 K/CU MM (ref 4–10.5)
WBC # BLD: 9.3 K/CU MM (ref 4–10.5)
WBC UA: 116 /HPF (ref 0–5)
WBC UA: 2 /HPF (ref 0–5)
WBC UA: 50 /HPF (ref 0–5)
WBC UA: 8 /HPF (ref 0–5)

## 2021-01-01 PROCEDURE — 93005 ELECTROCARDIOGRAM TRACING: CPT | Performed by: INTERNAL MEDICINE

## 2021-01-01 PROCEDURE — 6370000000 HC RX 637 (ALT 250 FOR IP): Performed by: INTERNAL MEDICINE

## 2021-01-01 PROCEDURE — 36415 COLL VENOUS BLD VENIPUNCTURE: CPT

## 2021-01-01 PROCEDURE — 80048 BASIC METABOLIC PNL TOTAL CA: CPT

## 2021-01-01 PROCEDURE — 86900 BLOOD TYPING SEROLOGIC ABO: CPT

## 2021-01-01 PROCEDURE — 6360000002 HC RX W HCPCS: Performed by: UROLOGY

## 2021-01-01 PROCEDURE — 6360000002 HC RX W HCPCS: Performed by: INTERNAL MEDICINE

## 2021-01-01 PROCEDURE — 86140 C-REACTIVE PROTEIN: CPT

## 2021-01-01 PROCEDURE — 6360000002 HC RX W HCPCS: Performed by: PHYSICIAN ASSISTANT

## 2021-01-01 PROCEDURE — 93306 TTE W/DOPPLER COMPLETE: CPT

## 2021-01-01 PROCEDURE — 99254 IP/OBS CNSLTJ NEW/EST MOD 60: CPT | Performed by: INTERNAL MEDICINE

## 2021-01-01 PROCEDURE — P9047 ALBUMIN (HUMAN), 25%, 50ML: HCPCS | Performed by: INTERNAL MEDICINE

## 2021-01-01 PROCEDURE — 96374 THER/PROPH/DIAG INJ IV PUSH: CPT

## 2021-01-01 PROCEDURE — 80053 COMPREHEN METABOLIC PANEL: CPT

## 2021-01-01 PROCEDURE — 6360000002 HC RX W HCPCS: Performed by: CLINICAL NURSE SPECIALIST

## 2021-01-01 PROCEDURE — 36591 DRAW BLOOD OFF VENOUS DEVICE: CPT

## 2021-01-01 PROCEDURE — 83036 HEMOGLOBIN GLYCOSYLATED A1C: CPT

## 2021-01-01 PROCEDURE — 2580000003 HC RX 258: Performed by: INTERNAL MEDICINE

## 2021-01-01 PROCEDURE — 82330 ASSAY OF CALCIUM: CPT

## 2021-01-01 PROCEDURE — 94761 N-INVAS EAR/PLS OXIMETRY MLT: CPT

## 2021-01-01 PROCEDURE — 82945 GLUCOSE OTHER FLUID: CPT

## 2021-01-01 PROCEDURE — 82010 KETONE BODYS QUAN: CPT

## 2021-01-01 PROCEDURE — 99222 1ST HOSP IP/OBS MODERATE 55: CPT | Performed by: INTERNAL MEDICINE

## 2021-01-01 PROCEDURE — 85025 COMPLETE CBC W/AUTO DIFF WBC: CPT

## 2021-01-01 PROCEDURE — 84134 ASSAY OF PREALBUMIN: CPT

## 2021-01-01 PROCEDURE — 85379 FIBRIN DEGRADATION QUANT: CPT

## 2021-01-01 PROCEDURE — 6370000000 HC RX 637 (ALT 250 FOR IP): Performed by: FAMILY MEDICINE

## 2021-01-01 PROCEDURE — 84132 ASSAY OF SERUM POTASSIUM: CPT

## 2021-01-01 PROCEDURE — 96413 CHEMO IV INFUSION 1 HR: CPT

## 2021-01-01 PROCEDURE — 2140000000 HC CCU INTERMEDIATE R&B

## 2021-01-01 PROCEDURE — 2709999900 HC NON-CHARGEABLE SUPPLY: Performed by: UROLOGY

## 2021-01-01 PROCEDURE — 3700000001 HC ADD 15 MINUTES (ANESTHESIA): Performed by: SURGERY

## 2021-01-01 PROCEDURE — 7100000001 HC PACU RECOVERY - ADDTL 15 MIN: Performed by: SURGERY

## 2021-01-01 PROCEDURE — 86922 COMPATIBILITY TEST ANTIGLOB: CPT

## 2021-01-01 PROCEDURE — 2709999900 HC NON-CHARGEABLE SUPPLY: Performed by: SURGERY

## 2021-01-01 PROCEDURE — 84484 ASSAY OF TROPONIN QUANT: CPT

## 2021-01-01 PROCEDURE — 32551 INSERTION OF CHEST TUBE: CPT

## 2021-01-01 PROCEDURE — 93005 ELECTROCARDIOGRAM TRACING: CPT | Performed by: PHYSICIAN ASSISTANT

## 2021-01-01 PROCEDURE — 36561 INSERT TUNNELED CV CATH: CPT | Performed by: NURSE PRACTITIONER

## 2021-01-01 PROCEDURE — 6360000002 HC RX W HCPCS: Performed by: FAMILY MEDICINE

## 2021-01-01 PROCEDURE — 6360000004 HC RX CONTRAST MEDICATION: Performed by: SPECIALIST

## 2021-01-01 PROCEDURE — 93010 ELECTROCARDIOGRAM REPORT: CPT | Performed by: INTERNAL MEDICINE

## 2021-01-01 PROCEDURE — 99283 EMERGENCY DEPT VISIT LOW MDM: CPT

## 2021-01-01 PROCEDURE — 71045 X-RAY EXAM CHEST 1 VIEW: CPT

## 2021-01-01 PROCEDURE — 1200000000 HC SEMI PRIVATE

## 2021-01-01 PROCEDURE — 2580000003 HC RX 258: Performed by: PHYSICIAN ASSISTANT

## 2021-01-01 PROCEDURE — 96375 TX/PRO/DX INJ NEW DRUG ADDON: CPT

## 2021-01-01 PROCEDURE — 83540 ASSAY OF IRON: CPT

## 2021-01-01 PROCEDURE — 84145 PROCALCITONIN (PCT): CPT

## 2021-01-01 PROCEDURE — 6370000000 HC RX 637 (ALT 250 FOR IP): Performed by: SPECIALIST

## 2021-01-01 PROCEDURE — 2500000003 HC RX 250 WO HCPCS: Performed by: INTERNAL MEDICINE

## 2021-01-01 PROCEDURE — 0T788DZ DILATION OF BILATERAL URETERS WITH INTRALUMINAL DEVICE, VIA NATURAL OR ARTIFICIAL OPENING ENDOSCOPIC: ICD-10-PCS | Performed by: UROLOGY

## 2021-01-01 PROCEDURE — 83615 LACTATE (LD) (LDH) ENZYME: CPT

## 2021-01-01 PROCEDURE — 3700000000 HC ANESTHESIA ATTENDED CARE: Performed by: UROLOGY

## 2021-01-01 PROCEDURE — 51702 INSERT TEMP BLADDER CATH: CPT

## 2021-01-01 PROCEDURE — 82962 GLUCOSE BLOOD TEST: CPT

## 2021-01-01 PROCEDURE — 87340 HEPATITIS B SURFACE AG IA: CPT

## 2021-01-01 PROCEDURE — 71250 CT THORAX DX C-: CPT

## 2021-01-01 PROCEDURE — 87116 MYCOBACTERIA CULTURE: CPT

## 2021-01-01 PROCEDURE — 1250000000 HC SEMI PRIVATE HOSPICE R&B

## 2021-01-01 PROCEDURE — 86850 RBC ANTIBODY SCREEN: CPT

## 2021-01-01 PROCEDURE — 99232 SBSQ HOSP IP/OBS MODERATE 35: CPT | Performed by: INTERNAL MEDICINE

## 2021-01-01 PROCEDURE — 6370000000 HC RX 637 (ALT 250 FOR IP): Performed by: CLINICAL NURSE SPECIALIST

## 2021-01-01 PROCEDURE — 85027 COMPLETE CBC AUTOMATED: CPT

## 2021-01-01 PROCEDURE — 2580000003 HC RX 258: Performed by: ANESTHESIOLOGY

## 2021-01-01 PROCEDURE — 86901 BLOOD TYPING SEROLOGIC RH(D): CPT

## 2021-01-01 PROCEDURE — 94010 BREATHING CAPACITY TEST: CPT

## 2021-01-01 PROCEDURE — 6360000002 HC RX W HCPCS: Performed by: SPECIALIST

## 2021-01-01 PROCEDURE — 87040 BLOOD CULTURE FOR BACTERIA: CPT

## 2021-01-01 PROCEDURE — 5A1D70Z PERFORMANCE OF URINARY FILTRATION, INTERMITTENT, LESS THAN 6 HOURS PER DAY: ICD-10-PCS | Performed by: INTERNAL MEDICINE

## 2021-01-01 PROCEDURE — 74176 CT ABD & PELVIS W/O CONTRAST: CPT

## 2021-01-01 PROCEDURE — 87635 SARS-COV-2 COVID-19 AMP PRB: CPT

## 2021-01-01 PROCEDURE — 99223 1ST HOSP IP/OBS HIGH 75: CPT | Performed by: INTERNAL MEDICINE

## 2021-01-01 PROCEDURE — 6360000002 HC RX W HCPCS: Performed by: NURSE ANESTHETIST, CERTIFIED REGISTERED

## 2021-01-01 PROCEDURE — C1758 CATHETER, URETERAL: HCPCS | Performed by: UROLOGY

## 2021-01-01 PROCEDURE — 99231 SBSQ HOSP IP/OBS SF/LOW 25: CPT | Performed by: INTERNAL MEDICINE

## 2021-01-01 PROCEDURE — 05HM33Z INSERTION OF INFUSION DEVICE INTO RIGHT INTERNAL JUGULAR VEIN, PERCUTANEOUS APPROACH: ICD-10-PCS | Performed by: UROLOGY

## 2021-01-01 PROCEDURE — 6370000000 HC RX 637 (ALT 250 FOR IP): Performed by: UROLOGY

## 2021-01-01 PROCEDURE — 99214 OFFICE O/P EST MOD 30 MIN: CPT | Performed by: NURSE PRACTITIONER

## 2021-01-01 PROCEDURE — 6360000002 HC RX W HCPCS

## 2021-01-01 PROCEDURE — 86141 C-REACTIVE PROTEIN HS: CPT

## 2021-01-01 PROCEDURE — 2580000003 HC RX 258: Performed by: EMERGENCY MEDICINE

## 2021-01-01 PROCEDURE — 6370000000 HC RX 637 (ALT 250 FOR IP): Performed by: PHYSICIAN ASSISTANT

## 2021-01-01 PROCEDURE — 3600000003 HC SURGERY LEVEL 3 BASE: Performed by: UROLOGY

## 2021-01-01 PROCEDURE — 36430 TRANSFUSION BLD/BLD COMPNT: CPT

## 2021-01-01 PROCEDURE — 3600000004 HC SURGERY LEVEL 4 BASE: Performed by: SURGERY

## 2021-01-01 PROCEDURE — 85007 BL SMEAR W/DIFF WBC COUNT: CPT

## 2021-01-01 PROCEDURE — 6360000002 HC RX W HCPCS: Performed by: EMERGENCY MEDICINE

## 2021-01-01 PROCEDURE — 2709999900 HC NON-CHARGEABLE SUPPLY

## 2021-01-01 PROCEDURE — 82570 ASSAY OF URINE CREATININE: CPT

## 2021-01-01 PROCEDURE — 99211 OFF/OP EST MAY X REQ PHY/QHP: CPT

## 2021-01-01 PROCEDURE — 82803 BLOOD GASES ANY COMBINATION: CPT

## 2021-01-01 PROCEDURE — 88108 CYTOPATH CONCENTRATE TECH: CPT | Performed by: PATHOLOGY

## 2021-01-01 PROCEDURE — 80202 ASSAY OF VANCOMYCIN: CPT

## 2021-01-01 PROCEDURE — 6370000000 HC RX 637 (ALT 250 FOR IP)

## 2021-01-01 PROCEDURE — 99212 OFFICE O/P EST SF 10 MIN: CPT | Performed by: SURGERY

## 2021-01-01 PROCEDURE — 2580000003 HC RX 258: Performed by: UROLOGY

## 2021-01-01 PROCEDURE — 3700000001 HC ADD 15 MINUTES (ANESTHESIA): Performed by: UROLOGY

## 2021-01-01 PROCEDURE — 7100000011 HC PHASE II RECOVERY - ADDTL 15 MIN

## 2021-01-01 PROCEDURE — 2700000000 HC OXYGEN THERAPY PER DAY

## 2021-01-01 PROCEDURE — 99285 EMERGENCY DEPT VISIT HI MDM: CPT

## 2021-01-01 PROCEDURE — BT1F1ZZ FLUOROSCOPY OF LEFT KIDNEY, URETER AND BLADDER USING LOW OSMOLAR CONTRAST: ICD-10-PCS | Performed by: SPECIALIST

## 2021-01-01 PROCEDURE — 6360000002 HC RX W HCPCS: Performed by: SURGERY

## 2021-01-01 PROCEDURE — 96367 TX/PROPH/DG ADDL SEQ IV INF: CPT

## 2021-01-01 PROCEDURE — 99255 IP/OBS CONSLTJ NEW/EST HI 80: CPT | Performed by: INTERNAL MEDICINE

## 2021-01-01 PROCEDURE — 6370000000 HC RX 637 (ALT 250 FOR IP): Performed by: NURSE PRACTITIONER

## 2021-01-01 PROCEDURE — 96365 THER/PROPH/DIAG IV INF INIT: CPT

## 2021-01-01 PROCEDURE — P9047 ALBUMIN (HUMAN), 25%, 50ML: HCPCS | Performed by: SPECIALIST

## 2021-01-01 PROCEDURE — 86704 HEP B CORE ANTIBODY TOTAL: CPT

## 2021-01-01 PROCEDURE — 32555 ASPIRATE PLEURA W/ IMAGING: CPT

## 2021-01-01 PROCEDURE — 2580000003 HC RX 258: Performed by: SPECIALIST

## 2021-01-01 PROCEDURE — 7100000010 HC PHASE II RECOVERY - FIRST 15 MIN

## 2021-01-01 PROCEDURE — 3600000014 HC SURGERY LEVEL 4 ADDTL 15MIN: Performed by: SURGERY

## 2021-01-01 PROCEDURE — 85730 THROMBOPLASTIN TIME PARTIAL: CPT

## 2021-01-01 PROCEDURE — 2580000003 HC RX 258: Performed by: CLINICAL NURSE SPECIALIST

## 2021-01-01 PROCEDURE — 2580000003 HC RX 258: Performed by: STUDENT IN AN ORGANIZED HEALTH CARE EDUCATION/TRAINING PROGRAM

## 2021-01-01 PROCEDURE — 87086 URINE CULTURE/COLONY COUNT: CPT

## 2021-01-01 PROCEDURE — 82607 VITAMIN B-12: CPT

## 2021-01-01 PROCEDURE — 84443 ASSAY THYROID STIM HORMONE: CPT

## 2021-01-01 PROCEDURE — 6360000004 HC RX CONTRAST MEDICATION: Performed by: INTERNAL MEDICINE

## 2021-01-01 PROCEDURE — C1769 GUIDE WIRE: HCPCS | Performed by: SPECIALIST

## 2021-01-01 PROCEDURE — 97535 SELF CARE MNGMENT TRAINING: CPT

## 2021-01-01 PROCEDURE — 82746 ASSAY OF FOLIC ACID SERUM: CPT

## 2021-01-01 PROCEDURE — 81001 URINALYSIS AUTO W/SCOPE: CPT

## 2021-01-01 PROCEDURE — 90935 HEMODIALYSIS ONE EVALUATION: CPT

## 2021-01-01 PROCEDURE — 82550 ASSAY OF CK (CPK): CPT

## 2021-01-01 PROCEDURE — 2580000003 HC RX 258: Performed by: NURSE ANESTHETIST, CERTIFIED REGISTERED

## 2021-01-01 PROCEDURE — 2500000003 HC RX 250 WO HCPCS: Performed by: NURSE ANESTHETIST, CERTIFIED REGISTERED

## 2021-01-01 PROCEDURE — 88360 TUMOR IMMUNOHISTOCHEM/MANUAL: CPT | Performed by: PATHOLOGY

## 2021-01-01 PROCEDURE — 3700000000 HC ANESTHESIA ATTENDED CARE: Performed by: SURGERY

## 2021-01-01 PROCEDURE — C1769 GUIDE WIRE: HCPCS | Performed by: UROLOGY

## 2021-01-01 PROCEDURE — P9016 RBC LEUKOCYTES REDUCED: HCPCS

## 2021-01-01 PROCEDURE — 0TJB8ZZ INSPECTION OF BLADDER, VIA NATURAL OR ARTIFICIAL OPENING ENDOSCOPIC: ICD-10-PCS | Performed by: UROLOGY

## 2021-01-01 PROCEDURE — 99233 SBSQ HOSP IP/OBS HIGH 50: CPT | Performed by: INTERNAL MEDICINE

## 2021-01-01 PROCEDURE — 0TP98DZ REMOVAL OF INTRALUMINAL DEVICE FROM URETER, VIA NATURAL OR ARTIFICIAL OPENING ENDOSCOPIC: ICD-10-PCS | Performed by: UROLOGY

## 2021-01-01 PROCEDURE — C2617 STENT, NON-COR, TEM W/O DEL: HCPCS | Performed by: SPECIALIST

## 2021-01-01 PROCEDURE — 76775 US EXAM ABDO BACK WALL LIM: CPT

## 2021-01-01 PROCEDURE — 97530 THERAPEUTIC ACTIVITIES: CPT

## 2021-01-01 PROCEDURE — 2720000010 HC SURG SUPPLY STERILE: Performed by: UROLOGY

## 2021-01-01 PROCEDURE — 96377 APPLICATON ON-BODY INJECTOR: CPT

## 2021-01-01 PROCEDURE — 99214 OFFICE O/P EST MOD 30 MIN: CPT | Performed by: INTERNAL MEDICINE

## 2021-01-01 PROCEDURE — APPSS60 APP SPLIT SHARED TIME 46-60 MINUTES: Performed by: NURSE PRACTITIONER

## 2021-01-01 PROCEDURE — 82805 BLOOD GASES W/O2 SATURATION: CPT

## 2021-01-01 PROCEDURE — 83605 ASSAY OF LACTIC ACID: CPT

## 2021-01-01 PROCEDURE — 82306 VITAMIN D 25 HYDROXY: CPT

## 2021-01-01 PROCEDURE — 70553 MRI BRAIN STEM W/O & W/DYE: CPT

## 2021-01-01 PROCEDURE — 7100000000 HC PACU RECOVERY - FIRST 15 MIN: Performed by: SURGERY

## 2021-01-01 PROCEDURE — C1788 PORT, INDWELLING, IMP: HCPCS | Performed by: SURGERY

## 2021-01-01 PROCEDURE — 3600000013 HC SURGERY LEVEL 3 ADDTL 15MIN: Performed by: UROLOGY

## 2021-01-01 PROCEDURE — 36561 INSERT TUNNELED CV CATH: CPT | Performed by: SURGERY

## 2021-01-01 PROCEDURE — 85610 PROTHROMBIN TIME: CPT

## 2021-01-01 PROCEDURE — 88305 TISSUE EXAM BY PATHOLOGIST: CPT | Performed by: PATHOLOGY

## 2021-01-01 PROCEDURE — 84157 ASSAY OF PROTEIN OTHER: CPT

## 2021-01-01 PROCEDURE — 83935 ASSAY OF URINE OSMOLALITY: CPT

## 2021-01-01 PROCEDURE — C1729 CATH, DRAINAGE: HCPCS

## 2021-01-01 PROCEDURE — A9579 GAD-BASE MR CONTRAST NOS,1ML: HCPCS | Performed by: INTERNAL MEDICINE

## 2021-01-01 PROCEDURE — 2000000000 HC ICU R&B

## 2021-01-01 PROCEDURE — 84155 ASSAY OF PROTEIN SERUM: CPT

## 2021-01-01 PROCEDURE — 83735 ASSAY OF MAGNESIUM: CPT

## 2021-01-01 PROCEDURE — 87070 CULTURE OTHR SPECIMN AEROBIC: CPT

## 2021-01-01 PROCEDURE — 3600000013 HC SURGERY LEVEL 3 ADDTL 15MIN: Performed by: SURGERY

## 2021-01-01 PROCEDURE — C2617 STENT, NON-COR, TEM W/O DEL: HCPCS | Performed by: UROLOGY

## 2021-01-01 PROCEDURE — C1769 GUIDE WIRE: HCPCS

## 2021-01-01 PROCEDURE — 7100000000 HC PACU RECOVERY - FIRST 15 MIN: Performed by: SPECIALIST

## 2021-01-01 PROCEDURE — C1729 CATH, DRAINAGE: HCPCS | Performed by: SURGERY

## 2021-01-01 PROCEDURE — 88341 IMHCHEM/IMCYTCHM EA ADD ANTB: CPT | Performed by: PATHOLOGY

## 2021-01-01 PROCEDURE — 94664 DEMO&/EVAL PT USE INHALER: CPT

## 2021-01-01 PROCEDURE — 99223 1ST HOSP IP/OBS HIGH 75: CPT | Performed by: OBSTETRICS & GYNECOLOGY

## 2021-01-01 PROCEDURE — 93970 EXTREMITY STUDY: CPT

## 2021-01-01 PROCEDURE — 83880 ASSAY OF NATRIURETIC PEPTIDE: CPT

## 2021-01-01 PROCEDURE — 97166 OT EVAL MOD COMPLEX 45 MIN: CPT

## 2021-01-01 PROCEDURE — 2500000003 HC RX 250 WO HCPCS: Performed by: SURGERY

## 2021-01-01 PROCEDURE — 82728 ASSAY OF FERRITIN: CPT

## 2021-01-01 PROCEDURE — 87205 SMEAR GRAM STAIN: CPT

## 2021-01-01 PROCEDURE — 82040 ASSAY OF SERUM ALBUMIN: CPT

## 2021-01-01 PROCEDURE — 3600000013 HC SURGERY LEVEL 3 ADDTL 15MIN: Performed by: SPECIALIST

## 2021-01-01 PROCEDURE — 6360000002 HC RX W HCPCS: Performed by: HOSPITALIST

## 2021-01-01 PROCEDURE — 2709999900 HC NON-CHARGEABLE SUPPLY: Performed by: SPECIALIST

## 2021-01-01 PROCEDURE — 6360000002 HC RX W HCPCS: Performed by: NURSE PRACTITIONER

## 2021-01-01 PROCEDURE — 7100000011 HC PHASE II RECOVERY - ADDTL 15 MIN: Performed by: SURGERY

## 2021-01-01 PROCEDURE — C1758 CATHETER, URETERAL: HCPCS | Performed by: SPECIALIST

## 2021-01-01 PROCEDURE — 90935 HEMODIALYSIS ONE EVALUATION: CPT | Performed by: INTERNAL MEDICINE

## 2021-01-01 PROCEDURE — 36592 COLLECT BLOOD FROM PICC: CPT

## 2021-01-01 PROCEDURE — 96360 HYDRATION IV INFUSION INIT: CPT

## 2021-01-01 PROCEDURE — 7100000001 HC PACU RECOVERY - ADDTL 15 MIN: Performed by: SPECIALIST

## 2021-01-01 PROCEDURE — 0T788DZ DILATION OF BILATERAL URETERS WITH INTRALUMINAL DEVICE, VIA NATURAL OR ARTIFICIAL OPENING ENDOSCOPIC: ICD-10-PCS | Performed by: SPECIALIST

## 2021-01-01 PROCEDURE — 76937 US GUIDE VASCULAR ACCESS: CPT

## 2021-01-01 PROCEDURE — 76000 FLUOROSCOPY <1 HR PHYS/QHP: CPT

## 2021-01-01 PROCEDURE — 7100000000 HC PACU RECOVERY - FIRST 15 MIN: Performed by: UROLOGY

## 2021-01-01 PROCEDURE — 87075 CULTR BACTERIA EXCEPT BLOOD: CPT

## 2021-01-01 PROCEDURE — C1894 INTRO/SHEATH, NON-LASER: HCPCS

## 2021-01-01 PROCEDURE — 36600 WITHDRAWAL OF ARTERIAL BLOOD: CPT

## 2021-01-01 PROCEDURE — 97167 OT EVAL HIGH COMPLEX 60 MIN: CPT

## 2021-01-01 PROCEDURE — 3700000001 HC ADD 15 MINUTES (ANESTHESIA): Performed by: SPECIALIST

## 2021-01-01 PROCEDURE — 83970 ASSAY OF PARATHORMONE: CPT

## 2021-01-01 PROCEDURE — 0TP98DZ REMOVAL OF INTRALUMINAL DEVICE FROM URETER, VIA NATURAL OR ARTIFICIAL OPENING ENDOSCOPIC: ICD-10-PCS | Performed by: SPECIALIST

## 2021-01-01 PROCEDURE — 96411 CHEMO IV PUSH ADDL DRUG: CPT

## 2021-01-01 PROCEDURE — 2580000003 HC RX 258: Performed by: SURGERY

## 2021-01-01 PROCEDURE — 99215 OFFICE O/P EST HI 40 MIN: CPT | Performed by: NURSE PRACTITIONER

## 2021-01-01 PROCEDURE — 7100000001 HC PACU RECOVERY - ADDTL 15 MIN: Performed by: UROLOGY

## 2021-01-01 PROCEDURE — B543ZZA ULTRASONOGRAPHY OF RIGHT JUGULAR VEINS, GUIDANCE: ICD-10-PCS | Performed by: UROLOGY

## 2021-01-01 PROCEDURE — 71275 CT ANGIOGRAPHY CHEST: CPT

## 2021-01-01 PROCEDURE — 0BNP4ZZ RELEASE LEFT PLEURA, PERCUTANEOUS ENDOSCOPIC APPROACH: ICD-10-PCS | Performed by: SURGERY

## 2021-01-01 PROCEDURE — 83550 IRON BINDING TEST: CPT

## 2021-01-01 PROCEDURE — 85652 RBC SED RATE AUTOMATED: CPT

## 2021-01-01 PROCEDURE — 3600000003 HC SURGERY LEVEL 3 BASE: Performed by: SPECIALIST

## 2021-01-01 PROCEDURE — 82042 OTHER SOURCE ALBUMIN QUAN EA: CPT

## 2021-01-01 PROCEDURE — 0TJ98ZZ INSPECTION OF URETER, VIA NATURAL OR ARTIFICIAL OPENING ENDOSCOPIC: ICD-10-PCS | Performed by: SPECIALIST

## 2021-01-01 PROCEDURE — 96361 HYDRATE IV INFUSION ADD-ON: CPT

## 2021-01-01 PROCEDURE — 99213 OFFICE O/P EST LOW 20 MIN: CPT

## 2021-01-01 PROCEDURE — C1752 CATH,HEMODIALYSIS,SHORT-TERM: HCPCS

## 2021-01-01 PROCEDURE — 86706 HEP B SURFACE ANTIBODY: CPT

## 2021-01-01 PROCEDURE — 0W9B3ZZ DRAINAGE OF LEFT PLEURAL CAVITY, PERCUTANEOUS APPROACH: ICD-10-PCS | Performed by: SURGERY

## 2021-01-01 PROCEDURE — C1713 ANCHOR/SCREW BN/BN,TIS/BN: HCPCS | Performed by: UROLOGY

## 2021-01-01 PROCEDURE — 6360000002 HC RX W HCPCS: Performed by: STUDENT IN AN ORGANIZED HEALTH CARE EDUCATION/TRAINING PROGRAM

## 2021-01-01 PROCEDURE — 80069 RENAL FUNCTION PANEL: CPT

## 2021-01-01 PROCEDURE — 0W9B3ZZ DRAINAGE OF LEFT PLEURAL CAVITY, PERCUTANEOUS APPROACH: ICD-10-PCS | Performed by: RADIOLOGY

## 2021-01-01 PROCEDURE — 2500000003 HC RX 250 WO HCPCS: Performed by: PHYSICIAN ASSISTANT

## 2021-01-01 PROCEDURE — 36556 INSERT NON-TUNNEL CV CATH: CPT

## 2021-01-01 PROCEDURE — 2060000000 HC ICU INTERMEDIATE R&B

## 2021-01-01 PROCEDURE — 3600000003 HC SURGERY LEVEL 3 BASE: Performed by: SURGERY

## 2021-01-01 PROCEDURE — 87102 FUNGUS ISOLATION CULTURE: CPT

## 2021-01-01 PROCEDURE — 93308 TTE F-UP OR LMTD: CPT

## 2021-01-01 PROCEDURE — 3700000000 HC ANESTHESIA ATTENDED CARE: Performed by: SPECIALIST

## 2021-01-01 PROCEDURE — 87449 NOS EACH ORGANISM AG IA: CPT

## 2021-01-01 PROCEDURE — 88342 IMHCHEM/IMCYTCHM 1ST ANTB: CPT | Performed by: PATHOLOGY

## 2021-01-01 PROCEDURE — 0W9B30Z DRAINAGE OF LEFT PLEURAL CAVITY WITH DRAINAGE DEVICE, PERCUTANEOUS APPROACH: ICD-10-PCS | Performed by: SURGERY

## 2021-01-01 PROCEDURE — 93971 EXTREMITY STUDY: CPT

## 2021-01-01 PROCEDURE — U0002 COVID-19 LAB TEST NON-CDC: HCPCS

## 2021-01-01 PROCEDURE — 93005 ELECTROCARDIOGRAM TRACING: CPT | Performed by: EMERGENCY MEDICINE

## 2021-01-01 PROCEDURE — 70450 CT HEAD/BRAIN W/O DYE: CPT

## 2021-01-01 PROCEDURE — 97162 PT EVAL MOD COMPLEX 30 MIN: CPT

## 2021-01-01 PROCEDURE — 82310 ASSAY OF CALCIUM: CPT

## 2021-01-01 PROCEDURE — G0328 FECAL BLOOD SCRN IMMUNOASSAY: HCPCS

## 2021-01-01 PROCEDURE — 96372 THER/PROPH/DIAG INJ SC/IM: CPT

## 2021-01-01 PROCEDURE — 99212 OFFICE O/P EST SF 10 MIN: CPT | Performed by: INTERNAL MEDICINE

## 2021-01-01 PROCEDURE — 87899 AGENT NOS ASSAY W/OPTIC: CPT

## 2021-01-01 PROCEDURE — 96402 CHEMO HORMON ANTINEOPL SQ/IM: CPT

## 2021-01-01 PROCEDURE — 7100000010 HC PHASE II RECOVERY - FIRST 15 MIN: Performed by: SURGERY

## 2021-01-01 PROCEDURE — 99284 EMERGENCY DEPT VISIT MOD MDM: CPT

## 2021-01-01 PROCEDURE — C1894 INTRO/SHEATH, NON-LASER: HCPCS | Performed by: UROLOGY

## 2021-01-01 PROCEDURE — 84300 ASSAY OF URINE SODIUM: CPT

## 2021-01-01 PROCEDURE — 94150 VITAL CAPACITY TEST: CPT

## 2021-01-01 DEVICE — VARIABLE LENGTH URETERAL STENT
Type: IMPLANTABLE DEVICE | Site: URETER | Status: FUNCTIONAL
Brand: CONTOUR VL™

## 2021-01-01 DEVICE — PORT INFUS OD2.7MM ID1.5MM INTRO 8FR TI POLYUR CATH DETACH CT80STPD] ANGIODYNAMICS INC]: Type: IMPLANTABLE DEVICE | Site: CHEST | Status: FUNCTIONAL

## 2021-01-01 DEVICE — VARIABLE LENGTH URETERAL STENT
Type: IMPLANTABLE DEVICE | Status: FUNCTIONAL
Brand: CONTOUR VL™

## 2021-01-01 RX ORDER — CALCITRIOL 0.25 UG/1
0.5 CAPSULE, LIQUID FILLED ORAL DAILY
Status: DISCONTINUED | OUTPATIENT
Start: 2021-01-01 | End: 2021-01-01

## 2021-01-01 RX ORDER — MEPERIDINE HYDROCHLORIDE 50 MG/ML
12.5 INJECTION INTRAMUSCULAR; INTRAVENOUS; SUBCUTANEOUS ONCE
Status: CANCELLED | OUTPATIENT
Start: 2021-01-01 | End: 2021-01-01

## 2021-01-01 RX ORDER — CALCIUM CARBONATE 200(500)MG
1000 TABLET,CHEWABLE ORAL 3 TIMES DAILY
Status: DISCONTINUED | OUTPATIENT
Start: 2021-01-01 | End: 2021-01-01

## 2021-01-01 RX ORDER — HEPARIN SODIUM (PORCINE) LOCK FLUSH IV SOLN 100 UNIT/ML 100 UNIT/ML
500 SOLUTION INTRAVENOUS PRN
Status: CANCELLED | OUTPATIENT
Start: 2021-01-01

## 2021-01-01 RX ORDER — DIPHENHYDRAMINE HCL 25 MG
25 TABLET ORAL ONCE
Status: COMPLETED | OUTPATIENT
Start: 2021-01-01 | End: 2021-01-01

## 2021-01-01 RX ORDER — SODIUM CHLORIDE 9 MG/ML
INJECTION, SOLUTION INTRAVENOUS CONTINUOUS
Status: CANCELLED | OUTPATIENT
Start: 2021-01-01

## 2021-01-01 RX ORDER — EPINEPHRINE 1 MG/ML
0.3 INJECTION, SOLUTION, CONCENTRATE INTRAVENOUS PRN
Status: CANCELLED | OUTPATIENT
Start: 2021-01-01

## 2021-01-01 RX ORDER — ONDANSETRON 4 MG/1
4 TABLET, ORALLY DISINTEGRATING ORAL EVERY 8 HOURS PRN
Status: DISCONTINUED | OUTPATIENT
Start: 2021-01-01 | End: 2021-01-01 | Stop reason: HOSPADM

## 2021-01-01 RX ORDER — CALCIUM CARBONATE 200(500)MG
500 TABLET,CHEWABLE ORAL 3 TIMES DAILY
Status: DISCONTINUED | OUTPATIENT
Start: 2021-01-01 | End: 2021-01-01

## 2021-01-01 RX ORDER — LIDOCAINE HYDROCHLORIDE 10 MG/ML
INJECTION, SOLUTION EPIDURAL; INFILTRATION; INTRACAUDAL; PERINEURAL
Status: COMPLETED | OUTPATIENT
Start: 2021-01-01 | End: 2021-01-01

## 2021-01-01 RX ORDER — SODIUM CHLORIDE 9 MG/ML
20 INJECTION, SOLUTION INTRAVENOUS ONCE
Status: CANCELLED | OUTPATIENT
Start: 2021-01-01 | End: 2021-01-01

## 2021-01-01 RX ORDER — SODIUM CHLORIDE 0.9 % (FLUSH) 0.9 %
10 SYRINGE (ML) INJECTION PRN
Status: DISCONTINUED | OUTPATIENT
Start: 2021-01-01 | End: 2021-01-01 | Stop reason: HOSPADM

## 2021-01-01 RX ORDER — METHYLPREDNISOLONE SODIUM SUCCINATE 125 MG/2ML
125 INJECTION, POWDER, LYOPHILIZED, FOR SOLUTION INTRAMUSCULAR; INTRAVENOUS ONCE
Status: CANCELLED | OUTPATIENT
Start: 2021-01-01 | End: 2021-01-01

## 2021-01-01 RX ORDER — SODIUM CHLORIDE 0.9 % (FLUSH) 0.9 %
5 SYRINGE (ML) INJECTION PRN
Status: CANCELLED | OUTPATIENT
Start: 2021-01-01

## 2021-01-01 RX ORDER — METHYLPREDNISOLONE SODIUM SUCCINATE 40 MG/ML
20 INJECTION, POWDER, LYOPHILIZED, FOR SOLUTION INTRAMUSCULAR; INTRAVENOUS ONCE
Status: CANCELLED
Start: 2021-01-01 | End: 2021-01-01

## 2021-01-01 RX ORDER — SODIUM CHLORIDE 9 MG/ML
25 INJECTION, SOLUTION INTRAVENOUS PRN
Status: DISCONTINUED | OUTPATIENT
Start: 2021-01-01 | End: 2021-01-01 | Stop reason: HOSPADM

## 2021-01-01 RX ORDER — HEPARIN SODIUM 10000 [USP'U]/100ML
5-30 INJECTION, SOLUTION INTRAVENOUS CONTINUOUS
Status: DISCONTINUED | OUTPATIENT
Start: 2021-01-01 | End: 2021-01-01

## 2021-01-01 RX ORDER — DIPHENHYDRAMINE HYDROCHLORIDE 50 MG/ML
50 INJECTION INTRAMUSCULAR; INTRAVENOUS ONCE
Status: CANCELLED | OUTPATIENT
Start: 2021-01-01 | End: 2021-01-01

## 2021-01-01 RX ORDER — CALCITRIOL 0.25 UG/1
0.5 CAPSULE, LIQUID FILLED ORAL 2 TIMES DAILY
Status: COMPLETED | OUTPATIENT
Start: 2021-01-01 | End: 2021-01-01

## 2021-01-01 RX ORDER — HEPARIN SODIUM,PORCINE/PF 1 UNIT/ML
1 SYRINGE (ML) INTRAVENOUS ONCE
Status: CANCELLED
Start: 2021-01-01 | End: 2021-01-01

## 2021-01-01 RX ORDER — SODIUM CHLORIDE 9 MG/ML
INJECTION, SOLUTION INTRAVENOUS CONTINUOUS PRN
Status: DISCONTINUED | OUTPATIENT
Start: 2021-01-01 | End: 2021-01-01 | Stop reason: SDUPTHER

## 2021-01-01 RX ORDER — LEVOFLOXACIN 250 MG/1
250 TABLET ORAL DAILY
Qty: 4 TABLET | Refills: 0 | Status: SHIPPED | OUTPATIENT
Start: 2021-01-01 | End: 2021-01-01

## 2021-01-01 RX ORDER — HYDRALAZINE HYDROCHLORIDE 20 MG/ML
5 INJECTION INTRAMUSCULAR; INTRAVENOUS EVERY 10 MIN PRN
Status: DISCONTINUED | OUTPATIENT
Start: 2021-01-01 | End: 2021-01-01 | Stop reason: HOSPADM

## 2021-01-01 RX ORDER — METOPROLOL SUCCINATE 100 MG/1
100 TABLET, EXTENDED RELEASE ORAL DAILY
Status: CANCELLED | OUTPATIENT
Start: 2021-01-01

## 2021-01-01 RX ORDER — HYDROCODONE BITARTRATE AND ACETAMINOPHEN 5; 325 MG/1; MG/1
1 TABLET ORAL EVERY 6 HOURS PRN
Status: DISCONTINUED | OUTPATIENT
Start: 2021-01-01 | End: 2021-01-01 | Stop reason: HOSPADM

## 2021-01-01 RX ORDER — B-COMPLEX WITH VITAMIN C
1 TABLET ORAL DAILY
Qty: 30 TABLET | Refills: 0 | Status: ON HOLD
Start: 2021-01-01 | End: 2021-01-01 | Stop reason: HOSPADM

## 2021-01-01 RX ORDER — FUROSEMIDE 40 MG/1
80 TABLET ORAL 2 TIMES DAILY
Status: DISCONTINUED | OUTPATIENT
Start: 2021-01-01 | End: 2021-01-01

## 2021-01-01 RX ORDER — ATROPA BELLADONNA AND OPIUM 16.2; 6 MG/1; MG/1
60 SUPPOSITORY RECTAL EVERY 8 HOURS PRN
Status: DISCONTINUED | OUTPATIENT
Start: 2021-01-01 | End: 2021-01-01 | Stop reason: HOSPADM

## 2021-01-01 RX ORDER — 0.9 % SODIUM CHLORIDE 0.9 %
250 INTRAVENOUS SOLUTION INTRAVENOUS ONCE
Status: COMPLETED | OUTPATIENT
Start: 2021-01-01 | End: 2021-01-01

## 2021-01-01 RX ORDER — SODIUM CHLORIDE, SODIUM LACTATE, POTASSIUM CHLORIDE, CALCIUM CHLORIDE 600; 310; 30; 20 MG/100ML; MG/100ML; MG/100ML; MG/100ML
INJECTION, SOLUTION INTRAVENOUS CONTINUOUS
Status: DISCONTINUED | OUTPATIENT
Start: 2021-01-01 | End: 2021-01-01 | Stop reason: HOSPADM

## 2021-01-01 RX ORDER — FUROSEMIDE 10 MG/ML
20 INJECTION INTRAMUSCULAR; INTRAVENOUS ONCE
Status: COMPLETED | OUTPATIENT
Start: 2021-01-01 | End: 2021-01-01

## 2021-01-01 RX ORDER — DIPHENHYDRAMINE HYDROCHLORIDE 50 MG/ML
50 INJECTION INTRAMUSCULAR; INTRAVENOUS ONCE
Status: CANCELLED | OUTPATIENT
Start: 2021-01-01

## 2021-01-01 RX ORDER — LORAZEPAM 2 MG/ML
0.5 INJECTION INTRAMUSCULAR ONCE
Status: COMPLETED | OUTPATIENT
Start: 2021-01-01 | End: 2021-01-01

## 2021-01-01 RX ORDER — SODIUM CHLORIDE 0.9 % (FLUSH) 0.9 %
10 SYRINGE (ML) INJECTION PRN
Status: CANCELLED | OUTPATIENT
Start: 2021-01-01

## 2021-01-01 RX ORDER — 0.9 % SODIUM CHLORIDE 0.9 %
250 INTRAVENOUS SOLUTION INTRAVENOUS ONCE
Status: CANCELLED | OUTPATIENT
Start: 2021-01-01 | End: 2021-01-01

## 2021-01-01 RX ORDER — METHYLPREDNISOLONE SODIUM SUCCINATE 125 MG/2ML
20 INJECTION, POWDER, LYOPHILIZED, FOR SOLUTION INTRAMUSCULAR; INTRAVENOUS ONCE
Status: CANCELLED
Start: 2021-01-01 | End: 2021-01-01

## 2021-01-01 RX ORDER — HEPARIN SODIUM (PORCINE) LOCK FLUSH IV SOLN 100 UNIT/ML 100 UNIT/ML
500 SOLUTION INTRAVENOUS PRN
Status: DISCONTINUED | OUTPATIENT
Start: 2021-01-01 | End: 2021-01-01 | Stop reason: HOSPADM

## 2021-01-01 RX ORDER — DEXTROSE MONOHYDRATE 25 G/50ML
12.5 INJECTION, SOLUTION INTRAVENOUS PRN
Status: DISCONTINUED | OUTPATIENT
Start: 2021-01-01 | End: 2021-01-01 | Stop reason: HOSPADM

## 2021-01-01 RX ORDER — SODIUM CHLORIDE 9 MG/ML
20 INJECTION, SOLUTION INTRAVENOUS ONCE
Status: DISCONTINUED | OUTPATIENT
Start: 2021-01-01 | End: 2021-01-01 | Stop reason: HOSPADM

## 2021-01-01 RX ORDER — METOPROLOL SUCCINATE 100 MG/1
100 TABLET, EXTENDED RELEASE ORAL DAILY
Status: DISCONTINUED | OUTPATIENT
Start: 2021-01-01 | End: 2021-01-01 | Stop reason: HOSPADM

## 2021-01-01 RX ORDER — BUMETANIDE 0.25 MG/ML
2 INJECTION, SOLUTION INTRAMUSCULAR; INTRAVENOUS ONCE
Status: COMPLETED | OUTPATIENT
Start: 2021-01-01 | End: 2021-01-01

## 2021-01-01 RX ORDER — DIPHENHYDRAMINE HCL 25 MG
25 TABLET ORAL ONCE
Status: CANCELLED | OUTPATIENT
Start: 2021-01-01 | End: 2021-01-01

## 2021-01-01 RX ORDER — METOPROLOL SUCCINATE 100 MG/1
100 TABLET, EXTENDED RELEASE ORAL DAILY
Qty: 30 TABLET | Refills: 0 | Status: SHIPPED | OUTPATIENT
Start: 2021-01-01 | End: 2021-01-01 | Stop reason: SDUPTHER

## 2021-01-01 RX ORDER — ACETAMINOPHEN 325 MG/1
650 TABLET ORAL EVERY 6 HOURS PRN
Status: DISCONTINUED | OUTPATIENT
Start: 2021-01-01 | End: 2021-01-01 | Stop reason: HOSPADM

## 2021-01-01 RX ORDER — HYDRALAZINE HYDROCHLORIDE 20 MG/ML
5 INJECTION INTRAMUSCULAR; INTRAVENOUS EVERY 10 MIN PRN
Status: DISCONTINUED | OUTPATIENT
Start: 2021-01-01 | End: 2021-01-01

## 2021-01-01 RX ORDER — METHYLPREDNISOLONE SODIUM SUCCINATE 40 MG/ML
20 INJECTION, POWDER, LYOPHILIZED, FOR SOLUTION INTRAMUSCULAR; INTRAVENOUS ONCE
Status: COMPLETED | OUTPATIENT
Start: 2021-01-01 | End: 2021-01-01

## 2021-01-01 RX ORDER — FENTANYL CITRATE 50 UG/ML
INJECTION, SOLUTION INTRAMUSCULAR; INTRAVENOUS PRN
Status: DISCONTINUED | OUTPATIENT
Start: 2021-01-01 | End: 2021-01-01 | Stop reason: SDUPTHER

## 2021-01-01 RX ORDER — DIPHENHYDRAMINE HCL 25 MG
TABLET ORAL
Status: COMPLETED
Start: 2021-01-01 | End: 2021-01-01

## 2021-01-01 RX ORDER — HYDROCODONE BITARTRATE AND ACETAMINOPHEN 5; 325 MG/1; MG/1
1 TABLET ORAL EVERY 6 HOURS PRN
Status: DISPENSED | OUTPATIENT
Start: 2021-01-01 | End: 2021-01-01

## 2021-01-01 RX ORDER — DIPHENHYDRAMINE HYDROCHLORIDE 50 MG/ML
50 INJECTION INTRAMUSCULAR; INTRAVENOUS ONCE
Status: COMPLETED | OUTPATIENT
Start: 2021-01-01 | End: 2021-01-01

## 2021-01-01 RX ORDER — LORAZEPAM 2 MG/ML
0.5 INJECTION INTRAMUSCULAR
Status: DISCONTINUED | OUTPATIENT
Start: 2021-01-01 | End: 2021-01-01 | Stop reason: HOSPADM

## 2021-01-01 RX ORDER — SODIUM CHLORIDE 0.9 % (FLUSH) 0.9 %
5-40 SYRINGE (ML) INJECTION PRN
Status: DISCONTINUED | OUTPATIENT
Start: 2021-01-01 | End: 2021-01-01

## 2021-01-01 RX ORDER — SODIUM CHLORIDE 0.9 % (FLUSH) 0.9 %
20 SYRINGE (ML) INJECTION PRN
Status: DISCONTINUED | OUTPATIENT
Start: 2021-01-01 | End: 2021-01-01 | Stop reason: HOSPADM

## 2021-01-01 RX ORDER — HYDROCODONE BITARTRATE AND ACETAMINOPHEN 5; 325 MG/1; MG/1
1 TABLET ORAL PRN
Status: DISCONTINUED | OUTPATIENT
Start: 2021-01-01 | End: 2021-01-01

## 2021-01-01 RX ORDER — 0.9 % SODIUM CHLORIDE 0.9 %
1000 INTRAVENOUS SOLUTION INTRAVENOUS ONCE
Status: COMPLETED | OUTPATIENT
Start: 2021-01-01 | End: 2021-01-01

## 2021-01-01 RX ORDER — SODIUM BICARBONATE 650 MG/1
1300 TABLET ORAL 3 TIMES DAILY
Status: DISCONTINUED | OUTPATIENT
Start: 2021-01-01 | End: 2021-01-01

## 2021-01-01 RX ORDER — POLYETHYLENE GLYCOL 3350 17 G/17G
17 POWDER, FOR SOLUTION ORAL DAILY PRN
Status: DISCONTINUED | OUTPATIENT
Start: 2021-01-01 | End: 2021-01-01 | Stop reason: HOSPADM

## 2021-01-01 RX ORDER — ROCURONIUM BROMIDE 10 MG/ML
INJECTION, SOLUTION INTRAVENOUS PRN
Status: DISCONTINUED | OUTPATIENT
Start: 2021-01-01 | End: 2021-01-01 | Stop reason: SDUPTHER

## 2021-01-01 RX ORDER — LORAZEPAM 0.5 MG/1
1 TABLET ORAL 2 TIMES DAILY
Qty: 60 TABLET | Refills: 0 | Status: SHIPPED | OUTPATIENT
Start: 2021-01-01 | End: 2021-01-01 | Stop reason: SDUPTHER

## 2021-01-01 RX ORDER — HYDROMORPHONE HCL 110MG/55ML
0.5 PATIENT CONTROLLED ANALGESIA SYRINGE INTRAVENOUS EVERY 5 MIN PRN
Status: DISCONTINUED | OUTPATIENT
Start: 2021-01-01 | End: 2021-01-01

## 2021-01-01 RX ORDER — LIDOCAINE HYDROCHLORIDE 20 MG/ML
5 INJECTION, SOLUTION EPIDURAL; INFILTRATION; INTRACAUDAL; PERINEURAL ONCE
Status: COMPLETED | OUTPATIENT
Start: 2021-01-01 | End: 2021-01-01

## 2021-01-01 RX ORDER — ONDANSETRON 2 MG/ML
4 INJECTION INTRAMUSCULAR; INTRAVENOUS
Status: DISCONTINUED | OUTPATIENT
Start: 2021-01-01 | End: 2021-01-01 | Stop reason: HOSPADM

## 2021-01-01 RX ORDER — NICOTINE POLACRILEX 4 MG
15 LOZENGE BUCCAL PRN
Status: DISCONTINUED | OUTPATIENT
Start: 2021-01-01 | End: 2021-01-01 | Stop reason: HOSPADM

## 2021-01-01 RX ORDER — DEXTROSE MONOHYDRATE 50 MG/ML
100 INJECTION, SOLUTION INTRAVENOUS PRN
Status: DISCONTINUED | OUTPATIENT
Start: 2021-01-01 | End: 2021-01-01 | Stop reason: HOSPADM

## 2021-01-01 RX ORDER — DEXAMETHASONE SODIUM PHOSPHATE 4 MG/ML
INJECTION, SOLUTION INTRA-ARTICULAR; INTRALESIONAL; INTRAMUSCULAR; INTRAVENOUS; SOFT TISSUE PRN
Status: DISCONTINUED | OUTPATIENT
Start: 2021-01-01 | End: 2021-01-01 | Stop reason: SDUPTHER

## 2021-01-01 RX ORDER — ONDANSETRON 2 MG/ML
4 INJECTION INTRAMUSCULAR; INTRAVENOUS ONCE
Status: COMPLETED | OUTPATIENT
Start: 2021-01-01 | End: 2021-01-01

## 2021-01-01 RX ORDER — FENTANYL CITRATE 50 UG/ML
25 INJECTION, SOLUTION INTRAMUSCULAR; INTRAVENOUS EVERY 5 MIN PRN
Status: DISCONTINUED | OUTPATIENT
Start: 2021-01-01 | End: 2021-01-01 | Stop reason: HOSPADM

## 2021-01-01 RX ORDER — CETIRIZINE HYDROCHLORIDE 10 MG/1
10 TABLET ORAL DAILY
Status: DISCONTINUED | OUTPATIENT
Start: 2021-01-01 | End: 2021-01-01 | Stop reason: HOSPADM

## 2021-01-01 RX ORDER — ACETAMINOPHEN 325 MG/1
650 TABLET ORAL ONCE
Status: CANCELLED | OUTPATIENT
Start: 2021-01-01 | End: 2021-01-01

## 2021-01-01 RX ORDER — SODIUM CHLORIDE 9 MG/ML
INJECTION, SOLUTION INTRAVENOUS CONTINUOUS
Status: DISCONTINUED | OUTPATIENT
Start: 2021-01-01 | End: 2021-01-01

## 2021-01-01 RX ORDER — HEPARIN SODIUM 5000 [USP'U]/ML
5000 INJECTION, SOLUTION INTRAVENOUS; SUBCUTANEOUS EVERY 8 HOURS SCHEDULED
Status: DISCONTINUED | OUTPATIENT
Start: 2021-01-01 | End: 2021-01-01 | Stop reason: HOSPADM

## 2021-01-01 RX ORDER — ACETAMINOPHEN 650 MG/1
650 SUPPOSITORY RECTAL EVERY 4 HOURS PRN
Status: DISCONTINUED | OUTPATIENT
Start: 2021-01-01 | End: 2021-01-01 | Stop reason: HOSPADM

## 2021-01-01 RX ORDER — FENTANYL CITRATE 50 UG/ML
50 INJECTION, SOLUTION INTRAMUSCULAR; INTRAVENOUS EVERY 5 MIN PRN
Status: DISCONTINUED | OUTPATIENT
Start: 2021-01-01 | End: 2021-01-01 | Stop reason: HOSPADM

## 2021-01-01 RX ORDER — LIDOCAINE HYDROCHLORIDE 20 MG/ML
INJECTION, SOLUTION INTRAVENOUS PRN
Status: DISCONTINUED | OUTPATIENT
Start: 2021-01-01 | End: 2021-01-01 | Stop reason: SDUPTHER

## 2021-01-01 RX ORDER — LORAZEPAM 2 MG/ML
0.5 INJECTION INTRAMUSCULAR
Status: CANCELLED | OUTPATIENT
Start: 2021-01-01

## 2021-01-01 RX ORDER — ACETAMINOPHEN 325 MG/1
650 TABLET ORAL ONCE
Status: COMPLETED | OUTPATIENT
Start: 2021-01-01 | End: 2021-01-01

## 2021-01-01 RX ORDER — DIPHENHYDRAMINE HYDROCHLORIDE 50 MG/ML
INJECTION INTRAMUSCULAR; INTRAVENOUS PRN
Status: DISCONTINUED | OUTPATIENT
Start: 2021-01-01 | End: 2021-01-01 | Stop reason: SDUPTHER

## 2021-01-01 RX ORDER — CALCIUM CARBONATE 200(500)MG
1000 TABLET,CHEWABLE ORAL
Qty: 180 TABLET | Refills: 0 | Status: SHIPPED | OUTPATIENT
Start: 2021-01-01 | End: 2021-09-13

## 2021-01-01 RX ORDER — CALCIUM CARBONATE 200(500)MG
500 TABLET,CHEWABLE ORAL EVERY 6 HOURS
Status: DISCONTINUED | OUTPATIENT
Start: 2021-01-01 | End: 2021-01-01

## 2021-01-01 RX ORDER — SODIUM CHLORIDE 0.9 % (FLUSH) 0.9 %
5-40 SYRINGE (ML) INJECTION EVERY 12 HOURS SCHEDULED
Status: DISCONTINUED | OUTPATIENT
Start: 2021-01-01 | End: 2021-01-01 | Stop reason: HOSPADM

## 2021-01-01 RX ORDER — TRISODIUM CITRATE DIHYDRATE AND CITRIC ACID MONOHYDRATE 500; 334 MG/5ML; MG/5ML
30 SOLUTION ORAL 4 TIMES DAILY
Status: DISPENSED | OUTPATIENT
Start: 2021-01-01 | End: 2021-01-01

## 2021-01-01 RX ORDER — ONDANSETRON 2 MG/ML
INJECTION INTRAMUSCULAR; INTRAVENOUS PRN
Status: DISCONTINUED | OUTPATIENT
Start: 2021-01-01 | End: 2021-01-01 | Stop reason: SDUPTHER

## 2021-01-01 RX ORDER — MAGNESIUM OXIDE 400 MG/1
400 TABLET ORAL DAILY
Status: DISCONTINUED | OUTPATIENT
Start: 2021-01-01 | End: 2021-01-01

## 2021-01-01 RX ORDER — OMEPRAZOLE 20 MG/1
20 CAPSULE, DELAYED RELEASE ORAL
Status: DISCONTINUED | OUTPATIENT
Start: 2021-01-01 | End: 2021-01-01 | Stop reason: CLARIF

## 2021-01-01 RX ORDER — DOCUSATE SODIUM 100 MG/1
100 CAPSULE, LIQUID FILLED ORAL DAILY PRN
Status: DISCONTINUED | OUTPATIENT
Start: 2021-01-01 | End: 2021-01-01 | Stop reason: HOSPADM

## 2021-01-01 RX ORDER — DEXAMETHASONE 4 MG/1
TABLET ORAL
Qty: 16 TABLET | Refills: 0 | Status: ON HOLD
Start: 2021-01-01 | End: 2021-01-01 | Stop reason: HOSPADM

## 2021-01-01 RX ORDER — SODIUM CHLORIDE 0.9 % (FLUSH) 0.9 %
20 SYRINGE (ML) INJECTION PRN
Status: CANCELLED | OUTPATIENT
Start: 2021-01-01

## 2021-01-01 RX ORDER — CALCIUM GLUCONATE 94 MG/ML
1000 INJECTION, SOLUTION INTRAVENOUS ONCE
Status: COMPLETED | OUTPATIENT
Start: 2021-01-01 | End: 2021-01-01

## 2021-01-01 RX ORDER — FUROSEMIDE 10 MG/ML
20 INJECTION INTRAMUSCULAR; INTRAVENOUS ONCE
Status: DISCONTINUED | OUTPATIENT
Start: 2021-01-01 | End: 2021-01-01

## 2021-01-01 RX ORDER — FUROSEMIDE 10 MG/ML
20 INJECTION INTRAMUSCULAR; INTRAVENOUS ONCE
Status: CANCELLED | OUTPATIENT
Start: 2021-01-01 | End: 2021-01-01

## 2021-01-01 RX ORDER — METOPROLOL SUCCINATE 50 MG/1
50 TABLET, EXTENDED RELEASE ORAL DAILY
Status: DISCONTINUED | OUTPATIENT
Start: 2021-01-01 | End: 2021-01-01

## 2021-01-01 RX ORDER — DEXAMETHASONE 4 MG/1
TABLET ORAL
Qty: 16 TABLET | Refills: 0 | Status: SHIPPED | OUTPATIENT
Start: 2021-01-01

## 2021-01-01 RX ORDER — CALCIUM GLUCONATE 20 MG/ML
2000 INJECTION, SOLUTION INTRAVENOUS ONCE
Status: COMPLETED | OUTPATIENT
Start: 2021-01-01 | End: 2021-01-01

## 2021-01-01 RX ORDER — CALCITRIOL 0.25 UG/1
1 CAPSULE, LIQUID FILLED ORAL 2 TIMES DAILY
Status: DISCONTINUED | OUTPATIENT
Start: 2021-01-01 | End: 2021-01-01 | Stop reason: HOSPADM

## 2021-01-01 RX ORDER — CALCIUM GLUCONATE 20 MG/ML
2000 INJECTION, SOLUTION INTRAVENOUS 2 TIMES DAILY
Status: DISCONTINUED | OUTPATIENT
Start: 2021-01-01 | End: 2021-01-01

## 2021-01-01 RX ORDER — FUROSEMIDE 10 MG/ML
20 INJECTION INTRAMUSCULAR; INTRAVENOUS ONCE
Status: DISCONTINUED | OUTPATIENT
Start: 2021-01-01 | End: 2021-01-01 | Stop reason: HOSPADM

## 2021-01-01 RX ORDER — CALCIUM CARBONATE 200(500)MG
1 TABLET,CHEWABLE ORAL 3 TIMES DAILY
Qty: 90 TABLET | Refills: 0
Start: 2021-01-01 | End: 2021-01-01 | Stop reason: SDUPTHER

## 2021-01-01 RX ORDER — DEXAMETHASONE SODIUM PHOSPHATE 10 MG/ML
6 INJECTION, SOLUTION INTRAMUSCULAR; INTRAVENOUS EVERY 24 HOURS
Status: DISCONTINUED | OUTPATIENT
Start: 2021-01-01 | End: 2021-01-01 | Stop reason: HOSPADM

## 2021-01-01 RX ORDER — HEPARIN SODIUM (PORCINE) LOCK FLUSH IV SOLN 100 UNIT/ML 100 UNIT/ML
500 SOLUTION INTRAVENOUS ONCE
Status: DISCONTINUED | OUTPATIENT
Start: 2021-01-01 | End: 2021-01-01 | Stop reason: HOSPADM

## 2021-01-01 RX ORDER — HYDROCODONE BITARTRATE AND ACETAMINOPHEN 5; 325 MG/1; MG/1
2 TABLET ORAL PRN
Status: DISCONTINUED | OUTPATIENT
Start: 2021-01-01 | End: 2021-01-01

## 2021-01-01 RX ORDER — MORPHINE SULFATE 2 MG/ML
2 INJECTION, SOLUTION INTRAMUSCULAR; INTRAVENOUS ONCE
Status: COMPLETED | OUTPATIENT
Start: 2021-01-01 | End: 2021-01-01

## 2021-01-01 RX ORDER — PROPOFOL 10 MG/ML
INJECTION, EMULSION INTRAVENOUS PRN
Status: DISCONTINUED | OUTPATIENT
Start: 2021-01-01 | End: 2021-01-01 | Stop reason: SDUPTHER

## 2021-01-01 RX ORDER — DOXORUBICIN HYDROCHLORIDE 2 MG/ML
60 INJECTION, SOLUTION INTRAVENOUS ONCE
Status: CANCELLED | OUTPATIENT
Start: 2021-01-01

## 2021-01-01 RX ORDER — CALCIUM GLUCONATE 20 MG/ML
2000 INJECTION, SOLUTION INTRAVENOUS ONCE
Status: DISCONTINUED | OUTPATIENT
Start: 2021-01-01 | End: 2021-01-01

## 2021-01-01 RX ORDER — DEXAMETHASONE SODIUM PHOSPHATE 10 MG/ML
10 INJECTION, SOLUTION INTRAMUSCULAR; INTRAVENOUS ONCE
Status: COMPLETED | OUTPATIENT
Start: 2021-01-01 | End: 2021-01-01

## 2021-01-01 RX ORDER — SODIUM CHLORIDE 0.9 % (FLUSH) 0.9 %
10 SYRINGE (ML) INJECTION PRN
Status: DISCONTINUED | OUTPATIENT
Start: 2021-01-01 | End: 2021-01-01 | Stop reason: SDUPTHER

## 2021-01-01 RX ORDER — OLANZAPINE 2.5 MG/1
TABLET ORAL
Qty: 16 TABLET | Refills: 0 | Status: SHIPPED | OUTPATIENT
Start: 2021-01-01 | End: 2021-01-01

## 2021-01-01 RX ORDER — CALCIUM GLUCONATE 20 MG/ML
2000 INJECTION, SOLUTION INTRAVENOUS 3 TIMES DAILY
Status: DISCONTINUED | OUTPATIENT
Start: 2021-01-01 | End: 2021-01-01

## 2021-01-01 RX ORDER — ZINC OXIDE 216 MG/ML
1 LOTION TOPICAL 2 TIMES DAILY
Status: DISCONTINUED | OUTPATIENT
Start: 2021-01-01 | End: 2021-01-01 | Stop reason: RX

## 2021-01-01 RX ORDER — LORAZEPAM 0.5 MG/1
0.5 TABLET ORAL 2 TIMES DAILY PRN
Qty: 30 TABLET | Refills: 0 | Status: SHIPPED | OUTPATIENT
Start: 2021-01-01 | End: 2021-01-01

## 2021-01-01 RX ORDER — BUMETANIDE 0.25 MG/ML
2 INJECTION, SOLUTION INTRAMUSCULAR; INTRAVENOUS EVERY 8 HOURS
Status: COMPLETED | OUTPATIENT
Start: 2021-01-01 | End: 2021-01-01

## 2021-01-01 RX ORDER — FLUTICASONE PROPIONATE 50 MCG
1 SPRAY, SUSPENSION (ML) NASAL DAILY
Status: DISCONTINUED | OUTPATIENT
Start: 2021-01-01 | End: 2021-01-01 | Stop reason: HOSPADM

## 2021-01-01 RX ORDER — SODIUM BICARBONATE 650 MG/1
1300 TABLET ORAL ONCE
Status: COMPLETED | OUTPATIENT
Start: 2021-01-01 | End: 2021-01-01

## 2021-01-01 RX ORDER — FUROSEMIDE 40 MG/1
40 TABLET ORAL DAILY
Qty: 7 TABLET | Refills: 0 | Status: SHIPPED | OUTPATIENT
Start: 2021-01-01 | End: 2021-01-01 | Stop reason: ALTCHOICE

## 2021-01-01 RX ORDER — DIPHENHYDRAMINE HCL 25 MG
25 CAPSULE ORAL ONCE
Status: COMPLETED | OUTPATIENT
Start: 2021-01-01 | End: 2021-01-01

## 2021-01-01 RX ORDER — HEPARIN SODIUM (PORCINE) LOCK FLUSH IV SOLN 100 UNIT/ML 100 UNIT/ML
500 SOLUTION INTRAVENOUS ONCE
Status: COMPLETED | OUTPATIENT
Start: 2021-01-01 | End: 2021-01-01

## 2021-01-01 RX ORDER — MAGNESIUM OXIDE 400 MG/1
400 TABLET ORAL DAILY
Status: DISCONTINUED | OUTPATIENT
Start: 2021-01-01 | End: 2021-01-01 | Stop reason: HOSPADM

## 2021-01-01 RX ORDER — NALOXONE HYDROCHLORIDE 0.4 MG/ML
INJECTION, SOLUTION INTRAMUSCULAR; INTRAVENOUS; SUBCUTANEOUS
Status: DISPENSED
Start: 2021-01-01 | End: 2021-01-01

## 2021-01-01 RX ORDER — PANTOPRAZOLE SODIUM 40 MG/1
40 TABLET, DELAYED RELEASE ORAL
Status: DISCONTINUED | OUTPATIENT
Start: 2021-01-01 | End: 2021-01-01 | Stop reason: HOSPADM

## 2021-01-01 RX ORDER — GLYCOPYRROLATE 1 MG/5 ML
0.2 SYRINGE (ML) INTRAVENOUS EVERY 4 HOURS PRN
Status: DISCONTINUED | OUTPATIENT
Start: 2021-01-01 | End: 2021-01-01 | Stop reason: HOSPADM

## 2021-01-01 RX ORDER — MAGNESIUM OXIDE 400 MG/1
400 TABLET ORAL DAILY
Qty: 30 TABLET | Refills: 1 | Status: SHIPPED | OUTPATIENT
Start: 2021-01-01

## 2021-01-01 RX ORDER — HYDROCODONE BITARTRATE AND ACETAMINOPHEN 5; 325 MG/1; MG/1
1 TABLET ORAL EVERY 4 HOURS PRN
Status: DISCONTINUED | OUTPATIENT
Start: 2021-01-01 | End: 2021-01-01 | Stop reason: HOSPADM

## 2021-01-01 RX ORDER — MEPERIDINE HYDROCHLORIDE 25 MG/ML
12.5 INJECTION INTRAMUSCULAR; INTRAVENOUS; SUBCUTANEOUS ONCE
Status: CANCELLED | OUTPATIENT
Start: 2021-01-01 | End: 2021-01-01

## 2021-01-01 RX ORDER — CALCITRIOL 0.25 UG/1
1 CAPSULE, LIQUID FILLED ORAL ONCE
Status: COMPLETED | OUTPATIENT
Start: 2021-01-01 | End: 2021-01-01

## 2021-01-01 RX ORDER — PROCHLORPERAZINE MALEATE 5 MG/1
10 TABLET ORAL EVERY 8 HOURS PRN
Status: DISCONTINUED | OUTPATIENT
Start: 2021-01-01 | End: 2021-01-01 | Stop reason: HOSPADM

## 2021-01-01 RX ORDER — PROMETHAZINE HYDROCHLORIDE 25 MG/ML
6.25 INJECTION, SOLUTION INTRAMUSCULAR; INTRAVENOUS
Status: DISCONTINUED | OUTPATIENT
Start: 2021-01-01 | End: 2021-01-01

## 2021-01-01 RX ORDER — METOPROLOL SUCCINATE 50 MG/1
100 TABLET, EXTENDED RELEASE ORAL DAILY
Status: DISCONTINUED | OUTPATIENT
Start: 2021-01-01 | End: 2021-01-01 | Stop reason: HOSPADM

## 2021-01-01 RX ORDER — HALOPERIDOL 5 MG/ML
1 INJECTION INTRAMUSCULAR EVERY 4 HOURS PRN
Status: CANCELLED | OUTPATIENT
Start: 2021-01-01

## 2021-01-01 RX ORDER — ACETAMINOPHEN 650 MG/1
650 SUPPOSITORY RECTAL EVERY 6 HOURS PRN
Status: DISCONTINUED | OUTPATIENT
Start: 2021-01-01 | End: 2021-01-01 | Stop reason: HOSPADM

## 2021-01-01 RX ORDER — TRAMADOL HYDROCHLORIDE 50 MG/1
100 TABLET ORAL EVERY 6 HOURS PRN
Qty: 60 TABLET | Refills: 0 | Status: SHIPPED | OUTPATIENT
Start: 2021-01-01 | End: 2021-01-01

## 2021-01-01 RX ORDER — SODIUM CHLORIDE 0.9 % (FLUSH) 0.9 %
10 SYRINGE (ML) INJECTION EVERY 12 HOURS SCHEDULED
Status: CANCELLED | OUTPATIENT
Start: 2021-01-01

## 2021-01-01 RX ORDER — ONDANSETRON 2 MG/ML
4 INJECTION INTRAMUSCULAR; INTRAVENOUS EVERY 6 HOURS PRN
Status: DISCONTINUED | OUTPATIENT
Start: 2021-01-01 | End: 2021-01-01 | Stop reason: HOSPADM

## 2021-01-01 RX ORDER — SODIUM CHLORIDE 0.9 % (FLUSH) 0.9 %
10 SYRINGE (ML) INJECTION EVERY 12 HOURS SCHEDULED
Status: DISCONTINUED | OUTPATIENT
Start: 2021-01-01 | End: 2021-01-01 | Stop reason: HOSPADM

## 2021-01-01 RX ORDER — DEXAMETHASONE 6 MG/1
6 TABLET ORAL
Qty: 5 TABLET | Refills: 0 | Status: SHIPPED | OUTPATIENT
Start: 2021-01-01 | End: 2021-01-01

## 2021-01-01 RX ORDER — DEXTROSE MONOHYDRATE 25 G/50ML
25 INJECTION, SOLUTION INTRAVENOUS ONCE
Status: DISCONTINUED | OUTPATIENT
Start: 2021-01-01 | End: 2021-01-01

## 2021-01-01 RX ORDER — DOXORUBICIN HYDROCHLORIDE 2 MG/ML
50 INJECTION, SOLUTION INTRAVENOUS ONCE
Status: CANCELLED | OUTPATIENT
Start: 2021-01-01

## 2021-01-01 RX ORDER — METHYLPREDNISOLONE SODIUM SUCCINATE 40 MG/ML
INJECTION, POWDER, LYOPHILIZED, FOR SOLUTION INTRAMUSCULAR; INTRAVENOUS
Status: COMPLETED
Start: 2021-01-01 | End: 2021-01-01

## 2021-01-01 RX ORDER — LABETALOL HYDROCHLORIDE 5 MG/ML
5 INJECTION, SOLUTION INTRAVENOUS EVERY 10 MIN PRN
Status: DISCONTINUED | OUTPATIENT
Start: 2021-01-01 | End: 2021-01-01 | Stop reason: HOSPADM

## 2021-01-01 RX ORDER — MIDAZOLAM HYDROCHLORIDE 1 MG/ML
INJECTION INTRAMUSCULAR; INTRAVENOUS PRN
Status: DISCONTINUED | OUTPATIENT
Start: 2021-01-01 | End: 2021-01-01 | Stop reason: SDUPTHER

## 2021-01-01 RX ORDER — HEPARIN SODIUM (PORCINE) LOCK FLUSH IV SOLN 100 UNIT/ML 100 UNIT/ML
1 SOLUTION INTRAVENOUS ONCE
Status: DISCONTINUED | OUTPATIENT
Start: 2021-01-01 | End: 2021-01-01 | Stop reason: HOSPADM

## 2021-01-01 RX ORDER — TRAMADOL HYDROCHLORIDE 50 MG/1
100 TABLET ORAL EVERY 6 HOURS PRN
Qty: 60 TABLET | Refills: 0 | Status: SHIPPED | OUTPATIENT
Start: 2021-01-01 | End: 2021-01-01 | Stop reason: SDUPTHER

## 2021-01-01 RX ORDER — SODIUM CHLORIDE 9 MG/ML
INJECTION, SOLUTION INTRAVENOUS CONTINUOUS
Status: CANCELLED
Start: 2021-01-01

## 2021-01-01 RX ORDER — SODIUM CHLORIDE 0.9 % (FLUSH) 0.9 %
10 SYRINGE (ML) INJECTION EVERY 12 HOURS SCHEDULED
Status: DISCONTINUED | OUTPATIENT
Start: 2021-01-01 | End: 2021-01-01 | Stop reason: SDUPTHER

## 2021-01-01 RX ORDER — LORAZEPAM 0.5 MG/1
0.5 TABLET ORAL 2 TIMES DAILY PRN
Status: DISCONTINUED | OUTPATIENT
Start: 2021-01-01 | End: 2021-01-01 | Stop reason: HOSPADM

## 2021-01-01 RX ORDER — ZINC OXIDE 216 MG/ML
1 LOTION TOPICAL 2 TIMES DAILY
Qty: 60 CAN | Refills: 5 | Status: SHIPPED | OUTPATIENT
Start: 2021-01-01 | End: 2021-01-01 | Stop reason: SDUPTHER

## 2021-01-01 RX ORDER — SODIUM BICARBONATE 650 MG/1
650 TABLET ORAL DAILY
Qty: 30 TABLET | Refills: 0 | Status: SHIPPED | OUTPATIENT
Start: 2021-01-01 | End: 2021-01-01

## 2021-01-01 RX ORDER — AMLODIPINE BESYLATE 5 MG/1
5 TABLET ORAL DAILY
Status: DISCONTINUED | OUTPATIENT
Start: 2021-01-01 | End: 2021-01-01

## 2021-01-01 RX ORDER — HALOPERIDOL 5 MG/ML
1 INJECTION INTRAMUSCULAR EVERY 4 HOURS PRN
Status: DISCONTINUED | OUTPATIENT
Start: 2021-01-01 | End: 2021-01-01 | Stop reason: HOSPADM

## 2021-01-01 RX ORDER — FLUTICASONE PROPIONATE 50 MCG
1 SPRAY, SUSPENSION (ML) NASAL DAILY
Status: DISCONTINUED | OUTPATIENT
Start: 2021-01-01 | End: 2021-01-01

## 2021-01-01 RX ORDER — DIPHENHYDRAMINE HCL 25 MG
25 TABLET ORAL ONCE
Status: DISCONTINUED | OUTPATIENT
Start: 2021-01-01 | End: 2021-01-01

## 2021-01-01 RX ORDER — HYDROCODONE BITARTRATE AND ACETAMINOPHEN 5; 325 MG/1; MG/1
1 TABLET ORAL EVERY 6 HOURS PRN
Qty: 56 TABLET | Refills: 0 | Status: SHIPPED | OUTPATIENT
Start: 2021-01-01 | End: 2021-01-01 | Stop reason: ALTCHOICE

## 2021-01-01 RX ORDER — HEPARIN SODIUM (PORCINE) LOCK FLUSH IV SOLN 100 UNIT/ML 100 UNIT/ML
1 SOLUTION INTRAVENOUS ONCE
Status: DISCONTINUED | OUTPATIENT
Start: 2021-01-01 | End: 2021-01-01

## 2021-01-01 RX ORDER — METOPROLOL SUCCINATE 100 MG/1
100 TABLET, EXTENDED RELEASE ORAL DAILY
Qty: 30 TABLET | Refills: 3 | Status: SHIPPED | OUTPATIENT
Start: 2021-01-01

## 2021-01-01 RX ORDER — SODIUM CHLORIDE 0.9 % (FLUSH) 0.9 %
20 SYRINGE (ML) INJECTION PRN
Status: DISCONTINUED | OUTPATIENT
Start: 2021-01-01 | End: 2021-01-01

## 2021-01-01 RX ORDER — DEXTROSE MONOHYDRATE 25 G/50ML
25 INJECTION, SOLUTION INTRAVENOUS ONCE
Status: COMPLETED | OUTPATIENT
Start: 2021-01-01 | End: 2021-01-01

## 2021-01-01 RX ORDER — MIDODRINE HYDROCHLORIDE 5 MG/1
10 TABLET ORAL ONCE
Status: COMPLETED | OUTPATIENT
Start: 2021-01-01 | End: 2021-01-01

## 2021-01-01 RX ORDER — TRAMADOL HYDROCHLORIDE 50 MG/1
TABLET ORAL
Qty: 60 TABLET | Refills: 0 | Status: SHIPPED | OUTPATIENT
Start: 2021-01-01 | End: 2021-01-01 | Stop reason: SDUPTHER

## 2021-01-01 RX ORDER — FENTANYL CITRATE 50 UG/ML
50 INJECTION, SOLUTION INTRAMUSCULAR; INTRAVENOUS EVERY 5 MIN PRN
Status: DISCONTINUED | OUTPATIENT
Start: 2021-01-01 | End: 2021-01-01

## 2021-01-01 RX ORDER — OMEPRAZOLE 20 MG/1
20 CAPSULE, DELAYED RELEASE ORAL
Qty: 30 CAPSULE | Refills: 0 | Status: SHIPPED | OUTPATIENT
Start: 2021-01-01 | End: 2021-01-01

## 2021-01-01 RX ORDER — SODIUM CHLORIDE 0.9 % (FLUSH) 0.9 %
5-40 SYRINGE (ML) INJECTION PRN
Status: DISCONTINUED | OUTPATIENT
Start: 2021-01-01 | End: 2021-01-01 | Stop reason: HOSPADM

## 2021-01-01 RX ORDER — SODIUM CHLORIDE, SODIUM LACTATE, POTASSIUM CHLORIDE, CALCIUM CHLORIDE 600; 310; 30; 20 MG/100ML; MG/100ML; MG/100ML; MG/100ML
INJECTION, SOLUTION INTRAVENOUS CONTINUOUS PRN
Status: DISCONTINUED | OUTPATIENT
Start: 2021-01-01 | End: 2021-01-01 | Stop reason: SDUPTHER

## 2021-01-01 RX ORDER — AMLODIPINE BESYLATE 5 MG/1
TABLET ORAL
Qty: 30 TABLET | Refills: 2 | Status: SHIPPED | OUTPATIENT
Start: 2021-01-01 | End: 2021-01-01

## 2021-01-01 RX ORDER — SODIUM BICARBONATE 650 MG/1
650 TABLET ORAL 2 TIMES DAILY
Status: DISCONTINUED | OUTPATIENT
Start: 2021-01-01 | End: 2021-01-01 | Stop reason: HOSPADM

## 2021-01-01 RX ORDER — HEPARIN SODIUM (PORCINE) LOCK FLUSH IV SOLN 100 UNIT/ML 100 UNIT/ML
SOLUTION INTRAVENOUS
Status: COMPLETED | OUTPATIENT
Start: 2021-01-01 | End: 2021-01-01

## 2021-01-01 RX ORDER — DIPHENHYDRAMINE HYDROCHLORIDE 50 MG/ML
12.5 INJECTION INTRAMUSCULAR; INTRAVENOUS
Status: DISCONTINUED | OUTPATIENT
Start: 2021-01-01 | End: 2021-01-01

## 2021-01-01 RX ORDER — CALCIUM CARBONATE 200(500)MG
750 TABLET,CHEWABLE ORAL
Status: DISCONTINUED | OUTPATIENT
Start: 2021-01-01 | End: 2021-01-01

## 2021-01-01 RX ORDER — CALCIUM CARBONATE 200(500)MG
1000 TABLET,CHEWABLE ORAL
Status: DISCONTINUED | OUTPATIENT
Start: 2021-01-01 | End: 2021-01-01 | Stop reason: HOSPADM

## 2021-01-01 RX ORDER — 0.9 % SODIUM CHLORIDE 0.9 %
1000 INTRAVENOUS SOLUTION INTRAVENOUS ONCE
Status: DISCONTINUED | OUTPATIENT
Start: 2021-01-01 | End: 2021-01-01

## 2021-01-01 RX ORDER — CIPROFLOXACIN 250 MG/1
250 TABLET, FILM COATED ORAL DAILY
Qty: 10 TABLET | Refills: 0 | Status: ON HOLD
Start: 2021-01-01 | End: 2021-01-01 | Stop reason: HOSPADM

## 2021-01-01 RX ORDER — MEPERIDINE HYDROCHLORIDE 25 MG/ML
12.5 INJECTION INTRAMUSCULAR; INTRAVENOUS; SUBCUTANEOUS EVERY 5 MIN PRN
Status: DISCONTINUED | OUTPATIENT
Start: 2021-01-01 | End: 2021-01-01

## 2021-01-01 RX ORDER — CEFAZOLIN SODIUM 2 G/100ML
2000 INJECTION, SOLUTION INTRAVENOUS ONCE
Status: CANCELLED | OUTPATIENT
Start: 2021-01-01

## 2021-01-01 RX ORDER — LABETALOL HYDROCHLORIDE 5 MG/ML
5 INJECTION, SOLUTION INTRAVENOUS EVERY 10 MIN PRN
Status: DISCONTINUED | OUTPATIENT
Start: 2021-01-01 | End: 2021-01-01

## 2021-01-01 RX ORDER — ACETAMINOPHEN 325 MG/1
TABLET ORAL
Status: COMPLETED
Start: 2021-01-01 | End: 2021-01-01

## 2021-01-01 RX ORDER — CEFAZOLIN SODIUM 1 G/3ML
INJECTION, POWDER, FOR SOLUTION INTRAMUSCULAR; INTRAVENOUS PRN
Status: DISCONTINUED | OUTPATIENT
Start: 2021-01-01 | End: 2021-01-01 | Stop reason: SDUPTHER

## 2021-01-01 RX ORDER — PALONOSETRON 0.05 MG/ML
0.25 INJECTION, SOLUTION INTRAVENOUS ONCE
Status: CANCELLED | OUTPATIENT
Start: 2021-01-01

## 2021-01-01 RX ORDER — FUROSEMIDE 10 MG/ML
40 INJECTION INTRAMUSCULAR; INTRAVENOUS ONCE
Status: COMPLETED | OUTPATIENT
Start: 2021-01-01 | End: 2021-01-01

## 2021-01-01 RX ORDER — TRISODIUM CITRATE DIHYDRATE AND CITRIC ACID MONOHYDRATE 500; 334 MG/5ML; MG/5ML
30 SOLUTION ORAL EVERY 6 HOURS
Status: DISCONTINUED | OUTPATIENT
Start: 2021-01-01 | End: 2021-01-01

## 2021-01-01 RX ORDER — DOCUSATE SODIUM 100 MG/1
100 CAPSULE, LIQUID FILLED ORAL DAILY PRN
Status: DISCONTINUED | OUTPATIENT
Start: 2021-01-01 | End: 2021-01-01

## 2021-01-01 RX ORDER — CALCITRIOL 0.25 UG/1
0.5 CAPSULE, LIQUID FILLED ORAL 2 TIMES DAILY
Status: DISCONTINUED | OUTPATIENT
Start: 2021-01-01 | End: 2021-01-01

## 2021-01-01 RX ORDER — PROPOFOL 10 MG/ML
INJECTION, EMULSION INTRAVENOUS CONTINUOUS PRN
Status: DISCONTINUED | OUTPATIENT
Start: 2021-01-01 | End: 2021-01-01 | Stop reason: SDUPTHER

## 2021-01-01 RX ORDER — SODIUM CHLORIDE 0.9 % (FLUSH) 0.9 %
10 SYRINGE (ML) INJECTION EVERY 12 HOURS SCHEDULED
Status: DISCONTINUED | OUTPATIENT
Start: 2021-01-01 | End: 2021-01-01

## 2021-01-01 RX ORDER — ONDANSETRON 2 MG/ML
4 INJECTION INTRAMUSCULAR; INTRAVENOUS EVERY 6 HOURS PRN
Status: CANCELLED | OUTPATIENT
Start: 2021-01-01

## 2021-01-01 RX ORDER — FUROSEMIDE 10 MG/ML
80 INJECTION INTRAMUSCULAR; INTRAVENOUS 2 TIMES DAILY
Status: DISCONTINUED | OUTPATIENT
Start: 2021-01-01 | End: 2021-01-01

## 2021-01-01 RX ORDER — ACETAMINOPHEN 325 MG/1
650 TABLET ORAL ONCE
Status: DISCONTINUED | OUTPATIENT
Start: 2021-01-01 | End: 2021-01-01

## 2021-01-01 RX ORDER — FUROSEMIDE 80 MG
80 TABLET ORAL 2 TIMES DAILY
Qty: 60 TABLET | Refills: 0 | Status: SHIPPED | OUTPATIENT
Start: 2021-01-01

## 2021-01-01 RX ORDER — FUROSEMIDE 40 MG/1
80 TABLET ORAL 2 TIMES DAILY
Status: DISCONTINUED | OUTPATIENT
Start: 2021-01-01 | End: 2021-01-01 | Stop reason: HOSPADM

## 2021-01-01 RX ORDER — ACETAMINOPHEN 650 MG/1
650 SUPPOSITORY RECTAL EVERY 6 HOURS PRN
Status: DISCONTINUED | OUTPATIENT
Start: 2021-01-01 | End: 2021-01-01

## 2021-01-01 RX ORDER — SODIUM BICARBONATE 650 MG/1
650 TABLET ORAL 3 TIMES DAILY
Status: DISCONTINUED | OUTPATIENT
Start: 2021-01-01 | End: 2021-01-01

## 2021-01-01 RX ORDER — HEPARIN SODIUM 1000 [USP'U]/ML
2600 INJECTION, SOLUTION INTRAVENOUS; SUBCUTANEOUS
Status: COMPLETED | OUTPATIENT
Start: 2021-01-01 | End: 2021-01-01

## 2021-01-01 RX ORDER — SODIUM CHLORIDE 9 MG/ML
25 INJECTION, SOLUTION INTRAVENOUS PRN
Status: DISCONTINUED | OUTPATIENT
Start: 2021-01-01 | End: 2021-01-01 | Stop reason: SDUPTHER

## 2021-01-01 RX ORDER — SODIUM BICARBONATE 650 MG/1
650 TABLET ORAL 2 TIMES DAILY
Status: DISCONTINUED | OUTPATIENT
Start: 2021-01-01 | End: 2021-01-01

## 2021-01-01 RX ORDER — ACETAMINOPHEN 325 MG/1
650 TABLET ORAL EVERY 4 HOURS PRN
Status: DISCONTINUED | OUTPATIENT
Start: 2021-01-01 | End: 2021-01-01 | Stop reason: HOSPADM

## 2021-01-01 RX ORDER — LIDOCAINE HYDROCHLORIDE 20 MG/ML
20 INJECTION, SOLUTION EPIDURAL; INFILTRATION; INTRACAUDAL; PERINEURAL ONCE
Status: COMPLETED | OUTPATIENT
Start: 2021-01-01 | End: 2021-01-01

## 2021-01-01 RX ORDER — 0.9 % SODIUM CHLORIDE 0.9 %
250 INTRAVENOUS SOLUTION INTRAVENOUS ONCE
Status: DISCONTINUED | OUTPATIENT
Start: 2021-01-01 | End: 2021-01-01

## 2021-01-01 RX ORDER — ZINC OXIDE 216 MG/ML
1 LOTION TOPICAL 2 TIMES DAILY
Qty: 60 CAN | Refills: 5 | Status: SHIPPED | OUTPATIENT
Start: 2021-01-01

## 2021-01-01 RX ORDER — HEPARIN SODIUM 1000 [USP'U]/ML
80 INJECTION, SOLUTION INTRAVENOUS; SUBCUTANEOUS PRN
Status: DISCONTINUED | OUTPATIENT
Start: 2021-01-01 | End: 2021-01-01 | Stop reason: ALTCHOICE

## 2021-01-01 RX ORDER — ONDANSETRON 4 MG/1
4 TABLET, ORALLY DISINTEGRATING ORAL EVERY 8 HOURS PRN
Status: DISCONTINUED | OUTPATIENT
Start: 2021-01-01 | End: 2021-01-01

## 2021-01-01 RX ORDER — FUROSEMIDE 20 MG/1
20 TABLET ORAL DAILY
Qty: 30 TABLET | Refills: 0 | Status: ON HOLD
Start: 2021-01-01 | End: 2021-01-01 | Stop reason: HOSPADM

## 2021-01-01 RX ORDER — PROMETHAZINE HYDROCHLORIDE 25 MG/1
12.5 TABLET ORAL EVERY 6 HOURS PRN
Status: DISCONTINUED | OUTPATIENT
Start: 2021-01-01 | End: 2021-01-01 | Stop reason: HOSPADM

## 2021-01-01 RX ORDER — SODIUM CHLORIDE 9 MG/ML
INJECTION, SOLUTION INTRAVENOUS CONTINUOUS
Status: DISCONTINUED | OUTPATIENT
Start: 2021-01-01 | End: 2021-01-01 | Stop reason: HOSPADM

## 2021-01-01 RX ORDER — METOPROLOL SUCCINATE 100 MG/1
100 TABLET, EXTENDED RELEASE ORAL ONCE
Status: COMPLETED | OUTPATIENT
Start: 2021-01-01 | End: 2021-01-01

## 2021-01-01 RX ORDER — AMLODIPINE BESYLATE 5 MG/1
5 TABLET ORAL DAILY
Qty: 30 TABLET | Refills: 2 | Status: ON HOLD
Start: 2021-01-01 | End: 2021-01-01 | Stop reason: HOSPADM

## 2021-01-01 RX ORDER — ACETAMINOPHEN 325 MG/1
650 TABLET ORAL EVERY 6 HOURS PRN
Status: DISCONTINUED | OUTPATIENT
Start: 2021-01-01 | End: 2021-01-01

## 2021-01-01 RX ORDER — FUROSEMIDE 40 MG/1
80 TABLET ORAL DAILY
Status: DISCONTINUED | OUTPATIENT
Start: 2021-01-01 | End: 2021-01-01

## 2021-01-01 RX ORDER — HEPARIN SODIUM 5000 [USP'U]/ML
5000 INJECTION, SOLUTION INTRAVENOUS; SUBCUTANEOUS ONCE
Status: COMPLETED | OUTPATIENT
Start: 2021-01-01 | End: 2021-01-01

## 2021-01-01 RX ORDER — FLUTICASONE PROPIONATE 50 MCG
1 SPRAY, SUSPENSION (ML) NASAL DAILY
COMMUNITY

## 2021-01-01 RX ORDER — CEFAZOLIN SODIUM 2 G/100ML
2000 INJECTION, SOLUTION INTRAVENOUS ONCE
Status: COMPLETED | OUTPATIENT
Start: 2021-01-01 | End: 2021-01-01

## 2021-01-01 RX ORDER — DIPHENHYDRAMINE HYDROCHLORIDE 50 MG/ML
50 INJECTION INTRAMUSCULAR; INTRAVENOUS ONCE
Status: DISCONTINUED | OUTPATIENT
Start: 2021-01-01 | End: 2021-01-01

## 2021-01-01 RX ORDER — DOCUSATE SODIUM 100 MG/1
100 CAPSULE, LIQUID FILLED ORAL DAILY PRN
Qty: 30 CAPSULE | Refills: 0 | Status: SHIPPED | OUTPATIENT
Start: 2021-01-01

## 2021-01-01 RX ORDER — ACETAMINOPHEN 500 MG
500 TABLET ORAL EVERY 6 HOURS PRN
COMMUNITY

## 2021-01-01 RX ORDER — DEXAMETHASONE 2 MG/1
2 TABLET ORAL DAILY
Status: ON HOLD | COMMUNITY
End: 2021-01-01

## 2021-01-01 RX ORDER — PROCHLORPERAZINE MALEATE 10 MG
10 TABLET ORAL EVERY 8 HOURS PRN
Qty: 60 TABLET | Refills: 1 | Status: SHIPPED | OUTPATIENT
Start: 2021-01-01

## 2021-01-01 RX ORDER — CALCIUM GLUCONATE 94 MG/ML
1000 INJECTION, SOLUTION INTRAVENOUS ONCE
Status: DISCONTINUED | OUTPATIENT
Start: 2021-01-01 | End: 2021-01-01 | Stop reason: CLARIF

## 2021-01-01 RX ORDER — HYDROCODONE BITARTRATE AND ACETAMINOPHEN 5; 325 MG/1; MG/1
1 TABLET ORAL EVERY 6 HOURS PRN
Status: DISCONTINUED | OUTPATIENT
Start: 2021-01-01 | End: 2021-01-01

## 2021-01-01 RX ORDER — CALCITRIOL 0.5 UG/1
1 CAPSULE, LIQUID FILLED ORAL 2 TIMES DAILY
Qty: 30 CAPSULE | Refills: 3 | Status: SHIPPED | OUTPATIENT
Start: 2021-01-01

## 2021-01-01 RX ORDER — LORAZEPAM 0.5 MG/1
0.5 TABLET ORAL EVERY 12 HOURS PRN
Status: DISCONTINUED | OUTPATIENT
Start: 2021-01-01 | End: 2021-01-01

## 2021-01-01 RX ORDER — CALCIUM GLUCONATE 20 MG/ML
1000 INJECTION, SOLUTION INTRAVENOUS ONCE
Status: COMPLETED | OUTPATIENT
Start: 2021-01-01 | End: 2021-01-01

## 2021-01-01 RX ORDER — LORAZEPAM 0.5 MG/1
1 TABLET ORAL 2 TIMES DAILY
Qty: 60 TABLET | Refills: 0 | Status: ON HOLD | OUTPATIENT
Start: 2021-01-01 | End: 2021-01-01 | Stop reason: HOSPADM

## 2021-01-01 RX ORDER — ONDANSETRON HYDROCHLORIDE 8 MG/1
TABLET, FILM COATED ORAL
Qty: 30 TABLET | Refills: 0 | Status: SHIPPED | OUTPATIENT
Start: 2021-01-01

## 2021-01-01 RX ORDER — FUROSEMIDE 10 MG/ML
40 INJECTION INTRAMUSCULAR; INTRAVENOUS 2 TIMES DAILY
Status: DISCONTINUED | OUTPATIENT
Start: 2021-01-01 | End: 2021-01-01

## 2021-01-01 RX ORDER — SODIUM CHLORIDE, SODIUM LACTATE, POTASSIUM CHLORIDE, CALCIUM CHLORIDE 600; 310; 30; 20 MG/100ML; MG/100ML; MG/100ML; MG/100ML
INJECTION, SOLUTION INTRAVENOUS CONTINUOUS
Status: DISCONTINUED | OUTPATIENT
Start: 2021-01-01 | End: 2021-01-01

## 2021-01-01 RX ORDER — 0.9 % SODIUM CHLORIDE 0.9 %
250 INTRAVENOUS SOLUTION INTRAVENOUS ONCE
Status: DISCONTINUED | OUTPATIENT
Start: 2021-01-01 | End: 2021-01-01 | Stop reason: HOSPADM

## 2021-01-01 RX ORDER — METHYLPREDNISOLONE SODIUM SUCCINATE 40 MG/ML
20 INJECTION, POWDER, LYOPHILIZED, FOR SOLUTION INTRAMUSCULAR; INTRAVENOUS ONCE
Status: DISCONTINUED | OUTPATIENT
Start: 2021-01-01 | End: 2021-01-01

## 2021-01-01 RX ORDER — HYDROCODONE BITARTRATE AND ACETAMINOPHEN 5; 325 MG/1; MG/1
1 TABLET ORAL EVERY 6 HOURS PRN
COMMUNITY

## 2021-01-01 RX ORDER — ZINC OXIDE 216 MG/ML
1 LOTION TOPICAL 2 TIMES DAILY
Status: DISCONTINUED | OUTPATIENT
Start: 2021-01-01 | End: 2021-01-01 | Stop reason: SDUPTHER

## 2021-01-01 RX ORDER — TRISODIUM CITRATE DIHYDRATE AND CITRIC ACID MONOHYDRATE 500; 334 MG/5ML; MG/5ML
30 SOLUTION ORAL 4 TIMES DAILY
Status: COMPLETED | OUTPATIENT
Start: 2021-01-01 | End: 2021-01-01

## 2021-01-01 RX ORDER — SODIUM CHLORIDE 9 MG/ML
25 INJECTION, SOLUTION INTRAVENOUS PRN
Status: CANCELLED | OUTPATIENT
Start: 2021-01-01

## 2021-01-01 RX ORDER — CALCIUM CARBONATE 200(500)MG
1000 TABLET,CHEWABLE ORAL
Status: DISCONTINUED | OUTPATIENT
Start: 2021-01-01 | End: 2021-01-01 | Stop reason: ALTCHOICE

## 2021-01-01 RX ORDER — GLYCOPYRROLATE 0.2 MG/ML
0.2 INJECTION INTRAMUSCULAR; INTRAVENOUS EVERY 4 HOURS PRN
Status: CANCELLED | OUTPATIENT
Start: 2021-01-01

## 2021-01-01 RX ORDER — HEPARIN SODIUM 1000 [USP'U]/ML
40 INJECTION, SOLUTION INTRAVENOUS; SUBCUTANEOUS PRN
Status: DISCONTINUED | OUTPATIENT
Start: 2021-01-01 | End: 2021-01-01 | Stop reason: ALTCHOICE

## 2021-01-01 RX ORDER — CALCITRIOL 0.25 UG/1
1 CAPSULE, LIQUID FILLED ORAL 2 TIMES DAILY
Status: DISCONTINUED | OUTPATIENT
Start: 2021-01-01 | End: 2021-01-01

## 2021-01-01 RX ORDER — ACETAMINOPHEN 650 MG/1
650 SUPPOSITORY RECTAL EVERY 6 HOURS PRN
Status: CANCELLED | OUTPATIENT
Start: 2021-01-01

## 2021-01-01 RX ORDER — CALCIUM CARBONATE 200(500)MG
1000 TABLET,CHEWABLE ORAL ONCE
Status: COMPLETED | OUTPATIENT
Start: 2021-01-01 | End: 2021-01-01

## 2021-01-01 RX ORDER — ALBUMIN (HUMAN) 12.5 G/50ML
25 SOLUTION INTRAVENOUS EVERY 8 HOURS
Status: COMPLETED | OUTPATIENT
Start: 2021-01-01 | End: 2021-01-01

## 2021-01-01 RX ORDER — ACETAMINOPHEN 650 MG/1
650 SUPPOSITORY RECTAL EVERY 4 HOURS PRN
Status: CANCELLED | OUTPATIENT
Start: 2021-01-01

## 2021-01-01 RX ORDER — AMLODIPINE BESYLATE 5 MG/1
5 TABLET ORAL DAILY
Status: DISCONTINUED | OUTPATIENT
Start: 2021-01-01 | End: 2021-01-01 | Stop reason: HOSPADM

## 2021-01-01 RX ORDER — CETIRIZINE HYDROCHLORIDE 10 MG/1
10 TABLET ORAL DAILY
Status: DISCONTINUED | OUTPATIENT
Start: 2021-01-01 | End: 2021-01-01

## 2021-01-01 RX ORDER — HEPARIN SODIUM 1000 [USP'U]/ML
2600 INJECTION, SOLUTION INTRAVENOUS; SUBCUTANEOUS
Status: DISPENSED | OUTPATIENT
Start: 2021-01-01 | End: 2021-01-01

## 2021-01-01 RX ORDER — HYDROMORPHONE HCL 110MG/55ML
PATIENT CONTROLLED ANALGESIA SYRINGE INTRAVENOUS PRN
Status: DISCONTINUED | OUTPATIENT
Start: 2021-01-01 | End: 2021-01-01 | Stop reason: SDUPTHER

## 2021-01-01 RX ORDER — HEPARIN SODIUM 1000 [USP'U]/ML
80 INJECTION, SOLUTION INTRAVENOUS; SUBCUTANEOUS ONCE
Status: COMPLETED | OUTPATIENT
Start: 2021-01-01 | End: 2021-01-01

## 2021-01-01 RX ADMIN — CEFEPIME HYDROCHLORIDE 1000 MG: 1 INJECTION, POWDER, FOR SOLUTION INTRAMUSCULAR; INTRAVENOUS at 04:00

## 2021-01-01 RX ADMIN — FENTANYL CITRATE 100 MCG: 50 INJECTION, SOLUTION INTRAMUSCULAR; INTRAVENOUS at 07:42

## 2021-01-01 RX ADMIN — CALCIUM CARBONATE 750 MG: 500 TABLET, CHEWABLE ORAL at 10:26

## 2021-01-01 RX ADMIN — HYDROMORPHONE HYDROCHLORIDE 0.5 MG: 1 INJECTION, SOLUTION INTRAMUSCULAR; INTRAVENOUS; SUBCUTANEOUS at 09:01

## 2021-01-01 RX ADMIN — HEPARIN SODIUM 18 UNITS/KG/HR: 10000 INJECTION, SOLUTION INTRAVENOUS at 08:48

## 2021-01-01 RX ADMIN — SODIUM CHLORIDE: 9 INJECTION, SOLUTION INTRAVENOUS at 12:18

## 2021-01-01 RX ADMIN — ALBUMIN (HUMAN) 25 G: 0.25 INJECTION, SOLUTION INTRAVENOUS at 16:17

## 2021-01-01 RX ADMIN — CEFAZOLIN SODIUM 2000 MG: 2 INJECTION, SOLUTION INTRAVENOUS at 10:17

## 2021-01-01 RX ADMIN — SODIUM BICARBONATE: 84 INJECTION, SOLUTION INTRAVENOUS at 15:46

## 2021-01-01 RX ADMIN — ZOLEDRONIC ACID 3 MG: 4 INJECTION, SOLUTION, CONCENTRATE INTRAVENOUS at 12:44

## 2021-01-01 RX ADMIN — DIPHENHYDRAMINE HYDROCHLORIDE 25 MG: 25 CAPSULE ORAL at 13:27

## 2021-01-01 RX ADMIN — SODIUM CHLORIDE, PRESERVATIVE FREE 10 ML: 5 INJECTION INTRAVENOUS at 20:47

## 2021-01-01 RX ADMIN — CEFEPIME HYDROCHLORIDE 1000 MG: 1 INJECTION, POWDER, FOR SOLUTION INTRAMUSCULAR; INTRAVENOUS at 04:33

## 2021-01-01 RX ADMIN — CALCITRIOL CAPSULES 0.25 MCG 0.5 MCG: 0.25 CAPSULE ORAL at 22:31

## 2021-01-01 RX ADMIN — MIDAZOLAM 2 MG: 1 INJECTION INTRAMUSCULAR; INTRAVENOUS at 10:16

## 2021-01-01 RX ADMIN — METOPROLOL SUCCINATE 100 MG: 100 TABLET, EXTENDED RELEASE ORAL at 20:04

## 2021-01-01 RX ADMIN — PACLITAXEL 100 MG: 6 INJECTION, SOLUTION, CONCENTRATE INTRAVENOUS at 12:05

## 2021-01-01 RX ADMIN — HEPARIN 500 UNITS: 100 SYRINGE at 13:55

## 2021-01-01 RX ADMIN — PROPOFOL 80 MG: 10 INJECTION, EMULSION INTRAVENOUS at 07:45

## 2021-01-01 RX ADMIN — PROPOFOL 50 MG: 10 INJECTION, EMULSION INTRAVENOUS at 16:40

## 2021-01-01 RX ADMIN — FUROSEMIDE 80 MG: 10 INJECTION, SOLUTION INTRAVENOUS at 18:00

## 2021-01-01 RX ADMIN — FAMOTIDINE 20 MG: 10 INJECTION, SOLUTION INTRAVENOUS at 10:02

## 2021-01-01 RX ADMIN — HEPARIN SODIUM 5000 UNITS: 5000 INJECTION INTRAVENOUS; SUBCUTANEOUS at 06:14

## 2021-01-01 RX ADMIN — PANTOPRAZOLE SODIUM 40 MG: 40 TABLET, DELAYED RELEASE ORAL at 06:26

## 2021-01-01 RX ADMIN — LEUPROLIDE ACETATE 7.5 MG: KIT at 10:29

## 2021-01-01 RX ADMIN — PACLITAXEL 114 MG: 6 INJECTION, SOLUTION INTRAVENOUS at 10:51

## 2021-01-01 RX ADMIN — SODIUM CHLORIDE, PRESERVATIVE FREE 10 ML: 5 INJECTION INTRAVENOUS at 23:00

## 2021-01-01 RX ADMIN — CETIRIZINE HYDROCHLORIDE 10 MG: 10 TABLET, FILM COATED ORAL at 10:28

## 2021-01-01 RX ADMIN — SODIUM CHLORIDE 20 ML/HR: 900 INJECTION, SOLUTION INTRAVENOUS at 11:43

## 2021-01-01 RX ADMIN — FAMOTIDINE 20 MG: 10 INJECTION, SOLUTION INTRAVENOUS at 10:40

## 2021-01-01 RX ADMIN — ONDANSETRON 4 MG: 2 INJECTION INTRAMUSCULAR; INTRAVENOUS at 12:21

## 2021-01-01 RX ADMIN — CALCITRIOL CAPSULES 0.25 MCG 1 MCG: 0.25 CAPSULE ORAL at 09:52

## 2021-01-01 RX ADMIN — Medication 500 UNITS: at 13:34

## 2021-01-01 RX ADMIN — HEPARIN SODIUM 5000 UNITS: 5000 INJECTION INTRAVENOUS; SUBCUTANEOUS at 14:01

## 2021-01-01 RX ADMIN — DEXAMETHASONE SODIUM PHOSPHATE 20 MG: 10 INJECTION, SOLUTION INTRAMUSCULAR; INTRAVENOUS at 10:19

## 2021-01-01 RX ADMIN — SODIUM CITRATE AND CITRIC ACID MONOHYDRATE 30 ML: 500; 334 SOLUTION ORAL at 16:18

## 2021-01-01 RX ADMIN — HEPARIN SODIUM 5000 UNITS: 5000 INJECTION INTRAVENOUS; SUBCUTANEOUS at 06:30

## 2021-01-01 RX ADMIN — HYDROMORPHONE HYDROCHLORIDE 0.5 MG: 1 INJECTION, SOLUTION INTRAMUSCULAR; INTRAVENOUS; SUBCUTANEOUS at 08:39

## 2021-01-01 RX ADMIN — CALCITRIOL CAPSULES 0.25 MCG 0.5 MCG: 0.25 CAPSULE ORAL at 10:11

## 2021-01-01 RX ADMIN — HEPARIN 500 UNITS: 100 SYRINGE at 14:24

## 2021-01-01 RX ADMIN — BUMETANIDE 2 MG/HR: 0.25 INJECTION INTRAMUSCULAR; INTRAVENOUS at 03:39

## 2021-01-01 RX ADMIN — FAMOTIDINE 20 MG: 10 INJECTION, SOLUTION INTRAVENOUS at 14:41

## 2021-01-01 RX ADMIN — CALCIUM CARBONATE 1000 MG: 500 TABLET, CHEWABLE ORAL at 17:57

## 2021-01-01 RX ADMIN — DEXTROSE MONOHYDRATE 25 G: 25 INJECTION, SOLUTION INTRAVENOUS at 00:12

## 2021-01-01 RX ADMIN — SODIUM CHLORIDE: 9 INJECTION, SOLUTION INTRAVENOUS at 16:31

## 2021-01-01 RX ADMIN — HYDROCODONE BITARTRATE AND ACETAMINOPHEN 1 TABLET: 5; 325 TABLET ORAL at 06:10

## 2021-01-01 RX ADMIN — SODIUM BICARBONATE 650 MG: 650 TABLET ORAL at 20:27

## 2021-01-01 RX ADMIN — DIPHENHYDRAMINE HYDROCHLORIDE 50 MG: 50 INJECTION INTRAMUSCULAR; INTRAVENOUS at 12:41

## 2021-01-01 RX ADMIN — CEFTRIAXONE 1000 MG: 1 INJECTION, POWDER, FOR SOLUTION INTRAMUSCULAR; INTRAVENOUS at 21:51

## 2021-01-01 RX ADMIN — CETIRIZINE HYDROCHLORIDE 10 MG: 10 TABLET, FILM COATED ORAL at 13:52

## 2021-01-01 RX ADMIN — MAGNESIUM OXIDE 400 MG (241.3 MG MAGNESIUM) TABLET 400 MG: TABLET at 10:09

## 2021-01-01 RX ADMIN — CALCIUM CARBONATE 1000 MG: 500 TABLET, CHEWABLE ORAL at 09:52

## 2021-01-01 RX ADMIN — DEXAMETHASONE SODIUM PHOSPHATE 10 MG: 10 INJECTION, SOLUTION INTRAMUSCULAR; INTRAVENOUS at 11:47

## 2021-01-01 RX ADMIN — FLUTICASONE PROPIONATE 1 SPRAY: 50 SPRAY, METERED NASAL at 09:54

## 2021-01-01 RX ADMIN — MIDODRINE HYDROCHLORIDE 10 MG: 5 TABLET ORAL at 02:24

## 2021-01-01 RX ADMIN — SODIUM CHLORIDE, PRESERVATIVE FREE 10 ML: 5 INJECTION INTRAVENOUS at 08:26

## 2021-01-01 RX ADMIN — SODIUM CHLORIDE, PRESERVATIVE FREE 10 ML: 5 INJECTION INTRAVENOUS at 13:32

## 2021-01-01 RX ADMIN — SODIUM CHLORIDE, PRESERVATIVE FREE 10 ML: 5 INJECTION INTRAVENOUS at 09:24

## 2021-01-01 RX ADMIN — MAGNESIUM OXIDE 400 MG (241.3 MG MAGNESIUM) TABLET 400 MG: TABLET at 10:26

## 2021-01-01 RX ADMIN — FENTANYL CITRATE 50 MCG: 50 INJECTION, SOLUTION INTRAMUSCULAR; INTRAVENOUS at 11:02

## 2021-01-01 RX ADMIN — PACLITAXEL 114 MG: 6 INJECTION, SOLUTION INTRAVENOUS at 12:41

## 2021-01-01 RX ADMIN — CALCITRIOL CAPSULES 0.25 MCG 0.5 MCG: 0.25 CAPSULE ORAL at 20:04

## 2021-01-01 RX ADMIN — HEPARIN 500 UNITS: 100 SYRINGE at 16:10

## 2021-01-01 RX ADMIN — HYDROCODONE BITARTRATE AND ACETAMINOPHEN 1 TABLET: 5; 325 TABLET ORAL at 20:27

## 2021-01-01 RX ADMIN — CALCIUM CARBONATE 1000 MG: 500 TABLET, CHEWABLE ORAL at 22:31

## 2021-01-01 RX ADMIN — METOPROLOL SUCCINATE 100 MG: 100 TABLET, EXTENDED RELEASE ORAL at 10:59

## 2021-01-01 RX ADMIN — SODIUM CHLORIDE 1000 ML: 9 INJECTION, SOLUTION INTRAVENOUS at 10:24

## 2021-01-01 RX ADMIN — METOPROLOL SUCCINATE 100 MG: 100 TABLET, EXTENDED RELEASE ORAL at 11:07

## 2021-01-01 RX ADMIN — SODIUM CHLORIDE, PRESERVATIVE FREE 10 ML: 5 INJECTION INTRAVENOUS at 09:16

## 2021-01-01 RX ADMIN — SODIUM BICARBONATE 650 MG TABLET 1300 MG: at 13:41

## 2021-01-01 RX ADMIN — FLUTICASONE PROPIONATE 1 SPRAY: 50 SPRAY, METERED NASAL at 09:22

## 2021-01-01 RX ADMIN — PACLITAXEL 100 MG: 6 INJECTION, SOLUTION, CONCENTRATE INTRAVENOUS at 15:04

## 2021-01-01 RX ADMIN — Medication 25 MG: at 09:18

## 2021-01-01 RX ADMIN — CALCIUM CARBONATE 1000 MG: 500 TABLET, CHEWABLE ORAL at 14:01

## 2021-01-01 RX ADMIN — HYDROCODONE BITARTRATE AND ACETAMINOPHEN 1 TABLET: 5; 325 TABLET ORAL at 09:51

## 2021-01-01 RX ADMIN — DIPHENHYDRAMINE HYDROCHLORIDE 50 MG: 50 INJECTION INTRAMUSCULAR; INTRAVENOUS at 10:40

## 2021-01-01 RX ADMIN — HYDROMORPHONE HYDROCHLORIDE 0.5 MG: 1 INJECTION, SOLUTION INTRAMUSCULAR; INTRAVENOUS; SUBCUTANEOUS at 05:04

## 2021-01-01 RX ADMIN — SODIUM CHLORIDE, POTASSIUM CHLORIDE, SODIUM LACTATE AND CALCIUM CHLORIDE: 600; 310; 30; 20 INJECTION, SOLUTION INTRAVENOUS at 15:30

## 2021-01-01 RX ADMIN — PROMETHAZINE HYDROCHLORIDE 12.5 MG: 25 TABLET ORAL at 21:05

## 2021-01-01 RX ADMIN — FAMOTIDINE 20 MG: 10 INJECTION, SOLUTION INTRAVENOUS at 11:02

## 2021-01-01 RX ADMIN — FUROSEMIDE 20 MG: 10 INJECTION, SOLUTION INTRAMUSCULAR; INTRAVENOUS at 16:47

## 2021-01-01 RX ADMIN — SODIUM BICARBONATE: 84 INJECTION, SOLUTION INTRAVENOUS at 03:07

## 2021-01-01 RX ADMIN — DIPHENHYDRAMINE HYDROCHLORIDE 25 MG: 25 TABLET ORAL at 12:09

## 2021-01-01 RX ADMIN — MAGNESIUM OXIDE 400 MG (241.3 MG MAGNESIUM) TABLET 400 MG: TABLET at 12:18

## 2021-01-01 RX ADMIN — PANTOPRAZOLE SODIUM 40 MG: 40 TABLET, DELAYED RELEASE ORAL at 06:04

## 2021-01-01 RX ADMIN — ACETAMINOPHEN 650 MG: 325 TABLET ORAL at 08:11

## 2021-01-01 RX ADMIN — FUROSEMIDE 40 MG: 10 INJECTION, SOLUTION INTRAVENOUS at 11:08

## 2021-01-01 RX ADMIN — SODIUM CHLORIDE: 9 INJECTION, SOLUTION INTRAVENOUS at 20:04

## 2021-01-01 RX ADMIN — SODIUM CHLORIDE, PRESERVATIVE FREE 20 ML: 5 INJECTION INTRAVENOUS at 12:10

## 2021-01-01 RX ADMIN — METOPROLOL SUCCINATE 100 MG: 50 TABLET, EXTENDED RELEASE ORAL at 09:29

## 2021-01-01 RX ADMIN — HYDROCODONE BITARTRATE AND ACETAMINOPHEN 1 TABLET: 5; 325 TABLET ORAL at 16:15

## 2021-01-01 RX ADMIN — CALCITRIOL CAPSULES 0.25 MCG 1 MCG: 0.25 CAPSULE ORAL at 20:28

## 2021-01-01 RX ADMIN — CALCITRIOL CAPSULES 0.25 MCG 0.5 MCG: 0.25 CAPSULE ORAL at 20:54

## 2021-01-01 RX ADMIN — CETIRIZINE HYDROCHLORIDE 10 MG: 10 TABLET, FILM COATED ORAL at 09:52

## 2021-01-01 RX ADMIN — HEPARIN 500 UNITS: 100 SYRINGE at 12:11

## 2021-01-01 RX ADMIN — SODIUM CHLORIDE, PRESERVATIVE FREE 10 ML: 5 INJECTION INTRAVENOUS at 10:11

## 2021-01-01 RX ADMIN — CALCITRIOL CAPSULES 0.25 MCG 0.5 MCG: 0.25 CAPSULE ORAL at 21:42

## 2021-01-01 RX ADMIN — SODIUM CHLORIDE, PRESERVATIVE FREE 10 ML: 5 INJECTION INTRAVENOUS at 20:18

## 2021-01-01 RX ADMIN — AMLODIPINE BESYLATE 5 MG: 5 TABLET ORAL at 09:05

## 2021-01-01 RX ADMIN — LIDOCAINE HYDROCHLORIDE 60 MG: 20 INJECTION, SOLUTION INTRAVENOUS at 16:40

## 2021-01-01 RX ADMIN — HYDROCODONE BITARTRATE AND ACETAMINOPHEN 1 TABLET: 5; 325 TABLET ORAL at 15:17

## 2021-01-01 RX ADMIN — DIPHENHYDRAMINE HYDROCHLORIDE 50 MG: 50 INJECTION INTRAMUSCULAR; INTRAVENOUS at 11:54

## 2021-01-01 RX ADMIN — CETIRIZINE HYDROCHLORIDE 10 MG: 10 TABLET, FILM COATED ORAL at 09:08

## 2021-01-01 RX ADMIN — BUMETANIDE 2 MG: 0.25 INJECTION INTRAMUSCULAR; INTRAVENOUS at 21:06

## 2021-01-01 RX ADMIN — CETIRIZINE HYDROCHLORIDE 10 MG: 10 TABLET, FILM COATED ORAL at 11:13

## 2021-01-01 RX ADMIN — HEPARIN SODIUM 5000 UNITS: 5000 INJECTION INTRAVENOUS; SUBCUTANEOUS at 23:06

## 2021-01-01 RX ADMIN — HEPARIN SODIUM 5080 UNITS: 1000 INJECTION INTRAVENOUS; SUBCUTANEOUS at 13:56

## 2021-01-01 RX ADMIN — HEPARIN SODIUM 5000 UNITS: 5000 INJECTION INTRAVENOUS; SUBCUTANEOUS at 06:06

## 2021-01-01 RX ADMIN — HEPARIN SODIUM 5000 UNITS: 5000 INJECTION INTRAVENOUS; SUBCUTANEOUS at 14:43

## 2021-01-01 RX ADMIN — ACETAMINOPHEN 650 MG: 325 TABLET ORAL at 09:16

## 2021-01-01 RX ADMIN — CETIRIZINE HYDROCHLORIDE 10 MG: 10 TABLET, FILM COATED ORAL at 09:54

## 2021-01-01 RX ADMIN — SODIUM BICARBONATE 650 MG: 650 TABLET ORAL at 09:18

## 2021-01-01 RX ADMIN — SODIUM CHLORIDE, PRESERVATIVE FREE 10 ML: 5 INJECTION INTRAVENOUS at 20:19

## 2021-01-01 RX ADMIN — DIPHENHYDRAMINE HYDROCHLORIDE 50 MG: 50 INJECTION INTRAMUSCULAR; INTRAVENOUS at 10:13

## 2021-01-01 RX ADMIN — LIDOCAINE HYDROCHLORIDE 20 MG: 20 INJECTION, SOLUTION INTRAVENOUS at 07:42

## 2021-01-01 RX ADMIN — CALCITRIOL CAPSULES 0.25 MCG 1 MCG: 0.25 CAPSULE ORAL at 20:18

## 2021-01-01 RX ADMIN — SODIUM BICARBONATE 1300 MG: 650 TABLET ORAL at 22:47

## 2021-01-01 RX ADMIN — ALBUMIN (HUMAN) 25 G: 0.25 INJECTION, SOLUTION INTRAVENOUS at 20:34

## 2021-01-01 RX ADMIN — FUROSEMIDE 20 MG: 10 INJECTION, SOLUTION INTRAVENOUS at 11:04

## 2021-01-01 RX ADMIN — FAMOTIDINE 20 MG: 10 INJECTION, SOLUTION INTRAVENOUS at 12:40

## 2021-01-01 RX ADMIN — HEPARIN 500 UNITS: 100 SYRINGE at 12:09

## 2021-01-01 RX ADMIN — LIDOCAINE HYDROCHLORIDE 20 MG: 20 INJECTION, SOLUTION INTRAVENOUS at 10:43

## 2021-01-01 RX ADMIN — CALCIUM GLUCONATE 2000 MG: 20 INJECTION, SOLUTION INTRAVENOUS at 21:00

## 2021-01-01 RX ADMIN — CALCITRIOL CAPSULES 0.25 MCG 0.5 MCG: 0.25 CAPSULE ORAL at 09:06

## 2021-01-01 RX ADMIN — PROMETHAZINE HYDROCHLORIDE 12.5 MG: 25 TABLET ORAL at 23:42

## 2021-01-01 RX ADMIN — CALCIUM CARBONATE 500 MG: 500 TABLET, CHEWABLE ORAL at 08:36

## 2021-01-01 RX ADMIN — CEFAZOLIN 1 G: 1 INJECTION, POWDER, FOR SOLUTION INTRAMUSCULAR; INTRAVENOUS; PARENTERAL at 07:33

## 2021-01-01 RX ADMIN — FUROSEMIDE 40 MG: 10 INJECTION, SOLUTION INTRAVENOUS at 08:04

## 2021-01-01 RX ADMIN — CETIRIZINE HYDROCHLORIDE 10 MG: 10 TABLET, FILM COATED ORAL at 11:28

## 2021-01-01 RX ADMIN — SODIUM CHLORIDE, PRESERVATIVE FREE 10 ML: 5 INJECTION INTRAVENOUS at 11:09

## 2021-01-01 RX ADMIN — SODIUM CHLORIDE 20 ML/HR: 9 INJECTION, SOLUTION INTRAVENOUS at 11:47

## 2021-01-01 RX ADMIN — PROPOFOL 120 MG: 10 INJECTION, EMULSION INTRAVENOUS at 12:12

## 2021-01-01 RX ADMIN — CALCITRIOL CAPSULES 0.25 MCG 0.5 MCG: 0.25 CAPSULE ORAL at 09:53

## 2021-01-01 RX ADMIN — CALCIUM GLUCONATE 2000 MG: 20 INJECTION, SOLUTION INTRAVENOUS at 02:35

## 2021-01-01 RX ADMIN — MAGNESIUM OXIDE 400 MG (241.3 MG MAGNESIUM) TABLET 400 MG: TABLET at 09:08

## 2021-01-01 RX ADMIN — CEFEPIME HYDROCHLORIDE 1000 MG: 1 INJECTION, POWDER, FOR SOLUTION INTRAMUSCULAR; INTRAVENOUS at 06:26

## 2021-01-01 RX ADMIN — SODIUM BICARBONATE 650 MG: 650 TABLET ORAL at 17:32

## 2021-01-01 RX ADMIN — SODIUM CHLORIDE, PRESERVATIVE FREE 10 ML: 5 INJECTION INTRAVENOUS at 09:19

## 2021-01-01 RX ADMIN — HYDROCODONE BITARTRATE AND ACETAMINOPHEN 1 TABLET: 5; 325 TABLET ORAL at 17:22

## 2021-01-01 RX ADMIN — CALCIUM GLUCONATE 2000 MG: 20 INJECTION, SOLUTION INTRAVENOUS at 11:54

## 2021-01-01 RX ADMIN — MIDAZOLAM 2 MG: 1 INJECTION INTRAMUSCULAR; INTRAVENOUS at 07:40

## 2021-01-01 RX ADMIN — CALCIUM GLUCONATE 2000 MG: 20 INJECTION, SOLUTION INTRAVENOUS at 09:18

## 2021-01-01 RX ADMIN — CALCIUM CARBONATE 500 MG: 500 TABLET, CHEWABLE ORAL at 09:00

## 2021-01-01 RX ADMIN — ALBUMIN (HUMAN) 25 G: 0.25 INJECTION, SOLUTION INTRAVENOUS at 05:28

## 2021-01-01 RX ADMIN — ALBUMIN (HUMAN) 25 G: 0.25 INJECTION, SOLUTION INTRAVENOUS at 20:54

## 2021-01-01 RX ADMIN — SODIUM BICARBONATE 650 MG: 650 TABLET ORAL at 08:37

## 2021-01-01 RX ADMIN — PROPOFOL 240 MG: 10 INJECTION, EMULSION INTRAVENOUS at 17:12

## 2021-01-01 RX ADMIN — DEXAMETHASONE SODIUM PHOSPHATE 10 MG: 10 INJECTION, SOLUTION INTRAMUSCULAR; INTRAVENOUS at 11:58

## 2021-01-01 RX ADMIN — BUMETANIDE 2 MG: 0.25 INJECTION, SOLUTION INTRAMUSCULAR; INTRAVENOUS at 23:07

## 2021-01-01 RX ADMIN — PACLITAXEL 100 MG: 6 INJECTION, SOLUTION, CONCENTRATE INTRAVENOUS at 12:21

## 2021-01-01 RX ADMIN — FAMOTIDINE 20 MG: 10 INJECTION, SOLUTION INTRAVENOUS at 11:47

## 2021-01-01 RX ADMIN — DEXAMETHASONE SODIUM PHOSPHATE 10 MG: 10 INJECTION, SOLUTION INTRAMUSCULAR; INTRAVENOUS at 10:04

## 2021-01-01 RX ADMIN — CALCITRIOL CAPSULES 0.25 MCG 0.5 MCG: 0.25 CAPSULE ORAL at 22:12

## 2021-01-01 RX ADMIN — HEPARIN SODIUM 5000 UNITS: 5000 INJECTION INTRAVENOUS; SUBCUTANEOUS at 06:51

## 2021-01-01 RX ADMIN — CALCITRIOL CAPSULES 0.25 MCG 1 MCG: 0.25 CAPSULE ORAL at 09:33

## 2021-01-01 RX ADMIN — PANTOPRAZOLE SODIUM 40 MG: 40 TABLET, DELAYED RELEASE ORAL at 06:51

## 2021-01-01 RX ADMIN — HEPARIN SODIUM 5000 UNITS: 5000 INJECTION INTRAVENOUS; SUBCUTANEOUS at 22:48

## 2021-01-01 RX ADMIN — SODIUM CHLORIDE 20 ML/HR: 900 INJECTION, SOLUTION INTRAVENOUS at 11:53

## 2021-01-01 RX ADMIN — CALCITRIOL CAPSULES 0.25 MCG 0.5 MCG: 0.25 CAPSULE ORAL at 19:48

## 2021-01-01 RX ADMIN — SODIUM CITRATE AND CITRIC ACID MONOHYDRATE 30 ML: 500; 334 SOLUTION ORAL at 02:00

## 2021-01-01 RX ADMIN — ACETAMINOPHEN 650 MG: 325 TABLET ORAL at 19:55

## 2021-01-01 RX ADMIN — HYDROCODONE BITARTRATE AND ACETAMINOPHEN 1 TABLET: 5; 325 TABLET ORAL at 20:18

## 2021-01-01 RX ADMIN — CEFEPIME HYDROCHLORIDE 2000 MG: 2 INJECTION, POWDER, FOR SOLUTION INTRAVENOUS at 15:33

## 2021-01-01 RX ADMIN — SODIUM CHLORIDE, PRESERVATIVE FREE 10 ML: 5 INJECTION INTRAVENOUS at 22:53

## 2021-01-01 RX ADMIN — DEXAMETHASONE SODIUM PHOSPHATE 10 MG: 10 INJECTION, SOLUTION INTRAMUSCULAR; INTRAVENOUS at 10:35

## 2021-01-01 RX ADMIN — DENOSUMAB 120 MG: 120 INJECTION SUBCUTANEOUS at 14:53

## 2021-01-01 RX ADMIN — CALCIUM CARBONATE 500 MG: 500 TABLET, CHEWABLE ORAL at 14:43

## 2021-01-01 RX ADMIN — HEPARIN SODIUM 5000 UNITS: 5000 INJECTION INTRAVENOUS; SUBCUTANEOUS at 05:58

## 2021-01-01 RX ADMIN — Medication 500 UNITS: at 11:06

## 2021-01-01 RX ADMIN — METOPROLOL SUCCINATE 100 MG: 50 TABLET, EXTENDED RELEASE ORAL at 10:26

## 2021-01-01 RX ADMIN — CALCIUM GLUCONATE 2000 MG: 20 INJECTION, SOLUTION INTRAVENOUS at 08:38

## 2021-01-01 RX ADMIN — CEFEPIME HYDROCHLORIDE 2000 MG: 2 INJECTION, POWDER, FOR SOLUTION INTRAVENOUS at 01:06

## 2021-01-01 RX ADMIN — SODIUM CHLORIDE, PRESERVATIVE FREE 10 ML: 5 INJECTION INTRAVENOUS at 22:12

## 2021-01-01 RX ADMIN — SODIUM CHLORIDE, PRESERVATIVE FREE 10 ML: 5 INJECTION INTRAVENOUS at 11:57

## 2021-01-01 RX ADMIN — CALCITRIOL CAPSULES 0.25 MCG 1 MCG: 0.25 CAPSULE ORAL at 09:18

## 2021-01-01 RX ADMIN — FUROSEMIDE 80 MG: 40 TABLET ORAL at 10:25

## 2021-01-01 RX ADMIN — HEPARIN SODIUM 5000 UNITS: 5000 INJECTION INTRAVENOUS; SUBCUTANEOUS at 05:52

## 2021-01-01 RX ADMIN — FUROSEMIDE 80 MG: 40 TABLET ORAL at 10:09

## 2021-01-01 RX ADMIN — METHYLPREDNISOLONE SODIUM SUCCINATE 20 MG: 40 INJECTION, POWDER, FOR SOLUTION INTRAMUSCULAR; INTRAVENOUS at 08:11

## 2021-01-01 RX ADMIN — FUROSEMIDE 80 MG: 40 TABLET ORAL at 20:18

## 2021-01-01 RX ADMIN — SODIUM BICARBONATE 650 MG: 650 TABLET ORAL at 20:55

## 2021-01-01 RX ADMIN — DIPHENHYDRAMINE HYDROCHLORIDE 50 MG: 50 INJECTION INTRAMUSCULAR; INTRAVENOUS at 12:40

## 2021-01-01 RX ADMIN — SODIUM CHLORIDE, PRESERVATIVE FREE 10 ML: 5 INJECTION INTRAVENOUS at 09:14

## 2021-01-01 RX ADMIN — PROPOFOL 50 MCG/KG/MIN: 10 INJECTION, EMULSION INTRAVENOUS at 10:21

## 2021-01-01 RX ADMIN — ONDANSETRON 4 MG: 2 INJECTION INTRAMUSCULAR; INTRAVENOUS at 00:49

## 2021-01-01 RX ADMIN — SODIUM CHLORIDE, PRESERVATIVE FREE 10 ML: 5 INJECTION INTRAVENOUS at 20:05

## 2021-01-01 RX ADMIN — DIPHENHYDRAMINE HYDROCHLORIDE 50 MG: 50 INJECTION INTRAMUSCULAR; INTRAVENOUS at 11:47

## 2021-01-01 RX ADMIN — CALCIUM CARBONATE 1000 MG: 500 TABLET, CHEWABLE ORAL at 12:16

## 2021-01-01 RX ADMIN — FUROSEMIDE 40 MG: 10 INJECTION, SOLUTION INTRAVENOUS at 10:22

## 2021-01-01 RX ADMIN — DEXAMETHASONE SODIUM PHOSPHATE 6 MG: 10 INJECTION INTRAMUSCULAR; INTRAVENOUS at 09:02

## 2021-01-01 RX ADMIN — BUMETANIDE 2 MG/HR: 0.25 INJECTION INTRAMUSCULAR; INTRAVENOUS at 20:26

## 2021-01-01 RX ADMIN — SODIUM CHLORIDE 20 ML/HR: 9 INJECTION, SOLUTION INTRAVENOUS at 12:40

## 2021-01-01 RX ADMIN — SODIUM CHLORIDE: 9 INJECTION, SOLUTION INTRAVENOUS at 01:38

## 2021-01-01 RX ADMIN — DEXAMETHASONE SODIUM PHOSPHATE 8 MG: 4 INJECTION, SOLUTION INTRAMUSCULAR; INTRAVENOUS at 07:49

## 2021-01-01 RX ADMIN — PANTOPRAZOLE SODIUM 40 MG: 40 TABLET, DELAYED RELEASE ORAL at 05:52

## 2021-01-01 RX ADMIN — LORAZEPAM 0.5 MG: 0.5 TABLET ORAL at 09:33

## 2021-01-01 RX ADMIN — METHYLPREDNISOLONE SODIUM SUCCINATE 20 MG: 40 INJECTION, POWDER, LYOPHILIZED, FOR SOLUTION INTRAMUSCULAR; INTRAVENOUS at 08:11

## 2021-01-01 RX ADMIN — LIDOCAINE HYDROCHLORIDE 20 MG: 20 INJECTION, SOLUTION INTRAVENOUS at 10:16

## 2021-01-01 RX ADMIN — SODIUM CHLORIDE, PRESERVATIVE FREE 10 ML: 5 INJECTION INTRAVENOUS at 13:34

## 2021-01-01 RX ADMIN — SODIUM CHLORIDE 250 ML: 9 INJECTION, SOLUTION INTRAVENOUS at 09:04

## 2021-01-01 RX ADMIN — METOPROLOL SUCCINATE 100 MG: 50 TABLET, EXTENDED RELEASE ORAL at 09:55

## 2021-01-01 RX ADMIN — SODIUM CHLORIDE, PRESERVATIVE FREE 20 ML: 5 INJECTION INTRAVENOUS at 11:54

## 2021-01-01 RX ADMIN — FUROSEMIDE 80 MG: 40 TABLET ORAL at 00:30

## 2021-01-01 RX ADMIN — HYDROMORPHONE HYDROCHLORIDE 0.2 MG: 2 INJECTION INTRAMUSCULAR; INTRAVENOUS; SUBCUTANEOUS at 08:34

## 2021-01-01 RX ADMIN — DEXAMETHASONE SODIUM PHOSPHATE 10 MG: 10 INJECTION INTRAMUSCULAR; INTRAVENOUS at 12:41

## 2021-01-01 RX ADMIN — HEPARIN SODIUM 5000 UNITS: 5000 INJECTION INTRAVENOUS; SUBCUTANEOUS at 00:10

## 2021-01-01 RX ADMIN — HYDROCODONE BITARTRATE AND ACETAMINOPHEN 1 TABLET: 5; 325 TABLET ORAL at 23:35

## 2021-01-01 RX ADMIN — HEPARIN SODIUM 5000 UNITS: 5000 INJECTION INTRAVENOUS; SUBCUTANEOUS at 06:54

## 2021-01-01 RX ADMIN — CALCITRIOL CAPSULES 0.25 MCG 0.5 MCG: 0.25 CAPSULE ORAL at 20:18

## 2021-01-01 RX ADMIN — DIPHENHYDRAMINE HYDROCHLORIDE 50 MG: 50 INJECTION, SOLUTION INTRAMUSCULAR; INTRAVENOUS at 14:37

## 2021-01-01 RX ADMIN — CETIRIZINE HYDROCHLORIDE 10 MG: 10 TABLET, FILM COATED ORAL at 21:05

## 2021-01-01 RX ADMIN — SODIUM CHLORIDE, PRESERVATIVE FREE 10 ML: 5 INJECTION INTRAVENOUS at 21:05

## 2021-01-01 RX ADMIN — SODIUM CHLORIDE, PRESERVATIVE FREE 10 ML: 5 INJECTION INTRAVENOUS at 20:27

## 2021-01-01 RX ADMIN — CETIRIZINE HYDROCHLORIDE 10 MG: 10 TABLET, FILM COATED ORAL at 20:52

## 2021-01-01 RX ADMIN — METOPROLOL SUCCINATE 100 MG: 50 TABLET, EXTENDED RELEASE ORAL at 21:05

## 2021-01-01 RX ADMIN — ONDANSETRON 4 MG: 2 INJECTION INTRAMUSCULAR; INTRAVENOUS at 10:16

## 2021-01-01 RX ADMIN — ONDANSETRON 4 MG: 2 INJECTION INTRAMUSCULAR; INTRAVENOUS at 08:08

## 2021-01-01 RX ADMIN — METOPROLOL SUCCINATE 100 MG: 100 TABLET, EXTENDED RELEASE ORAL at 08:04

## 2021-01-01 RX ADMIN — HEPARIN SODIUM 5000 UNITS: 5000 INJECTION INTRAVENOUS; SUBCUTANEOUS at 23:59

## 2021-01-01 RX ADMIN — FUROSEMIDE 80 MG: 40 TABLET ORAL at 20:26

## 2021-01-01 RX ADMIN — SODIUM BICARBONATE 1300 MG: 650 TABLET ORAL at 08:04

## 2021-01-01 RX ADMIN — CALCIUM CARBONATE 750 MG: 500 TABLET, CHEWABLE ORAL at 09:08

## 2021-01-01 RX ADMIN — SODIUM CHLORIDE: 9 INJECTION, SOLUTION INTRAVENOUS at 10:25

## 2021-01-01 RX ADMIN — HEPARIN SODIUM 5000 UNITS: 5000 INJECTION, SOLUTION INTRAVENOUS; SUBCUTANEOUS at 15:51

## 2021-01-01 RX ADMIN — SODIUM BICARBONATE 1300 MG: 650 TABLET ORAL at 11:08

## 2021-01-01 RX ADMIN — HEPARIN SODIUM 5000 UNITS: 5000 INJECTION INTRAVENOUS; SUBCUTANEOUS at 22:41

## 2021-01-01 RX ADMIN — INSULIN HUMAN 5 UNITS: 100 INJECTION, SOLUTION PARENTERAL at 00:10

## 2021-01-01 RX ADMIN — HEPARIN SODIUM 5000 UNITS: 5000 INJECTION INTRAVENOUS; SUBCUTANEOUS at 13:52

## 2021-01-01 RX ADMIN — CEFAZOLIN 1000 MG: 1 INJECTION, POWDER, FOR SOLUTION INTRAMUSCULAR; INTRAVENOUS; PARENTERAL at 08:12

## 2021-01-01 RX ADMIN — SODIUM BICARBONATE 650 MG: 650 TABLET ORAL at 00:29

## 2021-01-01 RX ADMIN — LIDOCAINE HYDROCHLORIDE 100 MG: 20 INJECTION, SOLUTION INTRAVENOUS at 07:45

## 2021-01-01 RX ADMIN — SODIUM CHLORIDE, PRESERVATIVE FREE 10 ML: 5 INJECTION INTRAVENOUS at 16:15

## 2021-01-01 RX ADMIN — LORAZEPAM 0.5 MG: 2 INJECTION INTRAMUSCULAR; INTRAVENOUS at 23:04

## 2021-01-01 RX ADMIN — DEXTROSE MONOHYDRATE 750 MG: 50 INJECTION, SOLUTION INTRAVENOUS at 17:11

## 2021-01-01 RX ADMIN — METOPROLOL SUCCINATE 100 MG: 50 TABLET, EXTENDED RELEASE ORAL at 08:26

## 2021-01-01 RX ADMIN — CALCIUM CARBONATE 750 MG: 500 TABLET, CHEWABLE ORAL at 16:38

## 2021-01-01 RX ADMIN — PANTOPRAZOLE SODIUM 40 MG: 40 TABLET, DELAYED RELEASE ORAL at 05:26

## 2021-01-01 RX ADMIN — CALCITRIOL CAPSULES 0.25 MCG 1 MCG: 0.25 CAPSULE ORAL at 00:30

## 2021-01-01 RX ADMIN — HEPARIN SODIUM 5000 UNITS: 5000 INJECTION INTRAVENOUS; SUBCUTANEOUS at 21:20

## 2021-01-01 RX ADMIN — DIPHENHYDRAMINE HYDROCHLORIDE 50 MG: 50 INJECTION INTRAMUSCULAR; INTRAVENOUS at 14:41

## 2021-01-01 RX ADMIN — FUROSEMIDE 80 MG: 10 INJECTION, SOLUTION INTRAVENOUS at 09:05

## 2021-01-01 RX ADMIN — FUROSEMIDE 80 MG: 40 TABLET ORAL at 09:57

## 2021-01-01 RX ADMIN — SODIUM BICARBONATE 1300 MG: 650 TABLET ORAL at 19:47

## 2021-01-01 RX ADMIN — SODIUM CHLORIDE, PRESERVATIVE FREE 10 ML: 5 INJECTION INTRAVENOUS at 09:59

## 2021-01-01 RX ADMIN — HYDROCODONE BITARTRATE AND ACETAMINOPHEN 1 TABLET: 5; 325 TABLET ORAL at 03:21

## 2021-01-01 RX ADMIN — HYDROCODONE BITARTRATE AND ACETAMINOPHEN 1 TABLET: 5; 325 TABLET ORAL at 22:08

## 2021-01-01 RX ADMIN — SODIUM CITRATE AND CITRIC ACID MONOHYDRATE 30 ML: 500; 334 SOLUTION ORAL at 22:07

## 2021-01-01 RX ADMIN — SODIUM BICARBONATE 650 MG: 650 TABLET ORAL at 20:18

## 2021-01-01 RX ADMIN — CEFEPIME HYDROCHLORIDE 2000 MG: 2 INJECTION, POWDER, FOR SOLUTION INTRAVENOUS at 00:37

## 2021-01-01 RX ADMIN — FAMOTIDINE 20 MG: 10 INJECTION, SOLUTION INTRAVENOUS at 11:13

## 2021-01-01 RX ADMIN — CALCIUM GLUCONATE 2000 MG: 20 INJECTION, SOLUTION INTRAVENOUS at 22:59

## 2021-01-01 RX ADMIN — PROPOFOL 120 MCG/KG/MIN: 10 INJECTION, EMULSION INTRAVENOUS at 07:41

## 2021-01-01 RX ADMIN — SODIUM CHLORIDE, PRESERVATIVE FREE 10 ML: 5 INJECTION INTRAVENOUS at 11:49

## 2021-01-01 RX ADMIN — SODIUM BICARBONATE 650 MG: 650 TABLET ORAL at 21:42

## 2021-01-01 RX ADMIN — CALCIUM GLUCONATE 1000 MG: 20 INJECTION, SOLUTION INTRAVENOUS at 17:24

## 2021-01-01 RX ADMIN — PACLITAXEL 114 MG: 6 INJECTION, SOLUTION, CONCENTRATE INTRAVENOUS at 10:59

## 2021-01-01 RX ADMIN — SODIUM CHLORIDE, PRESERVATIVE FREE 10 ML: 5 INJECTION INTRAVENOUS at 11:28

## 2021-01-01 RX ADMIN — CALCITRIOL CAPSULES 0.25 MCG 1 MCG: 0.25 CAPSULE ORAL at 20:26

## 2021-01-01 RX ADMIN — BUMETANIDE 2 MG: 0.25 INJECTION, SOLUTION INTRAMUSCULAR; INTRAVENOUS at 11:08

## 2021-01-01 RX ADMIN — SODIUM CHLORIDE 20 ML/HR: 9 INJECTION, SOLUTION INTRAVENOUS at 14:38

## 2021-01-01 RX ADMIN — DEXAMETHASONE SODIUM PHOSPHATE 6 MG: 10 INJECTION INTRAMUSCULAR; INTRAVENOUS at 09:54

## 2021-01-01 RX ADMIN — ACETAMINOPHEN 650 MG: 325 TABLET ORAL at 09:28

## 2021-01-01 RX ADMIN — SODIUM CHLORIDE: 9 INJECTION, SOLUTION INTRAVENOUS at 18:52

## 2021-01-01 RX ADMIN — SODIUM CHLORIDE 20 ML/HR: 9 INJECTION, SOLUTION INTRAVENOUS at 10:40

## 2021-01-01 RX ADMIN — METOPROLOL SUCCINATE 100 MG: 50 TABLET, EXTENDED RELEASE ORAL at 09:07

## 2021-01-01 RX ADMIN — CETIRIZINE HYDROCHLORIDE 10 MG: 10 TABLET, FILM COATED ORAL at 08:22

## 2021-01-01 RX ADMIN — CALCITRIOL CAPSULES 0.25 MCG 1 MCG: 0.25 CAPSULE ORAL at 08:37

## 2021-01-01 RX ADMIN — METOPROLOL SUCCINATE 100 MG: 50 TABLET, EXTENDED RELEASE ORAL at 09:33

## 2021-01-01 RX ADMIN — CALCIUM CARBONATE 500 MG: 500 TABLET, CHEWABLE ORAL at 20:28

## 2021-01-01 RX ADMIN — CALCIUM GLUCONATE: 98 INJECTION, SOLUTION INTRAVENOUS at 12:45

## 2021-01-01 RX ADMIN — SODIUM CITRATE AND CITRIC ACID MONOHYDRATE 30 ML: 500; 334 SOLUTION ORAL at 21:00

## 2021-01-01 RX ADMIN — SODIUM CHLORIDE, PRESERVATIVE FREE 10 ML: 5 INJECTION INTRAVENOUS at 22:08

## 2021-01-01 RX ADMIN — CEFEPIME 2000 MG: 2 INJECTION, POWDER, FOR SOLUTION INTRAVENOUS at 16:25

## 2021-01-01 RX ADMIN — ALBUMIN (HUMAN) 25 G: 0.25 INJECTION, SOLUTION INTRAVENOUS at 05:11

## 2021-01-01 RX ADMIN — SODIUM BICARBONATE 650 MG: 650 TABLET ORAL at 09:51

## 2021-01-01 RX ADMIN — MIDAZOLAM 2 MG: 1 INJECTION INTRAMUSCULAR; INTRAVENOUS at 07:41

## 2021-01-01 RX ADMIN — SODIUM BICARBONATE 1300 MG: 650 TABLET ORAL at 14:01

## 2021-01-01 RX ADMIN — HYDROMORPHONE HYDROCHLORIDE 0.2 MG: 2 INJECTION INTRAMUSCULAR; INTRAVENOUS; SUBCUTANEOUS at 08:30

## 2021-01-01 RX ADMIN — SODIUM CHLORIDE 1000 ML: 9 INJECTION, SOLUTION INTRAVENOUS at 16:25

## 2021-01-01 RX ADMIN — DEXAMETHASONE SODIUM PHOSPHATE 8 MG: 4 INJECTION, SOLUTION INTRAMUSCULAR; INTRAVENOUS at 08:20

## 2021-01-01 RX ADMIN — SODIUM BICARBONATE 650 MG: 650 TABLET ORAL at 09:24

## 2021-01-01 RX ADMIN — METHYLPREDNISOLONE SODIUM SUCCINATE 20 MG: 40 INJECTION, POWDER, LYOPHILIZED, FOR SOLUTION INTRAMUSCULAR; INTRAVENOUS at 09:18

## 2021-01-01 RX ADMIN — AZITHROMYCIN MONOHYDRATE 500 MG: 500 INJECTION, POWDER, LYOPHILIZED, FOR SOLUTION INTRAVENOUS at 01:11

## 2021-01-01 RX ADMIN — LORAZEPAM 0.5 MG: 0.5 TABLET ORAL at 06:39

## 2021-01-01 RX ADMIN — MAGNESIUM OXIDE 400 MG (241.3 MG MAGNESIUM) TABLET 400 MG: TABLET at 09:18

## 2021-01-01 RX ADMIN — HEPARIN 500 UNITS: 100 SYRINGE at 16:15

## 2021-01-01 RX ADMIN — SODIUM CHLORIDE, PRESERVATIVE FREE 20 ML: 5 INJECTION INTRAVENOUS at 08:20

## 2021-01-01 RX ADMIN — SODIUM CHLORIDE, PRESERVATIVE FREE 10 ML: 5 INJECTION INTRAVENOUS at 21:00

## 2021-01-01 RX ADMIN — HEPARIN 500 UNITS: 100 SYRINGE at 13:17

## 2021-01-01 RX ADMIN — FAMOTIDINE 20 MG: 10 INJECTION, SOLUTION INTRAVENOUS at 11:44

## 2021-01-01 RX ADMIN — HEPARIN SODIUM 5000 UNITS: 5000 INJECTION INTRAVENOUS; SUBCUTANEOUS at 05:30

## 2021-01-01 RX ADMIN — HEPARIN 500 UNITS: 100 SYRINGE at 11:49

## 2021-01-01 RX ADMIN — CALCIUM GLUCONATE 2000 MG: 20 INJECTION, SOLUTION INTRAVENOUS at 09:54

## 2021-01-01 RX ADMIN — ACETAMINOPHEN 650 MG: 325 TABLET ORAL at 13:26

## 2021-01-01 RX ADMIN — LORAZEPAM 0.5 MG: 0.5 TABLET ORAL at 20:28

## 2021-01-01 RX ADMIN — CALCIUM CARBONATE 750 MG: 500 TABLET, CHEWABLE ORAL at 16:15

## 2021-01-01 RX ADMIN — SODIUM BICARBONATE: 84 INJECTION, SOLUTION INTRAVENOUS at 08:39

## 2021-01-01 RX ADMIN — CALCIUM CARBONATE 750 MG: 500 TABLET, CHEWABLE ORAL at 18:47

## 2021-01-01 RX ADMIN — VANCOMYCIN HYDROCHLORIDE 1000 MG: 1 INJECTION, POWDER, LYOPHILIZED, FOR SOLUTION INTRAVENOUS at 19:06

## 2021-01-01 RX ADMIN — CALCITRIOL CAPSULES 0.25 MCG 1 MCG: 0.25 CAPSULE ORAL at 22:07

## 2021-01-01 RX ADMIN — FAMOTIDINE 20 MG: 10 INJECTION, SOLUTION INTRAVENOUS at 10:12

## 2021-01-01 RX ADMIN — SODIUM CHLORIDE, PRESERVATIVE FREE 10 ML: 5 INJECTION INTRAVENOUS at 08:23

## 2021-01-01 RX ADMIN — METOPROLOL SUCCINATE 100 MG: 50 TABLET, EXTENDED RELEASE ORAL at 10:08

## 2021-01-01 RX ADMIN — ACETAMINOPHEN 650 MG: 325 TABLET ORAL at 09:04

## 2021-01-01 RX ADMIN — SUGAMMADEX 200 MG: 100 INJECTION, SOLUTION INTRAVENOUS at 08:57

## 2021-01-01 RX ADMIN — SODIUM BICARBONATE 650 MG: 650 TABLET ORAL at 22:12

## 2021-01-01 RX ADMIN — METOPROLOL SUCCINATE 100 MG: 50 TABLET, EXTENDED RELEASE ORAL at 09:19

## 2021-01-01 RX ADMIN — ONDANSETRON 4 MG: 2 INJECTION INTRAMUSCULAR; INTRAVENOUS at 17:42

## 2021-01-01 RX ADMIN — CALCIUM CARBONATE 500 MG: 500 TABLET, CHEWABLE ORAL at 19:46

## 2021-01-01 RX ADMIN — CALCITRIOL CAPSULES 0.25 MCG 0.5 MCG: 0.25 CAPSULE ORAL at 16:16

## 2021-01-01 RX ADMIN — SODIUM CHLORIDE, POTASSIUM CHLORIDE, SODIUM LACTATE AND CALCIUM CHLORIDE: 600; 310; 30; 20 INJECTION, SOLUTION INTRAVENOUS at 12:03

## 2021-01-01 RX ADMIN — Medication 500 UNITS: at 11:04

## 2021-01-01 RX ADMIN — CALCIUM CARBONATE 750 MG: 500 TABLET, CHEWABLE ORAL at 13:53

## 2021-01-01 RX ADMIN — FENTANYL CITRATE 50 MCG: 50 INJECTION, SOLUTION INTRAMUSCULAR; INTRAVENOUS at 11:00

## 2021-01-01 RX ADMIN — CALCITRIOL CAPSULES 0.25 MCG 0.5 MCG: 0.25 CAPSULE ORAL at 08:04

## 2021-01-01 RX ADMIN — HEPARIN SODIUM 2600 UNITS: 1000 INJECTION INTRAVENOUS; SUBCUTANEOUS at 11:25

## 2021-01-01 RX ADMIN — METOPROLOL SUCCINATE 100 MG: 50 TABLET, EXTENDED RELEASE ORAL at 08:18

## 2021-01-01 RX ADMIN — SODIUM CHLORIDE 1000 ML: 9 INJECTION, SOLUTION INTRAVENOUS at 12:42

## 2021-01-01 RX ADMIN — MAGNESIUM OXIDE 400 MG (241.3 MG MAGNESIUM) TABLET 400 MG: TABLET at 09:51

## 2021-01-01 RX ADMIN — CALCIUM CARBONATE 750 MG: 500 TABLET, CHEWABLE ORAL at 12:39

## 2021-01-01 RX ADMIN — SODIUM CITRATE AND CITRIC ACID MONOHYDRATE 30 ML: 500; 334 SOLUTION ORAL at 12:16

## 2021-01-01 RX ADMIN — CEFEPIME HYDROCHLORIDE 1000 MG: 1 INJECTION, POWDER, FOR SOLUTION INTRAMUSCULAR; INTRAVENOUS at 15:54

## 2021-01-01 RX ADMIN — HYDROMORPHONE HYDROCHLORIDE 0.5 MG: 1 INJECTION, SOLUTION INTRAMUSCULAR; INTRAVENOUS; SUBCUTANEOUS at 03:53

## 2021-01-01 RX ADMIN — DEXAMETHASONE SODIUM PHOSPHATE 10 MG: 10 INJECTION, SOLUTION INTRAMUSCULAR; INTRAVENOUS at 10:40

## 2021-01-01 RX ADMIN — LORAZEPAM 0.5 MG: 0.5 TABLET ORAL at 00:30

## 2021-01-01 RX ADMIN — SODIUM BICARBONATE: 84 INJECTION, SOLUTION INTRAVENOUS at 08:22

## 2021-01-01 RX ADMIN — METOPROLOL SUCCINATE 100 MG: 50 TABLET, EXTENDED RELEASE ORAL at 09:57

## 2021-01-01 RX ADMIN — CALCIUM CARBONATE 750 MG: 500 TABLET, CHEWABLE ORAL at 10:11

## 2021-01-01 RX ADMIN — SODIUM CITRATE AND CITRIC ACID MONOHYDRATE 30 ML: 500; 334 SOLUTION ORAL at 00:12

## 2021-01-01 RX ADMIN — SODIUM CHLORIDE: 9 INJECTION, SOLUTION INTRAVENOUS at 10:28

## 2021-01-01 RX ADMIN — SODIUM ZIRCONIUM CYCLOSILICATE 10 G: 10 POWDER, FOR SUSPENSION ORAL at 10:27

## 2021-01-01 RX ADMIN — CALCIUM GLUCONATE 2000 MG: 20 INJECTION, SOLUTION INTRAVENOUS at 06:30

## 2021-01-01 RX ADMIN — SODIUM CHLORIDE, PRESERVATIVE FREE 10 ML: 5 INJECTION INTRAVENOUS at 09:52

## 2021-01-01 RX ADMIN — ACETAMINOPHEN 650 MG: 650 SUPPOSITORY RECTAL at 08:59

## 2021-01-01 RX ADMIN — SODIUM CHLORIDE, PRESERVATIVE FREE 10 ML: 5 INJECTION INTRAVENOUS at 08:38

## 2021-01-01 RX ADMIN — HYDROCODONE BITARTRATE AND ACETAMINOPHEN 1 TABLET: 5; 325 TABLET ORAL at 09:07

## 2021-01-01 RX ADMIN — HYDROMORPHONE HYDROCHLORIDE 0.5 MG: 1 INJECTION, SOLUTION INTRAMUSCULAR; INTRAVENOUS; SUBCUTANEOUS at 23:22

## 2021-01-01 RX ADMIN — DEXAMETHASONE SODIUM PHOSPHATE 10 MG: 10 INJECTION, SOLUTION INTRAMUSCULAR; INTRAVENOUS at 11:03

## 2021-01-01 RX ADMIN — CEFEPIME HYDROCHLORIDE 2000 MG: 2 INJECTION, POWDER, FOR SOLUTION INTRAVENOUS at 01:19

## 2021-01-01 RX ADMIN — DIPHENHYDRAMINE HYDROCHLORIDE 50 MG: 50 INJECTION INTRAMUSCULAR; INTRAVENOUS at 10:31

## 2021-01-01 RX ADMIN — SODIUM BICARBONATE 650 MG: 650 TABLET ORAL at 14:43

## 2021-01-01 RX ADMIN — PACLITAXEL 120 MG: 6 INJECTION, SOLUTION INTRAVENOUS at 14:53

## 2021-01-01 RX ADMIN — PANTOPRAZOLE SODIUM 40 MG: 40 TABLET, DELAYED RELEASE ORAL at 06:03

## 2021-01-01 RX ADMIN — SODIUM CHLORIDE, PRESERVATIVE FREE 10 ML: 5 INJECTION INTRAVENOUS at 12:06

## 2021-01-01 RX ADMIN — METOPROLOL SUCCINATE 100 MG: 50 TABLET, EXTENDED RELEASE ORAL at 09:23

## 2021-01-01 RX ADMIN — SODIUM CHLORIDE, PRESERVATIVE FREE 10 ML: 5 INJECTION INTRAVENOUS at 21:52

## 2021-01-01 RX ADMIN — SODIUM ZIRCONIUM CYCLOSILICATE 10 G: 10 POWDER, FOR SUSPENSION ORAL at 13:10

## 2021-01-01 RX ADMIN — DIPHENHYDRAMINE HYDROCHLORIDE 25 MG: 25 TABLET ORAL at 08:11

## 2021-01-01 RX ADMIN — DEXAMETHASONE SODIUM PHOSPHATE 10 MG: 10 INJECTION, SOLUTION INTRAMUSCULAR; INTRAVENOUS at 10:12

## 2021-01-01 RX ADMIN — PANTOPRAZOLE SODIUM 40 MG: 40 TABLET, DELAYED RELEASE ORAL at 06:54

## 2021-01-01 RX ADMIN — SODIUM CHLORIDE 20 ML/HR: 9 INJECTION, SOLUTION INTRAVENOUS at 10:31

## 2021-01-01 RX ADMIN — CALCITRIOL CAPSULES 0.25 MCG 0.5 MCG: 0.25 CAPSULE ORAL at 21:41

## 2021-01-01 RX ADMIN — MORPHINE SULFATE 2 MG: 2 INJECTION, SOLUTION INTRAMUSCULAR; INTRAVENOUS at 17:47

## 2021-01-01 RX ADMIN — CALCIUM GLUCONATE 1000 MG: 98 INJECTION, SOLUTION INTRAVENOUS at 16:32

## 2021-01-01 RX ADMIN — METOPROLOL SUCCINATE 100 MG: 50 TABLET, EXTENDED RELEASE ORAL at 09:51

## 2021-01-01 RX ADMIN — HEPARIN 500 UNITS: 100 SYRINGE at 14:15

## 2021-01-01 RX ADMIN — FLUTICASONE PROPIONATE 1 SPRAY: 50 SPRAY, METERED NASAL at 09:33

## 2021-01-01 RX ADMIN — DIPHENHYDRAMINE HYDROCHLORIDE 50 MG: 50 INJECTION INTRAMUSCULAR; INTRAVENOUS at 10:04

## 2021-01-01 RX ADMIN — FUROSEMIDE 80 MG: 40 TABLET ORAL at 09:08

## 2021-01-01 RX ADMIN — PACLITAXEL 100 MG: 6 INJECTION, SOLUTION, CONCENTRATE INTRAVENOUS at 13:07

## 2021-01-01 RX ADMIN — DEXAMETHASONE SODIUM PHOSPHATE 6 MG: 10 INJECTION INTRAMUSCULAR; INTRAVENOUS at 08:26

## 2021-01-01 RX ADMIN — SODIUM BICARBONATE 650 MG: 650 TABLET ORAL at 10:26

## 2021-01-01 RX ADMIN — PANTOPRAZOLE SODIUM 40 MG: 40 TABLET, DELAYED RELEASE ORAL at 06:30

## 2021-01-01 RX ADMIN — SODIUM CHLORIDE, PRESERVATIVE FREE 10 ML: 5 INJECTION INTRAVENOUS at 21:43

## 2021-01-01 RX ADMIN — DIPHENHYDRAMINE HYDROCHLORIDE 50 MG: 50 INJECTION INTRAMUSCULAR; INTRAVENOUS at 11:43

## 2021-01-01 RX ADMIN — DEXAMETHASONE SODIUM PHOSPHATE 10 MG: 10 INJECTION, SOLUTION INTRAMUSCULAR; INTRAVENOUS at 10:31

## 2021-01-01 RX ADMIN — PROPOFOL 30 MG: 10 INJECTION, EMULSION INTRAVENOUS at 10:16

## 2021-01-01 RX ADMIN — METOPROLOL TARTRATE 25 MG: 25 TABLET, FILM COATED ORAL at 09:00

## 2021-01-01 RX ADMIN — SODIUM CHLORIDE 250 ML: 9 INJECTION, SOLUTION INTRAVENOUS at 08:21

## 2021-01-01 RX ADMIN — SODIUM CHLORIDE: 900 INJECTION INTRAVENOUS at 10:09

## 2021-01-01 RX ADMIN — CALCIUM GLUCONATE 2000 MG: 20 INJECTION, SOLUTION INTRAVENOUS at 20:28

## 2021-01-01 RX ADMIN — FUROSEMIDE 80 MG: 40 TABLET ORAL at 09:33

## 2021-01-01 RX ADMIN — SODIUM CHLORIDE, POTASSIUM CHLORIDE, SODIUM LACTATE AND CALCIUM CHLORIDE: 600; 310; 30; 20 INJECTION, SOLUTION INTRAVENOUS at 07:30

## 2021-01-01 RX ADMIN — HEPARIN SODIUM 18 UNITS/KG/HR: 10000 INJECTION, SOLUTION INTRAVENOUS at 13:57

## 2021-01-01 RX ADMIN — CETIRIZINE HYDROCHLORIDE 10 MG: 10 TABLET, FILM COATED ORAL at 08:36

## 2021-01-01 RX ADMIN — CALCIUM CARBONATE 750 MG: 500 TABLET, CHEWABLE ORAL at 13:04

## 2021-01-01 RX ADMIN — ACETAMINOPHEN 650 MG: 650 SUPPOSITORY RECTAL at 13:44

## 2021-01-01 RX ADMIN — FENTANYL CITRATE 50 MCG: 50 INJECTION INTRAMUSCULAR; INTRAVENOUS at 07:56

## 2021-01-01 RX ADMIN — METHYLPREDNISOLONE SODIUM SUCCINATE 20 MG: 40 INJECTION, POWDER, FOR SOLUTION INTRAMUSCULAR; INTRAVENOUS at 09:04

## 2021-01-01 RX ADMIN — HEPARIN SODIUM 5000 UNITS: 5000 INJECTION INTRAVENOUS; SUBCUTANEOUS at 15:33

## 2021-01-01 RX ADMIN — ACETAMINOPHEN 650 MG: 650 SUPPOSITORY RECTAL at 21:06

## 2021-01-01 RX ADMIN — PROPOFOL 20 MG: 10 INJECTION, EMULSION INTRAVENOUS at 10:26

## 2021-01-01 RX ADMIN — BUMETANIDE 2 MG/HR: 0.25 INJECTION INTRAMUSCULAR; INTRAVENOUS at 12:53

## 2021-01-01 RX ADMIN — SODIUM BICARBONATE 650 MG: 650 TABLET ORAL at 09:08

## 2021-01-01 RX ADMIN — HEPARIN SODIUM 5000 UNITS: 5000 INJECTION INTRAVENOUS; SUBCUTANEOUS at 01:06

## 2021-01-01 RX ADMIN — SODIUM CHLORIDE 20 ML/HR: 9 INJECTION, SOLUTION INTRAVENOUS at 11:13

## 2021-01-01 RX ADMIN — SODIUM CHLORIDE, PRESERVATIVE FREE 10 ML: 5 INJECTION INTRAVENOUS at 09:02

## 2021-01-01 RX ADMIN — SODIUM CITRATE AND CITRIC ACID MONOHYDRATE 30 ML: 500; 334 SOLUTION ORAL at 14:27

## 2021-01-01 RX ADMIN — Medication 500 UNITS: at 11:49

## 2021-01-01 RX ADMIN — SODIUM BICARBONATE 650 MG TABLET 1300 MG: at 21:52

## 2021-01-01 RX ADMIN — CALCITRIOL CAPSULES 0.25 MCG 0.5 MCG: 0.25 CAPSULE ORAL at 10:26

## 2021-01-01 RX ADMIN — HYDROCODONE BITARTRATE AND ACETAMINOPHEN 1 TABLET: 5; 325 TABLET ORAL at 10:08

## 2021-01-01 RX ADMIN — CALCITRIOL CAPSULES 0.25 MCG 0.5 MCG: 0.25 CAPSULE ORAL at 09:23

## 2021-01-01 RX ADMIN — CALCIUM GLUCONATE 2000 MG: 20 INJECTION, SOLUTION INTRAVENOUS at 08:22

## 2021-01-01 RX ADMIN — Medication 500 UNITS: at 16:47

## 2021-01-01 RX ADMIN — SODIUM BICARBONATE 650 MG: 650 TABLET ORAL at 21:05

## 2021-01-01 RX ADMIN — PACLITAXEL 100 MG: 6 INJECTION, SOLUTION, CONCENTRATE INTRAVENOUS at 12:19

## 2021-01-01 RX ADMIN — FLUTICASONE PROPIONATE 1 SPRAY: 50 SPRAY, METERED NASAL at 20:05

## 2021-01-01 RX ADMIN — SODIUM CHLORIDE, PRESERVATIVE FREE 20 ML: 5 INJECTION INTRAVENOUS at 11:04

## 2021-01-01 RX ADMIN — HEPARIN SODIUM 5000 UNITS: 5000 INJECTION INTRAVENOUS; SUBCUTANEOUS at 05:26

## 2021-01-01 RX ADMIN — FUROSEMIDE 20 MG: 10 INJECTION, SOLUTION INTRAMUSCULAR; INTRAVENOUS at 11:48

## 2021-01-01 RX ADMIN — HYDROCODONE BITARTRATE AND ACETAMINOPHEN 1 TABLET: 5; 325 TABLET ORAL at 15:32

## 2021-01-01 RX ADMIN — FLUTICASONE PROPIONATE 1 SPRAY: 50 SPRAY, METERED NASAL at 08:40

## 2021-01-01 RX ADMIN — CALCITRIOL CAPSULES 0.25 MCG 0.5 MCG: 0.25 CAPSULE ORAL at 09:08

## 2021-01-01 RX ADMIN — MAGNESIUM OXIDE 400 MG (241.3 MG MAGNESIUM) TABLET 400 MG: TABLET at 09:00

## 2021-01-01 RX ADMIN — METOPROLOL SUCCINATE 50 MG: 50 TABLET, EXTENDED RELEASE ORAL at 13:52

## 2021-01-01 RX ADMIN — SODIUM CHLORIDE: 9 INJECTION, SOLUTION INTRAVENOUS at 11:30

## 2021-01-01 RX ADMIN — PACLITAXEL 100 MG: 6 INJECTION, SOLUTION INTRAVENOUS at 10:39

## 2021-01-01 RX ADMIN — LIDOCAINE HYDROCHLORIDE 5 ML: 20 INJECTION, SOLUTION EPIDURAL; INFILTRATION; INTRACAUDAL; PERINEURAL at 18:44

## 2021-01-01 RX ADMIN — HYDROMORPHONE HYDROCHLORIDE 0.5 MG: 1 INJECTION, SOLUTION INTRAMUSCULAR; INTRAVENOUS; SUBCUTANEOUS at 18:28

## 2021-01-01 RX ADMIN — CALCITRIOL CAPSULES 0.25 MCG 1 MCG: 0.25 CAPSULE ORAL at 10:22

## 2021-01-01 RX ADMIN — METHYLPREDNISOLONE SODIUM SUCCINATE 20 MG: 40 INJECTION, POWDER, FOR SOLUTION INTRAMUSCULAR; INTRAVENOUS at 13:26

## 2021-01-01 RX ADMIN — ACETAMINOPHEN 650 MG: 650 SUPPOSITORY RECTAL at 21:12

## 2021-01-01 RX ADMIN — MAGNESIUM GLUCONATE 500 MG ORAL TABLET 400 MG: 500 TABLET ORAL at 10:39

## 2021-01-01 RX ADMIN — HEPARIN SODIUM 5000 UNITS: 5000 INJECTION INTRAVENOUS; SUBCUTANEOUS at 14:17

## 2021-01-01 RX ADMIN — FAMOTIDINE 20 MG: 10 INJECTION, SOLUTION INTRAVENOUS at 10:13

## 2021-01-01 RX ADMIN — FAMOTIDINE 20 MG: 10 INJECTION, SOLUTION INTRAVENOUS at 10:32

## 2021-01-01 RX ADMIN — SODIUM CHLORIDE, PRESERVATIVE FREE 10 ML: 5 INJECTION INTRAVENOUS at 13:17

## 2021-01-01 RX ADMIN — SODIUM CHLORIDE 20 ML/HR: 900 INJECTION, SOLUTION INTRAVENOUS at 14:45

## 2021-01-01 RX ADMIN — Medication 1 TABLET: at 13:39

## 2021-01-01 RX ADMIN — CEFEPIME HYDROCHLORIDE 1000 MG: 1 INJECTION, POWDER, FOR SOLUTION INTRAMUSCULAR; INTRAVENOUS at 16:10

## 2021-01-01 RX ADMIN — SODIUM CITRATE AND CITRIC ACID MONOHYDRATE 30 ML: 500; 334 SOLUTION ORAL at 08:21

## 2021-01-01 RX ADMIN — FUROSEMIDE 40 MG: 10 INJECTION, SOLUTION INTRAVENOUS at 17:57

## 2021-01-01 RX ADMIN — DEXAMETHASONE SODIUM PHOSPHATE 10 MG: 10 INJECTION, SOLUTION INTRAMUSCULAR; INTRAVENOUS at 11:13

## 2021-01-01 RX ADMIN — DIPHENHYDRAMINE HYDROCHLORIDE 25 MG: 25 TABLET ORAL at 09:18

## 2021-01-01 RX ADMIN — SODIUM CHLORIDE: 9 INJECTION, SOLUTION INTRAVENOUS at 07:31

## 2021-01-01 RX ADMIN — HEPARIN 500 UNITS: 100 SYRINGE at 11:50

## 2021-01-01 RX ADMIN — METHYLPREDNISOLONE SODIUM SUCCINATE 20 MG: 40 INJECTION, POWDER, FOR SOLUTION INTRAMUSCULAR; INTRAVENOUS at 09:25

## 2021-01-01 RX ADMIN — Medication 500 UNITS: at 13:10

## 2021-01-01 RX ADMIN — SODIUM CHLORIDE 250 ML: 9 INJECTION, SOLUTION INTRAVENOUS at 09:20

## 2021-01-01 RX ADMIN — FAMOTIDINE 20 MG: 10 INJECTION, SOLUTION INTRAVENOUS at 10:31

## 2021-01-01 RX ADMIN — HEPARIN SODIUM 5000 UNITS: 5000 INJECTION INTRAVENOUS; SUBCUTANEOUS at 22:39

## 2021-01-01 RX ADMIN — SODIUM BICARBONATE 650 MG: 650 TABLET ORAL at 19:55

## 2021-01-01 RX ADMIN — CEFTRIAXONE 1000 MG: 1 INJECTION, POWDER, FOR SOLUTION INTRAMUSCULAR; INTRAVENOUS at 11:08

## 2021-01-01 RX ADMIN — Medication 25 MG: at 08:11

## 2021-01-01 RX ADMIN — SODIUM CHLORIDE, PRESERVATIVE FREE 10 ML: 5 INJECTION INTRAVENOUS at 13:30

## 2021-01-01 RX ADMIN — BUMETANIDE 2 MG: 0.25 INJECTION, SOLUTION INTRAMUSCULAR; INTRAVENOUS at 09:51

## 2021-01-01 RX ADMIN — HEPARIN SODIUM 5000 UNITS: 5000 INJECTION INTRAVENOUS; SUBCUTANEOUS at 20:48

## 2021-01-01 RX ADMIN — ACETAMINOPHEN 650 MG: 325 TABLET ORAL at 08:21

## 2021-01-01 RX ADMIN — AMLODIPINE BESYLATE 5 MG: 5 TABLET ORAL at 08:22

## 2021-01-01 RX ADMIN — HEPARIN 500 UNITS: 100 SYRINGE at 10:55

## 2021-01-01 RX ADMIN — BUMETANIDE 2 MG: 0.25 INJECTION, SOLUTION INTRAMUSCULAR; INTRAVENOUS at 14:26

## 2021-01-01 RX ADMIN — SODIUM CHLORIDE 1000 ML: 9 INJECTION, SOLUTION INTRAVENOUS at 16:30

## 2021-01-01 RX ADMIN — CALCIUM GLUCONATE 2000 MG: 20 INJECTION, SOLUTION INTRAVENOUS at 17:23

## 2021-01-01 RX ADMIN — DEXAMETHASONE SODIUM PHOSPHATE 10 MG: 10 INJECTION, SOLUTION INTRAMUSCULAR; INTRAVENOUS at 14:50

## 2021-01-01 RX ADMIN — SODIUM CHLORIDE, PRESERVATIVE FREE 10 ML: 5 INJECTION INTRAVENOUS at 21:42

## 2021-01-01 RX ADMIN — SODIUM CHLORIDE, PRESERVATIVE FREE 20 ML: 5 INJECTION INTRAVENOUS at 15:05

## 2021-01-01 RX ADMIN — METOPROLOL SUCCINATE 100 MG: 100 TABLET, EXTENDED RELEASE ORAL at 16:29

## 2021-01-01 RX ADMIN — HYDROMORPHONE HYDROCHLORIDE 0.5 MG: 1 INJECTION, SOLUTION INTRAMUSCULAR; INTRAVENOUS; SUBCUTANEOUS at 12:39

## 2021-01-01 RX ADMIN — HYDROCODONE BITARTRATE AND ACETAMINOPHEN 1 TABLET: 5; 325 TABLET ORAL at 11:55

## 2021-01-01 RX ADMIN — HYDROCODONE BITARTRATE AND ACETAMINOPHEN 1 TABLET: 5; 325 TABLET ORAL at 09:22

## 2021-01-01 RX ADMIN — HEPARIN SODIUM 5000 UNITS: 5000 INJECTION INTRAVENOUS; SUBCUTANEOUS at 22:53

## 2021-01-01 RX ADMIN — HEPARIN SODIUM 5000 UNITS: 5000 INJECTION INTRAVENOUS; SUBCUTANEOUS at 06:03

## 2021-01-01 RX ADMIN — CETIRIZINE HYDROCHLORIDE 10 MG: 10 TABLET, FILM COATED ORAL at 08:26

## 2021-01-01 RX ADMIN — HYDROCODONE BITARTRATE AND ACETAMINOPHEN 1 TABLET: 5; 325 TABLET ORAL at 16:31

## 2021-01-01 RX ADMIN — ACETAMINOPHEN 650 MG: 325 TABLET ORAL at 12:09

## 2021-01-01 RX ADMIN — SODIUM CHLORIDE, PRESERVATIVE FREE 10 ML: 5 INJECTION INTRAVENOUS at 09:00

## 2021-01-01 RX ADMIN — PACLITAXEL 100 MG: 6 INJECTION, SOLUTION INTRAVENOUS at 10:38

## 2021-01-01 RX ADMIN — CALCIUM CARBONATE (ANTACID) CHEW TAB 500 MG 1000 MG: 500 CHEW TAB at 10:22

## 2021-01-01 RX ADMIN — SODIUM CHLORIDE, PRESERVATIVE FREE 20 ML: 5 INJECTION INTRAVENOUS at 11:48

## 2021-01-01 RX ADMIN — HYDROCODONE BITARTRATE AND ACETAMINOPHEN 1 TABLET: 5; 325 TABLET ORAL at 11:14

## 2021-01-01 RX ADMIN — ONDANSETRON 4 MG: 2 INJECTION INTRAMUSCULAR; INTRAVENOUS at 08:00

## 2021-01-01 RX ADMIN — FENTANYL CITRATE 50 MCG: 50 INJECTION, SOLUTION INTRAMUSCULAR; INTRAVENOUS at 16:40

## 2021-01-01 RX ADMIN — BUMETANIDE 2 MG: 0.25 INJECTION, SOLUTION INTRAMUSCULAR; INTRAVENOUS at 16:18

## 2021-01-01 RX ADMIN — HEPARIN SODIUM 5000 UNITS: 5000 INJECTION INTRAVENOUS; SUBCUTANEOUS at 21:45

## 2021-01-01 RX ADMIN — SODIUM CHLORIDE 250 ML: 9 INJECTION, SOLUTION INTRAVENOUS at 08:10

## 2021-01-01 RX ADMIN — HEPARIN SODIUM 5000 UNITS: 5000 INJECTION INTRAVENOUS; SUBCUTANEOUS at 21:52

## 2021-01-01 RX ADMIN — PACLITAXEL 100 MG: 6 INJECTION, SOLUTION, CONCENTRATE INTRAVENOUS at 13:01

## 2021-01-01 RX ADMIN — PANTOPRAZOLE SODIUM 40 MG: 40 TABLET, DELAYED RELEASE ORAL at 06:14

## 2021-01-01 RX ADMIN — DIPHENHYDRAMINE HYDROCHLORIDE 50 MG: 50 INJECTION INTRAMUSCULAR; INTRAVENOUS at 10:35

## 2021-01-01 RX ADMIN — DIPHENHYDRAMINE HYDROCHLORIDE 25 MG: 25 TABLET ORAL at 08:21

## 2021-01-01 RX ADMIN — HYDROCODONE BITARTRATE AND ACETAMINOPHEN 1 TABLET: 5; 325 TABLET ORAL at 01:08

## 2021-01-01 RX ADMIN — CETIRIZINE HYDROCHLORIDE 10 MG: 10 TABLET, FILM COATED ORAL at 09:24

## 2021-01-01 RX ADMIN — CEFTRIAXONE 1000 MG: 1 INJECTION, POWDER, FOR SOLUTION INTRAMUSCULAR; INTRAVENOUS at 19:31

## 2021-01-01 RX ADMIN — HYDROCODONE BITARTRATE AND ACETAMINOPHEN 1 TABLET: 5; 325 TABLET ORAL at 15:27

## 2021-01-01 RX ADMIN — SODIUM CHLORIDE, PRESERVATIVE FREE 10 ML: 5 INJECTION INTRAVENOUS at 09:33

## 2021-01-01 RX ADMIN — CETIRIZINE HYDROCHLORIDE 10 MG: 10 TABLET, FILM COATED ORAL at 10:10

## 2021-01-01 RX ADMIN — SODIUM CHLORIDE, PRESERVATIVE FREE 10 ML: 5 INJECTION INTRAVENOUS at 12:09

## 2021-01-01 RX ADMIN — HEPARIN SODIUM 5000 UNITS: 5000 INJECTION INTRAVENOUS; SUBCUTANEOUS at 13:10

## 2021-01-01 RX ADMIN — SODIUM CHLORIDE: 9 INJECTION, SOLUTION INTRAVENOUS at 01:05

## 2021-01-01 RX ADMIN — DIPHENHYDRAMINE HYDROCHLORIDE 25 MG: 25 TABLET ORAL at 09:28

## 2021-01-01 RX ADMIN — SODIUM CHLORIDE: 9 INJECTION, SOLUTION INTRAVENOUS at 21:05

## 2021-01-01 RX ADMIN — SODIUM CHLORIDE, PRESERVATIVE FREE 10 ML: 5 INJECTION INTRAVENOUS at 09:20

## 2021-01-01 RX ADMIN — CALCIUM GLUCONATE 2000 MG: 20 INJECTION, SOLUTION INTRAVENOUS at 13:10

## 2021-01-01 RX ADMIN — ROCURONIUM BROMIDE 50 MG: 10 INJECTION INTRAVENOUS at 07:45

## 2021-01-01 RX ADMIN — SODIUM CHLORIDE 20 ML/HR: 9 INJECTION, SOLUTION INTRAVENOUS at 11:06

## 2021-01-01 RX ADMIN — HEPARIN SODIUM 5000 UNITS: 5000 INJECTION INTRAVENOUS; SUBCUTANEOUS at 21:43

## 2021-01-01 RX ADMIN — CALCIUM GLUCONATE: 98 INJECTION, SOLUTION INTRAVENOUS at 17:11

## 2021-01-01 RX ADMIN — METOPROLOL SUCCINATE 100 MG: 100 TABLET, EXTENDED RELEASE ORAL at 10:23

## 2021-01-01 RX ADMIN — DEXTROSE MONOHYDRATE 750 MG: 50 INJECTION, SOLUTION INTRAVENOUS at 11:04

## 2021-01-01 RX ADMIN — DEXAMETHASONE SODIUM PHOSPHATE 8 MG: 4 INJECTION, SOLUTION INTRAMUSCULAR; INTRAVENOUS at 12:21

## 2021-01-01 RX ADMIN — HYDROCODONE BITARTRATE AND ACETAMINOPHEN 1 TABLET: 5; 325 TABLET ORAL at 20:52

## 2021-01-01 RX ADMIN — DEXAMETHASONE SODIUM PHOSPHATE 6 MG: 10 INJECTION INTRAMUSCULAR; INTRAVENOUS at 08:22

## 2021-01-01 RX ADMIN — CEFTRIAXONE 1000 MG: 1 INJECTION, POWDER, FOR SOLUTION INTRAMUSCULAR; INTRAVENOUS at 11:53

## 2021-01-01 RX ADMIN — SODIUM CHLORIDE, PRESERVATIVE FREE 10 ML: 5 INJECTION INTRAVENOUS at 08:21

## 2021-01-01 RX ADMIN — PANTOPRAZOLE SODIUM 40 MG: 40 TABLET, DELAYED RELEASE ORAL at 06:00

## 2021-01-01 RX ADMIN — ALBUMIN (HUMAN) 25 G: 0.25 INJECTION, SOLUTION INTRAVENOUS at 12:45

## 2021-01-01 RX ADMIN — SODIUM CHLORIDE 20 ML/HR: 9 INJECTION, SOLUTION INTRAVENOUS at 12:30

## 2021-01-01 RX ADMIN — Medication 1 TABLET: at 08:26

## 2021-01-01 RX ADMIN — CETIRIZINE HYDROCHLORIDE 10 MG: 10 TABLET, FILM COATED ORAL at 09:00

## 2021-01-01 RX ADMIN — SODIUM BICARBONATE 650 MG: 650 TABLET ORAL at 20:54

## 2021-01-01 RX ADMIN — PACLITAXEL 114 MG: 6 INJECTION, SOLUTION, CONCENTRATE INTRAVENOUS at 10:58

## 2021-01-01 RX ADMIN — CETIRIZINE HYDROCHLORIDE 10 MG: 10 TABLET, FILM COATED ORAL at 17:37

## 2021-01-01 RX ADMIN — SODIUM CHLORIDE: 9 INJECTION, SOLUTION INTRAVENOUS at 07:34

## 2021-01-01 RX ADMIN — SODIUM BICARBONATE 650 MG: 650 TABLET ORAL at 09:20

## 2021-01-01 RX ADMIN — SODIUM CHLORIDE 20 ML/HR: 9 INJECTION, SOLUTION INTRAVENOUS at 10:12

## 2021-01-01 RX ADMIN — DEXAMETHASONE SODIUM PHOSPHATE 10 MG: 10 INJECTION INTRAMUSCULAR; INTRAVENOUS at 15:21

## 2021-01-01 RX ADMIN — Medication 500 UNITS: at 12:06

## 2021-01-01 RX ADMIN — SODIUM CHLORIDE 250 ML: 9 INJECTION, SOLUTION INTRAVENOUS at 09:25

## 2021-01-01 RX ADMIN — Medication 1 TABLET: at 09:02

## 2021-01-01 RX ADMIN — SODIUM BICARBONATE 50 MEQ: 84 INJECTION, SOLUTION INTRAVENOUS at 00:12

## 2021-01-01 RX ADMIN — DIPHENHYDRAMINE HYDROCHLORIDE 50 MG: 50 INJECTION INTRAMUSCULAR; INTRAVENOUS at 11:02

## 2021-01-01 RX ADMIN — MAGNESIUM OXIDE 400 MG (241.3 MG MAGNESIUM) TABLET 400 MG: TABLET at 08:36

## 2021-01-01 RX ADMIN — FAMOTIDINE 20 MG: 10 INJECTION, SOLUTION INTRAVENOUS at 14:47

## 2021-01-01 RX ADMIN — ACETAMINOPHEN 650 MG: 325 TABLET ORAL at 06:25

## 2021-01-01 RX ADMIN — FAMOTIDINE 20 MG: 10 INJECTION, SOLUTION INTRAVENOUS at 11:56

## 2021-01-01 RX ADMIN — FENTANYL CITRATE 100 MCG: 50 INJECTION, SOLUTION INTRAMUSCULAR; INTRAVENOUS at 12:12

## 2021-01-01 RX ADMIN — METOPROLOL SUCCINATE 100 MG: 50 TABLET, EXTENDED RELEASE ORAL at 08:59

## 2021-01-01 RX ADMIN — CALCIUM GLUCONATE 2000 MG: 20 INJECTION, SOLUTION INTRAVENOUS at 11:13

## 2021-01-01 RX ADMIN — SODIUM CHLORIDE 25 ML: 9 INJECTION, SOLUTION INTRAVENOUS at 14:36

## 2021-01-01 RX ADMIN — SODIUM CHLORIDE 20 ML/HR: 9 INJECTION, SOLUTION INTRAVENOUS at 10:02

## 2021-01-01 RX ADMIN — HEPARIN SODIUM 5000 UNITS: 5000 INJECTION INTRAVENOUS; SUBCUTANEOUS at 05:29

## 2021-01-01 RX ADMIN — CETIRIZINE HYDROCHLORIDE 10 MG: 10 TABLET, FILM COATED ORAL at 10:08

## 2021-01-01 RX ADMIN — ALTEPLASE 1 MG: 2.2 INJECTION, POWDER, LYOPHILIZED, FOR SOLUTION INTRAVENOUS at 09:14

## 2021-01-01 RX ADMIN — CEFTRIAXONE 1000 MG: 1 INJECTION, POWDER, FOR SOLUTION INTRAMUSCULAR; INTRAVENOUS at 10:22

## 2021-01-01 RX ADMIN — SODIUM CHLORIDE 1000 ML: 9 INJECTION, SOLUTION INTRAVENOUS at 18:04

## 2021-01-01 RX ADMIN — Medication 1 TABLET: at 09:52

## 2021-01-01 RX ADMIN — SODIUM CHLORIDE, PRESERVATIVE FREE 20 ML: 5 INJECTION INTRAVENOUS at 10:55

## 2021-01-01 RX ADMIN — LIDOCAINE HYDROCHLORIDE 20 ML: 20 INJECTION, SOLUTION EPIDURAL; INFILTRATION; INTRACAUDAL; PERINEURAL at 18:43

## 2021-01-01 RX ADMIN — METOPROLOL TARTRATE 25 MG: 25 TABLET, FILM COATED ORAL at 20:29

## 2021-01-01 RX ADMIN — FUROSEMIDE 80 MG: 10 INJECTION, SOLUTION INTRAVENOUS at 11:54

## 2021-01-01 RX ADMIN — SODIUM CHLORIDE, PRESERVATIVE FREE 10 ML: 5 INJECTION INTRAVENOUS at 10:23

## 2021-01-01 RX ADMIN — LORAZEPAM 0.5 MG: 2 INJECTION INTRAMUSCULAR; INTRAVENOUS at 03:17

## 2021-01-01 RX ADMIN — CALCIUM CARBONATE 1000 MG: 500 TABLET, CHEWABLE ORAL at 16:19

## 2021-01-01 RX ADMIN — PACLITAXEL 114 MG: 6 INJECTION, SOLUTION INTRAVENOUS at 11:27

## 2021-01-01 RX ADMIN — METOPROLOL SUCCINATE 100 MG: 50 TABLET, EXTENDED RELEASE ORAL at 08:22

## 2021-01-01 RX ADMIN — SODIUM BICARBONATE 650 MG: 650 TABLET ORAL at 09:33

## 2021-01-01 RX ADMIN — CALCIUM CARBONATE 1000 MG: 500 TABLET, CHEWABLE ORAL at 09:22

## 2021-01-01 RX ADMIN — METHYLPREDNISOLONE SODIUM SUCCINATE 20 MG: 40 INJECTION, POWDER, FOR SOLUTION INTRAMUSCULAR; INTRAVENOUS at 08:21

## 2021-01-01 RX ADMIN — METOPROLOL SUCCINATE 100 MG: 50 TABLET, EXTENDED RELEASE ORAL at 09:02

## 2021-01-01 RX ADMIN — FUROSEMIDE 80 MG: 40 TABLET ORAL at 09:23

## 2021-01-01 RX ADMIN — AMLODIPINE BESYLATE 5 MG: 5 TABLET ORAL at 21:05

## 2021-01-01 RX ADMIN — SODIUM CITRATE AND CITRIC ACID MONOHYDRATE 30 ML: 500; 334 SOLUTION ORAL at 08:36

## 2021-01-01 RX ADMIN — DEXAMETHASONE SODIUM PHOSPHATE 10 MG: 4 INJECTION, SOLUTION INTRAMUSCULAR; INTRAVENOUS at 14:36

## 2021-01-01 RX ADMIN — FUROSEMIDE 80 MG: 40 TABLET ORAL at 17:37

## 2021-01-01 RX ADMIN — METHYLPREDNISOLONE SODIUM SUCCINATE 20 MG: 40 INJECTION, POWDER, FOR SOLUTION INTRAMUSCULAR; INTRAVENOUS at 09:18

## 2021-01-01 RX ADMIN — GADOTERIDOL 12 ML: 279.3 INJECTION, SOLUTION INTRAVENOUS at 16:04

## 2021-01-01 RX ADMIN — HYDROMORPHONE HYDROCHLORIDE 0.5 MG: 1 INJECTION, SOLUTION INTRAMUSCULAR; INTRAVENOUS; SUBCUTANEOUS at 18:30

## 2021-01-01 RX ADMIN — SODIUM CHLORIDE, PRESERVATIVE FREE 10 ML: 5 INJECTION INTRAVENOUS at 00:49

## 2021-01-01 RX ADMIN — CEFEPIME HYDROCHLORIDE 1000 MG: 1 INJECTION, POWDER, FOR SOLUTION INTRAMUSCULAR; INTRAVENOUS at 04:23

## 2021-01-01 RX ADMIN — SODIUM CHLORIDE 250 ML: 9 INJECTION, SOLUTION INTRAVENOUS at 12:09

## 2021-01-01 RX ADMIN — SODIUM CHLORIDE, PRESERVATIVE FREE 10 ML: 5 INJECTION INTRAVENOUS at 12:11

## 2021-01-01 RX ADMIN — FUROSEMIDE 80 MG: 40 TABLET ORAL at 09:18

## 2021-01-01 RX ADMIN — ROCURONIUM BROMIDE 20 MG: 10 INJECTION INTRAVENOUS at 08:32

## 2021-01-01 RX ADMIN — CALCITRIOL CAPSULES 0.25 MCG 0.5 MCG: 0.25 CAPSULE ORAL at 14:17

## 2021-01-01 RX ADMIN — SODIUM BICARBONATE 650 MG TABLET 1300 MG: at 10:23

## 2021-01-01 RX ADMIN — DIPHENHYDRAMINE HYDROCHLORIDE 25 MG: 25 TABLET ORAL at 09:04

## 2021-01-01 RX ADMIN — SODIUM CHLORIDE, PRESERVATIVE FREE 10 ML: 5 INJECTION INTRAVENOUS at 20:53

## 2021-01-01 RX ADMIN — METOPROLOL SUCCINATE 100 MG: 50 TABLET, EXTENDED RELEASE ORAL at 10:10

## 2021-01-01 RX ADMIN — HEPARIN SODIUM 5000 UNITS: 5000 INJECTION INTRAVENOUS; SUBCUTANEOUS at 14:30

## 2021-01-01 RX ADMIN — CEFEPIME HYDROCHLORIDE 2000 MG: 2 INJECTION, POWDER, FOR SOLUTION INTRAVENOUS at 00:53

## 2021-01-01 RX ADMIN — DIPHENHYDRAMINE HYDROCHLORIDE 12.5 MG: 50 INJECTION INTRAMUSCULAR; INTRAVENOUS at 07:40

## 2021-01-01 RX ADMIN — FUROSEMIDE 80 MG: 40 TABLET ORAL at 09:17

## 2021-01-01 RX ADMIN — DIPHENHYDRAMINE HYDROCHLORIDE 50 MG: 50 INJECTION INTRAMUSCULAR; INTRAVENOUS at 14:48

## 2021-01-01 RX ADMIN — HYDROCODONE BITARTRATE AND ACETAMINOPHEN 1 TABLET: 5; 325 TABLET ORAL at 05:41

## 2021-01-01 RX ADMIN — HEPARIN SODIUM 5000 UNITS: 5000 INJECTION INTRAVENOUS; SUBCUTANEOUS at 14:29

## 2021-01-01 RX ADMIN — HEPARIN SODIUM 5000 UNITS: 5000 INJECTION INTRAVENOUS; SUBCUTANEOUS at 13:41

## 2021-01-01 RX ADMIN — SODIUM BICARBONATE 650 MG: 650 TABLET ORAL at 09:07

## 2021-01-01 RX ADMIN — IOPAMIDOL 70 ML: 755 INJECTION, SOLUTION INTRAVENOUS at 22:28

## 2021-01-01 RX ADMIN — SODIUM BICARBONATE 650 MG: 650 TABLET ORAL at 20:29

## 2021-01-01 RX ADMIN — CETIRIZINE HYDROCHLORIDE 10 MG: 10 TABLET, FILM COATED ORAL at 08:59

## 2021-01-01 RX ADMIN — DIPHENHYDRAMINE HYDROCHLORIDE 50 MG: 50 INJECTION INTRAMUSCULAR; INTRAVENOUS at 11:12

## 2021-01-01 RX ADMIN — CETIRIZINE HYDROCHLORIDE 10 MG: 10 TABLET, FILM COATED ORAL at 09:57

## 2021-01-01 RX ADMIN — HEPARIN 500 UNITS: 100 SYRINGE at 13:30

## 2021-01-01 RX ADMIN — SODIUM BICARBONATE 650 MG: 650 TABLET ORAL at 22:08

## 2021-01-01 RX ADMIN — SODIUM CITRATE AND CITRIC ACID MONOHYDRATE 30 ML: 500; 334 SOLUTION ORAL at 23:07

## 2021-01-01 RX ADMIN — HEPARIN 500 UNITS: 100 SYRINGE at 13:32

## 2021-01-01 RX ADMIN — HYDROCODONE BITARTRATE AND ACETAMINOPHEN 1 TABLET: 5; 325 TABLET ORAL at 10:20

## 2021-01-01 RX ADMIN — AZITHROMYCIN MONOHYDRATE 500 MG: 500 INJECTION, POWDER, LYOPHILIZED, FOR SOLUTION INTRAVENOUS at 17:26

## 2021-01-01 RX ADMIN — HYDROCODONE BITARTRATE AND ACETAMINOPHEN 1 TABLET: 5; 325 TABLET ORAL at 21:42

## 2021-01-01 RX ADMIN — CEFTRIAXONE SODIUM 1000 MG: 1 INJECTION, POWDER, FOR SOLUTION INTRAMUSCULAR; INTRAVENOUS at 23:49

## 2021-01-01 RX ADMIN — CETIRIZINE HYDROCHLORIDE 10 MG: 10 TABLET, FILM COATED ORAL at 09:02

## 2021-01-01 RX ADMIN — SODIUM CHLORIDE, PRESERVATIVE FREE 10 ML: 5 INJECTION INTRAVENOUS at 10:27

## 2021-01-01 RX ADMIN — SODIUM BICARBONATE: 84 INJECTION, SOLUTION INTRAVENOUS at 23:41

## 2021-01-01 RX ADMIN — SODIUM BICARBONATE 650 MG: 650 TABLET ORAL at 10:10

## 2021-01-01 RX ADMIN — CALCITRIOL CAPSULES 0.25 MCG 0.5 MCG: 0.25 CAPSULE ORAL at 08:22

## 2021-01-01 RX ADMIN — CALCITRIOL CAPSULES 0.25 MCG 0.5 MCG: 0.25 CAPSULE ORAL at 20:55

## 2021-01-01 RX ADMIN — FENTANYL CITRATE 50 MCG: 50 INJECTION INTRAMUSCULAR; INTRAVENOUS at 07:40

## 2021-01-01 RX ADMIN — CETIRIZINE HYDROCHLORIDE 10 MG: 10 TABLET, FILM COATED ORAL at 09:18

## 2021-01-01 RX ADMIN — PACLITAXEL 100 MG: 6 INJECTION, SOLUTION INTRAVENOUS at 10:51

## 2021-01-01 RX ADMIN — FENTANYL CITRATE 50 MCG: 50 INJECTION, SOLUTION INTRAMUSCULAR; INTRAVENOUS at 16:50

## 2021-01-01 RX ADMIN — IOHEXOL 50 ML: 240 INJECTION, SOLUTION INTRATHECAL; INTRAVASCULAR; INTRAVENOUS; ORAL at 09:35

## 2021-01-01 RX ADMIN — MAGNESIUM OXIDE 400 MG (241.3 MG MAGNESIUM) TABLET 400 MG: TABLET at 09:23

## 2021-01-01 RX ADMIN — CALCITRIOL CAPSULES 0.25 MCG 1 MCG: 0.25 CAPSULE ORAL at 09:00

## 2021-01-01 RX ADMIN — HEPARIN SODIUM 5000 UNITS: 5000 INJECTION INTRAVENOUS; SUBCUTANEOUS at 14:09

## 2021-01-01 RX ADMIN — FUROSEMIDE 80 MG: 10 INJECTION, SOLUTION INTRAVENOUS at 17:13

## 2021-01-01 RX ADMIN — FUROSEMIDE 40 MG: 10 INJECTION, SOLUTION INTRAVENOUS at 18:55

## 2021-01-01 RX ADMIN — DEXAMETHASONE SODIUM PHOSPHATE 10 MG: 10 INJECTION, SOLUTION INTRAMUSCULAR; INTRAVENOUS at 12:40

## 2021-01-01 RX ADMIN — INSULIN HUMAN 10 UNITS: 100 INJECTION, SOLUTION PARENTERAL at 17:04

## 2021-01-01 RX ADMIN — CALCIUM CARBONATE 750 MG: 500 TABLET, CHEWABLE ORAL at 16:36

## 2021-01-01 RX ADMIN — CEFEPIME HYDROCHLORIDE 1000 MG: 1 INJECTION, POWDER, FOR SOLUTION INTRAMUSCULAR; INTRAVENOUS at 18:34

## 2021-01-01 RX ADMIN — SODIUM BICARBONATE 650 MG: 650 TABLET ORAL at 09:57

## 2021-01-01 RX ADMIN — FUROSEMIDE 20 MG: 10 INJECTION, SOLUTION INTRAVENOUS at 11:57

## 2021-01-01 RX ADMIN — SODIUM CHLORIDE: 9 INJECTION, SOLUTION INTRAVENOUS at 21:49

## 2021-01-01 RX ADMIN — HYDROMORPHONE HYDROCHLORIDE 0.5 MG: 1 INJECTION, SOLUTION INTRAMUSCULAR; INTRAVENOUS; SUBCUTANEOUS at 01:44

## 2021-01-01 RX ADMIN — SODIUM BICARBONATE 1300 MG: 650 TABLET ORAL at 14:18

## 2021-01-01 RX ADMIN — SODIUM CHLORIDE, PRESERVATIVE FREE 10 ML: 5 INJECTION INTRAVENOUS at 23:22

## 2021-01-01 RX ADMIN — DEXTROSE MONOHYDRATE 25 G: 25 INJECTION, SOLUTION INTRAVENOUS at 17:04

## 2021-01-01 RX ADMIN — CEFTRIAXONE 1000 MG: 1 INJECTION, POWDER, FOR SOLUTION INTRAMUSCULAR; INTRAVENOUS at 08:03

## 2021-01-01 RX ADMIN — CEFEPIME HYDROCHLORIDE 2000 MG: 2 INJECTION, POWDER, FOR SOLUTION INTRAVENOUS at 01:14

## 2021-01-01 RX ADMIN — SODIUM CITRATE AND CITRIC ACID MONOHYDRATE 30 ML: 500; 334 SOLUTION ORAL at 13:10

## 2021-01-01 RX ADMIN — HYDROCODONE BITARTRATE AND ACETAMINOPHEN 1 TABLET: 5; 325 TABLET ORAL at 01:34

## 2021-01-01 RX ADMIN — SODIUM CHLORIDE 20 ML/HR: 9 INJECTION, SOLUTION INTRAVENOUS at 10:29

## 2021-01-01 RX ADMIN — DEXAMETHASONE SODIUM PHOSPHATE 10 MG: 10 INJECTION INTRAMUSCULAR; INTRAVENOUS at 14:41

## 2021-01-01 RX ADMIN — SODIUM BICARBONATE 1300 MG: 650 TABLET ORAL at 22:31

## 2021-01-01 RX ADMIN — SODIUM CHLORIDE, PRESERVATIVE FREE 10 ML: 5 INJECTION INTRAVENOUS at 08:03

## 2021-01-01 RX ADMIN — CALCITRIOL CAPSULES 0.25 MCG 0.5 MCG: 0.25 CAPSULE ORAL at 23:42

## 2021-01-01 RX ADMIN — SODIUM BICARBONATE 650 MG: 650 TABLET ORAL at 20:04

## 2021-01-01 RX ADMIN — SODIUM CITRATE AND CITRIC ACID MONOHYDRATE 30 ML: 500; 334 SOLUTION ORAL at 09:54

## 2021-01-01 RX ADMIN — SODIUM CHLORIDE 1000 ML: 9 INJECTION, SOLUTION INTRAVENOUS at 13:55

## 2021-01-01 RX ADMIN — HEPARIN SODIUM 5000 UNITS: 5000 INJECTION INTRAVENOUS; SUBCUTANEOUS at 06:01

## 2021-01-01 RX ADMIN — HEPARIN SODIUM 5000 UNITS: 5000 INJECTION INTRAVENOUS; SUBCUTANEOUS at 16:09

## 2021-01-01 RX ADMIN — SODIUM ZIRCONIUM CYCLOSILICATE 10 G: 10 POWDER, FOR SUSPENSION ORAL at 23:42

## 2021-01-01 RX ADMIN — DEXAMETHASONE SODIUM PHOSPHATE 10 MG: 10 INJECTION, SOLUTION INTRAMUSCULAR; INTRAVENOUS at 11:49

## 2021-01-01 RX ADMIN — LIDOCAINE HYDROCHLORIDE 60 MG: 20 INJECTION, SOLUTION INTRAVENOUS at 12:12

## 2021-01-01 RX ADMIN — METHYLPREDNISOLONE SODIUM SUCCINATE 20 MG: 40 INJECTION, POWDER, LYOPHILIZED, FOR SOLUTION INTRAMUSCULAR; INTRAVENOUS at 12:09

## 2021-01-01 RX ADMIN — PROPOFOL 40 MG: 10 INJECTION, EMULSION INTRAVENOUS at 10:55

## 2021-01-01 RX ADMIN — SODIUM BICARBONATE: 84 INJECTION, SOLUTION INTRAVENOUS at 09:34

## 2021-01-01 ASSESSMENT — PAIN DESCRIPTION - LOCATION
LOCATION: FLANK
LOCATION: LEG
LOCATION: FLANK
LOCATION: BACK
LOCATION: FLANK
LOCATION: BACK
LOCATION: CHEST
LOCATION: BACK
LOCATION: OTHER (COMMENT)

## 2021-01-01 ASSESSMENT — PAIN SCALES - WONG BAKER
WONGBAKER_NUMERICALRESPONSE: 0
WONGBAKER_NUMERICALRESPONSE: 8
WONGBAKER_NUMERICALRESPONSE: 0
WONGBAKER_NUMERICALRESPONSE: 2
WONGBAKER_NUMERICALRESPONSE: 0

## 2021-01-01 ASSESSMENT — PULMONARY FUNCTION TESTS
PIF_VALUE: 28
PIF_VALUE: 2
PIF_VALUE: 0
PIF_VALUE: 7
PIF_VALUE: 1
PIF_VALUE: 33
PIF_VALUE: 0
PIF_VALUE: 34
PIF_VALUE: 6
PIF_VALUE: 0
PIF_VALUE: 12
PIF_VALUE: 0
PIF_VALUE: 0
PIF_VALUE: 27
PIF_VALUE: 0
PIF_VALUE: 13
PIF_VALUE: 35
PIF_VALUE: 22
PIF_VALUE: 0
PIF_VALUE: 1
PIF_VALUE: 1
PIF_VALUE: 36
PIF_VALUE: 0
PIF_VALUE: 28
PIF_VALUE: 35
PIF_VALUE: 0
PIF_VALUE: 1
PIF_VALUE: 1
PIF_VALUE: 27
PIF_VALUE: 19
PIF_VALUE: 34
PIF_VALUE: 0
PIF_VALUE: 6
PIF_VALUE: 32
PIF_VALUE: 0
PIF_VALUE: 32
PIF_VALUE: 0
PIF_VALUE: 14
PIF_VALUE: 34
PIF_VALUE: 0
PIF_VALUE: 1
PIF_VALUE: 26
PIF_VALUE: 1
PIF_VALUE: 36
PIF_VALUE: 0
PIF_VALUE: 34
PIF_VALUE: 28
PIF_VALUE: 4
PIF_VALUE: 0
PIF_VALUE: 27
PIF_VALUE: 6
PIF_VALUE: 1
PIF_VALUE: 36
PIF_VALUE: 42
PIF_VALUE: 29
PIF_VALUE: 1
PIF_VALUE: 20
PIF_VALUE: 30
PIF_VALUE: 33
PIF_VALUE: 0
PIF_VALUE: 26
PIF_VALUE: 0
PIF_VALUE: 17
PIF_VALUE: 0
PIF_VALUE: 32
PIF_VALUE: 0
PIF_VALUE: 1
PIF_VALUE: 11
PIF_VALUE: 0
PIF_VALUE: 27
PIF_VALUE: 1
PIF_VALUE: 0
PIF_VALUE: 2
PIF_VALUE: 0
PIF_VALUE: 4
PIF_VALUE: 0
PIF_VALUE: 2
PIF_VALUE: 0
PIF_VALUE: 30
PIF_VALUE: 0
PIF_VALUE: 0
PIF_VALUE: 35
PIF_VALUE: 1
PIF_VALUE: 0
PIF_VALUE: 12
PIF_VALUE: 36
PIF_VALUE: 1
PIF_VALUE: 0
PIF_VALUE: 1
PIF_VALUE: 31
PIF_VALUE: 35
PIF_VALUE: 0
PIF_VALUE: 17
PIF_VALUE: 1
PIF_VALUE: 15
PIF_VALUE: 29
PIF_VALUE: 35
PIF_VALUE: 33
PIF_VALUE: 0
PIF_VALUE: 0
PIF_VALUE: 1
PIF_VALUE: 24
PIF_VALUE: 0
PIF_VALUE: 1
PIF_VALUE: 2
PIF_VALUE: 0
PIF_VALUE: 33
PIF_VALUE: 1
PIF_VALUE: 0
PIF_VALUE: 29
PIF_VALUE: 1
PIF_VALUE: 35
PIF_VALUE: 0
PIF_VALUE: 0
PIF_VALUE: 30
PIF_VALUE: 12
PIF_VALUE: 35
PIF_VALUE: 27
PIF_VALUE: 33
PIF_VALUE: 17
PIF_VALUE: 1
PIF_VALUE: 4
PIF_VALUE: 0
PIF_VALUE: 36
PIF_VALUE: 34
PIF_VALUE: 4
PIF_VALUE: 13
PIF_VALUE: 0
PIF_VALUE: 36
PIF_VALUE: 0
PIF_VALUE: 1
PIF_VALUE: 0
PIF_VALUE: 17
PIF_VALUE: 36
PIF_VALUE: 1
PIF_VALUE: 0
PIF_VALUE: 35
PIF_VALUE: 0
PIF_VALUE: 0
PIF_VALUE: 35
PIF_VALUE: 0
PIF_VALUE: 36
PIF_VALUE: 1
PIF_VALUE: 29
PIF_VALUE: 0
PIF_VALUE: 34
PIF_VALUE: 28
PIF_VALUE: 35
PIF_VALUE: 0
PIF_VALUE: 0
PIF_VALUE: 31
PIF_VALUE: 8
PIF_VALUE: 33
PIF_VALUE: 0
PIF_VALUE: 0
PIF_VALUE: 28
PIF_VALUE: 34
PIF_VALUE: 42
PIF_VALUE: 8
PIF_VALUE: 0
PIF_VALUE: 0
PIF_VALUE: 35
PIF_VALUE: 0
PIF_VALUE: 33
PIF_VALUE: 0
PIF_VALUE: 18
PIF_VALUE: 0
PIF_VALUE: 18
PIF_VALUE: 0
PIF_VALUE: 36
PIF_VALUE: 2
PIF_VALUE: 34
PIF_VALUE: 0
PIF_VALUE: 0
PIF_VALUE: 32
PIF_VALUE: 0
PIF_VALUE: 29
PIF_VALUE: 0
PIF_VALUE: 35
PIF_VALUE: 2
PIF_VALUE: 0
PIF_VALUE: 31
PIF_VALUE: 1
PIF_VALUE: 3
PIF_VALUE: 1
PIF_VALUE: 0
PIF_VALUE: 35
PIF_VALUE: 1
PIF_VALUE: 0
PIF_VALUE: 22

## 2021-01-01 ASSESSMENT — PAIN DESCRIPTION - ORIENTATION
ORIENTATION: LEFT

## 2021-01-01 ASSESSMENT — PATIENT HEALTH QUESTIONNAIRE - PHQ9
SUM OF ALL RESPONSES TO PHQ QUESTIONS 1-9: 0
SUM OF ALL RESPONSES TO PHQ9 QUESTIONS 1 & 2: 0
2. FEELING DOWN, DEPRESSED OR HOPELESS: 0
1. LITTLE INTEREST OR PLEASURE IN DOING THINGS: 0
1. LITTLE INTEREST OR PLEASURE IN DOING THINGS: 0
SUM OF ALL RESPONSES TO PHQ QUESTIONS 1-9: 0
2. FEELING DOWN, DEPRESSED OR HOPELESS: 0
2. FEELING DOWN, DEPRESSED OR HOPELESS: 0
SUM OF ALL RESPONSES TO PHQ QUESTIONS 1-9: 0
SUM OF ALL RESPONSES TO PHQ QUESTIONS 1-9: 0
SUM OF ALL RESPONSES TO PHQ9 QUESTIONS 1 & 2: 0
SUM OF ALL RESPONSES TO PHQ9 QUESTIONS 1 & 2: 0
SUM OF ALL RESPONSES TO PHQ QUESTIONS 1-9: 0
SUM OF ALL RESPONSES TO PHQ9 QUESTIONS 1 & 2: 0
1. LITTLE INTEREST OR PLEASURE IN DOING THINGS: 0

## 2021-01-01 ASSESSMENT — PAIN SCALES - GENERAL
PAINLEVEL_OUTOF10: 0
PAINLEVEL_OUTOF10: 4
PAINLEVEL_OUTOF10: 8
PAINLEVEL_OUTOF10: 8
PAINLEVEL_OUTOF10: 0
PAINLEVEL_OUTOF10: 7
PAINLEVEL_OUTOF10: 0
PAINLEVEL_OUTOF10: 7
PAINLEVEL_OUTOF10: 0
PAINLEVEL_OUTOF10: 7
PAINLEVEL_OUTOF10: 5
PAINLEVEL_OUTOF10: 5
PAINLEVEL_OUTOF10: 8
PAINLEVEL_OUTOF10: 0
PAINLEVEL_OUTOF10: 0
PAINLEVEL_OUTOF10: 8
PAINLEVEL_OUTOF10: 0
PAINLEVEL_OUTOF10: 0
PAINLEVEL_OUTOF10: 6
PAINLEVEL_OUTOF10: 0
PAINLEVEL_OUTOF10: 0
PAINLEVEL_OUTOF10: 3
PAINLEVEL_OUTOF10: 0
PAINLEVEL_OUTOF10: 7
PAINLEVEL_OUTOF10: 7
PAINLEVEL_OUTOF10: 0
PAINLEVEL_OUTOF10: 4
PAINLEVEL_OUTOF10: 0
PAINLEVEL_OUTOF10: 3
PAINLEVEL_OUTOF10: 0
PAINLEVEL_OUTOF10: 3
PAINLEVEL_OUTOF10: 10
PAINLEVEL_OUTOF10: 0
PAINLEVEL_OUTOF10: 2
PAINLEVEL_OUTOF10: 0
PAINLEVEL_OUTOF10: 7
PAINLEVEL_OUTOF10: 0
PAINLEVEL_OUTOF10: 3
PAINLEVEL_OUTOF10: 0
PAINLEVEL_OUTOF10: 7
PAINLEVEL_OUTOF10: 0
PAINLEVEL_OUTOF10: 2
PAINLEVEL_OUTOF10: 0
PAINLEVEL_OUTOF10: 9
PAINLEVEL_OUTOF10: 0
PAINLEVEL_OUTOF10: 7
PAINLEVEL_OUTOF10: 0
PAINLEVEL_OUTOF10: 4
PAINLEVEL_OUTOF10: 1
PAINLEVEL_OUTOF10: 0
PAINLEVEL_OUTOF10: 0
PAINLEVEL_OUTOF10: 4
PAINLEVEL_OUTOF10: 0
PAINLEVEL_OUTOF10: 0
PAINLEVEL_OUTOF10: 6
PAINLEVEL_OUTOF10: 3
PAINLEVEL_OUTOF10: 0
PAINLEVEL_OUTOF10: 0
PAINLEVEL_OUTOF10: 8
PAINLEVEL_OUTOF10: 0
PAINLEVEL_OUTOF10: 0
PAINLEVEL_OUTOF10: 8
PAINLEVEL_OUTOF10: 8
PAINLEVEL_OUTOF10: 0
PAINLEVEL_OUTOF10: 2
PAINLEVEL_OUTOF10: 0
PAINLEVEL_OUTOF10: 5
PAINLEVEL_OUTOF10: 2
PAINLEVEL_OUTOF10: 0
PAINLEVEL_OUTOF10: 7
PAINLEVEL_OUTOF10: 0
PAINLEVEL_OUTOF10: 0
PAINLEVEL_OUTOF10: 9
PAINLEVEL_OUTOF10: 0
PAINLEVEL_OUTOF10: 4
PAINLEVEL_OUTOF10: 0
PAINLEVEL_OUTOF10: 0
PAINLEVEL_OUTOF10: 2
PAINLEVEL_OUTOF10: 3
PAINLEVEL_OUTOF10: 0
PAINLEVEL_OUTOF10: 9
PAINLEVEL_OUTOF10: 0
PAINLEVEL_OUTOF10: 7
PAINLEVEL_OUTOF10: 0
PAINLEVEL_OUTOF10: 0
PAINLEVEL_OUTOF10: 5
PAINLEVEL_OUTOF10: 5
PAINLEVEL_OUTOF10: 0

## 2021-01-01 ASSESSMENT — ENCOUNTER SYMPTOMS
RHINORRHEA: 0
NAUSEA: 0
EYE REDNESS: 0
CONSTIPATION: 0
NAUSEA: 0
SHORTNESS OF BREATH: 0
BACK PAIN: 0
SHORTNESS OF BREATH: 0
DIARRHEA: 0
SORE THROAT: 0
BACK PAIN: 0
VOMITING: 0
SHORTNESS OF BREATH: 0
BLOOD IN STOOL: 0
DIARRHEA: 0
DIARRHEA: 0
COUGH: 0
COUGH: 1
VOMITING: 0
DIARRHEA: 0
SHORTNESS OF BREATH: 1
ABDOMINAL PAIN: 0
SHORTNESS OF BREATH: 0
ABDOMINAL PAIN: 0

## 2021-01-01 ASSESSMENT — PAIN DESCRIPTION - DESCRIPTORS
DESCRIPTORS: ACHING
DESCRIPTORS: ACHING
DESCRIPTORS: ACHING;PRESSURE
DESCRIPTORS: SPASM
DESCRIPTORS: DISCOMFORT
DESCRIPTORS: SORE
DESCRIPTORS: ACHING

## 2021-01-01 ASSESSMENT — PAIN DESCRIPTION - PAIN TYPE
TYPE: ACUTE PAIN
TYPE: SURGICAL PAIN
TYPE: CHRONIC PAIN
TYPE: OTHER (COMMENT)
TYPE: ACUTE PAIN;SURGICAL PAIN
TYPE: ACUTE PAIN
TYPE: ACUTE PAIN
TYPE: CHRONIC PAIN
TYPE: ACUTE PAIN

## 2021-01-01 ASSESSMENT — PAIN - FUNCTIONAL ASSESSMENT
PAIN_FUNCTIONAL_ASSESSMENT: PREVENTS OR INTERFERES SOME ACTIVE ACTIVITIES AND ADLS
PAIN_FUNCTIONAL_ASSESSMENT: 0-10
PAIN_FUNCTIONAL_ASSESSMENT: PREVENTS OR INTERFERES SOME ACTIVE ACTIVITIES AND ADLS
PAIN_FUNCTIONAL_ASSESSMENT: 0-10

## 2021-01-01 ASSESSMENT — PAIN DESCRIPTION - PROGRESSION
CLINICAL_PROGRESSION: GRADUALLY IMPROVING
CLINICAL_PROGRESSION: GRADUALLY WORSENING
CLINICAL_PROGRESSION: RESOLVED
CLINICAL_PROGRESSION: NOT CHANGED
CLINICAL_PROGRESSION: NOT CHANGED

## 2021-01-01 ASSESSMENT — PAIN DESCRIPTION - ONSET
ONSET: GRADUAL
ONSET: ON-GOING
ONSET: ON-GOING

## 2021-01-01 ASSESSMENT — PAIN DESCRIPTION - FREQUENCY
FREQUENCY: CONTINUOUS
FREQUENCY: INTERMITTENT
FREQUENCY: CONTINUOUS

## 2021-01-04 NOTE — PROGRESS NOTES
Patient called stating that the pharmacy didn't receive her RX on 12/22, can you please submit again

## 2021-01-07 PROBLEM — K90.9 MALABSORPTION: Status: ACTIVE | Noted: 2021-01-01

## 2021-01-07 PROBLEM — D50.9 IRON DEFICIENCY ANEMIA: Status: ACTIVE | Noted: 2021-01-01

## 2021-01-07 NOTE — PROGRESS NOTES
MA Rooming Questions  Patient: Corazon Snyder  MRN: [id number]    Date: 1/7/2021        1. Do you have any new issues?   no         2. Do you need any refills on medications?    no    3. Have you had any imaging done since your last visit?   no    4. Have you been hospitalized or seen in the emergency room since your last visit here?   no    5. Did the patient have a depression screening completed today?  Yes    PHQ-9 Total Score: 0 (1/7/2021 10:42 AM)       PHQ-9 Given to (if applicable):               PHQ-9 Score (if applicable):                     [] Positive     []  Negative              Does question #9 need addressed (if applicable)                     [] Yes    []  No               Gage Quevedo MA

## 2021-01-07 NOTE — PROGRESS NOTES
hgb 6.6 one unit of prbc 01/08/2021 at [facility]. Blood banded and sent for type and screen, band with patient. Patient advised and voiced understanding.

## 2021-01-07 NOTE — TELEPHONE ENCOUNTER
LVM for pt to return my call on my direct line as I need to know where she'd like her zometa & lupron injections scheduled as [facility] seems to think pt needs these done at the hospital.

## 2021-01-08 NOTE — PLAN OF CARE
Ambulatory to unit room 6 for Blood Transfusion. Reorientated to unit. Procedure and plan of care explained. Questions answered. Understanding verbalized.

## 2021-01-08 NOTE — DISCHARGE SUMMARY
Tolerated Transfusion well. Reviewed discharge instructions, understanding verbalized. Copies of AVS given to take home. Patient discharged home. Down to exit per self  .     Orders Placed This Encounter   Medications    0.9 % sodium chloride infusion 250 mL    sodium chloride flush 0.9 % injection 20 mL    acetaminophen (TYLENOL) tablet 650 mg    diphenhydrAMINE (BENADRYL) tablet 25 mg    furosemide (LASIX) injection 20 mg    methylPREDNISolone sodium (SOLU-MEDROL) injection 20 mg    heparin flush 100 UNIT/ML injection 500 Units

## 2021-01-21 NOTE — PROGRESS NOTES
Z-score of 1.4. This is within the normal range by WHO criteria. LEFT HIP: The bone mineral density in the total hip is measured at 0.972 g/cm2 corresponding to a T-score of -0.3 and a Z-score of -0.2. This is within the normal range by WHO criteria. The bone mineral density of the femoral neck is measured at 0.802 g/cm2 corresponding to a T-score of -1.7 and a Z-score of -1.2. This is within the osteopenia range by WHO criteria. IMPRESSION: Osteopenia by WHO criteria. Bone scan in September 2018 showed stable findings. On October 26, 2018 she was found to have RLE DVT. PET CT scan in November 2018 revealed:  1. Overall improvement of left axillary adenopathy and FDG avidity. There is persistent skin thickening of the left breast with associated FDG uptake in the retroareolar region and left breast tissue which is not significantly changed since the prior exam. 2. Overall progression of hypermetabolic skeletal osseous metastases when compared to the PET-CT from 05/07/2018. We discussed about the potential side effects of bisphosphonates including renal failure and Jaw necrosis. She switched lovenox to xarelto in January 2019. Bone scan in June 2019 compared to bone scan in September 2018 showed progression of disease. CA 27-29 has continued to increase. We will obtain PET CT to evaluate. Clinically she felt left breast lump continuing to get smaller. PET CT scan on August 26, 2019 showed :  1. New focus of intense uptake lateral to the left axilla and anterior to the shoulder musculature. 2. However there is improvement of disease within the left breast and left axilla and decreased uptake within the widespread osseous metastatic disease. In December 2019 CA 17-29 continued to rise. CT chest, abdomen and bone scan in January 2020 showed progression local and bone disease. She started lupron, aromasin and afinitor on January 10, 2020. I advise to monitor blood pressure.   On May 7, 2020 PET CT scan showed favorable response to chemotherapy. The mass anterior to the left shoulder musculature has significantly decreased in size and degree of uptake. Small nodules anterior to the mass extending to the skin demonstrates mild uptake and likely represents residual disease. Left axillary lymph nodes have decreased in size. Uptake in the left subareolar region has decreased. Diffuse osseous metastatic disease demonstrates decreased uptake. Labs in August 2020 were reviewed. CT chest, abdomen pelvis on October 5, 2020 showed  Decreased size of the soft tissue mass anterior to the left shoulder    musculature compared to prior PET-CT and CT suggesting response to therapy.         Decreased size of left axillary and subpectoral lymphadenopathy also    suggesting response to therapy.         Stable sclerotic metastatic disease noted throughout the visualized osseous    skeleton.         No evidence of new metastatic disease in the chest, abdomen or pelvis. CA 27-29 on October 5, 2020 was 409.2. She has panoramic study of the jaw which was negative for osteonecrosis. She has insomnia and anxiety. She is agreeable to restart low-dose Ativan as needed. She had ingrowing toenail of the left toe with has improved with antibiotic. She was sick and started to feel better. Labs in January 2021 were reviewed. Anemic w/u is consistent with chronic disease. She is on eliquis. I will check sed rate, FOB and give one unit PRBC today. On January 25, 2021 she came in for follow-up visit. CT chest, abdomen pelvis in January 2021 showed  1. Increasing skin thickening of the breast bilaterally with increasing   subcutaneous edema throughout the chest wall, which may be related to post   treatment changes. 2. No interval change in multifocal sclerotic osseous metastases.    3. Stable to slight interval decrease in size of soft tissue nodules in the   left chest wall/anterior to the left shoulder.  No significant interval change in left axillary adenopathy. 4. There is increasing size of the left ovary as well as new pelvic   adenopathy suspicious for malignancy either primary neoplastic process   involving the left ovary versus metastatic disease. 5. There is also mild wall thickening of the left posterolateral bladder wall   with mild left hydronephrosis.  Would recommend further evaluation with   cystoscopy as well. 6. New 3.7 x 2.0 cm partially calcified mass in the right adductor   musculature suspicious for metastatic disease as well.  Alternately this   could reflect chronic process such as myositis ossificans in the appropriate   clinical setting. She had hematoma before to the right thigh. I will cut zometa to every 3 months. I advised her to maintain her body weight and eat frequent meals. Is agreeable to see urologist for potential cystoscopy and GYN oncologist at [facility] for further work-up of the left ovary mass and pelvic adenopathy. Clinically she could have metastatic disease from the breast cancer. If that is the case, I will consider to change her treatment. She denied any headache or dizziness. She denied any chest pain or shortness of breath. She denied any acute bone pain. No fever or chills. No depression. Past Medical History  Allergies, asthma, depression, abnormal left breast mammogram. DVT. Surgical History  Appendectomy in .  . Social History  Denied any history of smoking. She drinks alcohol occasionally. No illicit drug use. She has 2 children and one sister. Family History  Mother: Diabetes, Hypertension. Current Therapy   Palbociclib + Anastrozole + Leuprolide Q28D Cycle Length: 28 Number Cycles: 20 Start: C1D1 on 2018 Assoc Dx: Female inflammatory breast cancer LOT: 1st Line Metastatic or Recurrent Stage: IV 2018 C1 D1  Zoledronic acid (Zometa) Q28D Cycle Length: 28 Number Cycles: 24 Start: Fauzia Camejo on 2018 Assoc Dx:  Secondary bone BANDSPCT 5 04/05/2019    SEGSPCT 63.2 01/07/2021    EOSRELPCT 1.1 01/07/2021    BASOPCT 0.4 01/07/2021    LYMPHOPCT 27.5 01/07/2021    MONOPCT 7.8 (H) 01/07/2021    BANDABS 0.23 04/05/2019    SEGSABS 3.4 01/07/2021    EOSABS 0.1 01/07/2021    BASOSABS 0.0 01/07/2021    LYMPHSABS 1.5 01/07/2021    MONOSABS 0.4 01/07/2021    DIFFTYPE AUTOMATED DIFFERENTIAL 01/07/2021    ANISOCYTOSIS 1+ 12/06/2019    POLYCHROM 1+ 12/06/2019    WBCMORP OCCASIONAL  ATYPICAL LYMPH(S) NOTED   12/06/2019     Chemistry:  Lab Results   Component Value Date     01/07/2021    K 3.9 01/07/2021     01/07/2021    CO2 19 (L) 01/07/2021    BUN 17 01/07/2021    CREATININE 1.6 (H) 01/07/2021    GLUCOSE 187 (H) 01/07/2021    CALCIUM 8.6 01/07/2021    PROT 7.4 01/07/2021    LABALBU 3.5 01/07/2021    BILITOT 0.1 01/07/2021    ALKPHOS 100 01/07/2021    AST 48 (H) 01/07/2021    ALT 14 01/07/2021    LABGLOM 34 (L) 01/07/2021    GFRAA 41 (L) 01/07/2021    MG 2.2 12/30/2020     Immunology:  Lab Results   Component Value Date    PROT 7.4 01/07/2021     Tumor Markers:  Lab Results   Component Value Date    LABCA2 409.2 (H) 10/05/2020      Observations:  PHQ-9 Total Score: 0 (1/25/2021 11:37 AM)        Assessment & Plan:    1. She has inflammatory left breast cancer. PET/CT scan showed metastatic disease to bone, left axillary lymph node, and left breasts with the skin involvement. At present time she does not have physical disease. CBC and CMP were within normal limits. CA 27-29 was elevated. I put her on palliative hormonal therapy plus Palbociclib. She started arimidex and Ibrance on June 4, 2018 and monthly lupron injection on June 8, 2018. Bone scan in September 2018 showed stable findings. After reviewing the PET CT scan in November 2018, I put her on Zometa monthly. Bone scan in July 2019 was reviewed and showed progression of the disease. PET scan in August 2019 showed grossly response to the therapy.   CT chest/abdomen and bone scan due to rising CA27-29 in January 2020 showed progression of disease. She started aromasin, afinitor and lupron on January 10, 2020. She understands the potential side effects of afinitor. PET CT scan May 2020 showed treatment response. CT chest, abdomen pelvis in October 2020 showed response and no evidence of new metastatic disease. She is agreeable to continue with Lupron, exemestane, Zometa and Afinitor. I changed zometa to every 3 months. CT chest, abdomen pelvis in January 2021 was reviewed. I compared to the previous study. She is able to see GYN oncologist at [facility] and urologist for further work-up. 2. Hypertension: I advise to monitor BP and has low sodium diet. I gave her refill of Norvasc. Blood pressure is stable. 3. Seasonal allergy. I advise to take Claritin and gave her flonase. I recommend flu shot. 4. She was found to have right lower extremity DVT in October 2019. She is on Eliquis. no signs of bleeding. 5. She has multifactorial anemia. JAK2 with reflex in January 2021 was negative. Anemic work-up in January 2021 was reviewed. 6.  I recommend to maintain body weight. I recommend to eat frequent meals. 7.  Prescribed compression for intermittent nausea. She stated Zofran does not seem to help. I also gave refill of Ativan for the anxiety and insomnia. RTC in 3 weeks or sooner. All of her question has been answered for today. Recent imaging and labs were reviewed and discussed with the patient.       Electronically signed by Marry Scott MD on 9/14/20 at 7:23 AM EDT

## 2021-01-21 NOTE — PROGRESS NOTES
Dr Brenna Jorge asked that patient was scheduled for OV to review CT scans. Patient is currently schedueld for OV on 01/25/21. [facility] called patient and she confirmed that appointment.

## 2021-01-25 NOTE — PROGRESS NOTES
MA Rooming Questions  Patient: Diana Arcos  MRN: [id number]    Date: 1/25/2021        1. Do you have any new issues?   no         2. Do you need any refills on medications?    no    3. Have you had any imaging done since your last visit? yes - Ct    4. Have you been hospitalized or seen in the emergency room since your last visit here?   no    5. Did the patient have a depression screening completed today?  No    PHQ-9 Total Score: 0 (1/25/2021 11:37 AM)       PHQ-9 Given to (if applicable):               PHQ-9 Score (if applicable):                     [] Positive     []  Negative              Does question #9 need addressed (if applicable)                     [] Yes    []  No               Avtar Schultz MA

## 2021-01-26 PROBLEM — N83.8 LEFT OVARIAN ENLARGEMENT: Status: ACTIVE | Noted: 2021-01-01

## 2021-01-27 NOTE — PROGRESS NOTES
Tolerated Leupron injection well. Reviewed discharge instruction, voiced understanding. Copies of AVS given. Pt discharged home. Pt to exit via ambulation.     Orders Placed This Encounter   Medications    leuprolide (LUPRON) injection 7.5 mg

## 2021-02-15 NOTE — PROGRESS NOTES
of 1.4. This is within the normal range by WHO criteria. LEFT HIP: The bone mineral density in the total hip is measured at 0.972 g/cm2 corresponding to a T-score of -0.3 and a Z-score of -0.2. This is within the normal range by WHO criteria. The bone mineral density of the femoral neck is measured at 0.802 g/cm2 corresponding to a T-score of -1.7 and a Z-score of -1.2. This is within the osteopenia range by WHO criteria. IMPRESSION: Osteopenia by WHO criteria. Bone scan in September 2018 showed stable findings. On October 26, 2018 she was found to have RLE DVT. PET CT scan in November 2018 revealed:  1. Overall improvement of left axillary adenopathy and FDG avidity. There is persistent skin thickening of the left breast with associated FDG uptake in the retroareolar region and left breast tissue which is not significantly changed since the prior exam. 2. Overall progression of hypermetabolic skeletal osseous metastases when compared to the PET-CT from 05/07/2018. We discussed about the potential side effects of bisphosphonates including renal failure and Jaw necrosis. She switched lovenox to xarelto in January 2019. Bone scan in June 2019 compared to bone scan in September 2018 showed progression of disease. CA 27-29 has continued to increase. We will obtain PET CT to evaluate. Clinically she felt left breast lump continuing to get smaller. PET CT scan on August 26, 2019 showed :  1. New focus of intense uptake lateral to the left axilla and anterior to the shoulder musculature. 2. However there is improvement of disease within the left breast and left axilla and decreased uptake within the widespread osseous metastatic disease. In December 2019 CA 17-29 continued to rise. CT chest, abdomen and bone scan in January 2020 showed progression local and bone disease. She started lupron, aromasin and afinitor on January 10, 2020. I advise to monitor blood pressure.   On May 7, 2020 PET CT scan showed favorable response to chemotherapy. The mass anterior to the left shoulder musculature has significantly decreased in size and degree of uptake. Small nodules anterior to the mass extending to the skin demonstrates mild uptake and likely represents residual disease. Left axillary lymph nodes have decreased in size. Uptake in the left subareolar region has decreased. Diffuse osseous metastatic disease demonstrates decreased uptake. Labs in August 2020 were reviewed. CT chest, abdomen pelvis on October 5, 2020 showed  Decreased size of the soft tissue mass anterior to the left shoulder    musculature compared to prior PET-CT and CT suggesting response to therapy.         Decreased size of left axillary and subpectoral lymphadenopathy also    suggesting response to therapy.         Stable sclerotic metastatic disease noted throughout the visualized osseous    skeleton.         No evidence of new metastatic disease in the chest, abdomen or pelvis. CA 27-29 on October 5, 2020 was 409.2. She has panoramic study of the jaw which was negative for osteonecrosis. She has insomnia and anxiety. She is agreeable to restart low-dose Ativan as needed. She had ingrowing toenail of the left toe with has improved with antibiotic. Labs in January 2021 were reviewed. Anemic w/u is consistent with chronic disease. She is on eliquis. CT chest, abdomen pelvis in January 2021 showed  1. Increasing skin thickening of the breast bilaterally with increasing   subcutaneous edema throughout the chest wall, which may be related to post   treatment changes. 2. No interval change in multifocal sclerotic osseous metastases. 3. Stable to slight interval decrease in size of soft tissue nodules in the   left chest wall/anterior to the left shoulder.  No significant interval   change in left axillary adenopathy.    4. There is increasing size of the left ovary as well as new pelvic   adenopathy suspicious for malignancy either primary neoplastic process   involving the left ovary versus metastatic disease. 5. There is also mild wall thickening of the left posterolateral bladder wall   with mild left hydronephrosis.  Would recommend further evaluation with   cystoscopy as well. 6. New 3.7 x 2.0 cm partially calcified mass in the right adductor   musculature suspicious for metastatic disease as well.  Alternately this   could reflect chronic process such as myositis ossificans in the appropriate   clinical setting. She had hematoma before to the right thigh. I will cut zometa to every 3 months. I advised her to maintain her body weight and eat frequent meals. She is agreeable to see urologist for potential cystoscopy and GYN oncologist at [facility] for further work-up of the left ovary mass and pelvic adenopathy. Clinically she could have metastatic disease from the breast cancer. If that is the case, I will consider to change her treatment. On February 15, 2021 she came in for follow-up visit. I increase tramadol to 100 mg every 6 hours. She claimed that she has a good bowel movement. She was seen by Dr. Leandro Henderson at [facility] who believe clinically she has metastatic breast cancer to the pelvis. She has been seen by urologist who will do cystoscopy and possible follow-up CT abdomen and pelvis. She is agreeable to change her regimen to chemotherapy. I will put her on AC x4 every 3 weeks followed by weekly Taxol. We discussed the risk and benefit of chemotherapy. She understands well the the goal of the therapy is palliative. I will schedule echocardiogram and refer to Dr. Karma Hudson for Mediport placement. A schedule for chemo education. She received blood transfusion. Iron study was reviewed. She is on Eliquis. I will check stool occult. Sed rate in January 2020 was more than 120. She has pain to the right lower extremity. She denied any headache or dizziness. She denied any chest pain or shortness of breath.   She denied any acute bone pain. No fever or chills. No depression. Past Medical History  Allergies, asthma, depression, abnormal left breast mammogram. DVT. Surgical History  Appendectomy in .  . Social History  Denied any history of smoking. She drinks alcohol occasionally. No illicit drug use. She has 2 children and one sister. Family History  Mother: Diabetes, Hypertension. 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 \"     Vital Signs:  BP (!) 141/80 (Site: Right Upper Arm, Position: Sitting, Cuff Size: Medium Adult)   Pulse 150   Temp 98.9 °F (37.2 °C) (Infrared)   Resp 16   Ht 5' 1\" (1.549 m)   Wt 115 lb 6.4 oz (52.3 kg)   SpO2 99%   BMI 21.80 kg/m²     Physical Exam:  CONSTITUTIONAL: awake, alert, cooperative, no apparent distress   EYES: pupils equal, round and reactive to light, sclera clear and conjunctiva pallor. ENT: Normocephalic, without obvious abnormality, atraumatic  NECK: supple, symmetrical, no jugular venous distension and no carotid bruits   HEMATOLOGIC/LYMPHATIC: no cervical, supraclavicular or axillary lymphadenopathy   LUNGS: no increased work of breathing and clear to auscultation   BREAST: thick skin of left breast with induration. No signs of infection.  No lumps to right breast.  CARDIOVASCULAR: regular rate and rhythm, normal S1 and S2, no murmur noted  ABDOMEN: normal bowel sounds x 4, soft, non-distended, non-tender, no masses palpated, no hepatosplenomegaly   MUSCULOSKELETAL: full range of motion noted, tone is normal  NEUROLOGIC: awake, alert, oriented to name, place and time. Grossly there is no neurofocal deficit. SKIN: No jaundice. appears intact. No petechial rash. EXTREMITIES: no LE edema. No cyanosis.     Labs:  Hematology:  Lab Results   Component Value Date    WBC 7.9 02/15/2021    RBC 3.23 (L) 02/15/2021    HGB 7.1 (L) 02/15/2021    HCT 23.9 (L) 02/15/2021    MCV 74.0 (L) 02/15/2021    MCH 22.0 (L) 02/15/2021    MCHC 29.7 (L) 02/15/2021    RDW 22.2 (H) 02/15/2021     (H) 02/15/2021    MPV 7.9 02/15/2021    BANDSPCT 5 04/05/2019    SEGSPCT 69.2 (H) 02/15/2021    EOSRELPCT 1.4 02/15/2021    BASOPCT 0.3 02/15/2021    LYMPHOPCT 19.3 (L) 02/15/2021    MONOPCT 9.8 (H) 02/15/2021    BANDABS 0.23 04/05/2019    SEGSABS 5.5 02/15/2021    EOSABS 0.1 02/15/2021    BASOSABS 0.0 02/15/2021    LYMPHSABS 1.5 02/15/2021    MONOSABS 0.8 02/15/2021    DIFFTYPE AUTOMATED DIFFERENTIAL 02/15/2021    ANISOCYTOSIS 1+ 12/06/2019    POLYCHROM 1+ 12/06/2019    WBCMORP OCCASIONAL  ATYPICAL LYMPH(S) NOTED   12/06/2019     Chemistry:  Lab Results   Component Value Date     01/07/2021    K 3.9 01/07/2021     01/07/2021    CO2 19 (L) 01/07/2021    BUN 17 01/07/2021    CREATININE 1.6 (H) 01/07/2021    GLUCOSE 187 (H) 01/07/2021    CALCIUM 8.6 01/07/2021    PROT 7.4 01/07/2021    LABALBU 3.5 01/07/2021    BILITOT 0.1 01/07/2021    ALKPHOS 100 01/07/2021    AST 48 (H) 01/07/2021    ALT 14 01/07/2021    LABGLOM 34 (L) 01/07/2021    GFRAA 41 (L) 01/07/2021    MG 2.2 12/30/2020     Immunology:  Lab Results   Component Value Date    PROT 7.4 01/07/2021     Tumor Markers:  Lab Results   Component Value Date    LABCA2 409.2 (H) 10/05/2020      Observations:  No data recorded Assessment & Plan:    1. She has inflammatory left breast cancer. PET/CT scan showed metastatic disease to bone, left axillary lymph node, and left breasts with the skin involvement. At present time she does not have physical disease. CBC and CMP were within normal limits. CA 27-29 was elevated. I put her on palliative hormonal therapy plus Palbociclib. She started arimidex and Ibrance on June 4, 2018 and monthly lupron injection on June 8, 2018. Bone scan in September 2018 showed stable findings. After reviewing the PET CT scan in November 2018, I put her on Zometa monthly. Bone scan in July 2019 was reviewed and showed progression of the disease. PET scan in August 2019 showed grossly response to the therapy. CT chest/abdomen and bone scan due to rising CA27-29 in January 2020 showed progression of disease. She started aromasin, afinitor and lupron on January 10, 2020. She understands the potential side effects of afinitor. PET CT scan May 2020 showed treatment response. CT chest, abdomen pelvis in October 2020 showed response and no evidence of new metastatic disease. She was on Lupron, exemestane, Zometa and Afinitor. I changed zometa to every 3 months. CT chest, abdomen pelvis in January 2021 was reviewed. Clinically she has metastatic disease from the breast cancer. She is agreeable to change to palliative chemotherapy. I scheduled for the chemo education. I will schedule echocardiogram and Mediport placement. 2. Hypertension: I advise to monitor BP and has low sodium diet. I gave her refill of Norvasc. Blood pressure is stable. 3. Seasonal allergy. I advise to take Claritin and gave her flonase. I recommend flu shot. 4. She was found to have right lower extremity DVT in October 2019. She is on Eliquis. no signs of bleeding. 5. She has multifactorial anemia. JAK2 with reflex in January 2021 was negative. Anemic work-up in January 2021 was reviewed.   I will order fecal occult blood.    6.  I recommend to maintain body weight. I recommend to eat frequent meals. 7.  Prescribed compression for intermittent nausea. She stated Zofran does not seem to help. I also gave refill of Ativan for the anxiety and insomnia. RTC in 3 weeks or sooner. All of her question has been answered for today. Recent imaging and labs were reviewed and discussed with the patient.       Electronically signed by Erin Braga MD on 9/14/20 at 7:23 AM EDT

## 2021-02-15 NOTE — PROGRESS NOTES
Hgb 7.1; per Dr Mukund Arzate 1 unit of PRBC, called infusion dept, spoke to [facility], patient scheduled for tomorrow, Tues. 02/16 @0900, patient advised and states understanding, blood banded and sent for T&S, band on patient (verifed) and patient signed consent.

## 2021-02-15 NOTE — PROGRESS NOTES
MA Rooming Questions  Patient: Diego Smith  MRN: [id number]    Date: 2/15/2021        1. Do you have any new issues? yes - pain in right leg         2. Do you need any refills on medications?    no    3. Have you had any imaging done since your last visit?   no    4. Have you been hospitalized or seen in the emergency room since your last visit here?   no    5. Did the patient have a depression screening completed today?  No    No data recorded     PHQ-9 Given to (if applicable):               PHQ-9 Score (if applicable):                     [] Positive     []  Negative              Does question #9 need addressed (if applicable)                     [] Yes    []  No               Karley Swift MA

## 2021-02-17 NOTE — PROGRESS NOTES
Tolerated Blood Transfusion well. Reviewed discharge instruction, voiced understanding. Copies of AVS given. Pt discharged home. Pt to exit via [address] per this nurse.     Orders Placed This Encounter   Medications    0.9 % sodium chloride infusion 250 mL    sodium chloride flush 0.9 % injection 20 mL    acetaminophen (TYLENOL) tablet 650 mg    diphenhydrAMINE (BENADRYL) tablet 25 mg    furosemide (LASIX) injection 20 mg    methylPREDNISolone sodium (SOLU-MEDROL) injection 20 mg    heparin flush 100 UNIT/ML injection 1 Units    DISCONTD: 0.9 % sodium chloride infusion 250 mL    DISCONTD: sodium chloride flush 0.9 % injection 20 mL    DISCONTD: acetaminophen (TYLENOL) tablet 650 mg    DISCONTD: diphenhydrAMINE (BENADRYL) tablet 25 mg    DISCONTD: furosemide (LASIX) injection 20 mg    DISCONTD: methylPREDNISolone sodium (SOLU-MEDROL) injection 20 mg    DISCONTD: heparin flush 100 UNIT/ML injection 1 Units

## 2021-02-17 NOTE — PROGRESS NOTES
Pt taken to room 04 for blood transfusion. Pt oriented to room, call light, bed/chair controls, TV, pt voiced understanding. Plan of care explained to pt, pt voiced understanding.

## 2021-03-02 NOTE — TELEPHONE ENCOUNTER
Called pt to schedule her education & tx dates fro 3/16 @ 1:00 & tx on 3/17 @ 9:45; pt voiced understanding.

## 2021-03-04 NOTE — PROGRESS NOTES
Department of [address] José Miguel Linares MD   Consult Note      Reason for Consult:  Port placement    Referring Physician:  Dr. Paris Myers:  No chief complaint on file. History Obtained From:  patient    HISTORY OF PRESENTILLNESS:                The patient is a 50 y.o. female who presents with the need for a port for ongoing chemotherapy for metastatic breast cancer. Past Medical History:    Past Medical History:   Diagnosis Date    Anxiety     Asthma     last flare up 5 yrs ago    Breast lesion     \"have spot on left breast going to remove- at this time not sure if cancer or not\"    Essential hypertension 4/1/2020    Secondary cancer of bone ([facility] [address] 4/1/2020    Thyroid nodule     \"have thyroid nodules-      Past Surgical History:    Past Surgical History:   Procedure Laterality Date    APPENDECTOMY  age 26    [address] [place]  age 5    T & A     Current Medications:   Current Outpatient Medications   Medication Sig Dispense Refill    amLODIPine (NORVASC) 5 MG tablet TAKE TWO TABLETS BY MOUTH DAILY 30 tablet 2    prochlorperazine (COMPAZINE) 10 MG tablet Take 1 tablet by mouth every 8 hours as needed (nausea) 60 tablet 1    apixaban (ELIQUIS) 5 MG TABS tablet Take 1 tablet by mouth 2 times daily 60 tablet 5    promethazine (PHENERGAN) 25 MG tablet TAKE ONE TABLET BY MOUTH EVERY 6 HOURS AS NEEDED FOR NAUSEA 30 tablet 0    AFINITOR 10 MG TABS chemo tablet Take 1 tablet by mouth daily 28 tablet 6    exemestane (AROMASIN) 25 MG tablet Take 1 tablet by mouth daily 30 tablet 3    cetirizine (ZYRTEC) 10 MG tablet Take 10 mg by mouth daily       No current facility-administered medications for this visit.        Allergies:  Latex    Social History:   Social History     Socioeconomic History    Marital status: Single     Spouse name: Not on file    Number of children: 2    Years of education: 15    Highest education level: Not on file   Occupational History    Occupation: dental assistant     Comment: [address] Needs    Financial resource strain: Not on file    Food insecurity     Worry: Not on file     Inability: Not on file    Transportation needs     Medical: Not on file     Non-medical: Not on file   Tobacco Use    Smoking status: Never Smoker    Smokeless tobacco: Never Used   Substance and Sexual Activity    Alcohol use: Yes     Comment: average\"one time per week\"    Drug use: No    Sexual activity: Yes     Partners: Male   Lifestyle    Physical activity     Days per week: Not on file     Minutes per session: Not on file    Stress: Not on file   Relationships    Social connections     Talks on phone: Not on file     Gets together: Not on file     Attends Synagogue service: Not on file     Active member of club or organization: Not on file     Attends meetings of clubs or organizations: Not on file     Relationship status: Not on file    Intimate partner violence     Fear of current or ex partner: Not on file     Emotionally abused: Not on file     Physically abused: Not on file     Forced sexual activity: Not on file   Other Topics Concern    Not on file   Social History Narrative    Lives in her own home with her children     Family History:   Family History   Problem Relation Age of Onset    Diabetes Mother     Stroke Mother     High Blood Pressure Mother         REVIEW OF SYSTEMS:    CONSTITUTIONAL:  negative  HEENT:  negative  CARDIOVASCULAR:  negative  GASTROINTESTINAL:  negative  GENITOURINARY:  negative  HEMATOLOGIC/LYMPHATIC:  negative  ENDOCRINE:  negative    PHYSICAL EXAM:    VITALS:  There were no vitals taken for this visit.   CONSTITUTIONAL:  awake, alert, no apparent distress and thin  ENT:  normocepalic, without obvious abnormality  NECK:  supple, symmetrical, trachea midline   LUNGS:  clear to auscultation  CARDIOVASCULAR:  regular rate and rhythm   MUSCULOSKELETAL:  1+ pitting edema lower extremities  NEUROLOGIC:  Mental Status Exam:  Level of Alertness:   awake  Orientation:   person, place, time      ASSESSMENT/RECOMMENDATIONS:  Metastatic breast cancer, needs port for chemotherapy. The risks, benefits and alternatives to the planned procedure were discussed. Patient expressed an understanding and is willing to proceed. The patient was counseled at length about the risks of blayne Covid-19 during their perioperative period and any recovery window from their procedure. The patient was made aware that blayne Covid-19  may worsen their prognosis for recovering from their procedure  and lend to a higher morbidity and/or mortality risk. All material risks, benefits, and reasonable alternatives including postponing the procedure were discussed. The patient does wish to proceed with the procedure at this time. Usha Erickson.

## 2021-03-05 NOTE — PROGRESS NOTES
Called patient and reviewed all upcoming appointments with her:      Echocardiogram - 3/10/21  Mediport placement - 3/15/21 at [facility]  Chemo treatment planning - 3/16/21  [facility] chemo [facility] - 3/22/21    Patient will stop Lupron injections but continue with Zometa. To stay on oral meds (Afinitor and Aromasin) until she starts chemo. Patient requesting refill of Tramadol - per Dr. Sabrina Falcon e-scripted to [facility]. Patient voiced understanding of all above. Instructed to call with any questions/concerns.

## 2021-03-12 NOTE — PROGRESS NOTES
Surgery:    [email or web address]                      Arrival time: 0600  Nothing to eat or drink after midnight unless instructed to take certain medications by the doctor or the nurse the am of surgery  Arrive at the front information desk -[facility] is on [address] [facility]  Please leave money and all other valuables at home. Wear comfortable clothing. If you wear contacts please bring a case. No make up. You may be asked to remove rings or body piercing. Please bring insurance cards and picture ID am of procedure. Please bring any consent or paper work from your doctor. If you become ill,such as a cold, sore throat or fever contact your doctor. Please bathe or shower am of procedure.   Medications to take AM of procedure:     Any questions call [facility]  [phone]     Pt had COVID 3/11/21 in process

## 2021-03-12 NOTE — ANESTHESIA PRE PROCEDURE
Department of Anesthesiology  Preprocedure Note       Name:  Alicia Bello   Age:  50 y.o.  :  1965                                          MRN:  [id number]         Date:  3/12/2021      Surgeon: Bertram Parrish):  Mau Del Real MD    Procedure: Procedure(s):  PORT INSERTION    Medications prior to admission:   Prior to Admission medications    Medication Sig Start Date End Date Taking? Authorizing Provider   acetaminophen (TYLENOL) 500 MG tablet Take 500 mg by mouth every 6 hours as needed for Pain   Yes Historical Provider, MD   traMADol (ULTRAM) 50 MG tablet Take 2 tablets by mouth every 6 hours as needed for Pain for up to 60 doses. 3/5/21 3/19/21  Vanna Alvarado MD   amLODIPine (NORVASC) 5 MG tablet TAKE TWO TABLETS BY MOUTH DAILY 21   Vanna Alvarado MD   prochlorperazine (COMPAZINE) 10 MG tablet Take 1 tablet by mouth every 8 hours as needed (nausea) 21   Vanna Alvarado MD   apixaban (ELIQUIS) 5 MG TABS tablet Take 1 tablet by mouth 2 times daily 21   Vanna Alvarado MD   promethazine (PHENERGAN) 25 MG tablet TAKE ONE TABLET BY MOUTH EVERY 6 HOURS AS NEEDED FOR NAUSEA 20   Vanna Alvarado MD   AFINITOR 10 MG TABS chemo tablet Take 1 tablet by mouth daily 20   Vanna Alvarado MD   exemestane (AROMASIN) 25 MG tablet Take 1 tablet by mouth daily 9/15/20 10/15/20  Vanna Alvarado MD   cetirizine (ZYRTEC) 10 MG tablet Take 10 mg by mouth daily    Historical Provider, MD       Current medications:    No current facility-administered medications for this encounter. Current Outpatient Medications   Medication Sig Dispense Refill    acetaminophen (TYLENOL) 500 MG tablet Take 500 mg by mouth every 6 hours as needed for Pain      traMADol (ULTRAM) 50 MG tablet Take 2 tablets by mouth every 6 hours as needed for Pain for up to 60 doses.  60 tablet 0    amLODIPine (NORVASC) 5 MG tablet TAKE TWO TABLETS BY MOUTH DAILY 30 tablet 2    prochlorperazine (COMPAZINE) 10 MG tablet Take 1 tablet by mouth every 8 hours as needed (nausea) 60 tablet 1    apixaban (ELIQUIS) 5 MG TABS tablet Take 1 tablet by mouth 2 times daily 60 tablet 5    promethazine (PHENERGAN) 25 MG tablet TAKE ONE TABLET BY MOUTH EVERY 6 HOURS AS NEEDED FOR NAUSEA 30 tablet 0    AFINITOR 10 MG TABS chemo tablet Take 1 tablet by mouth daily 28 tablet 6    exemestane (AROMASIN) 25 MG tablet Take 1 tablet by mouth daily 30 tablet 3    cetirizine (ZYRTEC) 10 MG tablet Take 10 mg by mouth daily         Allergies:     Allergies   Allergen Reactions    Latex Anaphylaxis     \"have trouble with banana's kiwi, avocados- Have trouble breathing with these and trouble breathig - get asthmatic from latex gloves, bandage, anything latex     Banana Anaphylaxis    Cinnamon Anaphylaxis       Problem List:    Patient Active Problem List   Diagnosis Code    Left breast mass N63.20    Secondary cancer of bone ([facility] [address] C79.51    Essential hypertension I10    Malignant neoplasm of central portion of left female breast ([address] C50.112    Secondary malignant neoplasm of breast ([address] C79.81    Dehydration E86.0    Iron deficiency anemia D50.9    Malabsorption K90.9    Left ovarian enlargement N83.8       Past Medical History:        Diagnosis Date    Anxiety     Asthma     last flare up 5 yrs ago    Breast lesion     \"have spot on left breast going to remove- at this time not sure if cancer or not\"    Essential hypertension 2020    Secondary cancer of bone ([address] 2020    Thyroid nodule     \"have thyroid nodules-        Past Surgical History:        Procedure Laterality Date    APPENDECTOMY  age 27   [facility]  SECTION  0   [facility] TONSILLECTOMY  age 5    [facility]       Social History:    Social History     Tobacco Use    Smoking status: Never Smoker    Smokeless tobacco: Never Used   Substance Use Topics    Alcohol use: Yes     Comment: average\"one time per week\"                                Counseling given: Not Answered      Vital Signs (Current):   Vitals:    03/12/21 1314   Weight: 109 lb (49.4 kg)   Height: 5' 1\" (1.549 m)                                              BP Readings from Last 3 Encounters:   03/04/21 (!) 161/93   02/17/21 (!) 143/80   02/15/21 (!) 141/80       NPO Status:                                                                                 BMI:   Wt Readings from Last 3 Encounters:   03/04/21 109 lb 6.4 oz (49.6 kg)   02/15/21 115 lb 6.4 oz (52.3 kg)   01/25/21 111 lb 3.2 oz (50.4 kg)     Body mass index is 20.6 kg/m². CBC:   Lab Results   Component Value Date    WBC 7.9 02/15/2021    RBC 3.23 02/15/2021    HGB 7.1 02/15/2021    HCT 23.9 02/15/2021    MCV 74.0 02/15/2021    RDW 22.2 02/15/2021     02/15/2021       CMP:   Lab Results   Component Value Date     02/15/2021    K 4.1 02/15/2021     02/15/2021    CO2 18 02/15/2021    BUN 29 02/15/2021    CREATININE 1.3 02/15/2021    GFRAA 52 02/15/2021    LABGLOM 43 02/15/2021    GLUCOSE 120 02/15/2021    PROT 6.9 02/15/2021    CALCIUM 8.6 02/15/2021    BILITOT 0.2 02/15/2021    ALKPHOS 66 02/15/2021    AST 65 02/15/2021    ALT 12 02/15/2021       POC Tests: No results for input(s): POCGLU, POCNA, POCK, POCCL, POCBUN, POCHEMO, POCHCT in the last 72 hours.     Coags: No results found for: PROTIME, INR, APTT    HCG (If Applicable): No results found for: PREGTESTUR, PREGSERUM, HCG, HCGQUANT     ABGs: No results found for: PHART, PO2ART, TAM9IHC, RPG6IEN, BEART, I1KVGOCX     Type & Screen (If Applicable):  No results found for: LABABO, LABRH    Drug/Infectious Status (If Applicable):  No results found for: HIV, HEPCAB    COVID-19 Screening (If Applicable): No results found for: COVID19      Anesthesia Evaluation  Patient summary reviewed  Airway:         Dental:          Pulmonary:   (+) asthma:                            Cardiovascular:    (+) hypertension:,                   Neuro/Psych:               GI/Hepatic/Renal:             Endo/Other:    (+) blood dyscrasia: anemia:., malignancy/cancer. ROS comment: Breast ca to Bone ca Abdominal:           Vascular:                                        Anesthesia Plan      MAC     ASA 3     ( Pre Anesthesia Assessment complete.  Chart reviewed on 3/12/2021)                            CINDY Gentile - CRNA   3/12/2021

## 2021-03-15 PROBLEM — C50.919 METASTATIC BREAST CANCER (HCC): Status: ACTIVE | Noted: 2020-07-31

## 2021-03-15 NOTE — ANESTHESIA POSTPROCEDURE EVALUATION
Department of Anesthesiology  Postprocedure Note    Patient: Radha Merino  MRN: [id number]  YOB: 1965  Date of evaluation: 3/15/2021  Time:  8:30 AM     Procedure Summary     Date: 03/15/21 Room / Location: [address]    Anesthesia Start: 0730 Anesthesia Stop: 0830    Procedure: RIGHT PORT INSERTION (Right Chest) Diagnosis: (MALIGNANT NEOPLASM OF CENTRAL PORTION OF LEFT BREAST OF FEMALE)    Surgeons: Sophia Dixon MD Responsible Provider: Santy Reed DO    Anesthesia Type: MAC ASA Status: 3          Anesthesia Type: MAC    Daniel Phase I:  10    Daniel Phase II:  10    Last vitals: Reviewed and per EMR flowsheets.        Anesthesia Post Evaluation    Patient location during evaluation: PACU  Patient participation: complete - patient participated  Level of consciousness: awake and alert  Pain score: 0  Airway patency: patent  Nausea & Vomiting: no nausea and no vomiting  Complications: no  Cardiovascular status: hemodynamically stable  Respiratory status: room air and spontaneous ventilation  Hydration status: stable

## 2021-03-15 NOTE — OP NOTE
Operative Note      Patient: Radha Oliver  YOB: 1965  MRN: [id number]    Date of Procedure: 3/15/2021    Pre-Op Diagnosis: MALIGNANT NEOPLASM OF CENTRAL PORTION OF LEFT BREAST OF FEMALE    Post-Op Diagnosis: Same       Procedure(s):  RIGHT PORT INSERTION    Surgeon(s):  Sotero Russell MD    First Assistant: CINDY Meredith-CNP  The use of a first assistant was necessary for the proper positioning, prepping, and draping of the patient, intraoperative retraction and suctioning for visualization, passing sutures and implants, stapling bowel and vessels using devises when necessary. Anesthesia: Monitor Anesthesia Care    PROCEDURE NOTES: Pt was brought to the OR and placed supine on the OR table. The  chest and neck was prepped and draped in the usual fashion. A right subclavian puncture was performed. The wire was inserted through he needle and the needle was removed. She has metastatic disease over most of the chest wall so placement of the port was in the upper right arm. The port pocket was made by anesthetizing the skin and incising through the skin and subqu tissue. The pocket was made to accommodate the port. The sheath and dilator was placed over the wire and the wire and dilator was removed. The catheter was inserted through the sheath and the sheath was peeled away. The catheter was attached to the trocar and tunneled under the skin into the pocket. It was cut to size and attached to the port and secured with the flange. The port was secured to the chest wall with 2 2-0 prolene sutures. It was aspirated and flushed. The incisions were closed with 3-0 and 5-0 vicryl. Dermabond was applied. A chest xray was done and confirmed the catheter in good position and no pneumothorax. Estimated Blood Loss (mL): Minimal    Complications: None    Specimens:   * No specimens in log *    Implants:  Implant Name Type Inv.  Item Serial No.  Lot No. LRB No. Used Action   PORT INFUS OD2.7MM ID1. 5MM INTRO 8FR TI POLYUR CATH DETACH [[id number]] [[employer]]  PORT INFUS OD2.7MM ID1. 5MM INTRO 8FR TI POLYUR CATH DETACH [[id number]] [[employer]]  [employer] [id number] Right 1 Implanted         Drains: * No LDAs found *    Findings: Metastatic disease covers most of chest necessitating port placement on upper right arm    Postoperatively the patient did well and is allowed to go home. They are to follow up per their appointment. They can shower. They are to do no lifting or driving until seen in the office. They can walk, climb stairs and ride in a car. If they have any problems, they are to give us a call.     Electronically signed by Nicolette Rankin MD on 3/15/2021 at 8:15 AM

## 2021-03-15 NOTE — H&P
PATIENT NAME: Javier Jones   YOB: 1965    ADMISSION DATE: 3/15/2021. TODAY'S DATE: 3/15/2021    CHIEF COMPLAINT:  Metastatic breast cancer    HISTORY OF PRESENT ILLNESS:  The patient is a 50 y.o. female  who presents with metastatic breast cancer. She was first diagnosed in 2018. She was seen at [facility] who believe she clinically has metastatic breast cancer to the pelvis. She presents today for mediport placement for chemotherapy. Past Medical History:        Diagnosis Date    Anxiety     Asthma     last flare up 5 yrs ago    Breast lesion     \"have spot on left breast going to remove- at this time not sure if cancer or not\"    Essential hypertension 4/1/2020    Secondary cancer of bone ([facility] [address] 4/1/2020    Thyroid nodule     \"have thyroid nodules-        Past Surgical History:        Procedure Laterality Date    APPENDECTOMY  age 26    [address]  age 5    T [facility] A       Medications Prior to Admission:   Medications Prior to Admission: acetaminophen (TYLENOL) 500 MG tablet, Take 500 mg by mouth every 6 hours as needed for Pain  traMADol (ULTRAM) 50 MG tablet, Take 2 tablets by mouth every 6 hours as needed for Pain for up to 60 doses.   amLODIPine (NORVASC) 5 MG tablet, TAKE TWO TABLETS BY MOUTH DAILY  prochlorperazine (COMPAZINE) 10 MG tablet, Take 1 tablet by mouth every 8 hours as needed (nausea)  promethazine (PHENERGAN) 25 MG tablet, TAKE ONE TABLET BY MOUTH EVERY 6 HOURS AS NEEDED FOR NAUSEA  AFINITOR 10 MG TABS chemo tablet, Take 1 tablet by mouth daily  cetirizine (ZYRTEC) 10 MG tablet, Take 10 mg by mouth daily  apixaban (ELIQUIS) 5 MG TABS tablet, Take 1 tablet by mouth 2 times daily  exemestane (AROMASIN) 25 MG tablet, Take 1 tablet by mouth daily    Allergies:  Latex, Banana, and Cinnamon    Social History:   Social History     Socioeconomic History    Marital status: Single     Spouse name: Not on file    Number of children: 2    Years of education: 15    Highest education level: Not on file   Occupational History    Occupation: dental assistant     Comment: [address] [employer] [address] Financial resource strain: Not on file    Food insecurity     Worry: Not on file     Inability: Not on file    Transportation needs     Medical: Not on file     Non-medical: Not on file   Tobacco Use    Smoking status: Never Smoker    Smokeless tobacco: Never Used   Substance and Sexual Activity    Alcohol use: Yes     Comment: average\"one time per week\"    Drug use: No    Sexual activity: Yes     Partners: Male   Lifestyle    Physical activity     Days per week: Not on file     Minutes per session: Not on file    Stress: Not on file   Relationships    Social connections     Talks on phone: Not on file     Gets together: Not on file     Attends [employer] service: Not on file     Active member of club or organization: Not on file     Attends meetings of clubs or organizations: Not on file     Relationship status: Not on file    Intimate partner violence     Fear of current or ex partner: Not on file     Emotionally abused: Not on file     Physically abused: Not on file     Forced sexual activity: Not on file   Other Topics Concern    Not on file   Social History Narrative    Lives in her own home with her children       Family History:       Problem Relation Age of Onset    Diabetes Mother     Stroke Mother     High Blood Pressure Mother        REVIEW OF SYSTEMS:    Pertinent items noted in HPI    PHYSICAL EXAM:    VITALS:  BP (!) 171/85   Pulse 138   Temp 99.5 °F (37.5 °C) (Temporal)   Resp 16   Ht 5' 1\" (1.549 m)   Wt 109 lb (49.4 kg)   LMP 04/08/2018   SpO2 96%   BMI 20.60 kg/m²   CONSTITUTIONAL:  awake, alert, no apparent distress  ENT:  normocepalic, without obvious abnormality  NECK:  supple, symmetrical, trachea midline  LUNGS:  clear to auscultation  CARDIOVASCULAR:  regular rate and rhythm  GASTROINTESTINAL;   soft, non-distended, non-tender  MUSCULOSKELETAL:  0+ pitting edema lower extremities  NEUROLOGIC:  Mental Status Exam:  Level of Alertness:   awake  Orientation:   person, place, time    DATA:  CBC with Differential:    Lab Results   Component Value Date    WBC 7.9 02/15/2021    RBC 3.23 02/15/2021    HGB 7.1 02/15/2021    HCT 23.9 02/15/2021     02/15/2021    MCV 74.0 02/15/2021    MCH 22.0 02/15/2021    MCHC 29.7 02/15/2021    RDW 22.2 02/15/2021    SEGSPCT 69.2 02/15/2021    BANDSPCT 5 04/05/2019    LYMPHOPCT 19.3 02/15/2021    MONOPCT 9.8 02/15/2021    BASOPCT 0.3 02/15/2021    MONOSABS 0.8 02/15/2021    LYMPHSABS 1.5 02/15/2021    EOSABS 0.1 02/15/2021    BASOSABS 0.0 02/15/2021    DIFFTYPE AUTOMATED DIFFERENTIAL 02/15/2021     CMP:    Lab Results   Component Value Date     02/15/2021    K 4.1 02/15/2021     02/15/2021    CO2 18 02/15/2021    BUN 29 02/15/2021    CREATININE 1.3 02/15/2021    GFRAA 52 02/15/2021    LABGLOM 43 02/15/2021    GLUCOSE 120 02/15/2021    PROT 6.9 02/15/2021    LABALBU 3.4 02/15/2021    CALCIUM 8.6 02/15/2021    BILITOT 0.2 02/15/2021    ALKPHOS 66 02/15/2021    AST 65 02/15/2021    ALT 12 02/15/2021       Radiology Reviewed    ASSESSMENT AND PLAN: Metastatic breast cancer,will plan port placement for chemotherapy. The risks, benefits and alternatives to the planned procedure were discussed. Patient expressed an understanding and is willing to proceed. The patient was counseled at length about the risks of blayne Covid-19 during their perioperative period and any recovery window from their procedure. The patient was made aware that blayne Covid-19  may worsen their prognosis for recovering from their procedure  and lend to a higher morbidity and/or mortality risk. All material risks, benefits, and reasonable alternatives including postponing the procedure were discussed. The patient does wish to proceed with the procedure at this time.     Terrel Frankel, APRN-CNP

## 2021-03-15 NOTE — PROGRESS NOTES
Patient Name: Radha Merino    Patient : 1965     Patient MRN: [id number]       Date: 3/15/2021                                                                                                   CHEMOTHERAPY TREATMENT PLAN    Care Team  Medical Oncologist: Jazzy Camejo MD  Surgeon:   Radiation Oncologist:   Primary Care Physician: CINDY Dykes CNP     Learning Needs Assessment  Before the treatment plan was discussed and the consent was signed, the care team assessed the patient's learning needs. This includes their ability to assume responsibility for managing their therapy. Diagnosis       metastatic breast cancer    The Goal of My Therapy is    (  ) To cure my cancer  (  ) To shrink the tumor prior to surgery  (  ) Increase my chance of cure after surgery  (x) Help me live as long as possible with the highest quality of life. I know that a cure is not possible.      Prognosis    (  ) Curable  (x) Palliative    Plan Of Treatment    Intent:  (  ) Neoadjuvant   (  ) Adjuvant   (x) Control    Treatment Regimen  (including supportive meds)                             Frequency                                               Duration     Adriamycin/cytoxan  taxol    Expected Response to Treatment    (  ) This therapy could cure your disease  (x) This therapy has a good chance of helping you live longer                                                                                        (  ) This therapy has a good chance of helping you feel better or making you live months longer compared to without it     Quality Of Life    (  ) Expect that you will be able to continue all normal activities  (  ) Expect that you will have some side effects but should be able to maintain normal activities  (x) Expect that you will be unable to work but able to perform light duties at home  (  ) Expect that you will be able to perform light duties and will need assistance with self care    Treatment Benefits and Harms     1. Patient taught on all common and rare toxicities regarding their treatment, disease process and drug regimen. This includes the short and long-         term effects of therapy (including infertility risks when appropriate). This also includes drug-drug and drug-food interactions when appropriate. Patient was educated when to call [facility] with adverse effects and symptoms. 2.    Patient was taught safe handling and storage of medications in the home (when appropriate) and procedures for handling body sections and             waste in the home. 3.    Patient was taught on planned for missed doses and follow-up plans during treatment, including checo of provider visits and labs. 4.    Patient signed consent on treatment and drug information sheets were given. Alternative To Recommended Treatment    (  ) Palliative or Hospice  (  ) Second Opinion  (x) Other    Patient Care Management     Advance Care Planning completed: will bring in once completed   (x) Yes   (  ) No   Pain Care Plan entered (as applicable): Norco   (x) Yes   (  ) No   Comments:  PHQ-9 completed (Follow-up plan as applicable):   (x) Yes   (  ) No   Comments:  Distress needs addressed with the patient: pan, bathing, worry, nervousness   (  ) Yes   (  ) No   Distress areas needing addressed further:     Patient was educated on the expectations and their responsibility to schedule their follow-up appointments. The patient was also educated that if they refuse to show-up to their appointment or refuse to schedule a follow-up appointment that it is their responsibility to call [facility] in a timely manner to schedule their next appointment. The patient was educated on [facility] policy and that the patient is ultimately responsible for monitoring their appointments and adhering to the schedule. ( X) Yes   (   ) No    Estimated Out of Pocket Costs discussed per the patient per financial navigator.    Patient Consented and taught per Nurse Navigator. .  Today, time spent with the patient discussing the intent and schedule of their treatment. The patient was given a copy of their treatment summary. Questions and concerns were addressed with the patient. Time spent with the patient face to face today was 60 minutes, counseling and coordination of care dominated more than 50% of this patient encounter.

## 2021-03-15 NOTE — ANESTHESIA PRE PROCEDURE
Department of Anesthesiology  Preprocedure Note       Name:  Mouna Topete   Age:  50 y.o.  :  1965                                          MRN:  [id number]         Date:  3/15/2021      Surgeon: Rossy Abdul):  Carolina Crow MD    Procedure: Procedure(s):  PORT INSERTION    Medications prior to admission:   Prior to Admission medications    Medication Sig Start Date End Date Taking? Authorizing Provider   acetaminophen (TYLENOL) 500 MG tablet Take 500 mg by mouth every 6 hours as needed for Pain   Yes Historical Provider, MD   amLODIPine (NORVASC) 5 MG tablet TAKE TWO TABLETS BY MOUTH DAILY 21  Yes Ry Patrick MD   traMADol (ULTRAM) 50 MG tablet Take 2 tablets by mouth every 6 hours as needed for Pain for up to 60 doses. 3/5/21 3/19/21  Ry Patrick MD   prochlorperazine (COMPAZINE) 10 MG tablet Take 1 tablet by mouth every 8 hours as needed (nausea) 21   Ry Patrick MD   apixaban (ELIQUIS) 5 MG TABS tablet Take 1 tablet by mouth 2 times daily 21   Ry Patrick MD   promethazine (PHENERGAN) 25 MG tablet TAKE ONE TABLET BY MOUTH EVERY 6 HOURS AS NEEDED FOR NAUSEA 20   Ry Patrick MD   AFINITOR 10 MG TABS chemo tablet Take 1 tablet by mouth daily 20   Ry Patrick MD   exemestane (AROMASIN) 25 MG tablet Take 1 tablet by mouth daily 9/15/20 10/15/20  Ry Patrick MD   cetirizine (ZYRTEC) 10 MG tablet Take 10 mg by mouth daily    Historical Provider, MD       Current medications:    Current Facility-Administered Medications   Medication Dose Route Frequency Provider Last Rate Last Admin    lactated ringers infusion   Intravenous Continuous Salazar Guerrier MD        ceFAZolin (ANCEF) 1,000 mg in dextrose 5 % 50 mL IVPB (mini-bag)  1,000 mg Intravenous Once Carolina Crow MD           Allergies:     Allergies   Allergen Reactions    Latex Anaphylaxis     \"have trouble with banana's kiwi, avocados- Have trouble breathing with these and trouble breathig - get asthmatic from latex gloves, bandage, anything latex     Banana Anaphylaxis    Cinnamon Anaphylaxis       Problem List:    Patient Active Problem List   Diagnosis Code    Left breast mass N63.20    Secondary cancer of bone ([facility] [address] C79.51    Essential hypertension I10    Malignant neoplasm of central portion of left female breast ([facility] [address] C50.112    Secondary malignant neoplasm of breast ([facility] [address] C79.81    Dehydration E86.0    Iron deficiency anemia D50.9    Malabsorption K90.9    Left ovarian enlargement N83.8       Past Medical History:        Diagnosis Date    Anxiety     Asthma     last flare up 5 yrs ago    Breast lesion     \"have spot on left breast going to remove- at this time not sure if cancer or not\"    Essential hypertension 2020    Secondary cancer of bone ([facility] [address] 2020    Thyroid nodule     \"have thyroid nodules-        Past Surgical History:        Procedure Laterality Date    APPENDECTOMY  age 27   [facility]  SECTION  0   [facility] TONSILLECTOMY  age 5    [facility]       Social History:    Social History     Tobacco Use    Smoking status: Never Smoker    Smokeless tobacco: Never Used   Substance Use Topics    Alcohol use: Yes     Comment: average\"one time per week\"                                Counseling given: Not Answered      Vital Signs (Current):   Vitals:    21 1314 03/15/21 0633   BP:  (!) 171/85   Pulse:  138   Resp:  16   Temp:  99.5 °F (37.5 °C)   TempSrc:  Temporal   SpO2:  96%   Weight: 109 lb (49.4 kg)    Height: 5' 1\" (1.549 m)                                               BP Readings from Last 3 Encounters:   03/15/21 (!) 171/85   21 (!) 161/93   21 (!) 143/80       NPO Status:                                                                                 BMI:   Wt Readings from Last 3 Encounters:   21 109 lb (49.4 kg)   21 109 lb 6.4 oz (49.6 kg)   02/15/21 115 lb 6.4 oz (52.3 kg)     Body mass index is 20.6 kg/m².     CBC:   Lab Results   Component Value Date    WBC 7.9 02/15/2021    RBC 3.23 02/15/2021    HGB 7.1 02/15/2021    HCT 23.9 02/15/2021    MCV 74.0 02/15/2021    RDW 22.2 02/15/2021     02/15/2021       CMP:   Lab Results   Component Value Date     02/15/2021    K 4.1 02/15/2021     02/15/2021    CO2 18 02/15/2021    BUN 29 02/15/2021    CREATININE 1.3 02/15/2021    GFRAA 52 02/15/2021    LABGLOM 43 02/15/2021    GLUCOSE 120 02/15/2021    PROT 6.9 02/15/2021    CALCIUM 8.6 02/15/2021    BILITOT 0.2 02/15/2021    ALKPHOS 66 02/15/2021    AST 65 02/15/2021    ALT 12 02/15/2021       POC Tests: No results for input(s): POCGLU, POCNA, POCK, POCCL, POCBUN, POCHEMO, POCHCT in the last 72 hours. Coags: No results found for: PROTIME, INR, APTT    HCG (If Applicable): No results found for: PREGTESTUR, PREGSERUM, HCG, HCGQUANT     ABGs: No results found for: PHART, PO2ART, AXB9YZP, MQD1JPW, BEART, T4SSMEKZ     Type & Screen (If Applicable):  No results found for: LABABO, LABRH    Drug/Infectious Status (If Applicable):  No results found for: HIV, HEPCAB    COVID-19 Screening (If Applicable):   Lab Results   Component Value Date    COVID19 NOT DETECTED 03/11/2021         Anesthesia Evaluation  Patient summary reviewed no history of anesthetic complications:   Airway: Mallampati: II  TM distance: >3 FB   Neck ROM: full  Mouth opening: > = 3 FB Dental: normal exam         Pulmonary: breath sounds clear to auscultation  (+) asthma:                            Cardiovascular:  Exercise tolerance: good (>4 METS),   (+) hypertension:,         Rhythm: regular  Rate: normal           Beta Blocker:  Not on Beta Blocker         Neuro/Psych:   Negative Neuro/Psych ROS              GI/Hepatic/Renal: Neg GI/Hepatic/Renal ROS            Endo/Other:    (+) blood dyscrasia: anemia:., malignancy/cancer (breast). ROS comment: Breast ca to Bone ca Abdominal:           Vascular: negative vascular ROS. Anesthesia Plan      MAC     ASA 3       Induction: intravenous. Anesthetic plan and risks discussed with patient. Plan discussed with CRNA.                   Vela Angelucci,    3/15/2021

## 2021-03-16 NOTE — PROGRESS NOTES
Patient arrived with daughter for treatment planning and chemotherapy education. Discussed treatment plan, potential side effects, prevention, and symptom management. Reviewed chemotherapy education folder and drug monograph. Patient's regimen will be AC every three weeks x4 cycles then Taxol weekly x12 cycles    Patient signed chemotherapy consent for Adriamycin, Cytoxan and Taxol. Copy of consent given to patient. Reviewed chemotherapy schedule/calendar. Rx for Zofran, Olanzapine and Dexamethasone sent to pharmacy per Jaycee Jain, APRN-CNP. Patient given calendar and written instructions for these medications and how/when to take. Reviewed ECHO EF 60%. Mediport to right axilla area intact without redness or drainage. Patient given on-call phone number for after office hours and weekends. CBC drawn today. Hemoglobin 6.9 - patient will receive one unit of PRBC tomorrow at 0900 at [facility]. Confirmed first chemotherapy appointment for [facility] on Monday, 3/22/21 at 0900.

## 2021-03-16 NOTE — PROGRESS NOTES
requires a shower chair and for increased stability and due to shortness of breath and fatigue  in order to participate in or complete daily living tasks of: bathing. The patient is able to safely use the shower chair and the functional mobility deficit can be sufficiently resolved. To return for ToxCheck/interim counts in two weeks.

## 2021-03-17 NOTE — PROGRESS NOTES
Pt taken to room 03 for blood transfusion. Pt oriented to room, call light, bed/chair controls, TV, pt voiced understanding. Plan of care explained to pt, pt voiced understanding.

## 2021-03-17 NOTE — PROGRESS NOTES
Tolerated Blood Transfusion well. Reviewed discharge instruction, voiced understanding. Copies of AVS given. Pt discharged home. Pt to exit via [facility] [place].     Orders Placed This Encounter   Medications    0.9 % sodium chloride infusion 250 mL    sodium chloride flush 0.9 % injection 20 mL    acetaminophen (TYLENOL) tablet 650 mg    diphenhydrAMINE (BENADRYL) tablet 25 mg    furosemide (LASIX) injection 20 mg    methylPREDNISolone sodium (SOLU-MEDROL) injection 20 mg    heparin flush 100 UNIT/ML injection 500 Units

## 2021-03-20 NOTE — ED NOTES
Patient discharged to home at this time. Discharge instructions and follow up care discussed, patient voices understanding.       Emmett Dancer, RN  03/20/21 0762

## 2021-03-20 NOTE — ED NOTES
Bed: 02TR-02  Expected date:   Expected time:   Means of arrival:   Comments:  EMS- Fall, CA patient, weakness     Angie Perez  03/20/21 2639

## 2021-03-20 NOTE — ED PROVIDER NOTES
Patient Identification  Derrick Lares is a 50 y.o. female    Chief Complaint  Fall (Denies pain, denies hitting head, oncologist wanted her to come and be checked)      HPI  (History provided by patient)  This is a 50 y.o. female who was brought in by self for chief complaint of fall. Onset was 6 AM today. Patient reports that the handle on her toilet broke and she slipped and fell. She landed on her abdomen. Denies any injuries from fall. Specifically denies hitting her head. She is on Eliquis. Called and spoke with her oncologist office who recommended she come to the ED for evaluation. She denies any new pain from fall, states she chronically has generalized pain secondary to breast cancer with bony metastasis. Patient noted to be tachycardic here, states that \"it is always been that way\". She notes that her oncologist got an echocardiogram due to the tachycardia which was unremarkable. She has never seen a cardiologist that she is aware of. She denies any chest pain or shortness of breath or palpitations. Review of records shows that is far back as a year patient has had significant tachycardia during multiple outpatient office visits. REVIEW OF SYSTEMS    Constitutional:  Denies fever, chills  HENT:  Denies sore throat or ear pain   Eyes: Denies vision changes, eye pain  Cardiovascular:  Denies chest pain, syncope  Respiratory:  Denies shortness of breath, cough   GI:  Denies abdominal pain, nausea, vomiting  :  Denies dysuria, discharge  Musculoskeletal:  + back and joint pain, chronic and unchanged. Skin:  Denies rash, pruritis  Neurologic:  Denies headache, focal weakness, or sensory changes     See HPI and nursing notes for additional information     I have reviewed the following nursing documentation:  Allergies:    Allergies   Allergen Reactions    Latex Anaphylaxis     \"have trouble with banana's kiwi, avocados- Have trouble breathing with these and trouble breathig - get asthmatic from latex gloves, bandage, anything latex     Banana Anaphylaxis    Cinnamon Anaphylaxis       Past medical history:  has a past medical history of Anxiety, Asthma, Breast lesion, Essential hypertension (2020), Secondary cancer of bone ([facility] [address] (2020), and Thyroid nodule. Past surgical history:  has a past surgical history that includes  section (); Appendectomy (age 27); Tonsillectomy (age 5); and Port Surgery (Right, 3/15/2021). Home medications:   Prior to Admission medications    Medication Sig Start Date End Date Taking? Authorizing Provider   ondansetron (ZOFRAN) 8 MG tablet 1 tab po q 8 hours PRN for chemo induced nausea/vomting 3/16/21   CINDY Bautista CNP   OLANZapine (ZYPREXA) 2.5 MG tablet One tablet HS for four nights starting the night before chemotherapy 3/16/21   CINDY Bautista CNP   dexamethasone (DECADRON) 4 MG tablet 1 tablet PO daily for 4 days starting the day after chemotherapy. 3/16/21   CINDY Bautista CNP   docusate sodium (COLACE) 100 MG capsule Take 1 capsule by mouth daily as needed for Constipation 3/16/21   CINDY Bautista CNP   HYDROcodone-acetaminophen (NORCO) 5-325 MG per tablet Take 1 tablet by mouth every 6 hours as needed for Pain for up to 14 days.  3/16/21 3/30/21  CINDY Bautista CNP   acetaminophen (TYLENOL) 500 MG tablet Take 500 mg by mouth every 6 hours as needed for Pain    Historical Provider, MD   amLODIPine (NORVASC) 5 MG tablet TAKE TWO TABLETS BY MOUTH DAILY 21   Erin Braga MD   prochlorperazine (COMPAZINE) 10 MG tablet Take 1 tablet by mouth every 8 hours as needed (nausea) 21   Erin Braga MD   apixaban (ELIQUIS) 5 MG TABS tablet Take 1 tablet by mouth 2 times daily 21   Erin Braga MD   promethazine (PHENERGAN) 25 MG tablet TAKE ONE TABLET BY MOUTH EVERY 6 HOURS AS NEEDED FOR NAUSEA 20   Erin Braga MD   AFINITOR 10 MG TABS chemo tablet Take 1 tablet by mouth daily 11/18/20   Carol Hendricks MD   exemestane (AROMASIN) 25 MG tablet Take 1 tablet by mouth daily 9/15/20 10/15/20  Carol Hendricks MD   cetirizine (ZYRTEC) 10 MG tablet Take 10 mg by mouth daily    Historical Provider, MD       Social history:  reports that she has never smoked. She has never used smokeless tobacco. She reports current alcohol use. She reports that she does not use drugs. Family history:    Family History   Problem Relation Age of Onset    Diabetes Mother     Stroke Mother     High Blood Pressure Mother          Exam  BP (!) 159/99   Pulse 136   Temp 98.3 °F (36.8 °C) (Oral)   Resp 16   LMP 04/08/2018   SpO2 97%   Nursing note and vitals reviewed. Constitutional: Well developed, well nourished. No acute distress. HENT:      Head: Normocephalic and atraumatic. Ears: External ears normal.      Nose: Nose normal.     Mouth: Membrane mucosa moist and pink. No posterior oropharynx erythema or tonsillar edema  Eyes: Anicteric sclera. No discharge, PERRL  Neck: Supple. Trachea midline. Cardiovascular: Tachycardic, regular rhythm, no murmurs, rubs, or gallops, radial pulses 2+ bilaterally. Pulmonary/Chest: Effort normal. No respiratory distress. CTAB. No stridor. No wheezes. No rales. Abdominal: Soft. Nontender to palpation. No distension. No guarding, rebound tenderness, or evidence of ascites. : No CVA tenderness. Musculoskeletal: Moves all extremities. No gross deformity. No tenderness palpation of the cervical, thoracic, lumbar spine or paraspinal muscles. Pelvis stable and nontender. No tenderness to palpation of the bilateral upper and lower extremities. NEUROLOGICAL: Awake and alert. GCS 15. Cranial nerves 2-12 grossly intact. Strength 5/5 throughout. Light touch sensation intact throughout. Finger to nose WNL. Skin: Warm and dry. No rash. Psychiatric: Normal mood and affect.  Behavior is normal.      Radiographs (if obtained):  [] The following radiograph was interpreted by myself in the absence of a radiologist:   [x] Radiologist's Report Reviewed:  No orders to display          Labs  No results found for this visit on 03/20/21. MDM  Patient presents for a fall, does not appear to have any injury, exam profile is overall benign. She is noted to be significantly tachycardic, she reports this is been ongoing for some time, I went through her records all the way back to 2019 which do show that she has been tachycardic on almost every single outpatient visit. She does not appear to have ever had a work-up for this and she thinks that it has been going on chronically for most of her life. I offered to perform tachycardia work-up in ED and patient declined. Recommended she follow-up with outpatient cardiology which she is comfortable with. She is comfortable with discharge home at this time. Recommended returning to ED for any new or worsening symptoms. I have independently evaluated this patient. Final Impression  1. Fall, initial encounter    2. Tachycardia        Blood pressure (!) 159/99, pulse 136, temperature 98.3 °F (36.8 °C), temperature source Oral, resp. rate 16, last menstrual period 04/08/2018, SpO2 97 %, not currently breastfeeding. Disposition:  Discharge to home in stable condition. Patient was given scripts for the following medications. I counseled patient how to take these medications. New Prescriptions    No medications on file       This chart was generated using the [address] [place] [address] [address]  [employer] system. I created this record but it may contain dictation errors given the limitations of this technology.        Milly Tapia PA-C  03/20/21 1874

## 2021-03-20 NOTE — ED NOTES
Patient up to bathroom via wheelchair at this time. Patient able to stand and pivot per self without difficulty.       Liu Macdonald RN  03/20/21 7405

## 2021-03-20 NOTE — ED TRIAGE NOTES
Patient to the ED s/p fall at home around 0600 this am. Patient reports that she has breast cancer with naseem to the bones. Patient to begin chemo next Monday. Patient denies hitting her head when she fell, denies pain, other than chronic pain. Patient reports that oncologist wanted patient sent to the ED to \"be checked out\".

## 2021-03-22 PROBLEM — N17.9 AKI (ACUTE KIDNEY INJURY) (HCC): Status: ACTIVE | Noted: 2021-01-01

## 2021-03-22 NOTE — ED PROVIDER NOTES
EMERGENCY DEPARTMENT ENCOUNTER      PCP: CINDY Chaves - CNP    CHIEF COMPLAINT    Chief Complaint   Patient presents with    Other     abnormal labs        Of note, this patient was also evaluated by the attending physician, Dr. Serafin Jo. HPI    Jim Montanez is a 50 y.o. female who presents with abnormal labs. Onset today, patient was at cancer center and notified she had elevated potassium, possibly elevated creatinine. Patient states she is scheduled to begin chemotherapy for left breast cancer with metastasis to her spine. She states she has not yet started this treatment as she was notified of the above lab abnormalities and sent to the emergency department for evaluation. She notes no new symptoms recently, but does admit to feeling dehydrated.       REVIEW OF SYSTEMS    Constitutional:  Denies fever, chills, weight loss or weakness   HENT:  Denies sore throat or ear pain   Cardiovascular:  Denies chest pain, palpitations   Respiratory:  Denies new cough or shortness of breath    GI:  Denies abdominal pain, nausea, vomiting, or diarrhea  :  Denies any urinary symptoms   Musculoskeletal:  Denies new back pain  Skin:  Denies rash  Neurologic:  Denies headache, focal weakness or sensory changes   Endocrine:  Denies polyuria or polydypsia   Lymphatic:  Denies swollen glands     All other review of systems are negative  See HPI and nursing notes for additional information     PAST MEDICAL AND SURGICAL HISTORY    Past Medical History:   Diagnosis Date    Anxiety     Asthma     last flare up 5 yrs ago    Breast lesion     \"have spot on left breast going to remove- at this time not sure if cancer or not\"    Essential hypertension 4/1/2020    Secondary cancer of bone ([address] 4/1/2020    Thyroid nodule     \"have thyroid nodules-      Past Surgical History:   Procedure Laterality Date    APPENDECTOMY  age 27   [address] Right 3/15/2021    RIGHT PORT INSERTION performed by Keiry Negron MD at [facility]  age 5    T & A       CURRENT MEDICATIONS    Current Outpatient Rx   Medication Sig Dispense Refill    ondansetron (ZOFRAN) 8 MG tablet 1 tab po q 8 hours PRN for chemo induced nausea/vomting 30 tablet 0    OLANZapine (ZYPREXA) 2.5 MG tablet One tablet HS for four nights starting the night before chemotherapy 16 tablet 0    dexamethasone (DECADRON) 4 MG tablet 1 tablet PO daily for 4 days starting the day after chemotherapy. 16 tablet 0    docusate sodium (COLACE) 100 MG capsule Take 1 capsule by mouth daily as needed for Constipation 30 capsule 0    HYDROcodone-acetaminophen (NORCO) 5-325 MG per tablet Take 1 tablet by mouth every 6 hours as needed for Pain for up to 14 days.  56 tablet 0    acetaminophen (TYLENOL) 500 MG tablet Take 500 mg by mouth every 6 hours as needed for Pain      amLODIPine (NORVASC) 5 MG tablet TAKE TWO TABLETS BY MOUTH DAILY 30 tablet 2    prochlorperazine (COMPAZINE) 10 MG tablet Take 1 tablet by mouth every 8 hours as needed (nausea) 60 tablet 1    apixaban (ELIQUIS) 5 MG TABS tablet Take 1 tablet by mouth 2 times daily 60 tablet 5    promethazine (PHENERGAN) 25 MG tablet TAKE ONE TABLET BY MOUTH EVERY 6 HOURS AS NEEDED FOR NAUSEA 30 tablet 0    AFINITOR 10 MG TABS chemo tablet Take 1 tablet by mouth daily 28 tablet 6    exemestane (AROMASIN) 25 MG tablet Take 1 tablet by mouth daily 30 tablet 3    cetirizine (ZYRTEC) 10 MG tablet Take 10 mg by mouth daily         ALLERGIES    Allergies   Allergen Reactions    Latex Anaphylaxis     \"have trouble with banana's kiwi, avocados- Have trouble breathing with these and trouble breathig - get asthmatic from latex gloves, bandage, anything latex     Banana Anaphylaxis    Cinnamon Anaphylaxis       SOCIAL AND FAMILY HISTORY    Social History     Socioeconomic History    Marital status:      Spouse name: Not on file    Number of children: 2    Years of education: 15    Highest education level: Not on file   Occupational History    Occupation: dental assistant     Comment: [address] [employer] [address] Financial resource strain: Not on file    Food insecurity     Worry: Not on file     Inability: Not on file    Transportation needs     Medical: Not on file     Non-medical: Not on file   Tobacco Use    Smoking status: Never Smoker    Smokeless tobacco: Never Used   Substance and Sexual Activity    Alcohol use: Yes     Comment: average\"one time per week\"    Drug use: No    Sexual activity: Yes     Partners: Male   Lifestyle    Physical activity     Days per week: Not on file     Minutes per session: Not on file    Stress: Not on file   Relationships    Social connections     Talks on phone: Not on file     Gets together: Not on file     Attends [employer] service: Not on file     Active member of club or organization: Not on file     Attends meetings of clubs or organizations: Not on file     Relationship status: Not on file    Intimate partner violence     Fear of current or ex partner: Not on file     Emotionally abused: Not on file     Physically abused: Not on file     Forced sexual activity: Not on file   Other Topics Concern    Not on file   Social History Narrative    Lives in her own home with her children     Family History   Problem Relation Age of Onset    Diabetes Mother     Stroke Mother     High Blood Pressure Mother          PHYSICAL EXAM    VITAL SIGNS: BP (!) 147/89   Pulse 117   Temp 97.8 °F (36.6 °C) (Oral)   Resp 20   Ht 5' 1\" (1.549 m)   Wt 122 lb (55.3 kg)   LMP 04/08/2018   SpO2 96%   BMI 23.05 kg/m²    Constitutional:  Well developed, Well nourished. Patient does appear to be dehydrated with dry oral mucosa. HENT:  Normocephalic, Atraumatic, PERRL. EOMI. Sclera clear. Conjunctiva normal, No discharge. Dry appearing palpebral conjunctiva.   Neck/Lymphatics: supple, no JVD, no swollen nodes  Cardiovascular: Rate 120, regular,  no murmurs/rubs/gallops. No JVD      Respiratory:  Nonlabored breathing. Normal breath sounds, No wheezing  Abdomen: Bowel sounds normal, Soft, No tenderness, no masses. Musculoskeletal:    There is no edema, asymmetry, or calf / thigh tenderness bilaterally. No cyanosis. No cool or pale-appearing limb. Distal cap refill and pulses intact bilateral upper and lower extremities  Bilateral upper and lower extremity ROM intact without pain or obvious deficit  Integument:  Warm, Dry  Neurologic: Alert & oriented , No focal deficits noted. Cranial nerves II through XII grossly intact. Normal gross motor coordination & motor strength bilateral upper and lower extremities  Sensation intact.   Psychiatric:  Affect normal, Mood normal.       Labs:  Results for orders placed or performed during the hospital encounter of 03/22/21   CBC Auto Differential   Result Value Ref Range    WBC 19.4 (H) 4.0 - 10.5 K/CU MM    RBC 3.34 (L) 4.2 - 5.4 M/CU MM    Hemoglobin 8.3 (L) 12.5 - 16.0 GM/DL    Hematocrit 26.5 (L) 37 - 47 %    MCV 79.3 78 - 100 FL    MCH 24.9 (L) 27 - 31 PG    MCHC 31.3 (L) 32.0 - 36.0 %    RDW 24.6 (H) 11.7 - 14.9 %    Platelets 791 (H) 180 - 440 K/CU MM    MPV 8.4 7.5 - 11.1 FL    Bands Relative 2 (L) 5 - 11 %    Segs Relative 91.0 (H) 36 - 66 %    Eosinophils % 1.0 0 - 3 %    Lymphocytes % 5.0 (L) 24 - 44 %    Monocytes % 1.0 0 - 4 %    Bands Absolute 0.39 K/CU MM    Segs Absolute 17.6 K/CU MM    Eosinophils Absolute 0.2 K/CU MM    Lymphocytes Absolute 1.0 K/CU MM    Monocytes Absolute 0.2 K/CU MM    Differential Type MANUAL DIFFERENTIAL     Polychromasia 1+     PLT Morphology RARE    Comprehensive Metabolic Panel   Result Value Ref Range    Sodium 129 (L) 135 - 145 MMOL/L    Potassium 6.6 (HH) 3.5 - 5.1 MMOL/L    Chloride 92 (L) 99 - 110 mMol/L    CO2 14 (L) 21 - 32 MMOL/L     (H) 6 - 23 MG/DL    CREATININE 7.5 (H) 0.6 - 1.1 MG/DL    Glucose 139 (H) 70 - 99 MG/DL    Calcium 8.5 8.3 - 10.6 MG/DL    Albumin 2.3 (L) 3.4 - 5.0 GM/DL    Total Protein 7.1 6.4 - 8.2 GM/DL    Total Bilirubin 0.3 0.0 - 1.0 MG/DL    ALT 8 (L) 10 - 40 U/L    AST 48 (H) 15 - 37 IU/L    Alkaline Phosphatase 120 40 - 129 IU/L    GFR Non-African American 6 (L) >60 mL/min/1.73m2    GFR  7 (L) >60 mL/min/1.73m2    Anion Gap 23 (H) 4 - 16   Troponin   Result Value Ref Range    Troponin T 0.015 (H) <0.01 NG/ML   Urinalysis   Result Value Ref Range    Color, UA STRAW (A) YELLOW    Clarity, UA CLEAR CLEAR    Glucose, Urine NEGATIVE NEGATIVE MG/DL    Bilirubin Urine NEGATIVE NEGATIVE MG/DL    Ketones, Urine NEGATIVE NEGATIVE MG/DL    Specific Gravity, UA 1.010 1.001 - 1.035    Blood, Urine SMALL (A) NEGATIVE    pH, Urine 6.0 5.0 - 8.0    Protein, UA 30 (A) NEGATIVE MG/DL    Urobilinogen, Urine NEGATIVE 0.2 - 1.0 MG/DL    Nitrite Urine, Quantitative NEGATIVE NEGATIVE    Leukocyte Esterase, Urine NEGATIVE NEGATIVE    RBC, UA 1 0 - 6 /HPF    WBC, UA 2 0 - 5 /HPF    Bacteria, UA NEGATIVE NEGATIVE /HPF    Trichomonas, UA NONE SEEN NONE SEEN /HPF   Lactic Acid, Plasma   Result Value Ref Range    Lactate 1.1 0.4 - 2.0 mMOL/L   POCT Glucose   Result Value Ref Range    POC Glucose 150 (H) 70 - 99 MG/DL   EKG 12 Lead   Result Value Ref Range    Ventricular Rate 249 BPM    Atrial Rate 220 BPM    P-R Interval 96 ms    QRS Duration 148 ms    Q-T Interval 182 ms    QTc Calculation (Bazett) 370 ms    R Axis 77 degrees    T Axis 45 degrees    Diagnosis       Undetermined rhythm  Nonspecific intraventricular block  Cannot rule out Septal infarct , age undetermined  Abnormal ECG  No previous ECGs available               EKG Interpretation  Please see ED physician's note for EKG interpretation       RADIOLOGY    XR CHEST PORTABLE   Final Result   Increased moderate left pleural effusion with left lung opacity that may   reflect atelectasis or pneumonia if the patient has fever or leukocytosis. ED COURSE & MEDICAL DECISION MAKING       -----------------------------------------------------------      CRITICAL CARE NOTE:  There was a high probability of clinically significant life-threatening deterioration of the patient's condition requiring my urgent intervention due to hyper kalemia, ELIDIA, dehydration. .    IV fluids, calcium gluconate, protocol orders for hyperkalemia (order set) was performed to address this. I discussed patient case with on-call nephrologist, Dr. Trudy Mejia who recommends I discussed with IR to place temporary dialysis catheter and she will plan to dialyze. Assement of the patient, including exam, was done simultaneously during critical care actions. Total critical care time is at least  35 minutes. This includes vital sign monitoring, pulse oximetry monitoring, telemetry monitoring, clinical response to the IV medications, reviewing the nursing notes, consultation time, dictation/documentation time, and interpretation of the lab work. This time excludes family discussion time and time spent performing separately billable procedure(s) (see EMR/chart for more details)    If applicable, Any/all of the below separately billable procedures that were performed during this critical care intervention are documented separately - please see the appropriate note for details:        -ECG interpretation       ----------------------------------------------------------               With fluids, patient's tachycardia is improving. Given active cancer, I considered PE as underlying cause of tachycardia, however patient is severely dehydrated with ELIDIA which is likely the cause of her tachycardia. Additionally she is anticoagulated. I discussed CTA chest with her at bedside and she would like to hold on this as she has had multiple CTs. She understands I am unable to fully evaluate for PE or other cardiopulmonary abnormalities.     1725-I discussed patient case with hospitalist who agrees to admit patient. Clinical  IMPRESSION    1. ELIDIA (acute kidney injury) ([facility]    2. Hyperkalemia    3. Leukocytosis, unspecified type    4. Metastatic breast cancer ([facility]          Pt admitted. Comment: Please note this report has been produced using speech recognition software and may contain errors related to that system including errors in grammar, punctuation, and spelling, as well as words and phrases that may be inappropriate. If there are any questions or concerns please feel free to contact the dictating provider for clarification.          Rock Salazar, [place]  03/22/21 1955

## 2021-03-22 NOTE — ED NOTES
Attempted to call report at this time. Transferred to Xray.  Called back and then put on hold     Ghada Elder, [address] [facility]  03/22/21 [id number]

## 2021-03-22 NOTE — PROGRESS NOTES
MA Rooming Questions  Patient: Richie Contreras  MRN: [id number]    Date: 3/22/2021        1. Do you have any new issues? yes - pt fell at home         2. Do you need any refills on medications?    no    3. Have you had any imaging done since your last visit?   no    4. Have you been hospitalized or seen in the emergency room since your last visit here?   no    5. Did the patient have a depression screening completed today?  No    No data recorded     PHQ-9 Given to (if applicable):               PHQ-9 Score (if applicable):                     [] Positive     []  Negative              Does question #9 need addressed (if applicable)                     [] Yes    []  No               Benito Armijo MA

## 2021-03-22 NOTE — ED NOTES
Attempted to call report at this time again.  Kept going back to the Charge RN who informed this RN she is passing medications and will have to find the RN taking the patient     Jasmyne Figueroa RN  03/22/21 1954

## 2021-03-22 NOTE — ED NOTES
Dahiana from IR called and pt will be transported by them for procedure and then taken to her room after.       Michael Gr RN  03/22/21 1955

## 2021-03-22 NOTE — ED NOTES
bowen from dialysis called and would like pt taken directly to dialysis after DMITRY Lopez, RN  03/22/21 1958

## 2021-03-22 NOTE — H&P
[facility] Hospitalist History & Physical      Name: Radha Oliver  PCP: CINDY Cuellar CNP  Date of Admission: 3/22/2021  Date of Service: Pt seen/examined on 3/22/2021     Chief Complaint:  Other (abnormal labs )    History Of Present Illness:      Radha Oliver is a 50 y.o. female that  has a past medical history of Anxiety, Asthma, Breast lesion, Essential hypertension (4/1/2020), Secondary cancer of bone ([address] (4/1/2020), and Thyroid nodule., who presented to ED with the above chief complaint. Patient with metastatic breast CA starting chemotherapy today, went to the infusion center today, labs done at the infusion center showed severe ELIDIA and hyperkalemia, was sent to the ED for admission. Per patient, has been weak in the last couple of days, had a fall yesterday, did not hit her head, no shortness of breath or dizziness prior to fall. Appetite has been poor, with poor oral intake, no nausea or vomiting or diarrhea. She denies any fever, chills, shortness of breath, abdominal pain, urinary symptoms GI symptoms. She stays with her daughter. ED course summary:   The case was discussed with the ED provider   Allergies:  Latex, Banana, and Cinnamon    Medications Prior to Admission Reviewed:    Prior to Admission medications    Medication Sig Start Date End Date Taking? Authorizing Provider   ondansetron (ZOFRAN) 8 MG tablet 1 tab po q 8 hours PRN for chemo induced nausea/vomting 3/16/21   CINDY Bautista CNP   OLANZapine (ZYPREXA) 2.5 MG tablet One tablet HS for four nights starting the night before chemotherapy 3/16/21   CINDY Bautista CNP   dexamethasone (DECADRON) 4 MG tablet 1 tablet PO daily for 4 days starting the day after chemotherapy.  3/16/21   CINDY Bautista CNP   docusate sodium (COLACE) 100 MG capsule Take 1 capsule by mouth daily as needed for Constipation 3/16/21   CINDY Bautista CNP   HYDROcodone-acetaminophen (NORCO) 5-325 MG per tablet Take 1 tablet by mouth every 6 hours as needed for Pain for up to 14 days. 3/16/21 3/30/21  CINDY Mock CNP   acetaminophen (TYLENOL) 500 MG tablet Take 500 mg by mouth every 6 hours as needed for Pain    Historical Provider, MD   amLODIPine (NORVASC) 5 MG tablet TAKE TWO TABLETS BY MOUTH DAILY 21   Aakash Rollins MD   prochlorperazine (COMPAZINE) 10 MG tablet Take 1 tablet by mouth every 8 hours as needed (nausea) 21   Aakash Rollins MD   apixaban (ELIQUIS) 5 MG TABS tablet Take 1 tablet by mouth 2 times daily 21   Aakash Rollins MD   promethazine (PHENERGAN) 25 MG tablet TAKE ONE TABLET BY MOUTH EVERY 6 HOURS AS NEEDED FOR NAUSEA 20   Aakash Rollins MD   AFINITOR 10 MG TABS chemo tablet Take 1 tablet by mouth daily 20   Aakash Rollins MD   exemestane (AROMASIN) 25 MG tablet Take 1 tablet by mouth daily 9/15/20 10/15/20  Aakash Rollins MD   cetirizine (ZYRTEC) 10 MG tablet Take 10 mg by mouth daily    Historical Provider, MD       Past Medical History Reviewed:    Patient  has a past medical history of Anxiety, Asthma, Breast lesion, Essential hypertension, Secondary cancer of bone ([address] and Thyroid nodule. Past Surgical History Reviewed:    Patient  has a past surgical history that includes  section (); Appendectomy (age 27); Tonsillectomy (age 5); and Port Surgery (Right, 3/15/2021). Family History:  Reviewed in detail. Positive as follows:      Problem Relation Age of Onset    Diabetes Mother     Stroke Mother     High Blood Pressure Mother      Social History:  The patient  reports that she has never smoked. She has never used smokeless tobacco. She reports current alcohol use. She reports that she does not use drugs. TOBACCO:   reports that she has never smoked. She has never used smokeless tobacco.  ETOH:   reports current alcohol use. Drugs:  reports no history of drug use.       ROS:  At least 10-point review of systems reviewed and were negative except as stated above in HPI. Physical Exam:  Vitals:  BP (!) 140/89   Pulse 116   Temp 97.8 °F (36.6 °C) (Oral)   Resp 22   Ht 5' 1\" (1.549 m)   Wt 122 lb (55.3 kg)   LMP 04/08/2018   SpO2 98%   BMI 23.05 kg/m²   General: In mild respiratory distress, chronic ill looking  Eyes: RAMIN. Normal conjunctiva. extraocular motors are intact, sclerae white, no injection, no icterus. ENT/Mouth: Normal appearing jaw and neck, no cervical lymphadenopathy or sinus tenderness. Clear oropharynx with moist mucous membrane. Cardiovascular:  normal rate, regular rhythm, normal S1, S2, no murmurs, rubs, clicks or gallops. There is no tenderness to palpation over the chest wall or over ribs. No peripheral edema. Pedal pulses 2+ bilaterally. Respiratory:CTA, diminished breath sounds bilaterally, rhonchi present , symmetric air movement. There is no use of accessory muscles no nasal flaring identified. Mediport in right pectoral area  Gastrointestinal: Soft, nontender, nondistended, no masses or organomegaly. Suprapubic: there is no tenderness to palpation over the external bladder    Genitourinary:  No CVA tenderness. Musculoskletal: No joint swelling, warmth, or tenderness. 5 out of 5 strength in all 4 extremities. No obvious muscle atrophy is noted. No focal muscle deficits are appreciated. Mild pitting edema +1 up to mid shin  Skin: Warm, Dry, Normal coloration and turgor, no rashes, no suspicious skin lesions noted. Neurologic: Normal speech, No focal findings or movement disorder noted. No evidence of incontinence or loss of bowel or bladder.   Mental status:  Level of consciousness is normal. Alert, Oriented x3, Coherent, Appropriate affect, No agitation. Hematologic/Lymphatic: No cervical lymphadenopathy.      Data  (4-points for high level)  Laboratory this visit:  Reviewed  Recent Labs     03/22/21  0904 03/22/21  1353   WBC 18.9* 19.4*   HGB 8.2* 8.3*   HCT 24.9* 26.5*   * 666*      Recent Labs     03/22/21  0904 03/22/21  1353   * 129*   K 6.2* 6.6*   CL 92* 92*   CO2 14* 14*   * 132*   CREATININE 7.5* 7.5*     Recent Labs     03/22/21  0904 03/22/21  1353   AST 53* 48*   ALT 8* 8*   BILITOT 0.2 0.3   ALKPHOS 125 120     No results for input(s): INR in the last 72 hours. No results for input(s): CKTOTAL, CKMB, CKMBINDEX in the last 72 hours. Invalid input(s): Kristian Georges input(s): PRO-BNP      Radiology this visit:  Reviewed. Xr Chest Portable    Result Date: 3/22/2021  EXAMINATION: ONE XRAY VIEW OF THE CHEST 3/22/2021 3:02 pm COMPARISON: 03/15/2021 HISTORY: ORDERING SYSTEM PROVIDED HISTORY: Chest pain TECHNOLOGIST PROVIDED HISTORY: Reason for exam:->Chest pain Reason for Exam: chest pain Acuity: Acute Type of Exam: Initial FINDINGS: Right chest port tip is been retracted and now projects over the superior cavoatrial junction. Increased moderate left pleural effusion with left lung base opacity. Right lung clear. No pneumothorax or right pleural effusion. Cardiac and mediastinal contours stable. No acute osseous abnormality. Increased moderate left pleural effusion with left lung opacity that may reflect atelectasis or pneumonia if the patient has fever or leukocytosis. Xr Chest Portable    Result Date: 3/15/2021  EXAMINATION: ONE XRAY VIEW OF THE CHEST 3/15/2021 7:33 am COMPARISON: CT scan of the chest 01/20/2021 HISTORY: ORDERING SYSTEM PROVIDED HISTORY: port TECHNOLOGIST PROVIDED HISTORY: Reason for exam:->port Reason for Exam: port placement in OR Acuity: Acute Type of Exam: Initial FINDINGS: Right-sided central venous catheter tip projects inferior to the right atrium at the IVC. Opacity in the left lung base. Hazy opacity in the bilateral upper lungs. Cardiac silhouette is poorly assessed. The bones are grossly unchanged. Right-sided central venous catheter tip projects inferior to the right atrium at the IVC.  Bilateral opacities could be due to atelectasis or other airspace opacity. Consider repeat upright chest x-ray in PA and lateral views. EKG this visit:  Reviewed   EKG: Sinus tachycardia, heart rate about 115, no hyperacute T waves noted    Documents: Recent previous records, labs, imaging in the EMR were reviewed. Assessments and Plans:    · ELIDIA on CKD 3, due to dehydration and poor oral intake  · High anion gap with metabolic acidosis likely due to uremia    Meet SIRS criteria  Labs with severe ELIDIA, hyperkalemia  EKG with no hyperacute T waves  UA negative    Plan  Admit to stepdown, cardiac telemetry  Serial potassium check  Repeat EKG   IV hydration, antiemetics  Supportive treatment as needed  Insert Mart  On hyperkalemia protocol with insulin   Nephrology on board, plans for dialysis, spoke with nephro    · Leukocytosis, likely due to severe dehydration or sepsis due to probable CAP  Check procalcitonin CRP  Chest x-ray with possible pneumonia  UA negative  Blood cultures ordered  On cefepime empirically, de-escalate as needed    · Metastatic breast CA  Chemotherapy on hold, hold cancer medications, consult oncology    · Chronic blood loss anemia  Monitor, transfuse for hemoglobin less than 7    · Thrombocytemia, possibly reactive  Oncology on board    · Elevated troponin likely due to demand ischemia    · Severe protein energy malnutrition  Nutritional supplements, dietician    DVT Prophylaxis: Heparin  Code Status -Full  ANNY-Anai Carvajal    Patient notes trying for a while and if proves abortive let her go, will be left full code for now, will need further discussion on CODE STATUS, possibly hospice consult    Consult to  placed    The above assessments and plans were explained to the patient and family in lay language, who indicated understanding.         MD Jethro Brantleye Hospitalist at [facility]  Office Phone: [phone]

## 2021-03-22 NOTE — ED TRIAGE NOTES
Pt presents to ED by EMS from the cancer center for abnormal labs ( elevated potassium and creatine ). Pt is currently being tx for stage 4 breast cancer. Pt began a new treatment this morning.

## 2021-03-22 NOTE — CONSULTS
Nephrology Service Consultation      [address], [address] [address]  Phone: [phone]  Office Hours: 8:30AM - 4:30PM  Monday - Friday            Patient:  Ledy Mcneal  MRN: [id number]  Consulting physician:  Jacinto Lawler MD  Reason for Consult: elevated creatinine      PCP: CINDY Fernandez CNP    HISTORY OF PRESENT ILLNESS:   The patient is a 50 y.o. female with breast cancer presented due to cr of 7 and K of 6.6  Renal consult for those labs findings. Cr was 1.3 in feb  She reports decreased oral intake  She denies new meds  Received 3L NS bolus in the ER due to low BP    REVIEW OF SYSTEMS:  14 point ROS is Negative. See positive ROS per HPI    Past Medical History:        Diagnosis Date    Anxiety     Asthma     last flare up 5 yrs ago    Breast lesion     \"have spot on left breast going to remove- at this time not sure if cancer or not\"    Essential hypertension 2020    Secondary cancer of bone ([facility] [address] 2020    Thyroid nodule     \"have thyroid nodules-        Past Surgical History:        Procedure Laterality Date    APPENDECTOMY  age 27   [de-identified]  SECTION  0    PORT SURGERY Right 3/15/2021    RIGHT PORT INSERTION performed by Betty Hunt MD at [facility]  age 4    T & A       Medications:   Prior to Admission medications    Medication Sig Start Date End Date Taking? Authorizing Provider   ondansetron (ZOFRAN) 8 MG tablet 1 tab po q 8 hours PRN for chemo induced nausea/vomting 3/16/21   CINDY Rios CNP   OLANZapine (ZYPREXA) 2.5 MG tablet One tablet HS for four nights starting the night before chemotherapy 3/16/21   CINDY Rios CNP   dexamethasone (DECADRON) 4 MG tablet 1 tablet PO daily for 4 days starting the day after chemotherapy.  3/16/21   CINDY Rios CNP   docusate sodium (COLACE) 100 MG capsule Take 1 capsule by mouth daily as needed for Constipation 3/16/21   CINDY Rios CNP Extremities: 1+ pitting ble edema, lue swelling  Neurologic: Mental status: alert, oriented, interactive, following commands  Psychiatric: mood and affect appropriate    CBC:   Recent Labs     03/22/21  0904 03/22/21  1353   WBC 18.9* 19.4*   HGB 8.2* 8.3*   * 666*     BMP:    Recent Labs     03/22/21  0904 03/22/21  1353   * 129*   K 6.2* 6.6*   CL 92* 92*   CO2 14* 14*   * 132*   CREATININE 7.5* 7.5*   GLUCOSE 125* 139*     Hepatic:   Recent Labs     03/22/21  0904 03/22/21  1353   AST 53* 48*   ALT 8* 8*   BILITOT 0.2 0.3   ALKPHOS 125 120         Assessment and Recommendations     Patient Active Problem List    Diagnosis Date Noted    [facility] 03/22/2021    Left ovarian enlargement 01/26/2021    Iron deficiency anemia 01/07/2021    Malabsorption 01/07/2021    Dehydration 11/20/2020    Malignant neoplasm of central portion of left female breast ([facility] [address] 07/31/2020    Metastatic breast cancer ([facility] [address] 07/31/2020    Secondary cancer of bone ([facility] [address] 04/01/2020    Essential hypertension 04/01/2020    Left breast mass      -S/p medical therapy for the hyperkalemia  -Unsure if pt is oliguric or not, no benítez in place  -Awaiting a repeat potassium level, will have to HD if K is not 5.5 or less//discussed HD and HD cath placement with her and she was agreeable      Electronically signed by Arianna Medina DO on 3/22/2021 at 6:41 PM    [address], MD Gorden Dancer, DO  [address]  [place], [place] [address]  PHONE: [phone]  FAX: [phone]

## 2021-03-22 NOTE — PROGRESS NOTES
Patient here for first chemo treatment. Daughter and ex- in with patient. Family concerned about patient becoming more weak and unable to be alone at home. Dr. Deboraha Lennox in to see patient and daughter. Decision made to not give Adriamycin/Cytoxan and just do weekly palliative Taxol for now. Heart rate today 133. Was in ED on Saturday after a fall and was tachy then. Cardiology appt made for patient for tomorrow morning with Dr. Zack Carey at 8520. Referral made for home care/PT last week and they were to go out Saturday to evaluate patient when she fell and went to ED. Patient voicing she would like to try PT prior to going with possible hospice care which family was inquiring about. Results of CMP back towards end of treatment - potassium and creatinine up and patient now with temp of 100.4 - patient sent to ED by ana luisa. Family called to notify.

## 2021-03-22 NOTE — ED PROVIDER NOTES
ATTENDING NOTE:    I discussed this patient's history and physical findings and reviewed the PA's findings with them, as well as performed an independent assessment and coordinated care with them. My additional findings are:    HISTORY OF PRESENT ILLNESS:  Chief Complaint   Patient presents with    Other     abnormal labs    . Pravin Hutson is a 50 y.o. female who presents with reports of elevated creatinine and potassium on outpatient labs. PHYSICAL EXAM:  VITAL SIGNS:   ED Triage Vitals [03/22/21 1241]   Enc Vitals Group      BP (!) 167/94      Pulse 123      Resp 18      Temp 98.3 °F (36.8 °C)      Temp Source Oral      SpO2 98 %      Weight 122 lb (55.3 kg)      Height 5' 1\" (1.549 m)      Head Circumference       Peak Flow       Pain Score       Pain Loc       Pain Edu? Excl. in [address]? Vitals during ED course were reviewed and are as charted. Key Physical Exam Findings:    Constitutional: Minimal distress, Non-toxic appearance    Eyes:  Conjunctiva normal, No discharge    HENT: Normocephalic, Atraumatic    Neck: Supple, no stridor, no grossly visible or palpable masses    Cardiovascular: Regular rate and rhythm, No murmurs, No rubs, No gallops    Pulmonary/Chest:  Normal breath sounds, No respiratory distress or accessory muscle use, No wheezing, crackles or rhonchi.      Abdomen:  Soft, nondistended and nonrigid, No tenderness or peritoneal signs, No masses, normal bowel sounds    Extremities:  No gross deformities, no edema, no tenderness    Neurologic:  Normal motor function, Normal sensory function, No focal deficits    Skin:  Warm, Dry, No erythema, No rash, No cyanosis, No mottling          RADIOLOGY/PROCEDURES/LABS/MEDICATIONS ADMINISTERED:    I have reviewed and interpreted all of the currently available lab results from this visit (if applicable):  Results for orders placed or performed during the hospital encounter of 03/22/21   CBC Auto Differential   Result Value Ref Range WBC 19.4 (H) 4.0 - 10.5 K/CU MM    RBC 3.34 (L) 4.2 - 5.4 M/CU MM    Hemoglobin 8.3 (L) 12.5 - 16.0 GM/DL    Hematocrit 26.5 (L) 37 - 47 %    MCV 79.3 78 - 100 FL    MCH 24.9 (L) 27 - 31 PG    MCHC 31.3 (L) 32.0 - 36.0 %    RDW 24.6 (H) 11.7 - 14.9 %    Platelets 177 (H) 715 - 440 K/CU MM    MPV 8.4 7.5 - 11.1 FL    Bands Relative 2 (L) 5 - 11 %    Segs Relative 91.0 (H) 36 - 66 %    Eosinophils % 1.0 0 - 3 %    Lymphocytes % 5.0 (L) 24 - 44 %    Monocytes % 1.0 0 - 4 %    Bands Absolute 0.39 K/CU MM    Segs Absolute 17.6 K/CU MM    Eosinophils Absolute 0.2 K/CU MM    Lymphocytes Absolute 1.0 K/CU MM    Monocytes Absolute 0.2 K/CU MM    Differential Type MANUAL DIFFERENTIAL     Polychromasia 1+     PLT Morphology RARE    Comprehensive Metabolic Panel   Result Value Ref Range    Sodium 129 (L) 135 - 145 MMOL/L    Potassium 6.6 (HH) 3.5 - 5.1 MMOL/L    Chloride 92 (L) 99 - 110 mMol/L    CO2 14 (L) 21 - 32 MMOL/L     (H) 6 - 23 MG/DL    CREATININE 7.5 (H) 0.6 - 1.1 MG/DL    Glucose 139 (H) 70 - 99 MG/DL    Calcium 8.5 8.3 - 10.6 MG/DL    Albumin 2.3 (L) 3.4 - 5.0 GM/DL    Total Protein 7.1 6.4 - 8.2 GM/DL    Total Bilirubin 0.3 0.0 - 1.0 MG/DL    ALT 8 (L) 10 - 40 U/L    AST 48 (H) 15 - 37 IU/L    Alkaline Phosphatase 120 40 - 129 IU/L    GFR Non-African American 6 (L) >60 mL/min/1.73m2    GFR  7 (L) >60 mL/min/1.73m2    Anion Gap 23 (H) 4 - 16   Troponin   Result Value Ref Range    Troponin T 0.015 (H) <0.01 NG/ML   Urinalysis   Result Value Ref Range    Color, UA STRAW (A) YELLOW    Clarity, UA CLEAR CLEAR    Glucose, Urine NEGATIVE NEGATIVE MG/DL    Bilirubin Urine NEGATIVE NEGATIVE MG/DL    Ketones, Urine NEGATIVE NEGATIVE MG/DL    Specific Gravity, UA 1.010 1.001 - 1.035    Blood, Urine SMALL (A) NEGATIVE    pH, Urine 6.0 5.0 - 8.0    Protein, UA 30 (A) NEGATIVE MG/DL    Urobilinogen, Urine NEGATIVE 0.2 - 1.0 MG/DL    Nitrite Urine, Quantitative NEGATIVE NEGATIVE    Leukocyte Esterase, Urine NEGATIVE NEGATIVE    RBC, UA 1 0 - 6 /HPF    WBC, UA 2 0 - 5 /HPF    Bacteria, UA NEGATIVE NEGATIVE /HPF    Trichomonas, UA NONE SEEN NONE SEEN /HPF   Lactic Acid, Plasma   Result Value Ref Range    Lactate 1.1 0.4 - 2.0 mMOL/L   Hepatitis B Surface Antibody   Result Value Ref Range    Hep B S Ab 26.69    Hepatitis B Surface Antigen   Result Value Ref Range    Hepatitis B Surface Ag NON REACTIVE NON REACTIVE   Potassium   Result Value Ref Range    Potassium 6.2 (HH) 3.5 - 5.1 MMOL/L   Potassium   Result Value Ref Range    Potassium 4.5 3.5 - 5.1 MMOL/L   Renal Function Panel   Result Value Ref Range    Sodium 134 (L) 135 - 145 MMOL/L    Potassium 5.5 (HH) 3.5 - 5.1 MMOL/L    Chloride 97 (L) 99 - 110 mMol/L    CO2 16 (L) 21 - 32 MMOL/L    Anion Gap 21 (H) 4 - 16    BUN 79 (H) 6 - 23 MG/DL    CREATININE 5.1 (H) 0.6 - 1.1 MG/DL    Glucose 128 (H) 70 - 99 MG/DL    Calcium 8.0 (L) 8.3 - 10.6 MG/DL    GFR Non- 9 (L) >60 mL/min/1.73m2    GFR  11 (L) >60 mL/min/1.73m2    Albumin 2.3 (L) 3.4 - 5.0 GM/DL    Phosphorus 5.2 (H) 2.5 - 4.9 MG/DL   CBC auto differential   Result Value Ref Range    WBC 15.5 (H) 4.0 - 10.5 K/CU MM    RBC 3.29 (L) 4.2 - 5.4 M/CU MM    Hemoglobin 8.1 (L) 12.5 - 16.0 GM/DL    Hematocrit 25.0 (L) 37 - 47 %    MCV 76.0 (L) 78 - 100 FL    MCH 24.6 (L) 27 - 31 PG    MCHC 32.4 32.0 - 36.0 %    RDW 25.2 (H) 11.7 - 14.9 %    Platelets 769 (H) 614 - 440 K/CU MM    MPV 8.5 7.5 - 11.1 FL    Differential Type AUTOMATED DIFFERENTIAL     Segs Relative 90.4 (H) 36 - 66 %    Lymphocytes % 4.9 (L) 24 - 44 %    Monocytes % 1.8 0 - 4 %    Eosinophils % 0.0 0 - 3 %    Basophils % 0.1 0 - 1 %    Segs Absolute 14.0 K/CU MM    Lymphocytes Absolute 0.8 K/CU MM    Monocytes Absolute 0.3 K/CU MM    Eosinophils Absolute 0.0 K/CU MM    Basophils Absolute 0.0 K/CU MM    Nucleated RBC % 0.3 %    Total Nucleated RBC 0.0 K/CU MM    Total Immature Neutrophil 0.43 K/CU MM    Immature Neutrophil % 2.8 (H) 0 - 0.43 %   Potassium w/ Reflex to Magnesium   Result Value Ref Range    Potassium 4.6 3.5 - 5.1 MMOL/L   Potassium w/ Reflex to Magnesium   Result Value Ref Range    Potassium 5.7 (HH) 3.5 - 5.1 MMOL/L   Potassium w/ Reflex to Magnesium   Result Value Ref Range    Potassium 3.9 3.5 - 5.1 MMOL/L   Potassium w/ Reflex to Magnesium   Result Value Ref Range    Potassium 4.4 3.5 - 5.1 MMOL/L   High sensitivity CRP   Result Value Ref Range    CRP High Sensitivity >300.0 (H) <5.0 mg/L   Procalcitonin   Result Value Ref Range    Procalcitonin 4.87    POCT Glucose   Result Value Ref Range    POC Glucose 150 (H) 70 - 99 MG/DL   POCT Glucose   Result Value Ref Range    POC Glucose 120 (H) 70 - 99 MG/DL   EKG 12 Lead   Result Value Ref Range    Ventricular Rate 249 BPM    Atrial Rate 220 BPM    P-R Interval 96 ms    QRS Duration 148 ms    Q-T Interval 182 ms    QTc Calculation (Bazett) 370 ms    R Axis 77 degrees    T Axis 45 degrees    Diagnosis       Undetermined rhythm  Nonspecific intraventricular block  Cannot rule out Septal infarct , age undetermined  Abnormal ECG  No previous ECGs available  Confirmed by Manpreet Sol ([id number]) on 3/23/2021 5:30:40 PM     EKG 12 Lead   Result Value Ref Range    Ventricular Rate 115 BPM    Atrial Rate 115 BPM    P-R Interval 144 ms    QRS Duration 70 ms    Q-T Interval 334 ms    QTc Calculation (Bazett) 462 ms    P Axis 61 degrees    R Axis 66 degrees    T Axis 50 degrees    Diagnosis       Sinus tachycardia  Low voltage QRS  Borderline ECG  When compared with ECG of 22-MAR-2021 13:11,  Previous ECG has undetermined rhythm, needs review  Criteria for Septal infarct are no longer present  Nonspecific T wave abnormality no longer evident in Inferior leads  Nonspecific T wave abnormality, improved in Anterolateral leads  Confirmed by Manpreet Sol ([id number]) on 3/23/2021 5:33:13 PM     EKG 12 Lead   Result Value Ref Range    Ventricular Rate 254 BPM    Atrial Rate 258 BPM    QRS Duration 70 ms Q-T Interval 136 ms    QTc Calculation (Bazett) 279 ms    R Axis 78 degrees    T Axis 73 degrees    Diagnosis       Sinus tachycardia  Confirmed by Peter Horner ([id number]) on 3/23/2021 5:31:18 PM            ABNORMAL LABS:  Labs Reviewed   CBC WITH AUTO DIFFERENTIAL - Abnormal; Notable for the following components:       Result Value    WBC 19.4 (*)     RBC 3.34 (*)     Hemoglobin 8.3 (*)     Hematocrit 26.5 (*)     MCH 24.9 (*)     MCHC 31.3 (*)     RDW 24.6 (*)     Platelets 657 (*)     Bands Relative 2 (*)     Segs Relative 91.0 (*)     Lymphocytes % 5.0 (*)     All other components within normal limits   COMPREHENSIVE METABOLIC PANEL - Abnormal; Notable for the following components:    Sodium 129 (*)     Potassium 6.6 (*)     Chloride 92 (*)     CO2 14 (*)      (*)     CREATININE 7.5 (*)     Glucose 139 (*)     Albumin 2.3 (*)     ALT 8 (*)     AST 48 (*)     GFR Non- 6 (*)     GFR  7 (*)     Anion Gap 23 (*)     All other components within normal limits   TROPONIN - Abnormal; Notable for the following components:    Troponin T 0.015 (*)     All other components within normal limits   URINALYSIS - Abnormal; Notable for the following components:    Color, UA STRAW (*)     Blood, Urine SMALL (*)     Protein, UA 30 (*)     All other components within normal limits   POTASSIUM - Abnormal; Notable for the following components:    Potassium 6.2 (*)     All other components within normal limits   RENAL FUNCTION PANEL - Abnormal; Notable for the following components:    Sodium 134 (*)     Potassium 5.5 (*)     Chloride 97 (*)     CO2 16 (*)     Anion Gap 21 (*)     BUN 79 (*)     CREATININE 5.1 (*)     Glucose 128 (*)     Calcium 8.0 (*)     GFR Non- 9 (*)     GFR  11 (*)     Albumin 2.3 (*)     Phosphorus 5.2 (*)     All other components within normal limits   CBC WITH AUTO DIFFERENTIAL - Abnormal; Notable for the following components:    WBC 15.5 (*) RBC 3.29 (*)     Hemoglobin 8.1 (*)     Hematocrit 25.0 (*)     MCV 76.0 (*)     MCH 24.6 (*)     RDW 25.2 (*)     Platelets 331 (*)     Segs Relative 90.4 (*)     Lymphocytes % 4.9 (*)     Immature Neutrophil % 2.8 (*)     All other components within normal limits   POTASSIUM W/ REFLEX TO MAGNESIUM - Abnormal; Notable for the following components:    Potassium 5.7 (*)     All other components within normal limits   HIGH SENSITIVITY CRP - Abnormal; Notable for the following components:    CRP High Sensitivity >300.0 (*)     All other components within normal limits   POCT GLUCOSE - Abnormal; Notable for the following components:    POC Glucose 150 (*)     All other components within normal limits   POCT GLUCOSE - Abnormal; Notable for the following components:    POC Glucose 120 (*)     All other components within normal limits   CULTURE, BLOOD 1   CULTURE, BLOOD 2   LACTIC ACID, PLASMA   HEPATITIS B SURFACE ANTIBODY   HEPATITIS B SURFACE ANTIGEN   POTASSIUM   POTASSIUM W/ REFLEX TO MAGNESIUM   POTASSIUM W/ REFLEX TO MAGNESIUM   POTASSIUM W/ REFLEX TO MAGNESIUM   PROCALCITONIN   PROCALCITONIN   C-REACTIVE PROTEIN   BASIC METABOLIC PANEL         IMAGING STUDIES ORDERED:  XR CHEST PORTABLE  US RETROPERITONEAL LIMITED  IR NONTUNNELED VASCULAR CATHETER > 5 YEARS  XR CHEST PORTABLE  CT ABDOMEN PELVIS WO CONTRAST  TELEMETRY MONITORING  INSERT ERNST CATHETER  CATHETER REMOVAL  VITAL SIGNS  TELEMETRY MONITORING  TELEMETRY MONITORING  INSERT ERNST CATHETER  CATHETER REMOVAL  STRICT INTAKE AND OUTPUT  STRICT INTAKE AND OUTPUT  STRICT INTAKE AND OUTPUT  IP CONSULT TO NEPHROLOGY  IP CONSULT TO CASE MANAGEMENT  IP CONSULT TO INTERVENTIONAL RADIOLOGY  IP CONSULT TO HOSPITALIST  IP CONSULT TO INTERVENTIONAL RADIOLOGY  IP CONSULT TO ONCOLOGY  IP CONSULT TO SOCIAL WORK  IP CONSULT TO UROLOGY  PATIENT STATUS (FROM ED OR OR/PROCEDURAL)  REASON FOR NO MECHANICAL VTE PROPHYLAXIS  FULL CODE  DIET RENAL  PT EVAL AND TREAT  DIETARY NUTRITION SUPPLEMENTS  HYPOGLYCEMIA TREATMENT: BG LESS THAN 50 MG/DL AND PATIENT ALERT  HYPOGLYCEMIA TREATMENT: BG LESS THAN 70 MG/DL AND PATIENT ALERT  HYPOGLYCEMIA TREATMENT: BG LESS THAN 70 MG/DL AND PATIENT NOT ALERT  ACTIVITY AS TOLERATED  HYPOGLYCEMIA TREATMENT: BG LESS THAN 50 MG/DL AND PATIENT ALERT  HYPOGLYCEMIA TREATMENT: BG LESS THAN 70 MG/DL AND PATIENT ALERT  HYPOGLYCEMIA TREATMENT: BG LESS THAN 70 MG/DL AND PATIENT NOT ALERT  DIET NPO, AFTER MIDNIGHT    I have personally viewed the imaging studies. The radiologist interpretation is:   CT ABDOMEN PELVIS WO CONTRAST Additional Contrast? None   Final Result   1. Moderate bilateral hydroureteronephrosis new on the right and not   significantly changed on the left compared with 27/51/2707 of uncertain   etiology. No obstructing stone or mass. Partially decompressed urinary   bladder with circumferential wall thickening not excluded. 2. Fluid overload with small right and large left pleural effusions, diffuse   body wall edema, small volume of ascites, and small pericardial effusion. 3. 6 bilateral iliac chain lymphadenopathy not significantly changed from   01/20/2021. Increasing size of the left ovary of uncertain etiology with an   underlying neoplasm not excluded. Unchanged diffuse sclerotic metastases. 4. Mildly increased size of a calcified mass within the right proximal   adductor musculature and new mass within the right gluteus medius muscle   suspicious for metastatic disease. US RETROPERITONEAL LIMITED   Final Result   1. Increased echogenicity of the renal cortices which is nonspecific but can   be seen with medical renal disease. 2. Mild-to-moderate bilateral hydronephrosis. XR CHEST PORTABLE   Final Result   New right internal jugular non tunneled hemodialysis catheter with no   evidence of pneumothorax.          IR NONTUNNELED VASCULAR CATHETER > 5 YEARS   Final Result   Successful ultrasound  guided non-tunneled dialysis catheter placement. The   catheter is ready to use. XR CHEST PORTABLE   Final Result   Increased moderate left pleural effusion with left lung opacity that may   reflect atelectasis or pneumonia if the patient has fever or leukocytosis.                MEDICATIONS ADMINISTERED:  Medications   glucose (GLUTOSE) 40 % oral gel 15 g (has no administration in time range)   dextrose 50 % IV solution (has no administration in time range)   glucagon (rDNA) injection 1 mg (has no administration in time range)   dextrose 5 % solution (has no administration in time range)   cetirizine (ZYRTEC) tablet 10 mg (10 mg Oral Given 3/23/21 1128)   docusate sodium (COLACE) capsule 100 mg (has no administration in time range)   HYDROcodone-acetaminophen (NORCO) 5-325 MG per tablet 1 tablet (has no administration in time range)   sodium chloride flush 0.9 % injection 10 mL (has no administration in time range)   heparin (porcine) injection 5,000 Units (5,000 Units Subcutaneous Given 3/23/21 1430)   promethazine (PHENERGAN) tablet 12.5 mg (has no administration in time range)     Or   ondansetron (ZOFRAN) injection 4 mg (has no administration in time range)   acetaminophen (TYLENOL) tablet 650 mg (has no administration in time range)     Or   acetaminophen (TYLENOL) suppository 650 mg (has no administration in time range)   polyethylene glycol (GLYCOLAX) packet 17 g (has no administration in time range)   glucose (GLUTOSE) 40 % oral gel 15 g (has no administration in time range)   dextrose 50 % IV solution (has no administration in time range)   glucagon (rDNA) injection 1 mg (has no administration in time range)   dextrose 5 % solution (has no administration in time range)   cefepime (MAXIPIME) 2000 mg IVPB minibag (0 mg Intravenous Stopped 3/23/21 0136)   heparin (porcine) injection 2,600 Units (has no administration in time range)   0.9 % sodium chloride bolus (0 mLs Intravenous Stopped 3/22/21 1455)   0.9 % sodium chloride bolus (0 mLs Intravenous Stopped 3/22/21 1930)   0.9 % sodium chloride bolus (0 mLs Intravenous Stopped 3/22/21 1804)   insulin regular (HUMULIN R;NOVOLIN R) injection 10 Units (10 Units Intravenous Given 3/22/21 1704)     And   dextrose 50 % IV solution (25 g Intravenous Given 3/22/21 1704)   calcium gluconate 10 % injection 1,000 mg (1,000 mg Intravenous Given 3/22/21 1632)   cefTRIAXone (ROCEPHIN) 1000 mg IVPB in 50 mL D5W minibag (0 mg Intravenous Stopped 3/22/21 2025)   azithromycin (ZITHROMAX) 500 mg in dextrose 5 % 250 mL IVPB (0 mg Intravenous Stopped 3/23/21 0211)         PLAN/ED COURSE:  Last Vitals: BP (!) 146/87   Pulse 126   Temp 98.8 °F (37.1 °C) (Oral)   Resp 21   Ht 5' 1\" (1.549 m)   Wt 153 lb 3.2 oz (69.5 kg)   LMP 04/08/2018   SpO2 94%   BMI 28.95 kg/m²     82-year-old female with acute kidney injury with hyperkalemia. Also incidentally found to have evidence of leukocytosis of unclear etiology consultation. Additional workup and treatment in the ED as documented above and in the physician assistants note. Pt will be admitted for further evaluation and treatment. Plan discussed with pt and/or family who expressed understanding and agreement with plan. Admitted in stable condition. Clinical Impression:  1. ELIDIA (acute kidney injury) ([facility]    2. Hyperkalemia    3. Leukocytosis, unspecified type    4. Metastatic breast cancer [facility])        Disposition referral (if applicable):  No follow-up provider specified.     Disposition medications (if applicable):  Current Discharge Medication List          ED Provider Disposition Time  DISPOSITION              Electronically signed by Yoana Hilton MD on 3/23/2021 at 11:04 PM        Yoana Hilton MD  03/23/21 [id number]

## 2021-03-22 NOTE — TELEPHONE ENCOUNTER
Mahnaz Reed, from the cancer [facility] called to cancel pt's appt for 3-, due to having the schedule a Cardio appt that pt needs. Mahnaz Reed, states that they are starting her on light chemo today so she can get started. Per Mahnaz Reed, at [facility] states that pt is not doing well.

## 2021-03-22 NOTE — ED PROVIDER NOTES
12 lead EKG per my interpretation:  Sinus Tachycardia 149 (EKG is reading 254 heart rate but it is reading T waves as a QRS)  Axis is   Normal  QTc is  279 (incorrect)  There is no specific T wave changes appreciated. There is no specific ST wave changes appreciated.     Prior EKG to compare with was not available        Joel Berg DO  03/22/21 [id number]

## 2021-03-22 NOTE — PROGRESS NOTES
Pt. Here for treatment with daughter. Pt. Transported to treatment suite via wheel chair. Pt. Stefanie Rollins and then assisted to private room. Right upper arm mediport accessed without difficulty, good blood return noted. Lab work drawn as ordered. Pt. Denies pain at this time, but has vicodin on hand for pain in right leg. Pt. Had fall over the weekend, stated that toilet seat is was unstable, pt. Was evaluated at ER, no injuries note. Appetite fair, Verbalized she has increased in weight. Eating more high protein and fatty foods. Denies any issues or concerns with urine or bowels. Pt. Daughter and ex- were present during office visit with Dr. Elian Carrion. Dr. Elian Carrion, pt. And family decided to not proceed with [facility] and to do weekly Taxol at this time. Dr. Elian Carrion also ordered a referral to home health care, and consult to cardiologist for elevated heart rate. Treatment administered without incident. Call received at approximately noon for elevated potassium, Dr. Elian Carrion notified and he noted elevated creatinine level. Dr. Lomeli Celina to stop chemo and send pt. To [facility] for evaluation. Chemo stopped, and squad called. Pt. And family notified, and verbalized understanding. /85, pulse 123, Temperature 100.4 (tympanic). Squad arrived, report given. Pt. Lifted to stretcher due to she felt like she could not transfer. Report called to Cami Vang in ER. Patient's status assessed and documented appropriately. All labs and required results were also reviewed today. Treatment parameters have been reviewed. Today's treatment has been approved by the provider. Treatment orders and medication sequencing (when applicable) was verified by 2 registered nurses. The treatment plan was confirmed with the patient prior to administration, and the patient understands the need to report any treatment-related symptoms.     Prior to administration, when applicable, the following 8 elements of medication administration were reviewed with 2nd Registered Nurse prior to dosing: drug name, drug dose, infusion volume when prepared in a syringe, rate of administration, expiration dates and/or times, appearance and integrity of drug(s), and rate of pump for infusion. The 5 rights of medication administration have been verified.

## 2021-03-22 NOTE — PROGRESS NOTES
Patient Name: Alex Tyson  Patient : 1965  Patient MRN: [id number]     Primary Oncologist: Vicki Cano MD  Referring Provider: CINDY Soares CNP     Date of Service: 3/22/2021      Chief Complaint:   Chief Complaint   Patient presents with    Follow-up     She came in for follow up visit. Patient Active Problem List:     Left breast mass     Secondary cancer of bone [facility])     Essential hypertension     Malignant neoplasm of central portion of left female breast ([facility] [address]     Secondary malignant neoplasm of breast ([facility] [address]     HPI:   Alex Tyson is a pleasant 61-year-old  female patient who initially noted left breast swelling and pain for a few months. She had mammogram at age of 30. She went for mammogram which showed abnormal left breast. Diagnostic bilateral mammogram on 2018 with ultrasound showed  findings concerning for inflammatory breast cancer with prominent left axillary lymph nodes, benign right mammogram.  Baseline CBC and CMP were within normal limits. CA 27-29 was 61. PET CT scan in May 27, 3435 showed metabolic activity within left breast with metabolically active skin thickening concerning for inflammatory breast cancer, metabolically active left axillary lymph node metastases and skeletal metastasis. 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 She did not have visceral disease. We discussed about potential therapy. I put her on Lupron plus Arimidex and Palbociclib. She had menstruation in May 2018. She started lupron, arimidex and Ibrance on May 30, 2018.   Bone density in 2018 showed  LUMBAR SPINE: The bone mineral density in the lumbar spine including the L1 through L4 levels is measured at 1.355 g/cm2, which corresponds to a T-score of 1.5 and a Z-score of 1.4. This is within the normal range by WHO criteria. LEFT HIP: The bone mineral density in the total hip is measured at 0.972 g/cm2 corresponding to a T-score of -0.3 and a Z-score of -0.2. This is within the normal range by WHO criteria. The bone mineral density of the femoral neck is measured at 0.802 g/cm2 corresponding to a T-score of -1.7 and a Z-score of -1.2. This is within the osteopenia range by WHO criteria. IMPRESSION: Osteopenia by WHO criteria. Bone scan in September 2018 showed stable findings. On October 26, 2018 she was found to have RLE DVT. PET CT scan in November 2018 revealed:  1. Overall improvement of left axillary adenopathy and FDG avidity. There is persistent skin thickening of the left breast with associated FDG uptake in the retroareolar region and left breast tissue which is not significantly changed since the prior exam. 2. Overall progression of hypermetabolic skeletal osseous metastases when compared to the PET-CT from 05/07/2018. We discussed about the potential side effects of bisphosphonates including renal failure and Jaw necrosis. She switched lovenox to xarelto in January 2019. Bone scan in June 2019 compared to bone scan in September 2018 showed progression of disease. CA 27-29 has continued to increase. We will obtain PET CT to evaluate. Clinically she felt left breast lump continuing to get smaller. PET CT scan on August 26, 2019 showed :  1. New focus of intense uptake lateral to the left axilla and anterior to the shoulder musculature. 2. However there is improvement of disease within the left breast and left axilla and decreased uptake within the widespread osseous metastatic disease. In December 2019 CA 17-29 continued to rise. CT chest, abdomen and bone scan in January 2020 showed progression local and bone disease. She started lupron, aromasin and afinitor on January 10, 2020. I advise to monitor blood pressure.   On May 7, 2020 PET CT scan showed favorable response to chemotherapy. The mass anterior to the left shoulder musculature has significantly decreased in size and degree of uptake. Small nodules anterior to the mass extending to the skin demonstrates mild uptake and likely represents residual disease. Left axillary lymph nodes have decreased in size. Uptake in the left subareolar region has decreased. Diffuse osseous metastatic disease demonstrates decreased uptake. Labs in August 2020 were reviewed. CT chest, abdomen pelvis on October 5, 2020 showed  Decreased size of the soft tissue mass anterior to the left shoulder    musculature compared to prior PET-CT and CT suggesting response to therapy.         Decreased size of left axillary and subpectoral lymphadenopathy also    suggesting response to therapy.         Stable sclerotic metastatic disease noted throughout the visualized osseous    skeleton.         No evidence of new metastatic disease in the chest, abdomen or pelvis. CA 27-29 on October 5, 2020 was 409.2. She has panoramic study of the jaw which was negative for osteonecrosis. She has insomnia and anxiety. She is agreeable to restart low-dose Ativan as needed. She had ingrowing toenail of the left toe with has improved with antibiotic. Labs in January 2021 were reviewed. Anemic w/u is consistent with chronic disease. She is on eliquis. CT chest, abdomen pelvis in January 2021 showed  1. Increasing skin thickening of the breast bilaterally with increasing   subcutaneous edema throughout the chest wall, which may be related to post   treatment changes. 2. No interval change in multifocal sclerotic osseous metastases. 3. Stable to slight interval decrease in size of soft tissue nodules in the   left chest wall/anterior to the left shoulder.  No significant interval   change in left axillary adenopathy.    4. There is increasing size of the left ovary as well as new pelvic   adenopathy suspicious for malignancy either primary neoplastic process   involving the left ovary versus metastatic disease. 5. There is also mild wall thickening of the left posterolateral bladder wall   with mild left hydronephrosis.  Would recommend further evaluation with   cystoscopy as well. 6. New 3.7 x 2.0 cm partially calcified mass in the right adductor   musculature suspicious for metastatic disease as well.  Alternately this   could reflect chronic process such as myositis ossificans in the appropriate   clinical setting. She had hematoma before to the right thigh. I will cut zometa to every 3 months. I advised her to maintain her body weight and eat frequent meals. She is agreeable to see urologist for potential cystoscopy and GYN oncologist at [facility] for further work-up of the left ovary mass and pelvic adenopathy. Clinically she could have metastatic disease from the breast cancer. If that is the case, I will consider to change her treatment. She was seen by Dr. Ayesha Walker at [facility] who believe clinically she has metastatic breast cancer to the pelvis. She has been seen by urologist who will do cystoscopy and possible follow-up CT abdomen and pelvis. On 2021 she came in for her treatment. Over the weekend she fell and went to the emergency room. Reportedly there was no imaging study. She was agreeable to start weekly Taxol. Labs revealed that she has acute kidney injury. I sent her back to the hospital for evaluation. Family is interested in hospice information. Patient wants to have physical therapy before deciding to go with hospice. Pain is controlled today. She denied any headache or dizziness. She denied any chest pain or shortness of breath. She denied any acute bone pain. No fever or chills. No depression. Past Medical History  Allergies, asthma, depression, abnormal left breast mammogram. DVT. Surgical History  Appendectomy in .  . Social History  Denied any history of smoking. She drinks alcohol occasionally. No illicit drug use. She has 2 children and one sister. Family History  Mother: Diabetes, Hypertension. Previous Therapy   Palbociclib + Anastrozole + Leuprolide Q28D Cycle Length: 28 Number Cycles: 20 Start: C1D1 on 05/30/2018 Assoc Dx: Female inflammatory breast cancer LOT: 1st Line Metastatic or Recurrent Stage: IV 05/30/2018 C1 D1  Zoledronic acid (Zometa) Q28D Cycle Length: 28 Number Cycles: 24 Start: Ariel Lauren on 12/07/2018 Assoc Dx: Secondary bone cancer LOT: 1st Line Metastatic or Recurrent 08/28/2020 C8 D1  Everolimus + Exemestane + Leuprolide Q28D Cycle Length: 28 Number Cycles: 6 Start: C1D1 on 12/11/2019 Assoc Dx: Female inflammatory breast cancer LOT: 2nd Line Metastatic Stage: IV Treatment Intent: [id number]/11/2019 C1 D1     Review of Systems: \"Per interval history; otherwise 10 point ROS is negative. \"     Vital Signs:  BP (!) 141/85 (Position: Sitting, Cuff Size: Medium Adult)   Pulse 132   Temp 97.6 °F (36.4 °C)   Resp 20   Ht 5' 1\" (1.549 m)   Wt 122 lb (55.3 kg)   LMP 04/08/2018   SpO2 96%   BMI 23.05 kg/m²     Physical Exam:  CONSTITUTIONAL: awake, alert, cooperative, no apparent distress   EYES: pupils equal, round and reactive to light, sclera clear and conjunctiva pallor. ENT: Normocephalic, without obvious abnormality, atraumatic  NECK: supple, symmetrical, no jugular venous distension and no carotid bruits   HEMATOLOGIC/LYMPHATIC: no cervical, supraclavicular or axillary lymphadenopathy   LUNGS: no increased work of breathing and clear to auscultation   BREAST: thick skin of left breast with induration. No signs of infection.  No lumps to right breast.  CARDIOVASCULAR: regular rate and rhythm, normal S1 and S2, no murmur noted  ABDOMEN: normal bowel sounds x 4, soft, non-distended, non-tender, no masses palpated, no hepatosplenomegaly   MUSCULOSKELETAL: full range of motion noted, tone is normal  NEUROLOGIC: awake, alert, oriented to name, place and time. Grossly there is no neurofocal deficit. SKIN: No jaundice. appears intact. No petechial rash. Dry skin. EXTREMITIES: no LE edema. No cyanosis. Labs:  Hematology:  Lab Results   Component Value Date    WBC 18.9 (H) 03/22/2021    RBC 3.38 (L) 03/22/2021    HGB 8.2 (L) 03/22/2021    HCT 24.9 (L) 03/22/2021    MCV 73.7 (L) 03/22/2021    MCH 24.3 (L) 03/22/2021    MCHC 32.9 03/22/2021    RDW 25.2 (H) 03/22/2021     (H) 03/22/2021    MPV 7.9 03/22/2021    BANDSPCT 5 04/05/2019    SEGSPCT 82.9 (H) 03/22/2021    EOSRELPCT 0.5 03/22/2021    BASOPCT 0.1 03/22/2021    LYMPHOPCT 8.0 (L) 03/22/2021    MONOPCT 8.5 (H) 03/22/2021    BANDABS 0.23 04/05/2019    SEGSABS 15.7 03/22/2021    EOSABS 0.1 03/22/2021    BASOSABS 0.0 03/22/2021    LYMPHSABS 1.5 03/22/2021    MONOSABS 1.6 03/22/2021    DIFFTYPE AUTOMATED DIFFERENTIAL 03/22/2021    ANISOCYTOSIS 1+ 12/06/2019    POLYCHROM 1+ 12/06/2019    WBCMORP OCCASIONAL  ATYPICAL LYMPH(S) NOTED   12/06/2019     Chemistry:  Lab Results   Component Value Date     (L) 03/22/2021    K 6.2 (HH) 03/22/2021    CL 92 (L) 03/22/2021    CO2 14 (L) 03/22/2021     (H) 03/22/2021    CREATININE 7.5 (H) 03/22/2021    GLUCOSE 125 (H) 03/22/2021    CALCIUM 8.0 (L) 03/22/2021    PROT 5.8 (L) 03/22/2021    LABALBU 2.5 (L) 03/22/2021    BILITOT 0.2 03/22/2021    ALKPHOS 125 03/22/2021    AST 53 (H) 03/22/2021    ALT 8 (L) 03/22/2021    LABGLOM 6 (L) 03/22/2021    GFRAA 7 (L) 03/22/2021    MG 2.2 12/30/2020     Immunology:  Lab Results   Component Value Date    PROT 5.8 (L) 03/22/2021     Tumor Markers:  Lab Results   Component Value Date    LABCA2 409.2 (H) 10/05/2020      Observations:  No data recorded        Assessment & Plan:    1. She has inflammatory left breast cancer. PET/CT scan showed metastatic disease to bone, left axillary lymph node, and left breasts with the skin involvement. At present time she does not have physical disease.  CBC and CMP were within normal limits. CA 27-29 was elevated. I put her on palliative hormonal therapy plus Palbociclib. She started arimidex and Ibrance on June 4, 2018 and monthly lupron injection on June 8, 2018. Bone scan in September 2018 showed stable findings. After reviewing the PET CT scan in November 2018, I put her on Zometa monthly. Bone scan in July 2019 was reviewed and showed progression of the disease. PET scan in August 2019 showed grossly response to the therapy. CT chest/abdomen and bone scan due to rising CA27-29 in January 2020 showed progression of disease. She started aromasin, afinitor and lupron on January 10, 2020. She understands the potential side effects of afinitor. PET CT scan May 2020 showed treatment response. CT chest, abdomen pelvis in October 2020 showed response and no evidence of new metastatic disease. She was on Lupron, exemestane, Zometa and Afinitor. I changed zometa to every 3 months. CT chest, abdomen pelvis in January 2021 was reviewed. Clinically she has metastatic disease from the breast cancer. She was scheduled for weekly Taxol on March 22, 2021. Due to acute kidney injury she was referred to the hospital for further evaluation. She may need dialysis. Family is aware about her poor prognosis. They are interested in hospice evaluation. Patient preferred to have physical therapy before deciding to go with hospice. 2. Hypertension: I advise to monitor BP and has low sodium diet. I gave her refill of Norvasc. Blood pressure is stable. 3. Seasonal allergy. I advise to take Claritin and gave her flonase. I recommend flu shot. 4. She was found to have right lower extremity DVT in October 2019. She is on Eliquis. no signs of bleeding. 5. She has multifactorial anemia. JAK2 with reflex in January 2021 was negative. Anemic work-up in January 2021 was reviewed. I will order fecal occult blood. 6.  I recommend to maintain body weight.   I recommend to eat frequent meals.    7.  Prescribed compression for intermittent nausea. She stated Zofran does not seem to help. I also gave refill of Ativan for the anxiety and insomnia. RTC in 3 weeks or sooner. All of her question has been answered for today. Recent imaging and labs were reviewed and discussed with the patient.       Electronically signed by Espinoza Chau MD on 9/14/20 at 7:23 AM EDT

## 2021-03-23 NOTE — PROGRESS NOTES
PROCEDURE PERFORMED: Temporary Dialysis Catheter    PRIMARY INDICATION FOR PROCEDURE:  Dialysis    INFORMED CONSENT:  PT ALERT & ORIENTED and verbalizes understanding of procedure. Pt signed consent with Dr. Shantal Campos prior to procedure. Consent placed in chart. TIME OUT COMPLETE: 2035    BARRIER PRECAUTIONS & STERILE TECHNIQUE: Arrived to ED-17 and patient was in bd. Will stay in bed for the procedure. Pt on Cardiac Monitor. Pt in the supine position.  CHLORHEXADINE scrubbed and draped in a sterile fashion by Rj LION    PAIN/LOCAL ANESTHESIA/SEDATION MANAGEMENT: Lidocaine was used to numb the skin and surrounding tissues    INTRAOPERATIVE:    ACCESS TIME:   US/FLUORO: 3 images  WIRE USED:  SHEATH USED:   CATHETER USED:  FINAL IMAGE TAKEN TO CONFIRM PLACEMENT OF:   CONTRAST/CC:     STERILE DRESSINGS:  Dry sterile dressing applied by Rj Lion    SPECIMENS: None    FOLLOW- UP X-RAY:  STAT chest    COMPLICATIONS:  Pt tolerated procedure well without any complications    STAFF PRESENT DURING PROCEDURE:   Earlean Klinefelter RN, Rj LION    REPORT CALLED TO: Bedside report given to Majo Cai RN in ED

## 2021-03-23 NOTE — CARE COORDINATION
- room # 17-- Steven FRASER  for pt entering ER with increasing weakness and abnormal labs with elevated potassium at 6.6 and Kidney Failure . Pt is currently tachycardic , and has an elevated respiratory rate. . She is a breast CA  with metastasis to the spine pt under Dr Jone Rockwell and Andrea Tay as PCP . At Bedside is Antonia Palmer principal caretaker--[phone]-. Dr Jone Rockwell was said to have set up [place] [phone] by pt --she thinks it was [facility] - but was not sure , they have yet to be visited --but are welcoming [place] [phone] assistance .  Pt admitted --Gemini [place] / Nimco Blanco

## 2021-03-23 NOTE — PROGRESS NOTES
New admit arrived to unit via cart from dialysis. Two nurse skin assessment completed by this RN and Susana Kinsey RN.

## 2021-03-23 NOTE — PROGRESS NOTES
Comprehensive Nutrition Assessment    Type and Reason for Visit:  Initial, Positive Nutrition Screen(poor appetite, poor intake)    Nutrition Recommendations/Plan:   Continue renal diet   Add renal ons TID   Monitor weights, po intake, and nutrition status daily     Nutrition Assessment:  Pt admitted with ELIDIA, acute tubular necrosis, hyperkalemia, breast ca with chemotherapy, and received HD. Unable to speak with pt in room d/t dialysis. Per chart review, pt with poor appetite and po intake prior to admission d/t pain in right leg 2/2 hematoma. Received HD last night and today, no fluid removed. No wt loss noted in chart review. 33.2% wt gain noted in 1 month, potential fluid retention ? Pt at high nutrition risk. Estimated Daily Nutrient Needs:  Energy (kcal):  5662-8553 (30-35 kcal/kg for HD); Weight Used for Energy Requirements:  Ideal     Protein (g):  94 g (1.2 g/kg); Weight Used for Protein Requirements:  Ideal        Fluid (ml/day):   ; Method Used for Fluid Requirements:  Standard Renal      Nutrition Related Findings:  +2 RLE, LLE, + non-pitting edema RUE, LUE Last BM: 3/22 Labs: K 5.7, Glu 128, WBC 15.5, GFR 9      Wounds:  None       Current Nutrition Therapies:    DIET RENAL;  Dietary Nutrition Supplements: Renal Oral Supplement    Anthropometric Measures:  · Height: 5' 1\" (154.9 cm)  · Current Body Weight: 153 lb 3.2 oz (69.5 kg)   · Admission Body Weight: 143 lb (64.9 kg)    · Usual Body Weight: 115 lb (52.2 kg)(2/15)     · Ideal Body Weight: 105 lbs; % Ideal Body Weight 145.9 %   · BMI: 29  · Adjusted Body Weight:  ; No Adjustment   · Adjusted BMI:      · BMI Categories: Overweight (BMI 25.0-29. 9)       Nutrition Diagnosis:   · Inadequate protein-energy intake related to renal dysfunction, pain as evidenced by poor intake prior to admission    Nutrition Interventions:   Food and/or Nutrient Delivery:  Continue Current Diet, Start Oral Nutrition Supplement  Nutrition Education/Counseling:  No recommendation at this time   Coordination of Nutrition Care:  Continue to monitor while inpatient, Feeding Assistance/Environment Change, Coordination of Community Care    Goals:  Pt will consume more than half of meals and supplements       Nutrition Monitoring and Evaluation:   Behavioral-Environmental Outcomes:  None Identified   Food/Nutrient Intake Outcomes:  Food and Nutrient Intake, Diet Advancement/Tolerance, Supplement Intake  Physical Signs/Symptoms Outcomes:  Biochemical Data, GI Status, Weight, Meal Time Behavior, Hemodynamic Status     Discharge Planning:     Too soon to determine     Electronically signed by Kyle Eason RD, LD on 3/23/21 at 1:04 PM EDT    Contact: [phone]

## 2021-03-23 NOTE — PROGRESS NOTES
Tolerated 2 hour dialysis treatment very well, removed 0.5 liters with treatment. Right non-tunneled catheter used for access and lines flushed, heparinized and capped post treatment. Post potassium drawn and sent as ordered    Patient Name: Alicia Bello  Patient : 1965  MRN: [id number]     Acct: [de-identified]  Date of Admission: 3/22/2021  Room/Bed: [facility] [place] [facility]\""  Code Status:  No Order  Allergies: Allergies   Allergen Reactions    Latex Anaphylaxis     \"have trouble with banana's kiwi, avocados- Have trouble breathing with these and trouble breathig - get asthmatic from latex gloves, bandage, anything latex     Banana Anaphylaxis    Cinnamon Anaphylaxis     Diagnosis:    Patient Active Problem List   Diagnosis    Left breast mass    [facility] [address]    Essential hypertension    Malignant neoplasm of central portion of left female breast ([facility] [address]    Metastatic breast cancer (HCC)    Dehydration    Iron deficiency anemia    Malabsorption    Left ovarian enlargement    ELIDIA (acute kidney injury) ([facility] [address]    Hyperkalemia    Metabolic acidosis    Hyponatremia         Treatment:  Hemodialysis 1:1  Priority: 1st Time Acute  Location: Acute Room    Diabetic: No  NPO: No  Isolation Precautions: None     Consent for Treatment Verified: Yes  Blood Consent Verified: Not Applicable     Safety Verified: Identify (I), Consent (C), Equipment (E), HepB Status (B), Orders Complete (O), Access Verified (A) and Timeliness (T)  Time out performed prior to access at 2140 hours. Report Received from Primary RN at 2130 hours. Primary RN (First Initial, Last Name, Title): JUJU Nielsen 94 RN  Incapacitated Nurse Education Completed: Not Applicable     HBsAg ONLY:  Date Drawn: 2021       Results: Negative  HBsAb:  Date Drawn:  2021       Results: Immune >10    Order  Dialysis Bath  K+ (Potassium): 2  Ca+ (Calcium): (3.5)  Na+ (Sodium): 138  HCO3 (Bicarb): 30     Na+ Modeling: Not Applicable  Dialyzer: [id number]  Dialysate Temperature (C):  36  Blood Flow Rate (BFR):  250   Dialysate Flow Rate (DFR):   500        Access to be Utilized   Access: Non-tunneled Catheter  Location: Internal Jugular  Side: Right   Needle gauge:  Not Applicable  + Bruit/Thrill: Not Applicable    First Use X-ray Verified: Yes  OK to use line order: Yes    Site Assessment:  Signs and Symptoms of Infection/Inflammation: None  If yes: Not Applicable  Dressing: Dry and Intact  Site Prep: Medical Aseptic Technique  Dressing Changed this Treatment: No  If yes, by whom: Special Procedures  Date of Last Dressing Change: March 22, 2021  Antimicrobial Patch in place?: Yes  Red Alcohol Caps in place?: Yes  Gauze Dressing?: No  Non Dialysis Use?: No  Comment:    Flows: Good, Patent  If access problem, who was notified:     Pre and Post-Assessment  Patient Vitals for the past 8 hrs:   Level of Consciousness Heart Rhythm Respiratory Quality/Effort O2 Device Bilateral Breath Sounds Skin Color Skin Condition/Temp Abdomen Inspection Bowel Sounds (All Quadrants) Edema RUE Edema LUE Edema RLE Edema LLE Edema Comments Pain Level   03/22/21 1657 Alert (0) -- -- Nasal cannula -- -- -- -- -- -- -- -- -- -- -- --   03/22/21 1806 Alert (0) -- -- Nasal cannula -- -- -- -- -- -- -- -- -- -- -- --   03/22/21 1930 Alert (0) -- -- -- -- -- -- -- -- -- -- -- -- -- -- --   03/22/21 2000 Alert (0) -- -- -- -- -- -- -- -- -- -- -- -- -- -- --   03/22/21 2030 Alert (0) -- -- -- -- -- -- -- -- -- -- -- -- -- -- --   03/22/21 2145 Alert (0) Regular Unlabored Nasal cannula Clear Appropriate for ethnicity;Jaundice Dry; Warm Soft Active Right upper extremity; Left upper extremity;Right lower extremity; Left lower extremity Non-pitting Non-pitting Non-pitting Non-pitting timeout completed, VSS, rec'd report from RN, hep status verified, water alarm intact 0   03/22/21 0536 Alert (0) Regular Unlabored Nasal cannula Clear Appropriate for ethnicity Dry; Warm Soft Active Right upper extremity; Left upper extremity;Right lower extremity; Left lower extremity Non-pitting Non-pitting Non-pitting Non-pitting treatment completed 0     Labs  Recent Labs     03/22/21  0904 03/22/21  1353   WBC 18.9* 19.4*   HGB 8.2* 8.3*   HCT 24.9* 26.5*   * 666*                                                                  Recent Labs     03/22/21  0904 03/22/21  1353 03/22/21  1800   * 129*  --    K 6.2* 6.6* 6.2*   CL 92* 92*  --    CO2 14* 14*  --    * 132*  --    CREATININE 7.5* 7.5*  --    GLUCOSE 125* 139*  --      IV Drips and Rate/Dose   dextrose        Safety - Before each treatment:   Dialysis Machine No.: [id number]  RO Machine No.: [id number]  Dialyzer Lot No.: [id number]  RO Machine Log Sheet Completed: Yes  Machine Alarm Self Test: Completed; Passed (03/22/21 2145)  Machine Autotest: Completed, Passed  Air Foam Detector: Tested, Proper Function, pH Reading  Extracorporeal Circuit Tested for Integrity: Yes  Machine Conductivity: 13.8  Manual Conductivity: 13.8     Bicarbonate Concentrate Lot No.: [id number]  Acid Concentrate Lot No.: [id number]  Manual Ph: 7.2  Bleach Test (Neg): Yes  Bath Temperature: 96.8 °F (36 °C)  Tubing Lot#: [id number]  Conductivity Meter Serial #: [id number]  All Connections Secure?: Yes  Venous Parameters Set?: Yes  Arterial Parameters Set?: Yes  Saline Line Double Clamped?: Yes  Air Foam Detector Engaged?: Yes  Machine Functioning Alarm Free?  Yes  Prime Given: 200ml    Chlorine Testing - Before each treatment and every 4 hours:   Treatment  Time On: 2145  Time Off: 2345  Treatment Goal: 0.5kg  Weight: 122 lb (55.3 kg) (03/22/21 1241)  1st check: less than 0.1 ppm at: 2030 hours  2nd check: less than 0.1 ppm at: 2030 hours  3rd check: Not Applicable  (if greater than 0.1 ppm, then check every 30 minutes from secondary)    Access Flows and Pressures  Patient Vitals for the past 8 hrs:   Blood Flow Rate (ml/min) Ultrafiltration Rate (ml/hr) Ultrafiltration Total Arterial Pressure (mmHg) Venous Pressure (mmHg) TMP Hemodialysis Conductivity DFR Comments Access Visible   03/22/21 2145 250 ml/min 250 ml/hr 0 ml -80 mmHg 80 30 13.8 500 treatment started Yes   03/22/21 2200 250 ml/min 250 ml/hr 54 ml -80 mmHg 90 30 13.8 500 resting without distress Yes   03/22/21 2215 250 ml/min 250 ml/hr 122 ml -80 mmHg 90 30 13.8 500 denies complaints Yes   03/22/21 2230 250 ml/min 250 ml/hr 189 ml -80 mmHg 90 30 13.8 500 resting without distress Yes   03/22/21 2245 250 ml/min 250 ml/hr 257 ml -80 mmHg 90 30 13.8 500 uses bedpan for small amout clear yellow urine Yes   03/22/21 2300 250 ml/min 250 ml/hr 319 ml -80 mmHg 90 30 13.8 500 no distress Yes   03/22/21 2315 250 ml/min 250 ml/hr 387 ml -80 mmHg 90 30 13.8 500 denies complaints Yes   03/22/21 2330 250 ml/min 250 ml/hr 441 ml -80 mmHg 90 30 13.8 500 no distress Yes   03/22/21 2345 200 ml/min -- 500 ml -- -- -- -- -- treatment completed Yes     Vital Signs  Patient Vitals for the past 8 hrs:   BP Temp Pulse Resp SpO2   03/22/21 1657 (!) 147/89 97.8 °F (36.6 °C) 117 20 96 %   03/22/21 1806 (!) 140/89 -- 116 22 98 %   03/22/21 1930 (!) 142/87 -- 112 20 97 %   03/22/21 2000 (!) 143/90 -- 115 22 98 %   03/22/21 2030 (!) 163/97 -- 124 21 98 %   03/22/21 2145 136/83 97.7 °F (36.5 °C) 122 19 97 %   03/22/21 2200 136/79 -- 123 -- --   03/22/21 2215 (!) 140/79 -- 127 -- --   03/22/21 2230 138/85 -- 128 -- --   03/22/21 2245 (!) 142/86 -- 131 -- --   03/22/21 2300 (!) 162/91 -- 130 -- --   03/22/21 2315 (!) 145/85 -- 130 -- --   03/22/21 2330 (!) 155/90 -- 134 -- --   03/22/21 2345 (!) 149/90 97.9 °F (36.6 °C) 129 19 97 %     Post-Dialysis  Arterial Catheter Locking Solution: Heparin (1000units:1ml)    Volume (ml): 1300  Venous Catheter Locking Solution: Heparin (1000units:1ml)    Volume (ml): 1300  Post-Treatment Procedures: Blood returned, Catheter capped, clamped and heparinized x 2 ports  Machine Disinfection Process: Acid/Vinegar Clean, Heat Disinfect, Exterior Machine Disinfection  Rinseback Volume (ml): 300 ml  Total Liters Processed (l/min): 29.3 l/min  Dialyzer Clearance: Moderately streaked  Duration of Treatment (minutes): 120 minutes     Hemodialysis Intake (ml): 500 ml  Hemodialysis Output (ml): 1000 ml  NET Removed (ml): 500 ml  Tolerated Treatment: Good             Provider Notification        Handoff complete and report given to Primary RN at 2350 hours.   Primary RN (First Initial, Last Name, Title):  April RN     Education  Person Educated: Patient   Knowledge Base: None  Barriers to Learning?: None  Preferred method of Learning: Oral  Topic(s): Access Care, Signs and Symptoms of Infection, Procedural, Potassium and Diet   Teaching Tools: Demonstration and Explanation   Response to Education: Verbalized Understanding and Requires Follow-up     Electronically signed by Jens Nicholson RN on 3/22/2021 at 11:55 PM

## 2021-03-23 NOTE — PROGRESS NOTES
Nephrology  Dialysis Note        [address], [address] [address]  Phone: [phone]  Office Hours: 8:30AM - 4:30PM  Monday - Friday          PROCEDURE:  Patient seen during hemodialysis      PHYSICIAN:  HEYDI      INDICATION:  Acute tubular necrosis      RX:  See dialysis flowsheet for specifics on access, blood flow rate, dialysate baths, duration of dialysis, anticoagulation and other technical information.       COMMENTS:  TOLERATING HD, NO FLUID REMOVAL  OBTAIN CT A/P TO EVALUATE THE HN FROM RENAL US    Electronically signed by Ishmael Cochran DO on 3/23/2021 at 9:34 AM    [facility]  Ethyl MD Latasha Ch DO [facility] [address],  [address] [place] [address]  PHONE: [phone]  FAX: [phone]

## 2021-03-23 NOTE — PROGRESS NOTES
Patient tolerated 3 hour HD treatment well, 0 mL of fluid was removed. Right non-tunneled CVC was accessed with no complications. Post treatment lines were flushed, heparinized, and capped x2. Post treatment BMP was drawn and sent to lab. Patient Name: Willie Ramires  Patient : 1965  MRN: [id number]     Acct: [de-identified]  Date of Admission: 3/22/2021  Room/Bed: 3120/3120-A  Code Status:  Full Code  Allergies: Allergies   Allergen Reactions    Latex Anaphylaxis     \"have trouble with banana's kiwi, avocados- Have trouble breathing with these and trouble breathig - get asthmatic from latex gloves, bandage, anything latex     Banana Anaphylaxis    Cinnamon Anaphylaxis     Diagnosis:    Patient Active Problem List   Diagnosis    Left breast mass    [facility] bone [facility] [address]    Essential hypertension    Malignant neoplasm of central portion of left female breast ([facility] [address]    Metastatic breast cancer (HCC)    Dehydration    Iron deficiency anemia    Malabsorption    Left ovarian enlargement    ELIDIA (acute kidney injury) ([facility] [address]    Hyperkalemia    Metabolic acidosis    Hyponatremia         Treatment:  Hemodilaysis 2:1  Priority: Routine  Location: Acute Room    Diabetic: No  NPO: No  Isolation Precautions: Dialysis     Consent for Treatment Verified: Yes  Blood Consent Verified: Not Applicable     Safety Verified: Identify (I), Consent (C), Equipment (E), HepB Status (B), Orders Complete (O), Access Verified (A) and Timeliness (T)  Time out performed prior to access at 0730 hours. Report Received from Primary RN at 0713 hours. Primary RN (First Initial, Last Name, Title):  CHRISTINE Lima RN  Incapacitated Nurse Education Completed: Not Applicable     HBsAg ONLY:  Date Drawn: 2021       Results: Negative  HBsAb:  Date Drawn:  2021       Results: Immune >10    Order  Dialysis Bath  K+ (Potassium): 2  Ca+ (Calcium): (3.5)  Na+ (Sodium): 138  HCO3 (Bicarb): 30  Citrate Recent Labs     03/22/21  0904 03/22/21  1353 03/22/21  1353 03/23/21  0032 03/23/21  0418 03/23/21  0643   * 129*  --   --  134*  --    K 6.2* 6.6*   < > 4.6 5.5* 5.7*   CL 92* 92*  --   --  97*  --    CO2 14* 14*  --   --  16*  --    * 132*  --   --  79*  --    CREATININE 7.5* 7.5*  --   --  5.1*  --    GLUCOSE 125* 139*  --   --  128*  --     < > = values in this interval not displayed. IV Drips and Rate/Dose   sodium chloride 125 mL/hr at 03/23/21 0105    dextrose      dextrose        Safety - Before each treatment:   Dialysis Machine No.: [id number]  RO Machine No.: [id number]  Dialyzer Lot No.: [id number]  RO Machine Log Sheet Completed: Yes  Machine Alarm Self Test: Completed; Passed (03/23/21 0741)  Machine Autotest: Completed, Passed  Air Foam Detector: Tested, Proper Function  Extracorporeal Circuit Tested for Integrity: Yes  Machine Conductivity: 13.9  Manual Conductivity: 14     Bicarbonate Concentrate Lot No.: [id number]  Acid Concentrate Lot No.: [id number]  Manual Ph: 7.4  Bleach Test (Neg): Yes  Bath Temperature: 96.8 °F (36 °C)  Tubing Lot#: [id number]  Conductivity Meter Serial #: [id number]  All Connections Secure?: Yes  Venous Parameters Set?: Yes  Arterial Parameters Set?: Yes  Saline Line Double Clamped?: Yes  Air Foam Detector Engaged?: Yes  Machine Functioning Alarm Free?  Yes  Prime Given: 200ml    Chlorine Testing - Before each treatment and every 4 hours:   Treatment  Time On: 0714  Time Off: 1044  Treatment Goal: 3 hour, 500mL  Weight: 153 lb 3.2 oz (69.5 kg) (03/23/21 1044)  1st check: less than 0.1 ppm at: 0645 hours  2nd check: less than 0.1 ppm at: 1045 hours  3rd check: Not Applicable  (if greater than 0.1 ppm, then check every 30 minutes from secondary)    Access Flows and Pressures  Patient Vitals for the past 8 hrs:   Blood Flow Rate (ml/min) Ultrafiltration Rate (ml/hr) Ultrafiltration Total Arterial Pressure (mmHg) Venous Pressure (mmHg) TMP Hemodialysis Conductivity DFR Comments Access Visible   03/23/21 0741 300 ml/min 170 ml/hr 0 ml -90 mmHg 80 30 13.8 700 tx started, prime given, lines secure Yes   03/23/21 0745 300 ml/min 170 ml/hr 20 ml -90 mmHg 90 30 13.9 700 resting with eyes closed Yes   03/23/21 0800 300 ml/min 170 ml/hr 57 ml -90 mmHg 100 30 13.5 700 resting with eyes closed  Yes   03/23/21 0815 300 ml/min 170 ml/hr 98 ml -90 mmHg 100 50 13.9 700 no change, resting  Yes   03/23/21 0830 300 ml/min 170 ml/hr 139 ml -90 mmHg 100 30 13.5 700 resting with eyes closed  Yes   03/23/21 0845 300 ml/min 170 ml/hr 192 ml -100 mmHg 100 50 13.9 700 resting with eyes closed  Yes   03/23/21 0900 300 ml/min 170 ml/hr 227 ml -100 mmHg 100 0 13.5 700 resting with eyes closed  Yes   03/23/21 0915 300 ml/min 170 ml/hr 272 ml -100 mmHg 100 30 13.8 700 no change, resting  Yes   03/23/21 0930 300 ml/min 170 ml/hr 306 ml -100 mmHg 100 30 13.4 700 Dr. Norbert Waterman at bedside Yes   03/23/21 0945 300 ml/min 170 ml/hr 356 ml -100 mmHg 100 20 14 700 resting with eyes closed  Yes   03/23/21 1000 300 ml/min 170 ml/hr 395 ml -100 mmHg 100 50 14 700 awake and alet Yes   03/23/21 1015 300 ml/min 170 ml/hr 433 ml -100 mmHg 100 20 13.9 20 resting with eyes closed  Yes   03/23/21 1030 300 ml/min 170 ml/hr 477 ml -100 mmHg 100 20 13.9 700 resting with eyes closed  Yes   03/23/21 1044 -- -- 500 ml -- -- -- -- -- tx ended, rinseback given  Yes     Vital Signs  Patient Vitals for the past 8 hrs:   BP Temp Pulse Resp SpO2 Weight Weight Method Percent Weight Change   03/23/21 0400 131/82 97.6 °F (36.4 °C) 120 22 -- -- -- --   03/23/21 0500 (!) 145/95 -- 129 20 -- -- -- --   03/23/21 0600 (!) 154/87 -- 124 21 -- -- -- --   03/23/21 0741 (!) 145/90 98 °F (36.7 °C) 122 22 97 % 154 lb 8 oz (70.1 kg) -- 7.44   03/23/21 0745 (!) 148/85 -- 122 -- -- -- -- --   03/23/21 0800 133/83 -- 125 -- -- -- -- --   03/23/21 0815 132/82 -- 126 -- -- -- -- --   03/23/21 0830 (!) 142/80 -- 127 -- -- -- -- --   03/23/21 0390 138/82 -- 127 -- -- -- -- --   03/23/21 0900 (!) 143/83 -- 128 -- -- -- -- --   03/23/21 0915 130/85 -- 128 -- -- -- -- --   03/23/21 0930 (!) 145/87 -- 127 -- -- -- -- --   03/23/21 0945 138/80 -- 128 -- -- -- -- --   03/23/21 1000 129/83 -- 126 -- -- -- -- --   03/23/21 1015 (!) 140/83 -- 127 -- -- -- -- --   03/23/21 1030 (!) 144/101 -- 128 -- -- -- -- --   03/23/21 1044 138/84 98 °F (36.7 °C) 128 18 97 % 153 lb 3.2 oz (69.5 kg) Bed scale -0.84     Post-Dialysis  Arterial Catheter Locking Solution: Heparin (1000units:1ml)    Volume (ml): 1.3  Venous Catheter Locking Solution: Heparin (1000units:1ml)    Volume (ml): 1.3  Post-Treatment Procedures: Blood returned, Catheter capped, clamped and heparinized x 2 ports  Machine Disinfection Process: Exterior Machine Disinfection  Rinseback Volume (ml): 300 ml  Total Liters Processed (l/min): 52.7 l/min  Dialyzer Clearance: Moderately streaked  Duration of Treatment (minutes): 180 minutes     Hemodialysis Intake (ml): 500 ml  Hemodialysis Output (ml): 500 ml  NET Removed (ml): 0 ml  Tolerated Treatment: Good  Patient Response to Treatment: no complaints          Provider Notification        Handoff complete and report given to Primary RN at 1118 hours. Primary RN (First Initial, Last Name, Title):   PEE Clayton RN     Education  Person Educated: Patient   Knowledge Base: Substantial  Barriers to Learning?: None  Preferred method of Learning: Oral  Topic(s): Procedural   Teaching Tools: Explanation   Response to Education: Verbalized Understanding     Electronically signed by Hernando Savage on 3/23/2021 at 11:22 AM

## 2021-03-23 NOTE — CONSULTS
Hematology/Oncology  Consult Note      Reason for Consult:  Metastatic breast cancer  Requesting Physician: Dr. Carvajal Ao:    Chief Complaint   Patient presents with    Other     abnormal labs      History Obtained From:  patient, electronic medical record    HISTORY OF PRESENT ILLNESS:      The patient is a 50 y.o. female with significant past medical history of metastatic breast cancer who started Taxol 3/22/21. During treatment stat call from lab was received and she was noted to have severe ELIDIA with Cr.7.5, potassium 6.2. She is also noted to have had increasing weakness over the weekend with a fall. She was sent to ED via squad. She reports associated lack of appetite, poor oral intake. She reports difficulty eating at times due to pain in right leg secondary to hematoma. She denies fever, chills, night sweats, no shortness of breath, chest pain, no urinary symptoms. She received 3L NS in ER due to hypotension. She had HD cath placement and HD last night. She will have HD again today. Cancer Treatment History:     She has inflammatory left breast cancer. PET/CT scan showed metastatic disease to bone, left axillary lymph node, and left breasts with the skin involvement. At present time she does not have physical disease. CBC and CMP were within normal limits. CA 27-29 was elevated. I put her on palliative hormonal therapy plus Palbociclib. She started arimidex and Ibrance on June 4, 2018 and monthly lupron injection on June 8, 2018. Bone scan in September 2018 showed stable findings. After reviewing the PET CT scan in November 2018, I put her on Zometa monthly. Bone scan in July 2019 was reviewed and showed progression of the disease. PET scan in August 2019 showed grossly response to the therapy. CT chest/abdomen and bone scan due to rising CA27-29 in January 2020 showed progression of disease. She started aromasin, afinitor and lupron on January 10, 2020.  She understands the potential side effects of afinitor. PET CT scan May 2020 showed treatment response. CT chest, abdomen pelvis in 2020 showed response and no evidence of new metastatic disease. She was on Lupron, exemestane, Zometa and Afinitor. I changed zometa to every 3 months. CT chest, abdomen pelvis in 2021 was reviewed. Clinically she has metastatic disease from the breast cancer. She is agreeable to change to palliative chemotherapy.      Past Medical History:        Diagnosis Date    Anxiety     Asthma     last flare up 5 yrs ago    Breast lesion     \"have spot on left breast going to remove- at this time not sure if cancer or not\"    Essential hypertension 2020    Secondary cancer of bone ([facility] [address] 2020    Thyroid nodule     \"have thyroid nodules-      Past Surgical History:        Procedure Laterality Date    APPENDECTOMY  age 27   Floyce Benítez  SECTION  0    PORT SURGERY Right 3/15/2021    RIGHT PORT INSERTION performed by Albert Adkins MD at [facility]  age 4    T & A       Current Medications:    Current Facility-Administered Medications: cetirizine (ZYRTEC) tablet 10 mg, 10 mg, Oral, Daily  docusate sodium (COLACE) capsule 100 mg, 100 mg, Oral, Daily PRN  HYDROcodone-acetaminophen (NORCO) 5-325 MG per tablet 1 tablet, 1 tablet, Oral, Q6H PRN  sodium chloride flush 0.9 % injection 10 mL, 10 mL, Intravenous, 2 times per day  sodium chloride flush 0.9 % injection 10 mL, 10 mL, Intravenous, PRN  heparin (porcine) injection 5,000 Units, 5,000 Units, Subcutaneous, 3 times per day  promethazine (PHENERGAN) tablet 12.5 mg, 12.5 mg, Oral, Q6H PRN **OR** ondansetron (ZOFRAN) injection 4 mg, 4 mg, Intravenous, Q6H PRN  acetaminophen (TYLENOL) tablet 650 mg, 650 mg, Oral, Q6H PRN **OR** acetaminophen (TYLENOL) suppository 650 mg, 650 mg, Rectal, Q6H PRN  0.9 % sodium chloride infusion, , Intravenous, Continuous  polyethylene glycol (GLYCOLAX) packet 17 g, 17 g, Oral,

## 2021-03-23 NOTE — ED NOTES
Unable to start Zithromax at this time, because IR is placing a line for dialysis at this time.       Svetlana Lopez RN  03/22/21 2026

## 2021-03-24 NOTE — CONSULTS
banana's kiwi, avocados- Have trouble breathing with these and trouble breathig - get asthmatic from latex gloves, bandage, anything latex     Banana Anaphylaxis    Cinnamon Anaphylaxis       calcium gluconate 2000 mg in sodium chloride 100 mL, Once  heparin (porcine) injection 2,600 Units, Once in dialysis  cetirizine (ZYRTEC) tablet 10 mg, Daily  docusate sodium (COLACE) capsule 100 mg, Daily PRN  HYDROcodone-acetaminophen (NORCO) 5-325 MG per tablet 1 tablet, Q6H PRN  sodium chloride flush 0.9 % injection 10 mL, PRN  heparin (porcine) injection 5,000 Units, 3 times per day  promethazine (PHENERGAN) tablet 12.5 mg, Q6H PRN    Or  ondansetron (ZOFRAN) injection 4 mg, Q6H PRN  acetaminophen (TYLENOL) tablet 650 mg, Q6H PRN    Or  acetaminophen (TYLENOL) suppository 650 mg, Q6H PRN  polyethylene glycol (GLYCOLAX) packet 17 g, Daily PRN  glucose (GLUTOSE) 40 % oral gel 15 g, PRN  dextrose 50 % IV solution, PRN  glucagon (rDNA) injection 1 mg, PRN  dextrose 5 % solution, PRN  cefepime (MAXIPIME) 2000 mg IVPB minibag, Q24H  glucose (GLUTOSE) 40 % oral gel 15 g, PRN  dextrose 50 % IV solution, PRN  glucagon (rDNA) injection 1 mg, PRN  dextrose 5 % solution, PRN      Current Facility-Administered Medications   Medication Dose Route Frequency Provider Last Rate Last Admin    calcium gluconate 2000 mg in sodium chloride 100 mL  2,000 mg Intravenous Once CINDY Ballard - CNP        heparin (porcine) injection 2,600 Units  2,600 Units Intracatheter Once in dialysis Елена Lau DO        cetirizine (ZYRTEC) tablet 10 mg  10 mg Oral Daily Cheyenne Hernandez MD   10 mg at 03/23/21 1128    docusate sodium (COLACE) capsule 100 mg  100 mg Oral Daily PRN Cheyenne Hernandez MD        HYDROcodone-acetaminophen (NORCO) 5-325 MG per tablet 1 tablet  1 tablet Oral Q6H PRN Cheyenne Hernandez MD        sodium chloride flush 0.9 % injection 10 mL  10 mL Intravenous PRN Cheyenne Hernandez MD        heparin (porcine) injection 5,000 Units  5,000 Units Subcutaneous 3 times per day Otilio Bingham MD   5,000 Units at 03/24/21 0530    promethazine (PHENERGAN) tablet 12.5 mg  12.5 mg Oral Q6H PRN Otilio Bingham MD        Or    ondansetron (ZOFRAN) injection 4 mg  4 mg Intravenous Q6H PRN Otilio Bingham MD        acetaminophen (TYLENOL) tablet 650 mg  650 mg Oral Q6H PRN Otilio Bingham MD        Or   [place] acetaminophen (TYLENOL) suppository 650 mg  650 mg Rectal Q6H PRN Otilio Bingham MD        polyethylene glycol (GLYCOLAX) packet 17 g  17 g Oral Daily PRN Otilio Bingham MD        glucose (GLUTOSE) 40 % oral gel 15 g  15 g Oral PRN Otilio Bingham MD        dextrose 50 % IV solution  12.5 g Intravenous PRN Otilio Bingham MD        glucagon (rDNA) injection 1 mg  1 mg Intramuscular PRN Otilio Bingham MD        dextrose 5 % solution  100 mL/hr Intravenous PRN Otilio Bingham MD        cefepime (MAXIPIME) 2000 mg IVPB minibag  2,000 mg Intravenous Q24H Otilio Bingham MD   Stopped at 03/24/21 0202    glucose (GLUTOSE) 40 % oral gel 15 g  15 g Oral PRN Otilio Bingham MD        dextrose 50 % IV solution  12.5 g Intravenous PRN Otilio Bingham MD        glucagon (rDNA) injection 1 mg  1 mg Intramuscular PRN Otilio Bingham MD        dextrose 5 % solution  100 mL/hr Intravenous PRN Otilio Bingham MD             Review of Systems:     · Constitutional: No Fever or Weight Loss   · Eyes: No Decreased Vision  · ENT: No Headaches, Hearing Loss or Vertigo  · Cardiovascular: No chest pain, dyspnea on exertion, palpitations or loss of consciousness  · Respiratory: No cough or wheezing    · Gastrointestinal: No abdominal pain, appetite loss, blood in stools, constipation, diarrhea or heartburn  · Genitourinary: No dysuria, trouble voiding, or hematuria  · Musculoskeletal:  No gait disturbance, weakness or joint complaints  · Integumentary: No rash or pruritis  · Neurological: No TIA or stroke symptoms  · Psychiatric: No anxiety or depression  · Endocrine: + malaise · Hematologic/Lymphatic: No bleeding problems, blood clots or swollen lymph nodes  · Allergic/Immunologic: No nasal congestion or hives    All other systems were reviewed and were negative otherwise. Physical Examination:      Vitals:    03/24/21 1045   BP: (!) 173/96   Pulse: 133   Resp: 17   Temp: 98.8 °F (37.1 °C)   SpO2: 95%      Wt Readings from Last 3 Encounters:   03/24/21 139 lb 11.2 oz (63.4 kg)   03/22/21 122 lb 9.6 oz (55.6 kg)   03/22/21 122 lb (55.3 kg)     Body mass index is 26.4 kg/m². General Appearance:  No distress, conversant  Constitutional:  Well developed, Well nourished  HEENT:  Normocephalic, Atraumatic, Oropharynx moist, No oral exudates,   Nose normal. Neck Supple Carotid: no carotid bruit  Eyes:  Conjunctiva normal, No discharge. Respiratory:    Normal breath sounds, No respiratory distress, No wheezing, no use of accessory muscles, diaphragm movement is normal  No chest Tenderness  Cardiovascular: S1-S2 No murmurs auscultated. No rubs, thrills or gallops. Normal  rhythm. Pedal pulses are normal. No pedal edema  GI:  Soft Non tender, non distended. :  No CVA tenderness. Musculoskeletal:   No tenderness, No cyanosis, No clubbing. Integument:  Warm, Dry, No erythema, No rash. Lymphatic:  No lymphadenopathy noted. Neurologic:  Alert & oriented x 3  No focal deficits noted. Psychiatric:  Affect normal, Judgment normal, Mood normal.       Lab Review     Recent Labs     03/23/21  0418   WBC 15.5*   HGB 8.1*   HCT 25.0*   *      Recent Labs     03/23/21  0418 03/23/21  0418 03/24/21  0505   *  --  136   K 5.5*   < > 4.9   CL 97*  --  98*   CO2 16*  --  22   PHOS 5.2*  --   --    BUN 79*  --  59*   CREATININE 5.1*  --  3.7*    < > = values in this interval not displayed. Recent Labs     03/22/21  1353   AST 48*   ALT 8*   BILITOT 0.3   ALKPHOS 120     No results for input(s): TROPONINI in the last 72 hours.   No results found for: BNP  No results found for: INR, PROTIME      All labs, images, EKGs were personally reviewed      Assessment: 50 y. o.year old with PMH of  has a past medical history of Anxiety, Asthma, Breast lesion, Essential hypertension, Secondary cancer of bone ([address] and Thyroid nodule. Recommendations:      1. Sinus tachycardia: Unclear etiology, repeat limited echo to rule out pericardial effusion. Prior history of DVT noted, patient previously was on anticoagulation but was stopped. Will obtain CTA of the chest to rule out pulmonary embolism. Will defer anticoagulation to hematology at this point. Start patient on metoprolol for tachycardia. 2. Essential hypertension: Blood pressure 173/96. Start patient on Toprol-XL  3. Sepsis: Currently on IV antibiotics  4. Metastatic breast cancer: S/p chemotherapy, hematology/oncology follow-up  5. Anemia of chronic disease/malignancy: Hemoglobin is stable. 6. Bilateral hydronephrosis and renal failure on dialysis currently.       Thank you for the consult    Dr. Lydia Ramos  3/24/2021 11:07 AM

## 2021-03-24 NOTE — PROGRESS NOTES
Progress Note  Date:3/24/2021       Room:3120/3120-A  Patient Palmer Wallace     YOB: 1965     Age:55 y.o. Subjective    Subjective   Review of Systems   All other systems reviewed and are negative. Objective         Vitals Last 24 Hours:  TEMPERATURE:  Temp  Av °F (36.7 °C)  Min: 97.6 °F (36.4 °C)  Max: 98.8 °F (37.1 °C)  RESPIRATIONS RANGE: Resp  Av.5  Min: 8  Max: 30  PULSE OXIMETRY RANGE: SpO2  Av.9 %  Min: 94 %  Max: 97 %  PULSE RANGE: Pulse  Av.8  Min: 121  Max: 131  BLOOD PRESSURE RANGE: Systolic (61VSI), DHU:140 , Min:129 , DOE:112   ; Diastolic (19SML), GCU:05, Min:80, Max:102    I/O (24Hr): Intake/Output Summary (Last 24 hours) at 3/24/2021 0924  Last data filed at 3/23/2021 1551  Gross per 24 hour   Intake 500 ml   Output 535 ml   Net -35 ml     Objective:  General Appearance: In no acute distress. Vital signs: (most recent): Blood pressure (!) 146/98, pulse 131, temperature 97.9 °F (36.6 °C), resp. rate 30, height 5' 1\" (1.549 m), weight 143 lb 4.8 oz (65 kg), last menstrual period 2018, SpO2 94 %, not currently breastfeeding. Vital signs are normal.    Output: Producing urine and producing stool. Lungs:  Normal effort. Breath sounds clear to auscultation. Heart: Normal rate. No gallop or friction rub. Chest: Symmetric chest wall expansion. Abdomen: Abdomen is soft and non-distended. Bowel sounds are normal.   There is no abdominal tenderness. There is no rebound tenderness. There is no guarding. Extremities: Normal range of motion. Neurological: Patient is alert and oriented to person, place and time. Pupils:  Pupils are equal.   Skin:  Warm and dry.       Labs/Imaging/Diagnostics    Labs:  CBC:  Recent Labs     21  0904 21  1353 21  0418   WBC 18.9* 19.4* 15.5*   RBC 3.38* 3.34* 3.29*   HGB 8.2* 8.3* 8.1*   HCT 24.9* 26.5* 25.0*   MCV 73.7* 79.3 76.0*   RDW 25.2* 24.6* 25.2*   * 666* 612* CHEMISTRIES:  Recent Labs     03/22/21  1353 03/22/21  1353 03/23/21  0418 03/23/21  0418 03/23/21  1112 03/23/21  1653 03/24/21  0505   *  --  134*  --   --   --  136   K 6.6*   < > 5.5*   < > 3.9 4.4 4.9   CL 92*  --  97*  --   --   --  98*   CO2 14*  --  16*  --   --   --  22   *  --  79*  --   --   --  59*   CREATININE 7.5*  --  5.1*  --   --   --  3.7*   GLUCOSE 139*  --  128*  --   --   --  121*   PHOS  --   --  5.2*  --   --   --   --     < > = values in this interval not displayed. PT/INR:No results for input(s): PROTIME, INR in the last 72 hours. APTT:No results for input(s): APTT in the last 72 hours. LIVER PROFILE:  Recent Labs     03/22/21  0904 03/22/21  1353   AST 53* 48*   ALT 8* 8*   BILITOT 0.2 0.3   ALKPHOS 125 120       Imaging Last 24 Hours:  Ct Abdomen Pelvis Wo Contrast Additional Contrast? None    Result Date: 3/23/2021  EXAMINATION: CT OF THE ABDOMEN AND PELVIS WITHOUT CONTRAST 3/23/2021 11:21 am TECHNIQUE: CT of the abdomen and pelvis was performed without the administration of intravenous contrast. Multiplanar reformatted images are provided for review. Dose modulation, iterative reconstruction, and/or weight based adjustment of the mA/kV was utilized to reduce the radiation dose to as low as reasonably achievable. COMPARISON: Renal ultrasound 03/23/2021. CT chest, abdomen, and pelvis 01/20/2021. HISTORY: ORDERING SYSTEM PROVIDED HISTORY: please evaluate the hydonephrosis on renal US TECHNOLOGIST PROVIDED HISTORY: Reason for exam:->please evaluate the hydonephrosis on renal US Additional Contrast?->None Is the patient pregnant?->No Reason for Exam: please evaluate the hydonephrosis on renal US Acuity: Acute Type of Exam: Initial FINDINGS: Lower Chest: Small right and large left pleural effusions. Mild right basilar atelectasis. Left lower lobe atelectasis. Small pericardial effusion.  Organs: Lack of intravenous contrast limits evaluation of the solid organs, vascular structures, and bowel. 5 mm low-density lesion in the liver dome not significantly changed on axial image 25 too small to definitively characterize. The liver is otherwise unremarkable. The gallbladder is normal in appearance. No biliary ductal dilatation. The pancreas, spleen, and bilateral adrenal glands are unremarkable. Moderate bilateral hydroureteronephrosis. This is new on the right and not significantly changed on the left compared with 01/20/2021. No obstructing stone or mass identified. No focal renal abnormality. GI/Bowel: The colon is unremarkable. No evidence of acute appendicitis. No obstruction or wall thickening definitely seen. Pelvis: The urinary bladder is partially decompressed with questionable circumferential wall thickening. No urinary bladder stone identified. The uterus and right adnexa are unremarkable. The left ovary continues to increased in size now measuring approximately 3.2 x 1.9 cm on axial image 109 (previously 1.8 x 1.6 cm). Left external iliac node measuring 1.7 x 1.2 cm (previously 1.9 x 1.2 cm). Right common iliac node measuring 1.2 x 0.7 cm (previously 1.1 x 0.9 cm). Right external iliac node measuring 2.5 x 2.1 cm (previously 2.5 x 1.6 cm). Small volume of free fluid in the pelvis. Peritoneum/Retroperitoneum: The abdominal aorta is normal in appearance. Trace ascites. No drainable fluid collection or pneumoperitoneum. No retroperitoneal or mesenteric lymphadenopathy. Bones/Soft Tissues: Diffuse body wall edema. 4.1 x 2.4 cm calcified mass within the proximal right adductor musculature (previously 3.7 x 2 cm). New somewhat ill-defined partially calcified mass within the right gluteus medius muscle measuring 3.5 x 2.6 cm. Extensive sclerotic metastases without significant change. Mild chronic T9 and T10 pathologic compression fractures without significant change. No acute osseous abnormality.      1. Moderate bilateral hydroureteronephrosis new on the right and not significantly changed on the left compared with 08/61/3579 of uncertain etiology. No obstructing stone or mass. Partially decompressed urinary bladder with circumferential wall thickening not excluded. 2. Fluid overload with small right and large left pleural effusions, diffuse body wall edema, small volume of ascites, and small pericardial effusion. 3. 6 bilateral iliac chain lymphadenopathy not significantly changed from 01/20/2021. Increasing size of the left ovary of uncertain etiology with an underlying neoplasm not excluded. Unchanged diffuse sclerotic metastases. 4. Mildly increased size of a calcified mass within the right proximal adductor musculature and new mass within the right gluteus medius muscle suspicious for metastatic disease. Xr Chest Portable    Result Date: 3/22/2021  EXAMINATION: ONE XRAY VIEW OF THE CHEST 3/22/2021 9:09 pm COMPARISON: None. HISTORY: ORDERING SYSTEM PROVIDED HISTORY: post temp cath placement TECHNOLOGIST PROVIDED HISTORY: Reason for exam:->post temp cath placement Reason for Exam: post temp cath placement Acuity: Acute Type of Exam: Initial Additional signs and symptoms: na Relevant Medical/Surgical History: breast cancer, bone cancer FINDINGS: The lungs are clear. The mediastinum and cardiac silhouette are unremarkable. There is no evidence of pneumothorax. There is been interval placement of a right internal jugular non tunneled hemodialysis catheter, tip projects of the inferior SVC. Significant left basilar pleuroparenchymal disease unchanged. New right internal jugular non tunneled hemodialysis catheter with no evidence of pneumothorax.      Xr Chest Portable    Result Date: 3/22/2021  EXAMINATION: ONE XRAY VIEW OF THE CHEST 3/22/2021 3:02 pm COMPARISON: 03/15/2021 HISTORY: ORDERING SYSTEM PROVIDED HISTORY: Chest pain TECHNOLOGIST PROVIDED HISTORY: Reason for exam:->Chest pain Reason for Exam: chest pain Acuity: Acute Type of Exam: Initial FINDINGS: Right chest port tip is been retracted and now projects over the superior cavoatrial junction. Increased moderate left pleural effusion with left lung base opacity. Right lung clear. No pneumothorax or right pleural effusion. Cardiac and mediastinal contours stable. No acute osseous abnormality. Increased moderate left pleural effusion with left lung opacity that may reflect atelectasis or pneumonia if the patient has fever or leukocytosis. Ir Nontunneled Vascular Catheter > 5 Years    Result Date: 3/23/2021  PROCEDURE: ULTRASOUND GUIDED VASCULAR ACCESS. Bedside PLACEMENT OF A NON-TUNNELED CATHETER. The procedure was performed by Dr. Jose G Dunaway, the IR on call physician. 3/22/2021. HISTORY: ORDERING SYSTEM PROVIDED HISTORY: temp cath TECHNOLOGIST PROVIDED HISTORY: Reason for exam:->temp cath Is the patient pregnant?->No SEDATION: Local anesthesia only. FLUOROSCOPY DOSE AND TYPE OR TIME AND EXPOSURES: None. TECHNIQUE: Informed consent was obtained after a detailed explanation of the procedure including risks, benefits, and alternatives. Universal protocol was observed. The right neck and chest were prepped and draped in sterile fashion using maximum sterile barrier technique. Local anesthesia was achieved with lidocaine. A micropuncture needle was used to access the right internal jugular vein using ultrasound guidance. An ultrasound image demonstrating patency of the vein with needle tip located within it. An image was obtained and stored in PACs. A 0.035 guidewire was used to place a temporal dialysis catheter after fascial tract dilation. The catheter flushed easily and there was a good blood return. The catheter was sutured to the skin. The catheter was locked with heparinized saline. The patient tolerated the procedure well and there were no immediate complications. FINDINGS: Chest x ray post procedure demonstrates the tip of the catheter in the lower SVC. The catheter is ready to use. Successful ultrasound  guided non-tunneled dialysis catheter placement. The catheter is ready to use. Us Retroperitoneal Limited    Result Date: 3/23/2021  EXAMINATION: ULTRASOUND OF THE KIDNEYS 3/23/2021 5:43 am COMPARISON: CT abdomen/pelvis performed January 20, 2021. HISTORY: ORDERING SYSTEM PROVIDED HISTORY: elidia TECHNOLOGIST PROVIDED HISTORY: Reason for exam:->elidia Reason for Exam: ELIDIA Acuity: Acute Type of Exam: Initial FINDINGS: The right kidney measures 10.8 cm in length and the left kidney measures 10.0 cm in length. Kidneys demonstrate increased cortical echogenicity. There is mild-to-moderate bilateral hydronephrosis. No focal lesions. 1. Increased echogenicity of the renal cortices which is nonspecific but can be seen with medical renal disease. 2. Mild-to-moderate bilateral hydronephrosis. Assessment//Plan         · ELIDIA on CKD 3, due to dehydration and poor oral intake  · High anion gap with metabolic acidosis likely due to uremia  · Meet SIRS criteria  · Labs with severe ELIDIA, hyperkalemia Leukocytosis, likely due to severe dehydration or sepsis due to probable CAP  · On cefepime empirically, de-escalate as needed  · Metastatic breast CA  · Chronic blood loss anemia  · Monitor, transfuse for hemoglobin less than 7  · Thrombocytemia, possibly reactive  · Oncology on board  · Elevated troponin likely due to demand ischemia  · Severe protein energy malnutrition  · Nutritional supplements, dietician    Assessment:    Condition: In stable condition. Improving. Plan:   Encourage ambulation and out of bed and up to chair. Continue respiratory treatments. Advance diet as tolerated. Give fluids. (    DVT: lovenox or PO medications  GI prophylaxis: PO intake or medicated   [unfilled]).      HD today  Very pleasant [place] woman  Appreciate consultants    Electronically signed by Marilyn Bennett DO on 3/24/21 at 9:24 AM EDT

## 2021-03-24 NOTE — PROGRESS NOTES
Physical Therapy  Attempted, patient with tachycardia in 120-130's at rest, just finished with OT and had HR in 150's with sitting EOB, per discussion with patient she would like to wait until started on new cardiac meds to manage HR. Agree and will re-attempt 3/25.

## 2021-03-24 NOTE — PROGRESS NOTES
Progress Note  Date:3/24/2021       Room:3120/3120-A  Patient Palmer Wallace     YOB: 1965     Age:55 y.o. Subjective    Subjective:  Symptoms:  Stable. No shortness of breath, chest pain, headache or diarrhea. Pain:  She complains of pain that is mild. Pain is partially controlled. Admits to fatigue today after dialysis    Review of Systems   Respiratory: Negative for shortness of breath. Cardiovascular: Negative for chest pain. Gastrointestinal: Negative for diarrhea. All other systems reviewed and are negative. Objective         Vitals Last 24 Hours:  TEMPERATURE:  Temp  Av °F (36.7 °C)  Min: 97.6 °F (36.4 °C)  Max: 98.8 °F (37.1 °C)  RESPIRATIONS RANGE: Resp  Av.5  Min: 8  Max: 30  PULSE OXIMETRY RANGE: SpO2  Av.9 %  Min: 94 %  Max: 97 %  PULSE RANGE: Pulse  Av.8  Min: 121  Max: 131  BLOOD PRESSURE RANGE: Systolic (22FAC), GGH:273 , Min:129 , PBU:105   ; Diastolic (84ZOX), XBT:23, Min:80, Max:102    I/O (24Hr): Intake/Output Summary (Last 24 hours) at 3/24/2021 0928  Last data filed at 3/23/2021 1551  Gross per 24 hour   Intake 500 ml   Output 535 ml   Net -35 ml     Objective:  General Appearance: In no acute distress. Vital signs: (most recent): Blood pressure (!) 146/98, pulse 131, temperature 97.9 °F (36.6 °C), resp. rate 30, height 5' 1\" (1.549 m), weight 143 lb 4.8 oz (65 kg), last menstrual period 2018, SpO2 94 %, not currently breastfeeding. Vital signs are normal.    Output: Producing urine and producing stool. Lungs:  Normal effort. Breath sounds clear to auscultation. Heart: Normal rate. No gallop or friction rub. Chest: Symmetric chest wall expansion. Abdomen: Abdomen is soft and non-distended. Bowel sounds are normal.   There is no abdominal tenderness. There is no rebound tenderness. There is no guarding. Extremities: Normal range of motion.     Neurological: Patient is alert and oriented to person, place and time. Pupils:  Pupils are equal.   Skin:  Warm and dry. Labs/Imaging/Diagnostics    Labs:  CBC:  Recent Labs     03/22/21  0904 03/22/21  1353 03/23/21  0418   WBC 18.9* 19.4* 15.5*   RBC 3.38* 3.34* 3.29*   HGB 8.2* 8.3* 8.1*   HCT 24.9* 26.5* 25.0*   MCV 73.7* 79.3 76.0*   RDW 25.2* 24.6* 25.2*   * 666* 612*     CHEMISTRIES:  Recent Labs     03/22/21  1353 03/22/21  1353 03/23/21  0418 03/23/21  0418 03/23/21  1112 03/23/21  1653 03/24/21  0505   *  --  134*  --   --   --  136   K 6.6*   < > 5.5*   < > 3.9 4.4 4.9   CL 92*  --  97*  --   --   --  98*   CO2 14*  --  16*  --   --   --  22   *  --  79*  --   --   --  59*   CREATININE 7.5*  --  5.1*  --   --   --  3.7*   GLUCOSE 139*  --  128*  --   --   --  121*   PHOS  --   --  5.2*  --   --   --   --     < > = values in this interval not displayed. PT/INR:No results for input(s): PROTIME, INR in the last 72 hours. APTT:No results for input(s): APTT in the last 72 hours. LIVER PROFILE:  Recent Labs     03/22/21  0904 03/22/21  1353   AST 53* 48*   ALT 8* 8*   BILITOT 0.2 0.3   ALKPHOS 125 120       Imaging Last 24 Hours:  Ct Abdomen Pelvis Wo Contrast Additional Contrast? None    Result Date: 3/23/2021  EXAMINATION: CT OF THE ABDOMEN AND PELVIS WITHOUT CONTRAST 3/23/2021 11:21 am TECHNIQUE: CT of the abdomen and pelvis was performed without the administration of intravenous contrast. Multiplanar reformatted images are provided for review. Dose modulation, iterative reconstruction, and/or weight based adjustment of the mA/kV was utilized to reduce the radiation dose to as low as reasonably achievable. COMPARISON: Renal ultrasound 03/23/2021. CT chest, abdomen, and pelvis 01/20/2021.  HISTORY: ORDERING SYSTEM PROVIDED HISTORY: please evaluate the hydonephrosis on renal US TECHNOLOGIST PROVIDED HISTORY: Reason for exam:->please evaluate the hydonephrosis on renal US Additional Contrast?->None Is the patient pregnant?->No Reason for Exam: please evaluate the hydonephrosis on renal US Acuity: Acute Type of Exam: Initial FINDINGS: Lower Chest: Small right and large left pleural effusions. Mild right basilar atelectasis. Left lower lobe atelectasis. Small pericardial effusion. Organs: Lack of intravenous contrast limits evaluation of the solid organs, vascular structures, and bowel. 5 mm low-density lesion in the liver dome not significantly changed on axial image 25 too small to definitively characterize. The liver is otherwise unremarkable. The gallbladder is normal in appearance. No biliary ductal dilatation. The pancreas, spleen, and bilateral adrenal glands are unremarkable. Moderate bilateral hydroureteronephrosis. This is new on the right and not significantly changed on the left compared with 01/20/2021. No obstructing stone or mass identified. No focal renal abnormality. GI/Bowel: The colon is unremarkable. No evidence of acute appendicitis. No obstruction or wall thickening definitely seen. Pelvis: The urinary bladder is partially decompressed with questionable circumferential wall thickening. No urinary bladder stone identified. The uterus and right adnexa are unremarkable. The left ovary continues to increased in size now measuring approximately 3.2 x 1.9 cm on axial image 109 (previously 1.8 x 1.6 cm). Left external iliac node measuring 1.7 x 1.2 cm (previously 1.9 x 1.2 cm). Right common iliac node measuring 1.2 x 0.7 cm (previously 1.1 x 0.9 cm). Right external iliac node measuring 2.5 x 2.1 cm (previously 2.5 x 1.6 cm). Small volume of free fluid in the pelvis. Peritoneum/Retroperitoneum: The abdominal aorta is normal in appearance. Trace ascites. No drainable fluid collection or pneumoperitoneum. No retroperitoneal or mesenteric lymphadenopathy. Bones/Soft Tissues: Diffuse body wall edema. 4.1 x 2.4 cm calcified mass within the proximal right adductor musculature (previously 3.7 x 2 cm).   New somewhat ill-defined partially calcified mass within the right gluteus medius muscle measuring 3.5 x 2.6 cm. Extensive sclerotic metastases without significant change. Mild chronic T9 and T10 pathologic compression fractures without significant change. No acute osseous abnormality. 1. Moderate bilateral hydroureteronephrosis new on the right and not significantly changed on the left compared with 29/94/2936 of uncertain etiology. No obstructing stone or mass. Partially decompressed urinary bladder with circumferential wall thickening not excluded. 2. Fluid overload with small right and large left pleural effusions, diffuse body wall edema, small volume of ascites, and small pericardial effusion. 3. 6 bilateral iliac chain lymphadenopathy not significantly changed from 01/20/2021. Increasing size of the left ovary of uncertain etiology with an underlying neoplasm not excluded. Unchanged diffuse sclerotic metastases. 4. Mildly increased size of a calcified mass within the right proximal adductor musculature and new mass within the right gluteus medius muscle suspicious for metastatic disease. Xr Chest Portable    Result Date: 3/22/2021  EXAMINATION: ONE XRAY VIEW OF THE CHEST 3/22/2021 9:09 pm COMPARISON: None. HISTORY: ORDERING SYSTEM PROVIDED HISTORY: post temp cath placement TECHNOLOGIST PROVIDED HISTORY: Reason for exam:->post temp cath placement Reason for Exam: post temp cath placement Acuity: Acute Type of Exam: Initial Additional signs and symptoms: na Relevant Medical/Surgical History: breast cancer, bone cancer FINDINGS: The lungs are clear. The mediastinum and cardiac silhouette are unremarkable. There is no evidence of pneumothorax. There is been interval placement of a right internal jugular non tunneled hemodialysis catheter, tip projects of the inferior SVC. Significant left basilar pleuroparenchymal disease unchanged.      New right internal jugular non tunneled hemodialysis catheter with no evidence of pneumothorax. Xr Chest Portable    Result Date: 3/22/2021  EXAMINATION: ONE XRAY VIEW OF THE CHEST 3/22/2021 3:02 pm COMPARISON: 03/15/2021 HISTORY: ORDERING SYSTEM PROVIDED HISTORY: Chest pain TECHNOLOGIST PROVIDED HISTORY: Reason for exam:->Chest pain Reason for Exam: chest pain Acuity: Acute Type of Exam: Initial FINDINGS: Right chest port tip is been retracted and now projects over the superior cavoatrial junction. Increased moderate left pleural effusion with left lung base opacity. Right lung clear. No pneumothorax or right pleural effusion. Cardiac and mediastinal contours stable. No acute osseous abnormality. Increased moderate left pleural effusion with left lung opacity that may reflect atelectasis or pneumonia if the patient has fever or leukocytosis. Ir Nontunneled Vascular Catheter > 5 Years    Result Date: 3/23/2021  PROCEDURE: ULTRASOUND GUIDED VASCULAR ACCESS. Bedside PLACEMENT OF A NON-TUNNELED CATHETER. The procedure was performed by Dr. Uyen Medeiros, the IR on call physician. 3/22/2021. HISTORY: ORDERING SYSTEM PROVIDED HISTORY: temp cath TECHNOLOGIST PROVIDED HISTORY: Reason for exam:->temp cath Is the patient pregnant?->No SEDATION: Local anesthesia only. FLUOROSCOPY DOSE AND TYPE OR TIME AND EXPOSURES: None. TECHNIQUE: Informed consent was obtained after a detailed explanation of the procedure including risks, benefits, and alternatives. Universal protocol was observed. The right neck and chest were prepped and draped in sterile fashion using maximum sterile barrier technique. Local anesthesia was achieved with lidocaine. A micropuncture needle was used to access the right internal jugular vein using ultrasound guidance. An ultrasound image demonstrating patency of the vein with needle tip located within it. An image was obtained and stored in PACs. A 0.035 guidewire was used to place a temporal dialysis catheter after fascial tract dilation.  The catheter flushed easily and there was a good blood return. The catheter was sutured to the skin. The catheter was locked with heparinized saline. The patient tolerated the procedure well and there were no immediate complications. FINDINGS: Chest x ray post procedure demonstrates the tip of the catheter in the lower SVC. The catheter is ready to use. Successful ultrasound  guided non-tunneled dialysis catheter placement. The catheter is ready to use. Us Retroperitoneal Limited    Result Date: 3/23/2021  EXAMINATION: ULTRASOUND OF THE KIDNEYS 3/23/2021 5:43 am COMPARISON: CT abdomen/pelvis performed January 20, 2021. HISTORY: ORDERING SYSTEM PROVIDED HISTORY: elidia TECHNOLOGIST PROVIDED HISTORY: Reason for exam:->elidia Reason for Exam: ELIDIA Acuity: Acute Type of Exam: Initial FINDINGS: The right kidney measures 10.8 cm in length and the left kidney measures 10.0 cm in length. Kidneys demonstrate increased cortical echogenicity. There is mild-to-moderate bilateral hydronephrosis. No focal lesions. 1. Increased echogenicity of the renal cortices which is nonspecific but can be seen with medical renal disease. 2. Mild-to-moderate bilateral hydronephrosis. Assessment//Plan         · ELIDIA on CKD 3, due to dehydration and poor oral intake  · High anion gap with metabolic acidosis likely due to uremia  · Meet SIRS criteria  · Labs with severe ELIDIA, hyperkalemia Leukocytosis, likely due to severe dehydration or sepsis due to probable CAP  · On cefepime empirically, de-escalate as needed  · Metastatic breast CA  · Chronic blood loss anemia  · Monitor, transfuse for hemoglobin less than 7  · Thrombocytemia, possibly reactive  · Oncology on board  · Elevated troponin likely due to demand ischemia  · Severe protein energy malnutrition  · Nutritional supplements, dietician    Assessment:    Condition: In stable condition. Improving. Plan:   Encourage ambulation and out of bed and up to chair. Continue respiratory treatments. Advance diet as tolerated. Give fluids. (    DVT: lovenox or PO medications  GI prophylaxis: PO intake or medicated   [unfilled]).      HD today  Very pleasant [place] woman  Appreciate consultants    Electronically signed by Porsche Henriquez DO on 3/24/21 at 9:24 AM EDT

## 2021-03-24 NOTE — PROGRESS NOTES
Alea Moreno requires a bedside commode due to being confined to one level of the home, and is physically incapable of utilizing regular toilet facilities. Current body weight is Weight: 139 lb 11.2 oz (63.4 kg). DX: breast cancer with metastatic disease. Alea Moreno was evaluated today and a DME order was entered for a semi-electric hospital bed because she requires assistance for positioning needs not possible in an ordinary bed. Patient requires frequent and immediate positioning changes for relief of pain and care needs related to medical diagnoses. Patient needs variability of bed height requirements to perform patient transfers and for bathing, toileting, personal cares, grooming, hygiene, dressing upper body, dressing lower body and taking own medications. Current body Weight: 139 lb 11.2 oz (63.4 kg). The need for this equipment was discussed with the patient and she understands and is in agreement. DX: breast cancer with metastatic disease. Alea Moreno was evaluated today and a DME order was entered for a standard wheelchair because she requires this to successfully complete daily living tasks of eating, bathing, toileting, personal cares, ambulating and grooming. A standard manual wheelchair is necessary due to patient's impaired ambulation and mobility restrictions and would be unable to resolve these daily living tasks using a cane or walker. The patient is capable of using a standard wheelchair safely in their home and can maneuver within their home with adequate access. There is a caregiver available to provide necessary assistance. The need for this equipment was discussed with the patient and she understands, is in agreement, and has not expressed an unwillingness to use the wheelchair. DX: breast cancer with metastatic disease. Dr. Tammy Moore  [facility]  Internal Medicine Hospitalist  [facility]  3/24/2021 2:16 PM

## 2021-03-24 NOTE — CONSULTS
[facility] [address]   Consult Note  [facility] 1 2 3 4 5    Date: 3/24/2021   Patient: Nora Mcneal   : 1965   DOA: 3/22/2021   MRN: [id number]   ROOM#: [id number]     Reason for Consult: Bilateral hydronephrosis//pt with metastatic breast cancer  Requesting Physician:  Dr. Seth Ignacio  Collaborating Urologist on Call at time of admission: Dr. Elliott Less: Abnormal labs    History Obtained From:  patient, electronic medical record    HISTORY OF PRESENT ILLNESS:                The patient is a 50 y.o. female with significant past medical history of metastatic breast cancer who presented with abnormal labs (Cr 7.5 and K 6.2). She also sustained a fall at home prior to arrival to the hospital. Pt was seen 21 by Dr. Daniella Pederson in our office for left hydronephrosis and decision was made for CT urogram and office cystoscopy. Pt was unable to follow up. She has since developed bilateral moderate hydronephrosis with no stones noted.     Past Medical History:        Diagnosis Date    Anxiety     Asthma     last flare up 5 yrs ago    Breast lesion     \"have spot on left breast going to remove- at this time not sure if cancer or not\"    Essential hypertension 2020    Secondary cancer of bone ([address] 2020    Thyroid nodule     \"have thyroid nodules-      Past Surgical History:        Procedure Laterality Date    APPENDECTOMY  age 27   Nicole Mely [facility]. IR NONTUNNELED VASCULAR CATHETER  3/22/2021    IR NONTUNNELED VASCULAR CATHETER 3/22/2021 [facility] SPECIAL PROCEDURES    PORT SURGERY Right 3/15/2021    RIGHT PORT INSERTION performed by Ivy Rashid MD at [facility]  age 5    T & A     Current Medications:   Current Facility-Administered Medications: calcium gluconate 2000 mg in sodium chloride 100 mL, 2,000 mg, Intravenous, Once  cetirizine (ZYRTEC) tablet 10 mg, 10 mg, Oral, Daily  docusate sodium (COLACE) capsule 100 mg, 100 mg, Oral, Daily °F (37.1 °C)  24HR BLOOD PRESSURE RANGE:  Systolic (93MNG), QZC:558 , Min:129 , PMC:317   ; Diastolic (88RIR), SHC:65, Min:80, Max:101      General appearance: alert, appears stated age, cooperative and no distress  Head: Normocephalic, without obvious abnormality, atraumatic  Back: No CVA tenderness  Abdomen: Soft, non-tender, non-distended    DATA:    WBC:    Lab Results   Component Value Date    WBC 15.5 03/23/2021     Hemoglobin/Hematocrit:    Lab Results   Component Value Date    HGB 8.1 03/23/2021    HCT 25.0 03/23/2021     BMP:    Lab Results   Component Value Date     03/24/2021    K 4.9 03/24/2021    CL 98 03/24/2021    CO2 22 03/24/2021    BUN 59 03/24/2021    LABALBU 2.3 03/23/2021    CREATININE 3.7 03/24/2021    CALCIUM 6.9 03/24/2021    GFRAA 16 03/24/2021    LABGLOM 13 03/24/2021     Imaging:  Echocardiogram Complete 2d With Doppler With Color    Result Date: 3/10/2021  Transthoracic Echocardiography Report (TTE)  Demographics   Patient Name       Mei Odell     Date of Study       03/10/2021   Date of Birth      1965        Gender              Female   Age                50 year(s)        Race                   Patient Number     [id number]        Room Number   Visit Number       [id number]   Corporate ID       [id number]   Accession Number   [id number]        Sonographer         Pili Mccain RDCS, RDMS, RVT   Ordering Physician Hedy Caruso MD  Interpreting        Marti Mendoza                                       Physician           Yoly Obrien MD  Procedure Type of Study   TTE procedure:ECHOCARDIOGRAM COMPLETE 2D W DOPPLER W COLOR. Procedure Date Date: 03/10/2021 Start: 12:56 PM Study Location: [facility] - Echo Lab Technical Quality: Limited visualization due to poor acoustical window. Indications:Preop cardiac evaluation.  Patient Status: Routine Height: 61 inches Weight: 109 pounds BSA: 1.46 m2 BMI: 20.6 kg/m2 Rhythm: Sinus tachycardia HR: 129 bpm BP: 161/93 mmHg  Conclusions   Summary  Technically difficult study due to breast lesions/scarring. Left ventricular systolic function is normal.  Ejection fraction is visually estimated at 60%. No significant valvular disease noted. No evidence of any pericardial effusion. Left pleural effusion. Signature   ------------------------------------------------------------------  Electronically signed by Beth Mike MD  (Interpreting physician) on 03/10/2021 at 04:58 PM  ------------------------------------------------------------------   Findings   Left Ventricle  Left ventricular systolic function is normal.  Ejection fraction is visually estimated at 60%. Left Atrium  Essentially normal left atrium. Right Atrium  Essentially normal right atrium. Right Ventricle  Essentially normal right ventricle. Aortic Valve  Structurally normal aortic valve. Mitral Valve  Trace mitral regurgitation is present. Tricuspid Valve  Trace tricuspid regurgitation; normal RVSP. Pulmonic Valve  The pulmonic valve was not well visualized. Pericardial Effusion  No evidence of any pericardial effusion. Pleural Effusion  Left pleural effusion.   M-Mode/2D Measurements & Calculations   LV Diastolic Dimension:  LV Systolic Dimension:  LA Dimension: 2.3 cmAO Root  3.52 cm                  2.43 cm                 Dimension: 2.5 cmLA Area:  LV FS:31 %               LV Volume Diastolic: 41 20.3 cm2  LV PW Diastolic: 1.35 cm ml  LV PW Systolic: 1.54 cm  LV Volume Systolic: 16  Septum Diastolic: 8.41   ml  cm                       LV EDV/LV EDV Index: 41 RV Diastolic Dimension:  Septum Systolic: 2.91 cm AM/98 [id number] ESV/LV ESV   2.83 cm  CO: 6.91 l/min           Index: 16 ml/11 m2  CI: 4.73 l/m*m2          EF Calculated (A4C): 61 LA/Aorta: 0.92                           %  LV Area Diastolic: 88.2  EF Calculated (2D):     LA volume/Index: 23 ml  cm2                      59.7 % /63C3  LV Area Systolic: 76.7  cm2                      LV Length: 7.06 cm                            LVOT: 1.9 cm  Doppler Measurements & Calculations                           AV Peak Velocity: 134 cm/s   LVOT Peak Velocity: 110                          AV Peak Gradient: 7.18 mmHg  cm/s                          AV Mean Velocity: 101 cm/s   LVOT Mean Velocity:                          AV Mean Gradient: 4 mmHg     78.9 cm/s                          AV VTI: 19.7 cm              LVOT Peak Gradient: 5  MV E' Septal Velocity:  AV Area (Continuity):2.72    mmHgLVOT Mean Gradient:  7.13 cm/s               cm2                          3 mmHg  MV E' Lateral Velocity:  12.1 cm/s               LVOT VTI: 18.9 cm      Ct Abdomen Pelvis Wo Contrast Additional Contrast? None    Result Date: 3/23/2021  EXAMINATION: CT OF THE ABDOMEN AND PELVIS WITHOUT CONTRAST 3/23/2021 11:21 am TECHNIQUE: CT of the abdomen and pelvis was performed without the administration of intravenous contrast. Multiplanar reformatted images are provided for review. Dose modulation, iterative reconstruction, and/or weight based adjustment of the mA/kV was utilized to reduce the radiation dose to as low as reasonably achievable. COMPARISON: Renal ultrasound 03/23/2021. CT chest, abdomen, and pelvis 01/20/2021. HISTORY: ORDERING SYSTEM PROVIDED HISTORY: please evaluate the hydonephrosis on renal US TECHNOLOGIST PROVIDED HISTORY: Reason for exam:->please evaluate the hydonephrosis on renal US Additional Contrast?->None Is the patient pregnant?->No Reason for Exam: please evaluate the hydonephrosis on renal US Acuity: Acute Type of Exam: Initial FINDINGS: Lower Chest: Small right and large left pleural effusions. Mild right basilar atelectasis. Left lower lobe atelectasis. Small pericardial effusion. Organs: Lack of intravenous contrast limits evaluation of the solid organs, vascular structures, and bowel.   5 mm low-density lesion in the liver dome not significantly changed on axial image 25 too small to definitively characterize. The liver is otherwise unremarkable. The gallbladder is normal in appearance. No biliary ductal dilatation. The pancreas, spleen, and bilateral adrenal glands are unremarkable. Moderate bilateral hydroureteronephrosis. This is new on the right and not significantly changed on the left compared with 01/20/2021. No obstructing stone or mass identified. No focal renal abnormality. GI/Bowel: The colon is unremarkable. No evidence of acute appendicitis. No obstruction or wall thickening definitely seen. Pelvis: The urinary bladder is partially decompressed with questionable circumferential wall thickening. No urinary bladder stone identified. The uterus and right adnexa are unremarkable. The left ovary continues to increased in size now measuring approximately 3.2 x 1.9 cm on axial image 109 (previously 1.8 x 1.6 cm). Left external iliac node measuring 1.7 x 1.2 cm (previously 1.9 x 1.2 cm). Right common iliac node measuring 1.2 x 0.7 cm (previously 1.1 x 0.9 cm). Right external iliac node measuring 2.5 x 2.1 cm (previously 2.5 x 1.6 cm). Small volume of free fluid in the pelvis. Peritoneum/Retroperitoneum: The abdominal aorta is normal in appearance. Trace ascites. No drainable fluid collection or pneumoperitoneum. No retroperitoneal or mesenteric lymphadenopathy. Bones/Soft Tissues: Diffuse body wall edema. 4.1 x 2.4 cm calcified mass within the proximal right adductor musculature (previously 3.7 x 2 cm). New somewhat ill-defined partially calcified mass within the right gluteus medius muscle measuring 3.5 x 2.6 cm. Extensive sclerotic metastases without significant change. Mild chronic T9 and T10 pathologic compression fractures without significant change. No acute osseous abnormality.      1. Moderate bilateral hydroureteronephrosis new on the right and not significantly changed on the left compared with 01/20/2021 of uncertain etiology. No obstructing stone or mass. Partially decompressed urinary bladder with circumferential wall thickening not excluded. 2. Fluid overload with small right and large left pleural effusions, diffuse body wall edema, small volume of ascites, and small pericardial effusion. 3. 6 bilateral iliac chain lymphadenopathy not significantly changed from 01/20/2021. Increasing size of the left ovary of uncertain etiology with an underlying neoplasm not excluded. Unchanged diffuse sclerotic metastases. 4. Mildly increased size of a calcified mass within the right proximal adductor musculature and new mass within the right gluteus medius muscle suspicious for metastatic disease. Xr Chest Portable    Result Date: 3/22/2021  EXAMINATION: ONE XRAY VIEW OF THE CHEST 3/22/2021 9:09 pm COMPARISON: None. HISTORY: ORDERING SYSTEM PROVIDED HISTORY: post temp cath placement TECHNOLOGIST PROVIDED HISTORY: Reason for exam:->post temp cath placement Reason for Exam: post temp cath placement Acuity: Acute Type of Exam: Initial Additional signs and symptoms: na Relevant Medical/Surgical History: breast cancer, bone cancer FINDINGS: The lungs are clear. The mediastinum and cardiac silhouette are unremarkable. There is no evidence of pneumothorax. There is been interval placement of a right internal jugular non tunneled hemodialysis catheter, tip projects of the inferior SVC. Significant left basilar pleuroparenchymal disease unchanged. New right internal jugular non tunneled hemodialysis catheter with no evidence of pneumothorax.      Xr Chest Portable    Result Date: 3/22/2021  EXAMINATION: ONE XRAY VIEW OF THE CHEST 3/22/2021 3:02 pm COMPARISON: 03/15/2021 HISTORY: ORDERING SYSTEM PROVIDED HISTORY: Chest pain TECHNOLOGIST PROVIDED HISTORY: Reason for exam:->Chest pain Reason for Exam: chest pain Acuity: Acute Type of Exam: Initial FINDINGS: Right chest port tip is been retracted and now projects over the superior cavoatrial junction. Increased moderate left pleural effusion with left lung base opacity. Right lung clear. No pneumothorax or right pleural effusion. Cardiac and mediastinal contours stable. No acute osseous abnormality. Increased moderate left pleural effusion with left lung opacity that may reflect atelectasis or pneumonia if the patient has fever or leukocytosis. Xr Chest Portable    Result Date: 3/15/2021  EXAMINATION: ONE XRAY VIEW OF THE CHEST 3/15/2021 7:33 am COMPARISON: CT scan of the chest 01/20/2021 HISTORY: ORDERING SYSTEM PROVIDED HISTORY: port TECHNOLOGIST PROVIDED HISTORY: Reason for exam:->port Reason for Exam: port placement in OR Acuity: Acute Type of Exam: Initial FINDINGS: Right-sided central venous catheter tip projects inferior to the right atrium at the IVC. Opacity in the left lung base. Hazy opacity in the bilateral upper lungs. Cardiac silhouette is poorly assessed. The bones are grossly unchanged. Right-sided central venous catheter tip projects inferior to the right atrium at the IVC. Bilateral opacities could be due to atelectasis or other airspace opacity. Consider repeat upright chest x-ray in PA and lateral views. Ir Nontunneled Vascular Catheter > 5 Years    Result Date: 3/23/2021  PROCEDURE: ULTRASOUND GUIDED VASCULAR ACCESS. Bedside PLACEMENT OF A NON-TUNNELED CATHETER. The procedure was performed by Dr. Miguelito Mueller, the IR on call physician. 3/22/2021. HISTORY: ORDERING SYSTEM PROVIDED HISTORY: temp cath TECHNOLOGIST PROVIDED HISTORY: Reason for exam:->temp cath Is the patient pregnant?->No SEDATION: Local anesthesia only. FLUOROSCOPY DOSE AND TYPE OR TIME AND EXPOSURES: None. TECHNIQUE: Informed consent was obtained after a detailed explanation of the procedure including risks, benefits, and alternatives. Universal protocol was observed. The right neck and chest were prepped and draped in sterile fashion using maximum sterile barrier technique.  Local anesthesia was achieved with lidocaine. A micropuncture needle was used to access the right internal jugular vein using ultrasound guidance. An ultrasound image demonstrating patency of the vein with needle tip located within it. An image was obtained and stored in PACs. A 0.035 guidewire was used to place a temporal dialysis catheter after fascial tract dilation. The catheter flushed easily and there was a good blood return. The catheter was sutured to the skin. The catheter was locked with heparinized saline. The patient tolerated the procedure well and there were no immediate complications. FINDINGS: Chest x ray post procedure demonstrates the tip of the catheter in the lower SVC. The catheter is ready to use. Successful ultrasound  guided non-tunneled dialysis catheter placement. The catheter is ready to use. Us Retroperitoneal Limited    Result Date: 3/23/2021  EXAMINATION: ULTRASOUND OF THE KIDNEYS 3/23/2021 5:43 am COMPARISON: CT abdomen/pelvis performed January 20, 2021. HISTORY: ORDERING SYSTEM PROVIDED HISTORY: elidia TECHNOLOGIST PROVIDED HISTORY: Reason for exam:->elidia Reason for Exam: ELIDIA Acuity: Acute Type of Exam: Initial FINDINGS: The right kidney measures 10.8 cm in length and the left kidney measures 10.0 cm in length. Kidneys demonstrate increased cortical echogenicity. There is mild-to-moderate bilateral hydronephrosis. No focal lesions. 1. Increased echogenicity of the renal cortices which is nonspecific but can be seen with medical renal disease. 2. Mild-to-moderate bilateral hydronephrosis. Assessment & Plan:      Willie Ramires is a 50y.o. year old female admitted 3/22/2021 for ELIDIA. H/o metastatic breast cancer. Known to Dr. Joy Lundberg.    1) Bilateral Hydronephrosis   CT a/p 3/23/21: Moderate bilateral hydroureteronephrosis new on the right and not significantly changed on the left compared with 71/15/5148 of uncertain etiology. No obstructing stone or mass.   Partially decompressed urinary bladder with circumferential wall thickening not excluded. RALPH 3/23/21: Increased echogenicity of the renal cortices which is nonspecific but can be seen with medical renal disease. Mild-to-moderate bilateral hydronephrosis. Pt seen in our office by Dr. Moshe Jones 2/11/21 and plan was for cystoscopy and CT urogram   Rapid Covid test pending   NPO after midnight   Plan for cystoscopy, bilateral RGPG, and bilateral ureteral stent placement tomorrow at 5pm with Dr. Eloise Nunez  2) ELIDIA   Cr 3.5, improved from 7.5 upon admission. Cr 1.3 in Feb 2021. Undergoing HD   Nephrology on board    Patient seen and examined, chart reviewed.      Electronically signed by Leonard Perez PA-C on 3/24/2021 at 7:29 AM

## 2021-03-24 NOTE — PROGRESS NOTES
She felt better today. Denied any acute pain. She is on heparin for DVT prophylaxis. CT abdomen pelvis on March 22, 2021 showed  1. Moderate bilateral hydroureteronephrosis new on the right and not   significantly changed on the left compared with 09/30/1141 of uncertain   etiology.  No obstructing stone or mass.  Partially decompressed urinary   bladder with circumferential wall thickening not excluded. 2. Fluid overload with small right and large left pleural effusions, diffuse   body wall edema, small volume of ascites, and small pericardial effusion. 3. 6 bilateral iliac chain lymphadenopathy not significantly changed from   01/20/2021.  Increasing size of the left ovary of uncertain etiology with an   underlying neoplasm not excluded.  Unchanged diffuse sclerotic metastases. 4. Mildly increased size of a calcified mass within the right proximal   adductor musculature and new mass within the right gluteus medius muscle   suspicious for metastatic disease. Creatinine and potassium improved. I ordered physical therapy. Urologist has been consulted. She consider to resume chemotherapy weekly Taxol as an outpatient. Denies any nausea, vomiting or diarrhea. No fever or chills. Temperature 97.6, pulse 125, respiratory 24, blood pressure 148/92. She is awake, alert and in no acute distress. Supple, no JVD, pupils are reactive. Positive pallor. S1-S2 tachycardic. Clear to auscultation anteriorly. Soft, bowel sounds present. Nontender nondistended. Left upper extremity edema. Cranial nerves II through XII grossly intact. WBC 15.5, hemoglobin 8.1, platelets 960. Assessment and plan  She has metastatic breast cancer. She will consider to resume palliative Taxol as an outpatient. Due to persistent tachycardia, I will consult cardiologist.  Urologist has been consulted for bilateral hydronephrosis. She is on DVT prophylaxis with heparin. Anemia is related to underlying malignancy.   I will continue to follow her closely.

## 2021-03-24 NOTE — PROGRESS NOTES
Occupational Therapy    [facility] ACUTE CARE OCCUPATIONAL THERAPY EVALUATION  Richie Contreras, 1965, 3120/3120-A, 3/24/2021    History  [place]:  The primary encounter diagnosis was ELIDIA (acute kidney injury) ([facility] [address] Diagnoses of Hyperkalemia, Leukocytosis, unspecified type, and Metastatic breast cancer [facility]) were also pertinent to this visit. Patient  has a past medical history of Anxiety, Asthma, Breast lesion, Essential hypertension, Secondary cancer of bone ([address] and Thyroid nodule. Patient  has a past surgical history that includes  section (); Appendectomy (age 27); Tonsillectomy (age 5); Port Surgery (Right, 3/15/2021); and IR NONTUNNELED VASCULAR CATHETER > 5 YEARS (3/22/2021). Subjective:  Patient states:  \"I'm tired after dialysis but I wan't to get up\"  Pain:  Denies. Communication with other providers:  Handoff to RN  Restrictions: General Precautions, Fall Risk    Home Setup/Prior level of function  Social/Functional History  Lives With: Daughter  Type of Home: House  Home Layout: One level, Laundry in basement  Home Access: Stairs to enter without rails  Entrance Stairs - Number of Steps: 2  Bathroom Shower/Tub: Tub/Shower unit, Shower chair without back  Bathroom Toilet: Handicap height  Bathroom Accessibility: Accessible  Home Equipment: (been using friend's 4WW)  ADL Assistance: Needs assistance(Supervision)  Homemaking Assistance: Needs assistance  Ambulation Assistance: Independent  Transfer Assistance: Independent  Active : No  Occupation: Retired  Additional Comments: Pt reports 1 fall in last 6 months stating she felt woozy.  Pt stated that last few weeks decreasing independence due to increased weakness    Examination of body systems (includes body structures/functions, activity/participation limitations):  · Observation:  Supine in bed upon arrival, agreeable ot therapy, boyfriend in room  · Vision:  Glasses  · Hearing:  [employer] [facility]  · Cardiopulmonary:  No 02 needs      Body Systems and functions:  · ROM R/L:  ~110 degrees bilateral shoulder flexion, distal WFL    · Strength R/L:  LUE: 3/5; RUE: 3/5,   · Sensation: WFL  · Tone: Normal  · Coordination: WFL  · Perception: WNL    Activities of Daily Living (ADLs):  · Feeding: Independent  · Grooming: CGA (washing face w/ warm washcloth)  · UB bathing: SBA  · LB bathing: modA  · UB dressing: CGA  · LB dressing: maxA (Donning socks sitting EOB)  · Toileting: maxA    Cognitive and Psychosocial Functioning:  · Overall cognitive status: WNL  · Affect: Normal        Mobility:  · Supine to sit:  modA  · Transfers: DNT pt became dizzy sitting EOB   · Sitting balance:  CGA HR increased ~145 continuing to rise w/ pt reporting increased dizziness, returned to supine. · Standing balance:  DNT. · Toilet/Shower Transfers: DNT  · Sit to supine: modA  · Scooting to HOB: MaxA    · Rolling L/R: SBA           AM-[facility] Daily Activity Inpatient   How much help for putting on and taking off regular lower body clothing?: Total  How much help for Bathing?: A Lot  How much help for Toileting?: Total  How much help for putting on and taking off regular upper body clothing?: A Little  How much help for taking care of personal grooming?: A Little  How much help for eating meals?: None  AM-[facility] Inpatient Daily Activity Raw Score: 14  AM-[facility] Inpatient ADL T-Scale Score : 33.39  ADL Inpatient CMS 0-100% Score: 59.67  ADL Inpatient CMS G-Code Modifier : CK    Treatment:  Self Care Training:   Cues were given for safety, sequence, UE/LE placement, visual cues, and balance. Activities performed today included LB dressing, grooming    Therapeutic Activity Training:   Therapeutic activity training was instructed today. Cues were given for safety, sequence, UE/LE placement, awareness, and balance.     Activities performed today included bed mobility training, sup-sit, sitting balance, sit to supine      Safety: patient left in bed with bed alarm, call light within reach, RN notified, gait belt used. Assessment:  Pt is a 49 yo female admitted from home for ELIDIA. Pt at baseline is Supervision for ADLs needs assistance for high level IADLs and is Independent for functional transfers/mobility w/ AD. Pt currently presents w/ deficits in ADL and high level IADL independence, functional activity tolerance, dynamic sitting and standing balance and tolerance and functional transfers, BUE strength/ROM. Pt would benefit from continued acute care OT services w/ discharge to continue to assess pending progress due to increased HR limiting pt's ability to move. Complexity: Moderate  Prognosis: Good, no significant barriers to participation at this time.    Plan  Times per week: 3x+  Times per day: Daily  Current Treatment Recommendations: Strengthening, Endurance Training, Patient/Caregiver Education & Training, Self-Care / ADL, Equipment Evaluation, Education, & procurement, Balance Training, Pain Management, Home Management Training, ROM, Functional Mobility Training, Safety Education & Training, Positioning     Equipment: defer    Goals:  Pt goal: go home  Time Frame for STGs: discharge  Goal 1: Pt will perform UE ADLs Independent  Goal 2: Pt will perform LE ADLs Supervision  Goal 3: Pt will perform toileting Supervision  Goal 4: Pt will perform functional transfer w/ AD Supervision  Goal 5: Pt will perform functional mobility w/ AD Supervision  Goal 6: Pt will perform therex/theract in order to increase functional activity tolerance and dynamic standing balance    Treatment plan:  Pt will perform functional task in sit reaching in all 3 planes in order to increase dynamic sitting balance and functional activity tolerance    Recommendations for NURSING activity: [place] E[place]  Time:   Time in: 1215  Time out: 1235  Timed treatment minutes: 10 minutes  Total time: 20 minutes    Electronically signed by:    Missy HERNANDEZ/L [id number]  1:36 PM,3/24/2021

## 2021-03-24 NOTE — PROGRESS NOTES
Nephrology  Dialysis Note        [address] [facility] [address]  Phone: [phone]  Office Hours: 8:30AM - 4:30PM  Monday - Friday          PROCEDURE:  Patient seen during hemodialysis      PHYSICIAN:  HEYDI      INDICATION:  Acute tubular necrosis      RX:  See dialysis flowsheet for specifics on access, blood flow rate, dialysate baths, duration of dialysis, anticoagulation and other technical information.       COMMENTS:  TOLERATING HD  SET TO LOSE 1KG  UROLOGY WAS CONSULTED FOR THE BILATERAL HN  UNCLEAR WHY NO ERNST IS PLACED    Electronically signed by Moreno Caicedo DO on 3/24/2021 at 9:28 AM    [facility]  MD Simran Paez DO  [facility] [address],  [address]  [place], [place] [address]  PHONE: [phone]  FAX: [phone]

## 2021-03-24 NOTE — CARE COORDINATION
.CM met with pt and daughter for d/c planning. Introduced self and updated white board. Permission received to discuss d/c planning with daughter at bedside. Pt lives with her daughter and daughter's GF. They assist pt with her ADL's. Family provides her transportation. She has a PCP, has insurance, and is able to afford her medication. Pt has a  at the [facility] that is helping her to apply for disability SS. Pt has a walker and a shower bench. She is requesting a hospital bed, BSC, and a w/c. Notified Dr Adelia Yanez. [facility] offered and pt is agreeable. Pt does not have a [facility] preference. Will attempt to find a [facility] agency that will accept her insurance. [facility] does not accept her insurance. Referral made to [facility]. They will check pt's insurance and will notify CM tomorrow if they are able to accept or not. Pt denies any other d/c needs at this time. D/c plan is home with daughter and HHC. Working on finding [facility] to accept [employer].   TE

## 2021-03-24 NOTE — ANESTHESIA PRE PROCEDURE
Department of Anesthesiology  Preprocedure Note       Name:  Richie Contreras   Age:  50 y.o.  :  1965                                          MRN:  [id number]         Date:  3/24/2021      Surgeon: Liss Easton):  Negin Acevedo MD    Procedure: Procedure(s):  BILATERAL CYSTOSCOPY RETROGRADE PYELOGRAM STENT INSERTION    Medications prior to admission:   Prior to Admission medications    Medication Sig Start Date End Date Taking? Authorizing Provider   amLODIPine (NORVASC) 5 MG tablet TAKE TWO TABLETS BY MOUTH DAILY 21  Yes Price Phillips MD   apixaban (ELIQUIS) 5 MG TABS tablet Take 1 tablet by mouth 2 times daily 21  Yes Price Phillips MD   cetirizine (ZYRTEC) 10 MG tablet Take 10 mg by mouth daily   Yes Historical Provider, MD   ondansetron (ZOFRAN) 8 MG tablet 1 tab po q 8 hours PRN for chemo induced nausea/vomting 3/16/21   Shepard Meckel, APRN - CNP   OLANZapine (ZYPREXA) 2.5 MG tablet One tablet HS for four nights starting the night before chemotherapy 3/16/21   Shepard Meckel, APRN - CNP   dexamethasone (DECADRON) 4 MG tablet 1 tablet PO daily for 4 days starting the day after chemotherapy. 3/16/21   Shepard Meckel, APRN - CNP   docusate sodium (COLACE) 100 MG capsule Take 1 capsule by mouth daily as needed for Constipation 3/16/21   Shepard Meckel, APRN - CNP   HYDROcodone-acetaminophen (NORCO) 5-325 MG per tablet Take 1 tablet by mouth every 6 hours as needed for Pain for up to 14 days.  3/16/21 3/30/21  Shepard Meckel, APRN - CNP   acetaminophen (TYLENOL) 500 MG tablet Take 500 mg by mouth every 6 hours as needed for Pain    Historical Provider, MD   prochlorperazine (COMPAZINE) 10 MG tablet Take 1 tablet by mouth every 8 hours as needed (nausea) 21   Price Phillips MD   promethazine (PHENERGAN) 25 MG tablet TAKE ONE TABLET BY MOUTH EVERY 6 HOURS AS NEEDED FOR NAUSEA 20   Price Phillips MD   AFINITOR 10 MG TABS chemo tablet Take 1 tablet by mouth daily 20   Price Phillips MD exemestane (AROMASIN) 25 MG tablet Take 1 tablet by mouth daily 9/15/20 10/15/20  Price Phillips MD       Current medications:    Current Facility-Administered Medications   Medication Dose Route Frequency Provider Last Rate Last Admin    calcium gluconate 2000 mg in sodium chloride 100 mL  2,000 mg Intravenous Once [place], APRN - CNP        metoprolol succinate (TOPROL XL) extended release tablet 50 mg  50 mg Oral Daily Arun Villalobos MD   50 mg at 03/24/21 1352    cetirizine (ZYRTEC) tablet 10 mg  10 mg Oral Daily Fabricio Hager MD   10 mg at 03/24/21 1352    docusate sodium (COLACE) capsule 100 mg  100 mg Oral Daily PRN Fabricio Hager MD        HYDROcodone-acetaminophen (NORCO) 5-325 MG per tablet 1 tablet  1 tablet Oral Q6H PRN Fabricio Hager MD        sodium chloride flush 0.9 % injection 10 mL  10 mL Intravenous PRN Fabricio Hager MD        heparin (porcine) injection 5,000 Units  5,000 Units Subcutaneous 3 times per day Fabricio Hager MD   5,000 Units at 03/24/21 1352    promethazine (PHENERGAN) tablet 12.5 mg  12.5 mg Oral Q6H PRN Fabricio Hager MD        Or    ondansetron (ZOFRAN) injection 4 mg  4 mg Intravenous Q6H PRN Fabricio Hager MD        acetaminophen (TYLENOL) tablet 650 mg  650 mg Oral Q6H PRN Fabricio Hager MD        Or   [place] acetaminophen (TYLENOL) suppository 650 mg  650 mg Rectal Q6H PRN Fabricio Hager MD        polyethylene glycol (GLYCOLAX) packet 17 g  17 g Oral Daily PRN Fabricio Hager MD        glucose (GLUTOSE) 40 % oral gel 15 g  15 g Oral PRN Fabricio Hager MD        dextrose 50 % IV solution  12.5 g Intravenous PRN Fbaricio Hager MD        glucagon (rDNA) injection 1 mg  1 mg Intramuscular PRN Fabricio Hager MD        dextrose 5 % solution  100 mL/hr Intravenous PRN Fabricio Hager MD        cefepime (MAXIPIME) 2000 mg IVPB minibag  2,000 mg Intravenous Q24H Fabricio Hager MD   Stopped at 03/24/21 0202    glucose (GLUTOSE) 40 % oral gel 15 g  15 g Oral PRN Fabricio Hager MD  dextrose 50 % IV solution  12.5 g Intravenous PRN Otilio Bingham MD        glucagon (rDNA) injection 1 mg  1 mg Intramuscular PRN Otilio Bingham MD        dextrose 5 % solution  100 mL/hr Intravenous PRN Otilio Bingham MD           Allergies:     Allergies   Allergen Reactions    Latex Anaphylaxis     \"have trouble with banana's kiwi, avocados- Have trouble breathing with these and trouble breathig - get asthmatic from latex gloves, bandage, anything latex     Banana Anaphylaxis    Cinnamon Anaphylaxis       Problem List:    Patient Active Problem List   Diagnosis Code    Left breast mass N63.20    Secondary cancer of bone ([facility] [address] C79.51    Essential hypertension I10    Malignant neoplasm of central portion of left female breast ([address] C50.112    Metastatic breast cancer ([facility] [address] C50.919    Dehydration E86.0    Iron deficiency anemia D50.9    Malabsorption K90.9    Left ovarian enlargement N83.8    ELIDIA (acute kidney injury) ([facility] [address] N17.9    Hyperkalemia S29.1    Metabolic acidosis N99.9    Hyponatremia E87.1       Past Medical History:        Diagnosis Date    Anxiety     Asthma     last flare up 5 yrs ago    Breast lesion     \"have spot on left breast going to remove- at this time not sure if cancer or not\"    Essential hypertension 4/1/2020    Secondary cancer of bone ([facility] [address] 4/1/2020    Thyroid nodule     \"have thyroid nodules-        Past Surgical History:        Procedure Laterality Date    APPENDECTOMY  age 27   [employer] [address] IR NONTUNNELED VASCULAR CATHETER  3/22/2021    IR NONTUNNELED VASCULAR CATHETER 3/22/2021 [facility] SPECIAL PROCEDURES    PORT SURGERY Right 3/15/2021    RIGHT PORT INSERTION performed by Hi Barkley MD at [address] [facility]  age 5    T & A       Social History:    Social History     Tobacco Use    Smoking status: Never Smoker    Smokeless tobacco: Never Used   Substance Use Topics    Alcohol use: Yes     Comment: average\"one time per week\"                                Counseling given: Not Answered      Vital Signs (Current):   Vitals:    03/24/21 1030 03/24/21 1032 03/24/21 1045 03/24/21 1415   BP: (!) 141/96 (!) 141/96 (!) 173/96 (!) 155/87   Pulse: 130 129 133 120   Resp: 18 21 17 16   Temp:  36.6 °C (97.9 °F) 37.1 °C (98.8 °F) 36.6 °C (97.9 °F)   TempSrc:   Oral Oral   SpO2:   95% 94%   Weight:  139 lb 11.2 oz (63.4 kg)     Height:                                                  BP Readings from Last 3 Encounters:   03/24/21 (!) 155/87   03/22/21 (!) 141/85   03/22/21 (!) 141/85       NPO Status:                                                                                 BMI:   Wt Readings from Last 3 Encounters:   03/24/21 139 lb 11.2 oz (63.4 kg)   03/22/21 122 lb 9.6 oz (55.6 kg)   03/22/21 122 lb (55.3 kg)     Body mass index is 26.4 kg/m².     CBC:   Lab Results   Component Value Date    WBC 15.5 03/23/2021    RBC 3.29 03/23/2021    HGB 8.1 03/23/2021    HCT 25.0 03/23/2021    MCV 76.0 03/23/2021    RDW 25.2 03/23/2021     03/23/2021       CMP:   Lab Results   Component Value Date     03/24/2021    K 3.5 03/24/2021    CL 98 03/24/2021    CO2 22 03/24/2021    BUN 59 03/24/2021    CREATININE 3.7 03/24/2021    GFRAA 16 03/24/2021    LABGLOM 13 03/24/2021    GLUCOSE 121 03/24/2021    PROT 7.1 03/22/2021    CALCIUM 6.9 03/24/2021    BILITOT 0.3 03/22/2021    ALKPHOS 120 03/22/2021    AST 48 03/22/2021    ALT 8 03/22/2021       POC Tests:   Recent Labs     03/24/21  1109   POCGLU 113*       Coags: No results found for: PROTIME, INR, APTT    HCG (If Applicable): No results found for: PREGTESTUR, PREGSERUM, HCG, HCGQUANT     ABGs: No results found for: PHART, PO2ART, DFS9JIO, OTX3ECN, BEART, R1GHGABN     Type & Screen (If Applicable):  No results found for: LABABO, LABRH    Drug/Infectious Status (If Applicable):  No results found for: HIV, HEPCAB    COVID-19 Screening (If Applicable):   Lab Results   Component Value Date COVID19 NOT DETECTED 03/11/2021           Anesthesia Evaluation  Patient summary reviewed no history of anesthetic complications:   Airway: Mallampati: II  TM distance: >3 FB   Neck ROM: full  Mouth opening: > = 3 FB Dental: normal exam         Pulmonary: breath sounds clear to auscultation  (+) asthma:                            Cardiovascular:  Exercise tolerance: good (>4 METS),   (+) hypertension:,         Rhythm: regular  Rate: normal           Beta Blocker:  Order written      ROS comment: Echo 3/24/2021:   Summary   This is a limited echocardiogram.   Left ventricular systolic function is normal.   Ejection fraction is visually estimated at 55-60%. Mild pericardial effusion   Pleural effusion present. Neuro/Psych:   Negative Neuro/Psych ROS  (+) depression/anxiety             GI/Hepatic/Renal:   (+) GERD: well controlled, renal disease: dialysis and ARF,          ROS comment: ELIDIA on CKD 3, due to dehydration and poor oral intake. Endo/Other:    (+) blood dyscrasia: anemia and anticoagulation therapy:., electrolyte abnormalities, malignancy/cancer (metastatic breast CA w/ mets to bone). ROS comment: Metastatic breast cancer - S/p chemotherapy, hematology/oncology follow-up Abdominal:           Vascular:   + DVT, .        ROS comment:  Prior history of DVT noted, patient previously was on anticoagulation but was stopped. Chest CT pending. Anesthesia Plan      MAC and general     ASA 3       Induction: intravenous. MIPS: Prophylactic antiemetics administered. Anesthetic plan and risks discussed with patient. Plan discussed with CRNA. CINDY Martinez - CRNA   3/24/2021         Pre Anesthesia Assessment complete. Chart reviewed on 3/24/2021     Pre Anesthesia Evaluation complete. Anesthesia plan, risks, benefits, alternatives, and personnel discussed with patient and/or legal guardian.  Patient and/or legal guardian verbalized an

## 2021-03-24 NOTE — PROGRESS NOTES
Patient tolerated a 3 hour HD treatment well, 1.0L of fluid was removed. Right non-tunneled CVC was access with no complications. Post treatment lines were flushed, heparinized, and capped x2. Post treatment potassium drawn and set to lab. Patient Name: Saman Taylor  Patient : 1965  MRN: [id number]     Acct: [de-identified]  Date of Admission: 3/22/2021  Room/Bed: 3120/3120-A  Code Status:  Full Code  Allergies: Allergies   Allergen Reactions    Latex Anaphylaxis     \"have trouble with banana's kiwi, avocados- Have trouble breathing with these and trouble breathig - get asthmatic from latex gloves, bandage, anything latex     Banana Anaphylaxis    Cinnamon Anaphylaxis     Diagnosis:    Patient Active Problem List   Diagnosis    Left breast mass    [facility] [address]    Essential hypertension    Malignant neoplasm of central portion of left female breast ([facility] [address]    Metastatic breast cancer (HCC)    Dehydration    Iron deficiency anemia    Malabsorption    Left ovarian enlargement    ELIDIA (acute kidney injury) ([facility] [address]    Hyperkalemia    Metabolic acidosis    Hyponatremia         Treatment:  Hemodilaysis 2:1  Priority: Routine  Location: Acute Room    Diabetic: No  NPO: No  Isolation Precautions: Dialysis     Consent for Treatment Verified: Yes  Blood Consent Verified: Not Applicable     Safety Verified: Identify (I), Consent (C), Equipment (E), HepB Status (B), Orders Complete (O), Access Verified (A) and Timeliness (T)  Time out performed prior to access at 0720 hours. Report Received from Primary RN at 0705 hours. Primary RN (First Initial, Last Name, Title):  Eleuterio Chaves RN  Incapacitated Nurse Education Completed: Not Applicable     HBsAg ONLY:  Date Drawn: 2021       Results: Negative  HBsAb:  Date Drawn:  2021       Results: Immune >10    Order  Dialysis Bath  K+ (Potassium): 3  Ca+ (Calcium): (3.5)  Na+ (Sodium): 138  HCO3 (Bicarb): 30  Citrate Bath  Ca+ 24.9* 26.5* 25.0*   * 666* 612*                                                                  Recent Labs     03/22/21  1353 03/22/21  1353 03/23/21  0418 03/23/21  0418 03/23/21  1112 03/23/21  1653 03/24/21  0505   *  --  134*  --   --   --  136   K 6.6*   < > 5.5*   < > 3.9 4.4 4.9   CL 92*  --  97*  --   --   --  98*   CO2 14*  --  16*  --   --   --  22   *  --  79*  --   --   --  59*   CREATININE 7.5*  --  5.1*  --   --   --  3.7*   GLUCOSE 139*  --  128*  --   --   --  121*    < > = values in this interval not displayed. IV Drips and Rate/Dose   dextrose      dextrose        Safety - Before each treatment:   Dialysis Machine No.: [id number]  RO Machine No.: [id number]  Dialyzer Lot No.: [id number]  RO Machine Log Sheet Completed: Yes  Machine Alarm Self Test: Completed; Passed (03/24/21 0730)  Machine Autotest: Completed, Passed  Air Foam Detector: Tested, Proper Function  Extracorporeal Circuit Tested for Integrity: Yes  Machine Conductivity: 14.0  Manual Conductivity: 14     Bicarbonate Concentrate Lot No.: [id number]  Acid Concentrate Lot No.: [id number]  Manual Ph: 7.4  Bleach Test (Neg): Yes  Bath Temperature: 96.8 °F (36 °C)  Tubing Lot#: [id number]  Conductivity Meter Serial #: [id number]  All Connections Secure?: Yes  Venous Parameters Set?: Yes  Arterial Parameters Set?: Yes  Saline Line Double Clamped?: Yes  Air Foam Detector Engaged?: Yes  Machine Functioning Alarm Free?  Yes  Prime Given: 200ml    Chlorine Testing - Before each treatment and every 4 hours:   Treatment  Time On: 0730  Time Off: 1032  Treatment Goal: 3 hour, 1.5L  Weight: 139 lb 11.2 oz (63.4 kg) (03/24/21 1032)  1st check: less than 0.1 ppm at: 0630 hours  2nd check: less than 0.1 ppm at: 0930 hours  3rd check: Not Applicable  (if greater than 0.1 ppm, then check every 30 minutes from secondary)    Access Flows and Pressures  Patient Vitals for the past 8 hrs:   Blood Flow Rate (ml/min) Ultrafiltration Rate (ml/hr) Ultrafiltration Total Arterial Pressure (mmHg) Venous Pressure (mmHg) TMP Hemodialysis Conductivity DFR Comments Access Visible   03/24/21 0730 350 ml/min 500 ml/hr 0 ml -110 mmHg 100 40 14 800 tx started, prime given.  lines seucre Yes   03/24/21 0745 350 ml/min 500 ml/hr 150 ml -110 mmHg 110 40 14 800 awake and talking  Yes   03/24/21 0800 350 ml/min 500 ml/hr 270 ml -110 mmHg 110 50 -- 800 resting quietly  Yes   03/24/21 0815 350 ml/min 500 ml/hr 406 ml -120 mmHg 120 40 -- 800 alert and awake Yes   03/24/21 0830 350 ml/min 500 ml/hr 536 ml -120 mmHg 120 40 14.1 800 Dr. Mary Romero at bedside Yes   03/24/21 0845 350 ml/min 500 ml/hr 657 ml -120 mmHg 120 40 14.2 800 resting with eyes closed  Yes   03/24/21 0900 350 ml/min 500 ml/hr 763 ml -120 mmHg 120 40 14.1 800 awake and alert Yes   03/24/21 0915 350 ml/min 500 ml/hr 925 ml -120 mmHg 120 40 -- 800 resting quietly Yes   03/24/21 0930 350 ml/min 500 ml/hr 1031 ml -120 mmHg 120 40 14 800 awake and alert Yes   03/24/21 0945 350 ml/min 500 ml/hr 1181 ml -120 mmHg 120 50 14 800 resting with eyes closed  Yes   03/24/21 1000 350 ml/min 500 ml/hr 1277 ml -120 mmHg 120 30 -- 800 no change Yes   03/24/21 1015 350 ml/min 500 ml/hr 1374 ml -120 mmHg 120 40 14 800 resting with eyes closed  Yes   03/24/21 1030 350 ml/min 500 ml/hr 1494 ml -120 mmHg 120 40 14 800 awake and alert Yes   03/24/21 1032 -- -- 1500 ml -- -- -- -- -- tx ended, rinsebakck given  Yes     Vital Signs  Patient Vitals for the past 8 hrs:   BP Temp Pulse Resp SpO2 Weight Weight Method Percent Weight Change   03/24/21 0345 (!) 160/88 97.6 °F (36.4 °C) 128 26 94 % -- -- --   03/24/21 0730 (!) 155/87 97.9 °F (36.6 °C) 122 23 -- 143 lb 4.8 oz (65 kg) -- -6.46   03/24/21 0745 131/83 -- 121 12 -- -- -- --   03/24/21 0800 133/88 -- 125 25 -- -- -- --   03/24/21 0815 (!) 150/88 -- 126 20 -- -- -- --   03/24/21 0830 (!) 132/102 -- 129 25 -- -- -- --   03/24/21 0845 (!) 150/87 -- 128 8 -- -- -- --   03/24/21 0900 (!) 144/97 -- 129 23 -- -- -- --   03/24/21 0915 (!) 146/98 -- 131 30 -- -- -- --   03/24/21 0930 (!) 141/90 -- 130 (!) 7 -- -- -- --   03/24/21 0945 (!) 151/87 -- 133 21 -- -- -- --   03/24/21 1000 139/87 -- 128 20 -- -- -- --   03/24/21 1015 (!) 147/96 -- 129 21 -- -- -- --   03/24/21 1030 (!) 141/96 -- 130 18 -- -- -- --   03/24/21 1032 (!) 141/96 97.9 °F (36.6 °C) 129 21 -- 139 lb 11.2 oz (63.4 kg) Bed scale -2.51     Post-Dialysis  Arterial Catheter Locking Solution: Heparin (1000units:1ml)    Volume (ml): 1.3  Venous Catheter Locking Solution: Heparin (1000units:1ml)    Volume (ml): 1.3  Post-Treatment Procedures: Blood returned, Catheter capped, clamped and heparinized x 2 ports  Machine Disinfection Process: Exterior Machine Disinfection  Rinseback Volume (ml): 300 ml  Total Liters Processed (l/min): 58.6 l/min  Dialyzer Clearance: Moderately streaked  Duration of Treatment (minutes): 180 minutes     Hemodialysis Intake (ml): 500 ml  Hemodialysis Output (ml): 1500 ml  NET Removed (ml): 1000 ml  Tolerated Treatment: Good  Patient Response to Treatment: no cmplaints          Provider Notification        Handoff complete and report given to Primary RN at 1050 hours.   Primary RN (First Initial, Last Name, Title):  [facility] RN     Education  Person Educated: Patient   Knowledge Base: Substantial  Barriers to Learning?: None  Preferred method of Learning: Oral  Topic(s): Procedural   Teaching Tools: Explanation   Response to Education: Verbalized Understanding     Electronically signed by Jessica Dodd on 3/24/2021 at 10:54 AM

## 2021-03-25 NOTE — PROGRESS NOTES
Bilateral hydronephrosis//pt with metastatic breast cancer  Acute renal failure s/p HD    Voiding some    Discussed options and risks  Discussed indwelling stents vs perc tubes vs observation/HD  She elected to proceed with cystoscopy and bilateral ureter stent placement  She accepted covid risks of surgery and anesthesia    A, ox3, nad  Soft, nd/nt    CT Scan a/p 3/23/2021 reviewed  CT Scan chest 3/24/2021 reviewed      Proceed with cystoscopy and bilateral ureter stent placement  Possible chronic indwelling stents with need for exchange every 3-4 months  Urinary catheter after surgery

## 2021-03-25 NOTE — PROGRESS NOTES
Progress Note  Date:3/25/2021       Room:3120/3120-A  Patient Gila Earing     YOB: 1965     Age:55 y.o. Subjective    Subjective:  Symptoms:  Stable. No shortness of breath, chest pain, headache or diarrhea. Pain:  She complains of pain that is mild. Pain is partially controlled. Admits to fatigue today after dialysis    Review of Systems   Respiratory: Negative for shortness of breath. Cardiovascular: Negative for chest pain. Gastrointestinal: Negative for diarrhea. All other systems reviewed and are negative. Objective         Vitals Last 24 Hours:  TEMPERATURE:  Temp  Av.4 °F (36.9 °C)  Min: 97.8 °F (36.6 °C)  Max: 99 °F (37.2 °C)  RESPIRATIONS RANGE: Resp  Av.2  Min: 15  Max: 22  PULSE OXIMETRY RANGE: SpO2  Av.3 %  Min: 92 %  Max: 96 %  PULSE RANGE: Pulse  Av.6  Min: 107  Max: 120  BLOOD PRESSURE RANGE: Systolic (07WXS), URM:587 , Min:142 , HMQ:086   ; Diastolic (58KNJ), BHI:57, Min:87, Max:109    I/O (24Hr): No intake or output data in the 24 hours ending 21 1136  Objective:  General Appearance: In no acute distress. Vital signs: (most recent): Blood pressure (!) 149/95, pulse 107, temperature 99 °F (37.2 °C), temperature source Oral, resp. rate 15, height 5' 1\" (1.549 m), weight 136 lb 1.6 oz (61.7 kg), last menstrual period 2018, SpO2 92 %, not currently breastfeeding. Vital signs are normal.    Output: Producing urine and producing stool. Lungs:  Normal effort. Breath sounds clear to auscultation. Heart: Normal rate. No gallop or friction rub. Chest: Symmetric chest wall expansion. Abdomen: Abdomen is soft and non-distended. Bowel sounds are normal.   There is no abdominal tenderness. There is no rebound tenderness. There is no guarding. Extremities: Normal range of motion. Neurological: Patient is alert and oriented to person, place and time. Pupils:  Pupils are equal.   Skin:  Warm and dry. Labs/Imaging/Diagnostics    Labs:  CBC:  Recent Labs     03/22/21  1353 03/23/21  0418   WBC 19.4* 15.5*   RBC 3.34* 3.29*   HGB 8.3* 8.1*   HCT 26.5* 25.0*   MCV 79.3 76.0*   RDW 24.6* 25.2*   * 612*     CHEMISTRIES:  Recent Labs     03/23/21  0418 03/23/21  0418 03/24/21  0505 03/24/21  0815 03/25/21  0345   *  --  136  --  137   K 5.5*   < > 4.9 3.5 4.3   CL 97*  --  98*  --  102   CO2 16*  --  22  --  21   BUN 79*  --  59*  --  41*   CREATININE 5.1*  --  3.7*  --  2.8*   GLUCOSE 128*  --  121*  --  104*   PHOS 5.2*  --   --   --   --     < > = values in this interval not displayed. PT/INR:No results for input(s): PROTIME, INR in the last 72 hours. APTT:No results for input(s): APTT in the last 72 hours. LIVER PROFILE:  Recent Labs     03/22/21  1353   AST 48*   ALT 8*   BILITOT 0.3   ALKPHOS 120       Imaging Last 24 Hours:  Ct Abdomen Pelvis Wo Contrast Additional Contrast? None    Result Date: 3/23/2021  EXAMINATION: CT OF THE ABDOMEN AND PELVIS WITHOUT CONTRAST 3/23/2021 11:21 am TECHNIQUE: CT of the abdomen and pelvis was performed without the administration of intravenous contrast. Multiplanar reformatted images are provided for review. Dose modulation, iterative reconstruction, and/or weight based adjustment of the mA/kV was utilized to reduce the radiation dose to as low as reasonably achievable. COMPARISON: Renal ultrasound 03/23/2021. CT chest, abdomen, and pelvis 01/20/2021. HISTORY: ORDERING SYSTEM PROVIDED HISTORY: please evaluate the hydonephrosis on renal US TECHNOLOGIST PROVIDED HISTORY: Reason for exam:->please evaluate the hydonephrosis on renal US Additional Contrast?->None Is the patient pregnant?->No Reason for Exam: please evaluate the hydonephrosis on renal US Acuity: Acute Type of Exam: Initial FINDINGS: Lower Chest: Small right and large left pleural effusions. Mild right basilar atelectasis. Left lower lobe atelectasis. Small pericardial effusion.  Organs: Lack of intravenous contrast limits evaluation of the solid organs, vascular structures, and bowel. 5 mm low-density lesion in the liver dome not significantly changed on axial image 25 too small to definitively characterize. The liver is otherwise unremarkable. The gallbladder is normal in appearance. No biliary ductal dilatation. The pancreas, spleen, and bilateral adrenal glands are unremarkable. Moderate bilateral hydroureteronephrosis. This is new on the right and not significantly changed on the left compared with 01/20/2021. No obstructing stone or mass identified. No focal renal abnormality. GI/Bowel: The colon is unremarkable. No evidence of acute appendicitis. No obstruction or wall thickening definitely seen. Pelvis: The urinary bladder is partially decompressed with questionable circumferential wall thickening. No urinary bladder stone identified. The uterus and right adnexa are unremarkable. The left ovary continues to increased in size now measuring approximately 3.2 x 1.9 cm on axial image 109 (previously 1.8 x 1.6 cm). Left external iliac node measuring 1.7 x 1.2 cm (previously 1.9 x 1.2 cm). Right common iliac node measuring 1.2 x 0.7 cm (previously 1.1 x 0.9 cm). Right external iliac node measuring 2.5 x 2.1 cm (previously 2.5 x 1.6 cm). Small volume of free fluid in the pelvis. Peritoneum/Retroperitoneum: The abdominal aorta is normal in appearance. Trace ascites. No drainable fluid collection or pneumoperitoneum. No retroperitoneal or mesenteric lymphadenopathy. Bones/Soft Tissues: Diffuse body wall edema. 4.1 x 2.4 cm calcified mass within the proximal right adductor musculature (previously 3.7 x 2 cm). New somewhat ill-defined partially calcified mass within the right gluteus medius muscle measuring 3.5 x 2.6 cm. Extensive sclerotic metastases without significant change. Mild chronic T9 and T10 pathologic compression fractures without significant change.   No acute osseous abnormality. 1. Moderate bilateral hydroureteronephrosis new on the right and not significantly changed on the left compared with 79/63/6245 of uncertain etiology. No obstructing stone or mass. Partially decompressed urinary bladder with circumferential wall thickening not excluded. 2. Fluid overload with small right and large left pleural effusions, diffuse body wall edema, small volume of ascites, and small pericardial effusion. 3. 6 bilateral iliac chain lymphadenopathy not significantly changed from 01/20/2021. Increasing size of the left ovary of uncertain etiology with an underlying neoplasm not excluded. Unchanged diffuse sclerotic metastases. 4. Mildly increased size of a calcified mass within the right proximal adductor musculature and new mass within the right gluteus medius muscle suspicious for metastatic disease. Xr Chest Portable    Result Date: 3/22/2021  EXAMINATION: ONE XRAY VIEW OF THE CHEST 3/22/2021 9:09 pm COMPARISON: None. HISTORY: ORDERING SYSTEM PROVIDED HISTORY: post temp cath placement TECHNOLOGIST PROVIDED HISTORY: Reason for exam:->post temp cath placement Reason for Exam: post temp cath placement Acuity: Acute Type of Exam: Initial Additional signs and symptoms: na Relevant Medical/Surgical History: breast cancer, bone cancer FINDINGS: The lungs are clear. The mediastinum and cardiac silhouette are unremarkable. There is no evidence of pneumothorax. There is been interval placement of a right internal jugular non tunneled hemodialysis catheter, tip projects of the inferior SVC. Significant left basilar pleuroparenchymal disease unchanged. New right internal jugular non tunneled hemodialysis catheter with no evidence of pneumothorax.      Xr Chest Portable    Result Date: 3/22/2021  EXAMINATION: ONE XRAY VIEW OF THE CHEST 3/22/2021 3:02 pm COMPARISON: 03/15/2021 HISTORY: ORDERING SYSTEM PROVIDED HISTORY: Chest pain TECHNOLOGIST PROVIDED HISTORY: Reason for exam:->Chest pain Reason for Exam: chest pain Acuity: Acute Type of Exam: Initial FINDINGS: Right chest port tip is been retracted and now projects over the superior cavoatrial junction. Increased moderate left pleural effusion with left lung base opacity. Right lung clear. No pneumothorax or right pleural effusion. Cardiac and mediastinal contours stable. No acute osseous abnormality. Increased moderate left pleural effusion with left lung opacity that may reflect atelectasis or pneumonia if the patient has fever or leukocytosis. Ir Nontunneled Vascular Catheter > 5 Years    Result Date: 3/23/2021  PROCEDURE: ULTRASOUND GUIDED VASCULAR ACCESS. Bedside PLACEMENT OF A NON-TUNNELED CATHETER. The procedure was performed by Dr. Autumn Greco, the IR on call physician. 3/22/2021. HISTORY: ORDERING SYSTEM PROVIDED HISTORY: temp cath TECHNOLOGIST PROVIDED HISTORY: Reason for exam:->temp cath Is the patient pregnant?->No SEDATION: Local anesthesia only. FLUOROSCOPY DOSE AND TYPE OR TIME AND EXPOSURES: None. TECHNIQUE: Informed consent was obtained after a detailed explanation of the procedure including risks, benefits, and alternatives. Universal protocol was observed. The right neck and chest were prepped and draped in sterile fashion using maximum sterile barrier technique. Local anesthesia was achieved with lidocaine. A micropuncture needle was used to access the right internal jugular vein using ultrasound guidance. An ultrasound image demonstrating patency of the vein with needle tip located within it. An image was obtained and stored in PACs. A 0.035 guidewire was used to place a temporal dialysis catheter after fascial tract dilation. The catheter flushed easily and there was a good blood return. The catheter was sutured to the skin. The catheter was locked with heparinized saline. The patient tolerated the procedure well and there were no immediate complications.  FINDINGS: Chest x ray post procedure demonstrates the tip of the catheter in the lower SVC. The catheter is ready to use. Successful ultrasound  guided non-tunneled dialysis catheter placement. The catheter is ready to use. Us Retroperitoneal Limited    Result Date: 3/23/2021  EXAMINATION: ULTRASOUND OF THE KIDNEYS 3/23/2021 5:43 am COMPARISON: CT abdomen/pelvis performed January 20, 2021. HISTORY: ORDERING SYSTEM PROVIDED HISTORY: elidia TECHNOLOGIST PROVIDED HISTORY: Reason for exam:->elidia Reason for Exam: ELIDIA Acuity: Acute Type of Exam: Initial FINDINGS: The right kidney measures 10.8 cm in length and the left kidney measures 10.0 cm in length. Kidneys demonstrate increased cortical echogenicity. There is mild-to-moderate bilateral hydronephrosis. No focal lesions. 1. Increased echogenicity of the renal cortices which is nonspecific but can be seen with medical renal disease. 2. Mild-to-moderate bilateral hydronephrosis. Assessment//Plan           Assessment:    Condition: In stable condition. Improving. (-ELIDIA on CKD 3, due to dehydration and poor oral intake  -High anion gap with metabolic acidosis likely due to uremia  -Meet SIRS criteria  -Labs with severe ELIDIA, hyperkalemia Leukocytosis, likely due to severe dehydration or sepsis   -Chronic blood loss anemia  -Monitor, transfuse for hemoglobin less than 7  -Thrombocytemia, possibly reactive  -Oncology on board  -Elevated troponin likely due to demand ischemia  -Severe protein energy malnutrition  -Nutritional supplements, dietician). Plan:   Encourage ambulation and out of bed and up to chair. Continue respiratory treatments. Advance diet as tolerated. Give fluids. (HD per nephrology  NPO for Procedure today  oob and ambulate today  Preparing for discharge    DVT: lovenox or PO medications  GI prophylaxis: PO intake or medicated   [unfilled]).        Electronically signed by Cheryl Poe DO on 3/24/21 at 9:24 AM EDT

## 2021-03-25 NOTE — BRIEF OP NOTE
Brief Postoperative Note      Patient: Kimberlee Torres  YOB: 1965  MRN: [id number]    Date of Procedure: 3/25/2021    Pre-Op Diagnosis: 1. Bilateral hydronephrosis secondary to metastatic breast cancer                                2.  Acute renal failure    Post-Op Diagnosis: Same       Procedure  1. Cystoscopy and bilateral ureter stent placement    Surgeon(s):  Sweetie Dee MD    Assistant:      Anesthesia: General    Estimated Blood Loss (mL): none    Complications: None    Specimens:       Implants:  Implant Name Type Inv. Item Serial No.  Lot No. LRB No. Used Action   STENT URET 4.8FR [id number] PERCFLX HYDR+ SFT PGTL TAPR TIP  STENT URET 4.8FR [id number] PERCFLX HYDR+ SFT PGTL TAPR TIP  [facility] [id number] Left 1 Implanted   STENT URET 4.8FR [id number] PERCFLX HYDR+ SFT PGTL TAPR TIP  STENT URET 4.8FR [id number] PERCFLX HYDR+ SFT PGTL TAPR TIP  [employer]- [id number] Right 1 Implanted         Drains:   Urethral Catheter Non-latex 16 fr (Active)       Findings: 1.   Nephrogram on right from recent CT Scan chest with IV contrast                  2.  No left nephrogram                  3.  Global pale color of global urothelium, friable urothelium                   4.   Severe atrophic vaginitis                  5.  Placed benítez catheter to promote drainage    Electronically signed by Sweetie Dee MD on 3/25/2021 at 12:30 PM

## 2021-03-25 NOTE — PROGRESS NOTES
Nephrology Progress Note        [address], [address] [address]  Phone: [phone]  Office Hours: 8:30AM - 4:30PM  Monday - Friday        3/25/2021 8:14 AM  Subjective:   Admit Date: 3/22/2021  PCP: Iman Canela, APRN - CNP  Interval History: doing ok  S/p iv contrast yesterday  uop unknown    Diet: Diet NPO, After Midnight      Data:   Scheduled Meds:   calcium gluconate  2,000 mg Intravenous Once    metoprolol succinate  50 mg Oral Daily    cetirizine  10 mg Oral Daily    heparin (porcine)  5,000 Units Subcutaneous 3 times per day    cefepime  2,000 mg Intravenous Q24H     Continuous Infusions:   dextrose      dextrose       PRN Meds:docusate sodium, HYDROcodone-acetaminophen, sodium chloride flush, promethazine **OR** ondansetron, acetaminophen **OR** acetaminophen, polyethylene glycol, glucose, dextrose, glucagon (rDNA), dextrose, glucose, dextrose, glucagon (rDNA), dextrose  I/O last 3 completed shifts: In: 500   Out: 1500   No intake/output data recorded. Intake/Output Summary (Last 24 hours) at 3/25/2021 0814  Last data filed at 3/24/2021 1032  Gross per 24 hour   Intake 500 ml   Output 1500 ml   Net -1000 ml       CBC:   Recent Labs     03/22/21  0904 03/22/21  1353 03/23/21  0418   WBC 18.9* 19.4* 15.5*   HGB 8.2* 8.3* 8.1*   * 666* 612*       BMP:    Recent Labs     03/23/21  0418 03/23/21  0418 03/24/21  0505 03/24/21  0815 03/25/21  0345   *  --  136  --  137   K 5.5*   < > 4.9 3.5 4.3   CL 97*  --  98*  --  102   CO2 16*  --  22  --  21   BUN 79*  --  59*  --  41*   CREATININE 5.1*  --  3.7*  --  2.8*   GLUCOSE 128*  --  121*  --  104*    < > = values in this interval not displayed. Hepatic:   Recent Labs     03/22/21  0904 03/22/21  1353   AST 53* 48*   ALT 8* 8*   BILITOT 0.2 0.3   ALKPHOS 125 120     Troponin: No results for input(s): TROPONINI in the last 72 hours. BNP: No results for input(s): BNP in the last 72 hours.   Lipids: No results for input(s): CHOL, HDL in the last 72 hours. Invalid input(s): LDLCALCU  ABGs: No results found for: PHART, PO2ART, JAS4PQT  INR: No results for input(s): INR in the last 72 hours.     Objective:   Vitals: BP (!) 149/95   Pulse 107   Temp 99 °F (37.2 °C) (Oral)   Resp 15   Ht 5' 1\" (1.549 m)   Wt 136 lb 1.6 oz (61.7 kg)   LMP 04/08/2018   SpO2 92%   BMI 25.72 kg/m²   General appearance: alert and cooperative with exam, in no acute distress  HEENT: normocephalic, atraumatic,   Neck: supple, trachea midline  Lungs: , breathing comfortably   Heart[de-identified] tachycardic  Abdomen: soft, non-tender; non distended,   Extremities: ble edema and rue edema  Neurologic: alert, oriented, follows commands, interactive    Assessment and Plan:     Patient Active Problem List     Diagnosis Date Noted    ELIDIA (acute kidney injury) ([facility] [address] 03/22/2021    Left ovarian enlargement 01/26/2021    Iron deficiency anemia 01/07/2021    Malabsorption 01/07/2021    Dehydration 11/20/2020    Malignant neoplasm of central portion of left female breast ([address] 07/31/2020    Metastatic breast cancer ([address] 07/31/2020    Secondary cancer of bone ([address] 04/01/2020    Essential hypertension 04/01/2020    Left breast mass        ATN: cr 6 on admission// UA bland//renal US with HN  Hyperkalemia  Metabolic acidosis  B/L HN  Metastatic beast cancer    Plan:  HD tomorrow  S/p iv contrast yesterday, may end up being d/c'ed on HD  Monitor uop                  Electronically signed by Carmen Soto DO on 3/25/2021 at 1296 MD Tala Dawkins DO [address]  [place], [place] [address]  PHONE: [phone]  FAX: [phone]

## 2021-03-25 NOTE — ANESTHESIA POSTPROCEDURE EVALUATION
Department of Anesthesiology  Postprocedure Note    Patient: Lorin Ayala  MRN: [id number]  YOB: 1965  Date of evaluation: 3/25/2021  Time:  1:43 PM     Procedure Summary     Date: 03/25/21 Room / Location: [place] [address] / [facility]    Anesthesia Start: 1203 Anesthesia Stop: 1243    Procedure: BILATERAL CYSTOSCOPY BILATERAL STENT INSERTION (Bilateral Ureter) Diagnosis: (-)    Surgeons: Bhargav Logan MD Responsible Provider: Ayla Grey DO    Anesthesia Type: MAC, general ASA Status: 3          Anesthesia Type: MAC, general    Daniel Phase I: Daniel Score: 10    Daniel Phase II:      Last vitals: Reviewed and per EMR flowsheets.        Anesthesia Post Evaluation    Patient location during evaluation: PACU  Patient participation: complete - patient participated  Level of consciousness: sleepy but conscious  Airway patency: patent  Nausea & Vomiting: no nausea  Complications: no  Cardiovascular status: hemodynamically stable  Respiratory status: acceptable  Hydration status: euvolemic

## 2021-03-25 NOTE — PROGRESS NOTES
Cardiology Progress Note     Today's Plan: increase Toprol     Admit Date:  3/22/2021    Consult reason/ Seen today for: tachycardia     Subjective and  Overnight Events:  Patient denies any chest pain or shortness of breath. Assessment / Plan / Recommendation:     1. Sinus tachycardia: Unclear etiology, repeat limited echo to rule out pericardial effusion. Prior history of DVT noted, patient previously was on anticoagulation but was stopped, notable BLE edema will get US to R/O DVT. CTA of the chest R/O pulmonary embolism is negative for PE. Will defer anticoagulation to hematology at this point. Better control but still elevated, increase metoprolol to 100 mg daily. 2. Essential hypertension: stable. Increase, Toprol-XL  3. Sepsis: Currently on IV antibiotics  4. Metastatic breast cancer: S/p chemotherapy, hematology/oncology follow-up  5. Anemia of chronic disease/malignancy: Hemoglobin is stable. 6. Bilateral hydronephrosis and renal failure on dialysis currently. Cystoscopy bilateral with stents today. History of Presenting Illness:    Chief complain on admission : 50 y. o.year old who is admitted for  Chief Complaint   Patient presents with    Other     abnormal labs         Past medical history:    has a past medical history of Anxiety, Asthma, Breast lesion, Essential hypertension, Secondary cancer of bone ([address] and Thyroid nodule. Past surgical history:   has a past surgical history that includes  section (); Appendectomy (age 27); Tonsillectomy (age 5); Port Surgery (Right, 3/15/2021); and IR NONTUNNELED VASCULAR CATHETER > 5 YEARS (3/22/2021). Social History:   reports that she has never smoked. She has never used smokeless tobacco. She reports current alcohol use. She reports that she does not use drugs.   Family history:  family history includes Diabetes in her mother; High Blood Pressure in her mother; Stroke in her mother. Allergies   Allergen Reactions    Latex Anaphylaxis     \"have trouble with banana's kiwi, avocados- Have trouble breathing with these and trouble breathig - get asthmatic from latex gloves, bandage, anything latex     Banana Anaphylaxis    Cinnamon Anaphylaxis       Review of Systems:  Review of Systems   Cardiovascular: Positive for leg swelling. All other systems reviewed and are negative. /80   Pulse 105   Temp 98.6 °F (37 °C) (Oral)   Resp 18   Ht 5' 1\" (1.549 m)   Wt 136 lb 1.6 oz (61.7 kg)   LMP 04/08/2018   SpO2 95%   BMI 25.72 kg/m²       Intake/Output Summary (Last 24 hours) at 3/25/2021 1417  Last data filed at 3/25/2021 1321  Gross per 24 hour   Intake 400 ml   Output 125 ml   Net 275 ml       Physical Exam:  Constitutional:  Well developed, Well nourished, No acute distress  HENT:  Normocephalic, Atraumatic, Bilateral external ears normal,  Nose normal.   Neck- trachea is midline  Eyes:  PERRL, Conjunctiva normal  Respiratory:  Normal breath sounds, No respiratory distress, No wheezing, No chest tenderness. Cardiovascular:  Elevated heart rate, Normal rhythm, no murmurs appreciated, No rubs appreciated, No gallops appreciated, JVP not elevated  Abdomen/GI:  Soft, No tenderness  Musculoskeletal:  Intact distal pulses,+3 edema, No tenderness, No cyanosis, No clubbing. Integument:  Warm, Dry  Lymphatic:  No lymphadenopathy noted.    Neurologic:  Alert & oriented  Psychiatric:  Affect and Mood :pleasant     Medications:    calcium gluconate  2,000 mg Intravenous Once    metoprolol succinate  50 mg Oral Daily    cetirizine  10 mg Oral Daily    heparin (porcine)  5,000 Units Subcutaneous 3 times per day    cefepime  2,000 mg Intravenous Q24H      dextrose      dextrose       docusate sodium, HYDROcodone-acetaminophen, sodium chloride flush, promethazine **OR** ondansetron, acetaminophen **OR** acetaminophen, polyethylene glycol, glucose, Chest:  Clear to auscultation, respiration easy  Cardiovascular:  s1s2  Abdomen:   nontender  Extremities:  +1 edema  Pulses; palpable  Neuro: grossly normal      MEDICAL DECISION MAKING;    I agree with the above plan, which was planned by myself and discussed with NP.    CTA of the chest negative for pulmonary embolism  Increase beta-blocker dose as above      Dr. Lydia Ramos MD

## 2021-03-25 NOTE — PROGRESS NOTES
Pt pulled out benítez. Rubi Mcgee [place] for Dr. Keshav Dee notified. Wants us to wait till next post void and then let him know status.   This nurse will pass on to next nurse

## 2021-03-25 NOTE — PROGRESS NOTES
No events overnight. She has been doing physical therapy. She has sinus tachycardia cardia of unclear etiology. Cardiologist ordered CTA which showed  No evidence of pulmonary embolism.  Sensitivity for subsegmental pulmonary   emboli is moderately reduced due to respiratory motion artifact.       Large left and moderate right pleural effusions with associated atelectasis.       Dilated pulmonary trunk suggesting pulmonary hypertension.       Mild to moderate urinary collecting system dilatation bilaterally, new   compared to prior PET-CT.       Anasarca. She is scheduled for renal stent placement on March 25, 2021. Today's creatinine was 2.8. Potassium was 4.3. Hemoglobin was 8.1, WBC 15.5, platelets 199. No acute pain today. No nausea, vomiting or diarrhea. Temperature 99, pulse 107, respiratory 15, blood pressure 149/95. Awake, alert, in no acute distress. Supple, no JVD, pupils are reactive. Positive pallor. S1-S2 regular. Clear to auscultation anteriorly. Soft, bowel sounds present, nontender nondistended. No cyanosis. Assessment and plan:  She has metastatic breast cancer. She plans to resume chemo as an outpatient. She is scheduled for renal stent placement today. Appreciate cardiology's input. She started on metoprolol. She is on Maxipime. To continue with physical therapy. If she is discharged home, I recommend to follow-up with me at the cancer center.   Thanks

## 2021-03-25 NOTE — PROGRESS NOTES
[facility]   Progress Note  [address] 0 1 2      Date: 3/25/2021   Patient: Radha Merino   : 1965   DOA: 3/22/2021   MRN: [id number]   ROOM#: 3120/3120-A     Admit Date: 3/22/2021     Collaborating Urologist on Call at time of admission: Dr. Nathan Little  CC: Abnormal labs  Reason for Consult: Bilateral hydronephrosis//pt with metastatic breast cancer    Subjective:     Pain: mild, no nausea and no vomiting,   Bowel Movement/Flatus:   Yes  Voiding:  easily,     Pt resting in bed, states she feels well today. Discussed plan for cystoscopy, bilateral RGPG, and possible bilateral ureteral stent placement today with associated risks/complications. Pt verbalizes understanding and is agreeable to proceed.     Objective:    Vitals:    BP (!) 149/95   Pulse 107   Temp 99 °F (37.2 °C) (Oral)   Resp 15   Ht 5' 1\" (1.549 m)   Wt 136 lb 1.6 oz (61.7 kg)   LMP 2018   SpO2 92%   BMI 25.72 kg/m²    Temp  Av.4 °F (36.9 °C)  Min: 97.8 °F (36.6 °C)  Max: 99 °F (37.2 °C)       Intake/Output Summary (Last 24 hours) at 3/25/2021 6723  Last data filed at 3/24/2021 1032  Gross per 24 hour   Intake 500 ml   Output 1500 ml   Net -1000 ml       Physical Exam:   General appearance: alert, appears stated age, cooperative and no distress  Head: Normocephalic, without obvious abnormality, atraumatic  Back: No CVA tenderness  Abdomen: Soft, non-tender, non-distended    Labs:   WBC:    Lab Results   Component Value Date    WBC 15.5 2021      Hemoglobin/Hematocrit:    Lab Results   Component Value Date    HGB 8.1 2021    HCT 25.0 2021      BMP:   Lab Results   Component Value Date     2021    K 4.3 2021     2021    CO2 21 2021    BUN 41 2021    LABALBU 2.3 2021    CREATININE 2.8 2021    CALCIUM 8.0 2021    GFRAA 21 2021    LABGLOM 18 2021     Blood Culture: NO GROWTH AT 48 HOURS     Imaging: Echocardiogram Complete 2d With Doppler With Color    Result Date: 3/10/2021  Transthoracic Echocardiography Report (TTE)  Demographics   Patient Name       Valiant Loser     Date of Study       03/10/2021   Date of Birth      1965        Gender              Female   Age                50 year(s)        Race                   Patient Number     [id number]        Room Number   Visit Number       [id number]   Corporate ID       [id number]   Accession Number   [id number]        Sonographer         Pili Mccain                                                           RDCS, RDMS, RVT   Ordering Physician Kristin Jansen MD  Interpreting        Kortney Kahn                                       Physician           Sam Anaya MD  Procedure Type of Study   TTE procedure:ECHOCARDIOGRAM COMPLETE 2D W DOPPLER W COLOR. Procedure Date Date: 03/10/2021 Start: 12:56 PM Study Location: [facility] - Echo Lab Technical Quality: Limited visualization due to poor acoustical window. Indications:Preop cardiac evaluation. Patient Status: Routine Height: 61 inches Weight: 109 pounds BSA: 1.46 m2 BMI: 20.6 kg/m2 Rhythm: Sinus tachycardia HR: 129 bpm BP: 161/93 mmHg  Conclusions   Summary  Technically difficult study due to breast lesions/scarring. Left ventricular systolic function is normal.  Ejection fraction is visually estimated at 60%. No significant valvular disease noted. No evidence of any pericardial effusion. Left pleural effusion. Signature   ------------------------------------------------------------------  Electronically signed by Yessy Caicedo MD  (Interpreting physician) on 03/10/2021 at 04:58 PM  ------------------------------------------------------------------   Findings   Left Ventricle  Left ventricular systolic function is normal.  Ejection fraction is visually estimated at 60%. Left Atrium  Essentially normal left atrium. Right Atrium  Essentially normal right atrium.    Right Ventricle  Essentially normal right ventricle. Aortic Valve  Structurally normal aortic valve. Mitral Valve  Trace mitral regurgitation is present. Tricuspid Valve  Trace tricuspid regurgitation; normal RVSP. Pulmonic Valve  The pulmonic valve was not well visualized. Pericardial Effusion  No evidence of any pericardial effusion. Pleural Effusion  Left pleural effusion.   M-Mode/2D Measurements & Calculations   LV Diastolic Dimension:  LV Systolic Dimension:  LA Dimension: 2.3 cmAO Root  3.52 cm                  2.43 cm                 Dimension: 2.5 cmLA Area:  LV FS:31 %               LV Volume Diastolic: 41 63.9 cm2  LV PW Diastolic: 3.46 cm ml  LV PW Systolic: 0.41 cm  LV Volume Systolic: 16  Septum Diastolic: 5.89   ml  cm                       LV EDV/LV EDV Index: 41 RV Diastolic Dimension:  Septum Systolic: 8.79 cm GY/20 H3XR ESV/LV ESV   2.83 cm  CO: 6.91 l/min           Index: 16 ml/11 m2  CI: 4.73 l/m*m2          EF Calculated (A4C): 61 LA/Aorta: 0.92                           %  LV Area Diastolic: 48.7  EF Calculated (2D):     LA volume/Index: 23 ml  cm2                      59.7 %                  /79M7  LV Area Systolic: 90.0  cm2                      LV Length: 7.06 cm                            LVOT: 1.9 cm  Doppler Measurements & Calculations                           AV Peak Velocity: 134 cm/s   LVOT Peak Velocity: 110                          AV Peak Gradient: 7.18 mmHg  cm/s                          AV Mean Velocity: 101 cm/s   LVOT Mean Velocity:                          AV Mean Gradient: 4 mmHg     78.9 cm/s                          AV VTI: 19.7 cm              LVOT Peak Gradient: 5  MV E' Septal Velocity:  AV Area (Continuity):2.72    mmHgLVOT Mean Gradient:  7.13 cm/s               cm2                          3 mmHg  MV E' Lateral Velocity:  12.1 cm/s               LVOT VTI: 18.9 cm      Echocardiogram Limited    Result Date: 3/24/2021  Transthoracic Echocardiography Report (TTE) Demographics   Patient Name       Nina Willams      Date of Study       03/24/2021   Date of Birth      1965         Gender              Female   Age                50 year(s)         Race                   Patient Number     [id number]         Room Number         6513   Visit Number       [id number]   Corporate ID       [id number]   Accession Number   [id number]         [facility]   Ordering Physician Ellaree Kawasaki MD                 Physician           MD  Procedure Type of Study   TTE procedure:ECHOCARDIOGRAM LIMITED. Procedure Date Date: 03/24/2021 Start: 02:20 PM Study Location: Portable Technical Quality: Adequate visualization Indications:Pericardial effusion. Patient Status: Routine Height: 61 inches Weight: 139 pounds BSA: 1.62 m2 BMI: 26.26 kg/m2 HR: 129 bpm BP: 155/87 mmHg  Conclusions   Summary  This is a limited echocardiogram.  Left ventricular systolic function is normal.  Ejection fraction is visually estimated at 55-60%. Mild pericardial effusion  Pleural effusion present. Signature   ------------------------------------------------------------------  Electronically signed by Yohan Veronica MD (Interpreting  physician) on 03/24/2021 at 03:29 PM  ------------------------------------------------------------------   Findings   Left Ventricle  Left ventricular systolic function is normal.  Ejection fraction is visually estimated at 55-60%. Pericardial Effusion  Mild pericardial effusion   Pleural Effusion  Pleural effusion present.   M-Mode/2D Measurements & Calculations   LV Diastolic Dimension: 1.51 cm LV Systolic Dimension: 8.69 cm  LV FS:22.9 %                    LV Volume Diastolic: 42 ml  LV PW Diastolic: 6.78 cm        LV Volume Systolic: 17 ml  LV PW Systolic: 1.4 cm          LV EDV/LV EDV Index: 42 ml/26 m2LV ESV/LV Septum Diastolic: 6.67 cm       ESV Index: 17 ml/10 m2  Septum Systolic: 9.25 cm        EF Calculated (A4C): 59.5 %                                  EF Calculated (2D): 47.2 %   LV Area Diastolic: 92.7 cm2     LV Length: 6.84 cm  LV Area Systolic: 45.4 cm2      Ct Abdomen Pelvis Wo Contrast Additional Contrast? None    Result Date: 3/23/2021  EXAMINATION: CT OF THE ABDOMEN AND PELVIS WITHOUT CONTRAST 3/23/2021 11:21 am TECHNIQUE: CT of the abdomen and pelvis was performed without the administration of intravenous contrast. Multiplanar reformatted images are provided for review. Dose modulation, iterative reconstruction, and/or weight based adjustment of the mA/kV was utilized to reduce the radiation dose to as low as reasonably achievable. COMPARISON: Renal ultrasound 03/23/2021. CT chest, abdomen, and pelvis 01/20/2021. HISTORY: ORDERING SYSTEM PROVIDED HISTORY: please evaluate the hydonephrosis on renal US TECHNOLOGIST PROVIDED HISTORY: Reason for exam:->please evaluate the hydonephrosis on renal US Additional Contrast?->None Is the patient pregnant?->No Reason for Exam: please evaluate the hydonephrosis on renal US Acuity: Acute Type of Exam: Initial FINDINGS: Lower Chest: Small right and large left pleural effusions. Mild right basilar atelectasis. Left lower lobe atelectasis. Small pericardial effusion. Organs: Lack of intravenous contrast limits evaluation of the solid organs, vascular structures, and bowel. 5 mm low-density lesion in the liver dome not significantly changed on axial image 25 too small to definitively characterize. The liver is otherwise unremarkable. The gallbladder is normal in appearance. No biliary ductal dilatation. The pancreas, spleen, and bilateral adrenal glands are unremarkable. Moderate bilateral hydroureteronephrosis. This is new on the right and not significantly changed on the left compared with 01/20/2021. No obstructing stone or mass identified.   No focal renal abnormality. GI/Bowel: The colon is unremarkable. No evidence of acute appendicitis. No obstruction or wall thickening definitely seen. Pelvis: The urinary bladder is partially decompressed with questionable circumferential wall thickening. No urinary bladder stone identified. The uterus and right adnexa are unremarkable. The left ovary continues to increased in size now measuring approximately 3.2 x 1.9 cm on axial image 109 (previously 1.8 x 1.6 cm). Left external iliac node measuring 1.7 x 1.2 cm (previously 1.9 x 1.2 cm). Right common iliac node measuring 1.2 x 0.7 cm (previously 1.1 x 0.9 cm). Right external iliac node measuring 2.5 x 2.1 cm (previously 2.5 x 1.6 cm). Small volume of free fluid in the pelvis. Peritoneum/Retroperitoneum: The abdominal aorta is normal in appearance. Trace ascites. No drainable fluid collection or pneumoperitoneum. No retroperitoneal or mesenteric lymphadenopathy. Bones/Soft Tissues: Diffuse body wall edema. 4.1 x 2.4 cm calcified mass within the proximal right adductor musculature (previously 3.7 x 2 cm). New somewhat ill-defined partially calcified mass within the right gluteus medius muscle measuring 3.5 x 2.6 cm. Extensive sclerotic metastases without significant change. Mild chronic T9 and T10 pathologic compression fractures without significant change. No acute osseous abnormality. 1. Moderate bilateral hydroureteronephrosis new on the right and not significantly changed on the left compared with 70/18/0967 of uncertain etiology. No obstructing stone or mass. Partially decompressed urinary bladder with circumferential wall thickening not excluded. 2. Fluid overload with small right and large left pleural effusions, diffuse body wall edema, small volume of ascites, and small pericardial effusion. 3. 6 bilateral iliac chain lymphadenopathy not significantly changed from 01/20/2021.   Increasing size of the left ovary of uncertain etiology with an ONE XRAY VIEW OF THE CHEST 3/15/2021 7:33 am COMPARISON: CT scan of the chest 01/20/2021 HISTORY: ORDERING SYSTEM PROVIDED HISTORY: port TECHNOLOGIST PROVIDED HISTORY: Reason for exam:->port Reason for Exam: port placement in OR Acuity: Acute Type of Exam: Initial FINDINGS: Right-sided central venous catheter tip projects inferior to the right atrium at the IVC. Opacity in the left lung base. Hazy opacity in the bilateral upper lungs. Cardiac silhouette is poorly assessed. The bones are grossly unchanged. Right-sided central venous catheter tip projects inferior to the right atrium at the IVC. Bilateral opacities could be due to atelectasis or other airspace opacity. Consider repeat upright chest x-ray in PA and lateral views. Cta Chest W Contrast    Result Date: 3/25/2021  EXAMINATION: CTA OF THE CHEST 3/24/2021 10:28 pm TECHNIQUE: CTA of the chest was performed after the administration of intravenous contrast.  Multiplanar reformatted images are provided for review. MIP images are provided for review. Dose modulation, iterative reconstruction, and/or weight based adjustment of the mA/kV was utilized to reduce the radiation dose to as low as reasonably achievable. COMPARISON: Radiograph March 22, 2021 HISTORY: ORDERING SYSTEM PROVIDED HISTORY: Shortness of breath TECHNOLOGIST PROVIDED HISTORY: Reason for exam:->rule out PE Reason for Exam: SOB Acuity: Acute Type of Exam: Initial Additional signs and symptoms: no Relevant Medical/Surgical History: 54 ml isovue 370 used FINDINGS: Pulmonary Arteries: Pulmonary arteries are adequately opacified for evaluation. Moderate respiratory motion artifact. No evidence of intraluminal filling defect to suggest pulmonary embolism. Main pulmonary artery is 32 mm in caliber. Mediastinum: No evidence of mediastinal lymphadenopathy. The heart and pericardium demonstrate no acute abnormality. There is no acute abnormality of the thoracic aorta.   Multiple small thyroid nodules redemonstrated, unchanged (no FDG uptake on recent PET-CT). Lungs/pleura: Large left pleural effusion with collapse of the lower lobe and portion of the left upper lobe. Moderate right pleural effusion. Dependent atelectasis on the right. Upper Abdomen: Mild-to-moderate collecting system dilatation noted of the kidneys bilaterally. Soft Tissues/Bones: Diffuse body wall edema. Bilateral axillary adenopathy redemonstrated. Relatively diffuse sclerotic changes of the spine, sternum and several ribs compatible with known metastatic disease. No evidence of pulmonary embolism. Sensitivity for subsegmental pulmonary emboli is moderately reduced due to respiratory motion artifact. Large left and moderate right pleural effusions with associated atelectasis. Dilated pulmonary trunk suggesting pulmonary hypertension. Mild to moderate urinary collecting system dilatation bilaterally, new compared to prior PET-CT. Anasarca. Ir Nontunneled Vascular Catheter > 5 Years    Result Date: 3/23/2021  PROCEDURE: ULTRASOUND GUIDED VASCULAR ACCESS. Bedside PLACEMENT OF A NON-TUNNELED CATHETER. The procedure was performed by Dr. Val Ivy, the IR on call physician. 3/22/2021. HISTORY: ORDERING SYSTEM PROVIDED HISTORY: temp cath TECHNOLOGIST PROVIDED HISTORY: Reason for exam:->temp cath Is the patient pregnant?->No SEDATION: Local anesthesia only. FLUOROSCOPY DOSE AND TYPE OR TIME AND EXPOSURES: None. TECHNIQUE: Informed consent was obtained after a detailed explanation of the procedure including risks, benefits, and alternatives. Universal protocol was observed. The right neck and chest were prepped and draped in sterile fashion using maximum sterile barrier technique. Local anesthesia was achieved with lidocaine. A micropuncture needle was used to access the right internal jugular vein using ultrasound guidance. An ultrasound image demonstrating patency of the vein with needle tip located within it.  An image was obtained and stored in PACs. A 0.035 guidewire was used to place a temporal dialysis catheter after fascial tract dilation. The catheter flushed easily and there was a good blood return. The catheter was sutured to the skin. The catheter was locked with heparinized saline. The patient tolerated the procedure well and there were no immediate complications. FINDINGS: Chest x ray post procedure demonstrates the tip of the catheter in the lower SVC. The catheter is ready to use. Successful ultrasound  guided non-tunneled dialysis catheter placement. The catheter is ready to use. Us Retroperitoneal Limited    Result Date: 3/23/2021  EXAMINATION: ULTRASOUND OF THE KIDNEYS 3/23/2021 5:43 am COMPARISON: CT abdomen/pelvis performed January 20, 2021. HISTORY: ORDERING SYSTEM PROVIDED HISTORY: elidia TECHNOLOGIST PROVIDED HISTORY: Reason for exam:->elidia Reason for Exam: ELIDIA Acuity: Acute Type of Exam: Initial FINDINGS: The right kidney measures 10.8 cm in length and the left kidney measures 10.0 cm in length. Kidneys demonstrate increased cortical echogenicity. There is mild-to-moderate bilateral hydronephrosis. No focal lesions. 1. Increased echogenicity of the renal cortices which is nonspecific but can be seen with medical renal disease. 2. Mild-to-moderate bilateral hydronephrosis. Assessment & Plan:      Jim Montanez is a 50y.o. year old female admitted 3/22/2021 for ELIDIA. H/o metastatic breast cancer. Known to Dr. Bekah Barragan.     1) Bilateral Hydronephrosis              CT a/p 3/23/21: Moderate bilateral hydroureteronephrosis new on the right and not significantly changed on the left compared with 00/05/1562 of uncertain etiology. No obstructing stone or mass. Partially decompressed urinary bladder with circumferential wall thickening not excluded. RALPH 3/23/21: Increased echogenicity of the renal cortices which is nonspecific but can be seen with medical renal disease.  Mild-to-moderate bilateral hydronephrosis. Pt seen in our office by Dr. Anton Mccullough 2/11/21 and plan was for cystoscopy and CT urogram              Rapid Covid test ordered               NPO since midnight              Plan for cystoscopy, bilateral RGPG, and bilateral ureteral stent placement today at 5pm with Dr. Rowdy Landry  2) ELIDIA              Cr 2.8, improved from 7.5 upon admission. Cr 1.3 in Feb 2021. On HD              Nephrology on board    Patient seen and examined, chart reviewed.      Electronically signed by April Griffin PA-C on 3/25/2021 at 8:26 AM

## 2021-03-25 NOTE — PROGRESS NOTES
1240- pt rec'd from the OR and placed on pacu monitor with alarms on. Report rec'd from Marielle WAGNER and OR nurse. Pt arousing and following commands. Pt re-oriented to surroundings. resps even and unlabored. Mart cath draining yellowish/ pink urine. Pt denies any pain. 1327- pt denies any pain. Mart cath draining pinkish yellow urine. Pt transferred back to room 3120 via bed and monitor. All valuables transferred with pt.

## 2021-03-26 NOTE — PROGRESS NOTES
She is resting. No acute distress. Venous Doppler study of lower extremity on March 25, 2021 showed  No evidence of DVT or SVT in either lower extremity. She has cystoscopy and bilateral ureteral stent placement on March 25, 2021. Cardiologist increase her metoprolol to 100 mg daily. Temperature 99.4, pulse 105, respirate 17, blood pressure 151/99, saturation 94%. Weight 140 pounds. She is awake, alert and in no acute distress. Supple, no JVD, pupils are reactive. S1-S2 no murmur. Clear to auscultation anteriorly  Soft, bowel sounds present. Nontender and nondistended. Lower extremity edema. Assessment and plan:  She has bilateral hydronephrosis related to metastatic breast cancer. She had cystoscopy and bilateral ureteral placement yesterday. Yesterday creatinine was 2.8 and potassium 4.3. To continue with physical therapy. She consider to resume chemotherapy as out patient next week. If she is discharged home I recommend to follow-up with me at the cancer center.   Thanks

## 2021-03-26 NOTE — PROGRESS NOTES
Progress Note  Date:3/26/2021       Room:[facility]0/3120-A  Patient Rachell Prieto     YOB: 1965     Age:55 y.o. Subjective    Subjective:  Symptoms:  Stable. No shortness of breath, chest pain, headache or diarrhea. Pain:  She complains of pain that is mild. Pain is partially controlled. Slept well last night  No c/o belly or back pain    Review of Systems   Respiratory: Negative for shortness of breath. Cardiovascular: Negative for chest pain. Gastrointestinal: Negative for diarrhea. All other systems reviewed and are negative. Objective         Vitals Last 24 Hours:  TEMPERATURE:  Temp  Av.5 °F (36.9 °C)  Min: 97.7 °F (36.5 °C)  Max: 99.4 °F (37.4 °C)  RESPIRATIONS RANGE: Resp  Av.2  Min: 1  Max: 30  PULSE OXIMETRY RANGE: SpO2  Av.3 %  Min: 93 %  Max: 100 %  PULSE RANGE: Pulse  Av.1  Min: 97  Max: 113  BLOOD PRESSURE RANGE: Systolic (85MFN), ECB:209 , Min:80 , BGB:955   ; Diastolic (74IPP), YAQ:48, Min:55, Max:105    I/O (24Hr): Intake/Output Summary (Last 24 hours) at 3/26/2021 0902  Last data filed at 3/26/2021 0537  Gross per 24 hour   Intake 650 ml   Output 1425 ml   Net -775 ml     Objective:  General Appearance: In no acute distress. Vital signs: (most recent): Blood pressure (!) 141/86, pulse 107, temperature 97.7 °F (36.5 °C), temperature source Oral, resp. rate 16, height 5' 1\" (1.549 m), weight 142 lb 11.2 oz (64.7 kg), last menstrual period 2018, SpO2 94 %, not currently breastfeeding. Vital signs are normal.    Output: Producing urine and producing stool. Lungs:  Normal effort. Breath sounds clear to auscultation. Heart: Normal rate. No gallop or friction rub. Chest: Symmetric chest wall expansion. Abdomen: Abdomen is soft and non-distended. Bowel sounds are normal.   There is no abdominal tenderness. There is no rebound tenderness. There is no guarding. Extremities: Normal range of motion.     Neurological: Patient is alert and oriented to person, place and time. Pupils:  Pupils are equal.   Skin:  Warm and dry. Labs/Imaging/Diagnostics    Labs:  CBC:  No results for input(s): WBC, RBC, HGB, HCT, MCV, RDW, PLT in the last 72 hours. CHEMISTRIES:  Recent Labs     03/24/21  0505 03/24/21  0815 03/25/21  0345 03/26/21  0555     --  137 136   K 4.9 3.5 4.3 4.7   CL 98*  --  102 100   CO2 22  --  21 21   BUN 59*  --  41* 44*   CREATININE 3.7*  --  2.8* 2.5*   GLUCOSE 121*  --  104* 136*     PT/INR:No results for input(s): PROTIME, INR in the last 72 hours. APTT:No results for input(s): APTT in the last 72 hours. LIVER PROFILE:  No results for input(s): AST, ALT, BILIDIR, BILITOT, ALKPHOS in the last 72 hours. Imaging Last 24 Hours:  Ct Abdomen Pelvis Wo Contrast Additional Contrast? None    Result Date: 3/23/2021  EXAMINATION: CT OF THE ABDOMEN AND PELVIS WITHOUT CONTRAST 3/23/2021 11:21 am  FINDINGS: Lower Chest: Small right and large left pleural effusions. Mild right basilar atelectasis. Left lower lobe atelectasis. Small pericardial effusion. Organs: Lack of intravenous contrast limits evaluation of the solid organs, vascular structures, and bowel. 5 mm low-density lesion in the liver dome not significantly changed on axial image 25 too small to definitively characterize. The liver is otherwise unremarkable. The gallbladder is normal in appearance. No biliary ductal dilatation. The pancreas, spleen, and bilateral adrenal glands are unremarkable. Moderate bilateral hydroureteronephrosis. This is new on the right and not significantly changed on the left compared with 01/20/2021. No obstructing stone or mass identified. No focal renal abnormality. GI/Bowel: The colon is unremarkable. No evidence of acute appendicitis. No obstruction or wall thickening definitely seen. Pelvis: The urinary bladder is partially decompressed with questionable circumferential wall thickening.   No urinary bladder stone identified. The uterus and right adnexa are unremarkable. The left ovary continues to increased in size now measuring approximately 3.2 x 1.9 cm on axial image 109 (previously 1.8 x 1.6 cm). Left external iliac node measuring 1.7 x 1.2 cm (previously 1.9 x 1.2 cm). Right common iliac node measuring 1.2 x 0.7 cm (previously 1.1 x 0.9 cm). Right external iliac node measuring 2.5 x 2.1 cm (previously 2.5 x 1.6 cm). Small volume of free fluid in the pelvis. Peritoneum/Retroperitoneum: The abdominal aorta is normal in appearance. Trace ascites. No drainable fluid collection or pneumoperitoneum. No retroperitoneal or mesenteric lymphadenopathy. Bones/Soft Tissues: Diffuse body wall edema. 4.1 x 2.4 cm calcified mass within the proximal right adductor musculature (previously 3.7 x 2 cm). New somewhat ill-defined partially calcified mass within the right gluteus medius muscle measuring 3.5 x 2.6 cm. Extensive sclerotic metastases without significant change. Mild chronic T9 and T10 pathologic compression fractures without significant change. No acute osseous abnormality. 1. Moderate bilateral hydroureteronephrosis new on the right and not significantly changed on the left compared with 33/36/2416 of uncertain etiology. No obstructing stone or mass. Partially decompressed urinary bladder with circumferential wall thickening not excluded. 2. Fluid overload with small right and large left pleural effusions, diffuse body wall edema, small volume of ascites, and small pericardial effusion. 3. 6 bilateral iliac chain lymphadenopathy not significantly changed from 01/20/2021. Increasing size of the left ovary of uncertain etiology with an underlying neoplasm not excluded. Unchanged diffuse sclerotic metastases. 4. Mildly increased size of a calcified mass within the right proximal adductor musculature and new mass within the right gluteus medius muscle suspicious for metastatic disease.        Us Retroperitoneal Limited    Result Date: 3/23/2021  EXAMINATION: ULTRASOUND OF THE KIDNEYS 3/23/2021 5:43 am COMPARISON: CT abdomen/pelvis performed January 20, 2021. HISTORY: ORDERING SYSTEM PROVIDED HISTORY: elidia TECHNOLOGIST PROVIDED HISTORY: Reason for exam:->elidia Reason for Exam: ELIDIA Acuity: Acute Type of Exam: Initial FINDINGS: The right kidney measures 10.8 cm in length and the left kidney measures 10.0 cm in length. Kidneys demonstrate increased cortical echogenicity. There is mild-to-moderate bilateral hydronephrosis. No focal lesions. 1. Increased echogenicity of the renal cortices which is nonspecific but can be seen with medical renal disease. 2. Mild-to-moderate bilateral hydronephrosis. Assessment//Plan           Assessment:    Condition: In stable condition. Improving. (-ELIDIA on CKD 3, due to dehydration and poor oral intake  -bilateral hydronephrosis related to metastatic breast cancer. She had cystoscopy and bilateral ureteral placement yesterday    -High anion gap with metabolic acidosis likely due to uremia  -Met SIRS criteria  -Labs with severe ELIDIA, hyperkalemia Leukocytosis, likely due to severe dehydration or sepsis   -Chronic blood loss anemia  -Monitor, transfuse for hemoglobin less than 7  -Thrombocytemia, possibly reactive  -Oncology on board  -Elevated troponin likely due to demand ischemia  -Severe protein energy malnutrition  -Nutritional supplements, dietician). Plan:   Encourage ambulation and out of bed and up to chair. Continue respiratory treatments. Advance diet as tolerated. Give fluids. (Waiting for labs - to make dialysis decision  Small fluid bolus now  Mart out with voiding trial    Must get oob again today  Denies wanting SNF and she has good support at home  hhc to be ordered     DVT: lovenox or PO medications  GI prophylaxis: PO intake or medicated   [unfilled]).        Electronically signed by Khushbu Feng DO on 3/24/21 at 9:24 AM EDT

## 2021-03-26 NOTE — PROGRESS NOTES
Comprehensive Nutrition Assessment    Type and Reason for Visit:  Reassess    Nutrition Recommendations/Plan:   Add standard ONS BID  Encourage po intake as able  Please document all po intake  Will monitor po intake, nutrition status, weight trends, poc    Nutrition Assessment:  noted no HD today, pt currently on carb control diet, no po intake documented recently, visited pt at bedside, pt reports UBW ~110#, reports appetite improved, noted pt ate 100% of entree at lunch today, pt denies any chewing or swallowing difficulty, pt denies any nutritional questions or concerns at this time, pt is at moderate nutrition risk    Estimated Daily Nutrient Needs:  Energy (kcal):  5703-2516 (30-35 kcal/kg for HD); Weight Used for Energy Requirements:  Ideal     Protein (g):  94 g (1.2 g/kg); Weight Used for Protein Requirements:  Ideal         Method Used for Fluid Requirements:  Standard Renal      Nutrition Related Findings:  Labs; BUN 44, Cr 2.5, Glucose 136      Wounds:  None       Current Nutrition Therapies:    DIET CARB CONTROL; Anthropometric Measures:  · Height: 5' 0.98\" (154.9 cm)  · Current Body Weight: 142 lb 10.2 oz (64.7 kg)   · Admission Body Weight: 143 lb 11.8 oz (65.2 kg)(first measured weight)    · Usual Body Weight: 115 lb (52.2 kg)(2/15)     · Ideal Body Weight: 105 lbs; % Ideal Body Weight 135.8 %   · BMI: 27  · BMI Categories: Overweight (BMI 25.0-29. 9)       Nutrition Diagnosis:   · Inadequate protein-energy intake related to renal dysfunction, pain as evidenced by poor intake prior to admission    Nutrition Interventions:   Food and/or Nutrient Delivery:  Continue Current Diet, Start Oral Nutrition Supplement  Nutrition Education/Counseling:  No recommendation at this time   Coordination of Nutrition Care:  Continue to monitor while inpatient    Goals:  pt will consume greater than 50-75% of meals and supplements       Nutrition Monitoring and Evaluation:   Behavioral-Environmental Outcomes: None Identified   Food/Nutrient Intake Outcomes:  Supplement Intake, Food and Nutrient Intake  Physical Signs/Symptoms Outcomes:  Biochemical Data, Weight, GI Status, Hemodynamic Status     Discharge Planning:     Too soon to determine     Electronically signed by Marah Prieto MS, RD, LD on 3/26/21 at 3:34 PM EDT    Contact: [phone]

## 2021-03-26 NOTE — PLAN OF CARE
Problem: Falls - Risk of:  Goal: Will remain free from falls  Description: Will remain free from falls  Outcome: Ongoing  Goal: Absence of physical injury  Description: Absence of physical injury  Outcome: Ongoing     Problem: Discharge Planning:  Goal: Discharged to appropriate level of care  Description: Discharged to appropriate level of care  Outcome: Ongoing     Problem: Pain:  Description: Pain management should include both nonpharmacologic and pharmacologic interventions.   Goal: Pain level will decrease  Description: Pain level will decrease  Outcome: Ongoing  Goal: Control of acute pain  Description: Control of acute pain  Outcome: Ongoing  Goal: Control of chronic pain  Description: Control of chronic pain  Outcome: Ongoing

## 2021-03-26 NOTE — OP NOTE
[address] [place] [address], [address] [place] [address]                                OPERATIVE REPORT    PATIENT NAME: Linda Yang                      :        1965  MED REC NO:   [id number]                          ROOM:       05  ACCOUNT NO:   [de-identified]                           ADMIT DATE: 2021  PROVIDER:     Chucho Rudd MD    DATE OF PROCEDURE:  2021    PREOPERATIVE DIAGNOSES:  1.  Bilateral hydronephrosis secondary to metastatic breast cancer. 2.  Acute renal failure. POSTOPERATIVE DIAGNOSES:  1.  Bilateral hydronephrosis secondary to metastatic breast cancer. 2.  Acute renal failure. PROCEDURE PERFORMED:  Cystoscopy and bilateral ureter stent placement. SURGEON:  Chucho Rudd MD    ANESTHESIA:  General with LMA. ESTIMATED BLOOD LOSS:  None. COMPLICATIONS:  None. SPECIMENS:  None. IMPLANTS:  1.  4.8-[place] bilateral ureter stents. 2.  16-[place] latex free Mart catheter. FINDINGS:  1. Nephrogram on the right from recent CT scan of chest with IV  contrast.  2.  No definite left nephrogram.  3.  Global pale color of global urothelium and friable urothelium. 4.  Severe atrophic vaginitis. 5.  Placed Mart catheter to promote drainage. DISPOSITION:  Stable to PACU. INDICATIONS FOR PROCEDURE:  The patient is a 61-year-old female with a  history of metastatic breast cancer, who was found to have acute renal  failure and bilateral hydronephrosis. She underwent dialysis to improve  her kidney function and her potassium level. Given the bilateral  obstruction, we discussed her options, which include observation with  hemodialysis versus bilateral indwelling ureter stents versus bilateral  perc tubes. She elected to proceed with bilateral ureter stents. I  discussed the risks, benefits, and alternatives of therapies. She  accepted the COVID risks of anesthesia and surgery.   She was on cefepime. OPERATIVE REPORT:  The patient was on cefepime. She was brought to the  operating room and placed in a supine position. A general anesthetic  with LMA was administered by the Anesthesia Service. She was then  placed in dorsal lithotomy position and prepped and draped in a sterile  fashion after being appropriately padded. Time-out was performed per  protocol. She had significant atrophic vaginitis. There is no definite palpable  vaginal mass. A 21-Bolivian cystoscope with a 30-degree lens was placed  through the urethra and into the bladder. The bladder was irrigated  several times. She had global pale color to the bladder with area of  cystitis and was friable. The right UO was identified. On fluoroscopy,  she had a nephrogram from recent CT scan with IV contrast.  A Sensor  guidewire was placed into the right collecting system followed by  4.8-Bolivian variable stent. There appeared to be adequate positioning of  the right ureter stent. A similar procedure was performed on the left  side. The left UO was slightly more difficult to find. Sensor  guidewire was placed into the left collecting system. There was no  definite left nephrogram.  A 4.8-Bolivian variable stent was placed into  the left collecting system with the aid of fluoroscopy. Both had  adequate appearing position. A 16-Bolivian Mart catheter was placed to  promote drainage. The patient tolerated the procedure well and was  brought to the PACU in stable condition.         Doris Mahoney MD    D: 03/25/2021 12:40:46       T: 03/25/2021 12:48:59     [email or web address]  Job#: [id number]     Doc#: [id number]    CC:  mUa Eric MD

## 2021-03-26 NOTE — CARE COORDINATION
Called [facility] and spoke with Juan Lacy to check on the DME. She states that they are waiting for the pre-auth from pt's insurance. She informed CM that they will contact pt's daughter when they have the approval. Francisco Salvador informed CM that the are in network with [employer] and will be able to accept pt when she is d/c'd. Attempted to update pt. She is resting in bed with eyes closed. Called pt's daughter and updated her. She states that she can take pt home while waiting for Auth for DME.  D/c plan is home with daughter and [facility]. Notify CM if any new d/c needs arise. TE     At MA, please call [employer] to notify them of dc at [phone]. Please fax AVS, H&P, and [facility] order to [phone].

## 2021-03-26 NOTE — PROGRESS NOTES
Cardiology Progress Note     Today's Plan: [facility]     Admit Date:  3/22/2021    Consult reason/ Seen today for: tachycardia     Subjective and  Overnight Events:  Patient denies any chest pain or shortness of breath no improvement in LE edema. Assessment / Plan / Recommendation:     1. Sinus tachycardia: Unclear etiology, repeat limited echo showed mild pericardial effusion. Prior history of DVT noted, patient previously was on anticoagulation but was stopped, notable BLE edema, US did not show DVT. CTA of the chest R/O pulmonary embolism is negative for PE. Will defer anticoagulation to hematology at this point. Better control but still elevated, improvement in tachycardia with metoprolol being increased to 100 mg daily. Current tachycardia may be induced by dehydration. IV hydration per primary. Cont BB  2. Essential hypertension: stable. Continue Toprol-XL  3. Sepsis: Currently on IV antibiotics  4. Metastatic breast cancer: S/p chemotherapy, hematology/oncology follow-up  5. Anemia of chronic disease/malignancy: Hemoglobin is stable. 6. Bilateral hydronephrosis and renal failure on dialysis currently. Cystoscopy bilateral with stents today. Severe dehydration secondary to sepsis. History of Presenting Illness:    Chief complain on admission : 50 y. o.year old who is admitted for  Chief Complaint   Patient presents with    Other     abnormal labs         Past medical history:    has a past medical history of Anxiety, Asthma, Breast lesion, Essential hypertension, Secondary cancer of bone ([address] and Thyroid nodule. Past surgical history:   has a past surgical history that includes  section (); Appendectomy (age 27); Tonsillectomy (age 5); Port Surgery (Right, 3/15/2021); IR NONTUNNELED VASCULAR CATHETER > 5 YEARS (3/22/2021); and Bladder surgery (Bilateral, 3/25/2021).   Social History:   reports that she has never smoked. She has never used smokeless tobacco. She reports current alcohol use. She reports that she does not use drugs. Family history:  family history includes Diabetes in her mother; High Blood Pressure in her mother; Stroke in her mother. Allergies   Allergen Reactions    Latex Anaphylaxis     \"have trouble with banana's kiwi, avocados- Have trouble breathing with these and trouble breathig - get asthmatic from latex gloves, bandage, anything latex     Banana Anaphylaxis    Cinnamon Anaphylaxis       Review of Systems:  Review of Systems   Cardiovascular: Positive for leg swelling. All other systems reviewed and are negative. BP (!) 141/86   Pulse 107   Temp 97.7 °F (36.5 °C) (Oral)   Resp 16   Ht 5' 1\" (1.549 m)   Wt 142 lb 11.2 oz (64.7 kg)   LMP 04/08/2018   SpO2 94%   BMI 26.96 kg/m²       Intake/Output Summary (Last 24 hours) at 3/26/2021 1106  Last data filed at 3/26/2021 0537  Gross per 24 hour   Intake 650 ml   Output 1425 ml   Net -775 ml       Physical Exam:  Constitutional:  Well developed, Well nourished, No acute distress  HENT:  Normocephalic, Atraumatic, Bilateral external ears normal,  Nose normal.   Neck- trachea is midline  Eyes:  PERRL, Conjunctiva normal  Respiratory:  Normal breath sounds, No respiratory distress, No wheezing, No chest tenderness. Cardiovascular:  Elevated heart rate, Normal rhythm, no murmurs appreciated, No rubs appreciated, No gallops appreciated, JVP not elevated  Abdomen/GI:  Soft, No tenderness  Musculoskeletal:  Intact distal pulses,+3 edema, No tenderness, No cyanosis, No clubbing. Integument:  Warm, Dry  Lymphatic:  No lymphadenopathy noted.    Neurologic:  Alert & oriented  Psychiatric:  Affect and Mood :pleasant     Medications:    sodium chloride  1,000 mL Intravenous Once    metoprolol succinate  100 mg Oral Daily    cetirizine  10 mg Oral Daily    heparin (porcine)  5,000 Units Subcutaneous 3 times per day    cefepime  2,000 mg Intravenous Q24H      dextrose      dextrose       opium-belladonna, docusate sodium, HYDROcodone-acetaminophen, sodium chloride flush, promethazine **OR** ondansetron, acetaminophen **OR** acetaminophen, polyethylene glycol, glucose, dextrose, glucagon (rDNA), dextrose, glucose, dextrose, glucagon (rDNA), dextrose    Lab Data:  CBC:   No results for input(s): WBC, HGB, HCT, MCV, PLT in the last 72 hours. BMP:   Recent Labs     03/24/21  0505 03/24/21  0815 03/25/21  0345 03/26/21  0555     --  137 136   K 4.9 3.5 4.3 4.7   CL 98*  --  102 100   CO2 22  --  21 21   BUN 59*  --  41* 44*   CREATININE 3.7*  --  2.8* 2.5*     PT/INR: No results for input(s): PROTIME, INR in the last 72 hours. BNP:  No results for input(s): PROBNP in the last 72 hours. TROPONIN: No results for input(s): TROPONINT in the last 72 hours. Impression:  Active Problems:    ELIDIA (acute kidney injury) ([facility] [address]    Tachycardia  Resolved Problems:    * No resolved hospital problems. *       All labs, medications and tests reviewed by myself, continue all other medications of all above medical condition listed as is except for changes mentioned above. Thank you   Please call with questions. Electronically signed by Mabel Morgan, APRN - CNP on 3/26/2021 at 11:06 AM           CARDIOLOGY ATTENDING ADDENDUM    I have seen, spoken to and examined this patient personally, independently of the nurse practitioner. I have reviewed the hospital care given to date and reviewed all pertinent labs and imaging. The plan was developed mutually at the time of the visit with the patient,  NP   and myself. I have spoken with patient, nursing staff and provided written and verbal instructions . The above note has been reviewed and I agree with the assessment, diagnosis, and treatment plan with changes made by me as follows       HPI:  I have reviewed the above HPI  And agree with above   Please review addendum/changes made to note above Interval history:        Doing fair     Physical Exam:  General:  Awake, alert, NAD  Head:normal  Eye:normal  Neck:  No JVD   Chest:  Clear to auscultation, respiration easy  Cardiovascular:  s1s2 tachycardia   Abdomen:   nontender  Extremities:  no edema  Pulses; palpable  Neuro: grossly normal      MEDICAL DECISION MAKING;    I agree with the above plan, which was planned by myself and discussed with NP. Continue with beta-blocker. Dehydration: IV fluids. Renal failure on dialysis.     Please call us with any further questions          Dr. Myriam Hawkins MD

## 2021-03-26 NOTE — PROGRESS NOTES
medical history of Anxiety, Asthma, Breast lesion, Essential hypertension, Secondary cancer of bone ([address] and Thyroid nodule. has a past surgical history that includes  section (); Appendectomy (age 27); Tonsillectomy (age 5); Port Surgery (Right, 3/15/2021); IR NONTUNNELED VASCULAR CATHETER > 5 YEARS (3/22/2021); and Bladder surgery (Bilateral, 3/25/2021). Restrictions     Vision/Hearing  Vision: Within Functional Limits  Hearing: Within functional limits     Subjective  General  Family / Caregiver Present: Yes(daughter at end of eval)  Referring Practitioner: Erin Braga MD  Diagnosis: ELIDIA  Subjective  Subjective: Denied symptoms of any pain or headache. Agreed to participate in therapy. Stated having elevated HR and O2 since been at hospital.  Pain Screening  Patient Currently in Pain: No  Pain Assessment  Pain Assessment: 0-10  Pain Level: 0  Patient's Stated Pain Goal: No pain  Vital Signs  Patient Currently in Pain: No       Orientation  Orientation  Overall Orientation Status: Within Normal Limits  Social/Functional History  Social/Functional History  Lives With: Daughter  Type of Home: House  Home Layout: One level  Home Access: Stairs to enter with rails  Entrance Stairs - Number of Steps: 2  Bathroom Shower/Tub: Tub/Shower unit  Bathroom Toilet: Handicap height  Bathroom Accessibility: Accessible  Home Equipment: Rolling walker, Cane  Receives Help From: Family  ADL Assistance: Needs assistance  Homemaking Assistance: Needs assistance  Homemaking Responsibilities: No  Ambulation Assistance: Needs assistance  Transfer Assistance: Needs assistance  Active : No  Occupation: On disability  Additional Comments: Pt reports 1 fall in last 6 months stating she felt woozy. Pt stated that last few weeks decreasing independence due to increased weakness  Cognition        Objective     Observation/Palpation  Palpation: NA  Observation: Pt sleeping reclined, easily awoken.  Agreed to particpate in therapy. PROM RLE (degrees)  RLE PROM: WFL  AROM RLE (degrees)  RLE AROM: WFL  RLE General AROM: No reported pain. PROM LLE (degrees)  LLE PROM: WFL  AROM LLE (degrees)  LLE AROM : WFL  LLE General AROM: No reported pain. Strength RLE  Strength RLE: WFL  Comment: Global 4-/5 in all directions with fatigue pushing into therapist.  Strength LLE  Strength LLE: WFL  Comment: Global 4-/5 in all directions with fatigue pushing into therapist.           Transfers  Sit to Stand: Minimal Assistance  Bed to Chair: Supervision  Stand Pivot Transfers: Contact guard assistance  Ambulation  Ambulation?: Yes  More Ambulation?: No  Ambulation 1  Surface: level tile  Device: Rolling Walker  Assistance: Contact guard assistance  Gait Deviations: Slow Melba;Decreased step length  Distance: 15ft x2 from bed to chair. HR elevated to ~115 bpm and 92-95% Sat O2. No observed heavy breathing. Balance  Sitting - Static: Fair  Sitting - Dynamic: Fair  Standing - Static: Fair;-  Standing - Dynamic: -;Fair  Functional Reach Test  Fall Risk (Score of <10 inches is fall risk): Yes        Plan   Plan  Times per week: 3+  Plan weeks: < 1 week  Specific instructions for Next Treatment: gait training, transfer training, LE strengthening, sitting/standing balance to tolerance.   Current Treatment Recommendations: Strengthening, Transfer Training, Endurance Training, Balance Training, Gait Training, Neuromuscular Re-education    AM-PAC Score           Basic Mobility Six Clicks Form [employer] AM-PAC Score Conversion Table   How much difficulty does the patient currently have Unable   (pt is unable to do activity) A Lot   (activity is a struggle, requires great effort/time) A Little   (pt can manage, but takes more effort/time than should) None   (pt has no difficulty) Raw Score Standardized Score CMS -100% Score CMS Modifier        6 23.55 100% CN   Turning over in bed (including adjusting bedclothes, sheets, and blankets)? []1 []2  [x]3  []4  7 26.42 92.36% CM        8 28.58 86.62% CM   Sitting down on and standing up from a chair with arms (e.g. wheelchair, bedside commode, etc.)? []1 [x]2 []3   []4   9 30.55 81.38% CM        10 32.29 76.75% CL   Moving from lying on back to sitting on the side of the bed? []1 [x]2  []3   []4   11 33.86 72.57% CL        12 35.33 68.66% CL   How much help from another person does the patient currently need Total   (Total/Dependent Assist) A Lot   (Max/Mod Assist) A Little   (Min/CGA/Supervision) None   (No human assistance) 13 36.74 64.91% CL        14 38.1 61.29% CL   Moving to and from a bed to a chair (including a wheelchair)? []1  []2   [x]3  []4   15 39.45 57.70% CK        16 40.78 54.16% CK   To walk in a hospital room? []1 []2   [x]3    []4  17 42.13 50.57% CK        18 43.63 46.58% CK   Climbing 3-5 steps with a railing? []1  [x]2   []3    []4  19 45.44 41.77% CK        20 47.67 35.83% CJ   Raw Score 15  21 50.25 28.97% CJ   Standardized Score  39.45 22 53.28 20.91% CJ   CMS 0-100% Score 57.70%   23 56.93 11.20% CI   CMS Modifier CK 24 61.14 0.00% CH     CH = 0% impaired  CI = 1-20% impaired  CJ = 20-40% impaired  CK = 40-60% impaired  CL = 60-80% impaired  CM = % impaired  CN = 100% impaired       Goals  Short term goals  Time Frame for Short term goals: <1 week  Short term goal 1: Pt will demo CGA with bed mobility. Short term goal 2: Pt will demo supervision with sit to stand transfer. Short term goal 3: Pt will demo supervision with gait activities with FWW from bed to chair/toilet. Short term goal 4: Pt will demo good (-) sitting posture to ease IADLs.   Patient Goals   Patient goals : return home       Therapy Time   Individual Concurrent Group Co-treatment   Time In  1448         Time Out  1518         Minutes  30                 Sravani Fenton, DONITA, JONA    3/26/2021 4:00 PM

## 2021-03-26 NOTE — PROGRESS NOTES
Nephrology Progress Note        [address], [address] [place] [address]  Phone: [phone]  Office Hours: 8:30AM - 4:30PM  Monday - Friday        3/26/2021 8:06 AM  Subjective:   Admit Date: 3/22/2021  PCP: Jason Gould, APRN - CNP  Interval History: doing ok  Making urine  S/p bilateral ureteral stents yesterday    Diet: DIET CARB CONTROL;      Data:   Scheduled Meds:   metoprolol succinate  100 mg Oral Daily    calcium gluconate  2,000 mg Intravenous Once    cetirizine  10 mg Oral Daily    heparin (porcine)  5,000 Units Subcutaneous 3 times per day    cefepime  2,000 mg Intravenous Q24H     Continuous Infusions:   dextrose      dextrose       PRN Meds:opium-belladonna, docusate sodium, HYDROcodone-acetaminophen, sodium chloride flush, promethazine **OR** ondansetron, acetaminophen **OR** acetaminophen, polyethylene glycol, glucose, dextrose, glucagon (rDNA), dextrose, glucose, dextrose, glucagon (rDNA), dextrose  I/O last 3 completed shifts: In: 650 [P.O.:250; I.V.:400]  Out: 1425 [Urine:1425]  No intake/output data recorded. Intake/Output Summary (Last 24 hours) at 3/26/2021 0806  Last data filed at 3/26/2021 0537  Gross per 24 hour   Intake 650 ml   Output 1425 ml   Net -775 ml       CBC: No results for input(s): WBC, HGB, PLT in the last 72 hours.     BMP:    Recent Labs     03/24/21  0505 03/24/21  0815 03/25/21  0345     --  137   K 4.9 3.5 4.3   CL 98*  --  102   CO2 22  --  21   BUN 59*  --  41*   CREATININE 3.7*  --  2.8*   GLUCOSE 121*  --  104*         Objective:   Vitals: BP (!) 151/99   Pulse 105   Temp 99.4 °F (37.4 °C) (Oral)   Resp 17   Ht 5' 1\" (1.549 m)   Wt 140 lb (63.5 kg)   LMP 04/08/2018   SpO2 94%   BMI 26.45 kg/m²   General appearance: alert and cooperative with exam, in no acute distress  HEENT: normocephalic, atraumatic,   Neck: supple, trachea midline  Lungs:  breathing comfortably on room air  Heart[de-identified] regular rate and rhythm, S1, S2 normal,  Abdomen: soft, non-tender; non distended,   Extremities:ble edema and lue edema  Neurologic: alert, oriented, follows commands, interactive    Assessment and Plan:     Patient Active Problem List     Diagnosis Date Noted    ELIDIA [facility]acute kidney [facility]) ([facility] 03/22/2021    Left ovarian enlargement 01/26/2021    Iron deficiency anemia 01/07/2021    Malabsorption 01/07/2021    Dehydration 11/20/2020    Malignant neoplasm of central portion of left female breast [facility] 07/31/2020    Metastatic breast cancer ([facility] 07/31/2020    Secondary cancer of bone ([facility] 04/01/2020    Essential hypertension 04/01/2020    Left breast mass        ATN: cr 6 on admission// UA bland//renal US with HN//HD FROM 3/22 TO 3/24 FOR HIGH K  Hyperkalemia  Metabolic acidosis  B/L HN: s/p B/L ureteral stents on 3/25/21  Metastatic beast cancer     Plan:  Making urine so awaiting BMP to decide on HD or not         ADDENDUM: 8:4AM  CR DOWN TO 2.5 FROM 2.8 AND MAKING URINE NOW SO NOT PLANNING HD TODAY                       Electronically signed by Devyn Funez DO on 3/26/2021 at 8:06 AM    MD Suleiman Arias DO [address]  [place] [address]  PHONE: [phone]  FAX: [phone]

## 2021-03-26 NOTE — PROGRESS NOTES
[facility] [address]   Progress Note  [facility] 0 1 2      Date: 3/26/2021   Patient: Srikanth Sevilla   : 1965   DOA: 3/22/2021   MRN: [id number]   ROOM#: 3120/3120-A     Admit Date: 3/22/2021     Collaborating Urologist on Call at time of admission: Dr. Selena Pierre  CC: Abnormal labs  Reason for Consult: Bilateral hydronephrosis//pt with metastatic breast cancer  POD #1: Cystoscopy, bilateral ureteral stent placement    Subjective:     Pain: mild, no nausea and no vomiting,   Bowel Movement/Flatus:   Yes  Voidinfr benítez catheter in place, urine fany colored, no clots noted. Pt resting comfortably in bed, states she is feeling well and having no issues with her ureteral stents. Objective:    Vitals:    BP (!) 141/86   Pulse 107   Temp 97.7 °F (36.5 °C) (Oral)   Resp 16   Ht 5' 1\" (1.549 m)   Wt 142 lb 11.2 oz (64.7 kg)   LMP 2018   SpO2 94%   BMI 26.96 kg/m²    Temp  Av.5 °F (36.9 °C)  Min: 97.7 °F (36.5 °C)  Max: 99.4 °F (37.4 °C)       Intake/Output Summary (Last 24 hours) at 3/26/2021 0954  Last data filed at 3/26/2021 0537  Gross per 24 hour   Intake 650 ml   Output 1425 ml   Net -775 ml       Physical Exam:   General appearance: alert, appears stated age, cooperative and no distress  Head: Normocephalic, without obvious abnormality, atraumatic  Back: No CVA tenderness  Abdomen: Soft, non-tender, non-distended  : 16fr benítez catheter in place, urine fany colored, no clots noted.     Labs:   WBC:    Lab Results   Component Value Date    WBC 15.5 2021      Hemoglobin/Hematocrit:    Lab Results   Component Value Date    HGB 8.1 2021    HCT 25.0 2021      BMP:   Lab Results   Component Value Date     2021    K 4.7 2021     2021    CO2 21 2021    BUN 44 2021    LABALBU 2.3 2021    CREATININE 2.5 2021    CALCIUM 7.3 2021    GFRAA 24 2021    LABGLOM 20 2021     Blood Culture: NO GROWTH AT 48 HOURS     Imaging:  Echocardiogram Complete 2d With Doppler With Color    Result Date: 3/10/2021  Transthoracic Echocardiography Report (TTE)  Demographics   Patient Name       Benedicto Holguin     Date of Study       03/10/2021   Date of Birth      1965        Gender              Female   Age                50 year(s)        Race                   Patient Number     [id number]        Room Number   Visit Number       [id number]   Corporate ID       [id number]   Accession Number   [id number]        Sonographer         Pili Mccain                                                           RDCS, RDMS, RVT   Ordering Physician Lianne Wade MD  Interpreting        [facility]                                       Physician           Karlyn Schilder MD  Procedure Type of Study   TTE procedure:ECHOCARDIOGRAM COMPLETE 2D W DOPPLER W COLOR. Procedure Date Date: 03/10/2021 Start: 12:56 PM Study Location: [facility] - Echo Lab Technical Quality: Limited visualization due to poor acoustical window. Indications:Preop cardiac evaluation. Patient Status: Routine Height: 61 inches Weight: 109 pounds BSA: 1.46 m2 BMI: 20.6 kg/m2 Rhythm: Sinus tachycardia HR: 129 bpm BP: 161/93 mmHg  Conclusions   Summary  Technically difficult study due to breast lesions/scarring. Left ventricular systolic function is normal.  Ejection fraction is visually estimated at 60%. No significant valvular disease noted. No evidence of any pericardial effusion. Left pleural effusion. Signature   ------------------------------------------------------------------  Electronically signed by Brooklyn Fernandez MD  (Interpreting physician) on 03/10/2021 at 04:58 PM  ------------------------------------------------------------------   Findings   Left Ventricle  Left ventricular systolic function is normal.  Ejection fraction is visually estimated at 60%. Left Atrium  Essentially normal left atrium.    Right Atrium  Essentially normal right atrium. Right Ventricle  Essentially normal right ventricle. Aortic Valve  Structurally normal aortic valve. Mitral Valve  Trace mitral regurgitation is present. Tricuspid Valve  Trace tricuspid regurgitation; normal RVSP. Pulmonic Valve  The pulmonic valve was not well visualized. Pericardial Effusion  No evidence of any pericardial effusion. Pleural Effusion  Left pleural effusion.   M-Mode/2D Measurements & Calculations   LV Diastolic Dimension:  LV Systolic Dimension:  LA Dimension: 2.3 cmAO Root  3.52 cm                  2.43 cm                 Dimension: 2.5 cmLA Area:  LV FS:31 %               LV Volume Diastolic: 41 64.8 cm2  LV PW Diastolic: 6.10 cm ml  LV PW Systolic: 6.77 cm  LV Volume Systolic: 16  Septum Diastolic: 9.60   ml  cm                       LV EDV/LV EDV Index: 41 RV Diastolic Dimension:  Septum Systolic: 8.93 cm BT/38 Z1DW ESV/LV ESV   2.83 cm  CO: 6.91 l/min           Index: 16 ml/11 m2  CI: 4.73 l/m*m2          EF Calculated (A4C): 61 LA/Aorta: 0.92                           %  LV Area Diastolic: 73.4  EF Calculated (2D):     LA volume/Index: 23 ml  cm2                      59.7 %                  /91O0  LV Area Systolic: 90.9  cm2                      LV Length: 7.06 cm                            LVOT: 1.9 cm  Doppler Measurements & Calculations                           AV Peak Velocity: 134 cm/s   LVOT Peak Velocity: 110                          AV Peak Gradient: 7.18 mmHg  cm/s                          AV Mean Velocity: 101 cm/s   LVOT Mean Velocity:                          AV Mean Gradient: 4 mmHg     78.9 cm/s                          AV VTI: 19.7 cm              LVOT Peak Gradient: 5  MV E' Septal Velocity:  AV Area (Continuity):2.72    mmHgLVOT Mean Gradient:  7.13 cm/s               cm2                          3 mmHg  MV E' Lateral Velocity:  12.1 cm/s               LVOT VTI: 18.9 cm      Echocardiogram Limited    Result Date: 3/24/2021  Transthoracic Echocardiography Report (TTE)  Demographics   Patient Name       Kate Mcmahon      Date of Study       03/24/2021   Date of Birth      1965         Gender              Female   Age                50 year(s)         Race                   Patient Number     [id number]         Room Number         5818   Visit Number       [id number]   Corporate ID       [id number]   Accession Number   [id number]         Nasreen Au SHANTELLE   Ordering Physician Chay Aguilera MD                 Physician           MD  Procedure Type of Study   TTE procedure:ECHOCARDIOGRAM LIMITED. Procedure Date Date: 03/24/2021 Start: 02:20 PM Study Location: Portable Technical Quality: Adequate visualization Indications:Pericardial effusion. Patient Status: Routine Height: 61 inches Weight: 139 pounds BSA: 1.62 m2 BMI: 26.26 kg/m2 HR: 129 bpm BP: 155/87 mmHg  Conclusions   Summary  This is a limited echocardiogram.  Left ventricular systolic function is normal.  Ejection fraction is visually estimated at 55-60%. Mild pericardial effusion  Pleural effusion present. Signature   ------------------------------------------------------------------  Electronically signed by Terence Loco MD (Interpreting  physician) on 03/24/2021 at 03:29 PM  ------------------------------------------------------------------   Findings   Left Ventricle  Left ventricular systolic function is normal.  Ejection fraction is visually estimated at 55-60%. Pericardial Effusion  Mild pericardial effusion   Pleural Effusion  Pleural effusion present.   M-Mode/2D Measurements & Calculations   LV Diastolic Dimension: 5.68 cm LV Systolic Dimension: 8.34 cm  LV FS:22.9 %                    LV Volume Diastolic: 42 ml  LV PW Diastolic: 1.05 cm        LV Volume Systolic: 17 ml  LV PW Systolic: 1.4 cm          LV EDV/LV EDV Index: 42 ml/26 m2LV ESV/LV  Septum Diastolic: 5.08 cm       ESV Index: 17 ml/10 m2  Septum Systolic: 7.55 cm        EF Calculated (A4C): 59.5 %                                  EF Calculated (2D): 47.2 %   LV Area Diastolic: 92.4 cm2     LV Length: 6.84 cm  LV Area Systolic: 74.6 cm2      Ct Abdomen Pelvis Wo Contrast Additional Contrast? None    Result Date: 3/23/2021  EXAMINATION: CT OF THE ABDOMEN AND PELVIS WITHOUT CONTRAST 3/23/2021 11:21 am TECHNIQUE: CT of the abdomen and pelvis was performed without the administration of intravenous contrast. Multiplanar reformatted images are provided for review. Dose modulation, iterative reconstruction, and/or weight based adjustment of the mA/kV was utilized to reduce the radiation dose to as low as reasonably achievable. COMPARISON: Renal ultrasound 03/23/2021. CT chest, abdomen, and pelvis 01/20/2021. HISTORY: ORDERING SYSTEM PROVIDED HISTORY: please evaluate the hydonephrosis on renal US TECHNOLOGIST PROVIDED HISTORY: Reason for exam:->please evaluate the hydonephrosis on renal US Additional Contrast?->None Is the patient pregnant?->No Reason for Exam: please evaluate the hydonephrosis on renal US Acuity: Acute Type of Exam: Initial FINDINGS: Lower Chest: Small right and large left pleural effusions. Mild right basilar atelectasis. Left lower lobe atelectasis. Small pericardial effusion. Organs: Lack of intravenous contrast limits evaluation of the solid organs, vascular structures, and bowel. 5 mm low-density lesion in the liver dome not significantly changed on axial image 25 too small to definitively characterize. The liver is otherwise unremarkable. The gallbladder is normal in appearance. No biliary ductal dilatation. The pancreas, spleen, and bilateral adrenal glands are unremarkable. Moderate bilateral hydroureteronephrosis. This is new on the right and not significantly changed on the left compared with 01/20/2021.   No obstructing stone or mass identified. No focal renal abnormality. GI/Bowel: The colon is unremarkable. No evidence of acute appendicitis. No obstruction or wall thickening definitely seen. Pelvis: The urinary bladder is partially decompressed with questionable circumferential wall thickening. No urinary bladder stone identified. The uterus and right adnexa are unremarkable. The left ovary continues to increased in size now measuring approximately 3.2 x 1.9 cm on axial image 109 (previously 1.8 x 1.6 cm). Left external iliac node measuring 1.7 x 1.2 cm (previously 1.9 x 1.2 cm). Right common iliac node measuring 1.2 x 0.7 cm (previously 1.1 x 0.9 cm). Right external iliac node measuring 2.5 x 2.1 cm (previously 2.5 x 1.6 cm). Small volume of free fluid in the pelvis. Peritoneum/Retroperitoneum: The abdominal aorta is normal in appearance. Trace ascites. No drainable fluid collection or pneumoperitoneum. No retroperitoneal or mesenteric lymphadenopathy. Bones/Soft Tissues: Diffuse body wall edema. 4.1 x 2.4 cm calcified mass within the proximal right adductor musculature (previously 3.7 x 2 cm). New somewhat ill-defined partially calcified mass within the right gluteus medius muscle measuring 3.5 x 2.6 cm. Extensive sclerotic metastases without significant change. Mild chronic T9 and T10 pathologic compression fractures without significant change. No acute osseous abnormality. 1. Moderate bilateral hydroureteronephrosis new on the right and not significantly changed on the left compared with 26/62/5036 of uncertain etiology. No obstructing stone or mass. Partially decompressed urinary bladder with circumferential wall thickening not excluded. 2. Fluid overload with small right and large left pleural effusions, diffuse body wall edema, small volume of ascites, and small pericardial effusion. 3. 6 bilateral iliac chain lymphadenopathy not significantly changed from 01/20/2021.   Increasing size of the left ovary of effusion with left lung opacity that may reflect atelectasis or pneumonia if the patient has fever or leukocytosis. Xr Chest Portable    Result Date: 3/15/2021  EXAMINATION: ONE XRAY VIEW OF THE CHEST 3/15/2021 7:33 am COMPARISON: CT scan of the chest 01/20/2021 HISTORY: ORDERING SYSTEM PROVIDED HISTORY: port TECHNOLOGIST PROVIDED HISTORY: Reason for exam:->port Reason for Exam: port placement in OR Acuity: Acute Type of Exam: Initial FINDINGS: Right-sided central venous catheter tip projects inferior to the right atrium at the IVC. Opacity in the left lung base. Hazy opacity in the bilateral upper lungs. Cardiac silhouette is poorly assessed. The bones are grossly unchanged. Right-sided central venous catheter tip projects inferior to the right atrium at the IVC. Bilateral opacities could be due to atelectasis or other airspace opacity. Consider repeat upright chest x-ray in PA and lateral views. Vl Dup Lower Extremity Venous Bilateral    Result Date: 3/25/2021  EXAMINATION: DUPLEX VENOUS ULTRASOUND OF THE BILATERAL LOWER EXTREMITIES, 3/25/2021 6:50 pm TECHNIQUE: Duplex ultrasound using B-mode/gray scaled imaging and Doppler spectral analysis and color flow was obtained of the bilateral lower extremities. COMPARISON: None. HISTORY: ORDERING SYSTEM PROVIDED HISTORY: leg edema, history of DVT TECHNOLOGIST PROVIDED HISTORY: Reason for exam:->leg edema, history of DVT Reason for Exam: bilat leg swelling; hx of RT DVT Of common femoral vein. Acuity: Acute Type of Exam: Initial FINDINGS: The visualized veins of the bilateral lower extremities are patent and free of echogenic thrombus. The veins demonstrate good compressibility with normal color flow study and spectral analysis. Slight to mild subcutaneous edema noted bilaterally in the calves. Left greater saphenous vein tortuous. No evidence of DVT or SVT in either lower extremity.      Cta Chest W Contrast    Result Date: 3/25/2021  EXAMINATION: CTA OF THE CHEST 3/24/2021 10:28 pm TECHNIQUE: CTA of the chest was performed after the administration of intravenous contrast.  Multiplanar reformatted images are provided for review. MIP images are provided for review. Dose modulation, iterative reconstruction, and/or weight based adjustment of the mA/kV was utilized to reduce the radiation dose to as low as reasonably achievable. COMPARISON: Radiograph March 22, 2021 HISTORY: ORDERING SYSTEM PROVIDED HISTORY: Shortness of breath TECHNOLOGIST PROVIDED HISTORY: Reason for exam:->rule out PE Reason for Exam: SOB Acuity: Acute Type of Exam: Initial Additional signs and symptoms: no Relevant Medical/Surgical History: 54 ml isovue 370 used FINDINGS: Pulmonary Arteries: Pulmonary arteries are adequately opacified for evaluation. Moderate respiratory motion artifact. No evidence of intraluminal filling defect to suggest pulmonary embolism. Main pulmonary artery is 32 mm in caliber. Mediastinum: No evidence of mediastinal lymphadenopathy. The heart and pericardium demonstrate no acute abnormality. There is no acute abnormality of the thoracic aorta. Multiple small thyroid nodules redemonstrated, unchanged (no FDG uptake on recent PET-CT). Lungs/pleura: Large left pleural effusion with collapse of the lower lobe and portion of the left upper lobe. Moderate right pleural effusion. Dependent atelectasis on the right. Upper Abdomen: Mild-to-moderate collecting system dilatation noted of the kidneys bilaterally. Soft Tissues/Bones: Diffuse body wall edema. Bilateral axillary adenopathy redemonstrated. Relatively diffuse sclerotic changes of the spine, sternum and several ribs compatible with known metastatic disease. No evidence of pulmonary embolism. Sensitivity for subsegmental pulmonary emboli is moderately reduced due to respiratory motion artifact. Large left and moderate right pleural effusions with associated atelectasis.  Dilated pulmonary trunk suggesting

## 2021-03-26 NOTE — CARE COORDINATION
D/c plan remains the same. Called [facility] to verify they are able to accept pt's insurance. Spoke with Kalie Stringer and she informed CM that they did not receive the facesheet that this CM faxed  On 03/24. Facesheet faxed again today. [facility] will notify CM if they are in network with pt's insurance or not. Home with daughter and [facility] if they are in network with pt's insurance. Hospital bed, w/c, and BSC were ordered from [facility] on 03/24. Daughter is aware to contact [place] for delivery of DME.   TE

## 2021-03-26 NOTE — ADT AUTH CERT
Renal Failure, Acute - Care Day 5 (3/26/2021) by Chyna Osei RN       Review Status Review Entered   Completed 3/26/2021 14:52      Criteria Review      Care Day: 5 Care Date: 3/26/2021 Level of Care: Intermediate Care    Guideline Day 3    Clinical Status    ( ) * Mental status at baseline or improved    ( ) * Respiratory status at baseline or improved    Routes    ( ) * Oral hydration, medications, and diet    * Milestone   Additional Notes   3/26/2021      Care day 5      Med surg       VS: 97.8 16 94 125/83  94% RA      Internal medicine H&P      Plan:    Encourage ambulation and out of bed and up to chair.  Continue respiratory treatments.  Advance diet as tolerated.  Give fluids.   (Waiting for labs - to make dialysis decision   Small fluid bolus now   Mart out with voiding trial       Must get oob again today   Denies wanting SNF and she has good support at home   hhc to be ordered     DVT: lovenox or PO medications   GI prophylaxis: PO intake or medicated       Nephrology progress note:  ELIDIA (acute kidney injury) ([facility] [address] 03/22/2021   · Left ovarian enlargement 01/26/2021   · Iron deficiency anemia 01/07/2021   · Malabsorption 01/07/2021   · Dehydration 11/20/2020   · Malignant neoplasm of central portion of left female breast ([facility] [address] 07/31/2020   · Metastatic breast cancer ([facility] [address] 07/31/2020   · Secondary cancer of bone ([facility] [address] 04/01/2020   · Essential hypertension 04/01/2020   · Left breast mass         ATN: cr 6 on admission// UA bland//renal US with HN//HD FROM 3/22 TO 3/24 FOR HIGH K   Hyperkalemia   Metabolic acidosis   B/L HN: s/p B/L ureteral stents on 3/25/21   Metastatic beast cancer    Plan:   Making urine so awaiting BMP to decide on HD or not         Urology progress note:   Assessment & Plan:     Ben Hernandez is a 50y.o. year old female admitted 3/22/2021 for ELIDIA. H/o metastatic breast cancer.  Known to Dr. Meadows Slice   1) Bilateral Hydronephrosis               POD #1: Cystoscopy, bilateral ureteral stent placement               CT a/p 3/23/21: Moderate bilateral hydroureteronephrosis new on the right and not significantly changed on the left compared with 79/57/0367 of uncertain etiology.  No obstructing stone or mass.  Partially decompressed urinary bladder with circumferential wall thickening not excluded.               RALPH 3/23/21: Increased echogenicity of the renal cortices which is nonspecific but can be seen with medical renal disease. Mild-to-moderate bilateral hydronephrosis.                 Pt seen in our office by Dr. Bekah Barragan 2/11/21 and plan was for cystoscopy and CT urogram. This was not performed d/t pt's busy schedule undergoing chemotherapy.               Recommend benítez removal and voiding trial prior to discharge.               Pt to follow up with Dr. Bekah Barragan in 2 weeks for reevaluation. 2) ELIDIA               Cr 2.5, improved from 7.5 upon admission. Cr 1.3 in Feb 2021.               Did not receive HD yesterday or today               Nephrology on board    Pt stable from a  standpoint. Will sign off, please call with any questions. Pt to follow up with Dr. Bekah Barragan in 2 weeks for reevaluation.    Cardiology progress note:  Continue with beta-blocker. Dehydration: IV fluids. Renal failure on dialysis.       Hematology progress note:  Venous Doppler study of lower extremity on March 25, 2021 showed   No evidence of DVT or SVT in either lower extremity. She has cystoscopy and bilateral ureteral stent placement on March 25, 2021. Cardiologist increase her metoprolol to 100 mg daily. Temperature 99.4, pulse 105, respirate 17, blood pressure 151/99, saturation 94%.  Weight 140 pounds. She is awake, alert and in no acute distress. Supple, no JVD, pupils are reactive. S1-S2 no murmur. Clear to auscultation anteriorly   Soft, bowel sounds present.  Nontender and nondistended. Lower extremity edema.    Assessment and plan:   She has bilateral hydronephrosis related to metastatic breast cancer.  She had cystoscopy and bilateral ureteral placement yesterday. Yesterday creatinine was 2.8 and potassium 4.3. To continue with physical therapy.  She consider to resume chemotherapy as out patient next week. If she is discharged home I recommend to follow-up with me at the cancer center. Meds:  ·  metoprolol succinate, 100 mg, Oral, Daily   ·  cetirizine, 10 mg, Oral, Daily   ·  heparin (porcine), 5,000 Units, Subcutaneous, 3 times per day   ·  cefepime, 2,000 mg, Intravenous, Q24H       PRN meds:   Norco 5/325 mg q 6 hrs prn po x 1                      Renal Failure, Acute - Care Day 4 (3/25/2021) by Woodford Sacks, RN       Review Status Review Entered   Completed 3/26/2021 14:52      Criteria Review      Care Day: 4 Care Date: 3/25/2021 Level of Care: Intermediate Care    Guideline Day 3    Clinical Status    ( ) * Mental status at baseline or improved    ( ) * Respiratory status at baseline or improved    Routes    ( ) * Oral hydration, medications, and diet    * Milestone   Additional Notes   3/25/2021      Care day 4      Med surg       VS: 99.0 15 107 19/95 92% 2 lpm nc       Internal medicine progress note:    Plan:    Encourage ambulation and out of bed and up to chair.  Continue respiratory treatments.  Advance diet as tolerated.  Give fluids.   (HD per nephrology   NPO for Procedure today   oob and ambulate today      Nephrology progress note:      ATN: cr 6 on admission// UA bland//renal US with HN   Hyperkalemia   Metabolic acidosis   B/L HN   Metastatic beast cancer    Plan:   HD tomorrow   S/p iv contrast yesterday, may end up being d/c'ed on HD   Monitor uop      Urology progress note:      1) Bilateral Hydronephrosis               CT a/p 3/23/21:  Moderate bilateral hydroureteronephrosis new on the right and not significantly changed on the left compared with 75/27/2309 of uncertain etiology.  No obstructing stone or mass.  Partially decompressed urinary bladder with circumferential wall thickening not excluded.               RALPH 3/23/21: Increased echogenicity of the renal cortices which is nonspecific but can be seen with medical renal disease. Mild-to-moderate bilateral hydronephrosis.                 Pt seen in our office by Dr. Danna Rolle 2/11/21 and plan was for cystoscopy and CT urogram               Rapid Covid test ordered                NPO since midnight               Plan for cystoscopy, bilateral RGPG, and bilateral ureteral stent placement today at 5pm with Dr. Bib Meyer   2) ELIDIA               Cr 2.8, improved from 7.5 upon admission. Cr 1.3 in Feb 2021.               On HD               Nephrology on board      Hematology progress note:  She is scheduled for renal stent placement on March 25, 2021. Today's creatinine was 2.8.  Potassium was 4.3.  Hemoglobin was 8.1, WBC 15.5, platelets 682. No acute pain today.  No nausea, vomiting or diarrhea. Temperature 99, pulse 107, respiratory 15, blood pressure 149/95. Awake, alert, in no acute distress. Supple, no JVD, pupils are reactive.  Positive pallor. S1-S2 regular. Clear to auscultation anteriorly. Soft, bowel sounds present, nontender nondistended. No cyanosis. Assessment and plan:   She has metastatic breast cancer.  She plans to resume chemo as an outpatient. She is scheduled for renal stent placement today. Appreciate cardiology's input. She started on metoprolol. She is on Maxipime.       Urology brief op note:   Pre-Op Diagnosis: 1.  Bilateral hydronephrosis secondary to metastatic breast cancer                                 2.  Acute renal failure       Post-Op Diagnosis: Same         Procedure   1.  Cystoscopy and bilateral ureter stent placement          Abnormal labs:   BUN 41 MG/DL      CREATININE 2.8 MG/DL      Glucose 104 MG/DL      Calcium 8.0 MG/DL      GFR Non-African American 18 mL/min/1.73m2      GFR African American 21 mL/min/1.73m2       Meds:  ·  metoprolol succinate, 100 mg, Oral, Daily   ·  cetirizine, 10 mg, Oral, Daily   ·  heparin (porcine), 5,000 Units, Subcutaneous, 3 times per day   ·  cefepime, 2,000 mg, Intravenous, Q24H       PRN meds:   Norco 5/325 mg q 6 hrs prn po x 1         Findings: 1.  Nephrogram on right from recent CT Scan chest with IV contrast                   2.  No left nephrogram                   3.  Global pale color of global urothelium, friable urothelium                    4.   Severe atrophic vaginitis                   5.  Placed benítez catheter to promote drainage          Renal Failure, Acute - Care Day 3 (3/24/2021) by Annia Mejia RN       Review Status Review Entered   Completed 3/26/2021 14:51      Criteria Review      Care Day: 3 Care Date: 3/24/2021 Level of Care: Intermediate Care    Guideline Day 3    Clinical Status    ( ) * Mental status at baseline or improved    ( ) * Respiratory status at baseline or improved    Routes    ( ) * Oral hydration, medications, and diet    * Milestone   Additional Notes   3/24/2021      Care day 3      Med surg       VS: 97.6 26 128 160/88 94% RA      Internal medicine progress note:  Plan:    Encourage ambulation and out of bed and up to chair.  Continue respiratory treatments.  Advance diet as tolerated.  Give fluids.   (       DVT: lovenox or PO medications   GI prophylaxis: PO intake or medicated    [unfilled]).        HD today   Very pleasant [place] woman   Appreciate consultants      Urology consult:  Assessment & Plan:        Florencia Henry is a 50y.o. year old female admitted 3/22/2021 for ELIDIA. H/o metastatic breast cancer. Known to Dr. Leena Shukla.       1) Bilateral Hydronephrosis               CT a/p 3/23/21:  Moderate bilateral hydroureteronephrosis new on the right and not significantly changed on the left compared with 06/33/6417 of uncertain etiology.  No obstructing stone or mass.  Partially decompressed urinary bladder with circumferential wall thickening not excluded.               RALPH 3/23/21: history of DVT noted, patient previously was on anticoagulation but was stopped.  Will obtain CTA of the chest to rule out pulmonary embolism.  Will defer anticoagulation to hematology at this point.  Start patient on metoprolol for tachycardia. 2. Essential hypertension: Blood pressure 173/96.  Start patient on Toprol-XL   3. Sepsis: Currently on IV antibiotics   4. Metastatic breast cancer: S/p chemotherapy, hematology/oncology follow-up   5. Anemia of chronic disease/malignancy: Hemoglobin is stable. 6. Bilateral hydronephrosis and renal failure on dialysis currently.       Abnormal labs:   CL 98   BUN 59   Creatinine 3.7   Glucose 121         Meds:  ·  metoprolol succinate, 100 mg, Oral, Daily   ·  cetirizine, 10 mg, Oral, Daily   ·  heparin (porcine), 5,000 Units, Subcutaneous, 3 times per day   ·  cefepime, 2,000 mg, Intravenous, Q24H

## 2021-03-27 NOTE — DISCHARGE SUMMARY
Discharge Summary    Date: 3/27/2021  Patient Name: Kimberlee Torres YOB: 1965 Age: 50 y.o. Admit Date: 3/22/2021  Discharge Date:  Discharge Condition:    Admission Diagnosis  Hyperkalemia (E87.5); [facility] (N17.9); Metastatic breast cancer ([facility] (C50.919); Leukocytosis, unspecified type (D72.829)     Discharge Diagnosis  Active Problems: ELIDIA (acute kidney injury) ([facility] TachycardiaResolved Problems: * No resolved hospital problems. [facility] Stay  Narrative of Hospital Course:      Consultants:  IP CONSULT TO NEPHROLOGYIP CONSULT TO CASE MANAGEMENTIP CONSULT TO INTERVENTIONAL RADIOLOGYIP CONSULT TO HOSPITALISTIP CONSULT TO INTERVENTIONAL RADIOLOGYIP CONSULT TO ONCOLOGYIP CONSULT TO SOCIAL WORKIP CONSULT TO UROLOGYIP CONSULT TO CARDIOLOGY    Surgeries/procedures Performed:       Treatments:           Discharge Plan/Disposition:  Home    Hospital/Incidental Findings Requiring Follow Up:    Patient Instructions:    Diet:    Activity:  For number of days (if applicable): Other Instructions:    Provider Follow-Up:   No follow-ups on file.      Significant Diagnostic Studies:    Recent Labs:  Admission on 03/22/2021WBC                                    Date: 03/22/2021Value: 19.4*       Ref range: 4.0 - 10.5 K/CU *  Status: FinalRBC                                           Date: 03/22/2021Value: 3.34*       Ref range: 4.2 - 5.4 M/CU MM  Status: FinalHemoglobin                                    Date: 03/22/2021Value: 8.3*        Ref range: 12.5 - 16.0 GM/DL  Status: FinalHematocrit                                    Date: 03/22/2021Value: 26.5*       Ref range: 37 - 47 %          Status: FinalMCV                                           Date: 03/22/2021Value: 79.3        Ref range: 78 - 100 FL        Status: [address] [place] [facility]                                           Date: 03/22/2021Value: 24.9*       Ref range: 27 - 31 PG         Status: [facility] Date: 03/22/2021Value: 31.3*       Ref range: 32.0 - 36.0 %      Status: FinalRDW                                           Date: 03/22/2021Value: 24.6*       Ref range: 11.7 - 14.9 %      Status: FinalPlatelets                                     Date: 03/22/2021Value: 666*        Ref range: 140 - 440 K/CU MM  Status: FinalMPV                                           Date: 03/22/2021Value: 8.4         Ref range: 7.5 - 11.1 FL      Status: FinalBands Relative                                Date: 03/22/2021Value: 2*          Ref range: 5 - 11 %           Status: FinalSegs Relative                                 Date: 03/22/2021Value: 91.0*       Ref range: 36 - 66 %          Status: FinalEosinophils %                                 Date: 03/22/2021Value: 1.0         Ref range: 0 - 3 %            Status: FinalLymphocytes %                                 Date: 03/22/2021Value: 5.0*        Ref range: 24 - 44 %          Status: FinalMonocytes %                                   Date: 03/22/2021Value: 1.0         Ref range: 0 - 4 %            Status: FinalBands Absolute                                Date: 03/22/2021Value: 0.39        Ref range: K/CU MM            Status: FinalSegs Absolute                                 Date: 03/22/2021Value: 17.6        Ref range: K/CU MM            Status: FinalEosinophils Absolute                          Date: 03/22/2021Value: 0.2         Ref range: K/CU MM            Status: FinalLymphocytes Absolute                          Date: 03/22/2021Value: 1.0         Ref range: K/CU MM            Status: FinalMonocytes Absolute                            Date: 03/22/2021Value: 0.2         Ref range: K/CU MM            Status: FinalDifferential Type                             Date: 03/22/2021Value: MANUAL DIFFERENTIAL                     Status: FinalPolychromasia                                 Date: 03/22/2021Value: 1+            Status: FinalPLT Morphology Date: 03/22/2021Value: RARE          Status: Final              Comment: LARGEPLT NOTED ON SCANSodium                                        Date: 03/22/2021Value: 129*        Ref range: 135 - 145 MMOL/L   Status: FinalPotassium                                     Date: 03/22/2021Value: 6.6*        Ref range: 3.5 - 5.1 MMOL/L   Status: Final              Comment: K CALLED TO SUNSHINEKENNEDY RN @ [phone] [phone] MARKKOREYJUSTICE MLTRESULTS READ BACKChloride                                      Date: 03/22/2021Value: 92*         Ref range: 99 - 110 mMol/L    Status: FinalCO2                                           Date: 03/22/2021Value: 14*         Ref range: 21 - 32 MMOL/L     Status: FinalBUN                                           Date: 03/22/2021Value: 132*        Ref range: 6 - 23 MG/DL       Status: FinalCREATININE                                    Date: 03/22/2021Value: 7.5*        Ref range: 0.6 - 1.1 MG/DL    Status: FinalGlucose                                       Date: 03/22/2021Value: 139*        Ref range: 70 - 99 MG/DL      Status: FinalCalcium                                       Date: 03/22/2021Value: 8.5         Ref range: 8.3 - 10.6 MG/DL   Status: FinalAlbumin                                       Date: 03/22/2021Value: 2.3*        Ref range: 3.4 - 5.0 GM/DL    Status: FinalTotal Protein                                 Date: 03/22/2021Value: 7.1         Ref range: 6.4 - 8.2 GM/DL    Status: FinalTotal Bilirubin                               Date: 03/22/2021Value: 0.3         Ref range: 0.0 - 1.0 MG/DL    Status: FinalALT                                           Date: 03/22/2021Value: 8*          Ref range: 10 - 40 U/L        Status: FinalAST                                           Date: 03/22/2021Value: 48*         Ref range: 15 - 37 IU/L       Status: FinalAlkaline Phosphatase                          Date: 03/22/2021Value: 120         Ref range: 40 - 129 IU/L      Status: FinalGFR Non- American                      Date: 03/22/2021Value: 6*          Ref range: >60 mL/min/1.73m2  Status: FinalGFR                           Date: 03/22/2021Value: 7*          Ref range: >60 mL/min/1.73m2  Status: FinalAnion Gap                                     Date: 03/22/2021Value: 23*         Ref range: 4 - 16             Status: FinalTroponin T                                    Date: 03/22/2021Value: 0.015*      Ref range: <0.01 NG/ML        Status: Final              Comment: (NOTE)PATIENTS WITH HIGH LEVELS OF BIOTIN ORAL INTAKE (ie >5mg/day) MAY HAVE FALSELY DECREASED TROPONIN T LEVELS. SAMPLES COLLECETED WITHIN 8 HOURS OF BIOTIN INTAKE MAY REQUIRE ADDITIONAL INFORMATION FOR DIAGNOSIS. Ventricular Rate                              Date: 03/22/2021Value: 249         Ref range: BPM                Status: FinalAtrial Rate                                   Date: 03/22/2021Value: 220         Ref range: BPM                Status: FinalP-R Interval                                  Date: 03/22/2021Value: 96          Ref range: ms                 Status: FinalQRS Duration                                  Date: 03/22/2021Value: 148         Ref range: ms                 Status: FinalQ-T Interval                                  Date: 03/22/2021Value: 182         Ref range: ms                 Status: FinalQTc Calculation (Bazett)                      Date: 03/22/2021Value: 370         Ref range: ms                 Status: FinalR Axis                                        Date: 03/22/2021Value: 77          Ref range: degrees            Status: FinalT Axis                                        Date: 03/22/2021Value: 45          Ref range: degrees            Status: FinalDiagnosis                                     Date: 03/22/2021Value:               Status: Final                 Value:Undetermined rhythmNonspecific intraventricular blockCannot rule out Septal infarct , age undeterminedAbnormal ECGNo previous ECGs availableConfirmed by Silva Thornton ([id number]) on 3/23/2021 5:30:40 PMColor, UA                                     Date: 03/22/2021Value: STRAW*      Ref range: YELLOW             Status: FinalClarity, UA                                   Date: 03/22/2021Value: CLEAR       Ref range: CLEAR              Status: FinalGlucose, Urine                                Date: 03/22/2021Value: NEGATIVE    Ref range: NEGATIVE MG/DL     Status: FinalBilirubin Urine                               Date: 03/22/2021Value: NEGATIVE    Ref range: NEGATIVE MG/DL     Status: FinalKetones, Urine                                Date: 03/22/2021Value: NEGATIVE    Ref range: NEGATIVE MG/DL     Status: FinalSpecific Gravity, UA                          Date: 03/22/2021Value: 1.010       Ref range: 1.001 - 1.035      Status: FinalBlood, Urine                                  Date: 03/22/2021Value: SMALL*      Ref range: NEGATIVE           Status: FinalpH, Urine                                     Date: 03/22/2021Value: 6.0         Ref range: 5.0 - 8.0          Status: FinalProtein, UA                                   Date: 03/22/2021Value: 30*         Ref range: NEGATIVE MG/DL     Status: FinalUrobilinogen, Urine                           Date: 03/22/2021Value: NEGATIVE    Ref range: 0.2 - 1.0 MG/DL    Status: FinalNitrite Urine, Quantitative                   Date: 03/22/2021Value: NEGATIVE    Ref range: NEGATIVE           Status: FinalLeukocyte Esterase, Urine                     Date: 03/22/2021Value: NEGATIVE    Ref range: NEGATIVE           Status: FinalRBC, UA                                       Date: 03/22/2021Value: 1           Ref range: 0 - 6 /HPF         Status: [address], UA                                       Date: 03/22/2021Value: 2           Ref range: 0 - 5 /HPF         Status: FinalBacteria, UA                                  Date: 03/22/2021Value: NEGATIVE    Ref range: NEGATIVE /HPF      Status: Date: 03/22/2021Value: 334         Ref range: ms                 Status: FinalQTc Calculation (Bazett)                      Date: 03/22/2021Value: 462         Ref range: ms                 Status: FinalP Axis                                        Date: 03/22/2021Value: 61          Ref range: degrees            Status: FinalR Axis                                        Date: 03/22/2021Value: 77          Ref range: degrees            Status: FinalT Axis                                        Date: 03/22/2021Value: 50          Ref range: degrees            Status: FinalDiagnosis                                     Date: 03/22/2021Value:               Status: Final                 Value:Sinus tachycardiaLow voltage QRSBorderline ECGWhen compared with ECG of 22-MAR-2021 13:11,Previous ECG has undetermined rhythm, needs reviewCriteria for Septal infarct are no longer presentNonspecific T wave abnormality no longer evident in Inferior leadsNonspecific T wave abnormality, improved in Anterolateral leadsConfirmed by Dhiraj Mitchell ([id number]) on 3/23/2021 5:33:13 PMPotassium                                     Date: 03/22/2021Value: 6.2*        Ref range: 3.5 - 5.1 MMOL/L   Status: Final              Comment: NO EVIDENCE OF HEMOLYSISCALLED AKASH LINDSAY/[id number]/MLT K SULLIVANRESULTS READ BACKPotassium                                     Date: 03/22/2021Value: 4.5         Ref range: 3.5 - 5.1 MMOL/L   Status: FinalSodium                                        Date: 03/23/2021Value: 134*        Ref range: 135 - 145 MMOL/L   Status: FinalPotassium                                     Date: 03/23/2021Value: 5.5*        Ref range: 3.5 - 5.1 MMOL/L   Status: Final              Comment: K CALLED TO MALISSA BRONSON BY FARZANA ROYAL ON 3.23.21 AT 0518 RESULTS READ BACKChloride                                      Date: 03/23/2021Value: 97*         Ref range: 99 - 110 mMol/L    Status: FinalCO2 Date: 03/23/2021Value: 16*         Ref range: 21 - 32 MMOL/L     Status: FinalAnion Gap                                     Date: 03/23/2021Value: 21*         Ref range: 4 - 16             Status: FinalBUN                                           Date: 03/23/2021Value: 79*         Ref range: 6 - 23 MG/DL       Status: FinalCREATININE                                    Date: 03/23/2021Value: 5.1*        Ref range: 0.6 - 1.1 MG/DL    Status: FinalGlucose                                       Date: 03/23/2021Value: 128*        Ref range: 70 - 99 MG/DL      Status: FinalCalcium                                       Date: 03/23/2021Value: 8.0*        Ref range: 8.3 - 10.6 MG/DL   Status: FinalGFR Non-                      Date: 03/23/2021Value: 9*          Ref range: >60 mL/min/1.73m2  Status: FinalGFR                           Date: 03/23/2021Value: 11*         Ref range: >60 mL/min/1.73m2  Status: FinalAlbumin                                       Date: 03/23/2021Value: 2.3*        Ref range: 3.4 - 5.0 GM/DL    Status: FinalPhosphorus                                    Date: 03/23/2021Value: 5.2*        Ref range: 2.5 - 4.9 MG/DL    Status: [address] [facility]                                           Date: 03/23/2021Value: 15.5*       Ref range: 4.0 - 10.5 K/CU *  Status: FinalRBC                                           Date: 03/23/2021Value: 3.29*       Ref range: 4.2 - 5.4 M/CU MM  Status: FinalHemoglobin                                    Date: 03/23/2021Value: 8.1*        Ref range: 12.5 - 16.0 GM/DL  Status: FinalHematocrit                                    Date: 03/23/2021Value: 25.0*       Ref range: 37 - 47 %          Status: FinalMCV                                           Date: 03/23/2021Value: 76.0*       Ref range: 78 - 100 FL        Status: [address] [place]                                           Date: 03/23/2021Value: 24.6*       Ref range: 27 - 31 PG         Status: [address] [place]  Date: 03/23/2021Value: 32.4        Ref range: 32.0 - 36.0 %      Status: FinalRDW                                           Date: 03/23/2021Value: 25.2*       Ref range: 11.7 - 14.9 %      Status: FinalPlatelets                                     Date: 03/23/2021Value: 612*        Ref range: 140 - 440 K/CU MM  Status: FinalMPV                                           Date: 03/23/2021Value: 8.5         Ref range: 7.5 - 11.1 FL      Status: FinalDifferential Type                             Date: 03/23/2021Value: AUTOMATED DIFFERENTIAL                     Status: FinalSegs Relative                                 Date: 03/23/2021Value: 90.4*       Ref range: 36 - 66 %          Status: FinalLymphocytes %                                 Date: 03/23/2021Value: 4.9*        Ref range: 24 - 44 %          Status: FinalMonocytes %                                   Date: 03/23/2021Value: 1.8         Ref range: 0 - 4 %            Status: FinalEosinophils %                                 Date: 03/23/2021Value: 0.0         Ref range: 0 - 3 %            Status: FinalBasophils %                                   Date: 03/23/2021Value: 0.1         Ref range: 0 - 1 %            Status: FinalSegs Absolute                                 Date: 03/23/2021Value: 14.0        Ref range: K/CU MM            Status: FinalLymphocytes Absolute                          Date: 03/23/2021Value: 0.8         Ref range: K/CU MM            Status: FinalMonocytes Absolute                            Date: 03/23/2021Value: 0.3         Ref range: K/CU MM            Status: FinalEosinophils Absolute                          Date: 03/23/2021Value: 0.0         Ref range: K/CU MM            Status: FinalBasophils Absolute                            Date: 03/23/2021Value: 0.0         Ref range: K/CU MM            Status: FinalNucleated RBC %                               Date: 03/23/2021Value: 0.3         Ref range: % Status: FinalTotal Nucleated RBC                           Date: 03/23/2021Value: 0.0         Ref range: K/CU MM            Status: FinalTotal Immature Neutrophil                     Date: 03/23/2021Value: 0.43        Ref range: K/CU MM            Status: FinalImmature Neutrophil %                         Date: 03/23/2021Value: 2.8*        Ref range: 0 - 0.43 %         Status: FinalPotassium                                     Date: 03/23/2021Value: 4.6         Ref range: 3.5 - 5.1 MMOL/L   Status: FinalPotassium                                     Date: 03/23/2021Value: 5.7*        Ref range: 3.5 - 5.1 MMOL/L   Status: Final              Comment: K CALLED TO PEE HINKLE RN @[id number] BY PEE RAMOSSRESULTS READ BACKPotassium                                     Date: 03/23/2021Value: 3.9         Ref range: 3.5 - 5.1 MMOL/L   Status: FinalPotassium                                     Date: 03/23/2021Value: 4.4         Ref range: 3.5 - 5.1 MMOL/L   Status: FinalCRP High Sensitivity                          Date: 03/23/2021Value: >300.0*     Ref range: <5.0 mg/L          Status: FinalProcalcitonin                                 Date: 03/23/2021Value: 4.87          Status: Final              Comment:       Suspected Sepsis:<0.50 ng/mLLow likelihood of sepsis. 0.50-2.00 ng/mLIncreased likelihood of sepsis. Antibiotics encouraged. >2.00 ng/mLHigh risk of sepsis/shock. Antibiotics strongly encouraged. Suspected Lower Respiratory Infections<0.24 ng/mLLow likelihood of bacterial infection. >0.24 ng/mLIncreased likelihood of bacterial infection. Antibiotics encouraged. With successful antibiotic therapy,PCT levels should decrease rapidly. (Half-life of 24 to 36 hours.)      Procalcitonin values from samples collected within the first 6 hours of systemic infections may still be low. Retesting may be indicated. Values from day 1 and day 4 can be entered into the Change in Procalcitonin Calculator ([email or web address]) to determine the patient's Mortality Risk Prognosis. In healthy neonates, plasma Procalcitonin (PCT) concentrations increase gradually after birth, reaching peak values at about 24 hours of age then decre                       ase to normal values below 0.5 ng/mL by 48-72 hours of age. Ventricular Rate                              Date: 03/22/2021Value: 254         Ref range: BPM                Status: FinalAtrial Rate                                   Date: 03/22/2021Value: 258         Ref range: BPM                Status: FinalQRS Duration                                  Date: 03/22/2021Value: 70          Ref range: ms                 Status: FinalQ-T Interval                                  Date: 03/22/2021Value: 136         Ref range: ms                 Status: FinalQTc Calculation (Bazett)                      Date: 03/22/2021Value: 279         Ref range: ms                 Status: FinalR Axis                                        Date: 03/22/2021Value: 78          Ref range: degrees            Status: FinalT Axis                                        Date: 03/22/2021Value: 73          Ref range: degrees            Status: FinalDiagnosis                                     Date: 03/22/2021Value:               Status: Final                 Value:Sinus tachycardiaConfirmed by Vy Dupree ([id number]) on 3/23/2021 5:31:18 [address] [facility] Glucose                                   Date: 03/23/2021Value: 120*        Ref range: 70 - 99 MG/DL      Status: FinalSodium                                        Date: 03/24/2021Value: 136         Ref range: 135 - 145 MMOL/L   Status: FinalPotassium                                     Date: 03/24/2021Value: 4.9         Ref range: 3.5 - 5.1 MMOL/L   Status: FinalChloride                                      Date: 03/24/2021Value: 98*         Ref range: 99 - 110 mMol/L    Status: FinalCO2                                           Date: 03/24/2021Value: 22 Ref range: 21 - 32 MMOL/L     Status: FinalAnion Gap                                     Date: 03/24/2021Value: 16          Ref range: 4 - 16             Status: FinalBUN                                           Date: 03/24/2021Value: 59*         Ref range: 6 - 23 MG/DL       Status: FinalCREATININE                                    Date: 03/24/2021Value: 3.7*        Ref range: 0.6 - 1.1 MG/DL    Status: FinalGlucose                                       Date: 03/24/2021Value: 121*        Ref range: 70 - 99 MG/DL      Status: FinalCalcium                                       Date: 03/24/2021Value: 6.9*        Ref range: 8.3 - 10.6 MG/DL   Status: Final              Comment: CA CALLED BY FARZANA ROYAL ON 3.24.21 AT 0623 TO [facility] RN RESULTS READ BACKGFR Non-                      Date: 03/24/2021Value: 13*         Ref range: >60 mL/min/1.73m2  Status: FinalGFR                           Date: 03/24/2021Value: 16*         Ref range: >60 mL/min/1.73m2  Status: FinalPotassium                                     Date: 03/24/2021Value: 3.5         Ref range: 3.5 - 5.1 MMOL/L   Status: FinalPOC Glucose                                   Date: 03/24/2021Value: 111*        Ref range: 70 - 99 MG/DL      Status: FinalPOC Glucose                                   Date: 03/24/2021Value: 113*        Ref range: 70 - 99 MG/DL      Status: FinalSource                                        Date: 03/24/2021Value: THROAT        Status: [id number]-5, NAAT                              Date: 03/24/2021Value: NOT DETECTED                     Status: Final              Comment:       Rapid NAAT: The specimen is NEGATIVE for SARS-CoV-2, the novel coronavirus associated with COVID-19. Negative results should be treated as presumptive and, if inconsistent with clinical signs and symptoms or necessary for patient management, should be tested with an alternate molecular assay. Negative results do not 03/26/2021Value: 21          Ref range: 21 - 32 MMOL/L     Status: FinalAnion Gap                                     Date: 03/26/2021Value: 15          Ref range: 4 - 16             Status: FinalBUN                                           Date: 03/26/2021Value: 44*         Ref range: 6 - 23 MG/DL       Status: FinalCREATININE                                    Date: 03/26/2021Value: 2.5*        Ref range: 0.6 - 1.1 MG/DL    Status: FinalGlucose                                       Date: 03/26/2021Value: 136*        Ref range: 70 - 99 MG/DL      Status: FinalCalcium                                       Date: 03/26/2021Value: 7.3*        Ref range: 8.3 - 10.6 MG/DL   Status: FinalGFR Non-                      Date: 03/26/2021Value: 20*         Ref range: >60 mL/min/1.73m2  Status: FinalGFR                           Date: 03/26/2021Value: 24*         Ref range: >60 mL/min/1.73m2  Status: FinalSodium                                        Date: 03/27/2021Value: 139         Ref range: 135 - 145 MMOL/L   Status: FinalPotassium                                     Date: 03/27/2021Value: 4.1         Ref range: 3.5 - 5.1 MMOL/L   Status: FinalChloride                                      Date: 03/27/2021Value: 105         Ref range: 99 - 110 mMol/L    Status: FinalCO2                                           Date: 03/27/2021Value: 22          Ref range: 21 - 32 MMOL/L     Status: FinalAnion Gap                                     Date: 03/27/2021Value: 12          Ref range: 4 - 16             Status: FinalBUN                                           Date: 03/27/2021Value: 35*         Ref range: 6 - 23 MG/DL       Status: FinalCREATININE                                    Date: 03/27/2021Value: 1.6*        Ref range: 0.6 - 1.1 MG/DL    Status: FinalGlucose                                       Date: 03/27/2021Value: 93          Ref range: 70 - 99 MG/DL      Status: FinalCalcium Date: 03/27/2021Value: 7.0*        Ref range: 8.3 - 10.6 MG/DL   Status: FinalGFR Non-                      Date: 03/27/2021Value: 34*         Ref range: >60 mL/min/1.73m2  Status: FinalGFR                           Date: 03/27/2021Value: 41*         Ref range: >60 mL/min/1.73m2  Status: FinalPOC Glucose                                   Date: 03/27/2021Value: 123*        Ref range: 70 - 99 MG/DL      Status: Final------------    Radiology last 7 days:  Ct Abdomen Pelvis Wo Contrast Additional Contrast? NoneResult Date: 3/23/63435. Moderate bilateral hydroureteronephrosis new on the right and not significantly changed on the left compared with 54/10/8511 of uncertain etiology. No obstructing stone or mass. Partially decompressed urinary bladder with circumferential wall thickening not excluded. 2. Fluid overload with small right and large left pleural effusions, diffuse body wall edema, small volume of ascites, and small pericardial effusion. 3. 6 bilateral iliac chain lymphadenopathy not significantly changed from 01/20/2021. Increasing size of the left ovary of uncertain etiology with an underlying neoplasm not excluded. Unchanged diffuse sclerotic metastases. 4. Mildly increased size of a calcified mass within the right proximal adductor musculature and new mass within the right gluteus medius muscle suspicious for metastatic disease. Xr Chest PortableResult Date: 3/22/2021New right internal jugular non tunneled hemodialysis catheter with no evidence of pneumothorax. Xr Chest PortableResult Date: 3/22/2021Increased moderate left pleural effusion with left lung opacity that may reflect atelectasis or pneumonia if the patient has fever or leukocytosis. Vl Dup Lower Extremity Venous BilateralResult Date: 3/25/2021No evidence of DVT or SVT in either lower extremity. Cta Chest W ContrastResult Date: 3/25/2021No evidence of pulmonary embolism.   Sensitivity for subsegmental pulmonary emboli is moderately reduced due to respiratory motion artifact. Large left and moderate right pleural effusions with associated atelectasis. Dilated pulmonary trunk suggesting pulmonary hypertension. Mild to moderate urinary collecting system dilatation bilaterally, new compared to prior PET-CT. Anasarca. Ir Nontunneled Vascular Catheter > 5 YearsResult Date: 3/23/2021Successful ultrasound  guided non-tunneled dialysis catheter placement. The catheter is ready to use. Us Retroperitoneal LimitedResult Date: 3/23/64624. Increased echogenicity of the renal cortices which is nonspecific but can be seen with medical renal disease. 2. Mild-to-moderate bilateral hydronephrosis. Pending Labs   Order Current Status  Culture, Blood 1 Preliminary result  Culture, Blood 2 Preliminary result      Discharge Medications    Current Discharge Medication ListSTART taking these medicationsmetoprolol succinate (TOPROL XL) 100 MG extended release tabletTake 1 tablet by mouth dailyQty: 30 tablet Refills: 0    Current Discharge Medication List    Current Discharge Medication ListCONTINUE these medications which have NOT CHANGEDamLODIPine (NORVASC) 5 MG tabletTAKE TWO TABLETS BY MOUTH DAILYQty: 30 tablet Refills: 2apixaban (ELIQUIS) 5 MG TABS tabletTake 1 tablet by mouth 2 times dailyQty: 60 tablet Refills: 5Associated Diagnoses:Metastatic breast carcinoma (HCC)cetirizine (ZYRTEC) 10 MG tabletTake 10 mg by mouth dailyondansetron (ZOFRAN) 8 MG tablet1 tab po q 8 hours PRN for chemo induced nausea/vomtingQty: 30 tablet Refills: 0OLANZapine (ZYPREXA) 2.5 MG tabletOne tablet HS for four nights starting the night before chemotherapyQty: 16 tablet Refills: 0docusate sodium (COLACE) 100 MG capsuleTake 1 capsule by mouth daily as needed for ConstipationQty: 30 capsule Refills: 0HYDROcodone-acetaminophen (NORCO) 5-325 MG per tabletTake 1 tablet by mouth every 6 hours as needed for Pain for up to 14 days. 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, estrogen receptor positive (HCC)acetaminophen (TYLENOL) 500 MG tabletTake 500 mg by mouth every 6 hours as needed for Painprochlorperazine (COMPAZINE) 10 MG tabletTake 1 tablet by mouth every 8 hours as needed (nausea)Qty: 60 tablet Refills: 1promethazine (PHENERGAN) 25 MG tabletTAKE ONE TABLET BY MOUTH EVERY 6 HOURS AS NEEDED FOR NAUSEAQty: 30 tablet Refills: 0AFINITOR 10 MG TABS chemo tabletTake 1 tablet by mouth dailyQty: 28 tablet Refills: 6Associated Diagnoses:Malignant neoplasm of central portion of left breast in female, estrogen receptor positive (HCC)exemestane (AROMASIN) 25 MG tabletTake 1 tablet by mouth dailyQty: 30 tablet Refills: 3    Current Discharge Medication ListSTOP taking these medicationsdexamethasone (DECADRON) 4 MG tabletComments:Reason for Stopping:    Time Spent on Discharge:E] minutes were spent in patient examination, evaluation, counseling as well as medication reconciliation, prescriptions for required medications, discharge plan, and follow up.     Electronically signed by Burnett Litten, DO on 3/27/21 at 12:54 PM EDT

## 2021-03-27 NOTE — PLAN OF CARE
Problem: Falls - Risk of:  Goal: Will remain free from falls  Description: Will remain free from falls  Outcome: Ongoing  Goal: Absence of physical injury  Description: Absence of physical injury  Outcome: Ongoing     Problem: Discharge Planning:  Goal: Discharged to appropriate level of care  Description: Discharged to appropriate level of care  Outcome: Ongoing     Problem: Pain:  Goal: Pain level will decrease  Description: Pain level will decrease  Outcome: Ongoing  Goal: Control of acute pain  Description: Control of acute pain  Outcome: Ongoing  Goal: Control of chronic pain  Description: Control of chronic pain  Outcome: Ongoing

## 2021-03-27 NOTE — PROGRESS NOTES
RENAL DOSE ADJUSTMENT MADE PER MEDICATION MANAGEMENT POLICY 083  (\"Dosing and Monitoring of Medications\")    PREVIOUS ORDER:  Cefepime 2gm q24h    NEW RENALLY ADJUSTED ORDER:  Cefepime 2gm q12h    RENAL LAB EVALUATION  Estimated Creatinine Clearance: 35 mL/min (A) (based on SCr of 1.6 mg/dL (H)). Recent Labs     03/25/21  0345 03/26/21  0555 03/27/21  0604   BUN 41* 44* 35*   CREATININE 2.8* 2.5* 1.6*       Will monitor CrCl & SCr daily and adjust frequency as renal function improves. SCr continues to improve daily.     Respectfully,  Laura Flores RPh, PharmD, BCPS  3/27/2021   11:28 AM

## 2021-03-27 NOTE — PROGRESS NOTES
normal,  Abdomen: soft, non-tender; non distended,   Extremities:ble edema and lue edema  Neurologic: alert, oriented, follows commands, interactive    Assessment and Plan:     Patient Active Problem List     Diagnosis Date Noted    ELIDIA (acute kidney injury) ([facility] 03/22/2021    Left ovarian enlargement 01/26/2021    Iron deficiency anemia 01/07/2021    Malabsorption 01/07/2021    Dehydration 11/20/2020    Mal[facility] of central portion of left female breast ([facility] 07/31/2020    Metastatic breast cancer ([facility] 07/31/2020    Secondary cancer of bone ([facility] 04/01/2020    Essential hypertension 04/01/2020    Left breast mass        ATN: cr 6 on admission// UA bland//renal US with HN//HD FROM 3/22 TO 3/24 FOR HIGH K  Hyperkalemia  Metabolic acidosis  B/L HN: s/p B/L ureteral stents on 3/25/21  Metastatic beast cancer     Plan:  No dialysis planned  Creat 1.6  K normal  Made more than 1.3 L of urine            Electronically signed by Aristides Farmer MD on 3/27/2021 at 10:31 AM    [facility]  MD Tal Beaver DO [address]  [place], [place] [address]  PHONE: [phone]  FAX: [phone]

## 2021-03-29 NOTE — PROGRESS NOTES
Hgb 6.9; per Dr Jeff Underwood 1 unit of PRBC, called infusion dept, spoke to [facility], patient scheduled for tomorrow, 03/30 @ 0800, patient advised and states understanding, blood banded and sent for T&S, band on patient (verifed) and patient signed consent.

## 2021-03-29 NOTE — PROGRESS NOTES
Patient here for chemotherapy today. Was just released from the hospital and has questions on her medications that she needs to be taking. Medication list updated. New calendar with treatment dates/times given to patient with new medication list.  Patient to also take Dexamethasone 2 mg in AM for two days after chemotherapy treatments - patient has at home. Patient voices understanding of all.

## 2021-03-29 NOTE — CARE COORDINATION
[facility] Transitions Initial Follow Up Call    Call within 2 business days of discharge: Yes    Patient: Radha Merino Patient : 1965   MRN: [id number]  Reason for Admission:   ELIDIA  Discharge Date: 3/27/21 RARS: Readmission Risk Score: 28      Last Discharge [facility]       Complaint Diagnosis Description Type Department Provider    3/22/21 Other ELIDIA (acute kidney injury) ([facility] . .. ED to Hosp-Admission (Discharged) (ADMITTED) [address], ; [place] [address] .. Follow Up: Attempted patient contact. No answer to phone. Message left with CTN contact information and request for return call.   Future Appointments   Date Time Provider Ruben Finch   3/30/2021  8:00 AM INFUSION ROOM 3 [address]   2021  9:45 AM SCHEDULE, [facility] MED ONC TREATMENT [facility] ONC [place]   2021  1:00 PM Milagro Santos, APRN - CNP [facility]   2021  1:30 PM SCHEDULE, [address] [facility] [place] TREATMENT [facility] MED ONC [place]       Анна Carey RN

## 2021-03-29 NOTE — PROGRESS NOTES
0945: RN Assessment    Pt here s/p her hospital discharge. Pt wants tx today. Pt with dressing to right side of neck, removed prior to her leaving. This is where her dialysis cath was at. Pt weak today, in a wheelchair, no respiratory distress noted. Access    Right upper arm port, accessed with a 20 gauge 3/4 inch D[place] needle, + blood return, labs drawn, dressing to site    Treatment    1012: NS at [facility] rate, Decadron 10mg given IVP, Benadryl 50mg given IVP, Pepcid 20mg given IVP without issues    1039: Taxol 100mg set to infuse over 1hr. + blood return pre/post tx    Labs    Pt scheduled for a blood transfusion tomorrow at 0800, set up by [facility]    HGB 6.9 today, Pt reports she feels okay, no distress, wants to proceed with chemo    Christiana Antonio RN NN back to see the pt and go over her medication list.    Pt assisted to wheelchair and taken to lobby for her boyfriend to get her.  He waited for her during her tx today

## 2021-03-30 NOTE — PROGRESS NOTES
Tolerated transfusion well. Reviewed discharge instruction, voiced understanding. Copies of AVS given. Pt discharged home. Pt to exit via wheelchair.     Orders Placed This Encounter   Medications    0.9 % sodium chloride infusion 250 mL    sodium chloride flush 0.9 % injection 20 mL    acetaminophen (TYLENOL) tablet 650 mg    diphenhydrAMINE (BENADRYL) tablet 25 mg    furosemide (LASIX) injection 20 mg    methylPREDNISolone sodium (SOLU-MEDROL) injection 20 mg    heparin flush 100 UNIT/ML injection 500 Units

## 2021-04-05 NOTE — PROGRESS NOTES
Escorted to infusion area via wheelchair. Here for chemo and office visit with Luis Wilson NP. [facility] [address] [facility]. Labs reviewed with patient. Chemo and Zometa administered as ordered. Tolerated well. AVS provided. Discharge in stable condition. Patient's status assessed and documented appropriately. All labs and required results were also reviewed today. Treatment parameters have been reviewed. Today's treatment has been approved by the provider. Treatment orders and medication sequencing (when applicable) was verified by 2 registered nurses. The treatment plan was confirmed with the patient prior to administration, and the patient understands the need to report any treatment-related symptoms. Prior to administration, when applicable, the following 8 elements of medication administration were reviewed with 2nd Registered Nurse prior to dosing: drug name, drug dose, infusion volume when prepared in a syringe, rate of administration, expiration dates and/or times, appearance and integrity of drug(s), and rate of pump for infusion. The 5 rights of medication administration have been verified.

## 2021-04-05 NOTE — PROGRESS NOTES
Patient Name: Olivier Degroot  Patient : 1965  Patient MRN: [id number]     Primary Oncologist: Neeta Giron MD  Referring Provider: CINDY Deluca CNP     Date of Service: 2021      Chief Complaint:   No chief complaint on file. She came in for follow up visit. Patient Active Problem List:     Left breast mass     Secondary cancer of bone [facility])     Essential hypertension     Malignant neoplasm of central portion of left female breast ([facility]     Secondary malignant neoplasm of breast ([facility]     HPI:   Olivier Degroot is a pleasant 42-year-old  female patient who initially noted left breast swelling and pain for a few months. She had mammogram at age of 30. She went for mammogram which showed abnormal left breast. Diagnostic bilateral mammogram on 2018 with ultrasound showed  findings concerning for inflammatory breast cancer with prominent left axillary lymph nodes, benign right mammogram.  Baseline CBC and CMP were within normal limits. CA 27-29 was 61. PET CT scan in May 27, 5804 showed metabolic activity within left breast with metabolically active skin thickening concerning for inflammatory breast cancer, metabolically active left axillary lymph node metastases and skeletal metastasis. Pathology report from the left breast biopsy in May 2018 showed infiltrating pleomorphic lobular carcinoma involving skin and breast tissue nuclear grade 2, ER 90+% moderate intensity, NH 8% with positive, HER-2/brandon negative by FISH and IHC. She did not have visceral disease. We discussed about potential therapy. I put her on Lupron plus Arimidex and Palbociclib. She had menstruation in May 2018. She started lupron, arimidex and Ibrance on May 30, 2018. Bone density in 2018 showed  LUMBAR SPINE: The bone mineral density in the lumbar spine including the L1 through L4 levels is measured at 1.355 g/cm2, which corresponds to a T-score of 1.5 and a Z-score of 1.4.  This is within the normal range by WHO criteria. LEFT HIP: The bone mineral density in the total hip is measured at 0.972 g/cm2 corresponding to a T-score of -0.3 and a Z-score of -0.2. This is within the normal range by WHO criteria. The bone mineral density of the femoral neck is measured at 0.802 g/cm2 corresponding to a T-score of -1.7 and a Z-score of -1.2. This is within the osteopenia range by WHO criteria. IMPRESSION: Osteopenia by WHO criteria. Bone scan in September 2018 showed stable findings. On October 26, 2018 she was found to have RLE DVT. PET CT scan in November 2018 revealed:  1. Overall improvement of left axillary adenopathy and FDG avidity. There is persistent skin thickening of the left breast with associated FDG uptake in the retroareolar region and left breast tissue which is not significantly changed since the prior exam. 2. Overall progression of hypermetabolic skeletal osseous metastases when compared to the PET-CT from 05/07/2018. We discussed about the potential side effects of bisphosphonates including renal failure and Jaw necrosis. She switched lovenox to xarelto in January 2019. Bone scan in June 2019 compared to bone scan in September 2018 showed progression of disease. CA 27-29 has continued to increase. We will obtain PET CT to evaluate. Clinically she felt left breast lump continuing to get smaller. PET CT scan on August 26, 2019 showed :  1. New focus of intense uptake lateral to the left axilla and anterior to the shoulder musculature. 2. However there is improvement of disease within the left breast and left axilla and decreased uptake within the widespread osseous metastatic disease. In December 2019 CA 17-29 continued to rise. CT chest, abdomen and bone scan in January 2020 showed progression local and bone disease. She started lupron, aromasin and afinitor on January 10, 2020. I advise to monitor blood pressure.   On May 7, 2020 PET CT scan showed favorable response to chemotherapy. The mass anterior to the left shoulder musculature has significantly decreased in size and degree of uptake. Small nodules anterior to the mass extending to the skin demonstrates mild uptake and likely represents residual disease. Left axillary lymph nodes have decreased in size. Uptake in the left subareolar region has decreased. Diffuse osseous metastatic disease demonstrates decreased uptake. Labs in August 2020 were reviewed. CT chest, abdomen pelvis on October 5, 2020 showed  Decreased size of the soft tissue mass anterior to the left shoulder    musculature compared to prior PET-CT and CT suggesting response to therapy.         Decreased size of left axillary and subpectoral lymphadenopathy also    suggesting response to therapy.         Stable sclerotic metastatic disease noted throughout the visualized osseous    skeleton.         No evidence of new metastatic disease in the chest, abdomen or pelvis. CA 27-29 on October 5, 2020 was 409.2. She has panoramic study of the jaw which was negative for osteonecrosis. She has insomnia and anxiety. She is agreeable to restart low-dose Ativan as needed. She had ingrowing toenail of the left toe with has improved with antibiotic. Labs in January 2021 were reviewed. Anemic w/u is consistent with chronic disease. She is on eliquis. CT chest, abdomen pelvis in January 2021 showed  1. Increasing skin thickening of the breast bilaterally with increasing   subcutaneous edema throughout the chest wall, which may be related to post   treatment changes. 2. No interval change in multifocal sclerotic osseous metastases. 3. Stable to slight interval decrease in size of soft tissue nodules in the   left chest wall/anterior to the left shoulder.  No significant interval   change in left axillary adenopathy.    4. There is increasing size of the left ovary as well as new pelvic   adenopathy suspicious for malignancy either primary neoplastic process involving the left ovary versus metastatic disease. 5. There is also mild wall thickening of the left posterolateral bladder wall   with mild left hydronephrosis.  Would recommend further evaluation with   cystoscopy as well. 6. New 3.7 x 2.0 cm partially calcified mass in the right adductor   musculature suspicious for metastatic disease as well.  Alternately this   could reflect chronic process such as myositis ossificans in the appropriate   clinical setting. She had hematoma before to the right thigh. I will cut zometa to every 3 months. I advised her to maintain her body weight and eat frequent meals. She is agreeable to see urologist for potential cystoscopy and GYN oncologist at [address] [place] [address] for further work-up of the left ovary mass and pelvic adenopathy. Clinically she could have metastatic disease from the breast cancer. If that is the case, I will consider to change her treatment. She was seen by Dr. Ruben Cassidy at [address] [place] [address] who believe clinically she has metastatic breast cancer to the pelvis. She has been seen by urologist who will do cystoscopy and possible follow-up CT abdomen and pelvis. She was sent to [facility] 3/22/21 for ELIDIA and had new on hydroureteronephrosis on the right and is s/p stent placement. CT abdomen/pelvis 3/23/21    Impression   1. Moderate bilateral hydroureteronephrosis new on the right and not   significantly changed on the left compared with 61/81/0589 of uncertain   etiology.  No obstructing stone or mass.  Partially decompressed urinary   bladder with circumferential wall thickening not excluded. 2. Fluid overload with small right and large left pleural effusions, diffuse   body wall edema, small volume of ascites, and small pericardial effusion. 3. 6 bilateral iliac chain lymphadenopathy not significantly changed from   01/20/2021.  Increasing size of the left ovary of uncertain etiology with an   underlying neoplasm not excluded.  Unchanged diffuse sclerotic metastases. 4. Mildly increased size of a calcified mass within the right proximal   adductor musculature and new mass within the right gluteus medius muscle   suspicious for metastatic disease. Presented on 2021 for scheduled follow up and treatment. Overall she is improving. She has gained weight and reports energy, appetite and ability to ambulate have improved. She has BLE which was doppler negative 3/21. We will give lasix as needed. We will monitor potassium which is adequate today. She received Zometa today. Calcium is on the low side. She is started on calcium/vitamin d po daily. Continues to have lymphedema. I will follow up on previous PT referral. She has declined hospice evaluation for now. She denied any headache or dizziness. She denied any chest pain or shortness of breath. She denied any acute bone pain. No fever or chills. No depression. Past Medical History  Allergies, asthma, depression, abnormal left breast mammogram. DVT. Surgical History  Appendectomy in .  . Social History  Denied any history of smoking. She drinks alcohol occasionally. No illicit drug use. She has 2 children and one sister. Family History  Mother: Diabetes, Hypertension. Previous Therapy   Palbociclib + Anastrozole + Leuprolide Q28D Cycle Length: 28 Number Cycles: 20 Start: C1D1 on 2018 Assoc Dx: Female inflammatory breast cancer LOT: 1st Line Metastatic or Recurrent Stage: IV 2018 C1 D1  Zoledronic acid (Zometa) Q28D Cycle Length: 28 Number Cycles: 24 Start: Matias Philip on 2018 Assoc Dx: Secondary bone cancer LOT: 1st Line Metastatic or Recurrent 2020 C8 D1  Everolimus + Exemestane + Leuprolide Q28D Cycle Length: 28 Number Cycles: 6 Start: C1D1 on 2019 Assoc Dx: Female inflammatory breast cancer LOT: 2nd Line Metastatic Stage: IV Treatment Intent: Macvhdbzks59/11/2019 C1 D1     Review of Systems: \"Per interval history; otherwise 10 point ROS is negative. \" Vital Signs:  BP (!) 159/87 (Site: Right Upper Arm, Position: Sitting, Cuff Size: Medium Adult)   Pulse 92   Temp 97.9 °F (36.6 °C) (Infrared)   Resp 16   Ht 5' 1\" (1.549 m)   Wt 124 lb (56.2 kg)   LMP 04/08/2018   SpO2 97%   BMI 23.43 kg/m²     Physical Exam:  CONSTITUTIONAL: awake, alert, cooperative, no apparent distress   EYES:  sclera clear and conjunctiva pallor. ENT: Normocephalic, without obvious abnormality, atraumatic  NECK: supple, symmetrical, no jugular venous distension and no carotid bruits   HEMATOLOGIC/LYMPHATIC: no cervical, supraclavicular or axillary lymphadenopathy   LUNGS: no increased work of breathing and clear to auscultation   BREAST: thick skin of left and right breast with induration. No signs of infection. No lumps to right breast.  CARDIOVASCULAR: regular rate and rhythm, normal S1 and S2, no murmur noted  ABDOMEN: normal bowel sounds x 4, soft, non-distended, non-tender, no masses palpated, no hepatosplenomegaly   MUSCULOSKELETAL: full range of motion noted, tone is normal  NEUROLOGIC: awake, alert, oriented to name, place and time. Grossly there is no neurofocal deficit. SKIN: No jaundice. appears intact. No petechial rash. Dry skin. EXTREMITIES: BLE edema. No cyanosis.     Labs:  Hematology:  Lab Results   Component Value Date    WBC 5.2 04/05/2021    RBC 3.26 (L) 04/05/2021    HGB 8.6 (L) 04/05/2021    HCT 28.0 (L) 04/05/2021    MCV 85.9 04/05/2021    MCH 26.4 (L) 04/05/2021    MCHC 30.7 (L) 04/05/2021    RDW 27.2 (H) 04/05/2021     04/05/2021    MPV 8.0 04/05/2021    BANDSPCT 2 (L) 03/22/2021    SEGSPCT 52.6 04/05/2021    EOSRELPCT 2.5 04/05/2021    BASOPCT 1.4 (H) 04/05/2021    LYMPHOPCT 28.4 04/05/2021    MONOPCT 15.1 (H) 04/05/2021    BANDABS 0.39 03/22/2021    SEGSABS 2.7 04/05/2021    EOSABS 0.1 04/05/2021    BASOSABS 0.1 04/05/2021    LYMPHSABS 1.5 04/05/2021    MONOSABS 0.8 04/05/2021    DIFFTYPE AUTOMATED DIFFERENTIAL 04/05/2021    ANISOCYTOSIS 1+ 12/06/2019    POLYCHROM 1+ 03/22/2021    WBCMORP OCCASIONAL  ATYPICAL LYMPH(S) NOTED   12/06/2019    PLTM RARE 03/22/2021     Chemistry:  Lab Results   Component Value Date     04/05/2021    K 4.7 (H) 04/05/2021     04/05/2021    CO2 22 04/05/2021    BUN 20 04/05/2021    CREATININE 1.2 (H) 04/05/2021    GLUCOSE 101 (H) 04/05/2021    CALCIUM 7.6 (L) 04/05/2021    PROT 5.6 (L) 04/05/2021    LABALBU 3.1 (L) 04/05/2021    BILITOT 0.2 04/05/2021    ALKPHOS 192 (H) 04/05/2021    AST 50 (H) 04/05/2021    ALT 11 04/05/2021    LABGLOM 46 (L) 04/05/2021    GFRAA 56 (L) 04/05/2021    PHOS 5.2 (H) 03/23/2021    MG 2.2 12/30/2020    POCCA 1.01 (L) 04/05/2021    POCGLU 109 (H) 04/05/2021     Immunology:  Lab Results   Component Value Date    PROT 5.6 (L) 04/05/2021     Tumor Markers:  Lab Results   Component Value Date    LABCA2 409.2 (H) 10/05/2020      Observations:  PHQ-9 Total Score: 0 (4/5/2021 12:54 PM)        Assessment & Plan:    1. She has inflammatory left breast cancer. PET/CT scan showed metastatic disease to bone, left axillary lymph node, and left breasts with the skin involvement. At present time she does not have physical disease. CBC and CMP were within normal limits. CA 27-29 was elevated. I put her on palliative hormonal therapy plus Palbociclib. She started arimidex and Ibrance on June 4, 2018 and monthly lupron injection on June 8, 2018. Bone scan in September 2018 showed stable findings. After reviewing the PET CT scan in November 2018, I put her on Zometa monthly. Bone scan in July 2019 was reviewed and showed progression of the disease. PET scan in August 2019 showed grossly response to the therapy. CT chest/abdomen and bone scan due to rising CA27-29 in January 2020 showed progression of disease. She started aromasin, afinitor and lupron on January 10, 2020. She understands the potential side effects of afinitor. PET CT scan May 2020 showed treatment response.   CT chest, abdomen pelvis in October 2020 showed response and no evidence of new metastatic disease. She was on Lupron, exemestane, Zometa and Afinitor. I changed zometa to every 3 months. CT chest, abdomen pelvis in January 2021 was reviewed. Clinically she has metastatic disease from the breast cancer. She was scheduled for weekly Taxol on March 22, 2021. Due to acute kidney injury she was referred to the hospital for further evaluation. Noted to have hydroureteronephrosis s/p stent placement. Cr normalized. \Family is aware about her poor prognosis. They are interested in hospice evaluation. Patient preferred to have physical therapy before deciding to go with hospice. I will follow up on PT referral as she has not been contacted. 2. Hypertension: I advise to monitor BP and has low sodium diet. I gave her refill of Norvasc. Blood pressure is stable. 3. Seasonal allergy. I advise to take Claritin and gave her flonase. I recommend flu shot. 4. She was found to have right lower extremity DVT in October 2019. She is on Eliquis. no signs of bleeding. 5. She has multifactorial anemia. JAK2 with reflex in January 2021 was negative. Anemic work-up in January 2021 was reviewed. I will order fecal occult blood. 6.  I recommend to maintain body weight. I recommend to eat frequent meals. Weight increased since last visit. 7.  Prescribed compression for intermittent nausea. She stated Zofran does not seem to help. I also gave refill of Ativan for the anxiety and insomnia. 8. Edema- lasix 20 mg PRN    RTC in 3 weeks or sooner. All of her question has been answered for today. Recent imaging and labs were reviewed and discussed with the patient.

## 2021-04-12 NOTE — TELEPHONE ENCOUNTER
Dr. Gloria Eubanks made aware of low calcium level of 6.3. Patient has not started taking the Calcium-Vitamin D supplement prescribed to her on 4/5/21. Per Dr. Gloria Eubanks - patient to start taking today one tab daily. Patient to call with any questions/concerns.

## 2021-04-12 NOTE — PROGRESS NOTES
Here for weekly Taxol. Reports feeling well. States energy level has improved. States she needs lorazepam refill. Denies any other concerns at this time. Mediport accessed, + blood return noted. Labs obtained. CBC reviewed with, WDL for treatment. CMP shows calcium 6.3, patient states NP ordered supplemental calcium, has not started it yet. Reviewed with Dr. Nancy Zimmerman. No new orders given. Ordered patient to start oral calcium today. Pt verbalizes understanding, agreeable to POC. Reviewed s/s low calcium. Treatment administered as ordered without incident. Reviewed AVS, copy given to patient. Discharged in stable condition via WC to daughter.

## 2021-04-19 NOTE — PROGRESS NOTES
Patient arrived to treatment suite for blood draw, pre-medications and chemotherapy infusion. Right ARM mediport accessed and blood drawn from site and sent to lab for processing. Patient stated difficulty sleeping, so this was discussed with her doctor. Treatment approved and given. Patient tolerated well. Left treatment suite with assistance in wheelchair. Discharge instructions provided. Patient's status assessed and documented appropriately. All labs and required results were also reviewed today. Treatment parameters have been reviewed. Today's treatment has been approved by the provider. Treatment orders and medication sequencing (when applicable) was verified by 2 registered nurses. The treatment plan was confirmed with the patient prior to administration, and the patient understands the need to report any treatment-related symptoms. Prior to administration, when applicable, the following 8 elements of medication administration were reviewed with 2nd Registered Nurse prior to dosing: drug name, drug dose, infusion volume when prepared in a syringe, rate of administration, expiration dates and/or times, appearance and integrity of drug(s), and rate of pump for infusion. The 5 rights of medication administration have been verified.

## 2021-04-23 NOTE — PROGRESS NOTES
Reviewed discharge instructions, voiced understanding, and copies of AVS given to take home. Pt tolerated Zometa infusion and Leupron injection well. Pt to exit via ambulation.     Orders Placed This Encounter   Medications    zoledronic acid (ZOMETA) 4 mg in sodium chloride 0.9 % 100 mL IVPB    leuprolide (LUPRON) injection 7.5 mg yes

## 2021-04-26 NOTE — PROGRESS NOTES
MA Rooming Questions  Patient: Bear Croft  MRN: [id number]    Date: 4/26/2021        1. Do you have any new issues?   no         2. Do you need any refills on medications? yes - metroprolol    3. Have you had any imaging done since your last visit?   no    4. Have you been hospitalized or seen in the emergency room since your last visit here?   no    5. Did the patient have a depression screening completed today?  No    No data recorded     PHQ-9 Given to (if applicable):               PHQ-9 Score (if applicable):                     [] Positive     []  Negative              Does question #9 need addressed (if applicable)                     [] Yes    []  No               Leslie Mosqueda CMA

## 2021-04-26 NOTE — PROGRESS NOTES
Patient Name: Keshav Joseph  Patient : 1965  Patient MRN: [id number]     Primary Oncologist: Carmen Tilley MD  Referring Provider: CINDY Mims CNP     Date of Service: 2021      Chief Complaint:   Chief Complaint   Patient presents with    Follow-up     She came in for follow up visit. Patient Active Problem List:     Left breast mass     Secondary cancer of bone [facility])     Essential hypertension     Malignant neoplasm of central portion of left female breast ([facility] [address]     Secondary malignant neoplasm of breast ([facility] [address]     HPI:   Keshav Joseph is a pleasant 51-year-old  female patient who initially noted left breast swelling and pain for a few months. She had mammogram at age of 30. She went for mammogram which showed abnormal left breast. Diagnostic bilateral mammogram on 2018 with ultrasound showed  findings concerning for inflammatory breast cancer with prominent left axillary lymph nodes, benign right mammogram.  Baseline CBC and CMP were within normal limits. CA 27-29 was 61. PET CT scan in May 27, 4290 showed metabolic activity within left breast with metabolically active skin thickening concerning for inflammatory breast cancer, metabolically active left axillary lymph node metastases and skeletal metastasis. Pathology report from the left breast biopsy in May 2018 showed infiltrating pleomorphic lobular carcinoma involving skin and breast tissue nuclear grade 2, ER 90+% moderate intensity, TX 8% with positive, HER-2/brandon negative by FISH and IHC. She did not have visceral disease. We discussed about potential therapy. I put her on Lupron plus Arimidex and Palbociclib. She had menstruation in May 2018. She started lupron, arimidex and Ibrance on May 30, 2018.   Bone density in 2018 showed  LUMBAR SPINE: The bone mineral density in the lumbar spine including the L1 through L4 levels is measured at 1.355 g/cm2, which corresponds to a T-score of 1.5 and a Z-score of 1.4. This is within the normal range by WHO criteria. LEFT HIP: The bone mineral density in the total hip is measured at 0.972 g/cm2 corresponding to a T-score of -0.3 and a Z-score of -0.2. This is within the normal range by WHO criteria. The bone mineral density of the femoral neck is measured at 0.802 g/cm2 corresponding to a T-score of -1.7 and a Z-score of -1.2. This is within the osteopenia range by WHO criteria. IMPRESSION: Osteopenia by WHO criteria. Bone scan in September 2018 showed stable findings. On October 26, 2018 she was found to have RLE DVT. PET CT scan in November 2018 revealed:  1. Overall improvement of left axillary adenopathy and FDG avidity. There is persistent skin thickening of the left breast with associated FDG uptake in the retroareolar region and left breast tissue which is not significantly changed since the prior exam. 2. Overall progression of hypermetabolic skeletal osseous metastases when compared to the PET-CT from 05/07/2018. We discussed about the potential side effects of bisphosphonates including renal failure and Jaw necrosis. She switched lovenox to xarelto in January 2019. Bone scan in June 2019 compared to bone scan in September 2018 showed progression of disease. CA 27-29 has continued to increase. We will obtain PET CT to evaluate. Clinically she felt left breast lump continuing to get smaller. PET CT scan on August 26, 2019 showed :  1. New focus of intense uptake lateral to the left axilla and anterior to the shoulder musculature. 2. However there is improvement of disease within the left breast and left axilla and decreased uptake within the widespread osseous metastatic disease. In December 2019 CA 17-29 continued to rise. CT chest, abdomen and bone scan in January 2020 showed progression local and bone disease. She started lupron, aromasin and afinitor on January 10, 2020. I advise to monitor blood pressure.   On May 7, 2020 PET CT scan showed diffuse sclerotic metastases. 4. Mildly increased size of a calcified mass within the right proximal   adductor musculature and new mass within the right gluteus medius muscle   suspicious for metastatic disease. Scented for scheduled follow-up and treatment on 2021. She reports she is tolerating treatment well. She denies nausea, vomiting, abdominal discomfort, neuropathy. She does notice she has some hair thinning. Weight is stable from last visit. She is able to walk at home, she is started increasing engagement with activities of daily living. She requires assistance with showering. She has started taking OTC calcium supplementation. She is having trouble taking in adequate fluids and we will give additional IV fluids today. She denies fevers, chills, oral ulcers, no easy bleeding or bruising, no new headache or dizziness. Pain from hematoma has subsided since stents were placed. She will follow with Dr. Trini Rodriguez. I have requested his note. We did discuss mental health today and she reports she feels that since her body has started feeling better her mood has improved. Past Medical History  Allergies, asthma, depression, abnormal left breast mammogram. DVT. Surgical History  Appendectomy in .  . Social History  Denied any history of smoking. She drinks alcohol occasionally. No illicit drug use. She has 2 children and one sister. Family History  Mother: Diabetes, Hypertension. Previous Therapy   Palbociclib + Anastrozole + Leuprolide Q28D Cycle Length: 28 Number Cycles: 20 Start: C1D1 on 2018 Assoc Dx: Female inflammatory breast cancer LOT: 1st Line Metastatic or Recurrent Stage: IV 2018 C1 D1  Zoledronic acid (Zometa) Q28D Cycle Length: 28 Number Cycles: 24 Start: [place] on 2018 Assoc Dx:  Secondary bone cancer LOT: 1st Line Metastatic or Recurrent 2020 C8 D1  Everolimus + Exemestane + Leuprolide Q28D Cycle Length: 28 Number Cycles: 6 Start: C1D1 on 12/11/2019 Assoc Dx: Female inflammatory breast cancer LOT: 2nd Line Metastatic Stage: IV Treatment Intent: Xycifejouk34/11/2019 C1 D1     Review of Systems: \"Per interval history; otherwise 10 point ROS is negative. \"     Vital Signs:  /67 (Site: Right Upper Arm, Position: Sitting, Cuff Size: Medium Adult)   Pulse 90   Temp 99.7 °F (37.6 °C) (Temporal)   Resp 16   Ht 5' 1\" (1.549 m)   Wt 118 lb (53.5 kg)   LMP 04/08/2018   SpO2 99%   BMI 22.30 kg/m²     Physical Exam:  CONSTITUTIONAL: awake, alert, cooperative, no apparent distress  +wheelchair  EYES:  sclera clear and conjunctiva pallor. ENT: Normocephalic, without obvious abnormality, atraumatic  NECK: supple, symmetrical  HEMATOLOGIC/LYMPHATIC: no cervical, supraclavicular or axillary lymphadenopathy   LUNGS: no increased work of breathing and clear to auscultation   BREAST: thick skin of left and right breast with induration. No signs of infection. No lumps to right breast.  CARDIOVASCULAR: regular rate and rhythm, normal S1 and S2, no murmur noted  ABDOMEN: normal bowel sounds x 4, soft, non-distended, non-tender, no masses palpated, no hepatosplenomegaly   MUSCULOSKELETAL: full range of motion noted, tone is normal  NEUROLOGIC: awake, alert, oriented to name, place and time. Grossly there is no neurofocal deficit. SKIN: No jaundice. appears intact. No petechial rash. Dry skin. EXTREMITIES: BLE edema. No cyanosis.     Labs:  Hematology:  Lab Results   Component Value Date    WBC 5.5 04/26/2021    RBC 2.94 (L) 04/26/2021    HGB 8.4 (L) 04/26/2021    HCT 27.1 (L) 04/26/2021    MCV 92.2 04/26/2021    MCH 28.6 04/26/2021    MCHC 31.0 (L) 04/26/2021    RDW 26.2 (H) 04/26/2021     04/26/2021    MPV 7.9 04/26/2021    BANDSPCT 2 (L) 03/22/2021    SEGSPCT 69.0 (H) 04/26/2021    EOSRELPCT 1.3 04/26/2021    BASOPCT 1.1 (H) 04/26/2021    LYMPHOPCT 18.2 (L) 04/26/2021    MONOPCT 10.4 (H) 04/26/2021    BANDABS 0.39 03/22/2021    SEGSABS 3.8 04/26/2021    EOSABS 0.1 04/26/2021    BASOSABS 0.1 04/26/2021    LYMPHSABS 1.0 04/26/2021    MONOSABS 0.6 04/26/2021    DIFFTYPE AUTOMATED DIFFERENTIAL 04/26/2021    ANISOCYTOSIS 1+ 12/06/2019    POLYCHROM 1+ 03/22/2021    WBCMORP OCCASIONAL  ATYPICAL LYMPH(S) NOTED   12/06/2019    PLTM RARE 03/22/2021     Chemistry:  Lab Results   Component Value Date     04/19/2021    K 4.5 04/19/2021     04/19/2021    CO2 21 04/19/2021    BUN 26 (H) 04/19/2021    CREATININE 0.9 04/19/2021    GLUCOSE 101 (H) 04/19/2021    CALCIUM 7.1 (L) 04/19/2021    PROT 5.5 (L) 04/19/2021    LABALBU 3.2 (L) 04/19/2021    BILITOT 0.2 04/19/2021    ALKPHOS 159 (H) 04/19/2021    AST 47 (H) 04/19/2021    ALT 8 (L) 04/19/2021    LABGLOM >60 04/19/2021    GFRAA >60 04/19/2021    PHOS 5.2 (H) 03/23/2021    MG 2.2 12/30/2020    POCCA 1.01 (L) 04/05/2021    POCGLU 109 (H) 04/05/2021     Immunology:  Lab Results   Component Value Date    PROT 5.5 (L) 04/19/2021     Tumor Markers:  Lab Results   Component Value Date    LABCA2 409.2 (H) 10/05/2020      Observations:  No data recorded        Assessment & Plan:    1. She has inflammatory left breast cancer. PET/CT scan showed metastatic disease to bone, left axillary lymph node, and left breasts with the skin involvement. At present time she does not have physical disease. CBC and CMP were within normal limits. CA 27-29 was elevated. I put her on palliative hormonal therapy plus Palbociclib. She started arimidex and Ibrance on June 4, 2018 and monthly lupron injection on June 8, 2018. Bone scan in September 2018 showed stable findings. After reviewing the PET CT scan in November 2018, I put her on Zometa monthly. Bone scan in July 2019 was reviewed and showed progression of the disease. PET scan in August 2019 showed grossly response to the therapy. CT chest/abdomen and bone scan due to rising CA27-29 in January 2020 showed progression of disease.  She started aromasin, afinitor and lupron on January 10, 2020. She understands the potential side effects of afinitor. PET CT scan May 2020 showed treatment response. CT chest, abdomen pelvis in October 2020 showed response and no evidence of new metastatic disease. She was on Lupron, exemestane, Zometa and Afinitor. I changed zometa to every 3 months. CT chest, abdomen pelvis in January 2021 was reviewed. Clinically she has metastatic disease from the breast cancer. She was scheduled for weekly Taxol on March 22, 2021. Due to acute kidney injury she was referred to the hospital for further evaluation. Noted to have hydroureteronephrosis s/p stent placement. Cr normalized. \Family is aware about her poor prognosis. She would like home PT eval/tx. 2. Hypertension: I advise to monitor BP and has low sodium diet. I gave her refill of Norvasc. Blood pressure is stable. Refilled metoprolol today. 3. Seasonal allergy. I advise to take Claritin and gave her flonase. I recommend flu shot. 4. She was found to have right lower extremity DVT in October 2019. She is on Eliquis. no signs of bleeding. 5. She has multifactorial anemia. JAK2 with reflex in January 2021 was negative. Anemic work-up in January 2021 was reviewed. I will order fecal occult blood. 6.  I recommend to maintain body weight. I recommend to eat frequent meals. Weight stable since last visit. 7.  Prescribed compazine for intermittent nausea. She stated Zofran does not seem to help. She is using  Ativan for the anxiety and insomnia. 8. Edema- lasix 20 mg PRN    RTC in 3 weeks or sooner. All of her question has been answered for today. Recent imaging and labs were reviewed and discussed with the patient.

## 2021-04-26 NOTE — PROGRESS NOTES
1000: RN Assessment    Pt here for labs/Taxol today. A&OX3. In a wheelchair for ambulatory support. Denies lightheadedness, dizziness, shortness of breath. Pt helped to the recliner tx room chair, accessed and labs drawn and then assisted back to wheelchair for her scheduled visit with Iraida Pisano today    Access    Right arm Port a cath, accessed with a  20 gauge 3/4 inch Durbin needle, + blood return, dressing to site. Labs drawn as ordered. Treatment    1020: Pt returned from her visit with Riaz Krishnan back to the recliner chair. 1028: NS 500ml fluids ordered by Iraida Pisano today. Started at this time    1110: NS completed    1112: Benadryl 50mg given IVP without issues    1113: Pepcid 20mg given IVP without issues NS at [facility] rate    1113: Decadron 10mg given IV without issues, set to infuse over 15 minutes. [id number]: Taxol 114mg given without issues. + blood return pre/post infusion. Port then flushed with NS and heparin and deaccessed, pt assisted to wheelchair and taken to lobby for her ride     Pt in no distress upon leaving. Patient's status assessed and documented appropriately. All labs and required results were also reviewed today. Treatment parameters have been reviewed. Today's treatment has been approved by the provider. Treatment orders and medication sequencing (when applicable) was verified by 2 registered nurses. The treatment plan was confirmed with the patient prior to administration, and the patient understands the need to report any treatment-related symptoms. Prior to administration, when applicable, the following 8 elements of medication administration were reviewed with 2nd Registered Nurse prior to dosing: drug name, drug dose, infusion volume when prepared in a syringe, rate of administration, expiration dates and/or times, appearance and integrity of drug(s), and rate of pump for infusion.   The 5 rights of medication administration have been verified.

## 2021-04-29 NOTE — TELEPHONE ENCOUNTER
I called and lvm with [facility] to see if they do in home PT. I also lvm with patient to see if she is currently getting [place] [id number] services.

## 2021-05-03 NOTE — PROGRESS NOTES
Patient Name: Tyrone Vasquez  Patient : 1965  Patient MRN: [id number]     Primary Oncologist: Pablito Rodriges MD  Referring Provider: CINDY Roldan CNP     Date of Service: 2021      Chief Complaint:   No chief complaint on file. She came in for follow up visit. Patient Active Problem List:     Left breast mass     Secondary cancer of bone      Essential hypertension     Malignant neoplasm of central portion of left female breast     Secondary malignant neoplasm of breast      HPI:   Tyrone Vasquez is a pleasant 57-year-old  female patient who initially noted left breast swelling and pain for a few months. She had mammogram at age of 30. She went for mammogram which showed abnormal left breast. Diagnostic bilateral mammogram on 2018 with ultrasound showed findings concerning for inflammatory breast cancer with prominent left axillary lymph nodes, benign right mammogram.  Baseline CBC and CMP were within normal limits. CA 27-29 was 61. PET CT scan in May 27, 1190 showed metabolic activity within left breast with metabolically active skin thickening concerning for inflammatory breast cancer, metabolically active left axillary lymph node metastases and skeletal metastasis. Pathology report from the left breast biopsy in May 2018 showed infiltrating pleomorphic lobular carcinoma involving skin and breast tissue nuclear grade 2, ER 90+% moderate intensity, AL 8% with positive, HER-2/brandon negative by FISH and IHC. She did not have visceral disease. We discussed about potential therapy. I put her on Lupron plus Arimidex and Palbociclib. She had menstruation in May 2018. She started lupron, arimidex and Ibrance on May 30, 2018. Bone density in 2018 showed  LUMBAR SPINE: The bone mineral density in the lumbar spine including the L1 through L4 levels is measured at 1.355 g/cm2, which corresponds to a T-score of 1.5 and a Z-score of 1.4.  This is within the normal range by Palbociclib. She started arimidex and Ibrance on June 4, 2018 and monthly lupron injection on June 8, 2018. Bone scan in September 2018 showed stable findings. After reviewing the PET CT scan in November 2018, I put her on Zometa monthly. Bone scan in July 2019 was reviewed and showed progression of the disease. PET scan in August 2019 showed grossly response to the therapy. CT chest/abdomen and bone scan due to rising CA27-29 in January 2020 showed progression of disease. She started aromasin, afinitor and lupron on January 10, 2020. She understands the potential side effects of afinitor. PET CT scan May 2020 showed treatment response. CT chest, abdomen pelvis in October 2020 showed response and no evidence of new metastatic disease. She was on Lupron, exemestane, Zometa and Afinitor. I changed zometa to every 3 months. CT chest, abdomen pelvis in January 2021 was reviewed. Due to progression of disease she started weekly Taxol on March 22, 2021. Due to recent Covid pneumonia, chemo is rescheduled to next week. 2. Hypertension: I advise to monitor BP and has low sodium diet. 3. Seasonal allergy. I advise to take Claritin and gave her flonase. I recommend flu shot. 4. She was found to have right lower extremity DVT in October 2019. She is on Eliquis. no signs of bleeding. 5. She has multifactorial anemia. JAK2 with reflex in January 2021 was negative. Anemic work-up in January 2021 was reviewed. I will order fecal occult blood. 6.  I recommend to maintain body weight. I recommend to eat frequent meals. Weight stable since last visit. 7.  Prescribed compazine for intermittent nausea. She stated Zofran does not seem to help. She is using  Ativan for the anxiety and insomnia. 8. Edema- lasix 20 mg PRN    RTC in 3 weeks or sooner. All of her question has been answered for today. Recent imaging and labs were reviewed and discussed with the patient.

## 2021-05-10 PROBLEM — J96.20 ACUTE AND CHRONIC RESPIRATORY FAILURE (ACUTE-ON-CHRONIC) (HCC): Status: ACTIVE | Noted: 2021-01-01

## 2021-05-10 NOTE — ED PROVIDER NOTES
EKG:  Sinus tachycardia with a rate of 118. HI interval 130, QRS 68, QTc 434. No ST elevations or depressions. Normal T waves. Low voltage QRS. Impression: Sinus tachycardia, abnormal EKG. When compared to previous EKG from 3/22/2021, there are no significant changes.      Linda Tee MD  05/10/21 7005

## 2021-05-10 NOTE — H&P
History and Physical      Name:  Nikolas Escamilla /Age/Sex: 1965  (50 y.o. female)   MRN & CSN:  [id number] & [id number] Admission Date/Time: 5/10/2021  1:58 PM   Location:   PCP: Feliberto Whaley, [address] [facility] Day: 1    Admitting Physician: DR iAden Mcdowell    Discussed assessment and treatment plan with the admitting and supervising physician who agrees with the plan below. Assessment and Plan:   Nikolas Escamilla is a 50 y.o.  female  who presents with difficulty with breathing    Sepsis most likely secondary to viral pneumonia: Tachycardic, tachypneic, SPO2 97% drop to 89. Lactic acid 2.6, procalcitonin 0.466. WBC 9.3.  + Covid the first   · Admit Covid unit  · 0.9 normal saline bolus x1 in the ED, will continue 0.9 normal saline at 75 mL/h x 1 day   · Continue cefepime every 12 hours and vancomycin  · Inpatient pharmacy consult for vancomycin dosing  · Inpatient infectious disease consult      Multifocal pneumonia likely secondary to Covid 19: X-ray showing patchy infiltrate within the lung. · Continue C-reactive protein  · Culture respiratory sputum  · Decadron 6 mg IV daily x10 days, dose received in the ED  · Check C-reactive protein  · Check ferritin, procalcitonin  · Check LDH, lactic acid  · Pending Legionella urine, strep pneumonia  · Droplet isolation    Acute on chronic hypoxic respiratory failure: Likely secondary to above. Current 3 L/O2/nasal cannula  · D-dimer pending  · Keep SPO2 more than 92%    Pleural effusion L >R likely secondary to breast CA  Ip radiology consult    Mild hyponatremia:   Recheck sodium in the morning    Hyperkalemia: K5.6 received calcium gluconate in the ED  · Recheck potassium at 2300    Hypocalcemia: CA 7.0, received calcium gluconate in the ED  Ionized calcium pending    Acute kidney injury most likely prerenal/chemo therapy: BUN 65/CR 3.7(baseline  2021) patient currently on Taxol for breast CA with mets.   · Avoid nephrotoxic drugs, renal dosing  · Nephrology consult  · Urology consult  · BMP in the morning    Elevated proBNP: proBNP 1511. EF 55 to 60% 3/24/2021      Nonzero troponin likely demand ischemia: Trop 0.013. EKG sinus tachycardia, nonischemic changes  Trend  serial troponins 6 hours x 2    Anemia chronic likely multifactorial: Hb 9. H/HCT 30.6. Hx iron deficiency anemia  CBC daily    Thrombocytosis: Platelet count 082 (775 and 4/26/2021)    Segmental diffuse osteoblastic metastatic disease/left breast diffuse nodularity per CT abdomen/pelvis without contrast    Hx breast cancer left breast with mets to the bone, status post hormonal therapy. She was on Lupron, exemestane, Zometa and Afinitor. Currently on weekly Taxol since March 22, 2021  · Follows up with Dr. Karla Quevedo  · Inpatient oncology    Hx  bilateral hydroureteronephrosis s/p bilateral ureteral stent placement 3/25/2021    Essential hypertension: Continue amlodipine, metoprolol    BMI: 22.30 kg/m²    Diet General   DVT Prophylaxis [] Lovenox, [x]  Heparin, [] SCDs, [] Warfarin  [] NOAC     GI Prophylaxis [x] PPI,  [] H2 Blocker,  [] Carafate,  [] Diet/Tube Feeds   Code Status Full   MDM [] Low, [x] Moderate,[]  High  Patient's risk as above due to      [x] One or more chronic illnesses with exacerbation progression      [x] Two or more stable chronic illnesses      [x] Undiagnosed new problem with uncertain prognosis      [] Elective major surgery      []Prescription drug management     History of Present Illness:     Chief Complaint: breathing difficulty, COVID-19 diagnosed May 1, 2021  Mich Adame is a 50 y.o.  female, with past medical history significant for breast cancer with mets, breast cancer, hypertension, asthma, bilateral hydronephrosis s/p stent who presented to the ED from home due increasing shortness of breath. Was diagnosed with COVID-19 on May 1, 2021.   States she was progressively increased fatigue and increased shortness of breath for last couple of days. Shortness of breath increased with exertion. Denies orthopnea/PND. Patient lives with her daughter. Denies chest pain, chest pressure. Admits having bilateral lower extremity edema. Denies fever, cough, abdominal pain, dysuria, hematuria, melena. Objective:   No intake or output data in the 24 hours ending 05/10/21 1723   Vitals:   Vitals:    05/10/21 1525   BP:  154/75   Pulse: 122   Resp: 29   Temp: 99.3 °F (37.4 °C)   SpO2:  94%     Physical Exam:   GEN ill-appearing,awake female, Well Developed, Well nourished, cooperative,  Moderate Distress. Appears given age. EYES Pupils are equally round. No scleral erythema, discharge, or conjunctivitis. HENT Mucous membranes are moist. Oral pharynx without exudates, no evidence of thrush. NECK Supple, no apparent thyromegaly or masses. RESP diminished breath sounds bilaterally, able to complete full sentence symmetric chest movement while on room air. CARDIO/VASC: Tachycardic S1/S2 auscultated. Regular rate and rhythm, No JVD. Peripheral pulses equal bilaterally and palpable. GI Abdomen is soft, no tenderness, masses, or guarding. Bowel sounds present.  No costovertebral angle tenderness. HEME/LYMPH No palpable cervical lymphadenopathy and no hepatosplenomegaly. MSK +1 bilateral peripheral Edema. NO calf tendernessNo gross joint deformities. SKIN scattered rash on the left breast and LUQ secondary cancer, flushed face normal coloration, warm, dry. NEURO A&O to self, location, month and year, speech clear, language fluent, repetition and naming intact, follows commands appropriately. Cranial nerves appear grossly intact, no lateralizing weakness. Strength 5/5 UE's/LE's b/l  PSYCH Normal mood. Affect appropriate.     Past Medical History:      Past Medical History:   Diagnosis Date    Anxiety     Asthma     last flare up 5 yrs ago    Breast lesion     \"have spot on left breast going to remove- at this time not sure if cancer or Physically abused: None     Forced sexual activity: None   Other Topics Concern    None   Social History Narrative    Lives in her own home with her children       Medications:     Home Medication   Prior to Admission medications    Medication Sig Start Date End Date Taking? Authorizing Provider   metoprolol succinate (TOPROL XL) 100 MG extended release tablet Take 1 tablet by mouth daily 4/26/21   Neita Soda APRN - CNP   LORazepam (ATIVAN) 0.5 MG tablet Take 2 tablets by mouth 2 times daily for 30 days. 4/12/21 5/12/21  Neita Soda, APRN - CNP   omeprazole (PRILOSEC) 20 MG delayed release capsule Take 1 capsule by mouth every morning (before breakfast) 4/5/21   Neita Soda, APRN - CNP   furosemide (LASIX) 20 MG tablet Take 1 tablet by mouth daily 4/5/21   Neita Soda, APRN - CNP   Calcium Carbonate-Vitamin D (OYSTER SHELL CALCIUM/D) 500-200 MG-UNIT TABS Take 1 tablet by mouth daily 4/5/21 4/5/22  Fariha Soda, APRN - CNP   dexamethasone (DECADRON) 2 MG tablet Take 2 mg by mouth daily Take in AM for two days after chemotherapy treatments. Randal Gregorio MD   ondansetron (ZOFRAN) 8 MG tablet 1 tab po q 8 hours PRN for chemo induced nausea/vomting 3/16/21   Neita Soda, APRN - CNP   docusate sodium (COLACE) 100 MG capsule Take 1 capsule by mouth daily as needed for Constipation 3/16/21   Neita Soda, APRN - CNP   HYDROcodone-acetaminophen (NORCO) 5-325 MG per tablet Take 1 tablet by mouth every 6 hours as needed for Pain for up to 14 days.  3/16/21 3/30/21  Neita Soda APRN - CNP   acetaminophen (TYLENOL) 500 MG tablet Take 500 mg by mouth every 6 hours as needed for Pain    Historical Provider, MD   amLODIPine (NORVASC) 5 MG tablet TAKE TWO TABLETS BY MOUTH DAILY 2/12/21   Randal Gregorio MD   prochlorperazine (COMPAZINE) 10 MG tablet Take 1 tablet by mouth every 8 hours as needed (nausea) 1/25/21   Randal Gregorio MD   promethazine (PHENERGAN) 25 MG tablet TAKE ONE TABLET BY MOUTH EVERY 6 HOURS AS NEEDED FOR NAUSEA 12/22/20   Gee Stringer MD   cetirizine (ZYRTEC) 10 MG tablet Take 10 mg by mouth daily    Historical Provider, MD     Medications:    sodium chloride  1,000 mL Intravenous Once    vancomycin  1,000 mg Intravenous Once    azithromycin  500 mg Intravenous Once    calcium gluconate-NaCl  1,000 mg Intravenous Once    vancomycin (VANCOCIN) intermittent dosing (placeholder)   Other See Admin Instructions    [START ON 5/11/2021] cefepime  1,000 mg Intravenous Q12H      Infusions:   PRN Meds:      Recent Labs     05/10/21  1513   WBC 9.3   HGB 9.8*   HCT 30.6*   *      Recent Labs     05/10/21  1513   *   K 5.6*      CO2 13*   BUN 65*   CREATININE 3.7*     Recent Labs     05/10/21  1513   AST 51*   ALT 6*   BILITOT 0.1   ALKPHOS 82     No results for input(s): INR in the last 72 hours. Recent Labs     05/10/21  1513   TROPONINT 0.013*        Imaging reviewed  Ct Abdomen Pelvis Wo Contrast Additional Contrast? None  Result Date: 5/10/2021  1. CT CHEST: Similar appearing large volume left pleural effusion with associated relaxation atelectasis mid and lower left lung. 2. Right pleural effusion seen previously has resolved. 3. Diffuse patchy interstitial and alveolar densities as well as new nodular density in the upper right lung are in keeping with history of COVID-19. Note that CT pulmonary findings of COVID-19 show overlap with other disease entities including H1N1 influenza, other viral pneumonia (i.e. adenovirus, CMV), organizing pneumonia, alveolar proteinosis and acute interstitial pneumonitis. 4. Diffuse osteoblastic metastatic disease. Not appreciably changed. 5. Diffuse nodularity in the left breast may be related to the patient's primary malignancy or metastatic disease. 6. Left axillary lymph node enlargement may be reactive or related to metastatic disease. 7. CT ABDOMEN/PELVIS: Unchanged bilateral hydronephrosis, status post bilateral ureter stent placement.  8.

## 2021-05-10 NOTE — ED PROVIDER NOTES
MD Georgina at [address]  age 5    T & A       CURRENT MEDICATIONS    Current Outpatient Rx   Medication Sig Dispense Refill    metoprolol succinate (TOPROL XL) 100 MG extended release tablet Take 1 tablet by mouth daily 30 tablet 3    LORazepam (ATIVAN) 0.5 MG tablet Take 2 tablets by mouth 2 times daily for 30 days. 60 tablet 0    omeprazole (PRILOSEC) 20 MG delayed release capsule Take 1 capsule by mouth every morning (before breakfast) 30 capsule 0    furosemide (LASIX) 20 MG tablet Take 1 tablet by mouth daily 30 tablet 0    Calcium Carbonate-Vitamin D (OYSTER SHELL CALCIUM/D) 500-200 MG-UNIT TABS Take 1 tablet by mouth daily 30 tablet 0    dexamethasone (DECADRON) 2 MG tablet Take 2 mg by mouth daily Take in AM for two days after chemotherapy treatments.  ondansetron (ZOFRAN) 8 MG tablet 1 tab po q 8 hours PRN for chemo induced nausea/vomting 30 tablet 0    docusate sodium (COLACE) 100 MG capsule Take 1 capsule by mouth daily as needed for Constipation 30 capsule 0    HYDROcodone-acetaminophen (NORCO) 5-325 MG per tablet Take 1 tablet by mouth every 6 hours as needed for Pain for up to 14 days.  56 tablet 0    acetaminophen (TYLENOL) 500 MG tablet Take 500 mg by mouth every 6 hours as needed for Pain      amLODIPine (NORVASC) 5 MG tablet TAKE TWO TABLETS BY MOUTH DAILY 30 tablet 2    prochlorperazine (COMPAZINE) 10 MG tablet Take 1 tablet by mouth every 8 hours as needed (nausea) 60 tablet 1    promethazine (PHENERGAN) 25 MG tablet TAKE ONE TABLET BY MOUTH EVERY 6 HOURS AS NEEDED FOR NAUSEA 30 tablet 0    cetirizine (ZYRTEC) 10 MG tablet Take 10 mg by mouth daily         ALLERGIES    Allergies   Allergen Reactions    Latex Anaphylaxis     \"have trouble with banana's kiwi, avocados- Have trouble breathing with these and trouble breathig - get asthmatic from latex gloves, bandage, anything latex     Banana Anaphylaxis    Cinnamon Anaphylaxis       SOCIAL & FAMILY of COVID-19. Note that CT pulmonary findings of COVID-19 show overlap with other disease   entities including H1N1 influenza, other viral pneumonia (i.e. adenovirus,   CMV), organizing pneumonia, alveolar proteinosis and acute interstitial   pneumonitis. 4. Diffuse osteoblastic metastatic disease. Not appreciably changed. 5. Diffuse nodularity in the left breast may be related to the patient's   primary malignancy or metastatic disease. 6. Left axillary lymph node enlargement may be reactive or related to   metastatic disease. 7. CT ABDOMEN/PELVIS: Unchanged bilateral hydronephrosis, status post   bilateral ureter stent placement. 8. Image degradation results from breathing motion blurring detail. 9. Nonspecific diffuse thickening left side of the urinary bladder wall   possibly related to infiltrative process or infection. CT CHEST WO CONTRAST   Final Result   1. CT CHEST: Similar appearing large volume left pleural effusion with   associated relaxation atelectasis mid and lower left lung. 2. Right pleural effusion seen previously has resolved. 3. Diffuse patchy interstitial and alveolar densities as well as new nodular   density in the upper right lung are in keeping with history of COVID-19. Note that CT pulmonary findings of COVID-19 show overlap with other disease   entities including H1N1 influenza, other viral pneumonia (i.e. adenovirus,   CMV), organizing pneumonia, alveolar proteinosis and acute interstitial   pneumonitis. 4. Diffuse osteoblastic metastatic disease. Not appreciably changed. 5. Diffuse nodularity in the left breast may be related to the patient's   primary malignancy or metastatic disease. 6. Left axillary lymph node enlargement may be reactive or related to   metastatic disease. 7. CT ABDOMEN/PELVIS: Unchanged bilateral hydronephrosis, status post   bilateral ureter stent placement. 8. Image degradation results from breathing motion blurring detail. 9. Nonspecific diffuse thickening left side of the urinary bladder wall   possibly related to infiltrative process or infection. XR CHEST PORTABLE   Final Result   Interval development of patchy infiltrates within the lungs, especially the   right, concerning for multifocal pneumonia. Consider both typical and   atypical etiologies, including viral pneumonia. Continued evidence of a large left pleural effusion with adjacent airspace   disease, some combination of atelectasis, pneumonia, and asymmetric edema. ED COURSE & MEDICAL DECISION MAKING      Patient presents as above. Patient is hypoxic into the upper 80s and placed on nasal cannula oxygen. Patient provided IV fluids for her tachycardia. 10 mg IV Decadron is ordered due to history of Covid and hypoxia. See physician note for EKG reading. Chest x-ray shows development of patchy infiltrates within the lungs especially in right concerning for multifocal pneumonia, continued evidence of large left pleural effusion with adjacent airspace disease. Hospital-acquired pneumonia antibiotics ordered including minimizing cefepime and azithromycin. CBC shows hemoglobin 9.8 hematocrit 30.6. CMP shows potassium of 5.6, BUN of 65 and creatinine 3.7. Calcium of 7. Patient provided 1 g of calcium gluconate. Consult to nephrologist Dr. Mir Rosen who agrees with current plan and admission to medicine. Troponin is elevated 0.013 which is similar to previous. BNP is mildly evaded 1511. Lactic acid is 2.6. Procalcitonin is 0.466. Consult urologist physician assistant Ivan Pickens who agrees with current plan and admission to medicine. CT chest, abdomen and pelvis shows similar-appearing large left pleural effusion, right pleural effusion seen previously has resolved, diffuse patchy interstitial and alveolar densities, diffuse osteoblastic disease not appreciably changed, unchanged bilateral hydronephrosis.   Consult hospitalist who accepts admission. CRITICAL CARE NOTE:  There was a high probability of clinically significant life-threatening deterioration of the patient's condition requiring my urgent intervention due to pneumonia, hypoxia, hyperkalemia, acute kidney injury. IV fluids, IV antibiotics, oxygen, consulted nephrology and urology was performed to address this. Total critical care time is at least  45 minutes. This includes vital sign monitoring, pulse oximetry monitoring, telemetry monitoring, clinical response to the IV medications, reviewing the nursing notes, consultation time, dictation/documentation time, and interpretation of the lab work. This time excludes time spent performing procedures and separately billable procedures and family discussion time. Clinical  IMPRESSION    1. Acute respiratory failure with hypoxia ([facility] [address]    2. SIRS (systemic inflammatory response syndrome) (HCC)    3. Severe sepsis with acute organ dysfunction ([facility] [address]    4. Multifocal pneumonia    5. COVID-19    6. NSTEMI (non-ST elevated myocardial infarction) ([facility] [address]    7. ELIDIA (acute kidney injury) ([facility] [address]    8. Metastatic breast cancer ([facility] [address]    9. Immunocompromised state due to drug therapy    10. Hyperkalemia    11. Pleural effusion on left    12. Hydronephrosis, unspecified hydronephrosis type        Patient admitted    Comment: Please note this report has been produced using speech recognition software and may contain errors related to that system including errors in grammar, punctuation, and spelling, as well as words and phrases that may be inappropriate. If there are any questions or concerns please feel free to contact the dictating provider for clarification.                           Daisy Riggs PA-C  05/10/21 4276

## 2021-05-10 NOTE — ED NOTES
Received report from Lilo Schwarz in lab, Lactic acid 2.6.  Will rerun     Ellen Link RN  05/10/21 8158

## 2021-05-10 NOTE — PROGRESS NOTES
[facility]  consulted by Dr. Joycelyn Telles for monitoring and adjustment. Indication for treatment: Pneumonia  Goal trough: 15-20 mcg/mL    Pertinent Laboratory Values:   Temp Readings from Last 3 Encounters:   05/10/21 99.3 °F (37.4 °C) (Oral)   04/26/21 99.7 °F (37.6 °C) (Temporal)   04/26/21 99.7 °F (37.6 °C) (Temporal)     Recent Labs     05/10/21  1513   WBC 9.3   LACTATE 2.6*     Recent Labs     05/10/21  1513   BUN 65*   CREATININE 3.7*     Estimated Creatinine Clearance: 13 mL/min (A) (based on SCr of 3.7 mg/dL (H)). No intake or output data in the 24 hours ending 05/10/21 1626    Pertinent Cultures:  Date    Source    Results  5/10/21  Urine    Ordered  5/10/21  Blood    Ordered  5/10/21  MRSA nasal   Ordered    Vancomycin level:   TROUGH:  No results for input(s): VANCOTROUGH in the last 72 hours. RANDOM:  No results for input(s): VANCORANDOM in the last 72 hours.     Assessment:  · WBC and temperature: WNL; afebrile  · Lactate elevated @ 2.6  · SCr, BUN, and urine output: ELIDIA, baseline Scr 1  · Day(s) of therapy: #1  · Vancomycin concentration: to be collected    Plan:  · Initiate vancomycin 1000 mg IVPB x 1 dose in ER  · Continue intermittent vancomycin dosing based on levels  · Plan to check a random level tomorrow afternoon  · Pharmacy will continue to monitor patient and adjust therapy as indicated    VANCOMYCIN CONCENTRATION SCHEDULED FOR 5/11 @ 1300    Thank you for the consult,  Petty Herr, [address]   5/10/2021 4:26 PM

## 2021-05-10 NOTE — ED NOTES
Bed: 01TR-01  Expected date:   Expected time:   Means of arrival:   Comments:  Covid pt from triage     Herson Nazario RN  05/10/21 2808

## 2021-05-10 NOTE — ED NOTES
The patient presents to the emergency department alert and oriented with a complaint of weakness for one day. The patient is tachypneic but denies difficulty breathing. The patient denies any pain. The patient placed on the monitor with vital signs taken. Assessment as follows; Skin is pink, warm and dry. Lung sounds are clear. The patient is COVID positive on May 1st. The patient is currently being treated for left breast cancer. The patient has a power port in the right shoulder area that she is receiving chemotherapy for. The patient states that she was feeling better yesterday.      Cristhian Kate, RN  05/10/21 0102

## 2021-05-10 NOTE — ED NOTES
Kyle Pierce RN attempted report. No answer to charge or unit phone.       [place] AUDIE Castañeda  05/10/21 7727

## 2021-05-10 NOTE — PROGRESS NOTES
Patient called to report that she feels that she needs IV fluids and is positive for COVID. She states that she has had an intermittent fever. Discussed with NP and with director GRETA Ulrich and patient informed to go to the ER since she is symptomatic with fever. Informed Dr Joy Abdi.

## 2021-05-10 NOTE — ED NOTES
Report given to [facility].      Jarocho Fraga RN  05/10/21 0393

## 2021-05-11 NOTE — CONSULTS
Nephrology Service Consultation      [address], [address] [address]  Phone: [phone]  Office Hours: 8:30AM - 4:30PM  Monday - Friday            Patient:  Kimo Jacobo  MRN: [id number]  Consulting physician:  Jacques Burrows MD  Reason for Consult: elevated creat      PCP: CINDY Santacruz - CNP    HISTORY OF PRESENT ILLNESS:   The patient is a 50 y.o. female with metastatic breast cancer, bilateral HN that required ureteral stents last admission, presented with resp failure. She was found to have covid 19  Renal consult for cr in the 3s from baseline of less than 1  She is on NC this morning, she reports making urine   She is on chemotherapy which is on hold currently    REVIEW OF SYSTEMS:  14 point ROS is Negative. See positive ROS per HPI    Past Medical History:        Diagnosis Date    Anxiety     Asthma     last flare up 5 yrs ago    Breast lesion     \"have spot on left breast going to remove- at this time not sure if cancer or not\"    Essential hypertension 4/1/2020    Secondary cancer of bone ([facility] [address] 4/1/2020    Thyroid nodule     \"have thyroid nodules-        Past Surgical History:        Procedure Laterality Date    APPENDECTOMY  age 27   Dong Layer BLADDER SURGERY Bilateral 3/25/2021    BILATERAL CYSTOSCOPY BILATERAL STENT INSERTION performed by Kristine Estrada MD at [address] [place] [address] IR NONTUNNELED VASCULAR CATHETER  3/22/2021    IR NONTUNNELED VASCULAR CATHETER 3/22/2021 [facility] SPECIAL PROCEDURES    PORT SURGERY Right 3/15/2021    RIGHT PORT INSERTION performed by Marianna Sandoval MD at [facility]  age 4    T & A       Medications:   Prior to Admission medications    Medication Sig Start Date End Date Taking?  Authorizing Provider   cetirizine (ZYRTEC) 10 MG tablet Take 10 mg by mouth daily   Yes Historical Provider, MD   metoprolol succinate (TOPROL XL) 100 MG extended release tablet Take 1 tablet by mouth daily 4/26/21   Leana Mcduffie Urielia CINDY Mijares CNP   LORazepam (ATIVAN) 0.5 MG tablet Take 2 tablets by mouth 2 times daily for 30 days. 4/12/21 5/12/21  CINDY South CNP   omeprazole (PRILOSEC) 20 MG delayed release capsule Take 1 capsule by mouth every morning (before breakfast) 4/5/21   CINDY South CNP   furosemide (LASIX) 20 MG tablet Take 1 tablet by mouth daily  Patient taking differently: Take 20 mg by mouth daily as needed  4/5/21   CINDY South CNP   Calcium Carbonate-Vitamin D (OYSTER SHELL CALCIUM/D) 500-200 MG-UNIT TABS Take 1 tablet by mouth daily 4/5/21 4/5/22  CINDY South CNP   ondansetron (ZOFRAN) 8 MG tablet 1 tab po q 8 hours PRN for chemo induced nausea/vomting  Patient taking differently: 8 mg every 8 hours as needed for Nausea or Vomiting  3/16/21   CINDY South CNP   docusate sodium (COLACE) 100 MG capsule Take 1 capsule by mouth daily as needed for Constipation 3/16/21   CINDY Souht CNP   HYDROcodone-acetaminophen (NORCO) 5-325 MG per tablet Take 1 tablet by mouth every 6 hours as needed for Pain for up to 14 days. 3/16/21 3/30/21  CINDY South CNP   acetaminophen (TYLENOL) 500 MG tablet Take 500 mg by mouth every 6 hours as needed for Pain    Historical Provider, MD   amLODIPine (NORVASC) 5 MG tablet TAKE TWO TABLETS BY MOUTH DAILY 2/12/21   Barbie Aguilar MD   prochlorperazine (COMPAZINE) 10 MG tablet Take 1 tablet by mouth every 8 hours as needed (nausea)  Patient taking differently: Take 10 mg by mouth every 8 hours as needed (Nausea)  1/25/21   Barbie Aguilar MD   promethazine (PHENERGAN) 25 MG tablet TAKE ONE TABLET BY MOUTH EVERY 6 HOURS AS NEEDED FOR NAUSEA 12/22/20   Barbie Aguilar MD        Allergies:  Latex, Banana, and Cinnamon    Social History:   TOBACCO:   reports that she has never smoked. She has never used smokeless tobacco.  ETOH:   reports current alcohol use.   OCCUPATION:      Family History:       Problem Relation Age of Onset    last admission  Metastatic breast cancer  COVID 19    Plan:  Cr down to 3.2 today  Medical therapy for the hyperkalemia  Give calcium gluconate tid and calcitriol  Start bicarb gtt  Avoid nephrotoxins  Hold norvasc, let BP come up a bit    Thank you  Monitor UOP    Electronically signed by Dayana Ramirez DO on 5/11/2021 at MD Chago Moreno DO  [address]  PHONE: [phone]  FAX: [phone]

## 2021-05-11 NOTE — CONSULTS
Infectious Disease Consult Note  2021   Patient Name: Hector Golden : 1965   Impression  Severe COVID-19 pneumonia with acute hypoxic respiratory failure. Bilateral pleural effusion   Breast cancer with osteoblastic metastatic disease, bilateral pleural effusions left greater than right. Status post left-sided thoracentesis. At risk for poor outcome  Inflammatory phase, possible bacterial superinfection. Date of symptom onset: Unknown: Patient got tested due to exposure to someone with COVID-19. Date of positive COVID-19 PCR: 2021 ([place])  Exposure: Trend  Oxygen supplementation: Nasal cannula  Bacterial infection  BMI: 24.04 kg/m2  · ELIDIA   Multi-morbidity: per PMHx asthma, anxiety, metastatic breast cancer to the bone on Taxol, thyroid nodules. Plan:   Therapeutic: Vancomycin, cefepime and azithromycin. Start dexamethasone   Diagnostic:Trend CRP and procalcitonin. (If not already done then) check lipid panel, BNP, troponin, CK, LD, HIV screen, viral hepatitis serology, HbA1c, MRSA nares, urine legionella Ag, urine S pneumoniae Ag, respiratory culture.  F/u test:     Thank you for allowing me to consult in the care of this patient.  ------------------------  REASON FOR CONSULT: Infective syndrome   Requested by: ANGE Ananth Gonzalez is a 50 y.o.  female with a medical history of breast cancer, bone metastasis on Taxol. She tested positive for SARS-CoV-2 at [facility] on 2021 after unknown exposure to friend who had Covid. She was admitted 5/10/2021 for further evaluation and management of worsening fatigue, increased shortness of breath. Admission shows noted to have hypoxia while breathing ambient air. CT of the chest revealed bilateral pleural effusion left greater than right. Diffuse patchy interstitial opacities consistent with COVID-19. Procalcitonin was 0.845. Started empirically on vancomycin and cefepime and azithromycin.   She denies headache, chest pain, abdominal pain, diarrhea or change in bowel habits. ?  Infectious diseases service was consulted to evaluate the pt, and recommend further investigative and therapeutic measures. Review and summary of old records:  ROS: Other systems reviewed Including eyes, ENT, respiratory, cardiovascular, GI, , dermatologic, neurologic, psych, hem/lymphatic, musculoskeletal and endocrine were negative other than what is mentioned above.      Patient Active Problem List    Diagnosis Date Noted    Acute and chronic respiratory failure (acute-on-chronic) ([address] 05/10/2021    Tachycardia     ELIDIA (acute kidney injury) ([address] 03/22/2021    Hyperkalemia     Metabolic acidosis     Hyponatremia     Left ovarian enlargement 01/26/2021    Iron deficiency anemia 01/07/2021    Malabsorption 01/07/2021    Dehydration 11/20/2020    Malignant neoplasm of central portion of left female breast ([address] 07/31/2020    Metastatic breast cancer ([address] 07/31/2020    Secondary cancer of bone ([facility] [address] 04/01/2020    Essential hypertension 04/01/2020    Left breast mass      Past Medical History:   Diagnosis Date    Anxiety     Asthma     last flare up 5 yrs ago    Breast lesion     \"have spot on left breast going to remove- at this time not sure if cancer or not\"    Essential hypertension 4/1/2020    Secondary cancer of bone ([address] 4/1/2020    Thyroid nodule     \"have thyroid nodules-       Past Surgical History:   Procedure Laterality Date    APPENDECTOMY  age 27   [de-identified] BLADDER SURGERY Bilateral 3/25/2021    BILATERAL CYSTOSCOPY BILATERAL STENT INSERTION performed by Denny Ohara MD at [address] [facility] IR NONTUNNELED VASCULAR CATHETER  3/22/2021    IR NONTUNNELED VASCULAR CATHETER 3/22/2021 [facility] SPECIAL PROCEDURES    PORT SURGERY Right 3/15/2021    RIGHT PORT INSERTION performed by Myranda Dorantes MD at [facility]  age 4    T & A      Family History   Problem Relation Age of Onset    Diabetes Mother     Stroke Mother     High Blood Pressure Mother       Infectious disease related family history - not contibutory. SOCIAL HISTORY  Social History     Tobacco Use    Smoking status: Never Smoker    Smokeless tobacco: Never Used   Substance Use Topics    Alcohol use: Yes     Comment: average\"one time per week\"       Born:   Lived   Occupation:   No recent travel of significance.  No recent unusual exposures.  NO pets    ? ALLERGIES  Allergies   Allergen Reactions    Latex Anaphylaxis     \"have trouble with banana's kiwi, avocados- Have trouble breathing with these and trouble breathig - get asthmatic from latex gloves, bandage, anything latex     Banana Anaphylaxis    Cinnamon Anaphylaxis      MEDICATIONS  Reviewed and are per the chart/EMR. IMMUNIZATION HISTORY    There is no immunization history on file for this patient. ? Antibiotics:  Vancomycin  Cefepime  Azithromycin. ?  -------------------------------------------------------------------------------------------------------------------    Vital Signs:  Vitals:    05/11/21 0800   BP: 108/63   Pulse: 87   Resp: 23   Temp: 97.6 °F (36.4 °C)   SpO2: 94%         Exam:    VS: noted; wt 57.7 kg  Gen: alert and oriented X3, on nasal oxygen  Skin: Anterior chest wall scar tissue no stigmata of endocarditis  Wounds: C/D/I  HEMT: AT/NC Oropharynx pink, moist, and without lesions or exudates; dentition in good state of repair  Eyes: PERRLA, EOMI, conjunctiva pink, sclera anicteric. Neck: Supple. Trachea midline. No LAD. Chest: Reduced air entry bilaterally  Heart: RRR and no MRG. Abd: soft, non-distended, no tenderness, no hepatomegaly. Normoactive bowel sounds. Ext: no clubbing, cyanosis, or edema  Catheter Site: without erythema or tenderness  LDA: CVC: Right-sided venous port  Neuro: Mental status intact. CN 2-12 intact and no focal sensory or motor deficits    ? Diagnostic Studies: reviewed  ? ?   I have examined this patient and available medical records on this date and have made the above observations, conclusions and recommendations.   Electronically signed by: Electronically signed by Sita Manley MD on 5/11/2021 at 10:42 AM

## 2021-05-11 NOTE — PROGRESS NOTES
Body Weight: 118 lb (53.5 kg)(stated)    · Usual Body Weight: 118 lb (53.5 kg)(4/26/21 per encounters and per pt report)     · Ideal Body Weight: 105 lbs; % Ideal Body Weight 121.1 %   · BMI: 24  · Adjusted Body Weight:  ; No Adjustment   · Adjusted BMI:      · BMI Categories: Normal Weight (BMI 18.5-24. 9)       Nutrition Diagnosis:   · Inadequate protein-energy intake related to acute injury/trauma, altered taste perception as evidenced by poor intake prior to admission, intake 26-50%      Nutrition Interventions:   Food and/or Nutrient Delivery:  Continue Current Diet, Snacks (Comment)  Nutrition Education/Counseling:  No recommendation at this time   Coordination of Nutrition Care:  Continue to monitor while inpatient, Coordination of Community Care    Goals:  Pt will consume more than half of meals and snacks       Nutrition Monitoring and Evaluation:   Behavioral-Environmental Outcomes:  None Identified   Food/Nutrient Intake Outcomes:  Food and Nutrient Intake  Physical Signs/Symptoms Outcomes:  Biochemical Data, Fluid Status or Edema, Weight     Discharge Planning:     Too soon to determine     Electronically signed by Renato Torres RD, LD on 5/11/21 at 9:50 AM EDT    Contact: [phone]

## 2021-05-11 NOTE — PROGRESS NOTES
[facility] [address] [facility]  Pharmacy consulted by CINDY Shane / Dr. Ananth Gupta for monitoring and adjustment. Indication for treatment: Pneumonia  Goal trough: 15-20 mcg/mL    Pertinent Laboratory Values:   Temp Readings from Last 3 Encounters:   05/11/21 97.6 °F (36.4 °C) (Oral)   04/26/21 99.7 °F (37.6 °C) (Temporal)   04/26/21 99.7 °F (37.6 °C) (Temporal)     Recent Labs     05/10/21  1513 05/10/21  1820 05/11/21  0545   WBC 9.3  --  6.6   LACTATE 2.6* 1.4 1.3     Recent Labs     05/10/21  1513 05/11/21  0545   BUN 65* 61*   CREATININE 3.7* 3.2*     Estimated Creatinine Clearance: 17 mL/min (A) (based on SCr of 3.2 mg/dL (H)). Intake/Output Summary (Last 24 hours) at 5/11/2021 1514  Last data filed at 5/11/2021 1315  Gross per 24 hour   Intake 1356.25 ml   Output 2475 ml   Net -1118.75 ml       Pertinent Cultures:  Date    Source    Results  5/10/21  Urine    Mixed skin dave  5/10/21  Blood    Ordered  5/10/21  MRSA nasal   Ordered  5/10/21  Legionella/Strep Pneumo Negative    Vancomycin level:   TROUGH:    Recent Labs     05/11/21  1430   VANCOTROUGH 13.7     RANDOM:  No results for input(s): VANCORANDOM in the last 72 hours.     Assessment:  · WBC and temperature: WNL; afebrile  · Lactate trending down  · SCr, BUN, and urine output:   · ELIDIA, baseline Scr 1  · Scr slightly improved @ 3.2  · Day(s) of therapy: #2  · Vancomycin concentration:  · 13.7, 19h post-dose    Plan:  · Patient received 1000 mg IVPB x 1 in ER  · Continue intermittent vancomycin dosing based on levels  · Based on level result, re-dose with vancomycin 750 mg x1  · Repeat level tomorrow afternoon  · Pharmacy will continue to monitor patient and adjust therapy as indicated    VANCOMYCIN CONCENTRATION SCHEDULED FOR 5/12 @ 1300    Thank you for the consult,  Kelvin Encarnacion   5/11/2021 3:14 PM

## 2021-05-11 NOTE — CARE COORDINATION
.Chart reviewed. The patient is to return home with spouse at discharge. The patient has a PCP and insurance. She does not drive and states she has a walker, cane and wheelchair. She states her daughter helps take care of her. She is on 2 liters of oxygen here and states she is not on oxygen at home. She states that they tried to get [facility] for therapy but it did not work out. She would like to have [facility] at d/c. The patient can afford and take their medications as prescribed.  Referral faxed to [facility] [phone]

## 2021-05-11 NOTE — PROGRESS NOTES
PROCEDURE PERFORMED: left thoracentesis    PRIMARY INDICATION FOR PROCEDURE:  Pleural effusion    INFORMED CONSENT:  PT A&O, verbalizes understanding of the procedure, signed consent with Dr. Jessika Merlos. Consent placed in chart. TIME OUT COMPLETE: [address]  Pt assisted to the side of the bed, positioned for comfort. Pt placed on Cardiac Monitor. Pt in the sitting position. Chlorhexadine and draped in a sterile fashion.      PAIN/LOCAL ANESTHESIA/SEDATION MANAGEMENT:  Lidocaine 1% without Epinephrine    INTRAOPERATIVE:   ACCESS TIME: 1123  US/FLUORO: 2 US images  FLUID RETURN: clear yellow pleural fluid draining  Total fluid removed: 1350 ml    STERILE DRESSINGS:  Gauze and tegaderm    SPECIMENS: No orders for lab    FOLLOW- UP X-RAY: ordered    COMPLICATIONS: None    STAFF PRESENT DURING PROCEDURE:  Dr Ana Luisa Gleason, Krystal Latif RN    REPORT CALLED TO: Aj Short RN

## 2021-05-11 NOTE — PROGRESS NOTES
Hospitalist Progress Note      Name:  Elise Griffith /Age/Sex: 1965  (50 y.o. female)   MRN & CSN:  [id number] & [id number] Admission Date/Time: 5/10/2021  1:58 PM   Location:  -A PCP: Marcelina Carbone [address] [facility] Day: 2    Assessment and Plan:   Elise Griffith is a 50 y.o.  female  who presents with <principal problem not specified>    >Sepsis most likely secondary to viral pneumonia:   >Acute on chronic hypoxic respiratory failure  >Multifocal pneumonia likely secondary to Covid 19  >Pleural effusion L >R likely secondary to breast CA  -Tachycardic, tachypneic, SPO2 97% drop to 89. Lactic acid 2.6, procalcitonin 0.466. WBC 9.3.  + Covid the first   - X-ray showing patchy infiltrate within the lung.  -Cefepime, vancomycin  -Inpatient infectious disease, IR consulted  -Decadron , thoracentesis done 1350 ml      >Acute kidney injury most likely prerenal/chemo therapy:   >Hx  bilateral hydroureteronephrosis s/p bilateral ureteral stent placement 3/25/2021  >Mild hyponatremia:   >Hyperkalemia: K5.6 received calcium gluconate in the ED  >Hypocalcemia: CA 7.0, received calcium gluconate in the ED  -BUN 65/CR 3.7(baseline  2021) patient currently on Taxol for breast CA with mets. -Avoid nephrotoxic drugs, renal dosing  -Nephrology, Urology consulted  - Bicarb drip, held amlodipine     >Elevated proBNP: proBNP 1511. EF 55 to 60% 3/24/2021 - sec to above  >Nonzero troponin likely demand ischemia: Trop 0.013. EKG sinus tachycardia, nonischemic changes  Trend  down to neg     >Anemia chronic   >Thrombocytosis: Platelet count 226 (083 and 2021)  -likely multifactorial: Hb 9. H/HCT 30.6. Hx iron deficiency anemia, ?  Bone marrow suppression with chemo, ? sepsis  -monitor, Hem/onc consulted.      >Segmental diffuse osteoblastic metastatic disease/left breast diffuse nodularity per CT abdomen/pelvis without contrast   >Hx breast cancer left breast with mets to the succinate  100 mg Oral Daily    sodium chloride flush  5-40 mL Intravenous 2 times per day    cefepime  1,000 mg Intravenous Q12H    pantoprazole  40 mg Oral QAM AC      Infusions:    sodium bicarbonate infusion 75 mL/hr at 05/11/21 0822    sodium chloride      sodium chloride      dextrose       PRN Meds: sodium chloride flush, 5-40 mL, PRN  sodium chloride, 25 mL, PRN  promethazine, 12.5 mg, Q6H PRN    Or  ondansetron, 4 mg, Q6H PRN  polyethylene glycol, 17 g, Daily PRN  acetaminophen, 650 mg, Q6H PRN    Or  acetaminophen, 650 mg, Q6H PRN  docusate sodium, 100 mg, Daily PRN  HYDROcodone-acetaminophen, 1 tablet, Q6H PRN  sodium chloride flush, 5-40 mL, PRN  sodium chloride, 25 mL, PRN  glucose, 15 g, PRN  dextrose, 12.5 g, PRN  glucagon (rDNA), 1 mg, PRN  dextrose, 100 mL/hr, PRN        Electronically signed by Mark Beauchamp MD on 5/11/2021 at 8:50 AM

## 2021-05-12 PROBLEM — U07.1 COVID-19: Status: ACTIVE | Noted: 2021-01-01

## 2021-05-12 NOTE — PROGRESS NOTES
Cr down to 2.8  Serum bicarb wnl  K level wnl  Stop bicarb gtt and bicitra  Stop calcium gluconate after today's dose  Stop calcitriol after today's dose  Continue supportive mgmt    Thank you

## 2021-05-12 NOTE — PROGRESS NOTES
Hospitalist Progress Note      Name:  Carine Smith /Age/Sex: 1965  (50 y.o. female)   MRN & CSN:  [id number] & [id number] Admission Date/Time: 5/10/2021  1:58 PM   Location:  -A PCP: Damian Washburn [address] Day: 3    Assessment and Plan:   Carine Smith is a 50 y.o.  female  who presents with <principal problem not specified>    >Sepsis most likely secondary to viral pneumonia:   >Acute on chronic hypoxic respiratory failure  >Multifocal pneumonia likely secondary to Covid 19  >Pleural effusion L >R likely secondary to breast CA  -Tachycardic, tachypneic, SPO2 97% drop to 89. Lactic acid 2.6, procalcitonin 0.466. WBC 9.3.  + Covid the first   - X-ray showing patchy infiltrate within the lung.  -Cefepime, vancomycin  -Inpatient infectious disease, IR consulted  -Decadron , thoracentesis done 1350 ml   - improving dyspnea     >Acute kidney injury most likely prerenal/chemo therapy:   >Hx  bilateral hydroureteronephrosis s/p bilateral ureteral stent placement 3/25/2021  >Mild hyponatremia:   >Hyperkalemia: K5.6 received calcium gluconate in the ED  >Hypocalcemia: CA 7.0, received calcium gluconate in the ED  -BUN 65/CR 3.7(baseline  2021) patient currently on Taxol for breast CA with mets. -Avoid nephrotoxic drugs, renal dosing  -Nephrology, Urology consulted  - Bicarb drip, held amlodipine  Cr improving      >Elevated proBNP: proBNP 1511. EF 55 to 60% 3/24/2021 - sec to above  >Nonzero troponin likely demand ischemia: Trop 0.013. EKG sinus tachycardia, nonischemic changes  Trend  down to neg     >Anemia chronic   >Thrombocytosis: Platelet count 091 (828 and 2021)  -likely multifactorial: Hb 9. H/HCT 30.6. Hx iron deficiency anemia, ? Bone marrow suppression with chemo, ? sepsis  -monitor, Hem/onc consulted.    - monitor       >Segmental diffuse osteoblastic metastatic disease/left breast diffuse nodularity per CT abdomen/pelvis without contrast   >Hx breast cancer left breast with mets to the bone, status post hormonal therapy. She was on Lupron, exemestane, Zometa and Afinitor. Currently on weekly Taxol since March 22, 2021  -Follows up with Dr. Duane Snider, consulted, Hold chemo     >Essential hypertension:  Amlodipine held  >BMI: 22.30 kg/m² : Dietician consulted. Diet DIET GENERAL;   DVT Prophylaxis [] Heparin   GI Prophylaxis [] PPI   Code Status Full Code   Disposition  pending clinical improvement and consult rec     History of Present Illness:     Pt S&E. Improving dyspnea, no chest pain, no abd pain, no n/V, no F/C.     10-14 point ROS reviewed negative, unless as noted above    Objective: Intake/Output Summary (Last 24 hours) at 5/12/2021 0751  Last data filed at 5/12/2021 0626  Gross per 24 hour   Intake 2568 ml   Output 2125 ml   Net 443 ml      Vitals:   Vitals:    05/12/21 0602   BP: 113/66   Pulse: 87   Resp: 14   Temp:    SpO2: 95%     Physical Exam:      GEN Awake female, cooperative. RESP Decreased air sounds. Symmetric chest movement . CARDIO/VASC S1/S2 auscultated. Regular rate. GI Abdomen is soft without significant tenderness, Bowel sounds are normoactive. MSK No gross joint deformities. Spontaneous movement of all extremities  SKIN  warm, dry. NEURO normal speech, no lateralizing weakness. PSYCH Awake, alert, oriented x 4. Affect appropriate.     Medications:   Medications:    calcitRIOL  0.5 mcg Oral BID    calcium gluconate  2,000 mg Intravenous TID    calcium-cholecalciferol  1 tablet Oral Daily    vancomycin (VANCOCIN) intermittent dosing (placeholder)   Other See Admin Instructions    sodium chloride flush  5-40 mL Intravenous 2 times per day    heparin (porcine)  5,000 Units Subcutaneous 3 times per day    citric acid-sodium citrate  30 mL Oral Q6H    dexamethasone  6 mg Intravenous Q24H    [Held by provider] amLODIPine  5 mg Oral Daily    cetirizine  10 mg Oral Daily    metoprolol succinate  100 mg Oral Daily    sodium chloride flush  5-40 mL Intravenous 2 times per day    cefepime  1,000 mg Intravenous Q12H    pantoprazole  40 mg Oral QAM AC      Infusions:    sodium bicarbonate infusion 75 mL/hr at 05/11/21 2341    sodium chloride      sodium chloride      dextrose       PRN Meds: sodium chloride flush, 5-40 mL, PRN  sodium chloride, 25 mL, PRN  promethazine, 12.5 mg, Q6H PRN    Or  ondansetron, 4 mg, Q6H PRN  polyethylene glycol, 17 g, Daily PRN  acetaminophen, 650 mg, Q6H PRN    Or  acetaminophen, 650 mg, Q6H PRN  docusate sodium, 100 mg, Daily PRN  HYDROcodone-acetaminophen, 1 tablet, Q6H PRN  sodium chloride flush, 5-40 mL, PRN  sodium chloride, 25 mL, PRN  glucose, 15 g, PRN  dextrose, 12.5 g, PRN  glucagon (rDNA), 1 mg, PRN  dextrose, 100 mL/hr, PRN        Electronically signed by Marvel Benavides MD on 5/12/2021 at 7:51 AM

## 2021-05-12 NOTE — PROGRESS NOTES
Infectious Disease Progress Note  2021   Patient Name: Rajeev Reyes : 1965       Reason for visit: F/u COVID-19 pneumonia, acute respiratory failure? History:? Interval history noted  Denies n/v/d/f or untoward effects of antimicrobials  Physical Exam:  Vital Signs: /67   Pulse 104   Temp 98.3 °F (36.8 °C) (Oral)   Resp 25   Ht 5' 1\" (1.549 m)   Wt 131 lb 9.8 oz (59.7 kg)   LMP 2018   SpO2 92%   BMI 24.87 kg/m²     Gen: alert and oriented X3. On nasal oxygen  Skin: no stigmata of endocarditis  Wounds: C/D/I  HEMT: AT/NC Oropharynx pink, moist, and without lesions or exudates; dentition in good state of repair  Eyes: PERRLA, EOMI, conjunctiva pink, sclera anicteric. Neck: Supple. Trachea midline. No LAD. Chest: Reduced air entry bilaterally  Heart: RRR  Abd: soft, non-distended, no tenderness, no hepatomegaly. Normoactive bowel sounds. Ext: no clubbing, cyanosis, or edema  Catheter Site: without erythema or tenderness  LDA: CVC: right arm mediport  Neuro: Mental status intact. CN 2-12 intact and no focal sensory or motor deficits     Radiologic / Imaging / TESTING  No results found. Labs:    Recent Results (from the past 24 hour(s))   Vancomycin, trough    Collection Time: 21  2:30 PM   Result Value Ref Range    Vancomycin Tr 13.7 10 - 20 UG/ML    DOSE AMOUNT DOSE AMT.  GIVEN - 1000     DOSE TIME DOSE TIME GIVEN - 5/10 @ 0448    Basic Metabolic Panel w/ Reflex to MG    Collection Time: 21  6:50 AM   Result Value Ref Range    Sodium 139 135 - 145 MMOL/L    Potassium 4.5 3.5 - 5.1 MMOL/L    Chloride 102 99 - 110 mMol/L    CO2 21 21 - 32 MMOL/L    Anion Gap 16 4 - 16    BUN 59 (H) 6 - 23 MG/DL    CREATININE 2.8 (H) 0.6 - 1.1 MG/DL    Glucose 166 (H) 70 - 99 MG/DL    Calcium 7.5 (L) 8.3 - 10.6 MG/DL    GFR Non- 18 (L) >60 mL/min/1.73m2    GFR  21 (L) >60 mL/min/1.73m2   CBC    Collection Time: 21  6:50 AM   Result Value Ref Range WBC 9.3 4.0 - 10.5 K/CU MM    RBC 2.88 (L) 4.2 - 5.4 M/CU MM    Hemoglobin 8.3 (L) 12.5 - 16.0 GM/DL    Hematocrit 26.9 (L) 37 - 47 %    MCV 93.4 78 - 100 FL    MCH 28.8 27 - 31 PG    MCHC 30.9 (L) 32.0 - 36.0 %    RDW 22.6 (H) 11.7 - 14.9 %    Platelets 885 164 - 222 K/CU MM    MPV 8.5 7.5 - 11.1 FL   D-Dimer, Quantitative    Collection Time: 05/12/21  6:50 AM   Result Value Ref Range    D-Dimer, Quant 1876 (H) <230 NG/mL(DDU)   C-Reactive Protein    Collection Time: 05/12/21  6:50 AM   Result Value Ref Range    CRP, High Sensitivity 121.5 mg/L   CK    Collection Time: 05/12/21  6:50 AM   Result Value Ref Range    Total CK 60 26 - 140 IU/L   POCT Glucose    Collection Time: 05/12/21 12:17 PM   Result Value Ref Range    POC Glucose 165 (H) 70 - 99 MG/DL     CULTURE results: Invalid input(s): BLOOD CULTURE,  URINE CULTURE, SURGICAL CULTURE    Diagnosis:  Patient Active Problem List   Diagnosis    Left breast mass    Secondary cancer of bone ([address]    Essential hypertension    Malignant neoplasm of central portion of left female breast ([address]    Metastatic breast cancer ([address]    Dehydration    Iron deficiency anemia    Malabsorption    Left ovarian enlargement    ELIDIA (acute kidney injury) ([address]    Hyperkalemia    Metabolic acidosis    Hyponatremia    Tachycardia    Acute and chronic respiratory failure (acute-on-chronic) (HCC)    COVID-19       Active Problems  Active Problems:    Acute and chronic respiratory failure (acute-on-chronic) ([address]    COVID-19  Resolved Problems:    * No resolved hospital problems. *      Impression and plan   Summary and rationale: Patient is a 50 y.o.  female with metastatic breast cancer on chemotherapy who presented with worsening shortness of breath. Diagnosed with COVID-19 pneumonia with acute hypoxic respiratory failure, bilateral pleural effusion, possible bacterial superinfection.    COVID-19 symptom onset: 5/1/2021   COVID-19 test date:

## 2021-05-12 NOTE — CONSULTS
Consult completed. Brisk blood return from Mediport and flushes with ease after Cathflo. Patient tolerated well.

## 2021-05-13 NOTE — CARE COORDINATION
CM reviewed notes. Pt would like home care with [address]. CM called and left Jammie greenfield  for home care. CM faxed the notes and face sheet to [address]. CM is awaiting determination.    1540 CM called pt in room. CM asked about PCP. Pt has not seen PCP since her PCP retired. CM received permission to call office to see who is following for her. CM spoke with Moi. CM set up appointment with Jonna Rankin NP. CM placed information on the discharge instructions.

## 2021-05-13 NOTE — PROGRESS NOTES
ONCOLOGY HEMATOLOGY CARE ([facility])  PROGRESS NOTE      Patient was seen and examined today. Not in any acute distress and no overnight events. She is feeling better. Due for chemo next week. No acute pain. No chest pain or SOB this morning. No nausea or vomiting. Likely she has reactive leukocytosis. Creatinine was 2.2. Anemia is multifactorial. To transfuse if Hg < 8 since she is on chemo therapy. PHYSICAL EXAM    Vitals: /69   Pulse 89   Temp 98.1 °F (36.7 °C) (Oral)   Resp 24   Ht 5' 1\" (1.549 m)   Wt 130 lb 4.7 oz (59.1 kg)   LMP 04/08/2018   SpO2 96%   BMI 24.62 kg/m²   CONSTITUTIONAL: awake, alert, cooperative, no apparent distress   EYES: pupils equal, round and reactive to light, sclera clear and conjunctiva normal  ENT: Normocephalic, without obvious abnormality, atraumatic  NECK: supple, symmetrical, no jugular venous distension and no carotid bruits   HEMATOLOGIC/LYMPHATIC: no cervical, supraclavicular or axillary lymphadenopathy   LUNGS: no increased work of breathing and clear to auscultation   CARDIOVASCULAR: regular rate and rhythm, normal S1 and S2, no murmur noted  ABDOMEN: normal bowel sounds x 4, soft, non-distended, non-tender, no masses palpated, no hepatosplenomegaly   MUSCULOSKELETAL: full range of motion noted, tone is normal  NEUROLOGIC: awake, alert, oriented to name, place and time. CN II - XII grossly intact. SKIN: No jaundice.  appears intact   EXTREMITIES: no LE edema     LABORATORY RESULTS  CBC:   Recent Labs     05/11/21  0545 05/12/21  0650 05/13/21  0430   WBC 6.6 9.3 13.0*   HGB 9.4* 8.3* 8.0*   * 429 344     BMP:    Recent Labs     05/11/21  0545 05/12/21  0650 05/13/21  0430    139 140   K 5.4* 4.5 4.0    102 102   CO2 13* 21 26   BUN 61* 59* 57*   CREATININE 3.2* 2.8* 2.2*   GLUCOSE 152* 166* 135*     Hepatic:   Recent Labs     05/10/21  1513   AST 51*   ALT 6*   BILITOT 0.1   ALKPHOS 82     INR:   Recent Labs     05/11/21  0545   INR 0. 99       ASSESSMENT/RECOMMENDATION    1. She has stage IV breast cancer. On palliative taxol. Chemo is due next week on Monday. Likely she will resume chemo as outpatient. 2. COVID pneumonia. Improving. 3. Dehydration with ELIDIA. Nephro is on board. Creatinine improves today. If she is discharged, I recommend to follow up at cancer ctr. Thanks  We will continue to follow the patient. Thank you.

## 2021-05-13 NOTE — PLAN OF CARE
Problem: Falls - Risk of:  Goal: Will remain free from falls  Description: Will remain free from falls  Outcome: Ongoing  Goal: Absence of physical injury  Description: Absence of physical injury  Outcome: Ongoing     Problem: Airway Clearance - Ineffective  Goal: Achieve or maintain patent airway  Outcome: Ongoing     Problem: Gas Exchange - Impaired  Goal: Absence of hypoxia  Outcome: Ongoing  Goal: Promote optimal lung function  Outcome: Ongoing     Problem: Breathing Pattern - Ineffective  Goal: Ability to achieve and maintain a regular respiratory rate  Outcome: Ongoing     Problem:  Body Temperature -  Risk of, Imbalanced  Goal: Ability to maintain a body temperature within defined limits  Outcome: Ongoing  Goal: Will regain or maintain usual level of consciousness  Outcome: Ongoing  Goal: Complications related to the disease process, condition or treatment will be avoided or minimized  Outcome: Ongoing     Problem: Isolation Precautions - Risk of Spread of Infection  Goal: Prevent transmission of infection  Outcome: Ongoing     Problem: Nutrition Deficits  Goal: Optimize nutritional status  Outcome: Ongoing     Problem: Risk for Fluid Volume Deficit  Goal: Maintain normal heart rhythm  Outcome: Ongoing  Goal: Maintain absence of muscle cramping  Outcome: Ongoing  Goal: Maintain normal serum potassium, sodium, calcium, phosphorus, and pH  Outcome: Ongoing     Problem: Loneliness or Risk for Loneliness  Goal: Demonstrate positive use of time alone when socialization is not possible  Outcome: Ongoing     Problem: Fatigue  Goal: Verbalize increase energy and improved vitality  Outcome: Ongoing     Problem: Patient Education: Go to Patient Education Activity  Goal: Patient/Family Education  Outcome: Ongoing     Problem: ABCDS Injury Assessment  Goal: Absence of physical injury  Outcome: Ongoing

## 2021-05-13 NOTE — PROGRESS NOTES
[facility]  Pharmacy consulted by CINDY Gregorio / Dr. Vivienne Johnson for monitoring and adjustment. Indication for treatment: COVID 19 pneumonia, possible secondary bacterial infection   Goal trough: 15 mcg/mL    Pertinent Laboratory Values:   Temp Readings from Last 3 Encounters:   05/13/21 96.2 °F (35.7 °C) (Oral)   04/26/21 99.7 °F (37.6 °C) (Temporal)   04/26/21 99.7 °F (37.6 °C) (Temporal)     Recent Labs     05/10/21  1513 05/10/21  1820 05/11/21  0545 05/12/21  0650 05/13/21  0430   WBC 9.3  --  6.6 9.3 13.0*   LACTATE 2.6* 1.4 1.3  --   --      Recent Labs     05/11/21  0545 05/12/21  0650 05/13/21  0430   BUN 61* 59* 57*   CREATININE 3.2* 2.8* 2.2*     Estimated Creatinine Clearance: 24 mL/min (A) (based on SCr of 2.2 mg/dL (H)).     Intake/Output Summary (Last 24 hours) at 5/13/2021 9263  Last data filed at 5/13/2021 0511  Gross per 24 hour   Intake 1693.25 ml   Output 1150 ml   Net 543.25 ml       Pertinent Cultures:  Date    Source    Results  5/10/21  Urine    Mixed skin dave  5/10/21  Blood    NGTD  5/10/21  MRSA nasal   Ordered  5/10/21  Legionella/Strep Pneumo Negative    Vancomycin level:   TROUGH:    Recent Labs     05/11/21  1430   VANCOTROUGH 13.7     RANDOM:    Recent Labs     05/12/21  1600 05/13/21  0430   VANCORANDOM 20.9 16.1       Assessment:  · WBC and temperature: WBC trending up (receiving dexamethasone); afebrile  · SCr, BUN, and urine output: ELIDIA improving   · Day(s) of therapy: #4  · Vancomycin concentration: 16.1, ok to re-dose     Plan:  · Pulse dosing based on levels 2/2 ELIDIA   · Received vancomycin 750 mg on 5/11   · Re-dose with 750 mg   · Pharmacy will continue to monitor patient and adjust therapy as indicated    [address] 5/15  @ 0600    Thank you for the consult,  Tiana QuispeD, BCPS   5/13/2021 9:22 AM

## 2021-05-13 NOTE — PROGRESS NOTES
Infectious Disease Progress Note  2021   Patient Name: Myles Andersen : 1965       Reason for visit: F/u COVID-19 pneumonia, acute respiratory failure? History:? Interval history noted  Denies n/v/d/f or untoward effects of antimicrobials  Physical Exam:  Vital Signs: BP (!) 160/74   Pulse 94   Temp 96.2 °F (35.7 °C) (Oral)   Resp 22   Ht 5' 1\" (1.549 m)   Wt 130 lb 4.7 oz (59.1 kg)   LMP 2018   SpO2 97%   BMI 24.62 kg/m²     Gen: alert and oriented X3. On nasal oxygen  Skin: no stigmata of endocarditis  Wounds: C/D/I  HEMT: AT/NC Oropharynx pink, moist, and without lesions or exudates; dentition in good state of repair  Eyes: PERRLA, EOMI, conjunctiva pink, sclera anicteric. Neck: Supple. Trachea midline. No LAD. Chest: Reduced air entry bilaterally  Heart: RRR  Abd: soft, non-distended, no tenderness, no hepatomegaly. Normoactive bowel sounds. Ext: no clubbing, cyanosis, or edema  Catheter Site: without erythema or tenderness  LDA: CVC: right arm mediport  Neuro: Mental status intact. CN 2-12 intact and no focal sensory or motor deficits     Radiologic / Imaging / TESTING  No results found. Labs:    Recent Results (from the past 24 hour(s))   POCT Glucose    Collection Time: 21 12:17 PM   Result Value Ref Range    POC Glucose 165 (H) 70 - 99 MG/DL   Vancomycin, random    Collection Time: 21  4:00 PM   Result Value Ref Range    Vancomycin Rm 20.9 UG/ML    DOSE AMOUNT DOSE AMT.  GIVEN - =     DOSE TIME DOSE TIME GIVEN - =    Basic Metabolic Panel w/ Reflex to MG    Collection Time: 21  4:30 AM   Result Value Ref Range    Sodium 140 135 - 145 MMOL/L    Potassium 4.0 3.5 - 5.1 MMOL/L    Chloride 102 99 - 110 mMol/L    CO2 26 21 - 32 MMOL/L    Anion Gap 12 4 - 16    BUN 57 (H) 6 - 23 MG/DL    CREATININE 2.2 (H) 0.6 - 1.1 MG/DL    Glucose 135 (H) 70 - 99 MG/DL    Calcium 7.2 (L) 8.3 - 10.6 MG/DL    GFR Non- 23 (L) >60 mL/min/1.73m2    GFR African American 28 (L) >60 mL/min/1.73m2   CBC    Collection Time: 05/13/21  4:30 AM   Result Value Ref Range    WBC 13.0 (H) 4.0 - 10.5 K/CU MM    RBC 2.77 (L) 4.2 - 5.4 M/CU MM    Hemoglobin 8.0 (L) 12.5 - 16.0 GM/DL    Hematocrit 24.8 (L) 37 - 47 %    MCV 89.5 78 - 100 FL    MCH 28.9 27 - 31 PG    MCHC 32.3 32.0 - 36.0 %    RDW 22.0 (H) 11.7 - 14.9 %    Platelets 338 351 - 154 K/CU MM    MPV 8.2 7.5 - 11.1 FL   D-Dimer, Quantitative    Collection Time: 05/13/21  4:30 AM   Result Value Ref Range    D-Dimer, Quant 2537 (H) <230 NG/mL(DDU)   C-Reactive Protein    Collection Time: 05/13/21  4:30 AM   Result Value Ref Range    CRP, High Sensitivity 62.5 mg/L   CK    Collection Time: 05/13/21  4:30 AM   Result Value Ref Range    Total  (H) 26 - 140 IU/L   Vancomycin, random    Collection Time: 05/13/21  4:30 AM   Result Value Ref Range    Vancomycin Rm 16.1 UG/ML    DOSE AMOUNT DOSE AMT. GIVEN - . DOSE TIME DOSE TIME GIVEN - .    Procalcitonin    Collection Time: 05/13/21  4:30 AM   Result Value Ref Range    Procalcitonin 0.471      CULTURE results: Invalid input(s): BLOOD CULTURE,  URINE CULTURE, SURGICAL CULTURE    Diagnosis:  Patient Active Problem List   Diagnosis    Left breast mass    Secondary cancer of bone ([address]    Essential hypertension    Malignant neoplasm of central portion of left female breast ([address]    Metastatic breast cancer ([address]    Dehydration    Iron deficiency anemia    Malabsorption    Left ovarian enlargement    ELIDIA (acute kidney injury) ([address]    Hyperkalemia    Metabolic acidosis    Hyponatremia    Tachycardia    Acute and chronic respiratory failure (acute-on-chronic) (HCC)    COVID-19       Active Problems  Active Problems:    Acute and chronic respiratory failure (acute-on-chronic) ([address]    COVID-19  Resolved Problems:    * No resolved hospital problems. *      Impression and plan   Summary and rationale: Patient is a 50 y.o.   female with metastatic breast cancer on chemotherapy who presented with worsening shortness of breath. Diagnosed with COVID-19 pneumonia with acute hypoxic respiratory failure, bilateral pleural effusion, possible bacterial superinfection.    COVID-19 symptom onset: 5/1/2021   COVID-19 test date: 5/1/2021   Expected discontinuation of isolation precautions: 5/21/2021   Clinical status: Improving off oxygen   Therapeutic:  o Ongoing antibiotics: Vancomycin, cefepime, azithromycin  o Antiviral agent:  o Anti-inflammatory agents:  - Dexamethasone: 5/11/2021  - Solu-Medrol:  - Tocilizumab:  o Completed antibiotics:   o Convalescent plasma receipt:  o Other agents:    Diagnostic: Trend CRP and procalcitonin   F/u:   Other:      Electronically signed by: Electronically signed by Marion Singh MD on 5/13/2021 at 11:14 AM

## 2021-05-13 NOTE — PROGRESS NOTES
Hospitalist Progress Note      Name:  Mich Adame /Age/Sex: 1965  (50 y.o. female)   MRN & CSN:  [id number] & [id number] Admission Date/Time: 5/10/2021  1:58 PM   Location:  [address] [place] [address]-A PCP: [facility] Day: 4    Assessment and Plan:   Mich Adame is a 50 y.o.  female  who presents with <principal problem not specified>    >Sepsis most likely secondary to viral pneumonia:   >Acute on chronic hypoxic respiratory failure  >Multifocal pneumonia likely secondary to Covid 19  >Pleural effusion L >R likely secondary to breast CA  -Tachycardic, tachypneic, SPO2 97% drop to 89. Lactic acid 2.6, procalcitonin 0.466. WBC 9.3.  + Covid the first   - X-ray showing patchy infiltrate within the lung.  -Cefepime, vancomycin  -Inpatient infectious disease, IR consulted  -Decadron , thoracentesis done 1350 ml   - improving dyspnea, off oxygen     >Acute kidney injury most likely prerenal/chemo therapy:   >Hx  bilateral hydroureteronephrosis s/p bilateral ureteral stent placement 3/25/2021  >Mild hyponatremia:   >Hyperkalemia: K5.6 received calcium gluconate in the ED  >Hypocalcemia: CA 7.0, received calcium gluconate in the ED  -BUN 65/CR 3.7(baseline  2021) patient currently on Taxol for breast CA with mets. -Avoid nephrotoxic drugs, renal dosing  -Nephrology, Urology consulted  - Bicarb drip, held amlodipine  Cr improved      >Elevated proBNP: proBNP 1511. EF 55 to 60% 3/24/2021 - sec to above  >Nonzero troponin likely demand ischemia: Trop 0.013. EKG sinus tachycardia, nonischemic changes  Trend  down to neg     >Anemia chronic   >Thrombocytosis: Platelet count 347 (364 and 2021)  -likely multifactorial: Hb 9. H/HCT 30.6. Hx iron deficiency anemia, ? Bone marrow suppression with chemo, ? sepsis  -monitor, Hem/onc consulted.    - monitor     >Segmental diffuse osteoblastic metastatic disease/left breast diffuse nodularity per CT abdomen/pelvis without contrast   >Hx breast cancer left breast with mets to the bone, status post hormonal therapy. She was on Lupron, exemestane, Zometa and Afinitor. Currently on weekly Taxol since March 22, 2021  -Follows up with Dr. Anish Arroyo, consulted, Held chemo     >Essential hypertension:  Amlodipine held  >BMI: 22.30 kg/m² : Dietician consulted. Diet DIET GENERAL;   DVT Prophylaxis [] Heparin   GI Prophylaxis [] PPI   Code Status Full Code   Disposition  pending Abx recommendations per ID. Anticipate tomorrow     History of Present Illness:     Pt S&E. No chest pain, improved dyspnea, no abd pain, no n/V, no f/C.     10-14 point ROS reviewed negative, unless as noted above    Objective: Intake/Output Summary (Last 24 hours) at 5/13/2021 0821  Last data filed at 5/13/2021 0511  Gross per 24 hour   Intake 1693.25 ml   Output 1150 ml   Net 543.25 ml      Vitals:   Vitals:    05/13/21 0430   BP: 138/69   Pulse: 89   Resp: 24   Temp: 98.1 °F (36.7 °C)   SpO2: 96%     Physical Exam:      GEN Awake female, cooperative. RESP Decreased air sounds. Symmetric chest movement . CARDIO/VASC S1/S2 auscultated. Regular rate. GI Abdomen is soft without significant tenderness, Bowel sounds are normoactive. MSK No gross joint deformities. Spontaneous movement of all extremities  SKIN  warm, dry. NEURO normal speech, no lateralizing weakness. PSYCH Awake, alert, oriented x 4. Affect appropriate.     Medications:   Medications:    calcium-cholecalciferol  1 tablet Oral Daily    vancomycin (VANCOCIN) intermittent dosing (placeholder)   Other See Admin Instructions    sodium chloride flush  5-40 mL Intravenous 2 times per day    heparin (porcine)  5,000 Units Subcutaneous 3 times per day    dexamethasone  6 mg Intravenous Q24H    [Held by provider] amLODIPine  5 mg Oral Daily    cetirizine  10 mg Oral Daily    metoprolol succinate  100 mg Oral Daily    sodium chloride flush  5-40 mL Intravenous 2 times per day    cefepime  1,000 mg Intravenous Q12H    pantoprazole  40 mg Oral QAM AC      Infusions:    sodium chloride      sodium chloride      dextrose       PRN Meds: sodium chloride flush, 5-40 mL, PRN  sodium chloride, 25 mL, PRN  promethazine, 12.5 mg, Q6H PRN    Or  ondansetron, 4 mg, Q6H PRN  polyethylene glycol, 17 g, Daily PRN  acetaminophen, 650 mg, Q6H PRN    Or  acetaminophen, 650 mg, Q6H PRN  docusate sodium, 100 mg, Daily PRN  sodium chloride flush, 5-40 mL, PRN  sodium chloride, 25 mL, PRN  glucose, 15 g, PRN  dextrose, 12.5 g, PRN  glucagon (rDNA), 1 mg, PRN  dextrose, 100 mL/hr, PRN      Electronically signed by Laurie Caicedo MD on 5/13/2021 at 8:21 AM

## 2021-05-14 NOTE — PROGRESS NOTES
[facility] [address] [facility] [address]  Pharmacy consulted by CINDY Ivey / Dr. Jamil Webb for monitoring and adjustment. Indication for treatment: COVID 19 pneumonia, possible secondary bacterial infection   Goal trough: 15 mcg/mL    Pertinent Laboratory Values:   Temp Readings from Last 3 Encounters:   05/14/21 97.9 °F (36.6 °C) (Oral)   04/26/21 99.7 °F (37.6 °C) (Temporal)   04/26/21 99.7 °F (37.6 °C) (Temporal)     Recent Labs     05/12/21  0650 05/13/21  0430   WBC 9.3 13.0*     Recent Labs     05/12/21  0650 05/13/21  0430 05/14/21  0434   BUN 59* 57* 52*   CREATININE 2.8* 2.2* 1.8*     Estimated Creatinine Clearance: 30 mL/min (A) (based on SCr of 1.8 mg/dL (H)). Intake/Output Summary (Last 24 hours) at 5/14/2021 1551  Last data filed at 5/14/2021 1209  Gross per 24 hour   Intake 10 ml   Output 450 ml   Net -440 ml       Pertinent Cultures:  Date    Source    Results  5/10/21  Urine    Mixed skin dave  5/10/21  Blood    NGTD  5/10/21  MRSA nasal   Ordered  5/10/21  Legionella/Strep Pneumo Negative    Vancomycin level:   TROUGH:    No results for input(s): VANCOTROUGH in the last 72 hours. RANDOM:    Recent Labs     05/12/21  1600 05/13/21  0430   VANCORANDOM 20.9 16.1       Assessment:  · WBC and temperature: WBC trending up yesterday (receiving dexamethasone); pt remains afebrile  · SCr, BUN, and urine output: ELIDIA from volume depletion, SCR improved from 3.7 on admission down to 1.8 today, UOP low  · Day(s) of therapy: #5  · Vancomycin concentration: 5/13 - 16.1, ok to re-dose     Plan:  · Pulse dosing based on levels 2/2 ELIDIA   · Received vancomycin 750 mg yesterday   · No dose to be given today  · Recheck the vanco level tomorrow 5/15 am  · Pharmacy will continue to monitor patient and adjust therapy as indicated    VANCOMYCIN CONCENTRATION SCHEDULED FOR 5/15  @ 0600    Thank you for the consult,  Jackelyn Iverson.  Fan Lopez  5/14/2021 3:51 PM

## 2021-05-14 NOTE — PROGRESS NOTES
CLINICAL PHARMACY NOTE: MEDS TO BEDS    Total # of Prescriptions Filled: 2   The following medications were delivered to the patient:  · Levofloxacin 250mg  4  · Dexamethasone 6mg 5    Additional Documentation:

## 2021-05-14 NOTE — PROGRESS NOTES
Nephrology Progress Note        [address], [address] [address]  Phone: [phone]  Office Hours: 8:30AM - 4:30PM  Monday - Friday 5/14/2021 8:06 AM  Subjective:   Admit Date: 5/10/2021  PCP: CINDY Valnetin - CNP  Interval History:     Diet: DIET GENERAL;      Data:   Scheduled Meds:   calcium-cholecalciferol  1 tablet Oral Daily    vancomycin (VANCOCIN) intermittent dosing (placeholder)   Other See Admin Instructions    sodium chloride flush  5-40 mL Intravenous 2 times per day    heparin (porcine)  5,000 Units Subcutaneous 3 times per day    dexamethasone  6 mg Intravenous Q24H    [Held by provider] amLODIPine  5 mg Oral Daily    cetirizine  10 mg Oral Daily    metoprolol succinate  100 mg Oral Daily    sodium chloride flush  5-40 mL Intravenous 2 times per day    cefepime  1,000 mg Intravenous Q12H    pantoprazole  40 mg Oral QAM AC     Continuous Infusions:   sodium chloride      sodium chloride      dextrose       PRN Meds:sodium chloride flush, sodium chloride, promethazine **OR** ondansetron, polyethylene glycol, acetaminophen **OR** acetaminophen, docusate sodium, sodium chloride flush, sodium chloride, glucose, dextrose, glucagon (rDNA), dextrose  I/O last 3 completed shifts: In: 10 [I.V.:10]  Out: -   No intake/output data recorded. Intake/Output Summary (Last 24 hours) at 5/14/2021 0806  Last data filed at 5/13/2021 2047  Gross per 24 hour   Intake 10 ml   Output --   Net 10 ml       CBC:   Recent Labs     05/12/21  0650 05/13/21  0430   WBC 9.3 13.0*   HGB 8.3* 8.0*    344       BMP:    Recent Labs     05/12/21  0650 05/13/21  0430 05/14/21  0434    140 142   K 4.5 4.0 3.6    102 104   CO2 21 26 24   BUN 59* 57* 52*   CREATININE 2.8* 2.2* 1.8*   GLUCOSE 166* 135* 113*     Hepatic: No results for input(s): AST, ALT, ALB, BILITOT, ALKPHOS in the last 72 hours.   Troponin: No results for input(s): TROPONINI in the last 72 hours.  BNP: No results for input(s): BNP in the last 72 hours. Lipids: No results for input(s): CHOL, HDL in the last 72 hours. Invalid input(s): LDLCALCU  ABGs: No results found for: PHART, PO2ART, PAT3WTW  INR: No results for input(s): INR in the last 72 hours. Objective:   Vitals: BP (!) 141/84   Pulse 84   Temp 97.6 °F (36.4 °C) (Oral)   Resp 19   Ht 5' 1\" (1.549 m)   Wt 129 lb 3 oz (58.6 kg)   LMP 04/08/2018   SpO2 94%   BMI 24.41 kg/m²   General appearance: alert and cooperative with exam, in no acute distress  HEENT: normocephalic, atraumatic,   Neck: supple, trachea midline  Lungs: breathing comfortably on room air  Abdomen: soft, non-tender; non distended,   Extremities: extremities atraumatic, no cyanosis or edema  Neurologic: alert, oriented, follows commands, interactive    Assessment and Plan:     Patient Active Problem List     Diagnosis Date Noted    Acute and chronic respiratory failure (acute-on-chronic) ([address] 05/10/2021    Tachycardia      ELIDIA (acute kidney injury) ([address] 03/22/2021    Hyperkalemia      Metabolic acidosis      Hyponatremia      Left ovarian enlargement 01/26/2021    Iron deficiency anemia 01/07/2021    Malabsorption 01/07/2021    Dehydration 11/20/2020    Malignant neoplasm of central portion of left female breast ([address] 07/31/2020    Metastatic breast cancer ([address] 07/31/2020    Secondary cancer of bone ([address] 04/01/2020    Essential hypertension 04/01/2020    Left breast mass        ELIDIA from volume depletion.   Hyperkalemia  Metabolic acidosis  Hypocalcemia  Bilateral HN s/p ureteral stents last admission  Metastatic breast cancer  COVID 19     Plan:  Cr down to 1.8 from 3.7 on admission  Avoid nephrotoxins  Resume norvasc if BP starts rising  Continue supportive mgmt    Thank you                      Electronically signed by Leyda Saucedo DO on 5/14/2021 at 8:06 AM    [address]  [place], [place] [address]  PHONE: [phone]  FAX: [phone]

## 2021-05-14 NOTE — DISCHARGE SUMMARY
Discharge Summary    Name:  Keshav Joseph /Age/Sex: 1965  (50 y.o. female)   MRN & CSN:  [id number] & [id number] Admission Date/Time: 5/10/2021  1:58 PM   Attending:  Subha Moy MD Discharging Physician: Reina Gibbons MD     HPI:     Keshav Joseph is a 50 y.o.  female, with past medical history significant for breast cancer with mets, breast cancer, hypertension, asthma, bilateral hydronephrosis s/p stent who presented to the ED from home due increasing shortness of breath. Was diagnosed with COVID-19 on May 1, 2021. States she was progressively increased fatigue and increased shortness of breath for last couple of days. Shortness of breath increased with exertion. Denies orthopnea/PND. Patient lives with her daughter. Denies chest pain, chest pressure. Admits having bilateral lower extremity edema. Denies fever, cough, abdominal pain, dysuria, hematuria, melena.     Hospital Course:   Keshav Joseph is a 50 y.o.  female  who presents with Acute and chronic respiratory failure (acute-on-chronic) (HCC)    >Sepsis most likely secondary to viral pneumonia:   >Acute on chronic hypoxic respiratory failure  >Multifocal pneumonia likely secondary to Covid 19  >Pleural effusion L >R likely secondary to breast CA  -Tachycardic, tachypneic, SPO2 97% drop to 89.  Lactic acid 2.6, procalcitonin 0.466.  WBC 9.3.  + Covid the first   - X-ray showing patchy infiltrate within the lung.  -Cefepime, vancomycin  -Inpatient infectious disease, IR consulted  -Decadron , thoracentesis done 1350 ml   - sx and clinically improved , off oxygen and was discharged home with [address] in a stable condition.      >Acute kidney injury most likely prerenal/chemo therapy:   >Hx  bilateral hydroureteronephrosis s/p bilateral ureteral stent placement 3/25/2021  >Mild hyponatremia:   >Hyperkalemia: K5.6 received calcium gluconate in the ED  >Hypocalcemia: CA 7.0, received calcium gluconate in the ED  -BUN 65/CR TABLETS BY MOUTH DAILY             Calcium Carbonate-Vitamin D (OYSTER SHELL CALCIUM/D) 500-200 MG-UNIT TABS  Take 1 tablet by mouth daily             cetirizine (ZYRTEC) 10 MG tablet  Take 10 mg by mouth daily             dexamethasone (DECADRON) 6 MG tablet  Take 1 tablet by mouth daily (with breakfast) for 5 days             docusate sodium (COLACE) 100 MG capsule  Take 1 capsule by mouth daily as needed for Constipation             furosemide (LASIX) 20 MG tablet  Take 1 tablet by mouth daily             HYDROcodone-acetaminophen (NORCO) 5-325 MG per tablet  Take 1 tablet by mouth every 6 hours as needed for Pain for up to 14 days. levoFLOXacin (LEVAQUIN) 250 MG tablet  Take 1 tablet by mouth daily for 4 days             metoprolol succinate (TOPROL XL) 100 MG extended release tablet  Take 1 tablet by mouth daily             omeprazole (PRILOSEC) 20 MG delayed release capsule  Take 1 capsule by mouth every morning (before breakfast)             ondansetron (ZOFRAN) 8 MG tablet  1 tab po q 8 hours PRN for chemo induced nausea/vomting             prochlorperazine (COMPAZINE) 10 MG tablet  Take 1 tablet by mouth every 8 hours as needed (nausea)             promethazine (PHENERGAN) 25 MG tablet  TAKE ONE TABLET BY MOUTH EVERY 6 HOURS AS NEEDED FOR NAUSEA                 Objective Findings at Discharge:   BP (!) 154/81   Pulse 87   Temp 97.9 °F (36.6 °C) (Oral)   Resp 16   Ht 5' 1\" (1.549 m)   Wt 129 lb 3 oz (58.6 kg)   LMP 04/08/2018   SpO2 95%   BMI 24.41 kg/m²            PHYSICAL EXAM   GEN    Awake female, cooperative. RESP  Decreased air sounds. Symmetric chest movement . CARDIO/VASC           S1/S2 auscultated. Regular rate. GI        Abdomen is soft without significant tenderness, Bowel sounds are normoactive. MSK    No gross joint deformities. Spontaneous movement of all extremities  SKIN     warm, dry. NEURO           normal speech, no lateralizing weakness.   PSYCH with associated relaxation atelectasis mid and lower left lung. *Right pleural effusion seen previously has resolved. *Diffuse patchy interstitial and alveolar densities as well as new nodular density in the upper right lung are in keeping with history of COVID-19. Soft Tissues/Bones: Diffuse osteoblastic metastatic disease. Diffuse nodularity throughout the left breast.  However enlarged left axillary lymph node measures 10 mm short axis. CT ABDOMEN/PELVIS FINDINGS: Kidneys: Stable bilateral hydronephrosis with interval bilateral double-J ureter stents placed. No definite urinary collecting system calculus is seen. No discrete renal lesion evident. Other organs: Portions of the organs are blurred significantly because of breathing motion. No definite acute abnormality of the liver, spleen, pancreas, adrenal glands or gallbladder. GI/Bowel: No definite acute inflammatory process or obstruction is evident. Pelvis: Distal end of double-J ureter stents are seen coiled within the urinary bladder. The left side of the urinary bladder wall appears mildly thickened. Peritoneum/Retroperitoneum: Unremarkable appearance of the aorta. No aneurysm. Unremarkable appearance of the IVC. No adenopathy or fluid. Bones/Soft Tissues: Diffuse osteoblastic metastatic disease. Calcification noted at the deep right pelvic musculature possibly related to myositis ossificans. Anasarca. 1. CT CHEST: Similar appearing large volume left pleural effusion with associated relaxation atelectasis mid and lower left lung. 2. Right pleural effusion seen previously has resolved. 3. Diffuse patchy interstitial and alveolar densities as well as new nodular density in the upper right lung are in keeping with history of COVID-19.  Note that CT pulmonary findings of COVID-19 show overlap with other disease entities including H1N1 influenza, other viral pneumonia (i.e. adenovirus, CMV), organizing pneumonia, alveolar proteinosis and acute interstitial pneumonitis. 4. Diffuse osteoblastic metastatic disease. Not appreciably changed. 5. Diffuse nodularity in the left breast may be related to the patient's primary malignancy or metastatic disease. 6. Left axillary lymph node enlargement may be reactive or related to metastatic disease. 7. CT ABDOMEN/PELVIS: Unchanged bilateral hydronephrosis, status post bilateral ureter stent placement. 8. Image degradation results from breathing motion blurring detail. 9. Nonspecific diffuse thickening left side of the urinary bladder wall possibly related to infiltrative process or infection. Ct Chest Wo Contrast    Result Date: 5/10/2021  EXAMINATION: CT OF THE CHEST WITHOUT CONTRAST; CT OF THE ABDOMEN AND PELVIS WITHOUT CONTRAST 5/10/2021 4:48 pm TECHNIQUE: CT of the chest was performed without the administration of intravenous contrast. Multiplanar reformatted images are provided for review. Dose modulation, iterative reconstruction, and/or weight based adjustment of the mA/kV was utilized to reduce the radiation dose to as low as reasonably achievable.; CT of the abdomen and pelvis was performed without the administration of intravenous contrast. Multiplanar reformatted images are provided for review. Dose modulation, iterative reconstruction, and/or weight based adjustment of the mA/kV was utilized to reduce the radiation dose to as low as reasonably achievable. COMPARISON: CT chest 03/24/2021 and CT abdomen and pelvis 03/23/2021 HISTORY: ORDERING SYSTEM PROVIDED HISTORY: Shortness of breath, fatigue Decision Support Exception - unselect if not a suspected or confirmed emergency medical condition->Emergency Medical Condition (MA) Is the patient pregnant?-> no Reason for Exam: SOB/ COVID Breast cancer history, currently on chemotherapy. CT CHEST FINDINGS: Mediastinum: Multiple small thyroid nodules redemonstrated, unchanged (no FDG uptake on recent PET-CT).   Stable probably reactive pretracheal lymph node enlargement at 13 x 20 mm axial series 2, image 46. Stable subcarinal lymph node enlargement 15 x 27 mm axial image 51. Lungs/pleura: Image degradation results from breathing motion blurring detail. *The similar appearing large volume left pleural effusion with associated relaxation atelectasis mid and lower left lung. *Right pleural effusion seen previously has resolved. *Diffuse patchy interstitial and alveolar densities as well as new nodular density in the upper right lung are in keeping with history of COVID-19. Soft Tissues/Bones: Diffuse osteoblastic metastatic disease. Diffuse nodularity throughout the left breast.  However enlarged left axillary lymph node measures 10 mm short axis. CT ABDOMEN/PELVIS FINDINGS: Kidneys: Stable bilateral hydronephrosis with interval bilateral double-J ureter stents placed. No definite urinary collecting system calculus is seen. No discrete renal lesion evident. Other organs: Portions of the organs are blurred significantly because of breathing motion. No definite acute abnormality of the liver, spleen, pancreas, adrenal glands or gallbladder. GI/Bowel: No definite acute inflammatory process or obstruction is evident. Pelvis: Distal end of double-J ureter stents are seen coiled within the urinary bladder. The left side of the urinary bladder wall appears mildly thickened. Peritoneum/Retroperitoneum: Unremarkable appearance of the aorta. No aneurysm. Unremarkable appearance of the IVC. No adenopathy or fluid. Bones/Soft Tissues: Diffuse osteoblastic metastatic disease. Calcification noted at the deep right pelvic musculature possibly related to myositis ossificans. Anasarca. 1. CT CHEST: Similar appearing large volume left pleural effusion with associated relaxation atelectasis mid and lower left lung. 2. Right pleural effusion seen previously has resolved.  3. Diffuse patchy interstitial and alveolar densities as well as new nodular density in the upper right lung are in keeping with history of COVID-19. Note that CT pulmonary findings of COVID-19 show overlap with other disease entities including H1N1 influenza, other viral pneumonia (i.e. adenovirus, CMV), organizing pneumonia, alveolar proteinosis and acute interstitial pneumonitis. 4. Diffuse osteoblastic metastatic disease. Not appreciably changed. 5. Diffuse nodularity in the left breast may be related to the patient's primary malignancy or metastatic disease. 6. Left axillary lymph node enlargement may be reactive or related to metastatic disease. 7. CT ABDOMEN/PELVIS: Unchanged bilateral hydronephrosis, status post bilateral ureter stent placement. 8. Image degradation results from breathing motion blurring detail. 9. Nonspecific diffuse thickening left side of the urinary bladder wall possibly related to infiltrative process or infection. Xr Chest Portable    Result Date: 5/11/2021  EXAMINATION: ONE XRAY VIEW OF THE CHEST 5/11/2021 12:27 pm COMPARISON: 05/10/2021 HISTORY: ORDERING SYSTEM PROVIDED HISTORY: post left thoracentesis TECHNOLOGIST PROVIDED HISTORY: Reason for exam:->post left thoracentesis Reason for Exam: post left thoracentesis FINDINGS: Status post left thoracentesis. No pneumothorax. Right-sided central venous catheter remains in place. The heart size is stable. Airspace opacities again seen throughout the right lung. Decreased left pleural effusion, no pneumothorax     Xr Chest Portable    Result Date: 5/10/2021  EXAMINATION: ONE XRAY VIEW OF THE CHEST 5/10/2021 11:59 am COMPARISON: 03/22/2022 HISTORY: ORDERING SYSTEM PROVIDED HISTORY: SOB TECHNOLOGIST PROVIDED HISTORY: Reason for exam:->SOB FINDINGS: The large left pleural effusions seen previously appear similar when compared to the previous exam.  Adjacent passive atelectasis is noted. However, in the interval, there has been development of patchy infiltrates within the lungs, especially on the right.  Marty catheter is unchanged in position. Cardial pericardial silhouette is mostly obscured by the pleural and parenchymal disease on the left, but is grossly unremarkable. No acute bony abnormality. Interval development of patchy infiltrates within the lungs, especially the right, concerning for multifocal pneumonia. Consider both typical and atypical etiologies, including viral pneumonia. Continued evidence of a large left pleural effusion with adjacent airspace disease, some combination of atelectasis, pneumonia, and asymmetric edema. Ir Guided Thoracentesis Pleural    Result Date: 5/11/2021  PROCEDURE: ULTRASOUNDGUIDED LEFT THORACENTESIS 5/11/2021 HISTORY: ORDERING SYSTEM PROVIDED HISTORY: pleural effusion TECHNOLOGIST PROVIDED HISTORY: Reason for exam:-> left pleural effusion Which side should the procedure be performed?->Radiologist Recommendation Is the patient pregnant?->No TECHNIQUE: Informed consent was obtained after a detailed explanation of the procedure including risks, benefits, and alternatives. Universal protocol was performed. The left chest was prepped and draped in sterile fashion and local anesthesia was achieved with lidocaine. A 5 [place] needle sheath was advanced under ultrasound guidance into pleural effusion and thoracentesis was performed. The patient tolerated the procedure well. FINDINGS: A total of 1350 ml clear and yellow fluid was removed. Successful ultrasound guided thoracentesis.        Discharge Time of 35 minutes    Electronically signed by Latha Johnston MD on 5/14/2021 at 10:05 AM

## 2021-05-15 NOTE — CARE COORDINATION
Received incoming call from [facility]. They state they have the referral for patient & start of care is scheduled for today.

## 2021-05-17 NOTE — PROGRESS NOTES
Patient arrived for treatment today, but is still within her Covid window and still has symptoms, so she was sent home without treatment by Dr. Willam Coreas.

## 2021-05-19 NOTE — PROGRESS NOTES
[facility] home care called and stated that the patient did not want to start home care yesterday and nurse informed that patient wanted to start home health care visits tomorrow 05/20/21.

## 2021-05-24 NOTE — PROGRESS NOTES
Patient arrived to treatment suite for blood draw, pre-medications and chemotherapy infusion. Right upper arm mediport accessed and blood drawn from site and sent to lab for processing. Patient has no questions or concerns for the doctor at this time. Treatment approved and given. Patient tolerated well. Left treatment suite ambulatory. Discharge instructions provided. Patient's status assessed and documented appropriately. All labs and required results were also reviewed today. Treatment parameters have been reviewed. Today's treatment has been approved by the provider. Treatment orders and medication sequencing (when applicable) was verified by 2 registered nurses. The treatment plan was confirmed with the patient prior to administration, and the patient understands the need to report any treatment-related symptoms. Prior to administration, when applicable, the following 8 elements of medication administration were reviewed with 2nd Registered Nurse prior to dosing: drug name, drug dose, infusion volume when prepared in a syringe, rate of administration, expiration dates and/or times, appearance and integrity of drug(s), and rate of pump for infusion. The 5 rights of medication administration have been verified.

## 2021-05-28 NOTE — Clinical Note
Renal function very low - chart sent to Dr. Domonique Choi whom stated pt may have clogged uretal stents and to have her follow up with urology as well. Will send referral.   Please give pt number to schedule with Judi and Dr Britany Sadler.

## 2021-05-28 NOTE — PROGRESS NOTES
to Visit Medications    Medication Sig Taking? Authorizing Provider   amLODIPine (NORVASC) 5 MG tablet Take 1 tablet by mouth daily Yes Pablito Rodriges MD   metoprolol succinate (TOPROL XL) 100 MG extended release tablet Take 1 tablet by mouth daily Yes CINDY Fall CNP   omeprazole (PRILOSEC) 20 MG delayed release capsule Take 1 capsule by mouth every morning (before breakfast) Yes CINDY Fall CNP   furosemide (LASIX) 20 MG tablet Take 1 tablet by mouth daily  Patient taking differently: Take 20 mg by mouth daily as needed  Yes CINDY Fall CNP   Calcium Carbonate-Vitamin D (OYSTER SHELL CALCIUM/D) 500-200 MG-UNIT TABS Take 1 tablet by mouth daily Yes CINDY Fall CNP   ondansetron (ZOFRAN) 8 MG tablet 1 tab po q 8 hours PRN for chemo induced nausea/vomting  Patient taking differently: 8 mg every 8 hours as needed for Nausea or Vomiting  Yes CINDY Fall CNP   docusate sodium (COLACE) 100 MG capsule Take 1 capsule by mouth daily as needed for Constipation Yes CINDY Fall CNP   acetaminophen (TYLENOL) 500 MG tablet Take 500 mg by mouth every 6 hours as needed for Pain Yes Historical Provider, MD   prochlorperazine (COMPAZINE) 10 MG tablet Take 1 tablet by mouth every 8 hours as needed (nausea)  Patient taking differently: Take 10 mg by mouth every 8 hours as needed (Nausea)  Yes Pablito Rodriges MD   promethazine (PHENERGAN) 25 MG tablet TAKE ONE TABLET BY MOUTH EVERY 6 HOURS AS NEEDED FOR NAUSEA Yes Pablito Rodriges MD   cetirizine (ZYRTEC) 10 MG tablet Take 10 mg by mouth daily Yes Historical Provider, MD   HYDROcodone-acetaminophen (NORCO) 5-325 MG per tablet Take 1 tablet by mouth every 6 hours as needed for Pain for up to 14 days.   CINDY Fall CNP       Social History     Tobacco Use    Smoking status: Never Smoker    Smokeless tobacco: Never Used   Vaping Use    Vaping Use: Never used   Substance Use Topics    Alcohol use: Yes     Comment: average\"one time per week\"    Drug use: No        PHYSICAL EXAMINATION:  Vital Signs: (As obtained by patient/caregiver or practitioner observation)    Blood pressure-  Heart rate-    Respiratory rate-    Temperature-  Pulse oximetry-     Physical Exam  Vitals reviewed. Constitutional:       General: She is not in acute distress. Appearance: Normal appearance. She is not ill-appearing, toxic-appearing or diaphoretic. HENT:      Head: Normocephalic and atraumatic. Nose: Nose normal.   Eyes:      Extraocular Movements: Extraocular movements intact. Pupils: Pupils are equal, round, and reactive to light. Pulmonary:      Effort: Pulmonary effort is normal.   Musculoskeletal:         General: Normal range of motion. Cervical back: Normal range of motion. Skin:     Coloration: Skin is not pale. Neurological:      General: No focal deficit present. Mental Status: She is alert and oriented to person, place, and time. Mental status is at baseline. Psychiatric:         Mood and Affect: Mood normal.         Behavior: Behavior normal.         Thought Content: Thought content normal.         Judgment: Judgment normal.         ASSESSMENT/PLAN:  1. Chronic kidney disease, unspecified CKD stage  Needs to schedule natalee. No nsaids or soda   Lasix use? ??  - [facility] ([facility]) - Morales De La Fuente DO, Nephrology, [place]    2. [facility]  Follow with brian    3. Iron deficiency anemia, unspecified iron deficiency anemia type  Oncology/ hematology    4. Essential hypertension  norvasc and toprol  Controlled. Continue current medication regimen. DASH diet. BP goal less than 140/90.      5. Gastroesophageal reflux disease, unspecified whether esophagitis present  prilosec- controlled. Hume diet. 6. Swelling of both lower extremities  Lasix PRN. Elevation. Low salt. Compression hose     7. Seasonal allergies  Zrytec. Controlled.        Renal function very low - chart sent to Dr. Alice Livingston whom stated pt may have clogged uretal stents and to have her follow up with urology as well. Will send referral.   Pt advised to schedule with Dr Shauna Huber          Return for 6 months - htn cancer . Ronit Lucas is a 50 y.o. female being evaluated by a Virtual Visit (video visit) encounter to address concerns as mentioned above. A caregiver was present when appropriate. Due to this being a TeleHealth encounter (During GIIMQ-46 public health emergency), evaluation of the following organ systems was limited: Vitals/Constitutional/EENT/Resp/CV/GI//MS/Neuro/Skin/Heme-Lymph-Imm. Pursuant to the emergency declaration under the [address] [place] [address] authority and the [employer] Act, this Virtual Visit was conducted with patient's (and/or legal guardian's) consent, to reduce the patient's risk of exposure to COVID-19 and provide necessary medical care. The patient (and/or legal guardian) has also been advised to contact this office for worsening conditions or problems, and seek emergency medical treatment and/or call 911 if deemed necessary. Patient identification was verified at the start of the visit: Yes    Total time spent on this encounter: 20 min    Services were provided through a video synchronous discussion virtually to substitute for in-person clinic visit. Patient and provider were located at their individual homes. --CINDY Nagy NP on 6/1/2021 at 11:54 AM    An electronic signature was used to authenticate this note.

## 2021-06-01 NOTE — TELEPHONE ENCOUNTER
Heike Vance from OT at [facility] called and stated that pt has refused OT visits yesterday and refused visit today.  Heike Vance can be reached at [phone]

## 2021-06-02 NOTE — PROGRESS NOTES
Hgb 7.3; per Dr Rony Canela 1 unit of PRBC, called infusion dept, spoke to Lopez Latham., patient scheduled for tomorrow, 06/03 @ 0800, patient advised and states understanding, blood banded and sent for T&S, band on patient (verifed) and patient signed consent.

## 2021-06-02 NOTE — TELEPHONE ENCOUNTER
----- Message from CINDY Keating - NP sent at 6/1/2021 12:09 PM EDT -----  Renal function very low - chart sent to Dr. dE Izaguirre whom stated pt may have clogged uretal stents and to have her follow up with urology as well. Will send referral. Please give pt number to schedule with Judi and Dr Gustavo Wood.

## 2021-06-03 NOTE — TELEPHONE ENCOUNTER
Claude Seats from [facility]. called patient missed visit today due to receiving blood transfusion.   Nini: [phone]

## 2021-06-03 NOTE — DISCHARGE SUMMARY
Tolerated tRANSFUSION** well. Reviewed discharge instructions, understanding verbalized. Copies of AVS given to take home. Patient discharged home. Down to exit per wheelchair.     Orders Placed This Encounter   Medications    0.9 % sodium chloride infusion 250 mL    sodium chloride flush 0.9 % injection 20 mL    acetaminophen (TYLENOL) tablet 650 mg    diphenhydrAMINE (BENADRYL) tablet 25 mg    furosemide (LASIX) injection 20 mg    methylPREDNISolone sodium (SOLU-MEDROL) injection 20 mg    heparin flush 100 UNIT/ML injection 500 Units    acetaminophen (TYLENOL) 325 MG tablet     Nova Walter: cabinet override    methylPREDNISolone sodium (SOLU-MEDROL) 40 MG injection     Nova Walter: cabinet override    diphenhydrAMINE (BENADRYL) 25 MG tablet     Nova Walter: javier over[place]oston

## 2021-06-09 NOTE — PROGRESS NOTES
Patient Name: Myles Andersen  Patient : 1965  Patient MRN: [id number]     Primary Oncologist: Jaiden Crawford MD  Referring Provider: CINDY Rosenbaum NP     Date of Service: 2021      Chief Complaint:   Chief Complaint   Patient presents with    Follow-up    Chemotherapy     She came in for follow up visit. Patient Active Problem List:     Left breast mass     Secondary cancer of bone      Essential hypertension     Malignant neoplasm of central portion of left female breast     Secondary malignant neoplasm of breast      HPI:   Myles Andersen is a pleasant 53-year-old  female patient who initially noted left breast swelling and pain for a few months. She had mammogram at age of 30. She went for mammogram which showed abnormal left breast. Diagnostic bilateral mammogram on 2018 with ultrasound showed findings concerning for inflammatory breast cancer with prominent left axillary lymph nodes, benign right mammogram.  Baseline CBC and CMP were within normal limits. CA 27-29 was 61. PET CT scan in May 27, 5567 showed metabolic activity within left breast with metabolically active skin thickening concerning for inflammatory breast cancer, metabolically active left axillary lymph node metastases and skeletal metastasis. 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 She did not have visceral disease. We discussed about potential therapy. I put her on Lupron plus Arimidex and Palbociclib. She had menstruation in May 2018. She started lupron, arimidex and Ibrance on May 30, 2018.   Bone density in 2018 showed  LUMBAR SPINE: The bone mineral density in the lumbar spine including the L1 through L4 levels is measured at 1.355 g/cm2, which corresponds to a T-score of 1.5 and a Z-score of 1.4. This is within the normal range by WHO criteria. LEFT HIP: The bone mineral density in the total hip is measured at 0.972 g/cm2 corresponding to a T-score of -0.3 and a Z-score of -0.2. This is within the normal range by WHO criteria. The bone mineral density of the femoral neck is measured at 0.802 g/cm2 corresponding to a T-score of -1.7 and a Z-score of -1.2. This is within the osteopenia range by WHO criteria. IMPRESSION: Osteopenia by WHO criteria. Bone scan in September 2018 showed stable findings. On October 26, 2018 she was found to have RLE DVT. PET CT scan in November 2018 revealed:  1. Overall improvement of left axillary adenopathy and FDG avidity. There is persistent skin thickening of the left breast with associated FDG uptake in the retroareolar region and left breast tissue which is not significantly changed since the prior exam. 2. Overall progression of hypermetabolic skeletal osseous metastases when compared to the PET-CT from 05/07/2018. We discussed about the potential side effects of bisphosphonates including renal failure and Jaw necrosis. She switched lovenox to xarelto in January 2019. Bone scan in June 2019 compared to bone scan in September 2018 showed progression of disease. CA 27-29 has continued to increase. We will obtain PET CT to evaluate. Clinically she felt left breast lump continuing to get smaller. PET CT scan on August 26, 2019 showed :  1. New focus of intense uptake lateral to the left axilla and anterior to the shoulder musculature. 2. However there is improvement of disease within the left breast and left axilla and decreased uptake within the widespread osseous metastatic disease. In December 2019 CA 17-29 continued to rise. CT chest, abdomen and bone scan in January 2020 showed progression local and bone disease. She started lupron, aromasin and afinitor on January 10, 2020. I advise to monitor blood pressure.   On May 7, 2020 PET CT scan showed favorable response to chemotherapy. The mass anterior to the left shoulder musculature has significantly decreased in size and degree of uptake. Small nodules anterior to the mass extending to the skin demonstrates mild uptake and likely represents residual disease. Left axillary lymph nodes have decreased in size. Uptake in the left subareolar region has decreased. Diffuse osseous metastatic disease demonstrates decreased uptake. Labs in August 2020 were reviewed. CT chest, abdomen pelvis on October 5, 2020 showed  Decreased size of the soft tissue mass anterior to the left shoulder   musculature compared to prior PET-CT and CT suggesting response to therapy.     Decreased size of left axillary and subpectoral lymphadenopathy also   suggesting response to therapy.     Stable sclerotic metastatic disease noted throughout the visualized osseous skeleton.     No evidence of new metastatic disease in the chest, abdomen or pelvis. CA 2729 on October 5, 2020 was 409.2. She has panoramic study of the jaw which was negative for osteonecrosis. She has insomnia and anxiety. She is agreeable to restart low-dose Ativan as needed. She had ingrowing toenail of the left toe with has improved with antibiotic. Labs in January 2021 were reviewed. Anemic w/u is consistent with chronic disease. She is on eliquis. CT chest, abdomen pelvis in January 2021 showed  1. Increasing skin thickening of the breast bilaterally with increasing  subcutaneous edema throughout the chest wall, which may be related to post treatment changes. 2. No interval change in multifocal sclerotic osseous metastases. 3. Stable to slight interval decrease in size of soft tissue nodules in the  left chest wall/anterior to the left shoulder.  No significant interval  change in left axillary adenopathy.   4. There is increasing size of the left ovary as well as new pelvic  adenopathy suspicious for malignancy either primary neoplastic process involving the left ovary versus metastatic disease. 5. There is also mild wall thickening of the left posterolateral bladder wallwith mild left hydronephrosis.  Would recommend further evaluation with cystoscopy as well. 6. New 3.7 x 2.0 cm partially calcified mass in the right adductor  musculature suspicious for metastatic disease as well.  Alternately this  could reflect chronic process such as myositis ossificans in the appropriate clinical setting. I  cut zometa to every 3 months. She was seen by Dr. Michael Stack at [facility] who believe clinically she has metastatic breast cancer to the pelvis. She was sent to [facility] 3/22/21 for ELIDIA and had new on hydroureteronephrosis on the right and is s/p stent placement. CT abdomen/pelvis 3/23/21  1. Moderate bilateral hydroureteronephrosis new on the right and not  significantly changed on the left compared with 39/84/0864 of uncertain  etiology.  No obstructing stone or mass.  Partially decompressed urinary  bladder with circumferential wall thickening not excluded. 2. Fluid overload with small right and large left pleural effusions, diffuse  body wall edema, small volume of ascites, and small pericardial effusion. 3. 6 bilateral iliac chain lymphadenopathy not significantly changed from  01/20/2021.  Increasing size of the left ovary of uncertain etiology with an  underlying neoplasm not excluded.  Unchanged diffuse sclerotic metastases. 4. Mildly increased size of a calcified mass within the right proximal  adductor musculature and new mass within the right gluteus medius muscle suspicious for metastatic disease. Louise 10, 2021 she presented for scheduled follow-up and treatment. Treatment has been delayed last week due to decreased white blood cells. Counts have recovered. She reports swelling to her lower extremities and but finds relief with compression stocking. She has been off eliquis. We will proceed with treatment today.  Reports she was told she needs her stent replaced. She has scheduled follow up with Dr. Oh Isaac. Past Medical History  Allergies, asthma, depression, abnormal left breast mammogram. DVT. Surgical History  Appendectomy in .  . Social History  Denied any history of smoking. She drinks alcohol occasionally. No illicit drug use. She has 2 children and one sister. Family History  Mother: Diabetes, Hypertension. Previous Therapy   Palbociclib + Anastrozole + Leuprolide Q28D Cycle Length: 28 Number Cycles: 20 Start: C1D1 on 2018 Assoc Dx: Female inflammatory breast cancer LOT: 1st Line Metastatic or Recurrent Stage: IV 2018 C1 D1  Zoledronic acid (Zometa) Q28D Cycle Length: 28 Number Cycles: 24 Start: Kailey Perales on 2018 Assoc Dx: Secondary bone cancer LOT: 1st Line Metastatic or Recurrent 2020 C8 D1  Everolimus + Exemestane + Leuprolide Q28D Cycle Length: 28 Number Cycles: 6 Start: C1D1 on 2019 Assoc Dx: Female inflammatory breast cancer LOT: 2nd Line Metastatic Stage: IV Treatment Intent: [id number]/11/2019 C1 D1     Review of Systems: \"Per interval history; otherwise 10 point ROS is negative. \"     Vital Signs:  BP (!) 147/77 (Site: Right Upper Arm, Position: Sitting, Cuff Size: Medium Adult)   Pulse 136   Temp 98.2 °F (36.8 °C) (Temporal)   Ht 5' 1\" (1.549 m)   Wt 120 lb (54.4 kg)   LMP 2018   SpO2 99%   BMI 22.67 kg/m²     Physical Exam:  CONSTITUTIONAL: awake, alert, cooperative, no apparent distress  +wheelchair  EYES:  sclera clear and conjunctiva pallor. ENT: Normocephalic, without obvious abnormality, atraumatic  NECK: supple, symmetrical  HEMATOLOGIC/LYMPHATIC: no cervical, supraclavicular or axillary lymphadenopathy   LUNGS: no increased work of breathing and clear to auscultation   BREAST: thick skin of left and right breast with induration. No signs of infection.  No lumps to right breast.  CARDIOVASCULAR: regular rate and rhythm, normal S1 and S2, no murmur noted  ABDOMEN: normal bowel sounds x 4, soft, non-distended, non-tender, no masses palpated, no hepatosplenomegaly   MUSCULOSKELETAL: full range of motion noted, tone is normal  NEUROLOGIC: awake, alert, oriented to name, place and time. Grossly there is no neurofocal deficit. SKIN: No jaundice. appears intact. No petechial rash. Dry skin. EXTREMITIES: BLE edema. No cyanosis.     Labs:  Hematology:  Lab Results   Component Value Date    WBC 8.9 06/09/2021    RBC 3.08 (L) 06/09/2021    HGB 8.8 (L) 06/09/2021    HCT 27.2 (L) 06/09/2021    MCV 88.3 06/09/2021    MCH 28.6 06/09/2021    MCHC 32.4 06/09/2021    RDW 19.8 (H) 06/09/2021     06/09/2021    MPV 8.2 06/09/2021    BANDSPCT 2 (L) 03/22/2021    SEGSPCT 43.9 06/09/2021    EOSRELPCT 1.0 06/09/2021    BASOPCT 0.3 06/09/2021    LYMPHOPCT 37.1 06/09/2021    MONOPCT 17.7 (H) 06/09/2021    BANDABS 0.39 03/22/2021    SEGSABS 3.9 06/09/2021    EOSABS 0.1 06/09/2021    BASOSABS 0.0 06/09/2021    LYMPHSABS 3.3 06/09/2021    MONOSABS 1.6 06/09/2021    DIFFTYPE AUTOMATED DIFFERENTIAL 06/09/2021    ANISOCYTOSIS 1+ 12/06/2019    POLYCHROM 1+ 03/22/2021    WBCMORP OCCASIONAL  ATYPICAL LYMPH(S) NOTED   12/06/2019    PLTM RARE 03/22/2021     Chemistry:  Lab Results   Component Value Date     06/02/2021    K 5.2 (H) 06/02/2021     06/02/2021    CO2 19 (L) 06/02/2021    BUN 51 (H) 06/02/2021    CREATININE 4.3 (H) 06/02/2021    GLUCOSE 96 06/02/2021    CALCIUM 7.9 (L) 06/02/2021    PROT 5.1 (L) 06/02/2021    LABALBU 2.9 (L) 06/02/2021    BILITOT 0.2 06/02/2021    ALKPHOS 70 06/02/2021    AST 32 06/02/2021    ALT 7 (L) 06/02/2021    LABGLOM 11 (L) 06/02/2021    GFRAA 13 (L) 06/02/2021    PHOS 5.2 (H) 03/23/2021    MG 2.2 12/30/2020    POCCA 1.01 (L) 04/05/2021    POCGLU 165 (H) 05/12/2021     Immunology:  Lab Results   Component Value Date    PROT 5.1 (L) 06/02/2021     Tumor Markers:  Lab Results   Component Value Date    LABCA2 409.2 (H) 10/05/2020 Observations:  PHQ-9 Total Score: 0 (6/9/2021 11:07 AM)        Assessment & Plan:    1. She has inflammatory left breast cancer. PET/CT scan showed metastatic disease to bone, left axillary lymph node, and left breasts with the skin involvement. At present time she does not have physical disease. CBC and CMP were within normal limits. CA 2729 was elevated. I put her on palliative hormonal therapy plus Palbociclib. She started arimidex and Ibrance on June 4, 2018 and monthly lupron injection on June 8, 2018. Bone scan in September 2018 showed stable findings. After reviewing the PET CT scan in November 2018, I put her on Zometa monthly. Bone scan in July 2019 was reviewed and showed progression of the disease. PET scan in August 2019 showed grossly response to the therapy. CT chest/abdomen and bone scan due to rising CA27-29 in January 2020 showed progression of disease. She started aromasin, afinitor and lupron on January 10, 2020. She understands the potential side effects of afinitor. PET CT scan May 2020 showed treatment response. CT chest, abdomen pelvis in October 2020 showed response and no evidence of new metastatic disease. She was on Lupron, exemestane, Zometa and Afinitor. I changed zometa to every 3 months. CT chest, abdomen pelvis in January 2021 was reviewed. Due to progression of disease she started weekly Taxol on March 22, 2021. Due to recent Covid pneumonia, chemo is rescheduled to next week. 6/2 delayed due to neutropenia  Proceed with treatment today    2. Hypertension: I advise to monitor BP and has low sodium diet. 3. Seasonal allergy. I advise to take Claritin and gave her flonase. I recommend flu shot. 4. She was found to have right lower extremity DVT in October 2019. She is on Eliquis. no signs of bleeding. - to restart. She has been off AC     5. She has multifactorial anemia. JAK2 with reflex in January 2021 was negative.   Anemic work-up in January 2021 was reviewed. I will order fecal occult blood. 6.  I recommend to maintain body weight. I recommend to eat frequent meals. Weight stable since last visit. 7.  Prescribed compazine for intermittent nausea. She stated Zofran does not seem to help. She is using  Ativan for the anxiety and insomnia. 8. Edema- lasix 20 mg PRN; On hold. RTC in 3 weeks or sooner. All of her question has been answered for today. Recent imaging and labs were reviewed and discussed with the patient. After patient left office, results of CMP were obtained. Cr 5.9, to ED .

## 2021-06-09 NOTE — ED PROVIDER NOTES
Denies polyuria or polydypsia   Lymphatic:  Denies swollen glands     All other review of systems are negative  See HPI and nursing notes for additional information     PAST MEDICAL AND SURGICAL HISTORY    Past Medical History:   Diagnosis Date    Anxiety     Asthma     last flare up 5 yrs ago    Breast lesion     \"have spot on left breast going to remove- at this time not sure if cancer or not\"    Essential hypertension 4/1/2020    Secondary cancer of bone ([address] 4/1/2020    Thyroid nodule     \"have thyroid nodules-      Past Surgical History:   Procedure Laterality Date    APPENDECTOMY  age 27   Murrel Neither BLADDER SURGERY Bilateral 3/25/2021    BILATERAL CYSTOSCOPY BILATERAL STENT INSERTION performed by Liza Lizama MD at [facility] IR NONTUNNELED VASCULAR CATHETER  3/22/2021    IR NONTUNNELED VASCULAR CATHETER 3/22/2021 [facility] SPECIAL PROCEDURES    PORT SURGERY Right 3/15/2021    RIGHT PORT INSERTION performed by Adria Adams MD at [facility]  age 5    T & A       CURRENT MEDICATIONS        ALLERGIES    Allergies   Allergen Reactions    Latex Anaphylaxis     \"have trouble with banana's kiwi, avocados- Have trouble breathing with these and trouble breathig - get asthmatic from latex gloves, bandage, anything latex     Banana Anaphylaxis    Cinnamon Anaphylaxis       SOCIAL AND FAMILY HISTORY    Social History     Socioeconomic History    Marital status:      Spouse name: None    Number of children: 2    Years of education: 15    Highest education level: None   Occupational History    Occupation: dental assistant     Comment: [employer]   Tobacco Use    Smoking status: Never Smoker    Smokeless tobacco: Never Used   Vaping Use    Vaping Use: Never used   Substance and Sexual Activity    Alcohol use: Yes     Comment: average\"one time per week\"    Drug use: No    Sexual activity: Yes     Partners: Male   Other Topics Concern  None   Social History Narrative    Lives in her own home with her children     Social Determinants of Health     Financial Resource Strain:     Difficulty of Paying Living Expenses:    Food Insecurity:     Worried About Running Out of Food in the Last Year:     [address] in the Last Year:    Transportation Needs:     Lack of Transportation (Medical):  Lack of Transportation (Non-Medical):    Physical Activity:     Days of Exercise per Week:     Minutes of Exercise per Session:    Stress:     Feeling of Stress :    Social Connections:     Frequency of Communication with Friends and Family:     Frequency of Social Gatherings with Friends and Family:     Attends Faith Services:     Active Member of Clubs or Organizations:     Attends Club or Organization Meetings:     Marital Status:    Intimate Partner Violence:     Fear of Current or Ex-Partner:     Emotionally Abused:     Physically Abused:     Sexually Abused:      Family History   Problem Relation Age of Onset    Diabetes Mother     Stroke Mother     High Blood Pressure Mother          PHYSICAL EXAM    VITAL SIGNS: /85   Pulse 105   Temp 98 °F (36.7 °C) (Oral)   Resp 25   Ht 5' 1\" (1.549 m)   Wt 120 lb (54.4 kg)   LMP 04/08/2018   SpO2 99%   BMI 22.67 kg/m²    Constitutional:  Well developed, Well nourished. No distress  HENT:  Normocephalic, Atraumatic, PERRL. EOMI. Sclera clear. Conjunctiva normal, No discharge. Neck/Lymphatics: supple, no JVD, no swollen nodes  Cardiovascular: Tachycardic rate, regular rhythm, no murmurs/rubs/gallops. No JVD. Respiratory:  Nonlabored breathing. Normal breath sounds, No wheezing  Abdomen:  Soft, No tenderness, no masses. No guarding or rigidity. No distention. Musculoskeletal:     No cool or pale-appearing limb. Bilateral upper and lower extremity ROM intact without pain or obvious deficit  Symmetric lower extremity bilateral edema present.   Integument:  Warm, Dry  Neurologic: Alert & oriented, No focal deficits noted. Cranial nerves II through XII grossly intact. Normal gross motor coordination & motor strength bilateral upper and lower extremities  Sensation intact.   Psychiatric:  Affect normal, Mood normal.       Labs:  Results for orders placed or performed during the hospital encounter of 06/09/21   CBC Auto Differential   Result Value Ref Range    WBC 7.7 4.0 - 10.5 K/CU MM    RBC 3.54 (L) 4.2 - 5.4 M/CU MM    Hemoglobin 10.2 (L) 12.5 - 16.0 GM/DL    Hematocrit 32.6 (L) 37 - 47 %    MCV 92.1 78 - 100 FL    MCH 28.8 27 - 31 PG    MCHC 31.3 (L) 32.0 - 36.0 %    RDW 19.3 (H) 11.7 - 14.9 %    Platelets 191 (H) 200 - 440 K/CU MM    MPV 8.9 7.5 - 11.1 FL    Segs Relative 75.0 (H) 36 - 66 %    Lymphocytes % 18.0 (L) 24 - 44 %    Monocytes % 7.0 (H) 0 - 4 %    Segs Absolute 5.8 K/CU MM    Lymphocytes Absolute 1.4 K/CU MM    Monocytes Absolute 0.5 K/CU MM    Differential Type MANUAL DIFFERENTIAL    Comprehensive Metabolic Panel   Result Value Ref Range    Sodium 133 (L) 135 - 145 MMOL/L    Potassium 5.4 (H) 3.5 - 5.1 MMOL/L    Chloride 99 99 - 110 mMol/L    CO2 12 (L) 21 - 32 MMOL/L    BUN 64 (H) 6 - 23 MG/DL    CREATININE 6.2 (H) 0.6 - 1.1 MG/DL    Glucose 263 (H) 70 - 99 MG/DL    Calcium 7.8 (L) 8.3 - 10.6 MG/DL    Albumin 2.9 (L) 3.4 - 5.0 GM/DL    Total Protein 7.2 6.4 - 8.2 GM/DL    Total Bilirubin 0.1 0.0 - 1.0 MG/DL    ALT 7 (L) 10 - 40 U/L    AST 34 15 - 37 IU/L    Alkaline Phosphatase 113 40 - 129 IU/L    GFR Non- 7 (L) >60 mL/min/1.73m2    GFR  9 (L) >60 mL/min/1.73m2    Anion Gap 22 (H) 4 - 16   Magnesium   Result Value Ref Range    Magnesium 1.8 1.8 - 2.4 mg/dl   Urinalysis   Result Value Ref Range    Color, UA YELLOW YELLOW    Clarity, UA CLEAR CLEAR    Glucose, Urine >500 (A) NEGATIVE MG/DL    Bilirubin Urine NEGATIVE NEGATIVE MG/DL    Ketones, Urine NEGATIVE NEGATIVE MG/DL    Specific Gravity, UA 1.012 1.001 - 1.035 started on 75 cc an hour of normal saline to hydrate patient and plan for admission. She is concerned that patient's ureteral stents may be blocked. CT abdomen pelvis was obtained to evaluate patient's ureteral stents and reveals unchanged moderate to large amount of bilateral hydronephrosis greater on the right with ureteral stents in good position. Overall no significant change in appearance of the abdomen/pelvis from comparison per radiologist read. Bladder is mostly collapsed. I did place consult to patient's urologist on call returned by on-call urologist, Dr. Silvano Morales, and we discussed the patient's case. He does not feel patient's ureteral stents are the issue but is happy to see patient in the morning. He agrees with starting patient on ceftriaxone for UTI. Urine culture in process. Suspect patient has lactic acidosis from dehydration. She reports decreased oral intake due to nausea from chemotherapy. Given that patient has no ketones present in the urine and beta hydroxybutyrate is normal I have low clinical suspicion for DKA. VBG does reveal patient to have metabolic acidosis with pH of 7.22. Did order 5 units of subcutaneous Humalog but this was held as point-of-care glucose reveals blood glucose to be 135 this time. I consulted with hospitalist, Dr. Leif Vergara, and we discussed the patient's case. She agrees to admit the patient at this time for further evaluation and care. She would like to start patient on bicarb drip given acidosis and hyperkalemia. She reports that she will order the bicarb drip and admit the patient. All pertinent Lab data and radiographic results reviewed with patient at bedside. The patient and/or the family were informed of the results of any tests/labs/imaging, the treatment plan, and time was allotted to answer questions. Diagnosis, disposition, and treatment plan reviewed in detail with patient.   Patient understands and agrees to admission at this

## 2021-06-09 NOTE — PROGRESS NOTES
Patient arrived to treatment suite for blood draw, pre-medications and chemotherapy infusion. Right arm mediport accessed and blood drawn from site and sent to lab for processing. Patient has no questions or concerns for the doctor at this time. Treatment approved and given. Patient tolerated well. Left treatment suite with assistance in wheelchair. Discharge instructions provided. Patient's status assessed and documented appropriately. All labs and required results were also reviewed today. Treatment parameters have been reviewed. Today's treatment has been approved by the provider. Treatment orders and medication sequencing (when applicable) was verified by 2 registered nurses. The treatment plan was confirmed with the patient prior to administration, and the patient understands the need to report any treatment-related symptoms. Prior to administration, when applicable, the following 8 elements of medication administration were reviewed with 2nd Registered Nurse prior to dosing: drug name, drug dose, infusion volume when prepared in a syringe, rate of administration, expiration dates and/or times, appearance and integrity of drug(s), and rate of pump for infusion. The 5 rights of medication administration have been verified.

## 2021-06-09 NOTE — ED PROVIDER NOTES
12 lead EKG per my interpretation:  Sinus Tachycardia at 131  Axis is   Normal  QTc is  within an acceptable range  There is no specific T wave changes appreciated. There is no specific ST wave changes appreciated. No STEMI    Prior EKG to compare with was available and sinus tachycardia seen on prior.     MD Adalid Ruelas MD  06/09/21 [facility]

## 2021-06-09 NOTE — TELEPHONE ENCOUNTER
----- Message from CINDY Nagy NP sent at 6/9/2021  1:09 PM EDT -----  Looks like patient was seen by oncology today and is having chemo? ?    I did get her in with Dr. Leigh Ann Jaramillo for urology. It is noted that she was seeing them on 6/4/2021    I will have my office staff attempt to reach the patient and advise her to call Dr Vladislav Greene office ASAP for instruction. Kaity Sheppard CNP       ----- Message -----  From: Ag Paredes DO  Sent: 6/9/2021   6:01 AM EDT  To: CINDY Nagy NP    Good am  We have been calling her incessantly to go to ed as her renal function is worse and would be better to go to the hospital, suspect her ureteral stents might be clogged.  She has been unreachable,

## 2021-06-10 PROBLEM — N17.9 ACUTE RENAL FAILURE (HCC): Status: ACTIVE | Noted: 2021-01-01

## 2021-06-10 NOTE — H&P
HISTORY AND PHYSICAL  (Hospitalist, Internal Medicine)  IDENTIFYING INFORMATION   PATIENT:  Nikolas Escamilla  MRN:  [id number]  ADMIT DATE: 6/9/2021      CHIEF COMPLAINT   Advised by her nephrologist to be seen in ER. HISTORY OF PRESENT ILLNESS   Nikolas Escamilla is a 50 y.o. female with metastatic breast cancer, on chemotherapy, hypertension, h/o rt LLE DVT, h/o bilateral hydronephrosis s/p bilateral ureteral stents-3/2021, sinus tachycardia, COVID 19-5/2021 was advised by her nephrologist to be evaluated in ER for worsening renal function. Patient denied any complaints-no headache, visual disturbance, admits to having nausea, no vomiting, chest pain, shortness of breath, denied any abdominal pain, denied any urinary complaints, admits to having decreased urine output on questioning, denied any diarrhea. Patient had chemo yesterday. Vitals at presentation-/93, , RR 15, temp 98.2, saturating 99% on room air. Lab work significant for sodium 133, potassium 5.4, CO2 12, BUN 64, creatinine 6.2, magnesium 1.8, lactic acid 3.3, albumin 2.9, random glucose 263, hemoglobin 10.2, platelets 716. VBG-pH 7.22, PCO2 34, PO2 77, HCO3 13.9. Beta hydroxybutyrate 2.2. CT abdomen/pelvis unchanged moderate-large amount of bilateral hydronephrosis greater on the right, ureteral stents in good position. Nephrology, urology consulted from ED.     PAST MEDICAL HISTORY PAST SURGICAL HISTORY   metastatic breast cancer, on chemotherapy, hypertension, h/o rt LLE DVT, h/o bilateral hydronephrosis s/p bilateral ureteral stents-3/2021, sinus tachycardia, COVID 19-5/2021 C section, appendectomy, port insertion-3/21   FAMILY HISTORY SOCIAL HISTORY    Hypertension, diabetes, CVA  Denied smoking, alcohol, illicit drug abuse   MEDICATIONS ALLERGIES    Reviewed medications with patient-amlodipine 5 mg daily, metoprolol succinate 100 mg daily, omeprazole 20 mg daily, Lasix 20 mg as needed, Norco 5-325 every 6 hours as needed, cetirizine 10 mg daily   no known drug allergies.        PAST MEDICAL, SURGICAL, FAMILY, and SOCIAL HISTORY         Past Medical History:   Diagnosis Date    Anxiety     Asthma     last flare up 5 yrs ago    Breast lesion     \"have spot on left breast going to remove- at this time not sure if cancer or not\"    Essential hypertension 4/1/2020    Secondary cancer of bone ([address] 4/1/2020    Thyroid nodule     \"have thyroid nodules-      Past Surgical History:   Procedure Laterality Date    APPENDECTOMY  age 27   [facility] BLADDER SURGERY Bilateral 3/25/2021    BILATERAL CYSTOSCOPY BILATERAL STENT INSERTION performed by Liza Lizama MD at [facility] IR NONTUNNELED VASCULAR CATHETER  3/22/2021    IR NONTUNNELED VASCULAR CATHETER 3/22/2021 [facility] SPECIAL PROCEDURES    PORT SURGERY Right 3/15/2021    RIGHT PORT INSERTION performed by Adria Adams MD at [facility]  age 4    T & A     Family History   Problem Relation Age of Onset    Diabetes Mother     Stroke Mother     High Blood Pressure Mother      Family Hx of HTN  Family Hx as reviewed above, otherwise non-contributory  Social History     Socioeconomic History    Marital status:      Spouse name: None    Number of children: 2    Years of education: 15    Highest education level: None   Occupational History    Occupation: dental assistant     Comment: [employer] Use    Smoking status: Never Smoker    Smokeless tobacco: Never Used   Vaping Use    Vaping Use: Never used   Substance and Sexual Activity    Alcohol use: Yes     Comment: average\"one time per week\"    Drug use: No    Sexual activity: Yes     Partners: Male   Other Topics Concern    None   Social History Narrative    Lives in her own home with her children     Social Determinants of Health     Financial Resource Strain:     Difficulty of Paying Living Expenses:    Food Insecurity:     Worried About Running Out of Food in the Last Year:     [address] in the Last Year:    Transportation Needs:     Lack of Transportation (Medical):  Lack of Transportation (Non-Medical):    Physical Activity:     Days of Exercise per Week:     Minutes of Exercise per Session:    Stress:     Feeling of Stress :    Social Connections:     Frequency of Communication with Friends and Family:     Frequency of Social Gatherings with Friends and Family:     Attends [employer] Services:     Active Member of Clubs or Organizations:     Attends Club or Organization Meetings:     Marital Status:    Intimate Partner Violence:     Fear of Current or Ex-Partner:     Emotionally Abused:     Physically Abused:     Sexually Abused:        MEDICATIONS   Medications Prior to Admission  Not in a hospital admission.     Current Medications  Current Facility-Administered Medications   Medication Dose Route Frequency Provider Last Rate Last Admin    sodium bicarbonate 100 mEq in dextrose 5 % 1,000 mL infusion   Intravenous Continuous Rocael Chisholm MD        sodium chloride flush 0.9 % injection 5-40 mL  5-40 mL Intravenous PRN Carlie Rinne, PA-C        insulin lispro (HUMALOG) injection vial 5 Units  5 Units Subcutaneous Once Carlie Rinne, PA-C        glucose (GLUTOSE) 40 % oral gel 15 g  15 g Oral PRN Carlie Rinne, PA-C        dextrose 50 % IV solution  12.5 g Intravenous PRN Carlie Rinne, PA-C        glucagon (rDNA) injection 1 mg  1 mg Intramuscular PRN Carlie Rinne, PA-C        dextrose 5 % solution  100 mL/hr Intravenous PRN Carlie Rinne, PA-C         Current Outpatient Medications   Medication Sig Dispense Refill    Nutritional Supplement LIQD Take 1 Can by mouth 2 times daily 60 Can 5    amLODIPine (NORVASC) 5 MG tablet Take 1 tablet by mouth daily 30 tablet 2    metoprolol succinate (TOPROL XL) 100 MG extended release tablet Take 1 tablet by mouth daily 30 tablet 3    omeprazole (PRILOSEC) 20 MG delayed release capsule Take 1 capsule by mouth every morning (before breakfast) 30 capsule 0    furosemide (LASIX) 20 MG tablet Take 1 tablet by mouth daily (Patient taking differently: Take 20 mg by mouth daily as needed ) 30 tablet 0    Calcium Carbonate-Vitamin D (OYSTER SHELL CALCIUM/D) 500-200 MG-UNIT TABS Take 1 tablet by mouth daily 30 tablet 0    ondansetron (ZOFRAN) 8 MG tablet 1 tab po q 8 hours PRN for chemo induced nausea/vomting (Patient taking differently: 8 mg every 8 hours as needed for Nausea or Vomiting ) 30 tablet 0    docusate sodium (COLACE) 100 MG capsule Take 1 capsule by mouth daily as needed for Constipation 30 capsule 0    HYDROcodone-acetaminophen (NORCO) 5-325 MG per tablet Take 1 tablet by mouth every 6 hours as needed for Pain for up to 14 days.  56 tablet 0    acetaminophen (TYLENOL) 500 MG tablet Take 500 mg by mouth every 6 hours as needed for Pain      prochlorperazine (COMPAZINE) 10 MG tablet Take 1 tablet by mouth every 8 hours as needed (nausea) (Patient taking differently: Take 10 mg by mouth every 8 hours as needed (Nausea) ) 60 tablet 1    promethazine (PHENERGAN) 25 MG tablet TAKE ONE TABLET BY MOUTH EVERY 6 HOURS AS NEEDED FOR NAUSEA 30 tablet 0    cetirizine (ZYRTEC) 10 MG tablet Take 10 mg by mouth daily       Facility-Administered Medications Ordered in Other Encounters   Medication Dose Route Frequency Provider Last Rate Last Admin    0.9 % sodium chloride infusion  20 mL/hr Intravenous Once Carmen Tilley MD 20 mL/hr at 06/09/21 1147 20 mL/hr at 06/09/21 1147    sodium chloride flush 0.9 % injection 10 mL  10 mL Intravenous PRN Carmen Tilley MD   10 mL at 06/09/21 1317    heparin flush 100 UNIT/ML injection 500 Units  500 Units Intracatheter PRN Carmen Tilley MD   500 Units at 06/09/21 1317         Allergies  Allergies   Allergen Reactions    Latex mL/min/1.73m2 7 (L)   GFR  Latest Ref Range: >60 mL/min/1.73m2 9 (L)   Magnesium Latest Ref Range: 1.8 - 2.4 mg/dl 1.8   Lactic Acid, Sepsis Latest Ref Range: 0.5 - 1.9 mMOL/L 3.3 (HH)   Glucose Latest Ref Range: 70 - 99 MG/ (H)   Calcium Latest Ref Range: 8.3 - 10.6 MG/DL 7.8 (L)   Total Protein Latest Ref Range: 6.4 - 8.2 GM/DL 7.2   Albumin Latest Ref Range: 3.4 - 5.0 GM/DL 2.9 (L)   Alk Phos Latest Ref Range: 40 - 129 IU/L 113   ALT Latest Ref Range: 10 - 40 U/L 7 (L)   AST Latest Ref Range: 15 - 37 IU/L 34   Bilirubin Latest Ref Range: 0.0 - 1.0 MG/DL 0.1   Beta-Hydroxybutyrate Latest Ref Range: 0.0 - 3.0 MG/DL 2.2   WBC Latest Ref Range: 4.0 - 10.5 K/CU MM 7.7   RBC Latest Ref Range: 4.2 - 5.4 M/CU MM 3.54 (L)   Hemoglobin Quant Latest Ref Range: 12.5 - 16.0 GM/DL 10.2 (L)   Hematocrit Latest Ref Range: 37 - 47 % 32.6 (L)   MCV Latest Ref Range: 78 - 100 FL 92.1   MCH Latest Ref Range: 27 - 31 PG 28.8   MCHC Latest Ref Range: 32.0 - 36.0 % 31.3 (L)   MPV Latest Ref Range: 7.5 - 11.1 FL 8.9   RDW Latest Ref Range: 11.7 - 14.9 % 19.3 (H)   Platelet Count Latest Ref Range: 140 - 440 K/CU  (H)   Lymphocyte % Latest Ref Range: 24 - 44 % 18.0 (L)   Monocytes % Latest Ref Range: 0 - 4 % 7.0 (H)   Lymphocytes Absolute Latest Units: K/CU MM 1.4   Monocytes Absolute Latest Units: K/CU MM 0.5   Differential Type Unknown MANUAL DIFFERENTIAL   Segs Relative Latest Ref Range: 36 - 66 % 75.0 (H)   Segs Absolute Latest Units: K/CU MM 5.8     Results for Otto Stager (MRN [id number]) as of 6/10/2021 03:28   Ref.  Range 6/9/2021 22:30   Base Excess Latest Ref Range: 0 - 2.4  13 (H)   pH, Saw Latest Ref Range: 7.32 - 7.42  7.22 (L)   pCO2, Saw Latest Ref Range: 38 - 52 mmHG 34 (L)   pO2, Saw Latest Ref Range: 28 - 48 mmHG 77 (H)   HCO3, Venous Latest Ref Range: 19 - 25 MMOL/L 13.9 (L)   O2 Sat, Saw Latest Ref Range: 50 - 70 % 91.5 (H)     Results for Otto Stager (MRN [id number]) as of 6/10/2021 03:28   Ref. Range 6/9/2021 22:39   Color, UA Latest Ref Range: YELLOW  YELLOW   Clarity, UA Latest Ref Range: CLEAR  CLEAR   Bilirubin, Urine Latest Ref Range: NEGATIVE MG/DL NEGATIVE   Ketones, Urine Latest Ref Range: NEGATIVE MG/DL NEGATIVE   Specific Gravity, UA Latest Ref Range: 1.001 - 1.035  1.012   Blood, Urine Latest Ref Range: NEGATIVE  MODERATE (A)   Protein, UA Latest Ref Range: NEGATIVE MG/ (A)   Urobilinogen, Urine Latest Ref Range: 0.2 - 1.0 MG/DL NEGATIVE   Leukocyte Esterase, Urine Latest Ref Range: NEGATIVE  TRACE (A)   Glucose, Urine Latest Ref Range: NEGATIVE MG/DL >500 (A)   Nitrite Urine, Quantitative Latest Ref Range: NEGATIVE  NEGATIVE   pH, Urine Latest Ref Range: 5.0 - 8.0  6.0   WBC, UA Latest Ref Range: 0 - 5 /HPF 8 (H)   RBC, UA Latest Ref Range: 0 - 6 /HPF 26 (H)   Squam Epithel, UA Latest Units: /HPF <1   Trans Epithel, UA Latest Units: /HPF <1   Bacteria, UA Latest Ref Range: NEGATIVE /HPF RARE (A)   Trichomonas, UA Latest Ref Range: NONE SEEN /HPF NONE SEEN   Creatinine, Ur Latest Units: MG/DL 55.1   Sodium, Ur Latest Ref Range: 35 - 167 MMOL/L 28 (L)     Recent Imaging    CT ABDOMEN PELVIS WO CONTRAST Additional Contrast? None [[id number]] Collected: 06/09/21 1916      Order Status: Completed Updated: 06/09/21 1940     Narrative:       EXAMINATION:   CT OF THE ABDOMEN AND PELVIS WITHOUT CONTRAST 6/9/2021 6:57 pm     TECHNIQUE:   CT of the abdomen and pelvis was performed without the administration of   intravenous contrast. Multiplanar reformatted images are provided for review. Dose modulation, iterative reconstruction, and/or weight based adjustment of   the mA/kV was utilized to reduce the radiation dose to as low as reasonably   achievable.      COMPARISON:   May 10, 2021     HISTORY:   ORDERING SYSTEM PROVIDED HISTORY: worsened Cr acutely with history of   bilateral ureter stents and breast CA, eval for stent blockage/hydronephrosis   TECHNOLOGIST PROVIDED HISTORY: Additional Contrast?->None   Reason for exam:->worsened Cr acutely with history of bilateral ureter stents   and breast CA, eval for stent blockage/hydronephrosis   Decision Support Exception - unselect if not a suspected or confirmed   emergency medical condition->Emergency Medical Condition (MA)   Is the patient pregnant?->No   Reason for Exam: worsened Cr acutely with history of bilateral ureter stents   and breast CA, eval for stent blockage/hydronephrosis   Acuity: Acute   Type of Exam: Initial     FINDINGS:   Lower Chest: Large left pleural effusion with adjacent compressive   atelectasis nearly identical to the comparison. Organs: There is bilateral hydronephrosis, unchanged, moderate left-sided   hydronephrosis and large amount of right-sided hydronephrosis.  Bilateral   ureteral stents are in good position, unchanged.  No new acute noncontrast   findings of the organs elsewhere. GI/Bowel: No bowel obstruction or other non contrast acute process   demonstrated.  No evidence of colitis, diverticulitis or appendicitis. Pelvis: Wall thickening of the bladder again noted, unchanged along the   posterior and left greater than right lateral walls.  Bladder mostly   collapsed. Peritoneum/Retroperitoneum: No free air, ascites or abscess. Bones/Soft Tissues: Extensive osseous metastatic disease changes again noted,   no significant change in the appearance.  Diffuse body wall edema is present,   very similar      Impression:       Unchanged moderate-large amount of bilateral hydronephrosis greater on the   right.  Ureteral stents in good position.  Overall no significant change in   appearance of the abdomen/pelvis from the comparison.          Relevant labs and imaging reviewed    ASSESSMENT AND PLAN     #. Acute renal failure:  -Renal function gradually worsening-creatinine was 1.0-4/26/2021, 1.8- 5/14/2021, 6.2 today.  -Patient admits to having good p.o. intake.   -CT abdomen/pelvis-ureteral stents in good position, unchanged moderate-large amount of bilateral hydronephrosis greater on the right.  -Mart catheter placed in ER.  -Continue IV fluids, strict input/output. -Nephrology, urology consulted from ER. #.  Elevated random glucose:  -Patient does not have a history of diabetes  -Does not appear to be in DKA  -Even though anion gap is elevated, VBG with pH 7.22-UA-no ketones, beta hydroxybutyrate within normal limits. -Monitor blood glucose  -Ordered HbA1c. #.  Anion gap metabolic acidosis: Could be secondary to ELIDIA  -Anion gap 22, CO2 12  -Started bicarb drip    #. Hyperkalemia  -Continue bicarb drip. Repeat BMP in a.m. #.  Hyponatremia    #. Elevated lactic acid:  -Continue IV fluids and repeat level. No signs and symptoms of infection. -UA-trace leukocyte esterase, nitrite negative, WBC 8, bacteria rare.  -Patient received IV ceftriaxone in ER    #. metastatic breast cancer, on chemotherapy  -Last chemotherapy 6/9/2021    #. Chronic LUE edema as per pt. #. Hypertension  -Resume home medications-amlodipine, metoprolol    #. h/o rt LLE DVT-off of anticoagulation    #. h/o bilateral hydronephrosis s/p bilateral ureteral stents-3/2021    #. sinus tachycardia-continue metoprolol    #. COVID 19-5/2021     DVT Prophylaxis: Heparin  GI Prophylaxis: Protonix  Code Status: FULL.       Case d/w ED physician    Maryanne Ceuvas MD  Hospitalist, Internal Medicine  6/10/2021 at 12:22 AM

## 2021-06-10 NOTE — ED NOTES
Report given to [facility]. Pt to be transported to floor.       Leonard Griffiths, AUDIE  06/10/21 4506

## 2021-06-10 NOTE — PROGRESS NOTES
Patient Name: Marsha Rg  Patient : 1965  Patient MRN: [id number]     Primary Oncologist: Tiffanie White MD  Referring Provider: CINDY Jones NP     Date of Service: 2021      Chief Complaint:   Chief Complaint   Patient presents with    Follow-up     She came in for follow up visit. Patient Active Problem List:     Left breast mass     Secondary cancer of bone      Essential hypertension     Malignant neoplasm of central portion of left female breast     Secondary malignant neoplasm of breast      HPI:   Marsha Rg is a pleasant 53-year-old  female patient who initially noted left breast swelling and pain for a few months. She had mammogram at age of 30. She went for mammogram which showed abnormal left breast. Diagnostic bilateral mammogram on 2018 with ultrasound showed findings concerning for inflammatory breast cancer with prominent left axillary lymph nodes, benign right mammogram.  Baseline CBC and CMP were within normal limits. CA 27-29 was 61. PET CT scan in May 27, 2083 showed metabolic activity within left breast with metabolically active skin thickening concerning for inflammatory breast cancer, metabolically active left axillary lymph node metastases and skeletal metastasis. Pathology report from the left breast biopsy in May 2018 showed infiltrating pleomorphic lobular carcinoma involving skin and breast tissue nuclear grade 2, ER 90+% moderate intensity, NV 8% with positive, HER-2/brandon negative by FISH and IHC. She did not have visceral disease. We discussed about potential therapy. I put her on Lupron plus Arimidex and Palbociclib. She had menstruation in May 2018. She started lupron, arimidex and Ibrance on May 30, 2018. Bone density in 2018 showed  LUMBAR SPINE: The bone mineral density in the lumbar spine including the L1 through L4 levels is measured at 1.355 g/cm2, which corresponds to a T-score of 1.5 and a Z-score of 1.4.  This is within the normal range by WHO criteria. LEFT HIP: The bone mineral density in the total hip is measured at 0.972 g/cm2 corresponding to a T-score of -0.3 and a Z-score of -0.2. This is within the normal range by WHO criteria. The bone mineral density of the femoral neck is measured at 0.802 g/cm2 corresponding to a T-score of -1.7 and a Z-score of -1.2. This is within the osteopenia range by WHO criteria. IMPRESSION: Osteopenia by WHO criteria. Bone scan in September 2018 showed stable findings. On October 26, 2018 she was found to have RLE DVT. PET CT scan in November 2018 revealed:  1. Overall improvement of left axillary adenopathy and FDG avidity. There is persistent skin thickening of the left breast with associated FDG uptake in the retroareolar region and left breast tissue which is not significantly changed since the prior exam. 2. Overall progression of hypermetabolic skeletal osseous metastases when compared to the PET-CT from 05/07/2018. We discussed about the potential side effects of bisphosphonates including renal failure and Jaw necrosis. She switched lovenox to xarelto in January 2019. Bone scan in June 2019 compared to bone scan in September 2018 showed progression of disease. CA 27-29 has continued to increase. We will obtain PET CT to evaluate. Clinically she felt left breast lump continuing to get smaller. PET CT scan on August 26, 2019 showed :  1. New focus of intense uptake lateral to the left axilla and anterior to the shoulder musculature. 2. However there is improvement of disease within the left breast and left axilla and decreased uptake within the widespread osseous metastatic disease. In December 2019 CA 17-29 continued to rise. CT chest, abdomen and bone scan in January 2020 showed progression local and bone disease. She started lupron, aromasin and afinitor on January 10, 2020. I advise to monitor blood pressure.   On May 7, 2020 PET CT scan showed favorable response to chemotherapy. The mass anterior to the left shoulder musculature has significantly decreased in size and degree of uptake. Small nodules anterior to the mass extending to the skin demonstrates mild uptake and likely represents residual disease. Left axillary lymph nodes have decreased in size. Uptake in the left subareolar region has decreased. Diffuse osseous metastatic disease demonstrates decreased uptake. Labs in August 2020 were reviewed. CT chest, abdomen pelvis on October 5, 2020 showed  Decreased size of the soft tissue mass anterior to the left shoulder   musculature compared to prior PET-CT and CT suggesting response to therapy.     Decreased size of left axillary and subpectoral lymphadenopathy also   suggesting response to therapy.     Stable sclerotic metastatic disease noted throughout the visualized osseous skeleton.     No evidence of new metastatic disease in the chest, abdomen or pelvis. CA 27-29 on October 5, 2020 was 409.2. Labs in January 2021 were reviewed. Anemic w/u is consistent with chronic disease. She is on eliquis. CT chest, abdomen pelvis in January 2021 showed  1. Increasing skin thickening of the breast bilaterally with increasing  subcutaneous edema throughout the chest wall, which may be related to post treatment changes. 2. No interval change in multifocal sclerotic osseous metastases. 3. Stable to slight interval decrease in size of soft tissue nodules in the  left chest wall/anterior to the left shoulder.  No significant interval  change in left axillary adenopathy. 4. There is increasing size of the left ovary as well as new pelvic  adenopathy suspicious for malignancy either primary neoplastic process involving the left ovary versus metastatic disease. 5. There is also mild wall thickening of the left posterolateral bladder wallwith mild left hydronephrosis.  Would recommend further evaluation with cystoscopy as well.   6. New 3.7 x 2.0 cm partially calcified mass in the right adductor  musculature suspicious for metastatic disease as well.  Alternately this  could reflect chronic process such as myositis ossificans in the appropriate clinical setting. I  cut zometa to every 3 months. She was seen by Dr. Ora Closs at [facility] who believe clinically she has metastatic breast cancer to the pelvis. She started Taxol on March 22, 2021. She was sent to [facility] 3/22/21 for ELIDIA and had new on hydroureteronephrosis on the right and is s/p stent placement. CT abdomen/pelvis 3/23/21  1. Moderate bilateral hydroureteronephrosis new on the right and not  significantly changed on the left compared with 85/93/0287 of uncertain  etiology.  No obstructing stone or mass.  Partially decompressed urinary  bladder with circumferential wall thickening not excluded. 2. Fluid overload with small right and large left pleural effusions, diffuse  body wall edema, small volume of ascites, and small pericardial effusion. 3. 6 bilateral iliac chain lymphadenopathy not significantly changed from  01/20/2021.  Increasing size of the left ovary of uncertain etiology with an  underlying neoplasm not excluded.  Unchanged diffuse sclerotic metastases. 4. Mildly increased size of a calcified mass within the right proximal  adductor musculature and new mass within the right gluteus medius muscle suspicious for metastatic disease. On June 28, 2021 she came in for follow-up visit. She has been tolerating Taxol well. She will continue with weekly Taxol on this coming Wednesday. I will schedule follow-up imaging study in 3 weeks to reassess the response to therapy. She had renal stent replacement in June 2021. She was seen by nephrologist recently and placed on Lasix. Reportedly venous Doppler study of left lower extremity was negative for DVT. I recommend to keep lower extremity elevated. Denied any acute pain. No nausea, vomiting or diarrhea. No fever or chills.   No headaches or dizzy spells. Past Medical History  Allergies, asthma, depression, abnormal left breast mammogram. DVT. Surgical History  Appendectomy in .  . Social History  Denied any history of smoking. She drinks alcohol occasionally. No illicit drug use. She has 2 children and one sister. Family History  Mother: Diabetes, Hypertension. Previous Therapy   Palbociclib + Anastrozole + Leuprolide Q28D Cycle Length: 28 Number Cycles: 20 Start: C1D1 on 2018 Assoc Dx: Female inflammatory breast cancer LOT: 1st Line Metastatic or Recurrent Stage: IV 2018 C1 D1  Zoledronic acid (Zometa) Q28D Cycle Length: 28 Number Cycles: 24 Start: Abdirahman Yousif on 2018 Assoc Dx: Secondary bone cancer LOT: 1st Line Metastatic or Recurrent 2020 C8 D1  Everolimus + Exemestane + Leuprolide Q28D Cycle Length: 28 Number Cycles: 6 Start: C1D1 on 2019 Assoc Dx: Female inflammatory breast cancer LOT: 2nd Line Metastatic Stage: IV Treatment Intent: Juzbmzrsxf32/11/2019 C1 D1     Review of Systems: \"Per interval history; otherwise 10 point ROS is negative. \"     Vital Signs:  /69 (Site: Right Upper Arm, Position: Sitting, Cuff Size: Medium Adult)   Pulse 105   Temp 99.6 °F (37.6 °C) (Temporal)   Ht 5' 1\" (1.549 m)   Wt 131 lb (59.4 kg)   LMP 2018   SpO2 99%   BMI 24.75 kg/m²     Physical Exam:  CONSTITUTIONAL: awake, alert, cooperative, no apparent distress  +wheelchair  EYES:  sclera clear and conjunctiva pallor. ENT: Normocephalic, without obvious abnormality, atraumatic. Moist oral mucosa  NECK: supple, symmetrical.  No JVD  HEMATOLOGIC/LYMPHATIC: no cervical, supraclavicular or axillary lymphadenopathy   LUNGS: no increased work of breathing and clear to auscultation   BREAST: Skin nodules to both anterior chest wall with was stable.   CARDIOVASCULAR: regular rate and rhythm, normal S1 and S2, no murmur   ABDOMEN: normal bowel sounds, soft, non-distended, non-tender, no masses palpated, no hepatosplenomegaly   MUSCULOSKELETAL: Decreased strength to both lower extremity. NEUROLOGIC: awake, alert, oriented to name, place and time. Cranial nerves II through XII grossly intact. SKIN: No jaundice. appears intact. No petechial rash. Dry skin. EXTREMITIES: BLE edema, left more than right. No cyanosis.     Labs:  Hematology:  Lab Results   Component Value Date    WBC 6.5 06/23/2021    RBC 2.80 (L) 06/23/2021    HGB 7.8 (L) 06/23/2021    HCT 25.2 (L) 06/23/2021    MCV 90.0 06/23/2021    MCH 27.9 06/23/2021    MCHC 31.0 (L) 06/23/2021    RDW 19.5 (H) 06/23/2021     (H) 06/23/2021    MPV 8.0 06/23/2021    BANDSPCT 2 (L) 03/22/2021    SEGSPCT 41.0 06/23/2021    EOSRELPCT 0.9 06/23/2021    BASOPCT 0.8 06/23/2021    LYMPHOPCT 36.6 06/23/2021    MONOPCT 20.7 (H) 06/23/2021    BANDABS 0.39 03/22/2021    SEGSABS 2.7 06/23/2021    EOSABS 0.1 06/23/2021    BASOSABS 0.1 06/23/2021    LYMPHSABS 2.4 06/23/2021    MONOSABS 1.4 06/23/2021    DIFFTYPE AUTOMATED DIFFERENTIAL 06/23/2021    ANISOCYTOSIS 1+ 12/06/2019    POLYCHROM 1+ 03/22/2021    WBCMORP OCCASIONAL  ATYPICAL LYMPH(S) NOTED   12/06/2019    PLTM RARE 03/22/2021     Chemistry:  Lab Results   Component Value Date     06/23/2021    K 3.9 06/23/2021     06/23/2021    CO2 20 (L) 06/23/2021    BUN 51 (H) 06/23/2021    CREATININE 3.7 (H) 06/23/2021    GLUCOSE 100 (H) 06/23/2021    CALCIUM 7.8 (L) 06/23/2021    PROT 5.8 (L) 06/23/2021    LABALBU 3.1 (L) 06/23/2021    BILITOT 0.2 06/23/2021    ALKPHOS 87 06/23/2021    AST 44 (H) 06/23/2021    ALT 6 (L) 06/23/2021    LABGLOM 13 (L) 06/23/2021    GFRAA 15 (L) 06/23/2021    PHOS 5.2 (H) 03/23/2021    MG 1.8 06/09/2021    POCCA 0.99 (L) 06/23/2021    POCGLU 104 (H) 06/23/2021     Immunology:  Lab Results   Component Value Date    PROT 5.8 (L) 06/23/2021     Tumor Markers:  Lab Results   Component Value Date    LABCA2 409.2 (H) 10/05/2020      Observations:  PHQ-9 Total Score: 0 (6/28/2021  1:44 PM)        Assessment & Plan:    1. She has inflammatory left breast cancer. PET/CT scan showed metastatic disease to bone, left axillary lymph node, and left breasts with the skin involvement. At present time she does not have physical disease. CBC and CMP were within normal limits. CA 27-29 was elevated. I put her on palliative hormonal therapy plus Palbociclib. She started arimidex and Ibrance on June 4, 2018 and monthly lupron injection on June 8, 2018. Bone scan in September 2018 showed stable findings. After reviewing the PET CT scan in November 2018, I put her on Zometa monthly. Bone scan in July 2019 was reviewed and showed progression of the disease. PET scan in August 2019 showed grossly response to the therapy. CT chest/abdomen and bone scan due to rising CA27-29 in January 2020 showed progression of disease. She started aromasin, afinitor and lupron on January 10, 2020. She understands the potential side effects of afinitor. PET CT scan May 2020 showed treatment response. CT chest, abdomen pelvis in October 2020 showed response and no evidence of new metastatic disease. She was on Lupron, exemestane, Zometa and Afinitor. I changed zometa to every 3 months. CT chest, abdomen pelvis in January 2021 was reviewed. Due to progression of disease she started weekly Taxol on March 22, 2021. She will continue with weekly Taxol. I will have follow-up imaging study prior to next office visit to see the response. 2. Hypertension: I advise to monitor BP and has low sodium diet. 3. Seasonal allergy. I advise to take Claritin and gave her flonase. I recommend flu shot. 4. She was found to have right lower extremity DVT in October 2019. She is on Eliquis. no signs of bleeding. 5. She has multifactorial anemia. JAK2 with reflex in January 2021 was negative. Anemic work-up in January 2021 was reviewed. I will order fecal occult blood. 6.  I recommend to keep lower extremity elevated.   She is on Lasix for bilateral leg swelling. 7.  Prescribed compazine for intermittent nausea. She stated Zofran does not seem to help. She is using  Ativan for the anxiety and insomnia. 8.  I remind her to follow-up with urologist every 3 months to have renal stent replacement. 9.  She has multifactorial anemia. I will continue to monitor CBC. RTC in 4 weeks or sooner. All of her question has been answered for today. Recent imaging and labs were reviewed and discussed with the patient.

## 2021-06-10 NOTE — CARE COORDINATION
Patient identified as potential readmission. Last admission 5/10-5/14 for acute on chronic respiratory failure. Patient here today for abnormal labs. CM met with patient to begin discharge planning. CM introduced self and CM role. Patient states she presents to ER with c/o abnormal labs. Patient reports she has her blood drawn once and week and was advised that today's labs were abnormal. Patient receiving chemo at the cancer center. Follows with Dr Portia Rueda. Patient has a PCP and insurance which assists with medication affordability. Patient reports she lives with her daughter and her daughter's significant other in a single story home in [facility]. Patient has the following DME: walker and hospital bed. Patient is active with [facility] with PT visits twice a week and nursing visits once a week. Patient reports she does not drive, but depends on her daughter's girlfriend to drive. Patient plan upon discharge is to return home with [facility] and [facility]. Patient is requiring readmission and there were no alternatives to admission to explore at this time.

## 2021-06-10 NOTE — PLAN OF CARE
Nutrition Problem #1: Predicted inadequate energy intake  Intervention: Food and/or Nutrient Delivery: Continue Current Diet  Nutritional Goals: Pt will consume at least 75% of her meals and supplements

## 2021-06-10 NOTE — ED NOTES
Pt assisted into bed. Slight tremors noted to BUE. Assisted into gown as well as being placed on monitor. States HR has been running high and this is why she is on the Toprol bur has been out for a couple of days d/t pharmacy issues. Pt with heavy breathing noted while laying in bed. Reports this being her norm and does not feel SOB. Reports having COVID a couple weeks ago as well. Large amount of edema noted to BLE 4+ and this started when she began having chemo treatments. Was sent to ED by Dr Damian Helton for renal labs being off.       Jocelin Sebastian, RN  06/09/21 2100

## 2021-06-10 NOTE — ED NOTES
Called lab d/t only result showing is lactic. Reports finding specimens and she would look into it.      Sunil Villasenor RN  06/09/21 2125

## 2021-06-10 NOTE — CONSULTS
Nephrology Service Consultation      [address], [address] [address]  Phone: [phone]  Office Hours: 8:30AM - 4:30PM  Monday - Friday            Patient:  Mich Adame  MRN: [id number]  Consulting physician:  Marta Garcia MD  Reason for Consult: elevated creat      PCP: CINDY Clark - NP    HISTORY OF PRESENT ILLNESS:   The patient is a 50 y.o. female with metastatic breast cancer, presented due to outpt labs showing worsening renal function. Renal consult for elevated creat, high K and metabolic acidosis  Denies diarrhea, vomiting, nsaid use. She is on chemotherapy  She has leg edema, she does not take any diuretics   She is on room air this am  Nonoliguric      REVIEW OF SYSTEMS:  14 point ROS is Negative. See positive ROS per HPI    Past Medical History:        Diagnosis Date    Anxiety     Asthma     last flare up 5 yrs ago    Breast lesion     \"have spot on left breast going to remove- at this time not sure if cancer or not\"    Essential hypertension 4/1/2020    Secondary cancer of bone ([facility] 4/1/2020    Thyroid nodule     \"have thyroid nodules-        Past Surgical History:        Procedure Laterality Date    APPENDECTOMY  age 27   [employer] BLADDER SURGERY Bilateral 3/25/2021    BILATERAL CYSTOSCOPY BILATERAL STENT INSERTION performed by Karla Jefferson MD at [facility] IR NONTUNNELED VASCULAR CATHETER  3/22/2021    IR NONTUNNELED VASCULAR CATHETER 3/22/2021 [facility] SPECIAL PROCEDURES    PORT SURGERY Right 3/15/2021    RIGHT PORT INSERTION performed by Bennett Bradley MD at [facility]  age 4    T & A       Medications:   Prior to Admission medications    Medication Sig Start Date End Date Taking?  Authorizing Provider   Nutritional Supplement LIQD Take 1 Can by mouth 2 times daily 6/9/21   CINDY Herbert - CNP   amLODIPine (NORVASC) 5 MG tablet Take 1 tablet by mouth daily 5/17/21   Chris Christine MD metoprolol succinate (TOPROL XL) 100 MG extended release tablet Take 1 tablet by mouth daily 4/26/21   Marilyn Flatness, APRN - CNP   omeprazole (PRILOSEC) 20 MG delayed release capsule Take 1 capsule by mouth every morning (before breakfast) 4/5/21   Marilyn Flatness, APRN - CNP   furosemide (LASIX) 20 MG tablet Take 1 tablet by mouth daily  Patient taking differently: Take 20 mg by mouth daily as needed  4/5/21   Marilyn Flatness, APRN - CNP   Calcium Carbonate-Vitamin D (OYSTER SHELL CALCIUM/D) 500-200 MG-UNIT TABS Take 1 tablet by mouth daily 4/5/21 4/5/22  Marilyn Flatness, APRN - CNP   ondansetron (ZOFRAN) 8 MG tablet 1 tab po q 8 hours PRN for chemo induced nausea/vomting  Patient taking differently: 8 mg every 8 hours as needed for Nausea or Vomiting  3/16/21   Marilyn Flatness, APRN - CNP   docusate sodium (COLACE) 100 MG capsule Take 1 capsule by mouth daily as needed for Constipation 3/16/21   Marilyn Flatness, APRN - CNP   HYDROcodone-acetaminophen (NORCO) 5-325 MG per tablet Take 1 tablet by mouth every 6 hours as needed for Pain for up to 14 days. 3/16/21 3/30/21  Marilyn Flatness, APRN - CNP   acetaminophen (TYLENOL) 500 MG tablet Take 500 mg by mouth every 6 hours as needed for Pain    Historical Provider, MD   prochlorperazine (COMPAZINE) 10 MG tablet Take 1 tablet by mouth every 8 hours as needed (nausea)  Patient taking differently: Take 10 mg by mouth every 8 hours as needed (Nausea)  1/25/21   Delfino Strickland, MD   promethazine (PHENERGAN) 25 MG tablet TAKE ONE TABLET BY MOUTH EVERY 6 HOURS AS NEEDED FOR NAUSEA 12/22/20   Delfino Strickland, MD   cetirizine (ZYRTEC) 10 MG tablet Take 10 mg by mouth daily    Historical Provider, MD        Allergies:  Latex, Banana, and Cinnamon    Social History:   TOBACCO:   reports that she has never smoked. She has never used smokeless tobacco.  ETOH:   reports current alcohol use.   OCCUPATION:      Family History:       Problem Relation Age of Onset    Diabetes Mother     Stroke Mother     High Blood Pressure Mother            Physical Exam:    Vitals: /84   Pulse 103   Temp 97.7 °F (36.5 °C) (Oral)   Resp 19   Ht 5' 1\" (1.549 m)   Wt 120 lb (54.4 kg)   LMP 04/08/2018   SpO2 99%   BMI 22.67 kg/m²   General appearance: in no acute distress, appears stated age  Skin: Skin color, texture, turgor normal. No rashes or lesions  HEENT: normocephalic, atraumatic  Neck: supple, trachea midline  Lungs: clear to auscultation bilaterally, breathing comfortably  Heart[de-identified] regular rate and rhythm, S1, S2 normal,  Abdomen: soft, non-tender; bowel sounds normal; no masses,   Extremities: 2+ pitting ble edema, LUE swelling due to breast cancer  Neurologic: Mental status: alert, oriented, interactive, following commands  Psychiatric: mood and affect appropriate    CBC:   Recent Labs     06/09/21 1057 06/09/21 1949   WBC 8.9 7.7   HGB 8.8* 10.2*    495*     BMP:    Recent Labs     06/09/21  1057 06/09/21  1949 06/10/21  0049    133*  --    K 5.2* 5.4*  --     99  --    CO2 16* 12*  --    BUN 61* 64*  --    CREATININE 5.9* 6.2*  --    GLUCOSE 86 263* 135     Hepatic:   Recent Labs     06/09/21 1057 06/09/21 1949   AST 29 34   ALT <5* 7*   BILITOT 0.2 0.1   ALKPHOS 98 113       I/O this shift:  In: 240 [P.O.:240]  Out: -      -----------------------------------------------------------------      Assessment and Recommendations     Patient Active Problem List    Diagnosis Date Noted    Acute renal failure ([facility] 06/10/2021    COVID-19 05/12/2021    Acute and chronic respiratory failure (acute[facility]-chronic) [facility] 05/10/2021    Tachycardia     ELIDIA (acute kidney injury) ([facility] 03/22/2021    Hyperkalemia     Metabolic acidosis     Hyponatremia     Left ovarian enlargement 01/26/2021    Iron deficiency anemia 01/07/2021    Malabsorption 01/07/2021    Dehydration 11/20/2020    Malignant neoplasm of central portion of left female breast ([facility] 07/31/2020    Metastatic breast cancer ([facility] 07/31/2020    [facility] 04/01/2020    Essential hypertension 04/01/2020    Left breast mass      -ELIDIA: prerenal vs obstruction//cr trending up//CT A/P shows bilateral HN which is not new and stents d not seem clogged  -Hyperkalemia  -Metabolic acidosis  -B/L HN; had ureteral stents placed in march  -BLE edema  -metastatic breast cancer and on chemo    Plan;  Give 1 liter of bicarb gtt then stop  Still awaiting am labs to decide if HD is needed  obtainn probnp level  Give iv lasix  Will benefit from echo  Avoid nephrotoxins    Thank you    Electronically signed by Via Sarah Shah DO on 6/10/2021 at 9:23 MD Maria Dolores Espinoza DO [address] [place], [place] [address]  PHONE: [phone]  FAX: [phone]

## 2021-06-10 NOTE — ED NOTES
Pt with smart port to R upper arm however is actively receiving chemo through this port reporting getting chemo today. Made aware of preference being not to access this as she is actively getting chemo to avoid complications with access. Pt willing to allow this nurse to start IV to L AC.       Julia Aguilera, RN  06/09/21 2056

## 2021-06-10 NOTE — PROGRESS NOTES
Comprehensive Nutrition Assessment    Type and Reason for Visit:  Initial, Positive Nutrition Screen (wt loss, poor po intake)    Nutrition Recommendations/Plan:   · Continue current diet  · Begin renal oral nutrition supplement daily    Nutrition Assessment:  Pt admitted with worsening renal function. H/O metastatic breast cancer on chemotherapy, bilateral hydronephrosis s/p bilateral ureteral stents, sinus tachycardia, COVID 19-5/2021. Per current stated wt, wt appears stable recently, pt endorses stable wt. Adequate po intake so far here, consuming 76% to all of meals, pt endorses good po intake. A1C 6.0 with highyperglycemia noted. Will order renal oral supplement and continue to follow as moderate nutrition risk. Malnutrition Assessment:  Malnutrition Status: At risk for malnutrition    Context:  Chronic Illness       Estimated Daily Nutrient Needs:  Energy (kcal):  2407-1697 (25-35 kcal/kg IBW); Weight Used for Energy Requirements:  Ideal     Protein (g):  29-38 (.6-.8 g/kg IBW); Weight Used for Protein Requirements:  Ideal        Fluid (ml/day):  3650-5761; Method Used for Fluid Requirements:  1 ml/kcal      Nutrition Related Findings:  K 5.2, Cr 5.9, Glu 243      Wounds:  None       Current Nutrition Therapies:    ADULT DIET; Regular; Low Sodium (2 gm); Low Potassium (Less than 3000 mg/day)    Anthropometric Measures:  · Height: 5' 1\" (154.9 cm)  · Current Body Weight: 120 lb (54.4 kg) (stated)   · Admission Body Weight:  (?)    · Usual Body Weight: 122 lb 9.6 oz (55.6 kg) (3/22/21)     · Ideal Body Weight: 105 lbs; % Ideal Body Weight 114.3 %   · BMI: 22.7  · BMI Categories: Normal Weight (BMI 18.5-24. 9)       Nutrition Diagnosis:   · Predicted inadequate energy intake related to catabolic illness as evidenced by poor intake prior to admission    Nutrition Interventions:   Food and/or Nutrient Delivery:  Continue Current Diet  Nutrition Education/Counseling:  No recommendation at this time Coordination of Nutrition Care:  Continue to monitor while inpatient    Goals:  Pt will consume at least 75% of her meals and supplements       Nutrition Monitoring and Evaluation:   Behavioral-Environmental Outcomes:  None Identified   Food/Nutrient Intake Outcomes:  Food and Nutrient Intake  Physical Signs/Symptoms Outcomes:  Biochemical Data, GI Status, Fluid Status or Edema, Nutrition Focused Physical Findings, Weight     Discharge Planning:    No discharge needs at this time     Electronically signed by Joycelyn Andersen RD, LD on 6/10/21 at 2:38 PM EDT    Contact: [phone]

## 2021-06-10 NOTE — CONSULTS
----- Message from Minna Bee MD sent at 9/4/2019  5:17 PM EDT -----  Please place her in 5 year colonoscopy recall ( I mistakenly put her in 10 year, thx)   [facility] [address], [address]   Consult Note  [facility] 1 2 3 4 5    Date: 6/10/2021   Patient: Blade Mcknight   : 1965   DOA: 2021   MRN: [id number]   ROOM#: [id number]     Reason for Consult: Bilateral hydronephrosis  Requesting Physician:  Kirill Snyder PA-C  Collaborating Urologist on Call at time of admission: Dr. Rosa Barnard:  Abnormal labs. History Obtained From:  patient, electronic medical record    HISTORY OF PRESENT ILLNESS:                The patient is a 50 y.o. female with significant past medical history of metastatic breast cancer w resultant bilateral hydronephrosis managed with chronic indwelling ureteral stents who presented with worsening kidney function. CT a/p reveals moderate bilateral hydronephrosis with ureteral stents in good position. Pt states she is feeling well overall and has been tolerating her stents OK since they were originally placed 3/25/21. She was having mild lower abdominal cramping and urgency/frequency, but this was improved with Ditropan XL 15mg. Pt currently denies flank/abd pain, f/c/n/v, gross hematuria, dysuria, or other sx. She is currently undergoing palliative chemotherapy with hem/onc. ED Provider's HPI 21: Blade Mcknight is a 50 y.o. female with PMH of breast cancer with metastasis on chemotherapy,  HTN and PSH of bilateral ureteral stents for hydronephrosis from metastatic cancer who presents to the ED advised to come in by her nephrologist, Dr. Alejandro Trevino, for worsening creatinine with concern for blockage of her bilateral ureteral stents that were placed in 2021 by Dr. Cheryl Ferrell for hydronephrosis from metastatic breast cancer. Patient reports that she follows with local urologist, Dr. Jarek Lora. Patient reports that other than mild intermittent nausea she is in baseline state of health.   She reports that she did have chemotherapy today for her metastatic breast cancer and takes Zofran and Phenergan as needed for non-tender, non-distended    DATA:    WBC:    Lab Results   Component Value Date    WBC 7.7 06/09/2021     Hemoglobin/Hematocrit:    Lab Results   Component Value Date    HGB 10.2 06/09/2021    HCT 32.6 06/09/2021     BMP:    Lab Results   Component Value Date     06/09/2021    K 5.4 06/09/2021    CL 99 06/09/2021    CO2 12 06/09/2021    BUN 64 06/09/2021    LABALBU 2.9 06/09/2021    CREATININE 6.2 06/09/2021    CALCIUM 7.8 06/09/2021    GFRAA 9 06/09/2021    LABGLOM 7 06/09/2021     PT/INR:    Lab Results   Component Value Date    PROTIME 12.0 05/11/2021    INR 0.99 05/11/2021     Urine Culture: pending    Imaging:  CT ABDOMEN PELVIS WO CONTRAST Additional Contrast? None    Result Date: 6/9/2021  EXAMINATION: CT OF THE ABDOMEN AND PELVIS WITHOUT CONTRAST 6/9/2021 6:57 pm TECHNIQUE: CT of the abdomen and pelvis was performed without the administration of intravenous contrast. Multiplanar reformatted images are provided for review. Dose modulation, iterative reconstruction, and/or weight based adjustment of the mA/kV was utilized to reduce the radiation dose to as low as reasonably achievable. COMPARISON: May 10, 2021 HISTORY: ORDERING SYSTEM PROVIDED HISTORY: worsened Cr acutely with history of bilateral ureter stents and breast CA, eval for stent blockage/hydronephrosis TECHNOLOGIST PROVIDED HISTORY: Additional Contrast?->None Reason for exam:->worsened Cr acutely with history of bilateral ureter stents and breast CA, eval for stent blockage/hydronephrosis Decision Support Exception - unselect if not a suspected or confirmed emergency medical condition->Emergency Medical Condition (MA) Is the patient pregnant?->No Reason for Exam: worsened Cr acutely with history of bilateral ureter stents and breast CA, eval for stent blockage/hydronephrosis Acuity: Acute Type of Exam: Initial FINDINGS: Lower Chest: Large left pleural effusion with adjacent compressive atelectasis nearly identical to the comparison.  Organs: There is bilateral hydronephrosis, unchanged, moderate left-sided hydronephrosis and large amount of right-sided hydronephrosis. Bilateral ureteral stents are in good position, unchanged. No new acute noncontrast findings of the organs elsewhere. GI/Bowel: No bowel obstruction or other non contrast acute process demonstrated. No evidence of colitis, diverticulitis or appendicitis. Pelvis: Wall thickening of the bladder again noted, unchanged along the posterior and left greater than right lateral walls. Bladder mostly collapsed. Peritoneum/Retroperitoneum: No free air, ascites or abscess. Bones/Soft Tissues: Extensive osseous metastatic disease changes again noted, no significant change in the appearance. Diffuse body wall edema is present, very similar     Unchanged moderate-large amount of bilateral hydronephrosis greater on the right. Ureteral stents in good position. Overall no significant change in appearance of the abdomen/pelvis from the comparison. XR CHEST PORTABLE    Result Date: 5/11/2021  EXAMINATION: ONE XRAY VIEW OF THE CHEST 5/11/2021 12:27 pm COMPARISON: 05/10/2021 HISTORY: ORDERING SYSTEM PROVIDED HISTORY: post left thoracentesis TECHNOLOGIST PROVIDED HISTORY: Reason for exam:->post left thoracentesis Reason for Exam: post left thoracentesis FINDINGS: Status post left thoracentesis. No pneumothorax. Right-sided central venous catheter remains in place. The heart size is stable. Airspace opacities again seen throughout the right lung.      Decreased left pleural effusion, no pneumothorax     IR GUIDED THORACENTESIS PLEURAL    Result Date: 5/11/2021  PROCEDURE: ULTRASOUNDGUIDED LEFT THORACENTESIS 5/11/2021 HISTORY: ORDERING SYSTEM PROVIDED HISTORY: pleural effusion TECHNOLOGIST PROVIDED HISTORY: Reason for exam:-> left pleural effusion Which side should the procedure be performed?->Radiologist Recommendation Is the patient pregnant?->No TECHNIQUE: Informed consent was obtained after a detailed explanation of the procedure including risks, benefits, and alternatives. Universal protocol was performed. The left chest was prepped and draped in sterile fashion and local anesthesia was achieved with lidocaine. A 5 Palestinian needle sheath was advanced under ultrasound guidance into pleural effusion and thoracentesis was performed. The patient tolerated the procedure well. FINDINGS: A total of 1350 ml clear and yellow fluid was removed. Successful ultrasound guided thoracentesis. Assessment & Plan:      Bear Croft is a 50y.o. year old female admitted 6/9/2021 for acute renal failure. H/o metastatic breast cancer, currently undergoing palliative chemotherapy with hem/onc. Known to Dr. Marylen Lamprey.    1) Bilateral Hydronephrosis: likely secondary to bilateral ureteral obstruction due to metastatic breast cancer              S/p cystoscopy, bilateral ureteral stent placement on 3/25/21    CT a/p 6/9/21: Unchanged moderate-large amount of bilateral hydronephrosis greater on the right. Ureteral stents in good position. UA dip w trace leuks, rare bacteria, mod blood; urine cx pending. On IV Rocephin. Plan for cystoscopy and bilateral ureteral stent exchange tomorrow afternoon. Will make NPO after midnight and order a rapid covid test.  2) ELIDIA              Cr 6.2; Cr 1.3 in Feb 2021. Nephrology following    Patient seen and examined, chart reviewed.      Electronically signed by Gila Medellin PA-C on 6/10/2021 at 7:46 AM

## 2021-06-11 NOTE — PROGRESS NOTES
Physician Progress Note      Froylan Matthews  CSN #:                  [id number]  :                       1965  ADMIT DATE:       2021 5:45 PM  DISCH DATE:  RESPONDING  PROVIDER #:        Duarte Maciel          QUERY TEXT:    Hospitalists,    Pt admitted with ELIDIA. Noted documentation of possible UTI by ED physician. If   possible, please document in the progress notes and discharge summary if you   are evaluating and/or treating any of the following: The medical record reflects the following:  Risk Factors: stents  Clinical Indicators: bilateral ureteral stents, Per ED physician   documentation: \"I did place consult to patient's urologist on call returned by   on-call urologist, Dr. Virgilio Anderson, and we discussed the patient's case. He   does not feel patient's ureteral stents are the issue but is happy to see   patient in the morning.   He agrees with starting patient on ceftriaxone for   UTI. \";;urine cx no growth  Treatment: labs, imaging, Urology consult, IV Rocephin    Thank you,  Aditi Taylor RN  [phone]  Options provided:  -- UTI due to ureteral stent  -- UTI as a complication of previous cystoscopy procedure 3/21  -- UTI ruled out  -- Other - I will add my own diagnosis  -- Disagree - Not applicable / Not valid  -- Disagree - Clinically unable to determine / Unknown  -- Refer to Clinical Documentation Reviewer    PROVIDER RESPONSE TEXT:    UTI ruled out    Query created by: Rene Collins on 2021 2:21 PM      Electronically signed by:  Duarte Maciel 2021 2:45 PM

## 2021-06-11 NOTE — PROGRESS NOTES
49 yo female with bilateral indwelling ureteral stents    NPO for cystoscopy with bilateral ureteral stent exchange today. Consent signed, on IV Rocephin, will proceed.

## 2021-06-11 NOTE — PROGRESS NOTES
Spoke to Dr Duane Castañeda let him know pt HR from 105-114, and  metoprolol was held on the floor this morning, verbal order to give regular PO dose of metoprolol.

## 2021-06-11 NOTE — ANESTHESIA POSTPROCEDURE EVALUATION
Department of Anesthesiology  Postprocedure Note    Patient: Akil Castellanos  MRN: [id number]  YOB: 1965  Date of evaluation: 6/11/2021  Time:  5:23 PM     Procedure Summary     Date: 06/11/21 Room / Location: [address]    Anesthesia Start: 8429 Anesthesia Stop: 4505    Procedure: BILATERAL CYSTOSCOPY RETROGRADE PYELOGRAM STENT EXCHANGE (Bilateral Urethra) Diagnosis: (-)    Surgeons: Cherry Brown MD Responsible Provider: Laith Arnold MD    Anesthesia Type: MAC ASA Status: 2          Anesthesia Type: MAC    Daniel Phase I:      Daniel Phase II:      Last vitals: Reviewed and per EMR flowsheets.        Anesthesia Post Evaluation    Patient location during evaluation: bedside  Patient participation: complete - patient participated  Level of consciousness: awake and alert  Pain score: 0  Airway patency: patent  Nausea & Vomiting: no nausea and no vomiting  Complications: no  Cardiovascular status: hemodynamically stable  Respiratory status: acceptable  Hydration status: euvolemic

## 2021-06-11 NOTE — CARE COORDINATION
CM in to see Pt to follow up on discharge planning. Pt verified information in previous CM note. Plan remains to return home with [facility]. CM call to [facility], spoke to Stacy, verified Pt is active and requested she have a resumption order or new home care order placed. PS to Dr. Millie Darling to update and request [facility]. order. Pt denied any other needs at this time.   CM following

## 2021-06-11 NOTE — ANESTHESIA PRE PROCEDURE
Department of Anesthesiology  Preprocedure Note       Name:  Nikolas Escamilla   Age:  50 y.o.  :  1965                                          MRN:  [id number]         Date:  2021      Surgeon: Jakob Olivera):  Saundra Jrodan MD    Procedure: Procedure(s):  BILATERAL CYSTOSCOPY RETROGRADE PYELOGRAM STENT EXCHANGE    Medications prior to admission:   Prior to Admission medications    Medication Sig Start Date End Date Taking? Authorizing Provider   Nutritional Supplement LIQD Take 1 Can by mouth 2 times daily 21   CINDY Shabazz CNP   amLODIPine (NORVASC) 5 MG tablet Take 1 tablet by mouth daily 21   Fernand Councilman, MD   metoprolol succinate (TOPROL XL) 100 MG extended release tablet Take 1 tablet by mouth daily 21   CINDY Shabazz CNP   omeprazole (PRILOSEC) 20 MG delayed release capsule Take 1 capsule by mouth every morning (before breakfast) 21   CINDY Shabazz CNP   furosemide (LASIX) 20 MG tablet Take 1 tablet by mouth daily  Patient taking differently: Take 20 mg by mouth daily as needed  21   CINDY Shabazz CNP   Calcium Carbonate-Vitamin D (OYSTER SHELL CALCIUM/D) 500-200 MG-UNIT TABS Take 1 tablet by mouth daily 21  CINDY Shabazz CNP   ondansetron (ZOFRAN) 8 MG tablet 1 tab po q 8 hours PRN for chemo induced nausea/vomting  Patient taking differently: 8 mg every 8 hours as needed for Nausea or Vomiting  3/16/21   CINDY Shabazz CNP   docusate sodium (COLACE) 100 MG capsule Take 1 capsule by mouth daily as needed for Constipation 3/16/21   CINDY Shabazz CNP   HYDROcodone-acetaminophen (NORCO) 5-325 MG per tablet Take 1 tablet by mouth every 6 hours as needed for Pain for up to 14 days.  3/16/21 3/30/21  CINDY Shabazz CNP   acetaminophen (TYLENOL) 500 MG tablet Take 500 mg by mouth every 6 hours as needed for Pain    Historical Provider, MD   prochlorperazine (COMPAZINE) 10 MG tablet Take 1 tablet by mouth every 8 hours as needed (nausea)  Patient taking differently: Take 10 mg by mouth every 8 hours as needed (Nausea)  1/25/21   Roxanne Gordon MD   promethazine (PHENERGAN) 25 MG tablet TAKE ONE TABLET BY MOUTH EVERY 6 HOURS AS NEEDED FOR NAUSEA 12/22/20   Roxanne Gordon MD   cetirizine (ZYRTEC) 10 MG tablet Take 10 mg by mouth daily    Historical Provider, MD       Current medications:    Current Facility-Administered Medications   Medication Dose Route Frequency Provider Last Rate Last Admin    sodium bicarbonate tablet 1,300 mg  1,300 mg Oral TID Елена Lau DO        lactated ringers infusion   Intravenous Continuous Miky Stratton  mL/hr at 06/11/21 1530 New Bag at 06/11/21 1530    sodium chloride flush 0.9 % injection 5-40 mL  5-40 mL Intravenous 2 times per day Ml Felix MD   20 mL at 06/11/21 1154    sodium chloride flush 0.9 % injection 5-40 mL  5-40 mL Intravenous PRN Ml Felix MD        0.9 % sodium chloride infusion  25 mL Intravenous PRN Ml Felix MD        heparin (porcine) injection 5,000 Units  5,000 Units Subcutaneous 3 times per day Ml Felix MD   5,000 Units at 06/10/21 2152    ondansetron (ZOFRAN-ODT) disintegrating tablet 4 mg  4 mg Oral Q8H PRN Ml Felix MD        Or    ondansetron (ZOFRAN) injection 4 mg  4 mg Intravenous Q6H PRN Ml Felix MD        polyethylene glycol (GLYCOLAX) packet 17 g  17 g Oral Daily PRN Ml Felix MD        acetaminophen (TYLENOL) tablet 650 mg  650 mg Oral Q6H PRN Ml Felix MD        Or    acetaminophen (TYLENOL) suppository 650 mg  650 mg Rectal Q6H PRN Ml Felix MD        amLODIPine (NORVASC) tablet 5 mg  5 mg Oral Daily Ml Felix MD   5 mg at 06/10/21 0905    HYDROcodone-acetaminophen (NORCO) 5-325 MG per tablet 1 tablet  1 tablet Oral Q6H PRN Ml Felix MD        metoprolol succinate (TOPROL XL) extended release tablet 100 mg  100 mg Oral Daily Kaushik Roberts MD   100 mg at 06/11/21 1629    pantoprazole (PROTONIX) tablet 40 mg  40 mg Oral QAM AC Kaushik Roberts MD   40 mg at 06/10/21 0630    furosemide (LASIX) injection 80 mg  80 mg Intravenous BID Елена LauDO   80 mg at 06/11/21 1154    cefTRIAXone (ROCEPHIN) 1000 mg IVPB in 50 mL D5W minibag  1,000 mg Intravenous Daily Kaushik Roberts MD   Stopped at 06/11/21 1433    glucose (GLUTOSE) 40 % oral gel 15 g  15 g Oral PRN Louis George PA-C        dextrose 50 % IV solution  12.5 g Intravenous PRN Louis George PA-C        glucagon (rDNA) injection 1 mg  1 mg Intramuscular PRN Louis George PA-C        dextrose 5 % solution  100 mL/hr Intravenous PRN Louis George PA-C           Allergies:     Allergies   Allergen Reactions    Latex Anaphylaxis     \"have trouble with banana's kiwi, avocados- Have trouble breathing with these and trouble breathig - get asthmatic from latex gloves, bandage, anything latex     Banana Anaphylaxis    Cinnamon Anaphylaxis       Problem List:    Patient Active Problem List   Diagnosis Code    Left breast mass N63.20    Secondary cancer of bone ([address] C79.51    Essential hypertension I10    Malignant neoplasm of central portion of left female breast ([address] C50.112    Metastatic breast cancer ([address] C50.919    Dehydration E86.0    Iron deficiency anemia D50.9    Malabsorption K90.9    Left ovarian enlargement N83.8    ELIDIA (acute kidney injury) ([address] N17.9    Hyperkalemia U17.3    Metabolic acidosis S12.2    Hyponatremia E87.1    Tachycardia R00.0    Acute and chronic respiratory failure (acute-on-chronic) (HCC) J96.20    COVID-19 U07.1    Acute renal failure (HCC) N17.9    Bilateral hydronephrosis N13.30       Past Medical History:        Diagnosis Date    Anxiety     Asthma     last flare up 5 yrs ago    Breast lesion     \"have spot on left breast going to remove- at this time not sure if cancer or not\"    Essential hypertension 4/1/2020    Secondary cancer of bone ([address] 4/1/2020    Thyroid nodule     \"have thyroid nodules-        Past Surgical History:        Procedure Laterality Date    APPENDECTOMY  age 26    BLADDER SURGERY Bilateral 3/25/2021    BILATERAL CYSTOSCOPY BILATERAL STENT INSERTION performed by Tyree Barroso MD at [address] IR NONTUNNELED VASCULAR CATHETER  3/22/2021    IR NONTUNNELED VASCULAR CATHETER 3/22/2021 [facility] SPECIAL PROCEDURES    PORT SURGERY Right 3/15/2021    RIGHT PORT INSERTION performed by Bandar Samaniego MD at [address] [facility]  age 4    T & A       Social History:    Social History     Tobacco Use    Smoking status: Never Smoker    Smokeless tobacco: Never Used   Substance Use Topics    Alcohol use: Yes     Comment: average\"one time per week\"                                Counseling given: Not Answered      Vital Signs (Current):   Vitals:    06/11/21 0600 06/11/21 0745 06/11/21 1149 06/11/21 1500   BP: 122/76  111/73 124/78   Pulse: 90 94 100 105   Resp: 12  18 18   Temp: 36.7 °C (98.1 °F)  37.1 °C (98.7 °F) 36.3 °C (97.4 °F)   TempSrc: Oral  Oral Temporal   SpO2: 97%  97% 97%   Weight: 146 lb 9.6 oz (66.5 kg)      Height: 5' 1\" (1.549 m)                                                 BP Readings from Last 3 Encounters:   06/11/21 124/78   06/11/21 133/87   06/09/21 (!) 147/77       NPO Status: Time of last liquid consumption: 1629                        Time of last solid consumption: 1700                        Date of last liquid consumption: 06/11/21                        Date of last solid food consumption: 06/10/21    BMI:   Wt Readings from Last 3 Encounters:   06/11/21 146 lb 9.6 oz (66.5 kg)   06/09/21 120 lb (54.4 kg)   06/02/21 120 lb (54.4 kg)     Body mass index is 27.7 kg/m².     CBC:   Lab Results   Component Value Date    WBC 5.8 06/11/2021    RBC 2.94 06/11/2021    HGB 8.4 06/11/2021    HCT 26.6 06/11/2021    MCV 90.5 06/11/2021    RDW 18.6 06/11/2021     06/11/2021       CMP:   Lab Results   Component Value Date     06/11/2021    K 5.1 06/11/2021     06/11/2021    CO2 17 06/11/2021    BUN 70 06/11/2021    CREATININE 5.9 06/11/2021    GFRAA 9 06/11/2021    LABGLOM 7 06/11/2021    GLUCOSE 113 06/11/2021    PROT 7.2 06/09/2021    CALCIUM 6.5 06/11/2021    BILITOT 0.1 06/09/2021    ALKPHOS 113 06/09/2021    AST 34 06/09/2021    ALT 7 06/09/2021       POC Tests:   Recent Labs     06/10/21  0048   POCGLU 135*       Coags:   Lab Results   Component Value Date    PROTIME 12.3 06/10/2021    INR 1.02 06/10/2021    APTT 32.6 05/11/2021       HCG (If Applicable): No results found for: PREGTESTUR, PREGSERUM, HCG, HCGQUANT     ABGs: No results found for: PHART, PO2ART, UTK6VRT, UJY9ZCI, BEART, E7BQFQUC     Type & Screen (If Applicable):  No results found for: LABABO, LABRH    Drug/Infectious Status (If Applicable):  No results found for: HIV, HEPCAB    COVID-19 Screening (If Applicable):   Lab Results   Component Value Date    COVID19 NOT DETECTED 06/10/2021    COVID19 NOT DETECTED 03/11/2021           Anesthesia Evaluation    Airway: Mallampati: II  TM distance: >3 FB   Neck ROM: full  Mouth opening: > = 3 FB Dental: normal exam         Pulmonary:normal exam                               Cardiovascular:                      Neuro/Psych:               GI/Hepatic/Renal:             Endo/Other:                     Abdominal:           Vascular:                                        Anesthesia Plan      MAC     ASA 2       Induction: intravenous. Anesthetic plan and risks discussed with patient.                       CINDY Rock - BERNARD   6/11/2021

## 2021-06-11 NOTE — PROGRESS NOTES
Hospitalist Progress Note      Name:  Kerrie Oneal /Age/Sex: 1965  (50 y.o. female)   MRN & CSN:  [id number] & [id number] Admission Date/Time: 2021  5:45 PM   Location:  [address] [place] [address]- PCP: Rashi Wahl 15 Day: 3    Assessment and Plan:   Kerrie Oneal is a 50 y.o.  female  who presents with <principal problem not specified>    1. ACUTE KIDNEY INJURY  - May be multifactorial, has bilat hydronephrosis with stents, also may be prerenal sec to dehydration, she is being treated for cancer. Creatinine remains unchanged and there is no sign of uremia. Monitor as per nephrology see below. No evidence of UTI thus far    2. HYDRONEPHROSIS  -see above. For cysto with stent change today    3. HYPERKALEMIA  -resolved    4. HTN  -BP remains appropriate, follow    5. METASTATIC BREAST CANCER  -treatment post discharge as per oncology    Diet Diet NPO   DVT Prophylaxis [] Lovenox, []  Heparin, [] SCDs, [] Ambulation   GI Prophylaxis [] PPI,  [] H2 Blocker,  [] Carafate,  [] Diet/Tube Feeds   Code Status Full Code   Disposition Patient requires continued admission due to ELIDIA   MDM [] Low, [] Moderate,[]  High  Patient's risk as above due to ELIDIA     History of Present Illness:     Chief Complaint: <principal problem not specified>  Kerrie Oneal is a 50 y.o.  female  who presents with elevated creatinine. She is currently undergoing treatment for metastatic breast cancer, already has ureteral stents in place, was sent to ED by her doctor for continuing rise in creatinine. CT showed bilateral hydronephrosis, she also has an anion gap metabolic acidosis. There were no issues overnight, she is feeling well and denies pain or N/V/D. No SOB       Ten point ROS reviewed negative, unless as noted above    Objective:        Intake/Output Summary (Last 24 hours) at 2021 1327  Last data filed at 6/10/2021 1710  Gross per 24 hour   Intake 300 ml   Output 1350 ml   Net -1050 ml      Vitals:   Vitals: 06/11/21 1149   BP: 111/73   Pulse: 100   Resp: 18   Temp: 98.7 °F (37.1 °C)   SpO2: 97%     Physical Exam:   GEN Awake female, sitting upright in bed in no apparent distress. Appears given age. EYES Pupils are equally round. No scleral erythema, discharge, or conjunctivitis. HENT Mucous membranes are moist. Oral pharynx without exudates, no evidence of thrush. NECK Supple, no apparent thyromegaly or masses. RESP Clear to auscultation, no wheezes, rales or rhonchi. Symmetric chest movement while on room air. CARDIO/VASC S1/S2 auscultated. Regular rate without appreciable murmurs, rubs, or gallops. No JVD or carotid bruits. Peripheral pulses equal bilaterally and palpable. No peripheral edema. GI Abdomen is soft without significant tenderness, masses, or guarding. Bowel sounds are normoactive. Rectal exam deferred. HEME/LYMPH No palpable cervical lymphadenopathy and no hepatosplenomegaly. No petechiae or ecchymoses. MSK No gross joint deformities. SKIN Normal coloration, warm, dry. NEURO Cranial nerves appear grossly intact, normal speech, no lateralizing weakness. PSYCH Awake, alert, oriented x 4. Affect appropriate.     Medications:   Medications:    sodium bicarbonate  1,300 mg Oral TID    calcium gluconate  2,000 mg Intravenous Once    sodium chloride flush  5-40 mL Intravenous 2 times per day    heparin (porcine)  5,000 Units Subcutaneous 3 times per day    amLODIPine  5 mg Oral Daily    metoprolol succinate  100 mg Oral Daily    pantoprazole  40 mg Oral QAM AC    furosemide  80 mg Intravenous BID    cefTRIAXone (ROCEPHIN) IV  1,000 mg Intravenous Daily      Infusions:    sodium chloride      dextrose       PRN Meds: sodium chloride flush, 5-40 mL, PRN  sodium chloride, 25 mL, PRN  ondansetron, 4 mg, Q8H PRN   Or  ondansetron, 4 mg, Q6H PRN  polyethylene glycol, 17 g, Daily PRN  acetaminophen, 650 mg, Q6H PRN   Or  acetaminophen, 650 mg, Q6H PRN  HYDROcodone-acetaminophen, 1 tablet, Q6H PRN  glucose, 15 g, PRN  dextrose, 12.5 g, PRN  glucagon (rDNA), 1 mg, PRN  dextrose, 100 mL/hr, PRN

## 2021-06-11 NOTE — OP NOTE
Operative Note      Patient: Myles Andersen  YOB: 1965  MRN: [id number]    Date of Procedure: 6/11/2021    Pre-Op Diagnosis: - bilateral ureteral strictures with ELIDIA with bilateral ureteral stents last exchanged ~ 3 m ago    Post-Op Diagnosis: Same       Procedure(s):  BILATERAL CYSTOSCOPY RETROGRADE PYELOGRAM STENT EXCHANGE    Surgeon(s):  Kacy Mehta MD     Anesthesia: General    Estimated Blood Loss (mL): Minimal    Complications: None    Specimens:   * No specimens in log *    Implants:  Implant Name Type Inv. Item Serial No.  Lot No. LRB No. Used Action   STENT URET 6FR [id number] PERCFLX HYDR+ TAPR TIP GRAD  STENT URET 6FR [id number] PERCFLX HYDR+ TAPR TIP GRAD  [facility] [id number] Right 1 Implanted   STENT URET 6FR [id number] PERCFLX HYDR+ TAPR TIP GRAD  STENT URET 6FR [id number] PERCFLX HYDR+ TAPR TIP GRAD  [facility] [id number] Left 1 Implanted         Drains:   Urethral Catheter Non-latex;Straight-tip 16 fr (Active)   Catheter Indications Need for fluid volume management of the critically ill patient in a critical care setting 06/11/21 0745   Site Assessment No urethral drainage 06/11/21 0745   Urine Color Yellow 06/11/21 0745   Urine Appearance Cloudy 06/11/21 0745   Output (mL) 1350 mL 06/10/21 1710       Findings: well positioned bilateral ureteral stents. Detailed Description of Procedure:      49 yo female with h/o metastatic breast cancer and bilateral ureteral stenosis. Has had bilateral ureteral stents that were placed 3/25/21. Admitted to  with ELIDIA with a creatinine of 5.9. Npo for cystoscopy with bilateral ureteral stent exchanges. After discussion of the risks/benefits she has signed the consent form. The patient was brought to the operative suite and placed in the supine position and MAC anesthesia was administered. Time out was taken to ensure this is the proper operation for the proper patient, all agreed.   She was sterilely prepped and draped. Rigid cystoscopy ensued. The left then the right ureteral stent was exchanged in the usual fashion, 6 [place]. Good coils were noted proximally fluoroscopically and distally cystoscopically. The bladder was drained with a catheter. This will hopefully help her ELIDIA. She will need stents exchanged in 3 months.     Electronically signed by Hermelinda Harris MD on 6/11/2021 at 5:10 PM

## 2021-06-11 NOTE — ANESTHESIA PRE PROCEDURE
Department of Anesthesiology  Preprocedure Note       Name:  Carine Smith   Age:  50 y.o.  :  1965                                          MRN:  [id number]         Date:  2021      Surgeon: Brian Lozoya):  Flip Wright MD    Procedure: Procedure(s):  BILATERAL CYSTOSCOPY RETROGRADE PYELOGRAM STENT EXCHANGE    Medications prior to admission:   Prior to Admission medications    Medication Sig Start Date End Date Taking? Authorizing Provider   Nutritional Supplement LIQD Take 1 Can by mouth 2 times daily 21   Neita Soda, APRN - CNP   amLODIPine (NORVASC) 5 MG tablet Take 1 tablet by mouth daily 21   Randal Gregorio MD   metoprolol succinate (TOPROL XL) 100 MG extended release tablet Take 1 tablet by mouth daily 21   Neita Soda, APRN - CNP   omeprazole (PRILOSEC) 20 MG delayed release capsule Take 1 capsule by mouth every morning (before breakfast) 21   Neita Soda, APRN - CNP   furosemide (LASIX) 20 MG tablet Take 1 tablet by mouth daily  Patient taking differently: Take 20 mg by mouth daily as needed  21   Neita Soda, APRN - CNP   Calcium Carbonate-Vitamin D (OYSTER SHELL CALCIUM/D) 500-200 MG-UNIT TABS Take 1 tablet by mouth daily 21  Neita Soda, APRN - CNP   ondansetron (ZOFRAN) 8 MG tablet 1 tab po q 8 hours PRN for chemo induced nausea/vomting  Patient taking differently: 8 mg every 8 hours as needed for Nausea or Vomiting  3/16/21   Neita Soda, APRN - CNP   docusate sodium (COLACE) 100 MG capsule Take 1 capsule by mouth daily as needed for Constipation 3/16/21   Neita Soda, APRN - CNP   HYDROcodone-acetaminophen (NORCO) 5-325 MG per tablet Take 1 tablet by mouth every 6 hours as needed for Pain for up to 14 days.  3/16/21 3/30/21  Neita Soda, APRN - CNP   acetaminophen (TYLENOL) 500 MG tablet Take 500 mg by mouth every 6 hours as needed for Pain    Historical Provider, MD   prochlorperazine (COMPAZINE) 10 MG tablet Take 1 tablet by mouth every 8 mg  100 mg Oral Daily Ed Horne MD   100 mg at 06/11/21 1629    pantoprazole (PROTONIX) tablet 40 mg  40 mg Oral QAM AC Ed Horne MD   40 mg at 06/10/21 0630    furosemide (LASIX) injection 80 mg  80 mg Intravenous BID Елена Lau DO   80 mg at 06/11/21 1154    cefTRIAXone (ROCEPHIN) 1000 mg IVPB in 50 mL D5W minibag  1,000 mg Intravenous Daily Ed Horne MD   Stopped at 06/11/21 1433    glucose (GLUTOSE) 40 % oral gel 15 g  15 g Oral PRN Roderick Jean PA-C        dextrose 50 % IV solution  12.5 g Intravenous PRN Roderick Jean PA-C        glucagon (rDNA) injection 1 mg  1 mg Intramuscular PRN Roderick Jean PA-C        dextrose 5 % solution  100 mL/hr Intravenous PRN Roderick Jean PA-C         Facility-Administered Medications Ordered in Other Encounters   Medication Dose Route Frequency Provider Last Rate Last Admin    propofol injection   Intravenous PRN Afua Cotta, APRN - CRNA   50 mg at 06/11/21 1640    fentaNYL (SUBLIMAZE) injection   Intravenous PRN Afua Cotta, APRN - CRNA   50 mcg at 06/11/21 1650    lidocaine (cardiac) (XYLOCAINE) injection   Intravenous PRN Afua [place], APRN - CRNA   60 mg at 06/11/21 1640       Allergies:     Allergies   Allergen Reactions    Latex Anaphylaxis     \"have trouble with banana's kiwi, avocados- Have trouble breathing with these and trouble breathig - get asthmatic from latex gloves, bandage, anything latex     Banana Anaphylaxis    Cinnamon Anaphylaxis       Problem List:    Patient Active Problem List   Diagnosis Code    Left breast mass N63.20    Secondary cancer of bone ([address] C79.51    Essential hypertension I10    Malignant neoplasm of central portion of left female breast ([address] C50.112    Metastatic breast cancer ([address] C50.919    Dehydration E86.0    Iron deficiency anemia D50.9    Malabsorption K90.9    Left ovarian enlargement N83.8    ELIDIA (acute kidney injury) ([address] N17.9    Hyperkalemia T57.1    Metabolic acidosis Y89.9    Hyponatremia E87.1    Tachycardia R00.0    Acute and chronic respiratory failure (acute-on-chronic) (HCC) J96.20    COVID-19 U07.1    Acute renal failure (HCC) N17.9    Bilateral hydronephrosis N13.30       Past Medical History:        Diagnosis Date    Anxiety     Asthma     last flare up 5 yrs ago    Breast lesion     \"have spot on left breast going to remove- at this time not sure if cancer or not\"    Essential hypertension 4/1/2020    Secondary cancer of bone ([address] 4/1/2020    Thyroid nodule     \"have thyroid nodules-        Past Surgical History:        Procedure Laterality Date    APPENDECTOMY  age 27   [place] BLADDER SURGERY Bilateral 3/25/2021    BILATERAL CYSTOSCOPY BILATERAL STENT INSERTION performed by Mae Walters MD at [facility] IR NONTUNNELED VASCULAR CATHETER  3/22/2021    IR NONTUNNELED VASCULAR CATHETER 3/22/2021 [facility] SPECIAL PROCEDURES    PORT SURGERY Right 3/15/2021    RIGHT PORT INSERTION performed by Zi Louis MD at [address] [facility]  age 4    T & A       Social History:    Social History     Tobacco Use    Smoking status: Never Smoker    Smokeless tobacco: Never Used   Substance Use Topics    Alcohol use: Yes     Comment: average\"one time per week\"                                Counseling given: Not Answered      Vital Signs (Current):   Vitals:    06/11/21 0600 06/11/21 0745 06/11/21 1149 06/11/21 1500   BP: 122/76  111/73 124/78   Pulse: 90 94 100 105   Resp: 12  18 18   Temp: 36.7 °C (98.1 °F)  37.1 °C (98.7 °F) 36.3 °C (97.4 °F)   TempSrc: Oral  Oral Temporal   SpO2: 97%  97% 97%   Weight: 146 lb 9.6 oz (66.5 kg)      Height: 5' 1\" (1.549 m)                                                 BP Readings from Last 3 Encounters:   06/11/21 124/78   06/11/21 124/81   06/09/21 (!) 147/77       NPO Status: Time of last liquid consumption: 1629                        Time of last solid consumption: 1700                        Date of last liquid consumption: 06/11/21                        Date of last solid food consumption: 06/10/21    BMI:   Wt Readings from Last 3 Encounters:   06/11/21 146 lb 9.6 oz (66.5 kg)   06/09/21 120 lb (54.4 kg)   06/02/21 120 lb (54.4 kg)     Body mass index is 27.7 kg/m².     CBC:   Lab Results   Component Value Date    WBC 5.8 06/11/2021    RBC 2.94 06/11/2021    HGB 8.4 06/11/2021    HCT 26.6 06/11/2021    MCV 90.5 06/11/2021    RDW 18.6 06/11/2021     06/11/2021       CMP:   Lab Results   Component Value Date     06/11/2021    K 5.1 06/11/2021     06/11/2021    CO2 17 06/11/2021    BUN 70 06/11/2021    CREATININE 5.9 06/11/2021    GFRAA 9 06/11/2021    LABGLOM 7 06/11/2021    GLUCOSE 113 06/11/2021    PROT 7.2 06/09/2021    CALCIUM 6.5 06/11/2021    BILITOT 0.1 06/09/2021    ALKPHOS 113 06/09/2021    AST 34 06/09/2021    ALT 7 06/09/2021       POC Tests:   Recent Labs     06/10/21  0048   POCGLU 135*       Coags:   Lab Results   Component Value Date    PROTIME 12.3 06/10/2021    INR 1.02 06/10/2021    APTT 32.6 05/11/2021       HCG (If Applicable): No results found for: PREGTESTUR, PREGSERUM, HCG, HCGQUANT     ABGs: No results found for: PHART, PO2ART, OFV3ZHU, MUJ5NLR, BEART, P0CBLQDL     Type & Screen (If Applicable):  No results found for: LABABO, LABRH    Drug/Infectious Status (If Applicable):  No results found for: HIV, HEPCAB    COVID-19 Screening (If Applicable):   Lab Results   Component Value Date    COVID19 NOT DETECTED 06/10/2021    COVID19 NOT DETECTED 03/11/2021           Anesthesia Evaluation    Airway:         Dental:          Pulmonary:   (+) asthma:                            Cardiovascular:    (+) hypertension:,                   Neuro/Psych:               GI/Hepatic/Renal:             Endo/Other:                     Abdominal:           Vascular:                                        Anesthesia Plan      MAC     ASA 2 Anesthetic plan and risks discussed with patient.       Plan discussed with surgical team.                  CINDY Boo - CRNA   6/11/2021

## 2021-06-11 NOTE — PROGRESS NOTES
LMP 04/08/2018   SpO2 97%   BMI 27.70 kg/m²   General appearance: alert and cooperative with exam, in no acute distress  HEENT: normocephalic, atraumatic,   Neck: supple, trachea midline  Lungs: breathing comfortably on room air  Extremities: 2+ pitting ble edema  Neurologic: alert, oriented, follows commands, interactive    Assessment and Plan:     Patient Active Problem List     Diagnosis Date Noted    Acute renal failure ([facility] 06/10/2021    COVID-19 05/12/2021    Acute and chronic respiratory failure (acute-on-chronic) ([address] 05/10/2021    Tachycardia      ELIDIA (acute kidney injury) ([facility] 03/22/2021    Hyperkalemia      Metabolic acidosis      Hyponatremia      Left ovarian enlargement 01/26/2021    Iron deficiency anemia 01/07/2021    Malabsorption 01/07/2021    Dehydration 11/20/2020    Malignant neoplasm of central portion of left female breast ([facility] 07/31/2020    Metastatic breast cancer ([facility] 07/31/2020    Secondary cancer of bone ([facility] 04/01/2020    Essential hypertension 04/01/2020    Left breast mass        -ELIDIA: prerenal vs obstruction//cr trending up//CT A/P shows bilateral HN which is not new and stents d not seem clogged  -Hyperkalemia  -Metabolic acidosis  -B/L HN; had ureteral stents placed in march  -BLE edema  -metastatic breast cancer and on chemo  -Hypocalcemia from bicarb gtt     Plan;  Cr stable  Nonoliguric   Give po bicarb pills  Give iv lasix  Give 2g calcium gluconate today  Will benefit from echo  Avoid nephrotoxins                         Electronically signed by Tomer Davidson DO on 6/11/2021 at 7:32 AM    [address],  [facility]  [place], [place] [address]  PHONE: [phone]  FAX: [phone]

## 2021-06-12 NOTE — PROGRESS NOTES
Hospitalist Progress Note      Name:  Alvin Irizarry /Age/Sex: 1965  (50 y.o. female)   MRN & CSN:  [id number] & [id number] Admission Date/Time: 2021  5:45 PM   [facility] Damian Gomez 112 Day: 4    Assessment and Plan:   Alvin Irizarry is a 50 y.o.  female  who presents with <principal problem not specified>    1. ACUTE KIDNEY INJURY  -  some improvement. Will continue current treatment plan and monitor daily renal function. dispo will be based on nephrology recommendations    2. HYDRONEPHROSIS  -S/P cysto with ureteral stent changes, stable. Defer to urology     3. HYPERKALEMIA  -resolved     4. HTN  -BP remains appropriate, follow     5. METASTATIC BREAST CANCER  - will follow up after DC    Diet ADULT DIET; Regular; Low Sodium (2 gm); Low Potassium (Less than 3000 mg/day); 1800 ml   DVT Prophylaxis [] Lovenox, []  Heparin, [] SCDs, [] Ambulation   GI Prophylaxis [] PPI,  [] H2 Blocker,  [] Carafate,  [] Diet/Tube Feeds   Code Status Full Code   Disposition Patient requires continued admission due to ELIDIA   MDM [] Low, [] Moderate,[]  High  Patient's risk as above due to ELIDIA     History of Present Illness:     Chief Complaint: <principal problem not specified>  Alvin Irizarry is a 50 y.o.  female  who presents with ELIDIA and hydronephrosis    No reported issues overnight. Patient's BP did improve. Currently she is lying in bed, feeling well , appetite good she has not had any dizziness or lightheadedness       Ten point ROS reviewed negative, unless as noted above    Objective: Intake/Output Summary (Last 24 hours) at 2021 1546  Last data filed at 2021 0647  Gross per 24 hour   Intake 240 ml   Output 2200 ml   Net -1960 ml      Vitals:   Vitals:    21 1416   BP: 109/68   Pulse: 102   Resp: 20   Temp: 98.4 °F (36.9 °C)   SpO2: 97%     Physical Exam:   GEN Awake female, sitting upright in bed in no apparent distress.  Appears given age.  Esmer Tsang are equally round. No scleral erythema, discharge, or conjunctivitis. HENT Mucous membranes are moist. Oral pharynx without exudates, no evidence of thrush. NECK Supple, no apparent thyromegaly or masses. RESP Clear to auscultation, no wheezes, rales or rhonchi. Symmetric chest movement while on room air. CARDIO/VASC S1/S2 auscultated. Regular rate without appreciable murmurs, rubs, or gallops. No JVD or carotid bruits. Peripheral pulses equal bilaterally and palpable. No peripheral edema. GI Abdomen is soft without significant tenderness, masses, or guarding. Bowel sounds are normoactive. Rectal exam deferred. HEME/LYMPH No palpable cervical lymphadenopathy and no hepatosplenomegaly. No petechiae or ecchymoses. MSK No gross joint deformities. SKIN Normal coloration, warm, dry. NEURO Cranial nerves appear grossly intact, normal speech, no lateralizing weakness. PSYCH Awake, alert, oriented x 4. Affect appropriate.     Medications:   Medications:    furosemide  40 mg Intravenous BID    calcitRIOL  0.5 mcg Oral BID    sodium bicarbonate  1,300 mg Oral TID    sodium chloride flush  5-40 mL Intravenous 2 times per day    heparin (porcine)  5,000 Units Subcutaneous 3 times per day    metoprolol succinate  100 mg Oral Daily    pantoprazole  40 mg Oral QAM AC    cefTRIAXone (ROCEPHIN) IV  1,000 mg Intravenous Daily      Infusions:    sodium chloride      dextrose       PRN Meds: sodium chloride flush, 5-40 mL, PRN  sodium chloride, 25 mL, PRN  ondansetron, 4 mg, Q8H PRN   Or  ondansetron, 4 mg, Q6H PRN  polyethylene glycol, 17 g, Daily PRN  acetaminophen, 650 mg, Q6H PRN   Or  acetaminophen, 650 mg, Q6H PRN  HYDROcodone-acetaminophen, 1 tablet, Q6H PRN  glucose, 15 g, PRN  dextrose, 12.5 g, PRN  glucagon (rDNA), 1 mg, PRN  dextrose, 100 mL/hr, PRN

## 2021-06-12 NOTE — PLAN OF CARE
Problem: Falls - Risk of:  Goal: Will remain free from falls  Description: Will remain free from falls  Outcome: Ongoing  Goal: Absence of physical injury  Description: Absence of physical injury  Outcome: Ongoing     Problem: Nutrition  Goal: Optimal nutrition therapy  Outcome: Ongoing     Problem: Cardiac:  Goal: Ability to maintain vital signs within normal range will improve  Description: Ability to maintain vital signs within normal range will improve  Outcome: Ongoing  Goal: Cardiovascular alteration will improve  Description: Cardiovascular alteration will improve  Outcome: Ongoing     Problem: Health Behavior:  Goal: Will modify at least one risk factor affecting health status  Description: Will modify at least one risk factor affecting health status  Outcome: Ongoing  Goal: Identification of resources available to assist in meeting health care needs will improve  Description: Identification of resources available to assist in meeting health care needs will improve  Outcome: Ongoing     Problem: Physical Regulation:  Goal: Complications related to the disease process, condition or treatment will be avoided or minimized  Description: Complications related to the disease process, condition or treatment will be avoided or minimized  Outcome: Ongoing

## 2021-06-12 NOTE — PROGRESS NOTES
04/08/2018   SpO2 98%   BMI 27.70 kg/m²   General appearance: alert and cooperative with exam, in no acute distress  HEENT: normocephalic, atraumatic,   Neck: supple, trachea midline  Lungs:  breathing comfortably on room air  Heart[de-identified] regular rate and rhythm,   Abdomen: soft, non-tender; non distended, +bowel sounds  Extremities: 2+ pitting ble edema  Neurologic: alert, oriented, follows commands, interactive    Assessment and Plan:     Patient Active Problem List     Diagnosis Date Noted    Acute renal failure ([address] 06/10/2021    COVID-19 05/12/2021    Acute and chronic respiratory failure (acute-on-chronic) ([address] 05/10/2021    Tachycardia      ELIDIA (acute kidney injury) ([address] 03/22/2021    Hyperkalemia      Metabolic acidosis      Hyponatremia      Left ovarian enlargement 01/26/2021    Iron deficiency anemia 01/07/2021    Malabsorption 01/07/2021    Dehydration 11/20/2020    Malignant neoplasm of central portion of left female breast ([address] 07/31/2020    Metastatic breast cancer ([address] 07/31/2020    Secondary cancer of bone ([address] 04/01/2020    Essential hypertension 04/01/2020    Left breast mass        -ELIDIA: prerenal vs obstruction//cr trending up//CT A/P shows bilateral HN ,s/p bl/ ureteral stent exchange on 6/11/21  -Hyperkalemia: resolved  -Metabolic acidosis: better  -B/L HN; had ureteral stents placed in march  -BLE edema  -metastatic breast cancer and on chemo  -Hypocalcemia from bicarb gtt     Plan;  Cr 5.7, s/p b/l ureteral stents exchange on 6/11  Continue oral bicarb  Continue iv lasix  Avoid nephrotoxins  Obtain albumin level for corrected calcium level                             Electronically signed by Jose Sandy DO on 6/12/2021 at 9:10 AM    [address],  [address] [place] [address]  PHONE: [phone]  FAX: [phone]

## 2021-06-12 NOTE — PROGRESS NOTES
[facility] [address]   Progress Note  [facility] 0 1 2      Date: 2021   Patient: Olivier Degroot   : 1965   DOA: 2021   MRN: [id number]   ROOM#: 3130/3130-A     Admit Date: 2021     Collaborating Urologist on Call at time of admission: Dr. Alli Rizzo  CC: Abnormal labs  Reason for Consult: Bilateral hydronephrosis  POD #1: Cystoscopy, bilateral RGPG, bilateral ureteral stent exchange    Subjective:     Pain: none, no nausea and no vomiting,   Bowel Movement/Flatus:   Yes  Voiding:  easily    Pt resting in bed, denies any pain.     Objective:    Vitals:    BP 83/60   Pulse 98   Temp 98.2 °F (36.8 °C) (Oral)   Resp 18   Ht 5' 1\" (1.549 m)   Wt 146 lb 9.6 oz (66.5 kg)   LMP 2018   SpO2 98%   BMI 27.70 kg/m²    Temp  Av.1 °F (36.7 °C)  Min: 97.4 °F (36.3 °C)  Max: 98.7 °F (37.1 °C)       Intake/Output Summary (Last 24 hours) at 2021 0840  Last data filed at 2021 1433  Gross per 24 hour   Intake --   Output 2700 ml   Net -2700 ml       Physical Exam:   General appearance: alert, appears stated age, cooperative and no distress  Head: Normocephalic, without obvious abnormality, atraumatic  Back: No CVA tenderness  Abdomen: Soft, non-tender, non-distended    Labs:   WBC:    Lab Results   Component Value Date    WBC 5.8 2021      Hemoglobin/Hematocrit:    Lab Results   Component Value Date    HGB 8.4 2021    HCT 26.6 2021      BMP:   Lab Results   Component Value Date     2021    K 4.8 2021     2021    CO2 20 2021    BUN 71 2021    LABALBU 2.9 2021    CREATININE 5.7 2021    CALCIUM 6.6 2021    GFRAA 9 2021    LABGLOM 8 2021      PT/INR:    Lab Results   Component Value Date    PROTIME 12.3 06/10/2021    INR 1.02 06/10/2021      PTT:    Lab Results   Component Value Date    APTT 32.6 2021     Urine Culture: pending    Imaging:  CT ABDOMEN PELVIS WO CONTRAST Additional Contrast? None    Result Date: 6/9/2021  EXAMINATION: CT OF THE ABDOMEN AND PELVIS WITHOUT CONTRAST 6/9/2021 6:57 pm TECHNIQUE: CT of the abdomen and pelvis was performed without the administration of intravenous contrast. Multiplanar reformatted images are provided for review. Dose modulation, iterative reconstruction, and/or weight based adjustment of the mA/kV was utilized to reduce the radiation dose to as low as reasonably achievable. COMPARISON: May 10, 2021 HISTORY: ORDERING SYSTEM PROVIDED HISTORY: worsened Cr acutely with history of bilateral ureter stents and breast CA, eval for stent blockage/hydronephrosis TECHNOLOGIST PROVIDED HISTORY: Additional Contrast?->None Reason for exam:->worsened Cr acutely with history of bilateral ureter stents and breast CA, eval for stent blockage/hydronephrosis Decision Support Exception - unselect if not a suspected or confirmed emergency medical condition->Emergency Medical Condition (MA) Is the patient pregnant?->No Reason for Exam: worsened Cr acutely with history of bilateral ureter stents and breast CA, eval for stent blockage/hydronephrosis Acuity: Acute Type of Exam: Initial FINDINGS: Lower Chest: Large left pleural effusion with adjacent compressive atelectasis nearly identical to the comparison. Organs: There is bilateral hydronephrosis, unchanged, moderate left-sided hydronephrosis and large amount of right-sided hydronephrosis. Bilateral ureteral stents are in good position, unchanged. No new acute noncontrast findings of the organs elsewhere. GI/Bowel: No bowel obstruction or other non contrast acute process demonstrated. No evidence of colitis, diverticulitis or appendicitis. Pelvis: Wall thickening of the bladder again noted, unchanged along the posterior and left greater than right lateral walls. Bladder mostly collapsed. Peritoneum/Retroperitoneum: No free air, ascites or abscess.  Bones/Soft Tissues: Extensive osseous metastatic disease changes again noted, no significant change in the appearance. Diffuse body wall edema is present, very similar     Unchanged moderate-large amount of bilateral hydronephrosis greater on the right. Ureteral stents in good position. Overall no significant change in appearance of the abdomen/pelvis from the comparison. FL LESS THAN 1 HOUR    Result Date: 6/11/2021  Radiology exam is complete. No Radiologist dictation. Please follow up with ordering provider. Assessment & Plan:      Tyrone Vasquez is a 50y.o. year old female admitted 6/9/2021 for acute renal failure. H/o metastatic breast cancer, currently undergoing palliative chemotherapy with hem/onc. Known to Dr. Júnior Keane.     1) Bilateral Hydronephrosis: likely secondary to bilateral ureteral obstruction due to metastatic breast cancer              POD #1: Cystoscopy, bilateral RGPG, bilateral ureteral stent exchange   S/p cystoscopy, bilateral ureteral stent placement on 3/25/21               CT a/p 6/9/21: Unchanged moderate-large amount of bilateral hydronephrosis greater on the right.  Ureteral stents in good position.              Urine cx negative; on IV Rocephin              Recommend bilateral ureteral stent exchange in 3 months  2) ELIDIA              Cr 5.7, improving; overall increased from 1.3 2/2021.              Nephrology following    Patient seen and examined, chart reviewed.      Electronically signed by Uche Mccracken PA-C on 6/12/2021 at 8:40 AM

## 2021-06-13 NOTE — PROGRESS NOTES
[facility] [address]   Progress Note  [facility] 0 1 2      Date: 2021   Patient: Kerrie Oneal   : 1965   DOA: 2021   MRN: [id number]   ROOM#: [id number]/3130-A     Admit Date: 2021     Collaborating Urologist on Call at time of admission: Dr. Tod Monterroso  CC: Abnormal labs  Reason for Consult: Bilateral hydronephrosis  POD #2: Cystoscopy, bilateral RGPG, bilateral ureteral stent exchange    Subjective:     Pain: none, no nausea and no vomiting,   Bowel Movement/Flatus:   Yes  Voiding:  easily    Pt resting in bed, denies any pain.     Objective:    Vitals:    /77   Pulse 104   Temp 98.5 °F (36.9 °C) (Oral)   Resp 20   Ht 5' 1\" (1.549 m)   Wt 141 lb 9.6 oz (64.2 kg)   LMP 2018   SpO2 96%   BMI 26.76 kg/m²    Temp  Av.3 °F (36.8 °C)  Min: 97.8 °F (36.6 °C)  Max: 98.6 °F (37 °C)       Intake/Output Summary (Last 24 hours) at 2021 1319  Last data filed at 2021 0515  Gross per 24 hour   Intake 360 ml   Output 3475 ml   Net -3115 ml       Physical Exam:   General appearance: alert, appears stated age, cooperative and no distress  Head: Normocephalic, without obvious abnormality, atraumatic  Back: No CVA tenderness  Abdomen: Soft, non-tender, non-distended    Labs:   WBC:    Lab Results   Component Value Date    WBC 5.8 2021      Hemoglobin/Hematocrit:    Lab Results   Component Value Date    HGB 8.4 2021    HCT 26.6 2021      BMP:   Lab Results   Component Value Date     2021    K 4.3 2021    CL 98 2021    CO2 20 2021    BUN 68 2021    LABALBU 2.6 2021    CREATININE 5.2 2021    CALCIUM 6.3 2021    GFRAA 10 2021    LABGLOM 9 2021      PT/INR:    Lab Results   Component Value Date    PROTIME 12.3 06/10/2021    INR 1.02 06/10/2021      PTT:    Lab Results   Component Value Date    APTT 32.6 2021     Urine Culture: No growth at 18 to 36 hours     Imaging:  CT ABDOMEN PELVIS WO CONTRAST Additional Contrast? None    Result Date: 6/9/2021  EXAMINATION: CT OF THE ABDOMEN AND PELVIS WITHOUT CONTRAST 6/9/2021 6:57 pm TECHNIQUE: CT of the abdomen and pelvis was performed without the administration of intravenous contrast. Multiplanar reformatted images are provided for review. Dose modulation, iterative reconstruction, and/or weight based adjustment of the mA/kV was utilized to reduce the radiation dose to as low as reasonably achievable. COMPARISON: May 10, 2021 HISTORY: ORDERING SYSTEM PROVIDED HISTORY: worsened Cr acutely with history of bilateral ureter stents and breast CA, eval for stent blockage/hydronephrosis TECHNOLOGIST PROVIDED HISTORY: Additional Contrast?->None Reason for exam:->worsened Cr acutely with history of bilateral ureter stents and breast CA, eval for stent blockage/hydronephrosis Decision Support Exception - unselect if not a suspected or confirmed emergency medical condition->Emergency Medical Condition (MA) Is the patient pregnant?->No Reason for Exam: worsened Cr acutely with history of bilateral ureter stents and breast CA, eval for stent blockage/hydronephrosis Acuity: Acute Type of Exam: Initial FINDINGS: Lower Chest: Large left pleural effusion with adjacent compressive atelectasis nearly identical to the comparison. Organs: There is bilateral hydronephrosis, unchanged, moderate left-sided hydronephrosis and large amount of right-sided hydronephrosis. Bilateral ureteral stents are in good position, unchanged. No new acute noncontrast findings of the organs elsewhere. GI/Bowel: No bowel obstruction or other non contrast acute process demonstrated. No evidence of colitis, diverticulitis or appendicitis. Pelvis: Wall thickening of the bladder again noted, unchanged along the posterior and left greater than right lateral walls. Bladder mostly collapsed. Peritoneum/Retroperitoneum: No free air, ascites or abscess.  Bones/Soft Tissues: Extensive osseous metastatic disease changes again noted, no significant change in the appearance. Diffuse body wall edema is present, very similar     Unchanged moderate-large amount of bilateral hydronephrosis greater on the right. Ureteral stents in good position. Overall no significant change in appearance of the abdomen/pelvis from the comparison. FL LESS THAN 1 HOUR    Result Date: 6/11/2021  Radiology exam is complete. No Radiologist dictation. Please follow up with ordering provider. Assessment & Plan:      Olivier Degroot is a 50y.o. year old female admitted 6/9/2021 for acute renal failure. H/o metastatic breast cancer, currently undergoing palliative chemotherapy with hem/onc. Known to Dr. Xiomy Luna.     1) Bilateral Hydronephrosis: likely secondary to bilateral ureteral obstruction due to metastatic breast cancer              POD #2: Cystoscopy, bilateral RGPG, bilateral ureteral stent exchange   S/p cystoscopy, bilateral ureteral stent placement on 3/25/21               CT a/p 6/9/21: Unchanged moderate-large amount of bilateral hydronephrosis greater on the right.  Ureteral stents in good position.              Urine cx negative; on IV Rocephin              Recommend bilateral ureteral stent exchange in 3 months  2) ELIDIA              Cr 5.2, improving; overall increased from 1.3 2/2021.              Nephrology following    Patient seen and examined, chart reviewed.      Electronically signed by Emily Eckert PA-C on 6/13/2021 at 1:19 PM

## 2021-06-13 NOTE — PROGRESS NOTES
Nephrology Progress Note        [address], [address] [address]  Phone: [phone]  Office Hours: 8:30AM - 4:30PM  Monday - Friday 6/13/2021 8:18 AM  Subjective:   Admit Date: 6/9/2021  PCP: CINDY Ray - DEBBY  Interval History: doing well on room air  Good UOP    Diet: ADULT DIET; Regular; Low Sodium (2 gm); Low Potassium (Less than 3000 mg/day); 1800 ml      Data:   Scheduled Meds:   calcium carbonate  1,000 mg Oral TID    furosemide  40 mg Intravenous BID    calcitRIOL  0.5 mcg Oral BID    sodium bicarbonate  1,300 mg Oral TID    sodium chloride flush  5-40 mL Intravenous 2 times per day    heparin (porcine)  5,000 Units Subcutaneous 3 times per day    metoprolol succinate  100 mg Oral Daily    pantoprazole  40 mg Oral QAM AC    cefTRIAXone (ROCEPHIN) IV  1,000 mg Intravenous Daily     Continuous Infusions:   sodium chloride      dextrose       PRN Meds:sodium chloride flush, sodium chloride, ondansetron **OR** ondansetron, polyethylene glycol, acetaminophen **OR** acetaminophen, HYDROcodone-acetaminophen, glucose, dextrose, glucagon (rDNA), dextrose  I/O last 3 completed shifts: In: 56 [P.O.:840; IV Piggyback:50]  Out: 8587 [Urine:3475]  No intake/output data recorded.     Intake/Output Summary (Last 24 hours) at 6/13/2021 0818  Last data filed at 6/13/2021 0515  Gross per 24 hour   Intake 650 ml   Output 3475 ml   Net -2825 ml       CBC:   Recent Labs     06/10/21  0917 06/11/21  0406   WBC 6.7 5.8   HGB 9.9* 8.4*   * 396       BMP:    Recent Labs     06/11/21  0406 06/12/21  0612 06/13/21  0621    138 137   K 5.1 4.8 4.3    100 98*   CO2 17* 20* 20*   BUN 70* 71* 68*   CREATININE 5.9* 5.7* 5.2*   GLUCOSE 113* 77 96         Objective:   Vitals: /77   Pulse 104   Temp 98.5 °F (36.9 °C) (Oral)   Resp 20   Ht 5' 1\" (1.549 m)   Wt 141 lb 9.6 oz (64.2 kg)   LMP 04/08/2018   SpO2 96%   BMI 26.76 kg/m²   General appearance: alert and cooperative with exam, in no acute distress  HEENT: normocephalic, atraumatic,   Neck: supple, trachea midline  Lungs: clear to auscultation bilaterally, breathing comfortably on room air  Heart[de-identified] regular rate and rhythm, S1, S2 normal,  Abdomen: soft, non-tender; non distended, +bowel sounds  Extremities: ble edema is better  Neurologic: alert, oriented, follows commands, interactive    Assessment and Plan:     Patient Active Problem List     Diagnosis Date Noted    Acute renal failure ([address] 06/10/2021    COVID-19 05/12/2021    Acute and chronic respiratory failure (acute-on-chronic) ([address] 05/10/2021    Tachycardia      ELIDIA (acute kidney injury) ([address] 03/22/2021    Hyperkalemia      Metabolic acidosis      Hyponatremia      Left ovarian enlargement 01/26/2021    Iron deficiency anemia 01/07/2021    Malabsorption 01/07/2021    Dehydration 11/20/2020    Malignant neoplasm of central portion of left female breast ([address] 07/31/2020    Metastatic breast cancer ([address] 07/31/2020    Secondary cancer of bone ([address] 04/01/2020    Essential hypertension 04/01/2020    Left breast mass        -ELIDIA: prerenal vs obstruction//cr trending up//CT A/P shows bilateral HN ,s/p bl/ ureteral stent exchange on 6/11/21  -Hyperkalemia: resolved  -Metabolic acidosis: better  -B/L HN; had ureteral stents placed in march and exchanged this admission  -BLE edema  -metastatic breast cancer and on chemo  -Hypocalcemia      Plan;  Cr  5.3, improving since stent exchange  Give calcitriol and tums for the hypocalcemia  Continue lasix at 40mg iv bid  Please do not dc yet, cr still too high, will let you know   Avoid nephrotoxins    Thank you                            Electronically signed by Kalen Pool DO on 6/13/2021 at 8:18 AM    [address], MD C[place] Spatz, DO [address], [address] [place], [place] [address]  PHONE: [phone]  FAX: [phone]

## 2021-06-13 NOTE — PROGRESS NOTES
Hospitalist Progress Note      Name:  Narinder Davis /Age/Sex: 1965  (50 y.o. female)   MRN & CSN:  [id number] & [id number] Admission Date/Time: 2021  5:45 PM   [facility] PCP: Sri Patiño, [address] Day: 5    Assessment and Plan:   Narinder Davis is a 50 y.o.  female  who presents with <principal problem not specified>    1. ACUTE KIDNEY INJURY  -  Continues to improve. Will monitor. Will defer to nephrology as far as when patient will be appropriate for discharge     2. HYDRONEPHROSIS  -S/P cysto with ureteral stent changes, stable. Defer to urology     3. HYPERKALEMIA  -resolved     4. HTN  -BP remains appropriate, follow     5. METASTATIC BREAST CANCER  -no current acute issues. Mets to bone    Diet ADULT DIET; Regular; Low Sodium (2 gm); Low Potassium (Less than 3000 mg/day); 1800 ml   DVT Prophylaxis [] Lovenox, []  Heparin, [] SCDs, [] Ambulation   GI Prophylaxis [] PPI,  [] H2 Blocker,  [] Carafate,  [] Diet/Tube Feeds   Code Status Full Code   Disposition Patient requires continued admission due to ELIDIA   MDM [] Low, [] Moderate,[]  High  Patient's risk as above due to ELIDIA     History of Present Illness:     Chief Complaint: <principal problem not specified>  Narinder Davis is a 50 y.o.  female  who presents with ELIDIA    No issues overnight. Appetite is good and she is feeling well. No pain or SOB       Ten point ROS reviewed negative, unless as noted above    Objective: Intake/Output Summary (Last 24 hours) at 2021 1346  Last data filed at 2021 0515  Gross per 24 hour   Intake 360 ml   Output 3475 ml   Net -3115 ml      Vitals:   Vitals:    21 1126   BP:    Pulse:    Resp:    Temp:    SpO2: 96%     Physical Exam:   GEN Awake female, sitting upright in bed in no apparent distress. Appears given age. EYES Pupils are equally round. No scleral erythema, discharge, or conjunctivitis.   HENT Mucous membranes are moist. Oral pharynx without exudates, no evidence of thrush. NECK Supple, no apparent thyromegaly or masses. RESP Clear to auscultation, no wheezes, rales or rhonchi. Symmetric chest movement while on room air. CARDIO/VASC S1/S2 auscultated. Regular rate without appreciable murmurs, rubs, or gallops. No JVD or carotid bruits. Peripheral pulses equal bilaterally and palpable. No peripheral edema. GI Abdomen is soft without significant tenderness, masses, or guarding. Bowel sounds are normoactive. Rectal exam deferred. HEME/LYMPH No palpable cervical lymphadenopathy and no hepatosplenomegaly. No petechiae or ecchymoses. MSK No gross joint deformities. SKIN Normal coloration, warm, dry. NEURO Cranial nerves appear grossly intact, normal speech, no lateralizing weakness. PSYCH Awake, alert, oriented x 4. Affect appropriate.     Medications:   Medications:    calcium carbonate  1,000 mg Oral TID    furosemide  40 mg Intravenous BID    calcitRIOL  0.5 mcg Oral BID    sodium bicarbonate  1,300 mg Oral TID    sodium chloride flush  5-40 mL Intravenous 2 times per day    heparin (porcine)  5,000 Units Subcutaneous 3 times per day    metoprolol succinate  100 mg Oral Daily    pantoprazole  40 mg Oral QAM AC    cefTRIAXone (ROCEPHIN) IV  1,000 mg Intravenous Daily      Infusions:    sodium chloride      dextrose       PRN Meds: sodium chloride flush, 5-40 mL, PRN  sodium chloride, 25 mL, PRN  ondansetron, 4 mg, Q8H PRN   Or  ondansetron, 4 mg, Q6H PRN  polyethylene glycol, 17 g, Daily PRN  acetaminophen, 650 mg, Q6H PRN   Or  acetaminophen, 650 mg, Q6H PRN  HYDROcodone-acetaminophen, 1 tablet, Q6H PRN  glucose, 15 g, PRN  dextrose, 12.5 g, PRN  glucagon (rDNA), 1 mg, PRN  dextrose, 100 mL/hr, PRN

## 2021-06-14 NOTE — DISCHARGE INSTR - DIET
Good nutrition is important when healing from an illness, injury, or surgery. Follow any nutrition recommendations given to you during your hospital stay. If you were given an oral nutrition supplement while in the hospital, continue to take this supplement at home. You can take it with meals, in-between meals, and/or before bedtime. These supplements can be purchased at most local grocery stores, pharmacies, and chain [employer]-stores. If you have any questions about your diet or nutrition, call the hospital and ask for the dietitian. Regular diet, sodium restricted 2000 mg per day.  Potassium restricted 3000 mg per day

## 2021-06-14 NOTE — DISCHARGE SUMMARY
No JVD or carotid bruits. Peripheral pulses equal bilaterally and palpable. No peripheral edema. GI Abdomen is soft without significant tenderness, masses, or guarding. Bowel sounds are normoactive. Rectal exam deferred. HEME/LYMPH No palpable cervical lymphadenopathy and no hepatosplenomegaly. No petechiae or ecchymoses. MSK No gross joint deformities. SKIN Normal coloration, warm, dry. NEURO Cranial nerves appear grossly intact, normal speech, no lateralizing weakness. PSYCH Awake, alert, oriented x 4. Affect appropriate. BMP/CBC  Recent Labs     06/12/21  0612 06/13/21  0621 06/14/21  0401    137 141   K 4.8 4.3 4.4    98* 101   CO2 20* 20* 21   BUN 71* 68* 65*   CREATININE 5.7* 5.2* 4.7*       IMAGING:  CT OF THE ABDOMEN AND PELVIS WITHOUT CONTRAST 6/9/2021 6:57 pm       TECHNIQUE:   CT of the abdomen and pelvis was performed without the administration of   intravenous contrast. Multiplanar reformatted images are provided for review.    Dose modulation, iterative reconstruction, and/or weight based adjustment of   the mA/kV was utilized to reduce the radiation dose to as low as reasonably   achievable.       COMPARISON:   May 10, 2021       HISTORY:   ORDERING SYSTEM PROVIDED HISTORY: worsened Cr acutely with history of   bilateral ureter stents and breast CA, eval for stent blockage/hydronephrosis   TECHNOLOGIST PROVIDED HISTORY:   Additional Contrast?->None   Reason for exam:->worsened Cr acutely with history of bilateral ureter stents   and breast CA, eval for stent blockage/hydronephrosis   Decision Support Exception - unselect if not a suspected or confirmed   emergency medical condition->Emergency Medical Condition (MA)   Is the patient pregnant?->No   Reason for Exam: worsened Cr acutely with history of bilateral ureter stents   and breast CA, eval for stent blockage/hydronephrosis   Acuity: Acute   Type of Exam: Initial       FINDINGS:   Lower Chest: Large left pleural effusion with adjacent compressive   atelectasis nearly identical to the comparison.       Organs: There is bilateral hydronephrosis, unchanged, moderate left-sided   hydronephrosis and large amount of right-sided hydronephrosis.  Bilateral   ureteral stents are in good position, unchanged.  No new acute noncontrast   findings of the organs elsewhere.       GI/Bowel: No bowel obstruction or other non contrast acute process   demonstrated.  No evidence of colitis, diverticulitis or appendicitis.       Pelvis: Wall thickening of the bladder again noted, unchanged along the   posterior and left greater than right lateral walls.  Bladder mostly   collapsed.       Peritoneum/Retroperitoneum: No free air, ascites or abscess.       Bones/Soft Tissues: Extensive osseous metastatic disease changes again noted,   no significant change in the appearance.  Diffuse body wall edema is present,   very similar           Impression   Unchanged moderate-large amount of bilateral hydronephrosis greater on the   right.  Ureteral stents in good position.  Overall no significant change in   appearance of the abdomen/pelvis from the comparison.          Discharge Time of 35 minutes    Electronically signed by Mechelle Land MD on 6/14/2021 at 3:42 PM Yes

## 2021-06-14 NOTE — CARE COORDINATION
Pt on discharge. Discharge plan remains to return home with [facility]. CM called and updated [facility] [place].   Faxed Pt information and new HC order to [phone]

## 2021-06-14 NOTE — PROGRESS NOTES
cooperative with exam, in no acute distress  HEENT: normocephalic, atraumatic,   Neck: supple, trachea midline  Lungs: breathing comfortably on room air  Heart[de-identified] regular rate and rhythm  Extremities: ble edema much better  Neurologic: alert, oriented, follows commands, interactive    Assessment and Plan:     Patient Active Problem List     Diagnosis Date Noted    Acute renal failure ([facility] 06/10/2021    COVID-19 05/12/2021    Acute and chronic respiratory failure (acute-on-chronic) ([address] 05/10/2021    Tachycardia      ELIDIA (acute kidney injury) ([facility] 03/22/2021    Hyperkalemia      Metabolic acidosis      Hyponatremia      Left ovarian enlargement 01/26/2021    Iron deficiency anemia 01/07/2021    Malabsorption 01/07/2021    Dehydration 11/20/2020    Malignant neoplasm of central portion of left female breast ([facility] [address] [facility] 07/31/2020    Metastatic breast cancer ([facility] 07/31/2020    Secondary cancer of bone ([facility] [address] 04/01/2020    Essential hypertension 04/01/2020    Left breast mass        -ELIDIA from ATN due to obstruction////CT A/P shows bilateral HN ,s/p bl/ ureteral stent exchange on 6/11/21///improving  -Hyperkalemia: resolved  -Metabolic acidosis: better  -B/L HN; had ureteral stents placed in march and exchanged this admission  -BLE edema  -metastatic breast cancer and on chemo  -Hypocalcemia      Plan;  -Cr better at 4.7, continues to trend down so ok to dc today then  -Dc recs: sodium bicarb 650mg po daily, lasix 40mg po daily for 7 days only, continue metoprolol, dc amlodipine since BP has been at goal without it, she is to continue her home calcium-vitamin d pills but take it bid, do not dc on calcitriol  -the office will call her for outpt fu    Thank you                             Electronically signed by Jaron Ojeda DO on 6/14/2021 at 7:52 AM    [address] MD  [address] , [address],  Sharan [facility]HU [facility], [facility] [address]  PHONE: [phone]  FAX: [phone]

## 2021-06-14 NOTE — PROGRESS NOTES
[facility], [address]   Progress Note  [facility] 0 1 2      Date: 2021   Patient: Myles Andersen   : 1965   DOA: 2021   MRN: [id number]   ROOM#: [id number]     Admit Date: 2021     Collaborating Urologist on Call at time of admission: Dr. Millie Brown  CC: Abnormal labs  Reason for Consult: Bilateral hydronephrosis  POD #3: Cystoscopy, bilateral RGPG, bilateral ureteral stent exchange    Subjective:     Pain: none, no nausea and no vomiting,   Bowel Movement/Flatus:   Yes  Voiding:  easily    Pt resting in bed, denies any pain.     Objective:    Vitals:    /72   Pulse 86   Temp 98.3 °F (36.8 °C) (Oral)   Resp 19   Ht 5' 1\" (1.549 m)   Wt 132 lb 8 oz (60.1 kg)   LMP 2018   SpO2 97%   BMI 25.04 kg/m²    Temp  Av.4 °F (36.9 °C)  Min: 98.2 °F (36.8 °C)  Max: 98.6 °F (37 °C)       Intake/Output Summary (Last 24 hours) at 2021 0825  Last data filed at 2021 2244  Gross per 24 hour   Intake 870 ml   Output 2850 ml   Net -1980 ml       Physical Exam:   General appearance: alert, appears stated age, cooperative and no distress  Head: Normocephalic, without obvious abnormality, atraumatic  Back: No CVA tenderness  Abdomen: Soft, non-tender, non-distended    Labs:   WBC:    Lab Results   Component Value Date    WBC 5.8 2021      Hemoglobin/Hematocrit:    Lab Results   Component Value Date    HGB 8.4 2021    HCT 26.6 2021      BMP:   Lab Results   Component Value Date     2021    K 4.4 2021     2021    CO2 21 2021    BUN 65 2021    LABALBU 2.6 2021    CREATININE 4.7 2021    CALCIUM 6.9 2021    GFRAA 12 2021    LABGLOM 10 2021      PT/INR:    Lab Results   Component Value Date    PROTIME 12.3 06/10/2021    INR 1.02 06/10/2021      PTT:    Lab Results   Component Value Date    APTT 32.6 2021     Urine Culture: No growth at 18 to 36 hours     Imaging:  CT ABDOMEN PELVIS WO CONTRAST Additional Contrast? None    Result Date: 6/9/2021  EXAMINATION: CT OF THE ABDOMEN AND PELVIS WITHOUT CONTRAST 6/9/2021 6:57 pm TECHNIQUE: CT of the abdomen and pelvis was performed without the administration of intravenous contrast. Multiplanar reformatted images are provided for review. Dose modulation, iterative reconstruction, and/or weight based adjustment of the mA/kV was utilized to reduce the radiation dose to as low as reasonably achievable. COMPARISON: May 10, 2021 HISTORY: ORDERING SYSTEM PROVIDED HISTORY: worsened Cr acutely with history of bilateral ureter stents and breast CA, eval for stent blockage/hydronephrosis TECHNOLOGIST PROVIDED HISTORY: Additional Contrast?->None Reason for exam:->worsened Cr acutely with history of bilateral ureter stents and breast CA, eval for stent blockage/hydronephrosis Decision Support Exception - unselect if not a suspected or confirmed emergency medical condition->Emergency Medical Condition (MA) Is the patient pregnant?->No Reason for Exam: worsened Cr acutely with history of bilateral ureter stents and breast CA, eval for stent blockage/hydronephrosis Acuity: Acute Type of Exam: Initial FINDINGS: Lower Chest: Large left pleural effusion with adjacent compressive atelectasis nearly identical to the comparison. Organs: There is bilateral hydronephrosis, unchanged, moderate left-sided hydronephrosis and large amount of right-sided hydronephrosis. Bilateral ureteral stents are in good position, unchanged. No new acute noncontrast findings of the organs elsewhere. GI/Bowel: No bowel obstruction or other non contrast acute process demonstrated. No evidence of colitis, diverticulitis or appendicitis. Pelvis: Wall thickening of the bladder again noted, unchanged along the posterior and left greater than right lateral walls. Bladder mostly collapsed. Peritoneum/Retroperitoneum: No free air, ascites or abscess.  Bones/Soft Tissues: Extensive osseous metastatic disease changes again noted, no significant change in the appearance. Diffuse body wall edema is present, very similar     Unchanged moderate-large amount of bilateral hydronephrosis greater on the right. Ureteral stents in good position. Overall no significant change in appearance of the abdomen/pelvis from the comparison. FL LESS THAN 1 HOUR    Result Date: 6/11/2021  Radiology exam is complete. No Radiologist dictation. Please follow up with ordering provider. Assessment & Plan:      Elodia Marcelo is a 50y.o. year old female admitted 6/9/2021 for acute renal failure. H/o metastatic breast cancer, currently undergoing palliative chemotherapy with hem/onc. Known to Dr. Travis Morfin.     1) Bilateral Hydronephrosis: likely secondary to bilateral ureteral obstruction due to metastatic breast cancer              POD #3: Cystoscopy, bilateral RGPG, bilateral ureteral stent exchange   S/p cystoscopy, bilateral ureteral stent placement on 3/25/21               CT a/p 6/9/21: Unchanged moderate-large amount of bilateral hydronephrosis greater on the right. Ureteral stents in good position.              Urine cx negative; on IV Rocephin; OK to discontinue abx              Recommend bilateral ureteral stent exchange in 3 months  2) ELIDIA              Cr 4.7, improving; overall increased from 1.3 2/2021.              Nephrology following    Pt stable from a  standpoint. Will sign off, please call with any questions. Pt to follow up in our office with Dr. Travis Morfin in 1 month. Patient seen and examined, chart reviewed.      Electronically signed by Isaac Navarro PA-C on 6/14/2021 at 8:25 AM

## 2021-06-15 NOTE — CARE COORDINATION
[address] [facility] Transitions Initial Follow Up Call    Call within 2 business days of discharge: Yes    Patient: Manuela Rodriguez Patient : 1965   MRN: [id number]  Reason for Admission: ELIDIA-ATN, Jose Guadalupe Hydronephrosis d/t obstruction r/t Mets Breast Cancer s/p stent exchange 21, Hyperkalemia  Discharge Date: 21 RARS: Readmission Risk Score: 35  Facility: [address] [place] [address]       Complaint Diagnosis Description Type Department Provider    21 Abnormal Lab ELIDIA (acute kidney injury) ([facility] [address] . .. ED to Hosp-Admission (Discharged) (ADMITTED) Wai Cao MD; Yanique Rice . ..        1st attempt to reach for Care Transition discharge call unsuccessful. HIPAA compliant message left for Patient and +HIPAA approved EC requesting call back.    Vinny Kenny RN

## 2021-06-16 NOTE — CARE COORDINATION
[facility] Transitions Initial Follow Up Call    Call within 2 business days of discharge: Yes    Patient: Jeanie Plascencia Patient : 1965   MRN: [id number]  Reason for Admission: ELIDIA-ATN, Jose Guadalupe Hydronephrosis d/t obstruction r/t Mets Breast Cancer s/p stent exchange 21, Hyperkalemia  Discharge Date: 21 RARS: Readmission Risk Score: 35  Facility: [address] [place] [address]       Complaint Diagnosis Description Type Department Provider    21 Abnormal Lab ELIDIA (acute kidney injury) ([facility] [address] [facility] . .. ED to Hosp-Admission (Discharged) (ADMITTED) Lilli Ríos MD; Aimee Rodriguez . ..      .  2nd unsuccessful attempt to reach for Care Transition discharge call. HIPAA compliant message left requesting call back. Message left for [address] [phone] notifying of 21 discharge, requested call back to verify. Episode closed per protocol, no further outreach scheduled.      Lord Jovita RN

## 2021-06-16 NOTE — PROGRESS NOTES
Pt. Here for treatment. Pt. Stated she was just released from hospital on Monday with kidney failure. Pt. Feeling better, denies any questions or concerns. Right upper arm mediport accessed without difficulty, good blood return noted. Lab work drawn as ordered. Labs reviewed and treatment administered without incident. Discharge paperwork given. Patient's status assessed and documented appropriately. All labs and required results were also reviewed today. Treatment parameters have been reviewed. Today's treatment has been approved by the provider. Treatment orders and medication sequencing (when applicable) was verified by 2 registered nurses. The treatment plan was confirmed with the patient prior to administration, and the patient understands the need to report any treatment-related symptoms. Prior to administration, when applicable, the following 8 elements of medication administration were reviewed with 2nd Registered Nurse prior to dosing: drug name, drug dose, infusion volume when prepared in a syringe, rate of administration, expiration dates and/or times, appearance and integrity of drug(s), and rate of pump for infusion. The 5 rights of medication administration have been verified.

## 2021-06-17 NOTE — CARE COORDINATION
[address] Transitions Initial Follow Up Call    Call within 2 business days of discharge: Yes    Patient: Jeannine Delaney Patient : 1965   MRN: [id number]  Reason for Admission: ELIDIA-ATN, Jose Guadalupe Hydronephrosis d/t obstruction r/t Mets Breast Cancer s/p stent exchange 21, Hyperkalemia  Discharge Date: 21 RARS: Readmission Risk Score: 35  Facility: [address] [place] [address]       Complaint Diagnosis Description Type Department Provider    21 Abnormal Lab ELIDIA (acute kidney injury) ([facility] [address] . .. ED to Hosp-Admission (Discharged) (ADMITTED) David Jaramillo MD; [address] . ..         Non-face-to-face services provided:  Obtained and reviewed discharge summary and/or continuity of care documents  Communication with specialists who will assume or re-assume care of the patient's system-specific problems-Hellen Gonzalez NP  Education of patient/family/caregiver/guardian to support self-management-see below  Assessment and support for treatment adherence and medication management-[facility]  Establishment or re-establishment of referrals-[address] Transitions 24 Hour Call    Schedule Follow Up Appointment with PCP: Declined  Do you have any ongoing symptoms?: No  Do you have a copy of your discharge instructions?: Yes  Do you have all of your prescriptions and are they filled?: Yes  Have you been contacted by a [address]?: No  Have you scheduled your follow up appointment?: Yes  How are you going to get to your appointment?: Car - family or friend to transport  Were you discharged with any Home Care or Post Acute Services: Yes  Post Acute Services: Home Health (Comment: [facility])  Care Transitions Interventions   Home Care Waiver: Completed        DME Assistance: Declined        Follow Up  Future Appointments   Date Time Provider Ruben Finch   2021 11:30 AM SCHEDULE, [facility] MED ONC TREATMENT [facility] MED ONC [place]   2021  3:00 PM DO QASIM Marquez ADL NEPH AFL Kidney &   6/28/2021  1:30 PM Val Skinner MD [facility] CC MMA   6/30/2021 10:30 AM SCHEDULE, [facility] MED ONC TREATMENT [facility] MED ONC [place]       Challenges to be reviewed by the provider   Additional needs identified to be addressed with provider :    Nutritional supplements ordered 6/9/21 per Wilber Mayen NP. Spoke w/ [address] today- they were unable to fill rx as note enough info. Pharmacy requesting new rx sent over w/ brand/type/flavor of nutritional supplement. Phone [phone], Fax [phone]    Thank You       Method of communication with provider : phone, routed note    CARE TRANSITION MONITORING  6/15/21 thru 7/15/21  [facility] PCP: Katherine Green NP    Was this a readmission? Yes  5/10/21-5/14/21 Covid19 Pna, A/C Resp Failure, ELIDIA  Patient stated reason for admission: Sent to ED per Dr Matteo Felipe d/t increased creatitine  Patients top risk factors for readmission: medical condition-ELIDIA-ATN, Jose Guadalupe Hydronephrosis w/ stents, Breast Cancer recieving chemo, Asthma, Recent Covid19    1549 Received call back from Patient after episode closed as was unable to reach Patient. New episode open, will transition for 30 days. Phone Assessment: Patient reports feeling better since home. Reports voiding qs w/ clear yellow urine. Denies hematuria, mucus in urine, s/p or flank pain, dizziness, generalized weakness, malaise or other c/o. Encouraged adequate water intake as advised per nephrologist. Report she was seen yesterday at Cancer center for scheduled chemo for Breast Cancer. Denies any adverse sx following chemo. Reports plan is for another round of chemo once this series is completed. Reports pharmacy was unable to fill nutritional supplement rx written by Curt Tsang NP on 6/9/21; CTN to f/u. Reports she has not heard from [facility] re: resumption of care visit.  CTN left message for [address] yesterday notifying of d/c and request for call back to confirm; no call back from agency, CTN to f/u. Reports she spoke w/ Dr Nasreen Che nurse today and was told lab values improved, no rx changes. AVS/Meds: Confirmed copy of AVS received, reviewed with Patient. Confirmed Patient obtained and is taking Calc Carb, Sod Bicarb, Lasix as directed. Med education provided, questions answered, verbalizes understanding. Stressed importance of med compliance. 1111F medication review completed with Patient. Advised obtaining 90 day supply of routine, prn meds. Advised contacting pharmacy/md for refill needs. Resource Need Assessment:   Living Arrangements: Lives w/ Dtr and Dtr's SO in single story home  ADLS: Needs assistance per Dtr  DME: Caro Sauceda, hospital bed, bsc, w/c, cane  Transportation: Family  HHC: [facility] for SN 1x wk, PT 2 x wk  [employer] Assistance: [address] per CTN with Patient/Family Approval: [place] [address] Follow Up Appointments: Discussed importance of 7 day hospital follow up w/ PCP for continuity of care. Patient to schedule. Transportation confirmed. IRPIF51:  Patient has not yet received Covid19 series as she is on chemo and Covid19 positive early May 2021. Advised to further discuss vaccine w/ PCP, Oncologist prior to receiving. Reviewed [id number] preventative measures including mask covering nose/mouth, social distancing, hand washing. Advanced Care Planning/HCDM:  Advance Care Planning   Healthcare Decision Maker:    Primary Decision Maker: Celio Storey - [phone]  Full code. No advance directives, considering. Not interested in ACP referral at this time. Advised to contact PCP/Oncologist 24/7 re: any health concerns for early outpatient intervention in an effort to avoid hospitalization. Advised 911 if noting acute distress. [address] [facility]. Notified Laurie Jeff of 6/14/21 discharge and Patient report that she has not yet been contacted per agency and no return confirmation of my vm.  Laurie Aguilar reports delay as agency did not receive hhc orders, d/c summary, wound orders etc prior to discharge. Per chart review all info faxed to agency 6/14/21 11:14 per D Marie REYES. CTN offered to refax. Ha Salazar has already reached out to PCP, [facility] for information, no additional documentation needed. Patient scheduled for resumption of care visit tomorrow, [place] RN to contact Patient with time. Ha Salazar reports she spoke w/ Patient yesterday and informed of above. CTN to f/u w/ Patient. Terry. Unable to fill rx as note enough info. Pharmacy requesting new rx sent over w/ brand/type/flavor of nutritional supplement. [address] Message left MA line- [address] re: need for new rx to pharmacy as above. Requested call back to confirm. 1617 Update to Patient re: HHC and Rx, verbalizes understanding. Note routed to NP.     Elizabeth Mendoza RN

## 2021-06-18 NOTE — PROGRESS NOTES
Left VM with patient to verify which brand of  nutritional supplement that she preferred and what flavor(s). Per discharge plan nurse at [address] the pharmacy is requesting additional information.

## 2021-06-23 PROBLEM — E87.70 HYPERVOLEMIA: Status: ACTIVE | Noted: 2021-01-01

## 2021-06-23 PROBLEM — R60.0 EDEMA LEG: Status: ACTIVE | Noted: 2021-01-01

## 2021-06-23 PROBLEM — E83.51 HYPOCALCEMIA: Status: ACTIVE | Noted: 2021-01-01

## 2021-06-23 PROBLEM — N17.0 ACUTE RENAL FAILURE WITH TUBULAR NECROSIS (HCC): Status: ACTIVE | Noted: 2021-01-01

## 2021-06-23 NOTE — PROGRESS NOTES
Assisted to infusion area via wheelchair. Here for treatment. No concerns voiced at this time. Right arm mediport accessed, labs drawn. CBC results reviewed with patient and Isacc Tillman NP. Chemo administered as ordered. Call light within reach. Tolerated infusion without incident. AVS provided. Discharge in stable condition. Patient has appt with Nephrologist today. Patient's status assessed and documented appropriately. All labs and required results were also reviewed today. Treatment parameters have been reviewed. Today's treatment has been approved by the provider. Treatment orders and medication sequencing (when applicable) was verified by 2 registered nurses. The treatment plan was confirmed with the patient prior to administration, and the patient understands the need to report any treatment-related symptoms. Prior to administration, when applicable, the following 8 elements of medication administration were reviewed with 2nd Registered Nurse prior to dosing: drug name, drug dose, infusion volume when prepared in a syringe, rate of administration, expiration dates and/or times, appearance and integrity of drug(s), and rate of pump for infusion. The 5 rights of medication administration have been verified.

## 2021-06-23 NOTE — PROGRESS NOTES
Physician Progress Note      PATIENTWray Net  CSN #:                  [id number]  :                       1965  ADMIT DATE:       2021 5:45 PM  [address] [place] DATE:        2021 4:17 PM  RESPONDING  PROVIDER #:        Billie Wooten          QUERY TEXT:    Hospitalists,    Pt admitted with ELIDIA. Noted documentation of Metastatic breast CA. If   possible, please document in progress notes and discharge summary the site of   metastasis. The medical record reflects the following:  Risk Factors: breast CA  Clinical Indicators: Per documentation: \"bilateral hydronephrosis likely   secondary to bilateral ureteral obstruction due to metastatic breast cancer   was ruled in most likely. \"  Treatment: labs, imaging, Urology consult, ureteral stent    Thank you,  Reji Chavarria RN CDS  [phone]  Options provided:  -- Breast cancer with metastasis to ##please specify metastasis site, please   specify metastasis site  -- Other - I will add my own diagnosis  -- Disagree - Not applicable / Not valid  -- Disagree - Clinically unable to determine / Unknown  -- Refer to Clinical Documentation Reviewer    PROVIDER RESPONSE TEXT:    Breast cancer with metastasis to bone    Query created by: Antonia Kelley on 2021 4:12 PM      Electronically signed by:  Billie Wooten 2021 6:55 AM

## 2021-06-28 NOTE — CARE COORDINATION
[facility] Transitions Follow Up Call    2021    Patient: Jessica Chandra  Patient : 1965   MRN: [id number]  Reason for Admission: ELIDIA-ATN, Jose Guadalupe Hydronephrosis d/t obstruction r/t Mets Breast Cancer s/p stent exchange 21, Hyperkalemia  Discharge Date: 21 RARS: Readmission Risk Score: 35    [facility] attempted outreach. Left message and contact information for call back.     Olegario Grijalva LPN  Care Coordinator

## 2021-06-29 NOTE — CARE COORDINATION
[facility] Transitions Follow Up Call    2021    Patient: Vlad Gates  Patient : 1965   MRN: <[id number]>  Reason for Admission: ELIDIA-ATN, Jose Guadalupe Hydronephrosis d/t obstruction r/t Mets Breast Cancer s/p stent exchange 21, Hyperkalemia  Discharge Date: 21 RARS: Readmission Risk Score: 35         Spoke with: NA    Second and final attempt to reach patient via phone for initial post hospital transition call. VM left stating purpose of call along with my contact information requesting a return call. Episode ended due to unsuccessful contact. [address]  [phone]      Care Transitions Subsequent and Final Call    Subsequent and Final Calls  Care Transitions Interventions  Other Interventions:            Follow Up  Future Appointments   Date Time Provider Ruben Finch   2021 10:30 AM [facility]EDULE[facility]C [place]   2021  1:45 PM SCHEDULE, [facility] MED ONC TREATMENT [facility] MED ONC [place]   2021 10:15 AM JERILYN, MED ONC NURSE [facility] MED ONC [place]   2021 10:30 AM CINDY Lundberg - CNP [facility] CC [facility]   2021  4:00 PM Елена Fisher,  AFL ADL NEPH AFL Kidney &       Lynn Love LPN

## 2021-06-30 NOTE — PROGRESS NOTES
Hgb 7.1; per Dr Valente Simpson 1 unit of PRBC, called infusion dept, spoke to Mishel Lopez., patient scheduled for tomorrow, 07/01 @ 0900, patient advised and states understanding, blood banded and sent for T&S, band on patient (verifed) and patient signed consent.

## 2021-06-30 NOTE — PROGRESS NOTES
Patient brought to treatment room per wheelchair. Assisted into recliner. Bilateral lower leg and ankle edema. Denies pain at present. Appetite okay. CBC and CMP done. Results reviewed with Dr. Bertram Oreilly. Patient to receive 1 [facility] tomorrow. Zometa held today as ordered. RTC 7/7 for next treatment.

## 2021-07-01 NOTE — PROGRESS NOTES
Tolerated transfusion well. Reviewed discharge instruction, voiced understanding. Copies of AVS given. Pt discharged home. Pt to exit via wheelchair.     Orders Placed This Encounter   Medications    0.9 % sodium chloride infusion 250 mL    sodium chloride flush 0.9 % injection 20 mL    acetaminophen (TYLENOL) tablet 650 mg    diphenhydrAMINE (BENADRYL) tablet 25 mg    furosemide (LASIX) injection 20 mg    methylPREDNISolone sodium (SOLU-MEDROL) injection 20 mg    heparin flush 100 UNIT/ML injection 500 Units    acetaminophen (TYLENOL) 325 MG tablet     Nova Walter: cabinet override    methylPREDNISolone sodium (SOLU-MEDROL) 40 MG injection     Nova Walter: cabinet override    diphenhydrAMINE (BENADRYL) 25 MG tablet     Nova Walter: javier overrboston

## 2021-07-07 NOTE — ADT AUTH CERT
Urinary Tract Infection (UTI) - Care Day 4 (6/13/2021) by Fransisco Lee RN       Review Status Review Entered   Completed 6/30/2021 15:23      Criteria Review      Care Day: 4 Care Date: 6/13/2021 Level of Care: Inpatient Floor    Guideline Day 3    Clinical Status    ( ) * Hemodynamic stability    6/30/2021 3:23 PM EDT by Jerry Collins      Vitals: /77   Pulse 104   Temp 98.5 °F (36.9 °C) (Oral)   Resp 20    (X) * Mental status at baseline    (X) * Antibiotic regimen for next level of care established    (X) * Urine output adequate    (X) * Renal function at baseline or acceptable for next level of care    (X) * Pain absent or managed    (X) * Oral intake adequate    (X) * Afebrile or temperature acceptable for next level of care    (X) * Vomiting absent    ( ) * Discharge plans and education understood    Activity    (X) * Ambulatory or acceptable for next level of care    Routes    (X) * Oral hydration    (X) * Oral medications or regimen acceptable for next level of care    (X) * Oral diet or acceptable for next level of care    * Milestone   Additional Notes   6/13         Scheduled Medications   · calcium carbonate 1,000 mg Oral TID   · furosemide 40 mg Intravenous BID   · calcitRIOL 0.5 mcg Oral BID   · sodium bicarbonate 1,300 mg Oral TID   · sodium chloride flush 5-40 mL Intravenous 2 times per day   · heparin (porcine) 5,000 Units Subcutaneous 3 times per day   · metoprolol succinate 100 mg Oral Daily   · pantoprazole 40 mg Oral QAM AC   · cefTRIAXone (ROCEPHIN) IV 1,000 mg Intravenous Daily         Gross per 24 hour   Intake 650 ml   Output 3475 ml   Net -2825 ml            Nephro:   ELIDIA: prerenal vs obstruction//cr trending up//CT A/P shows bilateral HN ,s/p bl/ ureteral stent exchange on 6/11/21   -Hyperkalemia: resolved   -Metabolic acidosis: better   -B/L HN; had ureteral stents placed in march and exchanged this admission   -BLE edema   -metastatic breast cancer and on chemo -Hypocalcemia        Plan;   Cr  5.3, improving since stent exchange   Give calcitriol and tums for the hypocalcemia   Continue lasix at 40mg iv bid   Please do not dc yet, cr still too high, will let you know    Avoid nephrotoxins             Urology:   Assessment & Plan:        Tyrone Vasquez is a 50y.o. year old female admitted 6/9/2021 for acute renal failure. H/o metastatic breast cancer, currently undergoing palliative chemotherapy with hem/onc. Known to Dr. Júnior Keaen.       1) Bilateral Hydronephrosis: likely secondary to bilateral ureteral obstruction due to metastatic breast cancer               POD #2: Cystoscopy, bilateral RGPG, bilateral ureteral stent exchange               S/p cystoscopy, bilateral ureteral stent placement on 3/25/21                CT a/p 6/9/21: Unchanged moderate-large amount of bilateral hydronephrosis greater on the right.  Ureteral stents in good position.               Urine cx negative; on IV Rocephin               Recommend bilateral ureteral stent exchange in 3 months   2) ELIDIA               Cr 5.2, improving; overall increased from 1.3 2/2021.               Nephrology following            Medicine:   Assessment and Plan:   Tyrone Vasquez is a 50 y. oCreston Aase presents with <principal problem not specified>       1. ACUTE KIDNEY INJURY   -  Continues to improve. Will monitor. Will defer to nephrology as far as when patient will be appropriate for discharge       2. HYDRONEPHROSIS   -S/P cysto with ureteral stent changes, stable. Defer to urology       3. HYPERKALEMIA   -resolved       4. HTN   -BP remains appropriate, follow       5. METASTATIC BREAST CANCER   -no current acute issues.  Mets to bone         Urinary Tract Infection (UTI) - Care Day 3 (6/12/2021) by Carlos A Jarrett RN       Review Status Review Entered   Completed 6/30/2021 15:21      Criteria Review      Care Day: 3 Care Date: 6/12/2021 Level of Care: Inpatient Floor    Guideline Day 3    Clinical Status    ( ) * Hemodynamic stability    6/30/2021 3:21 PM EDT by Mark Perkins      BP 83/60   Pulse 98   Temp 98.2 °F (36.8 °C) (Oral)   Resp 18    (X) * Mental status at baseline    (X) * Antibiotic regimen for next level of care established    (X) * Urine output adequate    (X) * Renal function at baseline or acceptable for next level of care    (X) * Pain absent or managed    (X) * Oral intake adequate    (X) * Afebrile or temperature acceptable for next level of care    (X) * Vomiting absent    ( ) * Discharge plans and education understood    Activity    (X) * Ambulatory or acceptable for next level of care    Routes    (X) * Oral hydration    (X) * Oral medications or regimen acceptable for next level of care    (X) * Oral diet or acceptable for next level of care    * Milestone   Additional Notes   6/12         Scheduled Medications   · sodium bicarbonate 1,300 mg Oral TID   · sodium chloride flush 5-40 mL Intravenous 2 times per day   · heparin (porcine) 5,000 Units Subcutaneous 3 times per day   · amLODIPine 5 mg Oral Daily   · metoprolol succinate 100 mg Oral Daily   · pantoprazole 40 mg Oral QAM AC   · furosemide 80 mg Intravenous BID   · cefTRIAXone (ROCEPHIN) IV 1,000 mg Intravenous Daily            Nephro:   -ELIDIA: prerenal vs obstruction//cr trending up//CT A/P shows bilateral HN ,s/p bl/ ureteral stent exchange on 6/11/21   -Hyperkalemia: resolved   -Metabolic acidosis: better   -B/L HN; had ureteral stents placed in march   -BLE edema   -metastatic breast cancer and on chemo   -Hypocalcemia from bicarb gtt       Plan;   Cr 5.7, s/p b/l ureteral stents exchange on 6/11   Continue oral bicarb   Continue iv lasix   Avoid nephrotoxins   Obtain albumin level for corrected calcium level          Urology:   Assessment & Plan:        Kerrie Oneal is a 50y.o. year old female admitted 6/9/2021 for acute renal failure. H/o metastatic breast cancer, currently undergoing palliative chemotherapy with hem/onc.  Known to Dr. Stanford Marshall.       1) Bilateral Hydronephrosis: likely secondary to bilateral ureteral obstruction due to metastatic breast cancer               POD #1: Cystoscopy, bilateral RGPG, bilateral ureteral stent exchange               S/p cystoscopy, bilateral ureteral stent placement on 3/25/21                CT a/p 6/9/21: Unchanged moderate-large amount of bilateral hydronephrosis greater on the right.  Ureteral stents in good position.               Urine cx negative; on IV Rocephin               Recommend bilateral ureteral stent exchange in 3 months   2) ELIDIA               Cr 5.7, improving; overall increased from 1.3 2/2021.               Nephrology following             Medicine:   Claudene Dress is a 50 y. oKasey Phenes presents with <principal problem not specified>       1. ACUTE KIDNEY INJURY   -  some improvement. Will continue current treatment plan and monitor daily renal function. dispo will be based on nephrology recommendations       2. HYDRONEPHROSIS   -S/P cysto with ureteral stent changes, stable. Defer to urology       3. HYPERKALEMIA   -resolved       4. HTN   -BP remains appropriate, follow       5.  METASTATIC BREAST CANCER   - will follow up after DC

## 2021-07-07 NOTE — PROGRESS NOTES
Here for weekly Taxol. Reports feeling much better after blood transfusion. Voiced no needs or concerns at this time. Would like to speak to Félix Alston regarding letters for work. Mediport accessed, labs obtained. CBCD WDL for treatment. Pt agreeable to POC. Treatment administered as ordered. Tolerated well. AVS copy given to patient. Discharged in stable condition via WC.

## 2021-07-08 NOTE — PROGRESS NOTES
Patient Name: Jayashree Briones  Patient : 1965  Patient MRN: [id number]     Primary Oncologist: Lucina Luna MD  Referring Provider: Juliann Apley, APRN - NP     Date of Service: 2021      Chief Complaint:   Chief Complaint   Patient presents with    Follow-up     She came in for follow up visit. Patient Active Problem List:     Left breast mass     Secondary cancer of bone      Essential hypertension     Malignant neoplasm of central portion of left female breast     Secondary malignant neoplasm of breast      HPI:   Jayashree Briones is a pleasant 79-year-old  female patient who initially noted left breast swelling and pain for a few months. She had mammogram at age of 30. She went for mammogram which showed abnormal left breast. Diagnostic bilateral mammogram on 2018 with ultrasound showed findings concerning for inflammatory breast cancer with prominent left axillary lymph nodes, benign right mammogram.  Baseline CBC and CMP were within normal limits. CA 27-29 was 61. PET CT scan in May 27, 4314 showed metabolic activity within left breast with metabolically active skin thickening concerning for inflammatory breast cancer, metabolically active left axillary lymph node metastases and skeletal metastasis. 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 She did not have visceral disease. We discussed about potential therapy. I put her on Lupron plus Arimidex and Palbociclib. She had menstruation in May 2018. She started lupron, arimidex and Ibrance on May 30, 2018. Bone density in 2018 showed  LUMBAR SPINE: The bone mineral density in the lumbar spine including the L1 through L4 levels is measured at 1.355 g/cm2, which corresponds to a T-score of 1.5 and a Z-score of 1.4.  This is within the normal range by WHO criteria. LEFT HIP: The bone mineral density in the total hip is measured at 0.972 g/cm2 corresponding to a T-score of -0.3 and a Z-score of -0.2. This is within the normal range by WHO criteria. The bone mineral density of the femoral neck is measured at 0.802 g/cm2 corresponding to a T-score of -1.7 and a Z-score of -1.2. This is within the osteopenia range by WHO criteria. IMPRESSION: Osteopenia by WHO criteria. Bone scan in September 2018 showed stable findings. On October 26, 2018 she was found to have RLE DVT. PET CT scan in November 2018 revealed:  1. Overall improvement of left axillary adenopathy and FDG avidity. There is persistent skin thickening of the left breast with associated FDG uptake in the retroareolar region and left breast tissue which is not significantly changed since the prior exam. 2. Overall progression of hypermetabolic skeletal osseous metastases when compared to the PET-CT from 05/07/2018. We discussed about the potential side effects of bisphosphonates including renal failure and Jaw necrosis. She switched lovenox to xarelto in January 2019. Bone scan in June 2019 compared to bone scan in September 2018 showed progression of disease. CA 27-29 has continued to increase. We will obtain PET CT to evaluate. Clinically she felt left breast lump continuing to get smaller. PET CT scan on August 26, 2019 showed :  1. New focus of intense uptake lateral to the left axilla and anterior to the shoulder musculature. 2. However there is improvement of disease within the left breast and left axilla and decreased uptake within the widespread osseous metastatic disease. In December 2019 CA 17-29 continued to rise. CT chest, abdomen and bone scan in January 2020 showed progression local and bone disease. She started lupron, aromasin and afinitor on January 10, 2020. I advise to monitor blood pressure.   On May 7, 2020 PET CT scan showed favorable response to chemotherapy. The mass anterior to the left shoulder musculature has significantly decreased in size and degree of uptake. Small nodules anterior to the mass extending to the skin demonstrates mild uptake and likely represents residual disease. Left axillary lymph nodes have decreased in size. Uptake in the left subareolar region has decreased. Diffuse osseous metastatic disease demonstrates decreased uptake. Labs in August 2020 were reviewed. CT chest, abdomen pelvis on October 5, 2020 showed  Decreased size of the soft tissue mass anterior to the left shoulder   musculature compared to prior PET-CT and CT suggesting response to therapy.     Decreased size of left axillary and subpectoral lymphadenopathy also   suggesting response to therapy.     Stable sclerotic metastatic disease noted throughout the visualized osseous skeleton.     No evidence of new metastatic disease in the chest, abdomen or pelvis. CA 2729 on October 5, 2020 was 409.2. Labs in January 2021 were reviewed. Anemic w/u is consistent with chronic disease. She is on eliquis. CT chest, abdomen pelvis in January 2021 showed  1. Increasing skin thickening of the breast bilaterally with increasing  subcutaneous edema throughout the chest wall, which may be related to post treatment changes. 2. No interval change in multifocal sclerotic osseous metastases. 3. Stable to slight interval decrease in size of soft tissue nodules in the  left chest wall/anterior to the left shoulder.  No significant interval  change in left axillary adenopathy. 4. There is increasing size of the left ovary as well as new pelvic  adenopathy suspicious for malignancy either primary neoplastic process involving the left ovary versus metastatic disease. 5. There is also mild wall thickening of the left posterolateral bladder wallwith mild left hydronephrosis.  Would recommend further evaluation with cystoscopy as well.   6. New 3.7 x 2.0 cm partially calcified mass in the right adductor  musculature suspicious for metastatic disease as well.  Alternately this  could reflect chronic process such as myositis ossificans in the appropriate clinical setting. I  cut zometa to every 3 months. She was seen by Dr. Tay Kam at [facility] who believe clinically she has metastatic breast cancer to the pelvis. She started Taxol on March 22, 2021. She was sent to [facility] 3/22/21 for ELIDIA and had new on hydroureteronephrosis on the right and is s/p stent placement. CT abdomen/pelvis 3/23/21  1. Moderate bilateral hydroureteronephrosis new on the right and not  significantly changed on the left compared with 95/42/3623 of uncertain  etiology.  No obstructing stone or mass.  Partially decompressed urinary  bladder with circumferential wall thickening not excluded. 2. Fluid overload with small right and large left pleural effusions, diffuse  body wall edema, small volume of ascites, and small pericardial effusion. 3. 6 bilateral iliac chain lymphadenopathy not significantly changed from  01/20/2021.  Increasing size of the left ovary of uncertain etiology with an  underlying neoplasm not excluded.  Unchanged diffuse sclerotic metastases. 4. Mildly increased size of a calcified mass within the right proximal  adductor musculature and new mass within the right gluteus medius muscle suspicious for metastatic disease. She had renal stent replacement in June 2021. She was seen by nephrologist recently and placed on Lasix. Reportedly venous Doppler study of left lower extremity was negative for DVT. On August 2, 2021 she came in for follow-up visit. She was seen by nephrologist.  I gave refill of the Ativan for the anxiety. He was scheduled for left thoracentesis. I request fluid cytology, LDH, glucose, albumin and total protein study. CT chest, abdomen pelvis on July 28, 2021 showed  1. Large left pleural effusion, increased compared to the exam of 5/10/2021.   There is now complete collapse of the left lower lobe and near complete  collapse of the left upper lobe.  Nodular appearance of the left pleural  surface is highly suspicious for pleural metastasis. 2. Pelvic adenopathy, not significantly changed compared to prior exam.  Finding is highly suspicious for metastasis. 3. Nodular skin thickening of the left anterior chest wall and subcutaneous  fat, likely metastasis. 4. Diffuse bone metastasis in the axial skeleton with pathologic fracture of  T10.  This is a chronic finding. 5. Persistent eccentric bladder wall thickening with relative thickening of  the left side of the bladder wall.  Differential includes cystitis and  malignancy.  If it would alter patient's management, consider cystoscopy. 6. Stable position of bilateral ureteral stents.  Right renal  pelvocaliectasis without you hydronephrosis. 7. Diffuse skin thickening with subcutaneous edema of the chest, abdominal,  and pelvic wall.  Please correlate with clinical symptoms of anasarca. She is due for the next chemo on Wednesday. She is due for renal stent replacement this week. As requested by urologist I will order UA and urine culture. She has intermittent hematuria. I will put her on daily Cipro 250 mg due to abnormal kidney function. So far she has been tolerating the Taxol except that she becomes fatigued after the Taxol. She has shortness of breath with exertion. Denies any acute pain. Keshia Check seems to control her pain. .  No nausea, vomiting or diarrhea. No fever or chills. No headaches or dizzy spells. Past Medical History  Allergies, asthma, depression, abnormal left breast mammogram. DVT. Surgical History  Appendectomy in .  . Social History  Denied any history of smoking. She drinks alcohol occasionally. No illicit drug use. She has 2 children and one sister. Family History  Mother: Diabetes, Hypertension.      Previous Therapy   Palbociclib + Anastrozole + Leuprolide Q28D Cycle Length: 28 Number Cycles: 20 Start: C1D1 on 05/30/2018 Assoc Dx: Female inflammatory breast cancer LOT: 1st Line Metastatic or Recurrent Stage: IV 05/30/2018 C1 D1  Zoledronic acid (Zometa) Q28D Cycle Length: 28 Number Cycles: 24 Start: Suma Mancuso on 12/07/2018 Assoc Dx: Secondary bone cancer LOT: 1st Line Metastatic or Recurrent 08/28/2020 C8 D1  Everolimus + Exemestane + Leuprolide Q28D Cycle Length: 28 Number Cycles: 6 Start: C1D1 on 12/11/2019 Assoc Dx: Female inflammatory breast cancer LOT: 2nd Line Metastatic Stage: IV Treatment Intent: [id number]/11/2019 C1 D1     Review of Systems: \"Per interval history; otherwise 10 point ROS is negative. \"     Vital Signs:  /64 (Site: Right Upper Arm, Position: Sitting, Cuff Size: Medium Adult)   Pulse 94   Temp 96.8 °F (36 °C) (Temporal)   Resp 18   Ht 5' 1\" (1.549 m)   LMP 04/08/2018   SpO2 96%   BMI 26.76 kg/m²     Physical Exam:  CONSTITUTIONAL: awake, alert, cooperative, no apparent distress  +wheelchair  EYES:  sclera clear and conjunctiva pallor. ENT: Normocephalic, without obvious abnormality, atraumatic. Moist oral mucosa  NECK: supple, symmetrical.  No JVD  HEMATOLOGIC/LYMPHATIC: no cervical, supraclavicular or axillary lymphadenopathy   LUNGS: Diminished breath sound to the left lung. Increased dullness on percussion to the left lung. BREAST: Skin nodules to both anterior chest wall with was stable. CARDIOVASCULAR: regular rate and rhythm, normal S1 and S2, no murmur   ABDOMEN: normal bowel sounds, soft, non-distended, non-tender, no masses palpated, no hepatosplenomegaly   MUSCULOSKELETAL: Decreased strength to both lower extremity. NEUROLOGIC: awake, alert, oriented to name, place and time. Cranial nerves II through XII grossly intact. SKIN: No jaundice. appears intact. No petechial rash. Dry skin. EXTREMITIES: BLE edema, left upper extremity swelling. No cyanosis.     Labs:  Hematology:  Lab Results   Component Value Date WBC 10.0 07/28/2021    RBC 2.71 (L) 07/28/2021    HGB 7.8 (L) 07/28/2021    HCT 24.6 (L) 07/28/2021    MCV 90.8 07/28/2021    MCH 28.8 07/28/2021    MCHC 31.7 (L) 07/28/2021    RDW 20.8 (H) 07/28/2021     07/28/2021    MPV 8.4 07/28/2021    BANDSPCT 2 (L) 03/22/2021    SEGSPCT 59.9 07/28/2021    EOSRELPCT 0.2 07/28/2021    BASOPCT 0.3 07/28/2021    LYMPHOPCT 24.5 07/28/2021    MONOPCT 15.1 (H) 07/28/2021    BANDABS 0.39 03/22/2021    SEGSABS 6.0 07/28/2021    EOSABS 0.0 07/28/2021    BASOSABS 0.0 07/28/2021    LYMPHSABS 2.5 07/28/2021    MONOSABS 1.5 07/28/2021    DIFFTYPE AUTOMATED DIFFERENTIAL 07/28/2021    ANISOCYTOSIS 1+ 12/06/2019    POLYCHROM 1+ 03/22/2021    WBCMORP OCCASIONAL  ATYPICAL LYMPH(S) NOTED   12/06/2019    PLTM RARE 03/22/2021     Chemistry:  Lab Results   Component Value Date     07/28/2021    K 3.9 07/28/2021     07/28/2021    CO2 17 (L) 07/28/2021    BUN 56 (H) 07/28/2021    CREATININE 4.7 (H) 07/28/2021    GLUCOSE 123 (H) 07/28/2021    CALCIUM 8.9 07/28/2021    PROT 5.9 (L) 07/28/2021    LABALBU 3.1 (L) 07/28/2021    BILITOT 0.2 07/28/2021    ALKPHOS 108 07/28/2021    AST 51 (H) 07/28/2021    ALT 7 (L) 07/28/2021    LABGLOM 10 (L) 07/28/2021    GFRAA 12 (L) 07/28/2021    PHOS 5.2 (H) 03/23/2021    MG 1.8 06/09/2021    POCCA 1.16 07/28/2021    POCGLU 127 (H) 07/28/2021     Immunology:  Lab Results   Component Value Date    PROT 5.9 (L) 07/28/2021     Tumor Markers:  Lab Results   Component Value Date    LABCA2 409.2 (H) 10/05/2020      Observations:  No data recorded        Assessment & Plan:    1. She has inflammatory left breast cancer. PET/CT scan showed metastatic disease to bone, left axillary lymph node, and left breasts with the skin involvement. At present time she does not have physical disease. CBC and CMP were within normal limits. CA 2729 was elevated. I put her on palliative hormonal therapy plus Palbociclib.  She started arimidex and Ibrance on June 4, 2018 and monthly lupron injection on June 8, 2018. Bone scan in September 2018 showed stable findings. After reviewing the PET CT scan in November 2018, I put her on Zometa monthly. Bone scan in July 2019 was reviewed and showed progression of the disease. PET scan in August 2019 showed grossly response to the therapy. CT chest/abdomen and bone scan due to rising CA27-29 in January 2020 showed progression of disease. She started aromasin, afinitor and lupron on January 10, 2020. She understands the potential side effects of afinitor. PET CT scan May 2020 showed treatment response. CT chest, abdomen pelvis in October 2020 showed response and no evidence of new metastatic disease. She was on Lupron, exemestane, Zometa and Afinitor. I changed zometa to every 3 months. CT chest, abdomen pelvis in January 2021 was reviewed. Due to progression of disease she started weekly Taxol on March 22, 2021. CT chest, abdomen pelvis in July 2021 were reviewed. She is agreeable to continue with the Taxol. She has stable disease. 2. Hypertension: I advise to monitor BP and has low sodium diet. 3. Seasonal allergy. I advise to take Claritin and gave her flonase. 4. She was found to have right lower extremity DVT in October 2019. She is on Eliquis. no signs of bleeding. 5. She has multifactorial anemia. JAK2 with reflex in January 2021 was negative. Anemic work-up in January 2021 was reviewed. She received multiple blood transfusion. She also has anemia caused by chemotherapy. 6.  I recommend to keep lower extremity elevated. She is on Lasix for bilateral leg swelling. Nephrologist has increased the dose of the Lasix. 7.  Prescribed compazine for intermittent nausea. She stated Zofran does not seem to help. She is using  Ativan for the anxiety and insomnia. 8.  I remind her to follow-up with urologist every 3 months to have renal stent replacement. She will have renal stent replacement in August 2021. I will order a UA and urine culture. I put her on empiric antibiotics. 9.  Schedule for therapeutic left thoracentesis. I will send fluid cytology. RTC in 3 weeks or sooner. All of her question has been answered for today. Recent imaging and labs were reviewed and discussed with the patient.

## 2021-07-14 NOTE — PROGRESS NOTES
Here for treatment/chemo. States feeling good this week. Admits to not low fluid intake, requesting extra hydration with treatment. Left forearm mediport accessed, labs drawn. Labs reviewed with patient and Dr. Ahsan Edwards. Creatinine 2.9, Zometa held today. IV hydration and chemo administered as ordered. Call light within reach. Tolerated infusion without incident. AVS provided. Discharged in stable condition. Patient's status assessed and documented appropriately. All labs and required results were also reviewed today. Treatment parameters have been reviewed. Today's treatment has been approved by the provider. Treatment orders and medication sequencing (when applicable) was verified by 2 registered nurses. The treatment plan was confirmed with the patient prior to administration, and the patient understands the need to report any treatment-related symptoms. Prior to administration, when applicable, the following 8 elements of medication administration were reviewed with 2nd Registered Nurse prior to dosing: drug name, drug dose, infusion volume when prepared in a syringe, rate of administration, expiration dates and/or times, appearance and integrity of drug(s), and rate of pump for infusion. The 5 rights of medication administration have been verified.

## 2021-07-14 NOTE — TELEPHONE ENCOUNTER
Pt is going to the [facility] on 1030 arrival for an 1100 appt-- no prep-- pt is aware and stated understanding

## 2021-07-21 NOTE — PROGRESS NOTES
Here for Taxol. Pt voiced no issues or concerns. Labs drawn via port. Reviewed labs with Dr. Nina Mancuso, hgb 8.0, creatinine increased in one week 2.6 to 3.4. Approved treatment, hold Xgeva. Phoebe Hooker NN to notify Dr. Damian Helton of creatinine. Per pt, she has f/u next Wednesday. Chemo administered as ordered. Tolerated well. Reviewed AVS, copy given to patient. Discharged in stable condition via WC.

## 2021-07-28 NOTE — PROGRESS NOTES
Pt. Here for treatment and right upper arm mediport accessed without difficulty, good blood return noted. Lab work drawn as ordered. Labs reviewed. Pt. Denies any questions or concern. Treatment administered without incident. Patient's status assessed and documented appropriately. All labs and required results were also reviewed today. Treatment parameters have been reviewed. Today's treatment has been approved by the provider. Treatment orders and medication sequencing (when applicable) was verified by 2 registered nurses. The treatment plan was confirmed with the patient prior to administration, and the patient understands the need to report any treatment-related symptoms. Prior to administration, when applicable, the following 8 elements of medication administration were reviewed with 2nd Registered Nurse prior to dosing: drug name, drug dose, infusion volume when prepared in a syringe, rate of administration, expiration dates and/or times, appearance and integrity of drug(s), and rate of pump for infusion. The 5 rights of medication administration have been verified.

## 2021-07-28 NOTE — PROGRESS NOTES
Hgb 7.8; per Dr Ok Pike 1 unit of PRBC, called infusion dept, spoke to [facility], patient scheduled for 07/29/2021 at 10:30 AM at [facility], patient advised and states understanding, blood banded and sent for T&S, band on patient (verifed) and patient signed consent.

## 2021-08-02 NOTE — TELEPHONE ENCOUNTER
Pt is going to have an IR appt on 8/3 6:30 arrival for a 8:30 appt-- gave pt all information on a voicemail-- told to call back with any questions

## 2021-08-02 NOTE — PROGRESS NOTES
MA Rooming Questions  Patient: Alyse Paz  MRN: [id number]    Date: 8/2/2021        1. Do you have any new issues? yes - swelling,intermittent back pain         2. Do you need any refills on medications?    no    3. Have you had any imaging done since your last visit? Ye-CT    4. Have you been hospitalized or seen in the emergency room since your last visit here?   no    5. Did the patient have a depression screening completed today?  No    No data recorded     PHQ-9 Given to (if applicable):               PHQ-9 Score (if applicable):                     [] Positive     []  Negative              Does question #9 need addressed (if applicable)                     [] Yes    []  No               Raf Snider CMA

## 2021-08-03 PROBLEM — J93.9 PNEUMOTHORAX: Status: ACTIVE | Noted: 2021-01-01

## 2021-08-03 NOTE — PROGRESS NOTES
PROCEDURE PERFORMED: Thoracentesis Left    PRIMARY INDICATION FOR PROCEDURE:  Pleural effusion    PT TRANSPORTED FROM: [facility] room #18                             TO THE IR ROOM: Large room    PT IN THE ROOM AT WHAT TIME: 0911                       INFORMED CONSENT:  PT A&O, verbalizes understanding of the procedure, signed consent with Dr. Chantal Mei. Consent placed in chart. AJIT SCORE PRE PROCEDURE: 10    NURSING ASSESSMENT: Pt is A&O, quiet, appears SOB. Answers questions appropriately. TIME OUT COMPLETE: 0939    BARRIER PRECAUTIONS & STERILE TECHNIQUE  Pt positioned for comfort. Warm blankets given. Pt placed on Cardiac Monitor. Pt in the sitting position. Chlorhexadine and draped in a sterile fashion. PAIN/LOCAL ANESTHESIA/SEDATION MANAGEMENT:  Lidocaine 1% without Epinephrine    INTRAOPERATIVE:    ACCESS TIME: 0840 with [employer]   US/FLUORO: 5 US images  WIRE USED:  SHEATH USED:   CATHETER USED:  FINAL IMAGE TAKEN TO CONFIRM PLACEMENT OF:   CONTRAST/CC:   FLUID RETURN: clear fany  Left thoracentesis completed by Dr Cheli Monsivais. 1400 ml fany pleural fluid removed. Pt tolerated the procedure well. STERILE DRESSINGS:  Gauze and tegaderm    SPECIMENS: sent to lab    EBL: less than 1 ml    FOLLOW- UP X-RAY: ordered stat    COMPLICATIONS: None    STAFF PRESENT DURING PROCEDURE:  Dr Chantal Mei, Placido RT, Krystal RN    AJIT SCORE POST PROCEDURE: 10    REPORT CALLED TO: Gerry Pineda RN    PT LEFT ROOM AT WHAT TIME: 1004    Pt transported back to [facility] room #18.

## 2021-08-03 NOTE — PROGRESS NOTES
1011- pt returned to [facility] rm 18, pt resting, respirations even and unlabored, pt states no pain and no SOB, daughter at bedside   1029- this nurse called radiology for stat CXR   1042- CXR at bedside   1105- Dr. Tyrese Tipton at bedside   [phone]- this nurse perfect serve called hospitalists to admit pt; pt alert, denies SOB and pain   1133- hospitalists at bedside   1210- This nurse and [address] transported pt to [facility], [address]- report called to [employer]

## 2021-08-03 NOTE — H&P
History and Physical      Name:  Dorothy Jc /Age/Sex: 1965  (50 y.o. female)   MRN & CSN:  [id number] & [id number] Admission Date/Time: 8/3/2021  1:46 PM   [facility] PCP: Rashi Valverde 15 Day: 1    Assessment and Plan:   Dorothy Jc is a 50 y.o.  female  who presents with pneumothorax    1) Pneumothorax with Possible Trapped lung  -Vitally stable; on room air saturation 97%  -Concerning for pneumothorax  -CXR: Suggestive of trapped lung with moderate amount of air in the left pleural space. -S/P  Chest tube placement on 2021  -IR and Pulmonology on board    2) Lt sided Pleural Effusion; likely malignant   -S/P thoracocentesis with drainage of 1400cc of fluid  -Follow fluid cytology      Other chronic medical conditions, medication resumed unless contraindicated  CKD IV  Metastatic breast cancer on palliative chemo  Asthma  Anxiety disorder  Chronic bilateral lower extremity swelling  History of DVT; right lower extremity. Initially on Eliquis but currently off. Unsure why this was discontinued  Bilateral hydronephrosis status post bilateral ureteral stent placement (scheduled for exchange on 2021-Will notify urology)          Diet ADULT DIET; Regular   DVT Prophylaxis [] Lovenox, []  Heparin, [] SCDs, [] Ambulation   GI Prophylaxis [] PPI,  [] H2 Blocker,  [] Carafate,  [] Diet/Tube Feeds   Code Status Full Code   Disposition Patient requires continued admission due to pneumothorax   MDM [] Low, [x] Moderate,[]  High  Patient's risk as above due to pneumothorax     History of Present Illness:     Chief Complaint: <principal problem not specified>  Dorothy Jc is a 50 y.o.  female  who presents with pneumothorax. Patient had elective thoracocentesis with drainage of 1400 cc of clear yellow fluid. Postprocedural x-ray concerning for left-sided pneumothorax. Chest tube was placed and patient admitted for management.   Patient currently denied any SOB, chest pain, palpitations, dizziness, cough. No fever or chills       Ten point ROS reviewed negative, unless as noted above    Objective:   No intake or output data in the 24 hours ending 21 1355   Vitals: There were no vitals filed for this visit. Physical Exam:   GEN Awake female, sitting upright in bed in no apparent distress. Appears given age. EYES Pupils are equally round. No scleral erythema, discharge, or conjunctivitis. HENT Mucous membranes are moist. Oral pharynx without exudates, no evidence of thrush. NECK Supple, no apparent thyromegaly or masses. RESP Clear to auscultation, no wheezes, rales or rhonchi. Symmetric chest movement while on room air. CARDIO/VASC S1/S2 auscultated. Regular rate without appreciable murmurs, rubs, or gallops. No JVD or carotid bruits. Peripheral pulses equal bilaterally and palpable. No peripheral edema. GI Abdomen is soft without significant tenderness, masses, or guarding. Bowel sounds are normoactive. Rectal exam deferred.  No costovertebral angle tenderness. Mart catheter is not present. HEME/LYMPH No palpable cervical lymphadenopathy and no hepatosplenomegaly. No petechiae or ecchymoses. MSK No gross joint deformities. SKIN Normal coloration, warm, dry. NEURO Cranial nerves appear grossly intact, normal speech, no lateralizing weakness. PSYCH Awake, alert, oriented x 4. Affect appropriate. Past Medical History:      Past Medical History:   Diagnosis Date    Anxiety     Asthma     last flare up 5 yrs ago    Breast lesion     \"have spot on left breast going to remove- at this time not sure if cancer or not\"    Essential hypertension 2020    Secondary cancer of bone ([address] 2020    Thyroid nodule     \"have thyroid nodules-      PSHX:  has a past surgical history that includes  section (); Appendectomy (age 27); Tonsillectomy (age 5); Port Surgery (Right, 3/15/2021);  IR NONTUNNELED VASCULAR CATHETER > 5 YEARS (3/22/2021); Bladder surgery (Bilateral, 3/25/2021); and Bladder surgery (Bilateral, 6/11/2021). Allergies: Allergies   Allergen Reactions    Latex Anaphylaxis     \"have trouble with banana's kiwi, avocados- Have trouble breathing with these and trouble breathig - get asthmatic from latex gloves, bandage, anything latex     Banana Anaphylaxis    Cinnamon Anaphylaxis       FAM HX: family history includes Diabetes in her mother; High Blood Pressure in her mother; Stroke in her mother. Soc HX:   Social History     Socioeconomic History    Marital status:      Spouse name: Not on file    Number of children: 2    Years of education: 15    Highest education level: Not on file   Occupational History    Occupation: dental assistant     Comment: [employer]   Tobacco Use    Smoking status: Never Smoker    Smokeless tobacco: Never Used   Vaping Use    Vaping Use: Never used   Substance and Sexual Activity    Alcohol use: Yes     Comment: average\"one time per week\"    Drug use: No    Sexual activity: Yes     Partners: Male   Other Topics Concern    Not on file   Social History Narrative    Lives in her own home with her children     Social Determinants of Health     Financial Resource Strain:     Difficulty of Paying Living Expenses:    Food Insecurity:     Worried About Running Out of Food in the Last Year:     [address] in the Last Year:    Transportation Needs:     Lack of Transportation (Medical):      Lack of Transportation (Non-Medical):    Physical Activity:     Days of Exercise per Week:     Minutes of Exercise per Session:    Stress:     Feeling of Stress :    Social Connections:     Frequency of Communication with Friends and Family:     Frequency of Social Gatherings with Friends and Family:     Attends Rastafari Services:     Active Member of Clubs or Organizations:     Attends Club or Organization Meetings:     Marital Status:    Intimate Partner Violence:     Fear of Current or Ex-Partner:     Emotionally Abused:     Physically Abused:     Sexually Abused:        Medications:   Medications:    cetirizine  10 mg Oral Daily    furosemide  80 mg Oral Daily    metoprolol succinate  100 mg Oral Daily    sodium bicarbonate  650 mg Oral BID    sodium chloride flush  5-40 mL Intravenous 2 times per day      Infusions:    sodium chloride       PRN Meds: LORazepam, 0.5 mg, BID PRN  prochlorperazine, 10 mg, Q8H PRN  sodium chloride flush, 5-40 mL, PRN  sodium chloride, 25 mL, PRN  ondansetron, 4 mg, Q8H PRN   Or  ondansetron, 4 mg, Q6H PRN  polyethylene glycol, 17 g, Daily PRN  acetaminophen, 650 mg, Q6H PRN   Or  acetaminophen, 650 mg, Q6H PRN      Prior to Admission medications    Medication Sig Start Date End Date Taking? Authorizing Provider   ciprofloxacin (CIPRO) 250 MG tablet Take 1 tablet by mouth daily for 10 days 8/2/21 8/12/21  Senait Mendez MD   furosemide (LASIX) 80 MG tablet Take 1 tablet by mouth daily 7/30/21   Елена Lau DO   sodium bicarbonate 650 MG tablet Take 1 tablet by mouth 2 times daily 7/30/21 7/30/22  Елена Lau DO   LORazepam (ATIVAN) 0.5 MG tablet Take 1 tablet by mouth 2 times daily as needed for Anxiety for up to 15 days.  7/30/21 8/14/21  Senait Mendez MD   dexamethasone (DECADRON) 4 MG tablet TAKE ONE TABLET BY MOUTH DAILY FOR 4 DAYS STARTING THE DAY AFTER CHEMOTHERAPY 7/30/21   CINDY Mason CNP   Calcium-Vitamin D 500-125 MG-UNIT TABS Take 1 tablet by mouth 2 times daily    Historical Provider, MD   Nutritional Supplement LIQD Take 1 Can by mouth 2 times daily 6/18/21   Senait Mendez MD   metoprolol succinate (TOPROL XL) 100 MG extended release tablet Take 1 tablet by mouth daily 4/26/21   CINDY Mason CNP   ondansetron (ZOFRAN) 8 MG tablet 1 tab po q 8 hours PRN for chemo induced nausea/vomting  Patient taking differently: 8 mg every 8 hours as needed for Nausea or Vomiting  3/16/21 CINDY Ramirez CNP   docusate sodium (COLACE) 100 MG capsule Take 1 capsule by mouth daily as needed for Constipation 3/16/21   CINDY Ramirez CNP   acetaminophen (TYLENOL) 500 MG tablet Take 500 mg by mouth every 6 hours as needed for Pain    Historical Provider, MD   prochlorperazine (COMPAZINE) 10 MG tablet Take 1 tablet by mouth every 8 hours as needed (nausea)  Patient taking differently: Take 10 mg by mouth every 8 hours as needed (Nausea)  1/25/21   Albert Butler MD   promethazine (PHENERGAN) 25 MG tablet TAKE ONE TABLET BY MOUTH EVERY 6 HOURS AS NEEDED FOR NAUSEA 12/22/20   Albert Butler MD   cetirizine (ZYRTEC) 10 MG tablet Take 10 mg by mouth daily    Historical Provider, MD           Electronically signed by Salvador Al MD on 8/3/2021 at 1:55 PM
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
## 2021-08-03 NOTE — H&P
Date:8/3/2021  Jason Wong   :1965   EZ#:[id number]    SEX:female   Referring Physician:  Dr. Qamar Thomas  Chief Complaint:  Shortness of breath  History of Present Illness:   Patient with a PMH of breast CA presents for a left thoracentesis.     HISTORY AND PHYSICAL  Pleural effusion [J90]    Past Medical History:  Past Medical History:   Diagnosis Date    Anxiety     Asthma     last flare up 5 yrs ago    Breast lesion     \"have spot on left breast going to remove- at this time not sure if cancer or not\"    Essential hypertension 2020    Secondary cancer of bone ([facility] [address] 2020    Thyroid nodule     \"have thyroid nodules-        Past Surgical History:  Past Surgical History:   Procedure Laterality Date    APPENDECTOMY  age 27    BLADDER SURGERY Bilateral 3/25/2021    BILATERAL CYSTOSCOPY BILATERAL STENT INSERTION performed by Kellen Lin MD at [address] [place] [address] Way Bilateral 2021    BILATERAL CYSTOSCOPY RETROGRADE PYELOGRAM STENT EXCHANGE performed by Darling Curran MD at [address] [facility] [address]    IR NONTUNNELED VASCULAR CATHETER  3/22/2021    IR NONTUNNELED VASCULAR CATHETER 3/22/2021 [facility] SPECIAL PROCEDURES    PORT SURGERY Right 3/15/2021    RIGHT PORT INSERTION performed by Leeann Mascorro MD at [facility]  age 4    T & A       Social History:  Social History     Socioeconomic History    Marital status:      Spouse name: Not on file    Number of children: 2    Years of education: 13    Highest education level: Not on file   Occupational History    Occupation: dental assistant     Comment: [employer]   Tobacco Use    Smoking status: Never Smoker    Smokeless tobacco: Never Used   Vaping Use    Vaping Use: Never used   Substance and Sexual Activity    Alcohol use: Yes     Comment: average\"one time per week\"    Drug use: No    Sexual activity: Yes     Partners: Male   Other Topics Concern    Not on file   Social History Narrative    Lives in her own home with her children     Social Determinants of Health     Financial Resource Strain:     Difficulty of Paying Living Expenses:    Food Insecurity:     Worried About Running Out of Food in the Last Year:     Ran Out of Food in the Last Year:    Transportation Needs:     Lack of Transportation (Medical):     Lack of Transportation (Non-Medical):    Physical Activity:     Days of Exercise per Week:     Minutes of Exercise per Session:    Stress:     Feeling of Stress :    Social Connections:     Frequency of Communication with Friends and Family:     Frequency of Social Gatherings with Friends and Family:     Attends Worship Services: Active Member of Clubs or Organizations:     Attends Club or Organization Meetings:     Marital Status:    Intimate Partner Violence:     Fear of Current or Ex-Partner:     Emotionally Abused:     Physically Abused:     Sexually Abused:        Family History:  Family History   Problem Relation Age of Onset    Diabetes Mother     Stroke Mother     High Blood Pressure Mother        Allergies: Allergies   Allergen Reactions    Latex Anaphylaxis     \"have trouble with banana's kiwi, avocados- Have trouble breathing with these and trouble breathig - get asthmatic from latex gloves, bandage, anything latex     Banana Anaphylaxis    Cinnamon Anaphylaxis       Medications:  Current Outpatient Medications on File Prior to Encounter   Medication Sig Dispense Refill    ciprofloxacin (CIPRO) 250 MG tablet Take 1 tablet by mouth daily for 10 days 10 tablet 0    furosemide (LASIX) 80 MG tablet Take 1 tablet by mouth daily 30 tablet 2    sodium bicarbonate 650 MG tablet Take 1 tablet by mouth 2 times daily 60 tablet 11    LORazepam (ATIVAN) 0.5 MG tablet Take 1 tablet by mouth 2 times daily as needed for Anxiety for up to 15 days.  30 tablet 0    dexamethasone (DECADRON) 4 MG tablet TAKE ONE TABLET BY MOUTH DAILY FOR 4 DAYS STARTING THE DAY AFTER CHEMOTHERAPY 16 tablet 0    Calcium-Vitamin D 500-125 MG-UNIT TABS Take 1 tablet by mouth 2 times daily      Nutritional Supplement LIQD Take 1 Can by mouth 2 times daily 60 Can 5    metoprolol succinate (TOPROL XL) 100 MG extended release tablet Take 1 tablet by mouth daily 30 tablet 3    ondansetron (ZOFRAN) 8 MG tablet 1 tab po q 8 hours PRN for chemo induced nausea/vomting (Patient taking differently: 8 mg every 8 hours as needed for Nausea or Vomiting ) 30 tablet 0    acetaminophen (TYLENOL) 500 MG tablet Take 500 mg by mouth every 6 hours as needed for Pain      promethazine (PHENERGAN) 25 MG tablet TAKE ONE TABLET BY MOUTH EVERY 6 HOURS AS NEEDED FOR NAUSEA 30 tablet 0    cetirizine (ZYRTEC) 10 MG tablet Take 10 mg by mouth daily      docusate sodium (COLACE) 100 MG capsule Take 1 capsule by mouth daily as needed for Constipation 30 capsule 0    prochlorperazine (COMPAZINE) 10 MG tablet Take 1 tablet by mouth every 8 hours as needed (nausea) (Patient taking differently: Take 10 mg by mouth every 8 hours as needed (Nausea) ) 60 tablet 1     No current facility-administered medications on file prior to encounter. Vital Signs:  @FLOWDT(6:last)@ @FLOWSTATM(6:24)@ @FLOWDT(5:last)@ @FLOWDT(8:last)@ @FLOWDT(9:last)@ @FLOWDT(10:last)@   @FLOWDT(14:first)@  @FLOWDT(14:last)@  Body mass index is 26.45 kg/m². Laboratory:  No results for input(s): WBC, HEMOGLOBIN, NA, CL, CO2, BUN, CREATININE, GLUCOSE, INR, PTT, CKMB in the last 72 hours. Invalid input(s): HEMATOCRIT, PLATELETS, POTASSIUM, CA, PT, CK1, TROP  INR @LABR24(INR)@    Physical Exam:  GENERAL:Well developed, well nourished in NAD  NECK: Neck exam - No JVD,HJR or carotid bruit, no thyromegaly   RESPIRATORY:No respiratory distress  HEART:RRR    Impression:  Active Problems:    * No active hospital problems. *  Resolved Problems:    * No resolved hospital problems.  *    US guided left thoracentesis    PLAN OF CARE/PLANNED PROCEDURE    IR THORACENTESIS PLEURAL W IMAGING [43096]

## 2021-08-04 NOTE — ANESTHESIA PRE PROCEDURE
mg by mouth every 6 hours as needed for Pain    Historical Provider, MD   prochlorperazine (COMPAZINE) 10 MG tablet Take 1 tablet by mouth every 8 hours as needed (nausea)  Patient taking differently: Take 10 mg by mouth every 8 hours as needed (Nausea)  1/25/21   Nat Villafana MD   promethazine (PHENERGAN) 25 MG tablet TAKE ONE TABLET BY MOUTH EVERY 6 HOURS AS NEEDED FOR NAUSEA 12/22/20   Nat Villafana MD   cetirizine (ZYRTEC) 10 MG tablet Take 10 mg by mouth daily    Historical Provider, MD       Current medications:    No current facility-administered medications for this encounter. No current outpatient medications on file.      Facility-Administered Medications Ordered in Other Encounters   Medication Dose Route Frequency Provider Last Rate Last Admin    magnesium oxide (MAG-OX) tablet 400 mg  400 mg Oral Daily Nat Villafana MD        cetirizine (ZYRTEC) tablet 10 mg  10 mg Oral Daily Salud DAWKINS MD   10 mg at 08/04/21 0957    furosemide (LASIX) tablet 80 mg  80 mg Oral Daily Salud DAWKINS MD   80 mg at 08/04/21 0957    LORazepam (ATIVAN) tablet 0.5 mg  0.5 mg Oral BID PRN Domenico Sutton MD        metoprolol succinate (TOPROL XL) extended release tablet 100 mg  100 mg Oral Daily Salud DAWKINS MD   100 mg at 08/04/21 0957    prochlorperazine (COMPAZINE) tablet 10 mg  10 mg Oral Q8H PRN Domenico Sutton MD        sodium bicarbonate tablet 650 mg  650 mg Oral BID Salud DAWKINS MD   650 mg at 08/04/21 0957    sodium chloride flush 0.9 % injection 5-40 mL  5-40 mL Intravenous 2 times per day Domenico Sutton MD   10 mL at 08/04/21 0959    sodium chloride flush 0.9 % injection 5-40 mL  5-40 mL Intravenous PRN Domenico Sutton MD        0.9 % sodium chloride infusion  25 mL Intravenous PRN Domenico Sutton MD        ondansetron (ZOFRAN-ODT) disintegrating tablet 4 mg  4 mg Oral Q8H PRN Domenico Sutton MD        Or    ondansetron [facility]) injection 4 mg 4 mg Intravenous Q6H PRN Chuy Downs MD        polyethylene glycol (GLYCOLAX) packet 17 g  17 g Oral Daily PRN Chuy Downs MD        acetaminophen (TYLENOL) tablet 650 mg  650 mg Oral Q6H PRN Chuy Downs MD        Or    acetaminophen (TYLENOL) suppository 650 mg  650 mg Rectal Q6H PRN Chuy Downs MD        HYDROcodone-acetaminophen [facility]) 5-325 MG per tablet 1 tablet  1 tablet Oral Q6H PRN Chuy Downs MD   1 tablet at 08/03/21 1722       Allergies:     Allergies   Allergen Reactions    Latex Anaphylaxis     \"have trouble with banana's kiwi, avocados- Have trouble breathing with these and trouble breathig - get asthmatic from latex gloves, bandage, anything latex     Banana Anaphylaxis    Cinnamon Anaphylaxis       Problem List:    Patient Active Problem List   Diagnosis Code    Left breast mass N63.20    Secondary cancer of bone ([address] C79.51    Essential hypertension I10    Malignant neoplasm of central portion of left female breast ([address] C50.112    Metastatic breast cancer ([address] C50.919    Dehydration E86.0    Iron deficiency anemia D50.9    Malabsorption K90.9    Left ovarian enlargement N83.8    ELIDIA (acute kidney injury) ([address] N17.9    Hyperkalemia S57.9    Metabolic acidosis H86.8    Hyponatremia E87.1    Tachycardia R00.0    Acute and chronic respiratory failure (acute-on-chronic) (HCC) J96.20    COVID-19 U07.1    Acute renal failure with tubular necrosis (HCC) N17.0    Bilateral hydronephrosis N13.30    Edema leg R60.0    Hypervolemia E87.70    Hypocalcemia E83.51    Pneumothorax J93.9       Past Medical History:        Diagnosis Date    Anxiety     Asthma     last flare up 5 yrs ago    Breast lesion     \"have spot on left breast going to remove- at this time not sure if cancer or not\"    Essential hypertension 4/1/2020    Secondary cancer of bone ([address] 4/1/2020    Thyroid nodule     \"have thyroid nodules-        Past Surgical History: Procedure Laterality Date    APPENDECTOMY  age 27    BLADDER SURGERY Bilateral 3/25/2021    BILATERAL CYSTOSCOPY BILATERAL STENT INSERTION performed by Criss Brooks MD at [address] Bilateral 6/11/2021    BILATERAL CYSTOSCOPY RETROGRADE PYELOGRAM STENT EXCHANGE performed by Jong Sorensen MD at [address] GUIDED CHEST TUBE  8/3/2021    CT GUIDED CHEST TUBE 8/3/2021 [address], Susan. [facility]"" CT SCAN    IR NONTUNNELED VASCULAR CATHETER  3/22/2021    IR NONTUNNELED VASCULAR CATHETER 3/22/2021 [address], Susan. \[facility]"" SPECIAL PROCEDURES    PORT SURGERY Right 3/15/2021    RIGHT PORT INSERTION performed by Nisa Santacruz MD at [address]  age 5    T & A       Social History:    Social History     Tobacco Use    Smoking status: Never Smoker    Smokeless tobacco: Never Used   Substance Use Topics    Alcohol use: Yes     Comment: average\"one time per week\"                                Counseling given: Not Answered      Vital Signs (Current): There were no vitals filed for this visit.                                            BP Readings from Last 3 Encounters:   08/04/21 103/66   08/03/21 (!) 116/48   08/02/21 108/64       NPO Status:                                                                                 BMI:   Wt Readings from Last 3 Encounters:   08/04/21 138 lb 6.4 oz (62.8 kg)   08/03/21 140 lb (63.5 kg)   07/30/21 141 lb 9.6 oz (64.2 kg)     There is no height or weight on file to calculate BMI.    CBC:   Lab Results   Component Value Date    WBC 4.5 08/04/2021    RBC 3.52 08/04/2021    HGB 10.0 08/04/2021    HCT 31.9 08/04/2021    MCV 90.6 08/04/2021    RDW 20.1 08/04/2021     08/04/2021       CMP:   Lab Results   Component Value Date     08/04/2021    K 4.3 08/04/2021     08/04/2021    CO2 19 08/04/2021    BUN 82 08/04/2021    CREATININE 3.9 08/04/2021    GFRAA 15 08/04/2021    LABGLOM 12 08/04/2021    GLUCOSE 110 08/04/2021    PROT 5.3

## 2021-08-04 NOTE — CONSULTS
Endocrinology   Consult Note      Dear Doctor Imani Kaur for the Consult     Pt. Was Admitted for : Left-sided pneumothorax    Reason for Consult: Better control of hypocalcemia      History Obtained From:  Patient/ EMR       HISTORY OF PRESENT ILLNESS:                The patient is a 50 y.o. female with significant past medical history of anxiety, asthma, breast lesion hypertension, history of thyroid nodule, renal failure secondary to calcium of bone was admitted to hospital after she developed pneumothorax on elective thoracentesis able to remove about 1400 cc of fluid from the left pleural space. During during the procedure patient developed left-sided pneumothorax and of left lung area chest tube was inserted on left side. Patient is comfortable not having much shortness of breath. She was found to be severely hypocalcemic. I was  consulted for better control of calcium. ROS:   Pt's ROS done in detail. Abnormal ROS are noted in Medical and Surgical History Section below:      Other Medical History:        Diagnosis Date    Anxiety     Asthma     last flare up 5 yrs ago    Breast lesion     \"have spot on left breast going to remove- at this time not sure if cancer or not\"    Essential hypertension 4/1/2020    Secondary cancer of bone ([address] 4/1/2020    Thyroid nodule     \"have thyroid nodules-      Surgical History:        Procedure Laterality Date    APPENDECTOMY  age 27   [facility] BLADDER SURGERY Bilateral 3/25/2021    BILATERAL CYSTOSCOPY BILATERAL STENT INSERTION performed by Megha Castañeda MD at [address] [facility] Bilateral 6/11/2021    BILATERAL CYSTOSCOPY RETROGRADE PYELOGRAM STENT EXCHANGE performed by Edith Christie MD at [address]  8/3/2021    CT GUIDED CHEST TUBE 8/3/2021 [facility] CT SCAN    IR NONTUNNELED VASCULAR CATHETER  3/22/2021    IR NONTUNNELED VASCULAR CATHETER 3/22/2021 [facility] SPECIAL PROCEDURES    PORT SURGERY Right 3/15/2021    RIGHT PORT INSERTION performed by Rocky Strange MD at [address] [facility]  age 5    T & A       Allergies:  Latex, Banana, and Cinnamon    Family History:       Problem Relation Age of Onset    Diabetes Mother     Stroke Mother     High Blood Pressure Mother      REVIEW OF SYSTEMS:  Review of System Done as noted above     PHYSICAL EXAM:      Vitals:    /67   Pulse 112   Temp 97.9 °F (36.6 °C) (Oral)   Resp (!) 32   Wt 138 lb 6.4 oz (62.8 kg)   LMP 04/08/2018   SpO2 96%   BMI 26.15 kg/m²     CONSTITUTIONAL:  awake, alert, cooperative, appears stated age   EYES:  vision intact Fundoscopic Exam not performed   ENT:Normal  NECK:  Supple, No JVD. Thyroid Exam:Normal   LUNGS:  Has Vesicular Breath Sounds, diminished breath sounds left side has chest tube for pneumothorax on left side  CARDIOVASCULAR:  Normal apical impulse, regular rate and rhythm, normal S1 and S2, no S3 or S4, and has no  murmur   ABDOMEN:  No scars, normal bowel sounds, soft, non-distended, non-tender, no masses palpated, no hepatolienomegaly  Musculoskeletal: Normal  Extremities: Normal, peripheral pulses normal, , has no edema   NEUROLOGIC:  Awake, alert, oriented to name, place and time. Cranial nerves II-XII are grossly intact. Motor is  intact. Sensory is intact. ,  and gait is normal.    DATA:    CBC:   Recent Labs     08/03/21  0812 08/03/21  1517 08/04/21  0717   WBC 5.4 4.3 4.5   HGB 10.6* 10.4* 10.0*    268 289    CMP:  Recent Labs     08/03/21  1517 08/04/21  0717    142   K 4.2 4.3    106   CO2 19* 19*   BUN 82* 82*   CREATININE 3.9* 3.9*   CALCIUM 6.1* 6.4*   PROT 5.4* 5.3*   LABALBU 2.9* 2.8*   BILITOT 0.2 0.2   ALKPHOS 80 81   AST 46* 48*   ALT 8* 7*     Lipids:   Lab Results   Component Value Date    CHOL 135 12/11/2019    HDL 54 12/11/2019    TRIG 74 12/11/2019     Glucose: No results for input(s): POCGLU in the last 72 hours.   Hemoglobin A1C:   Lab Results Component Value Date    LABA1C 6.0 06/10/2021     Free T4: No results found for: T4FREE  Free T3: No results found for: FT3  TSH High Sensitivity:   Lab Results   Component Value Date    TSHHS <0.010 01/07/2021       XR CHEST PORTABLE    Result Date: 8/4/2021  EXAMINATION: ONE XRAY VIEW OF THE CHEST 8/4/2021 5:44 am COMPARISON: August 3, 2021 HISTORY: ORDERING SYSTEM PROVIDED HISTORY: Post left chest tube insertion       Status post placement of a left pleural pigtail catheter. No significant interval change of a large pneumothorax. Stable opacities in the left lung with pleural effusion. XR CHEST PORTABLE    Result Date: 8/3/2021  EXAMINATION: ONE XRAY VIEW OF THE CHEST 8/3/2021 10:05 am  Significant decrease in size in left pleural effusion with likely small residual amount of pleural fluid remaining. Hazy opacification of the left lung and moderate amount of air in the left pleural space compatible with trapped lung. CT GUIDED CHEST TUBE    Result Date: 8/3/2021  PROCEDURE: CT GUIDED CHEST TUBE PLACEMENT 8/3/2021 HISTORY: ORDERING SYSTEM PROVIDED HISTORY: Post thoracentesis TECHNOLOGIST PROVIDED HISTORY: Reason for exam:->Post thoracentesis   1. Successful CT guided placement of a left-sided 8 [place] chest tube. 2. Interval re-expansion of left lung since the recent CT with diffuse asymmetric left-sided pulmonary parenchymal opacities. These opacities are nonspecific but could be secondary to infection, asymmetric edema, or pulmonary hemorrhage. Atelectasis is less likely. 3. Moderate left hydropneumothorax post left chest tube placement. 4. Nodular thickening noted along the pleura at the left lung base is suspicious for metastatic disease. IR GUIDED THORACENTESIS PLEURAL    Result Date: 8/3/2021  PROCEDURE: ULTRASOUNDGUIDED left THORACENTESIS 8/3/2021 HISTORY: ORDERING SYSTEM PROVIDED HISTORY: Pleural effusion TECHNOLOGIST PROVIDED HISTORY: Which side should the procedure be performed? ->Left Reason for exam:->Pleural Effusion Is the patient pregnant?->No Shortness of breath        FINDINGS: A total of 1400 cc clear yellow fluid was removed. Fluid was sent for analysis     Successful ultrasound guided left-sided thoracentesis. Scheduled Medicines   Medications:    magnesium oxide  400 mg Oral Daily    calcitRIOL  0.5 mcg Oral Daily    cetirizine  10 mg Oral Daily    furosemide  80 mg Oral Daily    metoprolol succinate  100 mg Oral Daily    sodium bicarbonate  650 mg Oral BID    sodium chloride flush  5-40 mL Intravenous 2 times per day      Infusions:    IV infusion builder      sodium chloride           IMPRESSION    Patient Active Problem List   Diagnosis    Left breast mass    Secondary cancer of bone ([address]    Essential hypertension    Malignant neoplasm of central portion of left female breast ([address]    Metastatic breast cancer (HCC)    Dehydration    Iron deficiency anemia    Malabsorption    Left ovarian enlargement    ELIDIA (acute kidney injury) ([address]    Hyperkalemia    Metabolic acidosis    Hyponatremia    Tachycardia    Acute and chronic respiratory failure (acute-on-chronic) (HCC)    COVID-19    Acute renal failure with tubular necrosis (HCC)    Bilateral hydronephrosis    Edema leg    Hypervolemia    Hypocalcemia    Pneumothorax         RECOMMENDATIONS:      1. Reviewed POC blood glucose . Labs and X ray results   2. Reviewed Home and Current Medicines   3. Will Start On calcium  + Calcitrpl    4. Monitor calciumfrequently   5. Modify  the dose of I medicines as needed        Will follow with you  Again thank you for sharing pt's care with me.      Truly yours,       Pradeep Castorena MD

## 2021-08-04 NOTE — CONSULTS
Pulmonary Consult Note      Reason for Consult: Left sided pneumothorax; iatrogenic from thoracocentesis  Requesting Physician: Dr. Kamilla Powers  Subjective:   CHIEF COMPLAINT :Pneumothorax s/p left thoracentesis    Patient Active Problem List    Diagnosis Date Noted    Pneumothorax 08/03/2021    Edema leg 06/23/2021    Hypervolemia 06/23/2021    Hypocalcemia 06/23/2021    Acute renal failure with tubular necrosis ([address] 06/10/2021    Bilateral hydronephrosis     COVID-19 05/12/2021    Acute and chronic respiratory failure (acute-on-chronic) ([address] 05/10/2021    Tachycardia     ELIDIA (acute kidney injury) ([address] 03/22/2021    Hyperkalemia     Metabolic acidosis     Hyponatremia     Left ovarian enlargement 01/26/2021    Iron deficiency anemia 01/07/2021    Malabsorption 01/07/2021    Dehydration 11/20/2020    Malignant neoplasm of central portion of left female breast ([address] 07/31/2020    Metastatic breast cancer ([address] 07/31/2020    Secondary cancer of bone ([address] 04/01/2020    Essential hypertension 04/01/2020    Left breast mass         HPI:                The patient is a 50 y.o. female with significant past medical history of Metastatic left breast ca, Anxiety, Asthma, HTN  presents with complaints of SOB. She recently had a CT chest which showed large left pleural effusion. She had a left thoracentesis and pneumothorax post for which she had a left pig tail catheter insertion. She had incomplete reexpansion or resolution of the left pneumothorax. At this time she is lying in the bed. She is in mild resp distress. Her pleural fluid is an exudate with a LDH of 1141.     Past Medical History:      Diagnosis Date    Anxiety     Asthma     last flare up 5 yrs ago    Breast lesion     \"have spot on left breast going to remove- at this time not sure if cancer or not\"    Essential hypertension 4/1/2020    Secondary cancer of bone ([address] 4/1/2020    Thyroid nodule     \"have thyroid nodules- Past Surgical History:        Procedure Laterality Date    APPENDECTOMY  age 27   [facility] BLADDER SURGERY Bilateral 3/25/2021    BILATERAL CYSTOSCOPY BILATERAL STENT INSERTION performed by Helen Henry MD at [address] Bilateral 6/11/2021    BILATERAL CYSTOSCOPY RETROGRADE PYELOGRAM STENT EXCHANGE performed by Jessika Richard MD at [address] GUIDED CHEST TUBE  8/3/2021    CT GUIDED CHEST TUBE 8/3/2021 [facility] CT SCAN    IR NONTUNNELED VASCULAR CATHETER  3/22/2021    IR NONTUNNELED VASCULAR CATHETER 3/22/2021 [facility] SPECIAL PROCEDURES    PORT SURGERY Right 3/15/2021    RIGHT PORT INSERTION performed by Juan F Ramos MD at [address]  age 5    T & A     Current Medications:     magnesium oxide  400 mg Oral Daily    cetirizine  10 mg Oral Daily    furosemide  80 mg Oral Daily    metoprolol succinate  100 mg Oral Daily    sodium bicarbonate  650 mg Oral BID    sodium chloride flush  5-40 mL Intravenous 2 times per day     Allergies:    Social History:    TOBACCO:   reports that she has never smoked. She has never used smokeless tobacco.  ETOH:   reports current alcohol use. Patient currently lives independently    Family History:       Problem Relation Age of Onset    Diabetes Mother     Stroke Mother     High Blood Pressure Mother        REVIEW OF SYSTEMS:    CONSTITUTIONAL:  negative for fevers, chills, diaphoresis, activity change, appetite change, fatigue, night sweats and unexpected weight change.    EYES:  negative for blurred vision, eye discharge, visual disturbance and icterus  HEENT:  negative for hearing loss, tinnitus, ear drainage, sinus pressure, nasal congestion, epistaxis and snoring  RESPIRATORY:  See HPI  CARDIOVASCULAR:  negative for chest pain, palpitations, exertional chest pressure/discomfort, edema, syncope  GASTROINTESTINAL:  negative for nausea, vomiting, diarrhea, constipation, blood in stool and abdominal pain  GENITOURINARY:  negative for frequency, dysuria, urinary incontinence, decreased urine volume, and hematuria  HEMATOLOGIC/LYMPHATIC:  negative for easy bruising, bleeding and lymphadenopathy  ALLERGIC/IMMUNOLOGIC:  negative for recurrent infections, angioedema, anaphylaxis and drug reactions  ENDOCRINE:  negative for weight changes and diabetic symptoms including polyuria, polydipsia and polyphagia  MUSCULOSKELETAL:  negative for  pain, joint swelling, decreased range of motion and muscle weakness  NEUROLOGICAL:  negative for headaches, slurred speech, unilateral weakness  PSYCHIATRIC/BEHAVIORAL: negative for hallucinations, behavioral problems, confusion and agitation. Objective:   PHYSICAL EXAM:      VITALS:  /67   Pulse 112   Temp 97.9 °F (36.6 °C) (Oral)   Resp (!) 32   Wt 138 lb 6.4 oz (62.8 kg)   LMP 2018   SpO2 96%   BMI 26.15 kg/m²   24HR INTAKE/OUTPUT:  No intake or output data in the 24 hours ending 21 1205  CURRENT PULSE OXIMETRY:  SpO2: 96 %  24HR PULSE OXIMETRY RANGE:  SpO2  Av.3 %  Min: 94 %  Max: 99 %    CONSTITUTIONAL:  awake, alert, cooperative, no apparent distress, and appears stated age  NECK:  Supple, symmetrical, trachea midline, no adenopathy, thyroid symmetric, not enlarged and no tenderness, skin normal  LUNGS: decreased air entry left base  CARDIOVASCULAR:  normal S1 and S2, no edema and no JVD  ABDOMEN:  normal bowel sounds, non-distended and no masses palpated, and no tenderness to palpation. No hepatospleenomegaly  LYMPHADENOPATHY:  no axillary or supraclavicular adenopathy. No cervical adnenopathy  PSYCHIATRIC: Oriented to person place and time. No obvious depression or anxiety. MUSCULOSKELETAL: No obvious misalignment or effusion of the joints. No clubbing, cyanosis of the digits. SKIN:  normal skin color, texture, turgor and no redness, warmth, or swelling.  No palpable nodules    DATA:    Old records have been reviewed  CBC with Differential: Lab Results   Component Value Date    WBC 4.5 08/04/2021    RBC 3.52 08/04/2021    HGB 10.0 08/04/2021    HCT 31.9 08/04/2021     08/04/2021    MCV 90.6 08/04/2021    MCH 28.4 08/04/2021    MCHC 31.3 08/04/2021    RDW 20.1 08/04/2021    NRBC 1 08/04/2021    SEGSPCT 66.0 08/04/2021    BANDSPCT 7 08/04/2021    LYMPHOPCT 20.0 08/04/2021    MONOPCT 7.0 08/04/2021    BASOPCT 0.3 07/28/2021    MONOSABS 0.3 08/04/2021    LYMPHSABS 0.9 08/04/2021    EOSABS 0.0 07/28/2021    BASOSABS 0.0 07/28/2021    DIFFTYPE MANUAL DIFFERENTIAL 08/04/2021     BMP:    Lab Results   Component Value Date     08/04/2021    K 4.3 08/04/2021     08/04/2021    CO2 19 08/04/2021    BUN 82 08/04/2021    CREATININE 3.9 08/04/2021    CALCIUM 6.4 08/04/2021    GFRAA 15 08/04/2021    LABGLOM 12 08/04/2021    GLUCOSE 110 08/04/2021     Hepatic Function Panel:    Lab Results   Component Value Date    ALKPHOS 81 08/04/2021    ALT 7 08/04/2021    AST 48 08/04/2021    PROT 5.3 08/04/2021    BILITOT 0.2 08/04/2021     ABG:  No results found for: BYE9RIB, BEART, W9TLVGHU, PHART, THGBART, HSH1ZGT, PO2ART, YHK2LOP    Cultures:   Blood Culture:    Sputum Culture:        Radiology Review:      Significant decrease in size in left pleural effusion with likely small   residual amount of pleural fluid remaining.  Hazy opacification of the left   lung and moderate amount of air in the left pleural space compatible with   trapped lung. Status post placement of a left pleural pigtail catheter.  No significant   interval change of a large pneumothorax.       Stable opacities in the left lung with pleural effusion     Large left pleural effusion, increased compared to the exam of 5/10/2021. There is now complete collapse of the left lower lobe and near complete   collapse of the left upper lobe.  Nodular appearance of the left pleural   surface is highly suspicious for pleural metastasis.    2. Pelvic adenopathy, not significantly changed compared to prior exam.   Finding is highly suspicious for metastasis. 3. Nodular skin thickening of the left anterior chest wall and subcutaneous   fat, likely metastasis. 4. Diffuse bone metastasis in the axial skeleton with pathologic fracture of   T10.  This is a chronic finding. 5. Persistent eccentric bladder wall thickening with relative thickening of   the left side of the bladder wall.  Differential includes cystitis and   malignancy.  If it would alter patient's management, consider cystoscopy. 6. Stable position of bilateral ureteral stents.  Right renal   pelvocaliectasis without you hydronephrosis. 7. Diffuse skin thickening with subcutaneous edema of the chest, abdominal,   and pelvic wall.  Please correlate with clinical symptoms of anasarca. Assessment/Plan       Patient Active Problem List    Diagnosis Date Noted    Pneumothorax 08/03/2021    Edema leg 06/23/2021    Hypervolemia 06/23/2021    Hypocalcemia 06/23/2021    Acute renal failure with tubular necrosis ([address] 06/10/2021    Bilateral hydronephrosis     COVID-19 05/12/2021    Acute and chronic respiratory failure (acute-on-chronic) ([address] 05/10/2021    Tachycardia     ELIDIA (acute kidney injury) ([address] 03/22/2021    Hyperkalemia     Metabolic acidosis     Hyponatremia     Left ovarian enlargement 01/26/2021    Iron deficiency anemia 01/07/2021    Malabsorption 01/07/2021    Dehydration 11/20/2020    Malignant neoplasm of central portion of left female breast ([address] 07/31/2020    Metastatic breast cancer ([address] 07/31/2020    Secondary cancer of bone ([address] 04/01/2020    Essential hypertension 04/01/2020    Left breast mass    Large left pleural effusion s/p thoracentesis ? malignant  Iatrogenic left pneumothorax s/p left chest tube  ? Entrapped lung  Metastatic left breast ca  CKD  AG Metabolic acidosis    PLAN  1. Chest tube to water seal  2. Inhalers  3. Keep sats > 92%  4. CXR in am  5.  If no resolution, then she may need a wide bore chest tube  6. DVT and GI Prophylaxis  7.  C/w present management          Electronically signed by Danny Jones MD on 8/4/2021 at 12:05 PM

## 2021-08-04 NOTE — CARE COORDINATION
CM in to see Pt to discuss discharge planning. Pt from home with family. Pt has DME to include wheelchair and walker. Pt is interested in [facility] [place], [facility]. at discharge if recommended. Pt stated she attempted to initiate care with [facility] with no luck. [facility] is out of network. Pt has insurance, pcp, and can afford medications.     CM following for possible [facility], [facility].

## 2021-08-04 NOTE — CONSULTS
Hematology/Oncology  Consult Note      Reason for Consult: Metastatic left breast cancer. Requesting Physician: Hospitalist    CHIEF COMPLAINT: Pneumothorax status post left thoracentesis    History Obtained From:  patient, electronic medical record    HISTORY OF PRESENT ILLNESS:      The patient is a 50 y.o. female with significant past medical history of metastatic breast cancer who presents with above. She is due for palliative chemotherapy Taxol today. She is also due for the renal stent replacement. Yesterday she had therapeutic left thoracentesis and had pneumothorax. She has a chest tube. There is no fluid in the chest tube at present time. She was suspected to have trapped lung. I discussed with her about her treatment which is due today and she is agreeable to have it in the hospital.  I will add magnesium supplement.     Past Medical History:        Diagnosis Date    Anxiety     Asthma     last flare up 5 yrs ago    Breast lesion     \"have spot on left breast going to remove- at this time not sure if cancer or not\"    Essential hypertension 4/1/2020    Secondary cancer of bone ([address] 4/1/2020    Thyroid nodule     \"have thyroid nodules-      Past Surgical History:        Procedure Laterality Date    APPENDECTOMY  age 27   [facility] [place] BLADDER SURGERY Bilateral 3/25/2021    BILATERAL CYSTOSCOPY BILATERAL STENT INSERTION performed by Edgar Jimenez MD at [address] [facility] Bilateral 6/11/2021    BILATERAL CYSTOSCOPY RETROGRADE PYELOGRAM STENT EXCHANGE performed by Nikole Weston MD at [address]    CT GUIDED CHEST TUBE  8/3/2021    CT GUIDED CHEST TUBE 8/3/2021 [facility] CT SCAN    IR NONTUNNELED VASCULAR CATHETER  3/22/2021    IR NONTUNNELED VASCULAR CATHETER 3/22/2021 [facility] SPECIAL PROCEDURES    PORT SURGERY Right 3/15/2021    RIGHT PORT INSERTION performed by Saira Taylor MD at [address] [facility]  age 4    T & A       Current Medications:    Current Facility-Administered Medications: magnesium oxide (MAG-OX) tablet 400 mg, 400 mg, Oral, Daily  cetirizine (ZYRTEC) tablet 10 mg, 10 mg, Oral, Daily  furosemide (LASIX) tablet 80 mg, 80 mg, Oral, Daily  LORazepam (ATIVAN) tablet 0.5 mg, 0.5 mg, Oral, BID PRN  metoprolol succinate (TOPROL XL) extended release tablet 100 mg, 100 mg, Oral, Daily  prochlorperazine (COMPAZINE) tablet 10 mg, 10 mg, Oral, Q8H PRN  sodium bicarbonate tablet 650 mg, 650 mg, Oral, BID  sodium chloride flush 0.9 % injection 5-40 mL, 5-40 mL, Intravenous, 2 times per day  sodium chloride flush 0.9 % injection 5-40 mL, 5-40 mL, Intravenous, PRN  0.9 % sodium chloride infusion, 25 mL, Intravenous, PRN  ondansetron (ZOFRAN-ODT) disintegrating tablet 4 mg, 4 mg, Oral, Q8H PRN **OR** ondansetron (ZOFRAN) injection 4 mg, 4 mg, Intravenous, Q6H PRN  polyethylene glycol (GLYCOLAX) packet 17 g, 17 g, Oral, Daily PRN  acetaminophen (TYLENOL) tablet 650 mg, 650 mg, Oral, Q6H PRN **OR** acetaminophen (TYLENOL) suppository 650 mg, 650 mg, Rectal, Q6H PRN  HYDROcodone-acetaminophen (NORCO) 5-325 MG per tablet 1 tablet, 1 tablet, Oral, Q6H PRN    Allergies:  Latex, Banana, and Cinnamon    Social History:    TOBACCO:   reports that she has never smoked. She has never used smokeless tobacco.  ETOH:   reports current alcohol use. DRUGS:   reports no history of drug use. Family History:       Problem Relation Age of Onset    Diabetes Mother     Stroke Mother     High Blood Pressure Mother      REVIEW OF SYSTEMS:    She denied acute complaint. No shortness of breath while resting. No nausea, vomiting or diarrhea. Denied any fever or chills. No headaches or dizzy spell. The remainder of the review of system is unremarkable.     PHYSICAL EXAM:      Vitals:    /60   Pulse 105   Temp 98.4 °F (36.9 °C) (Oral)   Resp 23   Wt 138 lb 6.4 oz (62.8 kg)   LMP 04/08/2018   SpO2 96%   BMI 26.15 kg/m²     CONSTITUTIONAL: awake, alert, cooperative, no apparent distress  +wheelchair  EYES:  sclera clear and conjunctiva pallor. ENT: Normocephalic, without obvious abnormality, atraumatic. Moist oral mucosa  NECK: supple, symmetrical.  No JVD  HEMATOLOGIC/LYMPHATIC: no cervical, supraclavicular or axillary lymphadenopathy   LUNGS: Diminished breath sound to the left lung. Chest Tube to the left lung noted. BREAST: Skin nodules to both anterior chest wall. CARDIOVASCULAR: regular rate and rhythm, normal S1 and S2, no murmur   ABDOMEN: normal bowel sounds, soft, non-distended, non-tender, no masses palpated, no hepatosplenomegaly   MUSCULOSKELETAL: Decreased strength to both lower extremity. NEUROLOGIC: awake, alert, oriented to name, place and time. Cranial nerves II through XII grossly intact. SKIN: No jaundice. appears intact. No petechial rash.   Dry skin. EXTREMITIES: BLE edema, left upper extremity swelling. No cyanosis.     DATA:    Labs:  General Labs:    CBC with Differential:    Lab Results   Component Value Date    WBC 4.5 08/04/2021    RBC 3.52 08/04/2021    HGB 10.0 08/04/2021    HCT 31.9 08/04/2021     08/04/2021    MCV 90.6 08/04/2021    MCH 28.4 08/04/2021    MCHC 31.3 08/04/2021    RDW 20.1 08/04/2021    NRBC 4 08/03/2021    SEGSPCT 69.0 08/03/2021    BANDSPCT 1 08/03/2021    LYMPHOPCT 28.0 08/03/2021    MONOPCT 2.0 08/03/2021    BASOPCT 0.3 07/28/2021    MONOSABS 0.1 08/03/2021    LYMPHSABS 1.2 08/03/2021    EOSABS 0.0 07/28/2021    BASOSABS 0.0 07/28/2021    DIFFTYPE AUTOMATED DIFFERENTIAL 08/03/2021     CMP:    Lab Results   Component Value Date     08/03/2021    K 4.2 08/03/2021     08/03/2021    CO2 19 08/03/2021    BUN 82 08/03/2021    CREATININE 3.9 08/03/2021    GFRAA 15 08/03/2021    LABGLOM 12 08/03/2021    GLUCOSE 133 08/03/2021    PROT 5.4 08/03/2021    LABALBU 2.9 08/03/2021    CALCIUM 6.1 08/03/2021    BILITOT 0.2 08/03/2021    ALKPHOS 80 08/03/2021    AST 46 08/03/2021    ALT 8 08/03/2021 IMPRESSION/RECOMMENDATIONS:      1. Chest metastatic breast cancer. She is currently on palliative chemotherapy with, paclitaxel. I will try to arrange to give the chemo while she is in the hospital.    2.  She has hydronephrosis related to metastatic breast cancer. She will need renal stent replacement. 3.  She has chronic kidney disease. She has been followed by nephrologist.  We will supplement her magnesium. She is on Lasix for bilateral lower extremity edema. I will continue to follow her closely. Thank you for allowing us to participate in her care.

## 2021-08-04 NOTE — PROGRESS NOTES
Email sent to the team for inpatient chemotherapy. Dr Savannah Jain has requested that patient receive inpatient Taxol.

## 2021-08-04 NOTE — CONSULTS
[facility]   Consult Note  [facility] 1 2 3 4 5    Date: 2021   Patient: Calderon Mckeon   : 1965   DOA: 8/3/2021   MRN: [id number]   ROOM#: [id number]     Reason for Consult: Ureteral stent exchange  Requesting Physician:  Dr. Bowman Level  Collaborating Urologist on Call at time of admission: Dr. Ashley Littleling: N/A    History Obtained From:  patient, electronic medical record    HISTORY OF PRESENT ILLNESS:                The patient is a 50 y.o. female with significant past medical history of metastatic breast cancer w resultant bilateral hydronephrosis managed with chronic indwelling ureteral stents and undergoing palliative chemotherapy who presented with a pneumothorax after undergoing thoracentesis 8/3/21 with 1400cc fluid return. Pt has had worsening kidney function over the last several weeks, so decision was made to exchange stents sooner than every 3 months. Pt is currently comfortable and denies any pain. Discussed plan for cystoscopy, bilateral ureteral stent exchange tomorrow if still OK to proceed with anesthesiology. She is agreeable.     Past Medical History:        Diagnosis Date    Anxiety     Asthma     last flare up 5 yrs ago    Breast lesion     \"have spot on left breast going to remove- at this time not sure if cancer or not\"    Essential hypertension 2020    Secondary cancer of bone ([address] 2020    Thyroid nodule     \"have thyroid nodules-      Past Surgical History:        Procedure Laterality Date    APPENDECTOMY  age 27   [facility] BLADDER SURGERY Bilateral 3/25/2021    BILATERAL CYSTOSCOPY BILATERAL STENT INSERTION performed by Jennifer Peoples MD at [address] [facility] Bilateral 2021    BILATERAL CYSTOSCOPY RETROGRADE PYELOGRAM STENT EXCHANGE performed by Tomasa Acharya MD at [facility] 6  8/3/2021    CT GUIDED CHEST TUBE 8/3/2021 [facility] CT SCAN    IR NONTUNNELED VASCULAR CATHETER  3/22/2021    IR NONTUNNELED VASCULAR CATHETER 3/22/2021 [facility] PROCEDURES    PORT SURGERY Right 3/15/2021    RIGHT PORT INSERTION performed by Aby Guy MD at [address]  age 4    T & A     Current Medications:   Current Facility-Administered Medications: magnesium oxide (MAG-OX) tablet 400 mg, 400 mg, Oral, Daily  cetirizine (ZYRTEC) tablet 10 mg, 10 mg, Oral, Daily  furosemide (LASIX) tablet 80 mg, 80 mg, Oral, Daily  LORazepam (ATIVAN) tablet 0.5 mg, 0.5 mg, Oral, BID PRN  metoprolol succinate (TOPROL XL) extended release tablet 100 mg, 100 mg, Oral, Daily  prochlorperazine (COMPAZINE) tablet 10 mg, 10 mg, Oral, Q8H PRN  sodium bicarbonate tablet 650 mg, 650 mg, Oral, BID  sodium chloride flush 0.9 % injection 5-40 mL, 5-40 mL, Intravenous, 2 times per day  sodium chloride flush 0.9 % injection 5-40 mL, 5-40 mL, Intravenous, PRN  0.9 % sodium chloride infusion, 25 mL, Intravenous, PRN  ondansetron (ZOFRAN-ODT) disintegrating tablet 4 mg, 4 mg, Oral, Q8H PRN **OR** ondansetron (ZOFRAN) injection 4 mg, 4 mg, Intravenous, Q6H PRN  polyethylene glycol (GLYCOLAX) packet 17 g, 17 g, Oral, Daily PRN  acetaminophen (TYLENOL) tablet 650 mg, 650 mg, Oral, Q6H PRN **OR** acetaminophen (TYLENOL) suppository 650 mg, 650 mg, Rectal, Q6H PRN  HYDROcodone-acetaminophen (NORCO) 5-325 MG per tablet 1 tablet, 1 tablet, Oral, Q6H PRN    Allergies:  Latex, Banana, and Cinnamon    Social History:   TOBACCO:   reports that she has never smoked. She has never used smokeless tobacco.  ETOH:   reports current alcohol use. DRUGS:   reports no history of drug use.     Family History:       Problem Relation Age of Onset    Diabetes Mother     Stroke Mother     High Blood Pressure Mother        REVIEW OF SYSTEMS:     CONSTITUTIONAL:  negative  RESPIRATORY:  negative  CARDIOVASCULAR:  negative  GASTROINTESTINAL:  negative  GENITOURINARY:  negative    PHYSICAL EXAM:      VITALS:  /60 Pulse 105   Temp 98.4 °F (36.9 °C) (Oral)   Resp 23   Wt 138 lb 6.4 oz (62.8 kg)   LMP 2018   SpO2 96%   BMI 26.15 kg/m²      TEMPERATURE:  Current - Temp: 98.4 °F (36.9 °C); Max - Temp  Av.1 °F (36.7 °C)  Min: 97.7 °F (36.5 °C)  Max: 98.4 °F (36.9 °C)  24HR BLOOD PRESSURE RANGE:  Systolic (58AHC), COB:756 , Min:90 , MVU:096   ; Diastolic (00GMR), HUN:81, Min:48, Max:78    General appearance: alert, appears stated age, cooperative and no distress  Head: Normocephalic, without obvious abnormality, atraumatic  Back: No CVA tenderness  Abdomen: Soft, non-tender, non-distended    DATA:    WBC:    Lab Results   Component Value Date    WBC 4.5 2021     Hemoglobin/Hematocrit:    Lab Results   Component Value Date    HGB 10.0 2021    HCT 31.9 2021     BMP:    Lab Results   Component Value Date     2021    K 4.3 2021     2021    CO2 19 2021    BUN 82 2021    LABALBU 2.8 2021    CREATININE 3.9 2021    CALCIUM 6.4 2021    GFRAA 15 2021    LABGLOM 12 2021     PT/INR:    Lab Results   Component Value Date    PROTIME 13.0 2021    INR 1.01 2021     Urine Culture: No growth at 18 to 36 hours     Imaging:  CT ABDOMEN PELVIS WO CONTRAST Additional Contrast? None    Result Date: 2021  EXAMINATION: CT OF THE CHEST WITHOUT CONTRAST; CT OF THE ABDOMEN AND PELVIS WITHOUT CONTRAST 2021 10:53 am; 2021 10:52 am TECHNIQUE: CT of the chest was performed without the administration of intravenous contrast. Multiplanar reformatted images are provided for review. Dose modulation, iterative reconstruction, and/or weight based adjustment of the mA/kV was utilized to reduce the radiation dose to as low as reasonably achievable.; CT of the abdomen and pelvis was performed without the administration of intravenous contrast. Multiplanar reformatted images are provided for review.  Dose modulation, iterative reconstruction, and/or weight based adjustment of the mA/kV was utilized to reduce the radiation dose to as low as reasonably achievable. COMPARISON: 5/10/2021, 3/24/2021 CT chest, 3/23/2021 CT abdomen and pelvis. Correlation is made to PET-CT dated 5/7/2020 HISTORY: ORDERING SYSTEM PROVIDED HISTORY: Malignant neoplasm of breast in female, estrogen receptor positive, unspecified laterality, unspecified site of breast [facility]) TECHNOLOGIST PROVIDED HISTORY: Is the patient pregnant?->No Reason for Exam: Recheck Breast Cancer, asthma, heartburn, gas, bloating, nausea, fatigue, back pain, swelling; ORDERING SYSTEM PROVIDED HISTORY: Malignant neoplasm of breast in female, estrogen receptor positive, unspecified laterality, unspecified site of breast [facility]) TECHNOLOGIST PROVIDED HISTORY: Additional Contrast?->None Is the patient pregnant?->No Reason for Exam: Recheck Breast Cancer, asthma, heartburn, gas, bloating, nausea, fatigue, back pain, swelling FINDINGS: Chest: Mediastinum: Heart size is normal.  Relative hypoattenuation of the blood pool compared to myocardium is suggestive of anemia. Within the limitations of a noncontrast exam, there is no evidence of right hilar adenopathy. Left hilum is not adequately seen for evaluation of adenopathy. 1.5 x 0.9 cm precarinal node, minimally decreased in size compared to the prior exam, at which time it measured 2.0 x 1.3 cm. Previously seen subcarinal lymph node has decreased in size. Lungs/pleura: Large left pleural effusion, increased compared to the prior exam, with collapse of the left lower lobe and near complete collapse of the left upper lobe. Although not well seen, the pleural surface has a slightly nodular appearance, suspicious for pleural metastasis. Soft Tissues/Bones: Skin thickening of the anterior chest wall. 1.7 x 1.1 cm right axillary lymph node has not significantly changed. Hypodensity in the thyroid gland with rim calcification is stable but not well seen by CT.   The isthmus of the thyroid gland appears thickened. Diffuse bone metastasis in the axial skeleton. Thoracic alignment is maintained. There is a pathologic fracture of T10, chronic. No evidence of bony fragment retropulsion into the central canal.  Nodular thickening of the left anterior chest wall skin and subcutaneous soft tissue. Abdomen/Pelvis: Organs: Unenhanced liver, gallbladder, spleen, pancreas, and adrenal glands are unremarkable. Bilateral ureteral stents with proximal loops in the renal collecting system and distal loops in the bladder. Right renal pelvicaliectasis without you hydronephrosis. GI/Bowel: No evidence of bowel obstruction or free intraperitoneal air. Pelvis: Eccentric bladder wall thickening with relative thickening of the left lateral wall compared to the right. Small amount of free fluid in the pelvis. Pelvic adenopathy, including 1.9 x 1.7 cm right external iliac node and 1.6 x 1.2 cm left external iliac node. Peritoneum/Retroperitoneum: Abdominal aortic caliber is normal.  No retroperitoneal adenopathy. Bones/Soft Tissues: Circumferential subcutaneous edema of the abdominal and pelvic wall. Diffuse sclerotic bone metastasis in the axial skeleton. 1. Large left pleural effusion, increased compared to the exam of 5/10/2021. There is now complete collapse of the left lower lobe and near complete collapse of the left upper lobe. Nodular appearance of the left pleural surface is highly suspicious for pleural metastasis. 2. Pelvic adenopathy, not significantly changed compared to prior exam. Finding is highly suspicious for metastasis. 3. Nodular skin thickening of the left anterior chest wall and subcutaneous fat, likely metastasis. 4. 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 This is a chronic finding. 5. Persistent eccentric bladder wall thickening with relative thickening of the left side of the bladder wall.   Differential includes cystitis and malignancy. If it would alter patient's management, consider cystoscopy. 6. Stable position of bilateral ureteral stents. Right renal pelvocaliectasis without you hydronephrosis. 7. Diffuse skin thickening with subcutaneous edema of the chest, abdominal, and pelvic wall. Please correlate with clinical symptoms of anasarca. CT CHEST WO CONTRAST    Result Date: 7/31/2021  EXAMINATION: CT OF THE CHEST WITHOUT CONTRAST; CT OF THE ABDOMEN AND PELVIS WITHOUT CONTRAST 7/28/2021 10:53 am; 7/28/2021 10:52 am TECHNIQUE: CT of the chest was performed without the administration of intravenous contrast. Multiplanar reformatted images are provided for review. Dose modulation, iterative reconstruction, and/or weight based adjustment of the mA/kV was utilized to reduce the radiation dose to as low as reasonably achievable.; CT of the abdomen and pelvis was performed without the administration of intravenous contrast. Multiplanar reformatted images are provided for review. Dose modulation, iterative reconstruction, and/or weight based adjustment of the mA/kV was utilized to reduce the radiation dose to as low as reasonably achievable. COMPARISON: 5/10/2021, 3/24/2021 CT chest, 3/23/2021 CT abdomen and pelvis.   Correlation is made to PET-CT dated 5/7/2020 HISTORY: ORDERING SYSTEM PROVIDED HISTORY: Malignant neoplasm of breast in female, estrogen receptor positive, unspecified laterality, unspecified site of breast [facility]) TECHNOLOGIST PROVIDED HISTORY: Is the patient pregnant?->No Reason for Exam: Recheck Breast Cancer, asthma, heartburn, gas, bloating, nausea, fatigue, back pain, swelling; ORDERING SYSTEM PROVIDED HISTORY: Malignant neoplasm of breast in female, estrogen receptor positive, unspecified laterality, unspecified site of breast [facility]) TECHNOLOGIST PROVIDED HISTORY: Additional Contrast?->None Is the patient pregnant?->No Reason for Exam: Recheck Breast Cancer, asthma, heartburn, gas, bloating, nausea, fatigue, back pain, swelling FINDINGS: Chest: Mediastinum: Heart size is normal.  Relative hypoattenuation of the blood pool compared to myocardium is suggestive of anemia. Within the limitations of a noncontrast exam, there is no evidence of right hilar adenopathy. Left hilum is not adequately seen for evaluation of adenopathy. 1.5 x 0.9 cm precarinal node, minimally decreased in size compared to the prior exam, at which time it measured 2.0 x 1.3 cm. Previously seen subcarinal lymph node has decreased in size. Lungs/pleura: Large left pleural effusion, increased compared to the prior exam, with collapse of the left lower lobe and near complete collapse of the left upper lobe. Although not well seen, the pleural surface has a slightly nodular appearance, suspicious for pleural metastasis. Soft Tissues/Bones: Skin thickening of the anterior chest wall. 1.7 x 1.1 cm right axillary lymph node has not significantly changed. Hypodensity in the thyroid gland with rim calcification is stable but not well seen by CT. The isthmus of the thyroid gland appears thickened. Diffuse bone metastasis in the axial skeleton. Thoracic alignment is maintained. There is a pathologic fracture of T10, chronic. No evidence of bony fragment retropulsion into the central canal.  Nodular thickening of the left anterior chest wall skin and subcutaneous soft tissue. Abdomen/Pelvis: Organs: Unenhanced liver, gallbladder, spleen, pancreas, and adrenal glands are unremarkable. Bilateral ureteral stents with proximal loops in the renal collecting system and distal loops in the bladder. Right renal pelvicaliectasis without you hydronephrosis. GI/Bowel: No evidence of bowel obstruction or free intraperitoneal air. Pelvis: Eccentric bladder wall thickening with relative thickening of the left lateral wall compared to the right. Small amount of free fluid in the pelvis.   Pelvic adenopathy, including 1.9 x 1.7 cm right external iliac node and 1.6 x 1.2 cm left external iliac node. Peritoneum/Retroperitoneum: Abdominal aortic caliber is normal.  No retroperitoneal adenopathy. Bones/Soft Tissues: Circumferential subcutaneous edema of the abdominal and pelvic wall. Diffuse sclerotic bone metastasis in the axial skeleton. 1. Large left pleural effusion, increased compared to the exam of 5/10/2021. There is now complete collapse of the left lower lobe and near complete collapse of the left upper lobe. Nodular appearance of the left pleural surface is highly suspicious for pleural metastasis. 2. Pelvic adenopathy, not significantly changed compared to prior exam. Finding is highly suspicious for metastasis. 3. Nodular skin thickening of the left anterior chest wall and subcutaneous fat, likely metastasis. 4. 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 This is a chronic finding. 5. Persistent eccentric bladder wall thickening with relative thickening of the left side of the bladder wall. Differential includes cystitis and malignancy. If it would alter patient's management, consider cystoscopy. 6. Stable position of bilateral ureteral stents. Right renal pelvocaliectasis without you hydronephrosis. 7. Diffuse skin thickening with subcutaneous edema of the chest, abdominal, and pelvic wall. Please correlate with clinical symptoms of anasarca. XR CHEST PORTABLE    Result Date: 8/4/2021  EXAMINATION: ONE XRAY VIEW OF THE CHEST 8/4/2021 5:44 am COMPARISON: August 3, 2021 HISTORY: ORDERING SYSTEM PROVIDED HISTORY: Post left chest tube insertion TECHNOLOGIST PROVIDED HISTORY: Reason for exam:->Post left chest tube insertion Reason for Exam: Post left chest tube insertion Acuity: Acute Type of Exam: Subsequent/Follow-up FINDINGS: Interval placement of a left chest tube. No significant interval change of a large pneumothorax. Airspace opacities are identified in the left upper lung zone.   Consolidation is seen in the base.  Left effusion is also seen. The right lung is grossly clear without focal consolidation or pleural effusion. No pulmonary edema. No new osseous abnormality. Stable appearance of a right-sided MediPort. Status post placement of a left pleural pigtail catheter. No significant interval change of a large pneumothorax. Stable opacities in the left lung with pleural effusion. XR CHEST PORTABLE    Result Date: 8/3/2021  EXAMINATION: ONE XRAY VIEW OF THE CHEST 8/3/2021 10:05 am COMPARISON: CT chest 07/28/2021 HISTORY: ORDERING SYSTEM PROVIDED HISTORY: post left thoracentesis. TECHNOLOGIST PROVIDED HISTORY: SDS #18 Reason for exam:->post left thoracentesis. Reason for Exam: post left thoracentesis. FINDINGS: Right arm port catheter with catheter tip right atrium. Stable cardiomediastinal silhouette. Significant decrease in size of left pleural effusion with probable small residual pleural effusion remaining. Hazy opacification of the left lung with moderate amount of air in the left pleural space compatible with atelectasis secondary to trapped lung. Significant decrease in size in left pleural effusion with likely small residual amount of pleural fluid remaining. Hazy opacification of the left lung and moderate amount of air in the left pleural space compatible with trapped lung. CT GUIDED CHEST TUBE    Result Date: 8/3/2021  PROCEDURE: CT GUIDED CHEST TUBE PLACEMENT 8/3/2021 HISTORY: ORDERING SYSTEM PROVIDED HISTORY: Post thoracentesis TECHNOLOGIST PROVIDED HISTORY: Reason for exam:->Post thoracentesis Which side should the procedure be performed? ->Left Is the patient pregnant?->No Reason for Exam: post thoracentesis chest tube Relevant Medical/Surgical History: ATTN: DR TREVIÑO Pneumothorax SEDATION: None TECHNIQUE: Informed consent was obtained after a detailed explanation of the procedure including risks, benefits, and alternatives. Universal protocol was followed.  A suitable skin site was prepped and draped in sterile fashion following CT localization. The patient was initially scanned supine, however, the patient has extensive post radiation change on anterior left chest wall. Patient was then placed right lateral decubitus to minimize needle path through the radiation change. An 18 gauge needle was advanced into the left pleural space and a 0.035 guidewire was used to place a 8 Malay chest tube after the fascial tract was dilated. The catheter was sutured to the skin and the patient tolerated the procedure well. The catheter was attached to Pleurevac drainage. The patient tolerated the procedure well and there were no immediate complications. EBL: Less than 5 cc FINDINGS: 1. When compared with the chest CT from 7/28/2021 there has been interval re-expansion of the left lung. There are diffuse nonspecific parenchymal opacities seen throughout visualized left lung. 2. Moderate left hydropneumothorax status post chest tube placement. 3. Nonspecific diffuse soft tissue nodularity seen throughout the left chest wall soft tissues with dermal thickening, which is not significantly changed since the recent CT. This could represent post radiation change, however, superimposed malignancy cannot be excluded. 4. Limited evaluation demonstrates sclerotic lesions seen throughout the spine, sternum and ribs. 5. Nodular thickening noted along the left pleura at the lung base. 1. Successful CT guided placement of a left-sided 8 Malay chest tube. 2. Interval re-expansion of left lung since the recent CT with diffuse asymmetric left-sided pulmonary parenchymal opacities. These opacities are nonspecific but could be secondary to infection, asymmetric edema, or pulmonary hemorrhage. Atelectasis is less likely. 3. Moderate left hydropneumothorax post left chest tube placement. 4. Nodular thickening noted along the pleura at the left lung base is suspicious for metastatic disease.      IR GUIDED THORACENTESIS PLEURAL    Result Date: 8/3/2021  PROCEDURE: Mishel Cole left THORACENTESIS 8/3/2021 HISTORY: ORDERING SYSTEM PROVIDED HISTORY: Pleural effusion TECHNOLOGIST PROVIDED HISTORY: Which side should the procedure be performed? ->Left Reason for exam:->Pleural Effusion Is the patient pregnant?->No Shortness of breath TECHNIQUE: Informed consent was obtained after a detailed explanation of the procedure including risks, benefits, and alternatives. Universal protocol was performed. The left chest was prepped and draped in sterile fashion and local anesthesia was achieved with lidocaine. A 5 Sami needle sheath was advanced under ultrasound guidance into pleural effusion and thoracentesis was performed. The patient tolerated the procedure well. EBL: Less than 5 cc FINDINGS: A total of 1400 cc clear yellow fluid was removed. Fluid was sent for analysis     Successful ultrasound guided left-sided thoracentesis. Assessment & Plan:      Ernestina Alvarez is a 50y.o. year old female admitted 8/3/2021 for pneumothorax. H/o metastatic breast cancer, currently undergoing palliative chemotherapy with hem/onc. Known to Dr. Annie Carrasco.     1) Bilateral Hydronephrosis: secondary to bilateral ureteral obstruction due to metastatic breast cancer              S/p cystoscopy, bilateral RGPG, bilateral ureteral stent exchange 6/11/21              S/p cystoscopy, bilateral ureteral stent placement on 3/25/21               CT a/p 6/9/21: Unchanged moderate-large amount of bilateral hydronephrosis greater on the right. Ureteral stents in good position.              Urine cx negative              Plan for cystoscopy, bilateral ureteral stent exchange tomorrow morning with Dr. Martinez Cools as long as still OK with anesthesia. Covid negative. Will make NPO after midnight. 2) ELIDIA              Cr 3.9              Nephrology following    Patient seen and examined, chart reviewed.      Electronically signed by Lou Gutiérrez PA-C on 8/4/2021 at 9:07 AM

## 2021-08-04 NOTE — PROGRESS NOTES
Hospitalist Progress Note      Name:  Troy Whitfield /Age/Sex: 1965  (50 y.o. female)   MRN & CSN:  [id number] & [id number] Admission Date/Time: 8/3/2021  1:46 PM   [facility]  [facility] PCP: Damian Weaver 112 Day: 2    Assessment and Plan:   Troy Whitfield is a 50 y.o.  female  who presents with <principal problem not specified>    > Pneumothorax with Possible Trapped lung  -Vitally stable; on room air saturation 97%  -Concerning for pneumothorax  -CXR: Suggestive of trapped lung with moderate amount of air in the left pleural space. -S/P  Chest tube placement on 2021  -IR and Pulmonology on board     > Lt sided Pleural Effusion; likely malignant   -S/P thoracocentesis with drainage of 1400cc of fluid  -Follow fluid cytology    > Hypocalcemia  - Ca supp, consult Endo      >Other chronic medical conditions, medication resumed unless contraindicated  CKD IV  Metastatic breast cancer on palliative chemo  Asthma  Anxiety disorder  Chronic bilateral lower extremity swelling  History of DVT; right lower extremity. Initially on Eliquis but currently off. Unsure why this was discontinued  Bilateral hydronephrosis status post bilateral ureteral stent placement (scheduled for exchange on 2021-Will notify urology)       Diet ADULT DIET; Regular   DVT Prophylaxis [] SCDs   Code Status Full Code   Disposition  Home with [facility] , Pending clinical improvement and chest tube removal, consult clearance     History of Present Illness:     Pt S&E. Admit chest tube site tenderness, improved dyspnea, no abd pain, no N/V, no F/C.     10-14 point ROS reviewed negative, unless as noted above    Objective:   No intake or output data in the 24 hours ending 21 0911   Vitals:   Vitals:    21 0231   BP: 108/60   Pulse: 105   Resp: 23   Temp: 98.4 °F (36.9 °C)   SpO2: 96%     Physical Exam:      GEN Awake female, cooperative, no apparent distress. RESP Decreased air sounds. Symmetric chest movement . Left sided chest tube  CARDIO/VASC S1/S2 auscultated. Regular rate. GI Abdomen is soft without significant tenderness, Bowel sounds are normoactive. MSK No gross joint deformities. Spontaneous movement of all extremities  SKIN Normal coloration, warm, dry. NEURO normal speech, no lateralizing weakness. PSYCH Awake, alert, oriented x 4. Affect appropriate.     Medications:   Medications:    magnesium oxide  400 mg Oral Daily    cetirizine  10 mg Oral Daily    furosemide  80 mg Oral Daily    metoprolol succinate  100 mg Oral Daily    sodium bicarbonate  650 mg Oral BID    sodium chloride flush  5-40 mL Intravenous 2 times per day      Infusions:    sodium chloride       PRN Meds: LORazepam, 0.5 mg, BID PRN  prochlorperazine, 10 mg, Q8H PRN  sodium chloride flush, 5-40 mL, PRN  sodium chloride, 25 mL, PRN  ondansetron, 4 mg, Q8H PRN   Or  ondansetron, 4 mg, Q6H PRN  polyethylene glycol, 17 g, Daily PRN  acetaminophen, 650 mg, Q6H PRN   Or  acetaminophen, 650 mg, Q6H PRN  HYDROcodone-acetaminophen, 1 tablet, Q6H PRN      Electronically signed by Vidal Robins MD on 8/4/2021 at 9:11 AM

## 2021-08-05 NOTE — PROGRESS NOTES
Patient in room 3127 at hospital to receive pre-medications and chemotherapy infusion. Mediport on left upper arm accessed and no blood return available after multiple flushes. Spoke with Lukas Gerardo, NP, who stated that since the chemotherapy is not an irritant or worse, we can use the mediport for chemotherapy today. Treatment approved and given. In-room monitoring observed for duration of treatment. Patient tolerated well.

## 2021-08-05 NOTE — PROGRESS NOTES
Pulmonary and Critical Care  Progress Note      VITALS:  BP (!) 107/57   Pulse 106   Temp 98.6 °F (37 °C) (Temporal)   Resp 21   Wt 138 lb 6.4 oz (62.8 kg)   LMP 04/08/2018   SpO2 97%   BMI 26.15 kg/m²     Subjective:   CHIEF COMPLAINT :Pneumothorax s/p left thoracentesis     HPI:                The patient is lying in the bed. She is not in acute resp distress. She had bilateral stents for the bilateral hydronephrosis    Objective:   PHYSICAL EXAM:    LUNGS:Occasional basal crackles  Abd-soft, BS+,NT  Ext- no pedal edema  CVS-s1s2, no murmurs      DATA:    CBC:  Recent Labs     08/03/21  1517 08/04/21  0717 08/05/21  0159   WBC 4.3 4.5 4.0   RBC 3.65* 3.52* 2.76*   HGB 10.4* 10.0* 7.8*   HCT 33.7* 31.9* 25.2*    289 296   MCV 92.3 90.6 91.3   MCH 28.5 28.4 28.3   MCHC 30.9* 31.3* 31.0*   RDW 20.2* 20.1* 20.0*   NRBC 4 1  --    SEGSPCT 69.0* 66.0 57.6   BANDSPCT 1* 7  --       BMP:  Recent Labs     08/03/21  1517 08/03/21  1517 08/04/21  0717 08/04/21  1357 08/05/21  0159     --  142  --  141   K 4.2  --  4.3  --  3.7     --  106  --  107   CO2 19*  --  19*  --  19*   BUN 82*  --  82*  --  76*   CREATININE 3.9*  --  3.9*  --  3.7*   CALCIUM 6.1*   < > 6.4* 6.2* 7.0*   GLUCOSE 133*  --  110*  --  125*    < > = values in this interval not displayed. ABG:  No results for input(s): PH, PO2ART, LGA5IRO, HCO3, BEART, O2SAT in the last 72 hours. BNP  No results found for: BNP   D-Dimer:  Lab Results   Component Value Date    DDIMER 2537 (H) 05/13/2021      Radiology:    Left chest tube remains in place with interval resolution of left lateral   chest wall in basilar pneumothorax.  No definite residual pneumothorax is   identified.       2.  Similar appearing left pleural effusion with airspace opacities in the   left mid lung.      1.       Assessment/Plan     Patient Active Problem List    Diagnosis Date Noted    Pneumothorax 08/03/2021    Edema leg 06/23/2021    Hypervolemia 06/23/2021  Hypocalcemia 06/23/2021    Acute renal failure with tubular necrosis ([facility] 06/10/2021    Bilateral hydronephrosis     COVID-19 05/12/2021    Acute and chronic respiratory failure (acute-on-chronic) ([facility] 05/10/2021    Tachycardia     ELIDIA (acute kidney injury) ([facility] 03/22/2021    Hyperkalemia     Metabolic acidosis     Hyponatremia     Left ovarian enlargement 01/26/2021    Iron deficiency anemia 01/07/2021    Malabsorption 01/07/2021    Dehydration 11/20/2020    Malignant neoplasm of central portion of left female breast ([facility] 07/31/2020    Metastatic breast cancer ([facility] 07/31/2020    [facility] 04/01/2020    Essential hypertension 04/01/2020    Left breast mass    Large left pleural effusion s/p thoracentesis ? malignant  Iatrogenic left pneumothorax s/p left chest tube- resolved  ? Entrapped lung  Metastatic left breast ca  CKD  AG Metabolic acidosis  Bilateral Hydronephrosis s/p bilateral stent       1. Chest tube clamp and no recurrence in am will d/c the chest tube  2. If recurrent left pleural effusion, she may need a Pleurx catheter  3. BMP in am  4. Inhalers  5. Keep sats > 92%  6. C/w present management  No follow-ups on file.     Electronically signed by Yessica Bowie MD on 8/5/2021 at 11:13 AM

## 2021-08-05 NOTE — PROGRESS NOTES
Progress Note( Dr. Apolonia Hinkle)  8/5/2021  Subjective:   Admit Date: 8/3/2021  PCP: CINDY Sanchez - NP    Admitted For : Left-sided pneumothorax    Consulted For: Hypocalcemia     Interval History: Feels somewhat better breathing is better though still short of breath    Denies any chest pains,   Yes SOB . Denies nausea or vomiting. No new bowel or bladder symptoms. Intake/Output Summary (Last 24 hours) at 8/5/2021 0744  Last data filed at 8/5/2021 0308  Gross per 24 hour   Intake 877 ml   Output 296 ml   Net 581 ml       DATA    CBC:   Recent Labs     08/03/21  1517 08/04/21  0717 08/05/21  0159   WBC 4.3 4.5 4.0   HGB 10.4* 10.0* 7.8*    289 296    CMP:  Recent Labs     08/03/21  1517 08/03/21  1517 08/04/21  0717 08/04/21  1357 08/05/21  0159     --  142  --  141   K 4.2  --  4.3  --  3.7     --  106  --  107   CO2 19*  --  19*  --  19*   BUN 82*  --  82*  --  76*   CREATININE 3.9*  --  3.9*  --  3.7*   CALCIUM 6.1*   < > 6.4* 6.2* 7.0*   PROT 5.4*  --  5.3*  --  5.0*   LABALBU 2.9*  --  2.8*  --  3.0*   BILITOT 0.2  --  0.2  --  0.2   ALKPHOS 80  --  81  --  65   AST 46*  --  48*  --  46*   ALT 8*  --  7*  --  6*    < > = values in this interval not displayed. Lipids:   Lab Results   Component Value Date    CHOL 135 12/11/2019    HDL 54 12/11/2019    TRIG 74 12/11/2019     Glucose:No results for input(s): POCGLU in the last 72 hours.   [id number]:  Lab Results   Component Value Date    LABA1C 6.0 06/10/2021     High Sensitivity TSH:   Lab Results   Component Value Date    TSHHS <0.010 01/07/2021     Free T3: No results found for: FT3  Free T4:No results found for: T4FREE    XR CHEST PORTABLE    Result Date: 8/5/2021  EXAMINATION: ONE XRAY VIEW OF THE CHEST 8/4/2021 5:44 am COMPARISON: August 3, 2021 HISTORY: ORDERING SYSTEM PROVIDED HISTORY: Post left chest tube insertion TECHNOLOGIST PROVIDED HISTORY: Reason for exam:->Post left chest tube insertion Reason for Exam: Post left chest tube insertion Acuity: Acute Type of Exam: Subsequent/Follow-up FINDINGS: Interval placement of a left chest tube. No significant interval change of a large pneumothorax. Airspace opacities are identified in the left upper lung zone. Consolidation is seen in the base. Left effusion is also seen. The right lung is grossly clear without focal consolidation or pleural effusion. No pulmonary edema. No new osseous abnormality. Stable appearance of a right-sided MediPort. Status post placement of a left pleural pigtail catheter. No significant interval change of a large pneumothorax. Stable opacities in the left lung with pleural effusion. XR CHEST PORTABLE    Result Date: 8/3/2021  EXAMINATION: ONE XRAY VIEW OF THE CHEST 8/3/2021 10:05 am COMPARISON: CT chest 07/28/2021 HISTORY: ORDERING SYSTEM PROVIDED HISTORY: post left thoracentesis. TECHNOLOGIST PROVIDED HISTORY: SDS #18 Reason for exam:->post left thoracentesis. Reason for Exam: post left thoracentesis. FINDINGS: Right arm port catheter with catheter tip right atrium. Stable cardiomediastinal silhouette. Significant decrease in size of left pleural effusion with probable small residual pleural effusion remaining. Hazy opacification of the left lung with moderate amount of air in the left pleural space compatible with atelectasis secondary to trapped lung. Significant decrease in size in left pleural effusion with likely small residual amount of pleural fluid remaining. Hazy opacification of the left lung and moderate amount of air in the left pleural space compatible with trapped lung. CT GUIDED CHEST TUBE    Result Date: 8/3/2021  PROCEDURE: CT GUIDED CHEST TUBE PLACEMENT 8/3/2021 HISTORY: ORDERING SYSTEM PROVIDED HISTORY: Post thoracentesis       1. Successful CT guided placement of a left-sided 8 Italian chest tube.  2. Interval re-expansion of left lung since the recent CT with diffuse asymmetric left-sided pulmonary parenchymal opacities. These opacities are nonspecific but could be secondary to infection, asymmetric edema, or pulmonary hemorrhage. Atelectasis is less likely. 3. Moderate left hydropneumothorax post left chest tube placement. 4. Nodular thickening noted along the pleura at the left lung base is suspicious for metastatic disease. IR GUIDED THORACENTESIS PLEURAL    Result Date: 8/3/2021  PROCEDURE: ULTRASOUNDGUIDED left THORACENTESIS 8/3/2021 HISTORY: ORDERING SYSTEM PROVIDED HISTORY: Pleural effusion      Successful ultrasound guided left-sided thoracentesis. Removed about 1400 cc of fluid      Scheduled Medicines   Medications:    magnesium oxide  400 mg Oral Daily    calcitRIOL  0.5 mcg Oral BID    calcium carbonate  750 mg Oral TID WC    albumin human  25 g Intravenous Q8H    cetirizine  10 mg Oral Daily    furosemide  80 mg Oral Daily    metoprolol succinate  100 mg Oral Daily    sodium bicarbonate  650 mg Oral BID    sodium chloride flush  5-40 mL Intravenous 2 times per day      Infusions:    IV infusion builder 60 mL/hr at 08/04/21 1711    sodium chloride           Objective:   Vitals: BP (!) 86/56   Pulse 90   Temp 98.7 °F (37.1 °C) (Oral)   Resp 29   Wt 138 lb 6.4 oz (62.8 kg)   LMP 04/08/2018   SpO2 96%   BMI 26.15 kg/m²   General appearance: alert and cooperative with exam  Neck: no JVD or bruit  Thyroid : Normal lobes   Lungs: Has Vesicular Breath sounds left-sided chest tubes  Heart:  regular rate and rhythm  Abdomen: soft, non-tender; bowel sounds normal; no masses,  no organomegaly  Musculoskeletal: Normal  Extremities: extremities normal, , no edema  Neurologic:  Awake, alert, oriented to name, place and time. Cranial nerves II-XII are grossly intact. Motor is  intact. Sensory is intact. ,  and gait is normal.    Assessment:     Patient Active Problem List:     Left breast mass     Secondary cancer of bone [facility])     Essential hypertension     Malignant neoplasm of central portion of left female breast ([facility]     Metastatic breast cancer ([facility]     Dehydration     Iron deficiency anemia     Malabsorption     Left ovarian enlargement     ELIDIA (acute kidney injury) ([facility] [address]     Hyperkalemia     Metabolic acidosis     Hyponatremia     Tachycardia     Acute and chronic respiratory failure (acute-on-chronic) (HCC)     COVID-19     Acute renal failure with tubular necrosis (HCC)     Bilateral hydronephrosis     Edema leg     Hypervolemia     Hypocalcemia     Pneumothorax      Plan:     1. Reviewed POC blood glucose . Labs and X ray results   2. Reviewed Current Medicines   3. On IV calcium Infusion+ PO Tums and Calcirol   4. Will follow     .      Oseas Bryson MD, MD

## 2021-08-05 NOTE — ANESTHESIA POSTPROCEDURE EVALUATION
Department of Anesthesiology  Postprocedure Note    Patient: Essence Issa  MRN: [id number]  YOB: 1965  Date of evaluation: 8/5/2021  Time:  11:23 AM     Procedure Summary     Date: 08/05/21 Room / Location: [place] [address] / [facility]    Anesthesia Start: 1009 Anesthesia Stop:     Procedure: BILATERAL CYSTOSCOPY, BILATERAL STENT EXCHANGE, LEFT URETEROSCOPYAND LEFT RETROGRADE PYELOGRAM (Bilateral Ureter) Diagnosis:       Hydronephrosis, unspecified hydronephrosis type      (Hydronephrosis, unspecified hydronephrosis type [N13.30])    Surgeons: Duglas Lozano MD Responsible Provider: Maylene Dakin, APRN - CRNA    Anesthesia Type: general ASA Status: 3          Anesthesia Type: No value filed. Daniel Phase I:      Daniel Phase II:      Last vitals: Reviewed and per EMR flowsheets.        Anesthesia Post Evaluation    Patient location during evaluation: PACU  Level of consciousness: responsive to physical stimuli and responsive to verbal stimuli  Pain score: 0  Airway patency: patent  Nausea & Vomiting: no vomiting and no nausea  Complications: no  Cardiovascular status: blood pressure returned to baseline and hemodynamically stable  Respiratory status: acceptable, face mask, nonlabored ventilation and spontaneous ventilation  Hydration status: stable

## 2021-08-05 NOTE — PROGRESS NOTES
ONCOLOGY HEMATOLOGY CARE ([facility])  PROGRESS NOTE      Patient was seen and examined today. Not in any acute distress and no overnight events. Pulmonologist is on board. She has left pigtail catheter insertion. Pleural fluid was an exudate with LDH of 1141.  1400 cc of fluid was removed. Reportedly she will not be able to have chemotherapy until probably Friday. If she still here on Friday I may give the chemo in the hospital.  She is agreeable to it. She does not have any acute complaint this morning. No chest pain or shortness of breath. No nausea, vomiting or diarrhea. PHYSICAL EXAM    Vitals: BP (!) 86/56   Pulse 90   Temp 98.7 °F (37.1 °C) (Oral)   Resp 18   Wt 138 lb 6.4 oz (62.8 kg)   LMP 04/08/2018   SpO2 97%   BMI 26.15 kg/m²   CONSTITUTIONAL: awake, alert, cooperative, no apparent distress   EYES: pupils equal, round and reactive to light, sclera clear and conjunctiva pallor  ENT: Normocephalic, without obvious abnormality, atraumatic  NECK: supple, symmetrical, no jugular venous distension and no carotid bruits   HEMATOLOGIC/LYMPHATIC: no cervical, supraclavicular or axillary lymphadenopathy   LUNGS: Fair air entry bilaterally, decreased breath sound to the left lung. Chest tube noted to the left chest.  CARDIOVASCULAR: regular rate and rhythm, normal S1 and S2, no murmur noted  ABDOMEN: normal bowel sound, soft, non-distended, non-tender, no masses palpated, no hepatosplenomgaly   MUSCULOSKELETAL: full range of motion noted, tone is normal  NEUROLOGIC: awake, alert, oriented to name, place and time. Motor skills grossly intact. SKIN: No jaundice. No petechial rash. EXTREMITIES: b/l LE edema.  No cyanosis    LABORATORY RESULTS  CBC:   Recent Labs     08/03/21  1517 08/04/21  0717 08/05/21  0159   WBC 4.3 4.5 4.0   HGB 10.4* 10.0* 7.8*    289 296     BMP:    Recent Labs     08/03/21  1517 08/04/21  0717 08/05/21  0159    142 141   K 4.2 4.3 3.7    106 107   CO2 19* 19* 19*   BUN 82* 82* 76*   CREATININE 3.9* 3.9* 3.7*   GLUCOSE 133* 110* 125*     Hepatic:   Recent Labs     08/03/21  1517 08/04/21  0717 08/05/21  0159   AST 46* 48* 46*   ALT 8* 7* 6*   BILITOT 0.2 0.2 0.2   ALKPHOS 80 81 65     INR:   Recent Labs     08/03/21  0812   INR 1.01     ASSESSMENT/RECOMMENDATION       1.  Chest metastatic breast cancer. She is currently on palliative chemotherapy with, paclitaxel. I will try to arrange to give the chemo while she is in the hospital.  If she is still he is on Friday, likely she will have the chemo on Friday.     2. She has hydronephrosis related to metastatic breast cancer. She will need renal stent replacement. Urologist was consulted. Plan for cystoscopy and bilateral ureteral stent replacement today.     3. She has chronic kidney disease. She has been followed by nephrologist.   She has chronic anemia related to malignancy and chemotherapy. I will continue to monitor CBC. To transfuse as needed. 4.  She is on magnesium and calcium supplementation. She is on Lasix for bilateral lower extremity edema. 5.  Status post left thoracentesis. She has pneumothorax. Pulmonologist on board. She has exudate. Cytology is pending. Continue with present care. We will continue to follow the patient. Thank you.

## 2021-08-05 NOTE — PROGRESS NOTES
Patient returned from PACU at this time. Per PACU RN, patient received 100mcg fentanyl during procedure and experienced increased lethargy during recovery. Patient was then given Narcan and quickly responded to voice and pain. Patient still experiencing drowsiness at this time. Will continue to monitor.

## 2021-08-05 NOTE — PROGRESS NOTES
Pt experiencing increased drowsiness following chemo administration. RN confirmed with hospitalist that patient received Benadryl following treatment. No intervention needed at this time.

## 2021-08-05 NOTE — OP NOTE
[address] [place] [address], [address] [place] [address]                                OPERATIVE REPORT    PATIENT NAME: Selam Carter                      :        1965  MED REC NO:   [id number]                          ROOM:  ACCOUNT NO:   [de-identified]                           ADMIT DATE: 2021  PROVIDER:     Stephan Loco MD    DATE OF PROCEDURE:  2021    PREOPERATIVE DIAGNOSIS:  Bilateral hydronephrosis secondary to  metastatic cancer. POSTOPERATIVE DIAGNOSIS:  Bilateral hydronephrosis secondary to  metastatic cancer. PROCEDURE:  Cystoscopy, bilateral ureteral stent exchange, left  diagnostic ureteroscopy, and left retrograde pyelogram.    SURGEON:  Stephan Loco MD    ANESTHESIA:  Monitored anesthesia care. ESTIMATED BLOOD LOSS:  None. COMPLICATIONS:  None. FINDINGS:  High-grade bilateral obstruction in the distal ureters. BRIEF HISTORY:  This is a 72-year-old female with a history of  metastatic breast cancer involving adenopathy causing bilateral ureteral  obstructions. She is here now for her routine bilateral stent exchange. DESCRIPTION OF PROCEDURE:  The patient was identified in the holding  area, taken to procedure room, placed in a dorsal lithotomy position. The patient's genital region was prepped and draped in the usual sterile  fashion. A 21-Tongan cystoscope sheath was introduced per urethra under  direct visualization. Lot of changes in the bladder base, almost of an  infiltrative process. Right ureteral stent was identified, grasped,  removed intact. Sensor wire was passed up the right ureter and right  kidney under direct fluoroscopy. A 6-Tongan variable length double-J  ureteral stent was passed over the guidewire in a standard fashion,  position confirmed using fluoroscopy, cystoscopy and deemed good. Left  ureteral stent was identified, grasped, removed intact.   Attempts to  place a wire were unsuccessful due to narrowing at the left distal  ureter, not J hooking using a 5-Taiwanese open-ended ureteral catheter to  attempt this. I did perform a retrograde study with an 8-Taiwanese  ureteral catheter and injected material there, no evidence of  extravasation, high-grade narrowing at the UVJ and in the distal ureter,  so at this point, I ended up using a MR-6 ureteroscope under direct  visualization into the left ureteral orifice and actually was able to  guide it into the left ureter and into the left kidney. Once the wire  was in the left kidney, I did pass a 5-Taiwanese open-ended ureteral  catheter over the wire into the left kidney. Injection of retrograde  contrast material via the ureteral catheter confirmed the position which  was in the left collecting system. Wire was passed into the left  ureteral catheter, catheter was removed, wire was left in place. Cystoscope was reintroduced over the indwelling safety wire. A 6-Taiwanese  variable length double-J ureteral stent was passed over the guidewire in  a standard fashion. It was very tight at the left distal ureter, much  tighter and more obstructive than the right side, but once we got it in  place, efflux of stagnant urine was seen, noninfected but stagnant, much  like the other side, demonstrating a high-grade obstruction, left  greater than right. At this point then, the bladder was emptied, taken  to the recovery room, tolerated the procedure well. Will also likely need another stent exchange instead of 3 months, likely  in 2 months due to the high-grade nature and progression of her cancer.         Matthew Ortiz MD    D: 08/05/2021 11:09:25       T: 08/05/2021 11:12:54     [email or web address]  Job#: [id number]     Doc#: [id number]    CC:

## 2021-08-05 NOTE — PROGRESS NOTES
1113: Patient arrived to PACU from OR. Monitors applied, alarms on. Report obtained from Patti Arce and Austen WAGNER. 1120: Dr. Ravi Dunlap at bedside. 1155: Patient repositioned in bed.   1220: Report called to [address] [place] [address].   7285: Patient transferred to room [phone].

## 2021-08-05 NOTE — BRIEF OP NOTE
Brief Postoperative Note      Patient: Troy Whitfield  YOB: 1965  MRN: [id number]    Date of Procedure: 8/5/2021    Pre-Op Diagnosis: Hydronephrosis, unspecified hydronephrosis type [N13.30]    Post-Op Diagnosis: Same       Procedure(s):  BILATERAL CYSTOSCOPY, BILATERAL STENT EXCHANGE, LEFT URETEROSCOPYAND LEFT RETROGRADE PYELOGRAM    Surgeon(s):  Manpreet Cain MD    Assistant:  * No surgical staff found *    Anesthesia: General    Estimated Blood Loss (mL): Minimal    Complications: None    Specimens:   * No specimens in log *    Implants:  Implant Name Type Inv. Item Serial No.  Lot No. LRB No. Used Action   STENT URET 6FR [id number] PERCFLX HYDR+ TAPR TIP GRAD  STENT URET 6FR [id number] PERCFLX HYDR+ TAPR TIP GRAD  [facility] [id number] Right 1 Implanted   STENT URET 6FR [id number] PERCFLX HYDR+ TAPR TIP GRAD  STENT URET 6FR [id number] PERCFLX HYDR+ TAPR [facility] [employer] [facility] [id number] Left 1 Implanted         Drains:   Chest Tube 1 Left Pleural 8 Bahamian (Active)   Suction -20 cm H2O 08/05/21 0823   Chest Tube Airleak No 08/05/21 0823   Drainage Description Other (Comment) 08/05/21 0234   Dressing Status Reinforced 08/05/21 0823   Site Assessment Clean;Dry; Intact 08/05/21 0934   Surrounding Skin Unable to view 08/05/21 0823   Output (ml) 0 ml 08/05/21 0823       [REMOVED] Urethral Catheter Non-latex 16 fr (Removed)       [REMOVED] Urethral Catheter Non-latex;Straight-tip 16 fr (Removed)       [REMOVED] Urethral Catheter Non-latex 16 fr (Removed)       Findings: bilateral hydro from metastatic cancer    Electronically signed by Manpreet Cain MD on 8/5/2021 at 11:03 AM

## 2021-08-05 NOTE — PROGRESS NOTES
Hospitalist Progress Note      Name:  Jose Phelps /Age/Sex: 1965  (50 y.o. female)   MRN & CSN:  [id number] & [id number] Admission Date/Time: 8/3/2021  1:46 PM   [facility] PCP: Lizzy Chicas, [employer] Day: 3    Assessment and Plan:   Jose Phelps is a 50 y.o.  female  who presents with <principal problem not specified>    1. LEFT PNEUMOTHORAX  -post chest tube placement, appears reinflated. Per pulm may DC tube tomorrow    2. LEFT PLEURAL EFFUSION  -post thoracentesis. Await analysis    3. 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 CKD 4  -sec to above, will follow    5. HYPOCALCEMIA  -treatment as per endocrinology    6. METASTATIC BREAST CANCER  -Currently continue chemo    Diet ADULT DIET; Regular   DVT Prophylaxis [] Lovenox, []  Heparin, [x] SCDs, [] Ambulation   GI Prophylaxis [] PPI,  [] H2 Blocker,  [] Carafate,  [] Diet/Tube Feeds   Code Status Full Code   Disposition Patient requires continued admission due to Ptx   MDM [] Low, [] Moderate,[]  High  Patient's risk as above due to Ptx     History of Present Illness:     Chief Complaint: <principal problem not specified>  Jose Phelps is a 50 y.o.  female  who presents with pleural effusion    Follow up, chart reviewed. Patient is recently back from cysto with ureteral stent changes. She is currently lethargic due to benadryl but is appropriately responsive. She denies SOB or pain no other specific complaints as well       Ten point ROS reviewed negative, unless as noted above    Objective: Intake/Output Summary (Last 24 hours) at 2021 1452  Last data filed at 2021 1223  Gross per 24 hour   Intake 1177 ml   Output 296 ml   Net 881 ml      Vitals:   Vitals:    21 1400   BP:    Pulse: 117   Resp: 22   Temp:    SpO2: 94%     Physical Exam:   GEN lethargic female, sitting upright in bed in no apparent distress. Appears given age.   EYES Pupils are equally round. No scleral erythema, discharge, or conjunctivitis. HENT Mucous membranes are moist. Oral pharynx without exudates, no evidence of thrush. NECK Supple, no apparent thyromegaly or masses. RESP Clear to auscultation, no wheezes, rales or rhonchi. Symmetric chest movement while on room air. CARDIO/VASC S1/S2 auscultated. Regular rate without appreciable murmurs, rubs, or gallops. No JVD or carotid bruits. Peripheral pulses equal bilaterally and palpable. No peripheral edema. GI Abdomen is soft without significant tenderness, masses, or guarding. Bowel sounds are normoactive. Rectal exam deferred. HEME/LYMPH No palpable cervical lymphadenopathy and no hepatosplenomegaly. No petechiae or ecchymoses. MSK No gross joint deformities. SKIN Normal coloration, warm, dry. NEURO Cranial nerves appear grossly intact, normal speech, no lateralizing weakness. PSYCH lethargic, arousable, oriented x 3. Affect appropriate.     Medications:   Medications:    magnesium oxide  400 mg Oral Daily    calcitRIOL  0.5 mcg Oral BID    calcium carbonate  750 mg Oral TID     PACLitaxel (TAXOL) chemo infusion  120 mg Intravenous Once    naloxone        famotidine (PEPCID) injection  20 mg Intravenous Once    albumin human  25 g Intravenous Q8H    cetirizine  10 mg Oral Daily    furosemide  80 mg Oral Daily    metoprolol succinate  100 mg Oral Daily    sodium bicarbonate  650 mg Oral BID    sodium chloride flush  5-40 mL Intravenous 2 times per day      Infusions:    IV infusion builder 60 mL/hr at 08/05/21 1245    sodium chloride 25 mL (08/05/21 1436)     PRN Meds: LORazepam, 0.5 mg, BID PRN  prochlorperazine, 10 mg, Q8H PRN  sodium chloride flush, 5-40 mL, PRN  sodium chloride, 25 mL, PRN  ondansetron, 4 mg, Q8H PRN   Or  ondansetron, 4 mg, Q6H PRN  polyethylene glycol, 17 g, Daily PRN  acetaminophen, 650 mg, Q6H PRN   Or  acetaminophen, 650 mg, Q6H PRN  HYDROcodone-acetaminophen, 1 tablet, Q6H PRN

## 2021-08-05 NOTE — ANESTHESIA PRE PROCEDURE
Department of Anesthesiology  Preprocedure Note       Name:  Cha Del Cid   Age:  50 y.o.  :  1965                                          MRN:  [id number]         Date:  2021      Surgeon: Sofia Vaughn):  Zoey Monique MD    Procedure: Procedure(s):  BILATERAL CYSTOSCOPY, BILATERAL STENT EXCHANGE, LEFT URETEROSCOPYAND LEFT RETROGRADE PYELOGRAM    Medications prior to admission:   Prior to Admission medications    Medication Sig Start Date End Date Taking? Authorizing Provider   HYDROcodone-acetaminophen (NORCO) 5-325 MG per tablet Take 1 tablet by mouth every 6 hours as needed for Pain. Yes Historical Provider, MD   ciprofloxacin (CIPRO) 250 MG tablet Take 1 tablet by mouth daily for 10 days 21  Agustin Goodman MD   furosemide (LASIX) 80 MG tablet Take 1 tablet by mouth daily 21   Елена Lau DO   sodium bicarbonate 650 MG tablet Take 1 tablet by mouth 2 times daily 21  Елена Lau DO   LORazepam (ATIVAN) 0.5 MG tablet Take 1 tablet by mouth 2 times daily as needed for Anxiety for up to 15 days.  21  Agustin Goodman MD   dexamethasone (DECADRON) 4 MG tablet TAKE ONE TABLET BY MOUTH DAILY FOR 4 DAYS STARTING THE DAY AFTER CHEMOTHERAPY 21   CINDY James CNP   Calcium-Vitamin D 500-125 MG-UNIT TABS Take 1 tablet by mouth 2 times daily    Historical Provider, MD   Nutritional Supplement LIQD Take 1 Can by mouth 2 times daily 21   Agustin Goodman MD   metoprolol succinate (TOPROL XL) 100 MG extended release tablet Take 1 tablet by mouth daily 21   CINDY James CNP   ondansetron (ZOFRAN) 8 MG tablet 1 tab po q 8 hours PRN for chemo induced nausea/vomting  Patient taking differently: 8 mg every 8 hours as needed for Nausea or Vomiting  3/16/21   CINDY James CNP   docusate sodium (COLACE) 100 MG capsule Take 1 capsule by mouth daily as needed for Constipation 3/16/21   CINDY James CNP acetaminophen (TYLENOL) 500 MG tablet Take 500 mg by mouth every 6 hours as needed for Pain    Historical Provider, MD   prochlorperazine (COMPAZINE) 10 MG tablet Take 1 tablet by mouth every 8 hours as needed (nausea)  Patient taking differently: Take 10 mg by mouth every 8 hours as needed (Nausea)  1/25/21   Yariel Hoang MD   promethazine (PHENERGAN) 25 MG tablet TAKE ONE TABLET BY MOUTH EVERY 6 HOURS AS NEEDED FOR NAUSEA 12/22/20   Yariel Hoang MD   cetirizine (ZYRTEC) 10 MG tablet Take 10 mg by mouth daily    Historical Provider, MD       Current medications:    Current Facility-Administered Medications   Medication Dose Route Frequency Provider Last Rate Last Admin    magnesium oxide (MAG-OX) tablet 400 mg  400 mg Oral Daily Yariel Hoang MD        calcitRIOL (ROCALTROL) capsule 0.5 mcg  0.5 mcg Oral BID Elizabeth Rubi MD        calcium carbonate (TUMS) chewable tablet 750 mg  750 mg Oral TID WC Elizabeth Rubi MD        famotidine (PEPCID) injection 20 mg  20 mg Intravenous Once Yariel Hoang MD        diphenhydrAMINE (BENADRYL) injection 50 mg  50 mg Intravenous Once Yariel Hoang MD        dexamethasone (DECADRON) 10 mg in sodium chloride 0.9 % IVPB  10 mg Intravenous Once Yariel Hoang MD        PACLitaxel (TAXOL) 120 mg in sodium chloride 0.9 % 250 mL chemo infusion  120 mg Intravenous Once Yariel Hoang MD        calcium gluconate 8,000 mg in sodium chloride 0.9 % 1,000 mL infusion   Intravenous Continuous Elizabeth Rubi MD 60 mL/hr at 08/04/21 1711 New Bag at 08/04/21 1711    albumin human 25 % IV solution 25 g  25 g Intravenous Dot MD Tatum   Stopped at 08/05/21 0738    cetirizine (ZYRTEC) tablet 10 mg  10 mg Oral Daily Ofe DAWKINS MD   10 mg at 08/04/21 0957    furosemide (LASIX) tablet 80 mg  80 mg Oral Daily Ofe DAWKINS MD   80 mg at 08/04/21 0957    LORazepam (ATIVAN) tablet 0.5 mg  0.5 mg Oral BID PRN Meghna Navarro MD        metoprolol succinate (TOPROL XL) extended release tablet 100 mg  100 mg Oral Daily Adam DAWKINS MD   100 mg at 08/05/21 0818    prochlorperazine (COMPAZINE) tablet 10 mg  10 mg Oral Q8H PRN Jocelynn Correa MD        sodium bicarbonate tablet 650 mg  650 mg Oral BID Jocelynn Correa MD   650 mg at 08/04/21 1955    sodium chloride flush 0.9 % injection 5-40 mL  5-40 mL Intravenous 2 times per day Jocelynn Correa MD   10 mL at 08/04/21 0959    sodium chloride flush 0.9 % injection 5-40 mL  5-40 mL Intravenous PRN Jocelynn Correa MD        0.9 % sodium chloride infusion  25 mL Intravenous PRN Jocelynn Correa MD        ondansetron (ZOFRAN-ODT) disintegrating tablet 4 mg  4 mg Oral Q8H PRN Jocelynn Correa MD        Or    ondansetron [facility]) injection 4 mg  4 mg Intravenous Q6H PRN Jocelynn Correa MD        polyethylene glycol (GLYCOLAX) packet 17 g  17 g Oral Daily PRN Jocelynn Correa MD        acetaminophen (TYLENOL) tablet 650 mg  650 mg Oral Q6H PRN Jocelynn Correa MD   650 mg at 08/04/21 1955    Or    acetaminophen (TYLENOL) suppository 650 mg  650 mg Rectal Q6H PRN Jocelynn Correa MD        HYDROcodone-acetaminophen [facility]) 5-325 MG per tablet 1 tablet  1 tablet Oral Q6H PRN Jocelynn Correa MD   1 tablet at 08/03/21 1722     Facility-Administered Medications Ordered in Other Encounters   Medication Dose Route Frequency Provider Last Rate Last Admin    midazolam (VERSED) injection   Intravenous PRN Maricruz Garcia, APRN - CRNA   2 mg at 08/05/21 1016    ondansetron (ZOFRAN) injection   Intravenous PRN Maricruz Roebs, APRN - CRNA   4 mg at 08/05/21 1016    propofol injection   Intravenous PRN Maricruz Roebs, APRN - CRNA   40 mg at 08/05/21 1055    0.9 % sodium chloride infusion   Intravenous Continuous PRN Francisco Javier Melvin APRN - CRNA   New Bag at 08/05/21 1009    lidocaine (cardiac) (XYLOCAINE) injection   Intravenous PRN Maricruz Garcia APRN - CRNA   20 mg at 08/05/21 1043    fentaNYL (SUBLIMAZE) injection   Intravenous PRN CINDY Butler - CRNA   50 mcg at 08/05/21 1102       Allergies:     Allergies   Allergen Reactions    Latex Anaphylaxis     \"have trouble with banana's kiwi, avocados- Have trouble breathing with these and trouble breathig - get asthmatic from latex gloves, bandage, anything latex     Banana Anaphylaxis    Cinnamon Anaphylaxis       Problem List:    Patient Active Problem List   Diagnosis Code    Left breast mass N63.20    Secondary cancer of bone ([address] C79.51    Essential hypertension I10    Malignant neoplasm of central portion of left female breast ([address] C50.112    Metastatic breast cancer ([address] C50.919    Dehydration E86.0    Iron deficiency anemia D50.9    Malabsorption K90.9    Left ovarian enlargement N83.8    ELIDIA (acute kidney injury) ([address] N17.9    Hyperkalemia G15.1    Metabolic acidosis Q37.7    Hyponatremia E87.1    Tachycardia R00.0    Acute and chronic respiratory failure (acute-on-chronic) (HCC) J96.20    COVID-19 U07.1    Acute renal failure with tubular necrosis (HCC) N17.0    Bilateral hydronephrosis N13.30    Edema leg R60.0    Hypervolemia E87.70    Hypocalcemia E83.51    Pneumothorax J93.9       Past Medical History:        Diagnosis Date    Anxiety     Asthma     last flare up 5 yrs ago    Breast lesion     \"have spot on left breast going to remove- at this time not sure if cancer or not\"    Essential hypertension 4/1/2020    Secondary cancer of bone ([facility] [address] 4/1/2020    Thyroid nodule     \"have thyroid nodules-        Past Surgical History:        Procedure Laterality Date    APPENDECTOMY  age 27   Oracio Loser BLADDER SURGERY Bilateral 3/25/2021    BILATERAL CYSTOSCOPY BILATERAL STENT INSERTION performed by Zachary Reeves MD at [address] [facility] Bilateral 6/11/2021    BILATERAL CYSTOSCOPY RETROGRADE PYELOGRAM STENT EXCHANGE performed by Tamika Brown MD at [address] SECTION  1997    CT GUIDED CHEST TUBE  8/3/2021    CT GUIDED CHEST TUBE 8/3/2021 [facility] CT SCAN    IR NONTUNNELED VASCULAR CATHETER  3/22/2021    IR NONTUNNELED VASCULAR CATHETER 3/22/2021 [facility] SPECIAL PROCEDURES    PORT SURGERY Right 3/15/2021    RIGHT PORT INSERTION performed by Leeann Mascorro MD at [address]  age 4    T & A       Social History:    Social History     Tobacco Use    Smoking status: Never Smoker    Smokeless tobacco: Never Used   Substance Use Topics    Alcohol use: Yes     Comment: average\"one time per week\"                                Counseling given: Not Answered      Vital Signs (Current):   Vitals:    08/05/21 0739 08/05/21 0800 08/05/21 0900 08/05/21 0923   BP:  (!) 107/57  (!) 107/57   Pulse:  107 115 106   Resp: 29 26 26 21   Temp:  36.9 °C (98.5 °F)  37 °C (98.6 °F)   TempSrc:  Oral  Temporal   SpO2: 96% 97%  97%   Weight:                                                  BP Readings from Last 3 Encounters:   08/05/21 (!) 107/57   08/03/21 (!) 116/48   08/02/21 108/64       NPO Status: Time of last liquid consumption: 0840                        Time of last solid consumption: 2359                        Date of last liquid consumption: 08/05/21                        Date of last solid food consumption: 08/04/21    BMI:   Wt Readings from Last 3 Encounters:   08/04/21 138 lb 6.4 oz (62.8 kg)   08/03/21 140 lb (63.5 kg)   07/30/21 141 lb 9.6 oz (64.2 kg)     Body mass index is 26.15 kg/m².     CBC:   Lab Results   Component Value Date    WBC 4.0 08/05/2021    RBC 2.76 08/05/2021    HGB 7.8 08/05/2021    HCT 25.2 08/05/2021    MCV 91.3 08/05/2021    RDW 20.0 08/05/2021     08/05/2021       CMP:   Lab Results   Component Value Date     08/05/2021    K 3.7 08/05/2021     08/05/2021    CO2 19 08/05/2021    BUN 76 08/05/2021    CREATININE 3.7 08/05/2021    GFRAA 16 08/05/2021    LABGLOM 13 08/05/2021    GLUCOSE 125 08/05/2021    PROT 5.0 08/05/2021    CALCIUM 7.0 08/05/2021    BILITOT 0.2 08/05/2021    ALKPHOS 65 08/05/2021    AST 46 08/05/2021    ALT 6 08/05/2021       POC Tests: No results for input(s): POCGLU, POCNA, POCK, POCCL, POCBUN, POCHEMO, POCHCT in the last 72 hours. Coags:   Lab Results   Component Value Date    PROTIME 13.0 08/03/2021    INR 1.01 08/03/2021    APTT 34.8 08/03/2021       HCG (If Applicable): No results found for: PREGTESTUR, PREGSERUM, HCG, HCGQUANT     ABGs: No results found for: PHART, PO2ART, XOZ2MOJ, DUB1KIR, BEART, S8FSRAHK     Type & Screen (If Applicable):  No results found for: LABABO, LABRH    Drug/Infectious Status (If Applicable):  No results found for: HIV, HEPCAB    COVID-19 Screening (If Applicable):   Lab Results   Component Value Date    COVID19 NOT DETECTED 08/03/2021    COVID19 NOT DETECTED 03/11/2021           Anesthesia Evaluation    Airway: Mallampati: II        Dental:          Pulmonary:                              Cardiovascular:                      Neuro/Psych:               GI/Hepatic/Renal:             Endo/Other:                     Abdominal:             Vascular:           Other Findings:             Anesthesia Plan      MAC     ASA 3                   Attending anesthesiologist reviewed and agrees with Preprocedure content              Claudette Opal, APRN - CRNA   8/5/2021

## 2021-08-06 NOTE — PROGRESS NOTES
Consult for a large left hydopneumothorax. , called to Dr Guanaco Flores.    Informed has a left chest tube, no drainage in it

## 2021-08-06 NOTE — FLOWSHEET NOTE
08/06/21 1553   Encounter Summary   Services provided to: Patient not available   Referral/Consult From: Rounding   Continue Visiting Yes   Routine   Type Initial   Assessment Sleeping; Unable to respond

## 2021-08-06 NOTE — PROGRESS NOTES
Progress Note( Dr. Jakob Christine)  8/6/2021  Subjective:   Admit Date: 8/3/2021  PCP: CINDY Martinez - NP    Admitted For : Left-sided pneumothorax    Consulted For: Hypocalcemia     Interval History: Feels somewhat better breathing is better though still short of breath  Serum Calcium is now kathy Had Urologic Procedure done  8/5/2021 as noted below   Left Ureteral Stent was exchanged      Denies any chest pains,   Yes SOB . Denies nausea or vomiting. No new bowel or bladder symptoms. Intake/Output Summary (Last 24 hours) at 8/6/2021 0733  Last data filed at 8/5/2021 2000  Gross per 24 hour   Intake 300 ml   Output 280 ml   Net 20 ml       DATA    CBC:   Recent Labs     08/04/21  0717 08/05/21  0159 08/06/21  0545   WBC 4.5 4.0 4.6   HGB 10.0* 7.8* 8.2*    296 354    CMP:  Recent Labs     08/04/21  0717 08/04/21  0717 08/04/21  1357 08/05/21  0159 08/06/21  0545     --   --  141 143   K 4.3  --   --  3.7 4.7     --   --  107 110   CO2 19*  --   --  19* 17*   BUN 82*  --   --  76* 72*   CREATININE 3.9*  --   --  3.7* 3.2*   CALCIUM 6.4*   < > 6.2* 7.0* 8.7   PROT 5.3*  --   --  5.0* 5.4*   LABALBU 2.8*  --   --  3.0* 3.6   BILITOT 0.2  --   --  0.2 0.2   ALKPHOS 81  --   --  65 63   AST 48*  --   --  46* 55*   ALT 7*  --   --  6* <5*    < > = values in this interval not displayed. Lipids:   Lab Results   Component Value Date    CHOL 135 12/11/2019    HDL 54 12/11/2019    TRIG 74 12/11/2019     Glucose:No results for input(s): POCGLU in the last 72 hours.   [id number]:  Lab Results   Component Value Date    LABA1C 6.0 06/10/2021     High Sensitivity TSH:   Lab Results   Component Value Date    TSHHS <0.010 01/07/2021     Free T3: No results found for: FT3  Free T4:No results found for: T4FREE    DATE OF PROCEDURE:  08/05/2021 Dr. Robert Alston      PREOPERATIVE DIAGNOSIS:  Bilateral hydronephrosis secondary to  metastatic cancer.     POSTOPERATIVE DIAGNOSIS:  Bilateral hydronephrosis secondary to  metastatic cancer.     PROCEDURE:  Cystoscopy, bilateral ureteral stent exchange, left  diagnostic ureteroscopy, and left retrograde pyelogram.       XR CHEST PORTABLE    Result Date: 8/5/2021  EXAMINATION: ONE XRAY VIEW OF THE CHEST 8/4/2021 5:44 am COMPARISON: August 3, 2021 HISTORY: ORDERING SYSTEM PROVIDED HISTORY: Post left chest tube insertion TECHNOLOGIST PROVIDED HISTORY: Reason for exam:->Post left chest tube insertion Reason for Exam: Post left chest tube insertion Acuity: Acute Type of Exam: Subsequent/Follow-up FINDINGS: Interval placement of a left chest tube. No significant interval change of a large pneumothorax. Airspace opacities are identified in the left upper lung zone. Consolidation is seen in the base. Left effusion is also seen. The right lung is grossly clear without focal consolidation or pleural effusion. No pulmonary edema. No new osseous abnormality. Stable appearance of a right-sided MediPort. Status post placement of a left pleural pigtail catheter. No significant interval change of a large pneumothorax. Stable opacities in the left lung with pleural effusion. XR CHEST PORTABLE    Result Date: 8/3/2021  EXAMINATION: ONE XRAY VIEW OF THE CHEST 8/3/2021 10:05 am COMPARISON: CT chest 07/28/2021 HISTORY: ORDERING SYSTEM PROVIDED HISTORY: post left thoracentesis. TECHNOLOGIST PROVIDED HISTORY: SDS #18 Reason for exam:->post left thoracentesis. Reason for Exam: post left thoracentesis. FINDINGS: Right arm port catheter with catheter tip right atrium. Stable cardiomediastinal silhouette. Significant decrease in size of left pleural effusion with probable small residual pleural effusion remaining. Hazy opacification of the left lung with moderate amount of air in the left pleural space compatible with atelectasis secondary to trapped lung. Significant decrease in size in left pleural effusion with likely small residual amount of pleural fluid remaining.   Hazy opacification of the left lung and moderate amount of air in the left pleural space compatible with trapped lung. CT GUIDED CHEST TUBE    Result Date: 8/3/2021  PROCEDURE: CT GUIDED CHEST TUBE PLACEMENT 8/3/2021 HISTORY: ORDERING SYSTEM PROVIDED HISTORY: Post thoracentesis       1. Successful CT guided placement of a left-sided 8 Sudanese chest tube. 2. Interval re-expansion of left lung since the recent CT with diffuse asymmetric left-sided pulmonary parenchymal opacities. These opacities are nonspecific but could be secondary to infection, asymmetric edema, or pulmonary hemorrhage. Atelectasis is less likely. 3. Moderate left hydropneumothorax post left chest tube placement. 4. Nodular thickening noted along the pleura at the left lung base is suspicious for metastatic disease. IR GUIDED THORACENTESIS PLEURAL    Result Date: 8/3/2021  PROCEDURE: ULTRASOUNDGUIDED left THORACENTESIS 8/3/2021 HISTORY: ORDERING SYSTEM PROVIDED HISTORY: Pleural effusion      Successful ultrasound guided left-sided thoracentesis.   Removed about 1400 cc of fluid      Scheduled Medicines   Medications:    magnesium oxide  400 mg Oral Daily    calcitRIOL  0.5 mcg Oral BID    calcium carbonate  750 mg Oral TID WC    famotidine (PEPCID) injection  20 mg Intravenous Once    cetirizine  10 mg Oral Daily    furosemide  80 mg Oral Daily    metoprolol succinate  100 mg Oral Daily    sodium bicarbonate  650 mg Oral BID    sodium chloride flush  5-40 mL Intravenous 2 times per day      Infusions:    IV infusion builder 60 mL/hr at 08/05/21 1245    sodium chloride 25 mL (08/05/21 1436)         Objective:   Vitals: /60   Pulse 103   Temp 97.6 °F (36.4 °C) (Oral)   Resp 21   Wt 138 lb 6.4 oz (62.8 kg)   LMP 04/08/2018   SpO2 99%   BMI 26.15 kg/m²   General appearance: alert and cooperative with exam  Neck: no JVD or bruit  Thyroid : Normal lobes   Lungs: Has Vesicular Breath sounds left-sided chest tubes  Heart: regular rate and rhythm  Abdomen: soft, non-tender; bowel sounds normal; no masses,  no organomegaly  Musculoskeletal: Normal  Extremities: extremities normal, , no edema  Neurologic:  Awake, alert, oriented to name, place and time. Cranial nerves II-XII are grossly intact. Motor is  intact. Sensory is intact. ,  and gait is normal.    Assessment:     Patient Active Problem List:     Left breast mass     Secondary cancer of bone ([address]     Essential hypertension     Malignant neoplasm of central portion of left female breast ([address]     Metastatic breast cancer (HCC)     Dehydration     Iron deficiency anemia     Malabsorption     Left ovarian enlargement     ELIDIA (acute kidney injury) ([address]     Hyperkalemia     Metabolic acidosis     Hyponatremia     Tachycardia     Acute and chronic respiratory failure (acute-on-chronic) (HCC)     COVID-19     Acute renal failure with tubular necrosis (HCC)     Bilateral hydronephrosis     Edema leg     Hypervolemia     Hypocalcemia     Pneumothorax      Plan:     1. Reviewed POC blood glucose . Labs and X ray results   2. Reviewed Current Medicines   3. On IV calcium Infusion+ PO Tums and Calcirol   4. Modify the dose as needed   5. Will follow     .      Milton Nieves MD, MD

## 2021-08-06 NOTE — PROGRESS NOTES
ONCOLOGY HEMATOLOGY CARE ([facility])  PROGRESS NOTE      Patient was seen and examined today. Not in any acute distress and no overnight events. Pulmonologist is on board. She has left pigtail catheter insertion. Pleural fluid was an exudate with LDH of 1141.  1400 cc of fluid was removed. Cytology was positive for metastatic breast cancer. She had Taxol on August 5, 2021. Denied any adverse reaction related to Taxol. Hemoglobin was 8.2. I will have PT/OT evaluation. She denied any acute pain. No nausea, vomiting or diarrhea. PHYSICAL EXAM    Vitals: /60   Pulse 103   Temp 97.6 °F (36.4 °C) (Oral)   Resp 21   Wt 138 lb 6.4 oz (62.8 kg)   LMP 04/08/2018   SpO2 99%   BMI 26.15 kg/m²   CONSTITUTIONAL: awake, alert, cooperative, no apparent distress   EYES: pupils equal, round and reactive to light, sclera clear and conjunctiva pallor  ENT: Normocephalic, without obvious abnormality, atraumatic  NECK: supple, symmetrical, no jugular venous distension and no carotid bruits   HEMATOLOGIC/LYMPHATIC: no cervical, supraclavicular or axillary lymphadenopathy   LUNGS: Fair air entry bilaterally, decreased breath sound to the left lung. Chest tube noted to the left chest.  CARDIOVASCULAR: regular rate and rhythm, normal S1 and S2, no murmur noted  ABDOMEN: normal bowel sound, soft, non-distended, non-tender, no masses palpated, no hepatosplenomgaly   MUSCULOSKELETAL: full range of motion noted, tone is normal  NEUROLOGIC: awake, alert, oriented to name, place and time. Cranial nerves II through XII are grossly intact. SKIN: No jaundice. No petechial rash. EXTREMITIES: b/l LE edema.  No cyanosis    LABORATORY RESULTS  CBC:   Recent Labs     08/04/21  0717 08/05/21  0159 08/06/21  0545   WBC 4.5 4.0 4.6   HGB 10.0* 7.8* 8.2*    296 354     BMP:    Recent Labs     08/04/21  0717 08/05/21  0159 08/06/21  0545    141 143   K 4.3 3.7 4.7    107 110   CO2 19* 19* 17*   BUN 82* 76* 72*   CREATININE 3.9* 3.7* 3.2*   GLUCOSE 110* 125* 158*     Hepatic:   Recent Labs     08/04/21  0717 08/05/21  0159 08/06/21  0545   AST 48* 46* 55*   ALT 7* 6* <5*   BILITOT 0.2 0.2 0.2   ALKPHOS 81 65 63     INR:   Recent Labs     08/03/21  0812   INR 1.01     ASSESSMENT/RECOMMENDATION       1. She has metastatic breast cancer. She had Taxol on August 5, 2021. So far she has been tolerating it well. 2.  She has hydronephrosis related to metastatic breast cancer. She will need renal stent replacement. She has cystoscopy with bilateral renal stent replacement on August 5, 2021.    3.  She has chronic kidney disease. She has been followed by nephrologist.   She has chronic anemia related to malignancy and chemotherapy. I will continue to monitor CBC. To transfuse as needed. 4.  She is on magnesium and calcium supplementation. She is on Lasix for bilateral lower extremity edema. 5.  Status post left thoracentesis. She has pneumothorax. Pulmonologist on board. Cytology is consistent with metastatic breast cancer. I will have PT OT evaluation. Continue with present care. We will continue to follow the patient. Thank you.

## 2021-08-06 NOTE — PROGRESS NOTES
Pulmonary and Critical Care  Progress Note      VITALS:  BP (!) 143/78   Pulse 108   Temp 97.6 °F (36.4 °C) (Oral)   Resp 23   Wt 138 lb 6.4 oz (62.8 kg)   LMP 04/08/2018   SpO2 98%   BMI 26.15 kg/m²     Subjective:   CHIEF COMPLAINT :Pneumothorax s/p left thoracentesis     HPI:                The patient is lying in the bed. She is not in acute resp distress    Objective:   PHYSICAL EXAM:    LUNGS:Occasional basal crackles  Abd-soft, BS+,NT  Ext- no pedal edema  CVS-s1s2, no murmurs      DATA:    CBC:  Recent Labs     08/03/21  1517 08/03/21  1517 08/04/21  0717 08/05/21  0159 08/06/21  0545   WBC 4.3   < > 4.5 4.0 4.6   RBC 3.65*   < > 3.52* 2.76* 2.93*   HGB 10.4*   < > 10.0* 7.8* 8.2*   HCT 33.7*   < > 31.9* 25.2* 27.1*      < > 289 296 354   MCV 92.3   < > 90.6 91.3 92.5   MCH 28.5   < > 28.4 28.3 28.0   MCHC 30.9*   < > 31.3* 31.0* 30.3*   RDW 20.2*   < > 20.1* 20.0* 20.2*   NRBC 4  --  1  --   --    SEGSPCT 69.0*   < > 66.0 57.6 80.0*   BANDSPCT 1*  --  7  --  1*    < > = values in this interval not displayed. BMP:  Recent Labs     08/04/21  0717 08/04/21  0717 08/04/21  1357 08/05/21  0159 08/06/21  0545     --   --  141 143   K 4.3  --   --  3.7 4.7     --   --  107 110   CO2 19*  --   --  19* 17*   BUN 82*  --   --  76* 72*   CREATININE 3.9*  --   --  3.7* 3.2*   CALCIUM 6.4*   < > 6.2* 7.0* 8.7   GLUCOSE 110*  --   --  125* 158*    < > = values in this interval not displayed. ABG:  No results for input(s): PH, PO2ART, TDM8YUC, HCO3, BEART, O2SAT in the last 72 hours. BNP  No results found for: BNP   D-Dimer:  Lab Results   Component Value Date    DDIMER 2537 (H) 05/13/2021      Radiology:   Large left hydropneumothorax with near complete collapse of the left lung. Visible portion of the left upper lobe contains a consolidation, suspicious   for pneumonia. 2. Peribronchovascular ground-glass opacities in the right upper lobe, likely   infectious or inflammatory. 3. Small right pleural effusion. 4. Nodular skin thickening along the left anterolateral chest wall and   subcutaneous fat, suspicious for metastasis. 5. Diffuse bone metastasis in the axial skeleton with chronic pathologic   fracture of T10.     1.       Assessment/Plan     Patient Active Problem List    Diagnosis Date Noted    Pneumothorax 08/03/2021    Edema leg 06/23/2021    Hypervolemia 06/23/2021    Hypocalcemia 06/23/2021    Acute renal failure with tubular necrosis ([address] 06/10/2021    Bilateral hydronephrosis     COVID-19 05/12/2021    Acute and chronic respiratory failure (acute-on-chronic) ([address] 05/10/2021    Tachycardia     ELIDIA (acute kidney injury) ([address] 03/22/2021    Hyperkalemia     Metabolic acidosis     Hyponatremia     Left ovarian enlargement 01/26/2021    Iron deficiency anemia 01/07/2021    Malabsorption 01/07/2021    Dehydration 11/20/2020    Malignant neoplasm of central portion of left female breast ([address] 07/31/2020    Metastatic breast cancer ([address] 07/31/2020    Secondary cancer of bone ([address] 04/01/2020    Essential hypertension 04/01/2020    Left breast mass    Large left pleural effusion s/p thoracentesis ? malignant  Iatrogenic left pneumothorax s/p left chest tube- resolved  ? Entrapped lung  Metastatic left breast ca  CKD  AG Metabolic acidosis  Bilateral Hydronephrosis s/p bilateral stent  Large left Hydropneumothorax       1. C/w left chest tube  2. CT surgery eval  3. Inhalers  4. Keep sats > 92%  5. C/w present management  No follow-ups on file.     Electronically signed by Danny Jones MD on 8/6/2021 at 12:10 PM

## 2021-08-06 NOTE — PROGRESS NOTES
Occupational Therapy  [facility] OCCUPATIONAL THERAPY EVALUATION    History  [place]iatam:  The encounter diagnosis was Malignant neoplasm of central portion of left breast in female, estrogen receptor positive ([address] Restrictions:   Chemo precautions                        Communication with other providers: Afia reeder RN    Subjective:  Patient states:  \"I have to go to the bathroom\"  Pain:  No pain  Patient goal:  Home    Occupational profile (relevant social history and personal factors):    Social/Functional History  Lives With: Daughter  Type of Home: House  Home Layout: One level  Home Access: Stairs to enter with rails, Ramped entrance  Entrance Stairs - Number of Steps: 3  Entrance Stairs - Rails: Both  Bathroom Shower/Tub: Tub/Shower unit  Bathroom Toilet: Bedside commode  Bathroom Equipment: Shower chair  Home Equipment: 4 wheeled walker, [employer]  ADL Assistance: Needs assistance  Homemaking Assistance: Needs assistance  Ambulation Assistance: Needs assistance  Transfer Assistance: Needs assistance  Additional Comments: Daughter helps pt with ADLs, IADLs, and transfers    Examination of body systems (includes body structures/functions, activity/participation limitations):  · Orientation: WFL  · Cognition:  WFL  · Observation:  Received pt in bed. Alert and cooperative. Chest tube in place. · Vision:  Rocketskates  · Hearing:  WFL  · ROM:  WFL BUE, not tested formally  · Strength: generally weak BUE  · Sensation: not tested    ADLs  Feeding: YAMILA    Grooming: CGA standing at sink for hand wash    Dressing: UB Diaz in seated LB MaxA    Bathing: UB Diaz in seated LB MaxA    Toileting: ModA, pt performed pericare in seated, didn't formally assess pant management    *Some ADL determined per observation of actual ADL performance, functional mobility, balance, activity tolerance, and cognition.      AM-PAC 6 click short form for inpatient daily activity:  Raw Score: 16  24/24 = unimpaired  23/24 = 1-20% impaired 20/24-22/24 = 21-40% impaired  15/24-19/24 = 41-59% impaired   10/24-14/24 = 60%-79% impaired  7/24-9/24 = 80%-99% impaired  6/24 = 100% impaired    Functional Mobility  Bed mobility:   Supine to sit: MaxA for trunk upright and leg mobility c cues for hand placement  Sit to supine: MaxA for trunk and leg mobility    Sitting balance: SBA EOB    Transfers:   Sit to stand: CGA 2 reps EOB to RW, Diaz 1 rep low commode c cues for hand placement  Stand to sit: CGA 1 rep to low commode c cues for hand placement, CGA 2 reps EOB    Standing balance: CGA c RW    Ambulation:  CGA c RW to/from bathroom    Activity tolerance  Tolerated session well but pt is quickly fatigued    Assessment:  Assessment  Performance deficits / Impairments: Decreased ADL status, Decreased endurance, Decreased functional mobility , Decreased strength, Decreased high-level IADLs, Decreased balance  Prognosis: Fair  Decision Making: High Complexity  REQUIRES OT FOLLOW UP: Yes  Discharge Recommendations: Home with Home health OT (Daughter helps at home)    Pt is a 50year old F admitted from home being treated for pneumothorax. Pt has relevant history of metastatic breast cancer with a chest tube currently in place. Pt lives at home with her daughter who assists with all ADLs, IADLs, and transfers. Upon eval, pt is below baseline and would benefit from cont OT during LOS. Rec home c HHOT at discharge for further therapy. Goals:  By d/c or goals met:     Pt will perform all bed mobility with ModA in prep for EOB/OOB activity. Pt will perform all functional transfers with SBA and appropriate use of LRD to bed, toilet, chair in prep for increased functional independence. Pt will perform UB ADLs with CGA to increase functional independence. Pt will perform LB ADLs with ModA to increase functional independence. Pt will perform all aspects of toileting with Diaz to increase functional independence.    Pt will participate in therex/therax c emphasis on strength, activity tolerance,  safety, YAMILA tasks. Plan:  Plan  Times per week: 2x      Recommendation for activity with nursing staff:  up to chair for meals  ambulate to bathroom with RW for toileting    Treatment today:    Self Care Training:   Self care training was performed today. Cues were given for safety, sequence, UE/LE placement, visual cues, and balance. Activities performed today included LB dressing, toileting, hand hygiene, and grooming. Education: Role of OT, OT POC, d/c needs, home safety    Safety: Left in bed with all needs in reach. bed alarm applied. Gait belt used for transfer and mobility.       Time in:  1420  Time out:  1447  Timed treatment minutes:  10  Total treatment time:  23    Electronically signed by:   Francois Ibrahim S/OT    CYNTHIA Lugo

## 2021-08-06 NOTE — PROGRESS NOTES
Formerly [facility] Katerine [facility] [address], [address]   Progress Note  [facility] 0 1 2      Date: 2021   Patient: Calderon Mckeon   : 1965   DOA: 8/3/2021   MRN: [id number]   ROOM#: [id number]     Admit Date: 8/3/2021     Collaborating Urologist on Call at time of admission: Dr. Ashley Yates: N/A  Reason for Consult: Ureteral stent exchange  POD #1: Cystoscopy, bilateral ureteral stent exchange, left RGPG/ureteroscopy    Subjective:     Pain: mild, no nausea and no vomiting,   Bowel Movement/Flatus:   Yes  Voiding:  easily,     Pt resting in bed, states she is feeling well this morning.     Objective:    Vitals:    /60   Pulse 103   Temp 97.6 °F (36.4 °C) (Oral)   Resp 21   Wt 138 lb 6.4 oz (62.8 kg)   LMP 2018   SpO2 99%   BMI 26.15 kg/m²    Temp  Av.4 °F (36.9 °C)  Min: 97.6 °F (36.4 °C)  Max: 99.4 °F (37.4 °C)       Intake/Output Summary (Last 24 hours) at 2021 6329  Last data filed at 2021  Gross per 24 hour   Intake 300 ml   Output 280 ml   Net 20 ml       Physical Exam:   General appearance: alert, appears stated age, cooperative and no distress  Head: Normocephalic, without obvious abnormality, atraumatic  Back: No CVA tenderness  Abdomen: Soft, non-tender, non-distended    Labs:   WBC:    Lab Results   Component Value Date    WBC 4.6 2021      Hemoglobin/Hematocrit:    Lab Results   Component Value Date    HGB 8.2 2021    HCT 27.1 2021      BMP:   Lab Results   Component Value Date     2021    K 4.7 2021     2021    CO2 17 2021    BUN 72 2021    LABALBU 3.6 2021    CREATININE 3.2 2021    CALCIUM 8.7 2021    GFRAA 18 2021    LABGLOM 15 2021      PT/INR:    Lab Results   Component Value Date    PROTIME 13.0 2021    INR 1.01 2021      PTT:    Lab Results   Component Value Date    APTT 34.8 2021     Urine Culture:  No growth at 18 to 36 hours     Imaging:  CT ABDOMEN PELVIS WO CONTRAST Additional Contrast? None    Result Date: 7/31/2021  EXAMINATION: CT OF THE CHEST WITHOUT CONTRAST; CT OF THE ABDOMEN AND PELVIS WITHOUT CONTRAST 7/28/2021 10:53 am; 7/28/2021 10:52 am TECHNIQUE: CT of the chest was performed without the administration of intravenous contrast. Multiplanar reformatted images are provided for review. Dose modulation, iterative reconstruction, and/or weight based adjustment of the mA/kV was utilized to reduce the radiation dose to as low as reasonably achievable.; CT of the abdomen and pelvis was performed without the administration of intravenous contrast. Multiplanar reformatted images are provided for review. Dose modulation, iterative reconstruction, and/or weight based adjustment of the mA/kV was utilized to reduce the radiation dose to as low as reasonably achievable. COMPARISON: 5/10/2021, 3/24/2021 CT chest, 3/23/2021 CT abdomen and pelvis. Correlation is made to PET-CT dated 5/7/2020 HISTORY: ORDERING SYSTEM PROVIDED HISTORY: Malignant neoplasm of breast in female, estrogen receptor positive, unspecified laterality, unspecified site of breast [facility]) TECHNOLOGIST PROVIDED HISTORY: Is the patient pregnant?->No Reason for Exam: Recheck Breast Cancer, asthma, heartburn, gas, bloating, nausea, fatigue, back pain, swelling; ORDERING SYSTEM PROVIDED HISTORY: Malignant neoplasm of breast in female, estrogen receptor positive, unspecified laterality, unspecified site of breast [facility]) TECHNOLOGIST PROVIDED HISTORY: Additional Contrast?->None Is the patient pregnant?->No Reason for Exam: Recheck Breast Cancer, asthma, heartburn, gas, bloating, nausea, fatigue, back pain, swelling FINDINGS: Chest: Mediastinum: Heart size is normal.  Relative hypoattenuation of the blood pool compared to myocardium is suggestive of anemia. Within the limitations of a noncontrast exam, there is no evidence of right hilar adenopathy.   Left hilum is not adequately seen for evaluation of adenopathy. 1.5 x 0.9 cm precarinal node, minimally decreased in size compared to the prior exam, at which time it measured 2.0 x 1.3 cm. Previously seen subcarinal lymph node has decreased in size. Lungs/pleura: Large left pleural effusion, increased compared to the prior exam, with collapse of the left lower lobe and near complete collapse of the left upper lobe. Although not well seen, the pleural surface has a slightly nodular appearance, suspicious for pleural metastasis. Soft Tissues/Bones: Skin thickening of the anterior chest wall. 1.7 x 1.1 cm right axillary lymph node has not significantly changed. Hypodensity in the thyroid gland with rim calcification is stable but not well seen by CT. The isthmus of the thyroid gland appears thickened. Diffuse bone metastasis in the axial skeleton. Thoracic alignment is maintained. There is a pathologic fracture of T10, chronic. No evidence of bony fragment retropulsion into the central canal.  Nodular thickening of the left anterior chest wall skin and subcutaneous soft tissue. Abdomen/Pelvis: Organs: Unenhanced liver, gallbladder, spleen, pancreas, and adrenal glands are unremarkable. Bilateral ureteral stents with proximal loops in the renal collecting system and distal loops in the bladder. Right renal pelvicaliectasis without you hydronephrosis. GI/Bowel: No evidence of bowel obstruction or free intraperitoneal air. Pelvis: Eccentric bladder wall thickening with relative thickening of the left lateral wall compared to the right. Small amount of free fluid in the pelvis. Pelvic adenopathy, including 1.9 x 1.7 cm right external iliac node and 1.6 x 1.2 cm left external iliac node. Peritoneum/Retroperitoneum: Abdominal aortic caliber is normal.  No retroperitoneal adenopathy. Bones/Soft Tissues: Circumferential subcutaneous edema of the abdominal and pelvic wall. Diffuse sclerotic bone metastasis in the axial skeleton.      1. Large left pleural effusion, increased compared to the exam of 5/10/2021. There is now complete collapse of the left lower lobe and near complete collapse of the left upper lobe. Nodular appearance of the left pleural surface is highly suspicious for pleural metastasis. 2. Pelvic adenopathy, not significantly changed compared to prior exam. Finding is highly suspicious for metastasis. 3. Nodular skin thickening of the left anterior chest wall and subcutaneous fat, likely metastasis. 4. 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 This is a chronic finding. 5. Persistent eccentric bladder wall thickening with relative thickening of the left side of the bladder wall. Differential includes cystitis and malignancy. If it would alter patient's management, consider cystoscopy. 6. Stable position of bilateral ureteral stents. Right renal pelvocaliectasis without you hydronephrosis. 7. Diffuse skin thickening with subcutaneous edema of the chest, abdominal, and pelvic wall. Please correlate with clinical symptoms of anasarca. CT CHEST WO CONTRAST    Result Date: 7/31/2021  EXAMINATION: CT OF THE CHEST WITHOUT CONTRAST; CT OF THE ABDOMEN AND PELVIS WITHOUT CONTRAST 7/28/2021 10:53 am; 7/28/2021 10:52 am TECHNIQUE: CT of the chest was performed without the administration of intravenous contrast. Multiplanar reformatted images are provided for review. Dose modulation, iterative reconstruction, and/or weight based adjustment of the mA/kV was utilized to reduce the radiation dose to as low as reasonably achievable.; CT of the abdomen and pelvis was performed without the administration of intravenous contrast. Multiplanar reformatted images are provided for review. Dose modulation, iterative reconstruction, and/or weight based adjustment of the mA/kV was utilized to reduce the radiation dose to as low as reasonably achievable.  COMPARISON: 5/10/2021, 3/24/2021 CT chest, 3/23/2021 CT abdomen and pelvis. Correlation is made to PET-CT dated 5/7/2020 HISTORY: ORDERING SYSTEM PROVIDED HISTORY: Malignant neoplasm of breast in female, estrogen receptor positive, unspecified laterality, unspecified site of breast [facility]) TECHNOLOGIST PROVIDED HISTORY: Is the patient pregnant?->No Reason for Exam: Recheck Breast Cancer, asthma, heartburn, gas, bloating, nausea, fatigue, back pain, swelling; ORDERING SYSTEM PROVIDED HISTORY: Malignant neoplasm of breast in female, estrogen receptor positive, unspecified laterality, unspecified site of breast [facility]) TECHNOLOGIST PROVIDED HISTORY: Additional Contrast?->None Is the patient pregnant?->No Reason for Exam: Recheck Breast Cancer, asthma, heartburn, gas, bloating, nausea, fatigue, back pain, swelling FINDINGS: Chest: Mediastinum: Heart size is normal.  Relative hypoattenuation of the blood pool compared to myocardium is suggestive of anemia. Within the limitations of a noncontrast exam, there is no evidence of right hilar adenopathy. Left hilum is not adequately seen for evaluation of adenopathy. 1.5 x 0.9 cm precarinal node, minimally decreased in size compared to the prior exam, at which time it measured 2.0 x 1.3 cm. Previously seen subcarinal lymph node has decreased in size. Lungs/pleura: Large left pleural effusion, increased compared to the prior exam, with collapse of the left lower lobe and near complete collapse of the left upper lobe. Although not well seen, the pleural surface has a slightly nodular appearance, suspicious for pleural metastasis. Soft Tissues/Bones: Skin thickening of the anterior chest wall. 1.7 x 1.1 cm right axillary lymph node has not significantly changed. Hypodensity in the thyroid gland with rim calcification is stable but not well seen by CT. The isthmus of the thyroid gland appears thickened. Diffuse bone metastasis in the axial skeleton. Thoracic alignment is maintained. There is a pathologic fracture of T10, chronic.   No evidence of bony fragment retropulsion into the central canal.  Nodular thickening of the left anterior chest wall skin and subcutaneous soft tissue. Abdomen/Pelvis: Organs: Unenhanced liver, gallbladder, spleen, pancreas, and adrenal glands are unremarkable. Bilateral ureteral stents with proximal loops in the renal collecting system and distal loops in the bladder. Right renal pelvicaliectasis without you hydronephrosis. GI/Bowel: No evidence of bowel obstruction or free intraperitoneal air. Pelvis: Eccentric bladder wall thickening with relative thickening of the left lateral wall compared to the right. Small amount of free fluid in the pelvis. Pelvic adenopathy, including 1.9 x 1.7 cm right external iliac node and 1.6 x 1.2 cm left external iliac node. Peritoneum/Retroperitoneum: Abdominal aortic caliber is normal.  No retroperitoneal adenopathy. Bones/Soft Tissues: Circumferential subcutaneous edema of the abdominal and pelvic wall. Diffuse sclerotic bone metastasis in the axial skeleton. 1. Large left pleural effusion, increased compared to the exam of 5/10/2021. There is now complete collapse of the left lower lobe and near complete collapse of the left upper lobe. Nodular appearance of the left pleural surface is highly suspicious for pleural metastasis. 2. Pelvic adenopathy, not significantly changed compared to prior exam. Finding is highly suspicious for metastasis. 3. Nodular skin thickening of the left anterior chest wall and subcutaneous fat, likely metastasis. 4. 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 This is a chronic finding. 5. Persistent eccentric bladder wall thickening with relative thickening of the left side of the bladder wall. Differential includes cystitis and malignancy. If it would alter patient's management, consider cystoscopy. 6. Stable position of bilateral ureteral stents.   Right renal pelvocaliectasis without you hydronephrosis. 7. Diffuse skin thickening with subcutaneous edema of the chest, abdominal, and pelvic wall. Please correlate with clinical symptoms of anasarca. FL LESS THAN 1 HOUR    Result Date: 8/5/2021  Radiology exam is complete. No Radiologist dictation. Please follow up with ordering provider. XR CHEST PORTABLE    Result Date: 8/5/2021  EXAMINATION: ONE XRAY VIEW OF THE CHEST 8/5/2021 5:45 am COMPARISON: Multiple prior chest radiograph, most recently 8/4/2021 HISTORY: ORDERING SYSTEM PROVIDED HISTORY: Hypoxia TECHNOLOGIST PROVIDED HISTORY: Reason for exam:->Hypoxia Reason for Exam: Hypoxia Acuity: Unknown Type of Exam: Subsequent/Follow-up Additional signs and symptoms: Hypoxia Relevant Medical/Surgical History: Hypoxia FINDINGS: Left chest tube remains in place, with interval decrease in size of left pneumothorax, with resolution of the left lateral chest and basilar component. No definite residual pneumothorax identified. Similar left pleural effusion and dense airspace opacities throughout the left lung. The cardiomediastinal silhouette is partially obscured and unchanged. Right lung is clear. No pneumothorax or pleural effusion. No acute osseous abnormality. 1.  Left chest tube remains in place with interval resolution of left lateral chest wall and basilar pneumothorax. No definite residual pneumothorax is identified. 2.  Similar appearing left pleural effusion with airspace opacities in the left mid lung. XR CHEST PORTABLE    Result Date: 8/5/2021  EXAMINATION: ONE XRAY VIEW OF THE CHEST 8/4/2021 5:44 am COMPARISON: August 3, 2021 HISTORY: ORDERING SYSTEM PROVIDED HISTORY: Post left chest tube insertion TECHNOLOGIST PROVIDED HISTORY: Reason for exam:->Post left chest tube insertion Reason for Exam: Post left chest tube insertion Acuity: Acute Type of Exam: Subsequent/Follow-up FINDINGS: Interval placement of a left chest tube.   No significant interval change of a large pneumothorax. Airspace opacities are identified in the left upper lung zone. Consolidation is seen in the base. Left effusion is also seen. The right lung is grossly clear without focal consolidation or pleural effusion. No pulmonary edema. No new osseous abnormality. Stable appearance of a right-sided MediPort. Status post placement of a left pleural pigtail catheter. No significant interval change of a large pneumothorax. Stable opacities in the left lung with pleural effusion. XR CHEST PORTABLE    Result Date: 8/3/2021  EXAMINATION: ONE XRAY VIEW OF THE CHEST 8/3/2021 10:05 am COMPARISON: CT chest 07/28/2021 HISTORY: ORDERING SYSTEM PROVIDED HISTORY: post left thoracentesis. TECHNOLOGIST PROVIDED HISTORY: SDS #18 Reason for exam:->post left thoracentesis. Reason for Exam: post left thoracentesis. FINDINGS: Right arm port catheter with catheter tip right atrium. Stable cardiomediastinal silhouette. Significant decrease in size of left pleural effusion with probable small residual pleural effusion remaining. Hazy opacification of the left lung with moderate amount of air in the left pleural space compatible with atelectasis secondary to trapped lung. Significant decrease in size in left pleural effusion with likely small residual amount of pleural fluid remaining. Hazy opacification of the left lung and moderate amount of air in the left pleural space compatible with trapped lung. CT GUIDED CHEST TUBE    Result Date: 8/3/2021  PROCEDURE: CT GUIDED CHEST TUBE PLACEMENT 8/3/2021 HISTORY: ORDERING SYSTEM PROVIDED HISTORY: Post thoracentesis TECHNOLOGIST PROVIDED HISTORY: Reason for exam:->Post thoracentesis Which side should the procedure be performed? ->Left Is the patient pregnant?->No Reason for Exam: post thoracentesis chest tube Relevant Medical/Surgical History: ATTN: DR TREVIÑO Pneumothorax SEDATION: None TECHNIQUE: Informed consent was obtained after a detailed explanation of the procedure including risks, benefits, and alternatives. Universal protocol was followed. A suitable skin site was prepped and draped in sterile fashion following CT localization. The patient was initially scanned supine, however, the patient has extensive post radiation change on anterior left chest wall. Patient was then placed right lateral decubitus to minimize needle path through the radiation change. An 18 gauge needle was advanced into the left pleural space and a 0.035 guidewire was used to place a 8 [place] Islander chest tube after the fascial tract was dilated. The catheter was sutured to the skin and the patient tolerated the procedure well. The catheter was attached to Pleurevac drainage. The patient tolerated the procedure well and there were no immediate complications. EBL: Less than 5 cc FINDINGS: 1. When compared with the chest CT from 7/28/2021 there has been interval re-expansion of the left lung. There are diffuse nonspecific parenchymal opacities seen throughout visualized left lung. 2. Moderate left hydropneumothorax status post chest tube placement. 3. Nonspecific diffuse soft tissue nodularity seen throughout the left chest wall soft tissues with dermal thickening, which is not significantly changed since the recent CT. This could represent post radiation change, however, superimposed malignancy cannot be excluded. 4. Limited evaluation demonstrates sclerotic lesions seen throughout the spine, sternum and ribs. 5. Nodular thickening noted along the left pleura at the lung base. 1. Successful CT guided placement of a left-sided 8 [place] Islander chest tube. 2. Interval re-expansion of left lung since the recent CT with diffuse asymmetric left-sided pulmonary parenchymal opacities. These opacities are nonspecific but could be secondary to infection, asymmetric edema, or pulmonary hemorrhage. Atelectasis is less likely. 3. Moderate left hydropneumothorax post left chest tube placement.  4. Nodular thickening noted along the pleura at the left lung base is suspicious for metastatic disease. IR GUIDED THORACENTESIS PLEURAL    Result Date: 8/3/2021  PROCEDURE: ULTRASOUNDGUIDED left THORACENTESIS 8/3/2021 HISTORY: ORDERING SYSTEM PROVIDED HISTORY: Pleural effusion TECHNOLOGIST PROVIDED HISTORY: Which side should the procedure be performed? ->Left Reason for exam:->Pleural Effusion Is the patient pregnant?->No Shortness of breath TECHNIQUE: Informed consent was obtained after a detailed explanation of the procedure including risks, benefits, and alternatives. Universal protocol was performed. The left chest was prepped and draped in sterile fashion and local anesthesia was achieved with lidocaine. A 5 [place] needle sheath was advanced under ultrasound guidance into pleural effusion and thoracentesis was performed. The patient tolerated the procedure well. EBL: Less than 5 cc FINDINGS: A total of 1400 cc clear yellow fluid was removed. Fluid was sent for analysis     Successful ultrasound guided left-sided thoracentesis. Assessment & Plan:      Essence Issa is a 50y.o. year old female admitted 8/3/2021 for pneumothorax. H/o metastatic breast cancer, currently undergoing palliative chemotherapy with hem/onc. Known to Dr. Lavelle Fernandez.     1) Bilateral Hydronephrosis: secondary to bilateral ureteral obstruction due to metastatic breast cancer              POD #1: Cystoscopy, bilateral ureteral stent exchange, left RGPG/ureteroscopy   S/p cystoscopy, bilateral RGPG, bilateral ureteral stent exchange 6/11/21              S/p cystoscopy, bilateral ureteral stent placement on 3/25/21               CT a/p 6/9/21: Unchanged moderate-large amount of bilateral hydronephrosis greater on the right. Ureteral stents in good position.              Urine cx negative              Recommend bilateral ureteral stent exchange in 2 months due to high-grade nature and progression of her cancer.   2) ELIDIA              Cr 3.2              Nephrology following    Pt stable from a  standpoint. Will sign off, please call with any questions. Pt to follow up with Dr. Pepe Vo in 1 month for reevaluation and scheduling of next stent exchange. Patient seen and examined, chart reviewed.      Electronically signed by Ramesh García PA-C on 8/6/2021 at 7:29 AM

## 2021-08-06 NOTE — PROGRESS NOTES
Hospitalist Progress Note      Name:  Jovany Motley /Age/Sex: 1965  (50 y.o. female)   MRN & CSN:  [id number] & [id number] Admission Date/Time: 8/3/2021  1:46 PM   Location:  [facility] PCP: Rashi Nunes 15 Day: 4    Assessment and Plan:   Jovany Motley is a 50 y.o.  female  who presents with <principal problem not specified>    1. LEFT PNEUMOTHORAX  -post chest tube placement, I received a call from radiology repeat CXr today possible shift of cardiac silhouette and increased opacity in left lung field. Order STAT CT chest non contrast     2. LEFT PLEURAL EFFUSION  -post thoracentesis, cytology C/W metastatic breast cancer     3. 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 CKD 4  -sec to above, will follow     5. HYPOCALCEMIA  -treatment as per endocrinology     6. METASTATIC BREAST CANCER  -continuing to receive chemo    Diet ADULT DIET; Regular   DVT Prophylaxis [] Lovenox, []  Heparin, [x] SCDs, [] Ambulation   GI Prophylaxis [] PPI,  [] H2 Blocker,  [] Carafate,  [] Diet/Tube Feeds   Code Status Full Code   Disposition Patient requires continued admission due to Ptx   MDM [] Low, [] Moderate,[]  High  Patient's risk as above due to Ptx     History of Present Illness:     Chief Complaint: <principal problem not specified>  Jovany Motley is a 50 y.o.  female  who presents with pneumothorax    NO issues overnight. She is comfortable no SOB, oxygenating well on room air. No specific complaints at this time       Ten point ROS reviewed negative, unless as noted above    Objective: Intake/Output Summary (Last 24 hours) at 2021 08  Last data filed at 2021  Gross per 24 hour   Intake 300 ml   Output 280 ml   Net 20 ml      Vitals:   Vitals:    21 0600   BP: 128/60   Pulse: 103   Resp: 21   Temp:    SpO2:      Physical Exam:   GEN Awake female, sitting upright in bed in no apparent distress.  Appears given age. EYES Pupils are equally round. No scleral erythema, discharge, or conjunctivitis. HENT Mucous membranes are moist. Oral pharynx without exudates, no evidence of thrush. NECK Supple, no apparent thyromegaly or masses. RESP Clear to auscultation  With significantly decreased BS left upper lung field, no wheezes, rales or rhonchi. Symmetric chest movement while on room air. CARDIO/VASC S1/S2 auscultated. Regular rate without appreciable murmurs, rubs, or gallops. No JVD or carotid bruits. Peripheral pulses equal bilaterally and palpable. No peripheral edema. GI Abdomen is soft without significant tenderness, masses, or guarding. Bowel sounds are normoactive. Rectal exam deferred. HEME/LYMPH No palpable cervical lymphadenopathy and no hepatosplenomegaly. No petechiae or ecchymoses. MSK No gross joint deformities. SKIN Normal coloration, warm, dry. NEURO Cranial nerves appear grossly intact, normal speech, no lateralizing weakness. PSYCH Awake, alert,  Affect appropriate.     Medications:   Medications:    magnesium oxide  400 mg Oral Daily    calcitRIOL  0.5 mcg Oral BID    calcium carbonate  750 mg Oral TID WC    famotidine (PEPCID) injection  20 mg Intravenous Once    cetirizine  10 mg Oral Daily    furosemide  80 mg Oral Daily    metoprolol succinate  100 mg Oral Daily    sodium bicarbonate  650 mg Oral BID    sodium chloride flush  5-40 mL Intravenous 2 times per day      Infusions:    IV infusion builder 60 mL/hr at 08/05/21 1245    sodium chloride 25 mL (08/05/21 1436)     PRN Meds: LORazepam, 0.5 mg, BID PRN  prochlorperazine, 10 mg, Q8H PRN  sodium chloride flush, 5-40 mL, PRN  sodium chloride, 25 mL, PRN  ondansetron, 4 mg, Q8H PRN   Or  ondansetron, 4 mg, Q6H PRN  polyethylene glycol, 17 g, Daily PRN  acetaminophen, 650 mg, Q6H PRN   Or  acetaminophen, 650 mg, Q6H PRN  HYDROcodone-acetaminophen, 1 tablet, Q6H PRN

## 2021-08-07 NOTE — PROGRESS NOTES
Progress Note( Dr. Halina Sanchez)  8/7/2021  Subjective:   Admit Date: 8/3/2021  PCP: CINDY Stroud NP    Admitted For : Left-sided pneumothorax    Consulted For: Hypocalcemia     Interval History: Feels somewhat better breathing is better though still short of breath  Serum Calcium is now normal     Had Urologic Procedure done  8/5/2021 as noted below   Left Ureteral Stent was exchanged    Reviewed CT scanning done and left-sided hydropneumothorax with collapse of the lung though patient has chest tube there    Denies any chest pains,   Yes SOB . Denies nausea or vomiting. No new bowel or bladder symptoms. No intake or output data in the 24 hours ending 08/07/21 0737    DATA    CBC:   Recent Labs     08/05/21 0159 08/06/21 0545 08/07/21 0229   WBC 4.0 4.6 4.1   HGB 7.8* 8.2* 8.4*    354 350    CMP:  Recent Labs     08/05/21 0159 08/06/21 0545 08/07/21 0229    143 145   K 3.7 4.7 4.8    110 110   CO2 19* 17* 20*   BUN 76* 72* 72*   CREATININE 3.7* 3.2* 3.3*   CALCIUM 7.0* 8.7 9.6   PROT 5.0* 5.4* 5.4*   LABALBU 3.0* 3.6 3.6   BILITOT 0.2 0.2 0.2   ALKPHOS 65 63 62   AST 46* 55* 63*   ALT 6* <5* <5*     Lipids:   Lab Results   Component Value Date    CHOL 135 12/11/2019    HDL 54 12/11/2019    TRIG 74 12/11/2019     Glucose:No results for input(s): POCGLU in the last 72 hours.   VoxxdafdrsD2M:  Lab Results   Component Value Date    LABA1C 6.0 06/10/2021     High Sensitivity TSH:   Lab Results   Component Value Date    TSHHS <0.010 01/07/2021     Free T3: No results found for: FT3  Free T4:No results found for: T4FREE    DATE OF PROCEDURE:  08/05/2021 Dr. Krzyzstof Weiner      PREOPERATIVE DIAGNOSIS:  Bilateral hydronephrosis secondary to  metastatic cancer.     POSTOPERATIVE DIAGNOSIS:  Bilateral hydronephrosis secondary to  metastatic cancer.     PROCEDURE:  Cystoscopy, bilateral ureteral stent exchange, left  diagnostic ureteroscopy, and left retrograde pyelogram.       XR CHEST PORTABLE    Result Date: 8/5/2021  EXAMINATION: ONE XRAY VIEW OF THE CHEST 8/4/2021 5:44 am COMPARISON: August 3, 2021 HISTORY: ORDERING SYSTEM PROVIDED HISTORY: Post left chest tube insertion TECHNOLOGIST PROVIDED HISTORY: Reason for exam:->Post left chest tube insertion Reason for Exam: Post left chest tube insertion Acuity: Acute Type of Exam: Subsequent/Follow-up FINDINGS: Interval placement of a left chest tube. No significant interval change of a large pneumothorax. Airspace opacities are identified in the left upper lung zone. Consolidation is seen in the base. Left effusion is also seen. The right lung is grossly clear without focal consolidation or pleural effusion. No pulmonary edema. No new osseous abnormality. Stable appearance of a right-sided MediPort. Status post placement of a left pleural pigtail catheter. No significant interval change of a large pneumothorax. Stable opacities in the left lung with pleural effusion. XR CHEST PORTABLE    Result Date: 8/3/2021  EXAMINATION: ONE XRAY VIEW OF THE CHEST 8/3/2021 10:05 am COMPARISON: CT chest 07/28/2021 HISTORY: ORDERING SYSTEM PROVIDED HISTORY: post left thoracentesis. TECHNOLOGIST PROVIDED HISTORY: SDS #18 Reason for exam:->post left thoracentesis. Reason for Exam: post left thoracentesis. FINDINGS: Right arm port catheter with catheter tip right atrium. Stable cardiomediastinal silhouette. Significant decrease in size of left pleural effusion with probable small residual pleural effusion remaining. Hazy opacification of the left lung with moderate amount of air in the left pleural space compatible with atelectasis secondary to trapped lung. Significant decrease in size in left pleural effusion with likely small residual amount of pleural fluid remaining. Hazy opacification of the left lung and moderate amount of air in the left pleural space compatible with trapped lung.      CT GUIDED CHEST TUBE    Result Date: 8/3/2021  PROCEDURE: CT GUIDED CHEST TUBE PLACEMENT 8/3/2021 HISTORY: ORDERING SYSTEM PROVIDED HISTORY: Post thoracentesis       1. Successful CT guided placement of a left-sided 8 Sami chest tube. 2. Interval re-expansion of left lung since the recent CT with diffuse asymmetric left-sided pulmonary parenchymal opacities. These opacities are nonspecific but could be secondary to infection, asymmetric edema, or pulmonary hemorrhage. Atelectasis is less likely. 3. Moderate left hydropneumothorax post left chest tube placement. 4. Nodular thickening noted along the pleura at the left lung base is suspicious for metastatic disease. IR GUIDED THORACENTESIS PLEURAL    Result Date: 8/3/2021  PROCEDURE: ULTRASOUNDGUIDED left THORACENTESIS 8/3/2021 HISTORY: ORDERING SYSTEM PROVIDED HISTORY: Pleural effusion      Successful ultrasound guided left-sided thoracentesis.   Removed about 1400 cc of fluid      Scheduled Medicines   Medications:    magnesium oxide  400 mg Oral Daily    calcitRIOL  0.5 mcg Oral BID    calcium carbonate  750 mg Oral TID WC    famotidine (PEPCID) injection  20 mg Intravenous Once    cetirizine  10 mg Oral Daily    furosemide  80 mg Oral Daily    metoprolol succinate  100 mg Oral Daily    sodium bicarbonate  650 mg Oral BID    sodium chloride flush  5-40 mL Intravenous 2 times per day      Infusions:    sodium chloride      sodium chloride 25 mL (08/05/21 1436)         Objective:   Vitals: BP (!) 152/74   Pulse 103   Temp 98.7 °F (37.1 °C) (Axillary)   Resp 16   Ht 5' 1\" (1.549 m)   Wt 138 lb 6.4 oz (62.8 kg)   LMP 04/08/2018   SpO2 96%   BMI 26.15 kg/m²   General appearance: alert and cooperative with exam  Neck: no JVD or bruit  Thyroid : Normal lobes   Lungs: Has Vesicular Breath sounds left-sided chest tubes  Heart:  regular rate and rhythm  Abdomen: soft, non-tender; bowel sounds normal; no masses,  no organomegaly  Musculoskeletal: Normal  Extremities: extremities normal, , no edema  Neurologic:  Awake, alert, oriented to name, place and time. Cranial nerves II-XII are grossly intact. Motor is  intact. Sensory is intact. ,  and gait is normal.    Assessment:     Patient Active Problem List:     Left breast mass     Secondary cancer of bone ([address]     Essential hypertension     Malignant neoplasm of central portion of left female breast ([address]     Metastatic breast cancer (HCC)     Dehydration     Iron deficiency anemia     Malabsorption     Left ovarian enlargement     ELIDIA (acute kidney injury) ([address]     Hyperkalemia     Metabolic acidosis     Hyponatremia     Tachycardia     Acute and chronic respiratory failure (acute-on-chronic) (HCC)     COVID-19     Acute renal failure with tubular necrosis (HCC)     Bilateral hydronephrosis     Edema leg     Hypervolemia     Hypocalcemia     Pneumothorax      Plan:     1. Reviewed POC blood glucose . Labs and X ray results   2. Reviewed Current Medicines   3. Will D/C  IV calcium Infusion  4. Continue on PO Tums and Calcirol   5. Modify the dose as needed  6. Left-sided hydropneumothorax with collapse of the lung. 7. Reconsulted cardiac thoracic surgeon awaiting their input  8. Will follow     .      Marcia Barbour MD, MD

## 2021-08-07 NOTE — PROGRESS NOTES
Hospitalist Progress Note      Name:  Calderon Mckeon /Age/Sex: 1965  (50 y.o. female)   MRN & CSN:  [id number] & [id number] Admission Date/Time: 8/3/2021  1:46 PM   [facility] PCP: [place] [place] [place] [address] Day: 5    Assessment and Plan:   Calderon Mckeon is a 50 y.o.  female  who presents with <principal problem not specified>    1. LEFT PNEUMOTHORAX  -post chest tube pigtail, CT showed worsened with hydropneumothorax. She was just seen by CT surgery who recommends placement of larger tube. She is currently in no distress. Patient would like to speak with daughter before deciding on chest tube     2. LEFT PLEURAL EFFUSION  -post thoracentesis, cytology C/W metastatic breast cancer see above     3. OBSTRUCTIVE UROPATHY SECONDARY TO METASTASES  -S/P stent replacement creat is stable     4. CKD 4  -sec to above stable, will monitor     5. HYPOCALCEMIA  -treatment as per endocrinology     6. METASTATIC BREAST CANCER  -as noted, receiving chemo    Diet ADULT DIET; Regular   DVT Prophylaxis [] Lovenox, []  Heparin, [x] SCDs, [] Ambulation   GI Prophylaxis [] PPI,  [] H2 Blocker,  [] Carafate,  [] Diet/Tube Feeds   Code Status Full Code   Disposition Patient requires continued admission due to Ptx   MDM [] Low, [] Moderate,[]  High  Patient's risk as above due to Ptx     History of Present Illness:     Chief Complaint: <principal problem not specified>  Calderon Mckeon is a 50 y.o.  female  who presents with pleural effusion    No issues overnight. She has remained stable, is comfortable and oxygenating appropriately on room air. She has no complaints currently.  Her friend is present at bedside       Ten point ROS reviewed negative, unless as noted above    Objective:   No intake or output data in the 24 hours ending 21 1110   Vitals:   Vitals:    21 1000   BP: (!) 147/64   Pulse: 108   Resp: 20   Temp: 98.4 °F (36.9 °C)   SpO2:      Physical Exam:   GEN Awake female, sitting upright in bed in no apparent distress. Appears given age. EYES Pupils are equally round. No scleral erythema, discharge, or conjunctivitis. HENT Mucous membranes are moist. Oral pharynx without exudates, no evidence of thrush. NECK Supple, no apparent thyromegaly or masses. RESP Clear to auscultation on right, significantly decreased BS left lung field, no wheezes, rales or rhonchi. Symmetric chest movement while on room air. CARDIO/VASC S1/S2 auscultated. Regular rate without appreciable murmurs, rubs, or gallops. No JVD or carotid bruits. Peripheral pulses equal bilaterally and palpable. No peripheral edema. GI Abdomen is soft without significant tenderness, masses, or guarding. Bowel sounds are normoactive. Rectal exam deferred. HEME/LYMPH No palpable cervical lymphadenopathy and no hepatosplenomegaly. No petechiae or ecchymoses. MSK No gross joint deformities. SKIN Normal coloration, warm, dry. NEURO Cranial nerves appear grossly intact, normal speech, no lateralizing weakness. PSYCH Awake, alert, oriented x 4. Affect appropriate.     Medications:   Medications:    magnesium oxide  400 mg Oral Daily    calcitRIOL  0.5 mcg Oral BID    calcium carbonate  750 mg Oral TID WC    famotidine (PEPCID) injection  20 mg Intravenous Once    cetirizine  10 mg Oral Daily    furosemide  80 mg Oral Daily    metoprolol succinate  100 mg Oral Daily    sodium bicarbonate  650 mg Oral BID    sodium chloride flush  5-40 mL Intravenous 2 times per day      Infusions:    sodium chloride 50 mL/hr at 08/07/21 1025    sodium chloride 25 mL (08/05/21 1436)     PRN Meds: LORazepam, 0.5 mg, BID PRN  prochlorperazine, 10 mg, Q8H PRN  sodium chloride flush, 5-40 mL, PRN  sodium chloride, 25 mL, PRN  ondansetron, 4 mg, Q8H PRN   Or  ondansetron, 4 mg, Q6H PRN  polyethylene glycol, 17 g, Daily PRN  acetaminophen, 650 mg, Q6H PRN   Or  acetaminophen, 650 mg, Q6H PRN  HYDROcodone-acetaminophen, 1 tablet, Q6H PRN

## 2021-08-07 NOTE — PROGRESS NOTES
Daughter Odette Valerio at bedside, requesting to talk to Dr. Henry Sheppard, Dr. Henry Sheppard paged and transferred the call to patient's room.

## 2021-08-07 NOTE — CONSULTS
Department of Cardiovascular & Thoracic Surgery   Consult Note    Reason for Consult: Large left pleural effusion consolidation  Status post IR chest tube placement that is now occluded  She is known to have metastatic breast cancer  And in fragile general health    Requesting Physician: Dr. Ave Callahan    Date of Consult: 8/7/21      History Obtained From:  Patient, EMR patient's friend and daughter     HISTORY OF PRESENT ILLNESS:    The patient is a 50 y.o. female who presents with history of metastatic breast cancer and presents with large left pleural effusion  She then underwent thoracentesis following which she got pneumothorax  Chest tube was then placed per IR  Fluid is now reaccumulated and chest tube is no longer draining  Patient however remains on room air oxygen saturation in the high 90s.       Past Medical History:        Diagnosis Date    Anxiety     Asthma     last flare up 5 yrs ago    Breast lesion     \"have spot on left breast going to remove- at this time not sure if cancer or not\"    Essential hypertension 4/1/2020    Secondary cancer of bone ([facility] 4/1/2020    Thyroid nodule     \"have thyroid nodules-      Past Surgical History:        Procedure Laterality Date    APPENDECTOMY  age 27   [facility] BLADDER SURGERY Bilateral 3/25/2021    BILATERAL CYSTOSCOPY BILATERAL STENT INSERTION performed by Reddy Celestin MD at [facility] Bilateral 6/11/2021    BILATERAL CYSTOSCOPY RETROGRADE PYELOGRAM STENT EXCHANGE performed by Yvon Gracia MD at [address] [facility] Bilateral 8/5/2021    BILATERAL CYSTOSCOPY, BILATERAL STENT EXCHANGE, LEFT URETEROSCOPYAND LEFT RETROGRADE PYELOGRAM performed by Ida Morton MD at [facility] 6  8/3/2021    CT GUIDED CHEST TUBE 8/3/2021 [facility] CT SCAN    IR NONTUNNELED VASCULAR CATHETER  3/22/2021    IR NONTUNNELED VASCULAR CATHETER 3/22/2021 [facility] SPECIAL PROCEDURES    PORT SURGERY Right 3/15/2021 Years of education: 15    Highest education level: Not on file   Occupational History    Occupation: dental assistant     Comment: [place] Use    Smoking status: Never Smoker    Smokeless tobacco: Never Used   Vaping Use    Vaping Use: Never used   Substance and Sexual Activity    Alcohol use: Yes     Comment: average\"one time per week\"    Drug use: No    Sexual activity: Yes     Partners: Male   Other Topics Concern    Not on file   Social History Narrative    Lives in her own home with her children     Social Determinants of Health     Financial Resource Strain:     Difficulty of Paying Living Expenses:    Food Insecurity:     Worried About Running Out of Food in the Last Year:     [address] in the Last Year:    Transportation Needs:     Lack of Transportation (Medical):  Lack of Transportation (Non-Medical):    Physical Activity:     Days of Exercise per Week:     Minutes of Exercise per Session:    Stress:     Feeling of Stress :    Social Connections:     Frequency of Communication with Friends and Family:     Frequency of Social Gatherings with Friends and Family:     Attends [employer] Services:     Active Member of Clubs or Organizations:     Attends Club or Organization Meetings:     Marital Status:    Intimate Partner Violence:     Fear of Current or Ex-Partner:     Emotionally Abused:     Physically Abused:     Sexually Abused:        Family History:        Problem Relation Age of Onset    Diabetes Mother     Stroke Mother     High Blood Pressure Mother        REVIEW OF SYSTEMS:  Constitutional: Negative for fever, chills, diaphoresis, appetite change and fatigue. HENT: Negative for sore throat, trouble swallowing and voice change. Respiratory: Negative for cough, positive for shortness of breath no wheezing. Cardiovascular: Negative for chest pain positive for SOB with one flight of stairs exertion, no pitting LE edema. Gastrointestinal: Negative for nausea, vomiting, abdominal distention, constipation, no diarrhea, blood in stool, anal bleeding or rectal pain. Musculoskeletal: Negative for joint swelling and arthralgias. Skin: Warm and dry, well perfused. Neurological: Negative for seizures, syncope, speech difficulty and weakness. Hematological/Lymphatic: Negative for adenopathy. No history of DVT/PE. Does not bruise/bleed easily. Psychiatric/Behavioral: Negative for agitation. All others reviewed and negative. EXAM:  Constitutional: Blood pressure (!) 147/64, pulse 108, temperature 98.4 °F (36.9 °C), temperature source Oral, resp. rate 20, height 5' 1\" (1.549 m), weight 138 lb 6.4 oz (62.8 kg), last menstrual period 04/08/2018, SpO2 96 %, not currently breastfeeding. No apparent distress,     Appears fragile and poor general health   Despite appearance of the chest x-ray with regards to left-sided opacification she appears comfortable conversant and on room air at 98% sat    neurologic: follows commands, no focal weakness noted   Lungs: Good respiratory effort.  No left-sided breath sounds are heard left chest tube in place with opening  CV: Regular rate/ rhythm , no peripheral edema, feet warm and well perfused  GI: Soft, non-tender in all four quadrants, non-distended, + bowel sounds, liver and spleen no palpable masses  : bladder nondistended   MSK: no obvious deformity   Skin: warm, pink and dry       DATA:  Serial x-rays and CT scans reviewed  Left lung pathology and progression of the left pleural effusion and resolution and total opacification of the left lung field noted    IMPRESSION  Patient Active Problem List   Diagnosis    Left breast mass    Secondary cancer of bone ([address]    Essential hypertension    Malignant neoplasm of central portion of left female breast ([address]    Metastatic breast cancer (HCC)    Dehydration    Iron deficiency anemia    Malabsorption    Left ovarian enlargement  [facility]    Hyperkalemia    Metabolic acidosis    Hyponatremia    Tachycardia    Acute and chronic respiratory failure (acute-on-chronic) (HCC)    COVID-19    Acute renal failure with tubular necrosis (HCC)    Bilateral hydronephrosis    Edema leg    Hypervolemia    Hypocalcemia    Pneumothorax             RECOMMENDATIONS:  Patient is able to actively participate in the discussion;   discussed with her consideration of placing larger chest tube and consideration of decortication by VATS  Patient and her friend at bedside at the time of discussion  She verbalized good understanding of the options  She expects to discuss these options with the rest of the family and make a decision  Given the prognosis of the underlying cancer we will pursue  options of intervention based on the patient's decision

## 2021-08-07 NOTE — PROCEDURES
Procedure: left tube thoracostomy    Diagnosis: Recurrent large left pleural effusion with complete opacification of the left chest  Known metastatic cancer of the breast  Previous IR placement of chest tube    Details of the procedure:    Per patient and family request please see previous consultation regarding discussion, procedure place a left chest tube    Patient left chest was exposed and prepared and draped exposing the anterolateral chest with complete sterile antibiotic precautions    Timeout was performed    Local anesthesia infiltrated and the anterior axillary line in the sixth interspace very carefully selected location    Incision was made in the skin about 2 cm wide dissection carried into the pleural space and encountered the large pleural effusion    #28 a chest tube negotiated through the opening into the chest cavity    Retrieved approximately 1200 cc of serous pleural fluid  Connected to underwater seal under suction    Chest tube anchored at the skin level    Follow chest x-ray revealed complete reexpansion of the left lung    Patient tolerated procedure well    Blood loss was minimal    Prognosis remains guarded    Patient's family advised of the findings and expectations

## 2021-08-08 NOTE — PROGRESS NOTES
Hospitalist Progress Note      Name:  Eric Bangura /Age/Sex: 1965  (50 y.o. female)   MRN & CSN:  [id number] & [id number] Admission Date/Time: 8/3/2021  1:46 PM   [facility] P[facility]: Ulises Brown [address] [facility] Day: 6    Assessment and Plan:   Eric Bangura is a 50 y.o.  female  who presents with <principal problem not specified>    1. LEFT PNEUMOTHORAX  -S/P replacement with larger chest tube, lung re inflated, will continue with current care     2. LEFT PLEURAL EFFUSION  -recurrent malignant, see above, 1200ml removed with placement of new tube. Continue suction     3. OBSTRUCTIVE UROPATHY SECONDARY TO METASTASES  -S/P stent replacement creat is stable will monitor     4. CKD 4  -sec to above stable, will monitor     5. HYPOCALCEMIA  -treatment as per endocrinology     6. METASTATIC BREAST CANCER  -as noted, patient is continuing chemo. Diet ADULT DIET; Regular   DVT Prophylaxis [] Lovenox, []  Heparin, [x] SCDs, [] Ambulation   GI Prophylaxis [] PPI,  [] H2 Blocker,  [] Carafate,  [] Diet/Tube Feeds   Code Status Full Code   Disposition Patient requires continued admission due to Ptx   MDM [] Low, [] Moderate,[]  High  Patient's risk as above due to Ptx     History of Present Illness:     Chief Complaint: <principal problem not specified>  Eric Bangura is a 50 y.o.  female  who presents with pneumothorax    Events noted. Patient did decide on further treatment of Ptx so she was taken to OR and larger chest tube was placed. Tube currently to suction and continues to drain fluid. She is currently stable, pain is controlled and she has no complaints, she is drowsy from medication. Her friend is present at bedside and patient gives permission to discuss condition with friend       Ten point ROS reviewed negative, unless as noted above    Objective:        Intake/Output Summary (Last 24 hours) at 2021 1201  Last data filed at 2021 0600  Gross per 24 hour   Intake -- Output 1375 ml   Net -1375 ml      Vitals:   Vitals:    08/08/21 1100   BP: (!) 104/55   Pulse: 97   Resp: 17   Temp:    SpO2:      Physical Exam:   GEN drowsy female, lying in bed in no apparent distress. Appears given age. EYES Pupils are equally round. No scleral erythema, discharge, or conjunctivitis. HENT Mucous membranes are moist. Oral pharynx without exudates, no evidence of thrush. NECK Supple, no apparent thyromegaly or masses. RESP Clear to auscultation, no wheezes, rales or rhonchi. Symmetric chest movement while on room air. CARDIO/VASC S1/S2 auscultated. Regular rate without appreciable murmurs, rubs, or gallops. No JVD or carotid bruits. Peripheral pulses equal bilaterally and palpable. No peripheral edema. GI Abdomen is soft without significant tenderness, masses, or guarding. Bowel sounds are normoactive. Rectal exam deferred. HEME/LYMPH No palpable cervical lymphadenopathy and no hepatosplenomegaly. No petechiae or ecchymoses. MSK No gross joint deformities. SKIN Normal coloration, warm, dry.   NEURO Cranial nerves appear grossly intact, normal speech, no lateralizing weakness    Medications:   Medications:    magnesium oxide  400 mg Oral Daily    calcitRIOL  0.5 mcg Oral BID    calcium carbonate  750 mg Oral TID WC    famotidine (PEPCID) injection  20 mg Intravenous Once    cetirizine  10 mg Oral Daily    furosemide  80 mg Oral Daily    metoprolol succinate  100 mg Oral Daily    sodium bicarbonate  650 mg Oral BID    sodium chloride flush  5-40 mL Intravenous 2 times per day      Infusions:    sodium chloride 50 mL/hr at 08/08/21 0138    sodium chloride 25 mL (08/05/21 1436)     PRN Meds: LORazepam, 0.5 mg, BID PRN  prochlorperazine, 10 mg, Q8H PRN  sodium chloride flush, 5-40 mL, PRN  sodium chloride, 25 mL, PRN  ondansetron, 4 mg, Q8H PRN   Or  ondansetron, 4 mg, Q6H PRN  polyethylene glycol, 17 g, Daily PRN  acetaminophen, 650 mg, Q6H PRN   Or  acetaminophen, 650 mg, Q6H

## 2021-08-08 NOTE — PROGRESS NOTES
PATIENT NAME: Leanne Liu    TODAY'S DATE: 08/08/21    Reason for today's visit: Left pleural effusion s/p chest tube placement    SUBJECTIVE:    Pt is comfortable breathing easier on room air at 95% O2 sat. Chest tube continues to drain serous fluid     OBJECTIVE:   VITALS:    Vitals:    08/08/21 1231   BP: 119/60   Pulse: 95   Resp: 30   Temp: 98.9 °F (37.2 °C)   SpO2: 97%     INTAKE/OUTPUT:    Date 08/08/21 0000 - 08/08/21 2359   Shift 1127-5312 4000-8953 4299-5758 24 Hour Total   INTAKE   Shift Total(mL/kg)       OUTPUT   Chest Tube 475   475   Shift Total(mL/kg) 475(7.6)   475(7.6)   Weight (kg) 62.8 62.8 62.8 62.8      No data found. EXAM:  Constitutional: Blood pressure 119/60, pulse 95, temperature 98.9 °F (37.2 °C), temperature source Axillary, resp. rate 30, height 5' 1\" (1.549 m), weight 138 lb 6.4 oz (62.8 kg), last menstrual period 04/08/2018, SpO2 97 %, not currently breastfeeding. No apparent distress, appears stated age and cooperative. Neurologic: follows commands, no focal weakness noted   Lungs: Good respiratory effort.  Clear to auscultation,   CV: Regular rate/ rhythm , no peripheral edema, feet warm and well perfused  GI: Soft, non-tender in all four quadrants, non-distended, + bowel sounds, spleen and liver no palpable masses   : bladder nondistended   MSK: no obvious deformity   Skin: warm, pink and dry   Left chest tubes in place t the new chest tube that was placed yesterday is patent and serous drainage continues with active fluctuation    Data:  CBC:   Recent Labs     08/06/21  0545 08/07/21  0229 08/08/21  0120   WBC 4.6 4.1 3.5*   HGB 8.2* 8.4* 8.4*   HCT 27.1* 26.8* 28.8*    350 322     BMP:    Recent Labs     08/06/21  0545 08/07/21  0229 08/08/21  0120    145 142   K 4.7 4.8 5.0    110 107   CO2 17* 20* 20*   BUN 72* 72* 75*   CREATININE 3.2* 3.3* 3.1*   GLUCOSE 158* 106* 92     Hepatic:   Recent Labs     08/06/21  0545 08/07/21  0229 08/08/21  0120 AST 55* 63* 60*   ALT <5* <5* <5*   BILITOT 0.2 0.2 0.2   ALKPHOS 63 62 62     Mag:    No results for input(s): MG in the last 72 hours. Phos:   No results for input(s): PHOS in the last 72 hours. INR: No results for input(s): INR in the last 72 hours.     Radiology Review:  CXR independently reviewed -left lung remains expanded except at the base substantially improved compared to previous chest x-ray prior to the new chest tube placement      ASSESSMENT:  Patient Active Problem List   Diagnosis    Left breast mass    Secondary cancer of bone ([address]    Essential hypertension    Malignant neoplasm of central portion of left female breast ([address]    Metastatic breast cancer ([address]    Dehydration    Iron deficiency anemia    Malabsorption    Left ovarian enlargement    ELIDIA (acute kidney injury) ([address]    Hyperkalemia    Metabolic acidosis    Hyponatremia    Tachycardia    Acute and chronic respiratory failure (acute-on-chronic) (HCC)    COVID-19    Acute renal failure with tubular necrosis (HCC)    Bilateral hydronephrosis    Edema leg    Hypervolemia    Hypocalcemia    Pneumothorax           PLAN:     Continue chest tube to underwater seal and suction  Continue serial chest x-rays

## 2021-08-08 NOTE — PROGRESS NOTES
Progress Note( Dr. Andria Del Cid)  8/8/2021  Subjective:   Admit Date: 8/3/2021  PCP: CINDY Cross - NP    Admitted For : Left-sided pneumothorax    Consulted For: Hypocalcemia     Interval History: Feels somewhat better breathing is better though still short of breath  Serum Calcium is now normal     Had Urologic Procedure done  8/5/2021 as noted below   Left Ureteral Stent was exchanged    Reviewed CT scanning done and left-sided hydropneumothorax with collapse of the lung though patient has chest tube there  After placing this second bigger tube left lung is expanded also has some patchy airspace disease ammonia versus pulmonary edema    Denies any chest pains,   Yes SOB . Denies nausea or vomiting. No new bowel or bladder symptoms. Intake/Output Summary (Last 24 hours) at 8/8/2021 0904  Last data filed at 8/8/2021 0600  Gross per 24 hour   Intake --   Output 1375 ml   Net -1375 ml       DATA    CBC:   Recent Labs     08/06/21  0545 08/07/21  0229 08/08/21  0120   WBC 4.6 4.1 3.5*   HGB 8.2* 8.4* 8.4*    350 322    CMP:  Recent Labs     08/06/21  0545 08/07/21  0229 08/08/21  0120    145 142   K 4.7 4.8 5.0    110 107   CO2 17* 20* 20*   BUN 72* 72* 75*   CREATININE 3.2* 3.3* 3.1*   CALCIUM 8.7 9.6 9.3   PROT 5.4* 5.4* 4.9*   LABALBU 3.6 3.6 3.3*   BILITOT 0.2 0.2 0.2   ALKPHOS 63 62 62   AST 55* 63* 60*   ALT <5* <5* <5*     Lipids:   Lab Results   Component Value Date    CHOL 135 12/11/2019    HDL 54 12/11/2019    TRIG 74 12/11/2019     Glucose:No results for input(s): POCGLU in the last 72 hours.   [id number]:  Lab Results   Component Value Date    LABA1C 6.0 06/10/2021     High Sensitivity TSH:   Lab Results   Component Value Date    TSHHS <0.010 01/07/2021     Free T3: No results found for: FT3  Free T4:No results found for: T4FREE    DATE OF PROCEDURE:  08/05/2021 Dr. Uziel Manning      PREOPERATIVE DIAGNOSIS:  Bilateral hydronephrosis secondary to  metastatic cancer.     POSTOPERATIVE DIAGNOSIS:  Bilateral hydronephrosis secondary to  metastatic cancer.     PROCEDURE:  Cystoscopy, bilateral ureteral stent exchange, left  diagnostic ureteroscopy, and left retrograde pyelogram.       XR CHEST PORTABLE    Result Date: 8/5/2021  EXAMINATION: ONE XRAY VIEW OF THE CHEST 8/4/2021 5:44 am COMPARISON: August 3, 2021 HISTORY: ORDERING SYSTEM PROVIDED HISTORY: Post left chest tube insertion TECHNOLOGIST PROVIDED HISTORY: Reason for exam:->Post left chest tube insertion Reason for Exam: Post left chest tube insertion Acuity: Acute Type of Exam: Subsequent/Follow-up FINDINGS: Interval placement of a left chest tube. No significant interval change of a large pneumothorax. Airspace opacities are identified in the left upper lung zone. Consolidation is seen in the base. Left effusion is also seen. The right lung is grossly clear without focal consolidation or pleural effusion. No pulmonary edema. No new osseous abnormality. Stable appearance of a right-sided MediPort. Status post placement of a left pleural pigtail catheter. No significant interval change of a large pneumothorax. Stable opacities in the left lung with pleural effusion. XR CHEST PORTABLE    Result Date: 8/3/2021  EXAMINATION: ONE XRAY VIEW OF THE CHEST 8/3/2021 10:05 am COMPARISON: CT chest 07/28/2021 HISTORY: ORDERING SYSTEM PROVIDED HISTORY: post left thoracentesis. TECHNOLOGIST PROVIDED HISTORY: SDS #18 Reason for exam:->post left thoracentesis. Reason for Exam: post left thoracentesis. FINDINGS: Right arm port catheter with catheter tip right atrium. Stable cardiomediastinal silhouette. Significant decrease in size of left pleural effusion with probable small residual pleural effusion remaining. Hazy opacification of the left lung with moderate amount of air in the left pleural space compatible with atelectasis secondary to trapped lung.      Significant decrease in size in left pleural effusion with Normal lobes   Lungs: Has Vesicular Breath sounds left-sided chest tubes  Heart:  regular rate and rhythm  Abdomen: soft, non-tender; bowel sounds normal; no masses,  no organomegaly  Musculoskeletal: Normal  Extremities: extremities normal, , no edema  Neurologic:  Awake, alert, oriented to name, place and time. Cranial nerves II-XII are grossly intact. Motor is  intact. Sensory is intact. ,  and gait is normal.    Assessment:     Patient Active Problem List:     Left breast mass     Secondary cancer of bone ([address]     Essential hypertension     Malignant neoplasm of central portion of left female breast ([address]     Metastatic breast cancer (HCC)     Dehydration     Iron deficiency anemia     Malabsorption     Left ovarian enlargement     ELIDIA (acute kidney injury) ([address]     Hyperkalemia     Metabolic acidosis     Hyponatremia     Tachycardia     Acute and chronic respiratory failure (acute-on-chronic) (HCC)     COVID-19     Acute renal failure with tubular necrosis (HCC)     Bilateral hydronephrosis     Edema leg     Hypervolemia     Hypocalcemia     Pneumothorax      Plan:     1. Reviewed POC blood glucose . Labs and X ray results   2. Reviewed Current Medicines   3. Continue on PO Tums and Calcirol   4. Modify the dose as needed  5. Left-sided lung expanded after placing the second being at the chest tube  6. Reviewed notes cardiac thoracic surgeon  7. Will follow     .      Katarzyna Seaman MD, MD

## 2021-08-09 NOTE — FLOWSHEET NOTE
08/09/21 1510   Encounter Summary   Services provided to: Patient   Referral/Consult From: [facility]; Other (Comment)   Place of [address] Visiting   (patient did not show interest on 's visit)   Length of Encounter 15 minutes   Routine   Type Follow up  (referral)   Assessment Coping;Passive   Intervention Active listening;Sustaining presence/ Ministry of presence   Outcome Did not respond   Spiritual/Jewish   Type Spiritual support

## 2021-08-09 NOTE — PROGRESS NOTES
Progress Note( Dr. Dasia Gonzales)  8/9/2021  Subjective:   Admit Date: 8/3/2021  PCP: CINDY Perez - NP    Admitted For : Left-sided pneumothorax    Consulted For: Hypocalcemia     Interval History: Feels somewhat better breathing is better though still short of breath  Serum Calcium is now normal     Had Urologic Procedure done  8/5/2021 as noted below   Left Ureteral Stent was exchanged    Reviewed CT scanning done and left-sided hydropneumothorax with collapse of the lung though patient has chest tube there  After placing this second bigger tube left lung is expanded also has some patchy airspace disease ammonia versus pulmonary edema    Denies any chest pains,   Yes SOB . Denies nausea or vomiting. No new bowel or bladder symptoms. Intake/Output Summary (Last 24 hours) at 8/9/2021 0740  Last data filed at 8/9/2021 0641  Gross per 24 hour   Intake --   Output 700 ml   Net -700 ml       DATA    CBC:   Recent Labs     08/07/21 0229 08/08/21 0120 08/09/21  0343   WBC 4.1 3.5* 2.5*   HGB 8.4* 8.4* 8.2*    322 283    CMP:  Recent Labs     08/07/21 0229 08/08/21  0120 08/09/21  0343    142 138   K 4.8 5.0 4.9    107 107   CO2 20* 20* 19*   BUN 72* 75* 75*   CREATININE 3.3* 3.1* 3.0*   CALCIUM 9.6 9.3 8.3   PROT 5.4* 4.9* 4.6*   LABALBU 3.6 3.3* 2.7*   BILITOT 0.2 0.2 0.2   ALKPHOS 62 62 61   AST 63* 60* 48*   ALT <5* <5* <5*     Lipids:   Lab Results   Component Value Date    CHOL 135 12/11/2019    HDL 54 12/11/2019    TRIG 74 12/11/2019     Glucose:No results for input(s): POCGLU in the last 72 hours.   [id number]:  Lab Results   Component Value Date    LABA1C 6.0 06/10/2021     High Sensitivity TSH:   Lab Results   Component Value Date    TSHHS <0.010 01/07/2021     Free T3: No results found for: FT3  Free T4:No results found for: T4FREE    DATE OF PROCEDURE:  08/05/2021 Dr. Guo Prom      PREOPERATIVE DIAGNOSIS:  Bilateral hydronephrosis secondary to  metastatic cancer.     POSTOPERATIVE DIAGNOSIS:  Bilateral hydronephrosis secondary to  metastatic cancer.     PROCEDURE:  Cystoscopy, bilateral ureteral stent exchange, left  diagnostic ureteroscopy, and left retrograde pyelogram.       XR CHEST PORTABLE    Result Date: 8/5/2021  EXAMINATION: ONE XRAY VIEW OF THE CHEST 8/4/2021 5:44 am COMPARISON: August 3, 2021 HISTORY: ORDERING SYSTEM PROVIDED HISTORY: Post left chest tube insertion TECHNOLOGIST PROVIDED HISTORY: Reason for exam:->Post left chest tube insertion Reason for Exam: Post left chest tube insertion Acuity: Acute Type of Exam: Subsequent/Follow-up FINDINGS: Interval placement of a left chest tube. No significant interval change of a large pneumothorax. Airspace opacities are identified in the left upper lung zone. Consolidation is seen in the base. Left effusion is also seen. The right lung is grossly clear without focal consolidation or pleural effusion. No pulmonary edema. No new osseous abnormality. Stable appearance of a right-sided MediPort. Status post placement of a left pleural pigtail catheter. No significant interval change of a large pneumothorax. Stable opacities in the left lung with pleural effusion. XR CHEST PORTABLE    Result Date: 8/3/2021  EXAMINATION: ONE XRAY VIEW OF THE CHEST 8/3/2021 10:05 am COMPARISON: CT chest 07/28/2021 HISTORY: ORDERING SYSTEM PROVIDED HISTORY: post left thoracentesis. TECHNOLOGIST PROVIDED HISTORY: SDS #18 Reason for exam:->post left thoracentesis. Reason for Exam: post left thoracentesis. FINDINGS: Right arm port catheter with catheter tip right atrium. Stable cardiomediastinal silhouette. Significant decrease in size of left pleural effusion with probable small residual pleural effusion remaining. Hazy opacification of the left lung with moderate amount of air in the left pleural space compatible with atelectasis secondary to trapped lung.      Significant decrease in size in left pleural effusion with likely small residual amount of pleural fluid remaining. Hazy opacification of the left lung and moderate amount of air in the left pleural space compatible with trapped lung. CT GUIDED CHEST TUBE    Result Date: 8/3/2021  PROCEDURE: CT GUIDED CHEST TUBE PLACEMENT 8/3/2021 HISTORY: ORDERING SYSTEM PROVIDED HISTORY: Post thoracentesis       1. Successful CT guided placement of a left-sided 8 Yoruba chest tube. 2. Interval re-expansion of left lung since the recent CT with diffuse asymmetric left-sided pulmonary parenchymal opacities. These opacities are nonspecific but could be secondary to infection, asymmetric edema, or pulmonary hemorrhage. Atelectasis is less likely. 3. Moderate left hydropneumothorax post left chest tube placement. 4. Nodular thickening noted along the pleura at the left lung base is suspicious for metastatic disease. IR GUIDED THORACENTESIS PLEURAL    Result Date: 8/3/2021  PROCEDURE: ULTRASOUNDGUIDED left THORACENTESIS 8/3/2021 HISTORY: ORDERING SYSTEM PROVIDED HISTORY: Pleural effusion      Successful ultrasound guided left-sided thoracentesis.   Removed about 1400 cc of fluid      Scheduled Medicines   Medications:    magnesium oxide  400 mg Oral Daily    calcitRIOL  0.5 mcg Oral BID    calcium carbonate  750 mg Oral TID WC    famotidine (PEPCID) injection  20 mg Intravenous Once    cetirizine  10 mg Oral Daily    furosemide  80 mg Oral Daily    metoprolol succinate  100 mg Oral Daily    sodium bicarbonate  650 mg Oral BID    sodium chloride flush  5-40 mL Intravenous 2 times per day      Infusions:    sodium chloride 50 mL/hr at 08/08/21 2149    sodium chloride 25 mL (08/05/21 1436)         Objective:   Vitals: /60   Pulse 95   Temp 98.9 °F (37.2 °C) (Axillary)   Resp 20   Ht 5' 1\" (1.549 m)   Wt 138 lb 6.4 oz (62.8 kg)   LMP 04/08/2018   SpO2 97%   BMI 26.15 kg/m²   General appearance: alert and cooperative with exam  Neck: no JVD or bruit  Thyroid : Normal lobes   Lungs: Has Vesicular Breath sounds left-sided chest tubes  Heart:  regular rate and rhythm  Abdomen: soft, non-tender; bowel sounds normal; no masses,  no organomegaly  Musculoskeletal: Normal  Extremities: extremities normal, , no edema  Neurologic:  Awake, alert, oriented to name, place and time. Cranial nerves II-XII are grossly intact. Motor is  intact. Sensory is intact. ,  and gait is normal.    Assessment:     Patient Active Problem List:     Left breast mass     Secondary cancer of bone ([address]     Essential hypertension     Malignant neoplasm of central portion of left female breast ([address]     Metastatic breast cancer (HCC)     Dehydration     Iron deficiency anemia     Malabsorption     Left ovarian enlargement     ELIDIA (acute kidney injury) ([address]     Hyperkalemia     Metabolic acidosis     Hyponatremia     Tachycardia     Acute and chronic respiratory failure (acute-on-chronic) (HCC)     COVID-19     Acute renal failure with tubular necrosis (HCC)     Bilateral hydronephrosis     Edema leg     Hypervolemia     Hypocalcemia     Pneumothorax      Plan:     1. Reviewed POC blood glucose . Labs and X ray results   2. Reviewed Current Medicines   3. Continue on PO Tums and Calcirol   4. Modify the dose as needed  5. Left-sided lung expanded after placing the second being at the chest tube  6. Reviewed notes cardiac thoracic surgeon  7. Will follow     .      Brian Jaramillo MD, MD

## 2021-08-09 NOTE — FLOWSHEET NOTE
Provided assistance with HCPOA. Patient conpleted a HCPOA; Alize Warner. (daughter. Phone: [phone])       08/09/21 1543   Encounter Summary   Services provided to: Patient   Referral/Consult From: Nurse;Family   Support System Children; Other (Comment)   Place of [facility]   Continue Visiting Yes  (as needed)   Length of Encounter 30 minutes   Spiritual Assessment Completed Yes   Advance Care Planning Yes   Routine   Type Follow up   Assessment Calm;Coping   Intervention Active listening;Sustaining presence/ Ministry of presence  (assistance with HCPOA (completed))   Outcome Expressed gratitude   Spiritual/Mu-ism   Type Spiritual support   Primary Decision Maker (Healthcare Proxy)   Patient's Healthcare Decision Maker is:   Alize Warner (PHONE: [phone]))

## 2021-08-09 NOTE — FLOWSHEET NOTE
08/09/21 1543   Encounter Summary   Services provided to: Patient   Referral/Consult From: Nurse;Family   Support System Children; Other (Comment)   Place of [employer]   Continue Visiting Yes  (as needed)   Length of Encounter 30 minutes   Spiritual Assessment Completed Yes   Routine   Type Follow up   Assessment Calm;Coping   Intervention Active listening;Sustaining presence/ Ministry of presence  (assistance with HCPOA (completed))   Outcome Expressed gratitude   Spiritual/Baptist   Type Spiritual support

## 2021-08-09 NOTE — PROGRESS NOTES
Pulmonary and Critical Care  Progress Note      VITALS:  /64   Pulse 106   Temp 98 °F (36.7 °C) (Axillary)   Resp 21   Ht 5' 1\" (1.549 m)   Wt 138 lb 6.4 oz (62.8 kg)   LMP 04/08/2018   SpO2 97%   BMI 26.15 kg/m²     Subjective:   CHIEF COMPLAINT :Pneumothorax s/p left thoracentesis     HPI:                The patient is lying in the bed. She is not in acute resp distress. She has good output from the chest tube. She still has air leak. She is sleepy but arousable. Objective:   PHYSICAL EXAM:    LUNGS:Occasional basal crackles  Abd-soft, BS+,NT  Ext- no pedal edema  CVS-s1s2, no murmurs      DATA:    CBC:  Recent Labs     08/07/21 0229 08/08/21 0120 08/09/21  0343   WBC 4.1 3.5* 2.5*   RBC 2.94* 3.07* 2.96*   HGB 8.4* 8.4* 8.2*   HCT 26.8* 28.8* 27.3*    322 283   MCV 91.2 93.8 92.2   MCH 28.6 27.4 27.7   MCHC 31.3* 29.2* 30.0*   RDW 20.3* 20.1* 19.8*   NRBC 2  --  1   SEGSPCT 73.0* 78.6* 68.0*   BANDSPCT  --   --  5      BMP:  Recent Labs     08/07/21 0229 08/08/21 0120 08/09/21  0343    142 138   K 4.8 5.0 4.9    107 107   CO2 20* 20* 19*   BUN 72* 75* 75*   CREATININE 3.3* 3.1* 3.0*   CALCIUM 9.6 9.3 8.3   GLUCOSE 106* 92 92      ABG:  No results for input(s): PH, PO2ART, KCO9LBV, HCO3, BEART, O2SAT in the last 72 hours. BNP  No results found for: BNP   D-Dimer:  Lab Results   Component Value Date    DDIMER 2537 (H) 05/13/2021      1.  Radiology: Pending      Assessment/Plan     Patient Active Problem List    Diagnosis Date Noted    Pneumothorax 08/03/2021    Edema leg 06/23/2021    Hypervolemia 06/23/2021    Hypocalcemia 06/23/2021    Acute renal failure with tubular necrosis ([facility] 06/10/2021    Bilateral hydronephrosis     COVID-19 05/12/2021    Acute and chronic respiratory failure (acute-on-chronic) ([facility] 05/10/2021    Tachycardia     ELIDIA (acute kidney injury) ([facility] 03/22/2021    Hyperkalemia     Metabolic acidosis     Hyponatremia     Left ovarian enlargement 01/26/2021    Iron deficiency anemia 01/07/2021    Malabsorption 01/07/2021    Dehydration 11/20/2020    Malignant neoplasm of central portion of left female breast ([facility] 07/31/2020    Metastatic breast cancer ([facility] 07/31/2020    Secondary cancer of bone ([facility] 04/01/2020    Essential hypertension 04/01/2020    Left breast mass    Left Malignant Pleural effusion s/p left chest tube  Iatrogenic left pneumothorax s/p left chest tube- resolved  ? Entrapped lung  Metastatic left breast ca  CKD  AG Metabolic acidosis  Bilateral Hydronephrosis s/p bilateral stent  Large left Hydropneumothorax       1. CT per CTS  2. May need pleurx if there is recurrent left pleural effusion  3. Inhalers  4. Keep sats > 92%  5. C/w present management  No follow-ups on file.     Electronically signed by Sandip Gutierrez MD on 8/9/2021 at 10:46 AM

## 2021-08-09 NOTE — PROGRESS NOTES
sitting upright in bed in no apparent distress. Appears given age. EYES Pupils are equally round. No scleral erythema, discharge, or conjunctivitis. HENT Mucous membranes are moist. Oral pharynx without exudates, no evidence of thrush. NECK Supple, no apparent thyromegaly or masses. RESP Clear to auscultation with mild decreased BSs on left, no wheezes, rales or rhonchi. Symmetric chest movement  CARDIO/VASC S1/S2 auscultated. Regular rate without appreciable murmurs, rubs, or gallops. No JVD or carotid bruits. Peripheral pulses equal bilaterally and palpable. No peripheral edema. GI Abdomen is soft without significant tenderness, masses, or guarding. Bowel sounds are normoactive. Rectal exam deferred. HEME/LYMPH No palpable cervical lymphadenopathy and no hepatosplenomegaly. No petechiae or ecchymoses. MSK No gross joint deformities. SKIN Normal coloration, warm, dry. NEURO Cranial nerves appear grossly intact, normal speech, no lateralizing weakness. PSYCH Awake, alert, oriented x 4. Affect appropriate.     Medications:   Medications:    magnesium oxide  400 mg Oral Daily    calcitRIOL  0.5 mcg Oral BID    calcium carbonate  750 mg Oral TID WC    famotidine (PEPCID) injection  20 mg Intravenous Once    cetirizine  10 mg Oral Daily    furosemide  80 mg Oral Daily    metoprolol succinate  100 mg Oral Daily    sodium bicarbonate  650 mg Oral BID    sodium chloride flush  5-40 mL Intravenous 2 times per day      Infusions:    sodium chloride 50 mL/hr at 08/08/21 2149    sodium chloride 25 mL (08/05/21 1436)     PRN Meds: HYDROcodone-acetaminophen, 1 tablet, Q4H PRN  LORazepam, 0.5 mg, BID PRN  prochlorperazine, 10 mg, Q8H PRN  sodium chloride flush, 5-40 mL, PRN  sodium chloride, 25 mL, PRN  ondansetron, 4 mg, Q8H PRN   Or  ondansetron, 4 mg, Q6H PRN  polyethylene glycol, 17 g, Daily PRN  acetaminophen, 650 mg, Q6H PRN   Or  acetaminophen, 650 mg, Q6H PRN

## 2021-08-09 NOTE — CARE COORDINATION
Patient getting CXR at bedside.  Case Management to follow Double O-Z Flap Text: The defect edges were debeveled with a #15 scalpel blade.  Given the location of the defect, shape of the defect and the proximity to free margins a Double O-Z flap was deemed most appropriate.  Using a sterile surgical marker, an appropriate transposition flap was drawn incorporating the defect and placing the expected incisions within the relaxed skin tension lines where possible. The area thus outlined was incised deep to adipose tissue with a #15 scalpel blade.  The skin margins were undermined to an appropriate distance in all directions utilizing iris scissors.

## 2021-08-09 NOTE — PROGRESS NOTES
Initial consult for Palliative care. Patient is resting in bed and arouses to name. Patient is lethargic and closes her eyes intermittently during conversation. She is oriented to person, place and situation. Inquired about Goals of care. Patient states that she intends on continuing with chemo therapy and wants to do everything possible. I had asked if she had discussed with her children about her wishes if her breathing continued to worsen or if her heart were to stop would she want to be put on a ventilator or have CPR. It was explained to her that with her terminal Illness the out come of a code situation would most likely not be good and the alternative would be to treat her symptoms with medications and allow her to have a peaceful death . The patient was very Adamant and stated \"resuscitate me \". I informed her that we would respect her wishes and asked if she had ACP docs. She does not , but would like to complete them while she is here. Spoke with patient's daughter Meri via telephone whom stated she will be in here soon, Meri would like to meet in person with her mom and myself to discuss goals of care. Meri is supportive of her moms wishes she just would like some clarification as to how far and how much her mom wants to go through. Will follow up later today.

## 2021-08-09 NOTE — PROGRESS NOTES
Follow up visit for palliative care. Met with patient and her daughter Meri. ACP docs have been completed this afternoon with  Dido. Patient states that she feels relieved. After talking with Alverto Whitfield earlier today we had decided to both talk with the patient about long term goals of care. We wanted to know if patient had to be intubated for some reason and was unable to be taken off the vent would a trach and peg be something she would want . We explained that this is hypothetical but that it is not a decision sonia wanted to make for the patient . The patient stated she needed more time to think about it. I informed her that was completely fine and she only needed to let Meri Know her answer. Patient acknowledged and continued to rest in her bed quietly . Will follow up as needed.

## 2021-08-09 NOTE — PROGRESS NOTES
106* 92 92     Hepatic:   Recent Labs     08/07/21  0229 08/08/21  0120 08/09/21  0343   AST 63* 60* 48*   ALT <5* <5* <5*   BILITOT 0.2 0.2 0.2   ALKPHOS 62 62 61     INR:   No results for input(s): INR in the last 72 hours. ASSESSMENT/RECOMMENDATION       1. She has metastatic breast cancer. She had Taxol on August 5, 2021. So far she has been tolerating it well. 2.  She has hydronephrosis related to metastatic breast cancer. She will need renal stent replacement. She has cystoscopy with bilateral renal stent replacement on August 5, 2021.    3.  She has chronic kidney disease. She has been followed by nephrologist.   She has chronic anemia related to malignancy and chemotherapy. I will continue to monitor CBC. To transfuse as needed. 4.  She is on magnesium and calcium supplementation. She is on Lasix for bilateral lower extremity edema. 5.  Status post left thoracentesis. She has pneumothorax. Pulmonologist on board. Cytology is consistent with metastatic breast cancer. To continue with PT/OT. Continue with present care. We will continue to follow the patient. Thank you.

## 2021-08-09 NOTE — PROGRESS NOTES
PATIENT NAME: Reyna Chávez    TODAY'S DATE: 08/09/21    Reason for today's visit: malignant pleural effusion    SUBJECTIVE:    Pt is feeling better. Breathing easier. She is on RA. OBJECTIVE:   VITALS:    Vitals:    08/09/21 1133   BP: (!) 92/56   Pulse: 94   Resp: 16   Temp: 99.5 °F (37.5 °C)   SpO2:      INTAKE/OUTPUT:    Date 08/09/21 0000 - 08/09/21 2359   Shift 6952-0690 8683-17882 1600-2359 24 Hour Total   INTAKE   Shift Total(mL/kg)       OUTPUT   Chest Tube 350   350   Shift Total(mL/kg) 350(5.6)   350(5.6)   Weight (kg) 62.8 62.8 62.8 62.8      No data found. EXAM:  Constitutional: Blood pressure (!) 92/56, pulse 94, temperature 99.5 °F (37.5 °C), temperature source Oral, resp. rate 16, height 5' 1\" (1.549 m), weight 138 lb 6.4 oz (62.8 kg), last menstrual period 04/08/2018, SpO2 97 %, not currently breastfeeding. No apparent distress, appears stated age and cooperative. Neurologic: follows commands, no focal weakness noted   Lungs: Good respiratory effort. Diminished L side. Large bore CT with small air leak noted. There is copious serous drainage. CV: Regular rate/ rhythm , no peripheral edema, feet warm and well perfused  GI: Soft, non-tender in all four quadrants, non-distended, + bowel sounds, spleen and liver no palpable masses   : bladder nondistended   MSK: no obvious deformity   Skin: warm, pink and dry       Data:  CBC:   Recent Labs     08/07/21 0229 08/08/21  0120 08/09/21  0343   WBC 4.1 3.5* 2.5*   HGB 8.4* 8.4* 8.2*   HCT 26.8* 28.8* 27.3*    322 283     BMP:    Recent Labs     08/07/21 0229 08/08/21  0120 08/09/21  0343    142 138   K 4.8 5.0 4.9    107 107   CO2 20* 20* 19*   BUN 72* 75* 75*   CREATININE 3.3* 3.1* 3.0*   GLUCOSE 106* 92 92     Hepatic:   Recent Labs     08/07/21  0229 08/08/21  0120 08/09/21  0343   AST 63* 60* 48*   ALT <5* <5* <5*   BILITOT 0.2 0.2 0.2   ALKPHOS 62 62 61     Mag:    No results for input(s): MG in the last 72 hours. Phos:   No results for input(s): PHOS in the last 72 hours. INR: No results for input(s): INR in the last 72 hours. Radiology Review:  CXR independently reviewed - minimal residual effusion, L lung expanded       ASSESSMENT:  Patient Active Problem List   Diagnosis    Left breast mass    Secondary cancer of bone ([address]    Essential hypertension    Malignant neoplasm of central portion of left female breast ([address]    Metastatic breast cancer ([address]    Dehydration    Iron deficiency anemia    Malabsorption    Left ovarian enlargement    ELIDIA (acute kidney injury) ([address]    Hyperkalemia    Metabolic acidosis    Hyponatremia    Tachycardia    Acute and chronic respiratory failure (acute-on-chronic) (HCC)    COVID-19    Acute renal failure with tubular necrosis (HCC)    Bilateral hydronephrosis    Edema leg    Hypervolemia    Hypocalcemia    Pneumothorax       L malignant pleural effusion s/p CT placement. PLAN:     Total CT ouput 700 cc/ 24 hrs. Pigtail catheter clamped. Continue large bore CT so suction. Plan to remove pigtail tomorrow am.   Will likely need pleurex catheter placement.      Maryam Peterson PA-C

## 2021-08-10 NOTE — PROGRESS NOTES
.  Occupational Therapy Treatment Note      Name: Julianna Ramirez MRN: [id number] :   1965   Date:  8/10/2021   Admission Date: 8/3/2021 Room:  [address] [place] [address]-A     Primary Problem:  The encounter diagnosis was Malignant neoplasm of central portion of left breast in female, estrogen receptor positive ([facility] [address] Restrictions/Precautions:     Chemo precautions    Communication with other providers:  Per chart review and Nurse Kristian Mendoza patient is appropriate for therapeutic intervention. Subjective:  Patient states:  \"I feeling really tired today and don't feel like standing today. \"  Pain: Denied at rest; however, with bed mobility 5/10 with BLE (location, type, intensity)    Objective:    Observation: Pt in supine position upon arrival.   Objective Measures:  A/O x4    Treatment, including education:  Therapeutic Activity Training:   Therapeutic activity training was instructed today. Cues were given for safety, sequence, UE/LE placement, awareness, and balance. Activities performed today included all aspects of bed mobility training, sup-sit, sitting balance with upright posture at midline. Demo Max A with supine>sit>supine + verbal cues for seq and hand placement  Focus on sitting balance and AROME with functional reaching at table top. Demo Mod A to initiate 1-2 min midline; however, min A up to ~5 mins of sitting with 1 UE support at all times. Pt request to lay back down in supine. Engaged in trunk stability by reaching out TRISTEN at table top to grasp cup and silverware in order to improve sitting balance tolerance during self feeding task. Safety  Patient safely in bed + alarm set at end of session, with call light/phone in reach, and nursing aware. Gait belt was used for bed mobility.     Assessment / Impression:    Patient's tolerance of treatment: well, F- activity tolerance  Adverse Reaction: none  Significant change in status and impact:  none  Barriers to improvement: Strength, activity tolerance, balance      Goals:  By d/c or goals met:  Pt will perform all bed mobility with ModA in prep for EOB/OOB activity. Pt will perform all functional transfers with SBA and appropriate use of LRD to bed, toilet, chair in prep for increased functional independence. Pt will perform UB ADLs with CGA to increase functional independence. Pt will perform LB ADLs with ModA to increase functional independence. Pt will perform all aspects of toileting with Diaz to increase functional independence. Pt will participate in therex/therax c emphasis on strength, activity tolerance,  safety, YAMILA tasks.     Plan for Next Session:    Continue OT POC with emphasis on standing/sitting tolerance during ADL task    Time in:  1037  Time out:  1101  Timed treatment minutes:  24  Total treatment time:  24      Electronically signed by:    SHERI Junior,   8/10/2021, 10:42 AM

## 2021-08-10 NOTE — PROGRESS NOTES
ONCOLOGY HEMATOLOGY CARE ([facility])  PROGRESS NOTE      Patient was seen and examined today. Not in any acute distress and no overnight events. Status post left thoracentesis. Pleural fluid was an exudate with LDH of 1141.  1400 cc of fluid was removed. Cytology was positive for metastatic breast cancer. She had Taxol on August 5, 2021. Chest X-ray on August 10, 2021 showed  Left-sided chest tubes in place. Trace left focal pneumothorax. Increased lung markings bilaterally, may be related to mild pulmonary   vascular congestion versus bronchitis or early pneumonia. She has dry mouth. I recommend to drink enough fluid. Denied any pain or shortness of breath. Chest tube noted. No nausea, vomiting or diarrhea. She was seen by palliative care and . Thoracic surgeon stated that she will likely need Pleurx catheter placement. PHYSICAL EXAM    Vitals: BP (!) 115/59   Pulse 107   Temp 98.3 °F (36.8 °C) (Oral)   Resp 19   Ht 5' 1\" (1.549 m)   Wt 138 lb 6.4 oz (62.8 kg)   LMP 04/08/2018   SpO2 96%   BMI 26.15 kg/m²   CONSTITUTIONAL: awake, alert, cooperative, no apparent distress   EYES: pupils equal, round and reactive to light, sclera clear and conjunctiva pallor  ENT: Normocephalic, without obvious abnormality, atraumatic  NECK: supple, symmetrical, no jugular venous distension and no carotid bruits   HEMATOLOGIC/LYMPHATIC: no cervical, supraclavicular or axillary lymphadenopathy   LUNGS: Fair air entry bilaterally, decreased breath sound to the left lung. Chest tubes noted to the left chest.   CARDIOVASCULAR: regular rate and rhythm, normal S1 and S2, no murmur noted  ABDOMEN: normal bowel sound, soft, non-distended, non-tender, no masses palpated, no hepatosplenomegaly   MUSCULOSKELETAL: full range of motion noted, tone is normal  NEUROLOGIC: awake, alert, oriented to name, place and time. Cranial nerves II through XII are grossly intact. SKIN: No jaundice.   No petechial

## 2021-08-10 NOTE — CARE COORDINATION
Spoke with patient and her friend at bedside regarding discharge planning  . CM asked patient about HHC and patient is agreeable and stated that she does not care who the provider is as long as they accept patient's insurance. Patient stated that her daughter is with her at home. Referral made to [facility]. Faxed face sheet.  They are checking to see if they take patient's insurance and will return call to CM

## 2021-08-10 NOTE — PROGRESS NOTES
Hospitalist Progress Note      Name:  Sudha Preciado /Age/Sex: 1965  (50 y.o. female)   MRN & CSN:  [id number] & [id number] Admission Date/Time: 8/3/2021  1:46 PM   [facility] PCP: Rashi Fitzgerald 15 Day: 8    Assessment and Plan:            1. LEFT PNEUMOTHORAX  -S/P replacement with larger chest tube, lung has reinflated and it continues to drain.   -August 10: Pigtail catheter removed, to new large bore CT tube to suction     2. LEFT PLEURAL EFFUSION  -recurrent malignant, see above, continue to drain. Currently patient had wished to continue with treatment, suspect effusion will continue. -CT surgery mentions that she will likely need a Pleurx catheter     3. 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 CKD 4  -Monitor BMP     5. HYPOCALCEMIA  -treatment as per endocrinology     6. METASTATIC BREAST CANCER  -as noted. Care per oncology    Fluids:  -August 10: Normal saline at 50 mL/h per Dr. Shankar Lozano since   -Consider stopping IV fluids    Chronic kidney disease        Subjective:     Patient came in for a thoracentesis ordered by Dr. Michael Joshua and had 1400 mL of yellow fluid drained. She developed a left pneumothorax and was directly admitted    She has breast cancer and is on palliative chemotherapy with paclitaxel. She had paclitaxel while here on     She has a nonobstructive uropathy and has bilateral ureteral stents, she had the stents exchanged on  while here    Per thoracic surgery on : Pigtail catheter clamped, continue large-bore CT to suction, plan to remove pigtail tomorrow, will need Pleurx catheter placement    Objective:        Intake/Output Summary (Last 24 hours) at 8/10/2021 0742  Last data filed at 8/10/2021 0611  Gross per 24 hour   Intake --   Output 550 ml   Net -550 ml      Vitals:   Vitals:    08/10/21 0400   BP: (!) 115/59   Pulse: 107   Resp: 19   Temp:    SpO2: Labs:   CBC:   Lab Results   Component Value Date    WBC 2.5 08/10/2021    HGB 8.3 08/10/2021    HCT 27.1 08/10/2021    MCV 92.2 08/10/2021     08/10/2021     BMP:   Lab Results   Component Value Date     08/10/2021    K 4.8 08/10/2021     08/10/2021    CO2 18 08/10/2021    PHOS 5.2 03/23/2021    BUN 77 08/10/2021    CREATININE 3.1 08/10/2021    CALCIUM 8.0 08/10/2021       Physical Exam:      Physical Exam  HENT:      Nose: No rhinorrhea. Eyes:      General:         Right eye: No discharge. Left eye: No discharge. Pulmonary:      Effort: Pulmonary effort is normal.   Skin:     Coloration: Skin is not jaundiced. Neurological:      Mental Status: She is alert. Cranial Nerves: No cranial nerve deficit.            Medications:   Medications:    magnesium oxide  400 mg Oral Daily    calcitRIOL  0.5 mcg Oral BID    calcium carbonate  750 mg Oral TID     famotidine (PEPCID) injection  20 mg Intravenous Once    cetirizine  10 mg Oral Daily    furosemide  80 mg Oral Daily    metoprolol succinate  100 mg Oral Daily    sodium bicarbonate  650 mg Oral BID    sodium chloride flush  5-40 mL Intravenous 2 times per day      Infusions:    sodium chloride 50 mL/hr at 08/09/21 1852    sodium chloride 25 mL (08/05/21 1436)     PRN Meds: HYDROcodone-acetaminophen, 1 tablet, Q4H PRN  LORazepam, 0.5 mg, BID PRN  prochlorperazine, 10 mg, Q8H PRN  sodium chloride flush, 5-40 mL, PRN  sodium chloride, 25 mL, PRN  ondansetron, 4 mg, Q8H PRN   Or  ondansetron, 4 mg, Q6H PRN  polyethylene glycol, 17 g, Daily PRN  acetaminophen, 650 mg, Q6H PRN   Or  acetaminophen, 650 mg, Q6H PRN

## 2021-08-10 NOTE — PROGRESS NOTES
PATIENT NAME: Stalin Sepulveda    TODAY'S DATE: 08/10/21    Reason for today's visit: malignant pleural effusion    SUBJECTIVE:    Pt with minimal complaints. She has very minimal SOB. She is on RA. OBJECTIVE:   VITALS:    Vitals:    08/10/21 1209   BP: 108/67   Pulse: 95   Resp: 20   Temp: 99.3 °F (37.4 °C)   SpO2:      INTAKE/OUTPUT:    Date 08/10/21 0000 - 08/10/21 2359   Shift 7510-7977 1216-0214 4340-5065 24 Hour Total   INTAKE   Shift Total(mL/kg)       OUTPUT   Chest Tube 280   280   Shift Total(mL/kg) 280(4.5)   280(4.5)   Weight (kg) 62.8 62.8 62.8 62.8      No data found. EXAM:  Constitutional: Blood pressure 108/67, pulse 95, temperature 99.3 °F (37.4 °C), temperature source Axillary, resp. rate 20, height 5' 1\" (1.549 m), weight 138 lb 6.4 oz (62.8 kg), last menstrual period 04/08/2018, SpO2 98 %, not currently breastfeeding. No apparent distress, appears stated age and cooperative. Neurologic: follows commands, no focal weakness noted   Lungs: Good respiratory effort. Diminished L side. Large bore CT with small air leak noted. There is copious serous drainage.    CV: Regular rate/ rhythm , no peripheral edema, feet warm and well perfused  GI: Soft, non-tender in all four quadrants, non-distended, + bowel sounds, spleen and liver no palpable masses   : bladder nondistended   MSK: no obvious deformity   Skin: warm, pink and dry       Data:  CBC:   Recent Labs     08/08/21  0120 08/09/21  0343 08/10/21  0456   WBC 3.5* 2.5* 2.5*   HGB 8.4* 8.2* 8.3*   HCT 28.8* 27.3* 27.1*    283 291     BMP:    Recent Labs     08/08/21  0120 08/09/21  0343 08/10/21  0456    138 141   K 5.0 4.9 4.8    107 107   CO2 20* 19* 18*   BUN 75* 75* 77*   CREATININE 3.1* 3.0* 3.1*   GLUCOSE 92 92 80     Hepatic:   Recent Labs     08/08/21  0120 08/09/21  0343 08/10/21  0456   AST 60* 48* 42*   ALT <5* <5* <5*   BILITOT 0.2 0.2 0.2   ALKPHOS 62 61 64     Mag:    No results for input(s): MG in the last 72 hours.   Phos:   No results for input(s): PHOS in the last 72 hours. INR: No results for input(s): INR in the last 72 hours. Radiology Review:  CXR independently reviewed - trace effusion       ASSESSMENT:  Patient Active Problem List   Diagnosis    Left breast mass    Secondary cancer of bone ([address]    Essential hypertension    Malignant neoplasm of central portion of left female breast ([address]    Metastatic breast cancer ([address]    Dehydration    Iron deficiency anemia    Malabsorption    Left ovarian enlargement    ELIDIA (acute kidney injury) ([address]    Hyperkalemia    Metabolic acidosis    Hyponatremia    Tachycardia    Acute and chronic respiratory failure (acute-on-chronic) (HCC)    COVID-19    Acute renal failure with tubular necrosis (HCC)    Bilateral hydronephrosis    Edema leg    Hypervolemia    Hypocalcemia    Pneumothorax       L malignant pleural effusion s/p CT placement. PLAN:     Total CT ouput 500 cc/ 24 hrs. Pigtail catheter removed. Continue large bore CT so suction. There is a small intermittent air leak noted. Will likely need pleurex catheter placement.      Chloé Guillen PA-C

## 2021-08-10 NOTE — PROGRESS NOTES
Comprehensive Nutrition Assessment    Type and Reason for Visit:  Initial, Positive Nutrition Screen (on day 7)    Nutrition Recommendations/Plan:   · Continue current diet  · Begin renal oral nutrition supplement daily    Nutrition Assessment:  Pt admitted with pneumothorax s/p thoracentesis, L malignant pleural effusion s/p CT placement. H/O metastatic breast cancer, CKD, hydronephrosis s/p bilateral ureteral stents. No recent wt loss per Epic history. Feeding self and tolerating Regular Diet. Will order oral supplement to optimize po intake and continue to follow as moderate nutrition risk. Malnutrition Assessment:  Malnutrition Status: At risk for malnutrition  Context:  Chronic Illness       Estimated Daily Nutrient Needs:  Energy (kcal):  5277-8415 (25-35 kcal/kg IBW); Weight Used for Energy Requirements:  Ideal     Protein (g):  48-58 (1-1.2 g/kg IBW); Weight Used for Protein Requirements:  Ideal        Fluid (ml/day):  6011-6229; Method Used for Fluid Requirements:  1 ml/kcal      Nutrition Related Findings:  Cr 3.1, Ca 8      Wounds:  None (CT x 2)       Current Nutrition Therapies:    ADULT DIET; Regular  Diet NPO    Anthropometric Measures:  · Height: 5' 1\" (154.9 cm)  · Current Body Weight: 138 lb 6.4 oz (62.8 kg)   · Admission Body Weight: 138 lb 6.4 oz (62.8 kg)    · Usual Body Weight: 124 lb (56.2 kg) (4/5/21)     · Ideal Body Weight: 105 lbs; % Ideal Body Weight 131.8 %   · BMI: 26.2  · BMI Categories: Overweight (BMI 25.0-29. 9)       Nutrition Diagnosis:   · Inadequate protein-energy intake related to catabolic illness as evidenced by poor intake prior to admission    Nutrition Interventions:   Food and/or Nutrient Delivery:  Continue Current Diet, Start Oral Nutrition Supplement  Nutrition Education/Counseling:  No recommendation at this time   Coordination of Nutrition Care:  Continue to monitor while inpatient    Goals:  Pt will consume at least 75% of her meals and supplements Nutrition Monitoring and Evaluation:   Behavioral-Environmental Outcomes:  None Identified   Food/Nutrient Intake Outcomes:  Food and Nutrient Intake, Supplement Intake  Physical Signs/Symptoms Outcomes:  Biochemical Data, GI Status, Fluid Status or Edema, Meal Time Behavior, Nutrition Focused Physical Findings, Skin, Weight     Discharge Planning:    Continue Oral Nutrition Supplement     Electronically signed by Aric Menon RD, LD on 8/10/21 at 3:52 PM EDT    Contact: [phone]

## 2021-08-10 NOTE — PROGRESS NOTES
Pulmonary and Critical Care  Progress Note      VITALS:  BP (!) 94/49   Pulse 104   Temp 97.9 °F (36.6 °C) (Oral)   Resp 25   Ht 5' 1\" (1.549 m)   Wt 138 lb 6.4 oz (62.8 kg)   LMP 04/08/2018   SpO2 95%   BMI 26.15 kg/m²     Subjective:   CHIEF COMPLAINT :Pneumothorax s/p left thoracentesis     HPI:                The patient is lying in the bed. She is not in acute resp distress    Objective:   PHYSICAL EXAM:    LUNGS:Occasional basal crackles  Abd-soft, BS+,NT  Ext- no pedal edema  CVS-s1s2, no murmurs      DATA:    CBC:  Recent Labs     08/08/21  0120 08/09/21  0343 08/10/21  0456   WBC 3.5* 2.5* 2.5*   RBC 3.07* 2.96* 2.94*   HGB 8.4* 8.2* 8.3*   HCT 28.8* 27.3* 27.1*    283 291   MCV 93.8 92.2 92.2   MCH 27.4 27.7 28.2   MCHC 29.2* 30.0* 30.6*   RDW 20.1* 19.8* 19.9*   NRBC  --  1  --    SEGSPCT 78.6* 68.0* 69.0*   BANDSPCT  --  5  --       BMP:  Recent Labs     08/08/21  0120 08/09/21  0343 08/10/21  0456    138 141   K 5.0 4.9 4.8    107 107   CO2 20* 19* 18*   BUN 75* 75* 77*   CREATININE 3.1* 3.0* 3.1*   CALCIUM 9.3 8.3 8.0*   GLUCOSE 92 92 80      ABG:  No results for input(s): PH, PO2ART, YFL7FLG, HCO3, BEART, O2SAT in the last 72 hours. BNP  No results found for: BNP   D-Dimer:  Lab Results   Component Value Date    DDIMER 2537 (H) 05/13/2021      Radiology:   Left-sided chest tubes in place. Trace left focal pneumothorax.       Increased lung markings bilaterally, may be related to mild pulmonary   vascular congestion versus bronchitis or early pneumonia     1.        Assessment/Plan     Patient Active Problem List    Diagnosis Date Noted    Pneumothorax 08/03/2021    Edema leg 06/23/2021    Hypervolemia 06/23/2021    Hypocalcemia 06/23/2021    Acute renal failure with tubular necrosis ([address] 06/10/2021    Bilateral hydronephrosis     COVID-19 05/12/2021    Acute and chronic respiratory failure (acute-on-chronic) ([address] 05/10/2021    Tachycardia     ELIDIA (acute kidney injury) ([facility] [place] [facility] 03/22/2021    Hyperkalemia     Metabolic acidosis     Hyponatremia     Left ovarian enlargement 01/26/2021    Iron deficiency anemia 01/07/2021    Malabsorption 01/07/2021    Dehydration 11/20/2020    Malignant neoplasm of central portion of left female breast ([facility] [place] [facility] 07/31/2020    Metastatic breast cancer ([facility] [place] [facility] 07/31/2020    Secondary cancer of bone ([facility] [place] [facility] 04/01/2020    Essential hypertension 04/01/2020    Left breast mass    Left Malignant Pleural effusion s/p left chest tube  Iatrogenic left pneumothorax s/p left chest tube- resolving  ? Entrapped lung  Metastatic left breast ca  CKD  AG Metabolic acidosis  Bilateral Hydronephrosis s/p bilateral stent  Large left Hydropneumothorax       1. CT per CTS  2. If still air leak after clamping the left chest tube, patient may need Pleurodesis  3. Inhalers  4. Keep sats > 92%  5. ICS  6. OOB  7. C/w present management  No follow-ups on file.     Electronically signed by Stevie Mason MD on 8/10/2021 at 10:54 AM

## 2021-08-11 NOTE — ANESTHESIA PRE PROCEDURE
Department of Anesthesiology  Preprocedure Note       Name:  Alyse Paz   Age:  50 y.o.  :  1965                                          MRN:  [id number]         Date:  2021      Surgeon: Yue Zamora):  Luis E Looney MD    Procedure: Procedure(s):  LEFT THORACOSCOPY WITH LEFT PLEURX CATHETER PLACEMENT    Medications prior to admission:   Prior to Admission medications    Medication Sig Start Date End Date Taking? Authorizing Provider   HYDROcodone-acetaminophen (NORCO) 5-325 MG per tablet Take 1 tablet by mouth every 6 hours as needed for Pain. Historical Provider, MD   ciprofloxacin (CIPRO) 250 MG tablet Take 1 tablet by mouth daily for 10 days 21  Judson Pierce MD   furosemide (LASIX) 80 MG tablet Take 1 tablet by mouth daily 21   Елена Lau DO   sodium bicarbonate 650 MG tablet Take 1 tablet by mouth 2 times daily 21  Елена Lau DO   LORazepam (ATIVAN) 0.5 MG tablet Take 1 tablet by mouth 2 times daily as needed for Anxiety for up to 15 days.  21  Judson Pierce MD   dexamethasone (DECADRON) 4 MG tablet TAKE ONE TABLET BY MOUTH DAILY FOR 4 DAYS STARTING THE DAY AFTER CHEMOTHERAPY 21   CINDY Lane CNP   Calcium-Vitamin D 500-125 MG-UNIT TABS Take 1 tablet by mouth 2 times daily    Historical Provider, MD   Nutritional Supplement LIQD Take 1 Can by mouth 2 times daily 21   Judson Pierce MD   metoprolol succinate (TOPROL XL) 100 MG extended release tablet Take 1 tablet by mouth daily 21   CINDY Lane CNP   ondansetron (ZOFRAN) 8 MG tablet 1 tab po q 8 hours PRN for chemo induced nausea/vomting  Patient taking differently: 8 mg every 8 hours as needed for Nausea or Vomiting  3/16/21   CINDY Lane CNP   docusate sodium (COLACE) 100 MG capsule Take 1 capsule by mouth daily as needed for Constipation 3/16/21   CINDY Lane CNP   acetaminophen (TYLENOL) 500 MG tablet Take 500 mg by mouth every 6 hours as needed for Pain    Historical Provider, MD   prochlorperazine (COMPAZINE) 10 MG tablet Take 1 tablet by mouth every 8 hours as needed (nausea)  Patient taking differently: Take 10 mg by mouth every 8 hours as needed (Nausea)  1/25/21   Vickie Doyle MD   promethazine (PHENERGAN) 25 MG tablet TAKE ONE TABLET BY MOUTH EVERY 6 HOURS AS NEEDED FOR NAUSEA 12/22/20   Vickie Doyle MD   cetirizine (ZYRTEC) 10 MG tablet Take 10 mg by mouth daily    Historical Provider, MD       Current medications:    No current facility-administered medications for this visit. No current outpatient medications on file.      Facility-Administered Medications Ordered in Other Visits   Medication Dose Route Frequency Provider Last Rate Last Admin    furosemide (LASIX) tablet 80 mg  80 mg Oral BID Елена Lau DO   80 mg at 08/11/21 0933    calcitRIOL (ROCALTROL) capsule 1 mcg  1 mcg Oral BID Laura Dillon MD   1 mcg at 08/11/21 0933    calcium carbonate (TUMS) chewable tablet 1,000 mg  1,000 mg Oral TID  Laura Dillon MD        pantoprazole (PROTONIX) tablet 40 mg  40 mg Oral QAM AC Adarsh Ann MD        fluticasone Keiko Redhead) 50 MCG/ACT nasal spray 1 spray  1 spray Each Nostril Daily Adarsh Ann MD   1 spray at 08/11/21 0933    HYDROcodone-acetaminophen (NORCO) 5-325 MG per tablet 1 tablet  1 tablet Oral Q4H PRN Ezra Benton MD   1 tablet at 08/11/21 1114    0.9 % sodium chloride infusion   Intravenous Continuous Laura Dillon MD 50 mL/hr at 08/11/21 1218 New Bag at 08/11/21 1218    magnesium oxide (MAG-OX) tablet 400 mg  400 mg Oral Daily Benito Drake MD   400 mg at 08/11/21 1218    famotidine (PEPCID) injection 20 mg  20 mg Intravenous Once Vickie Doyle MD        cetirizine (ZYRTEC) tablet 10 mg  10 mg Oral Daily Benito Drake MD   10 mg at 08/11/21 1113    LORazepam (ATIVAN) tablet 0.5 mg  0.5 mg Oral BID PRN Benito Drake MD   0.5 mg at 08/11/21 0933    metoprolol succinate (TOPROL XL) extended release tablet 100 mg  100 mg Oral Daily Duglas Lozano MD   100 mg at 08/11/21 0933    prochlorperazine (COMPAZINE) tablet 10 mg  10 mg Oral Q8H PRN Duglas Lozano MD        sodium bicarbonate tablet 650 mg  650 mg Oral BID Duglas Lozano MD   650 mg at 08/11/21 0933    sodium chloride flush 0.9 % injection 5-40 mL  5-40 mL Intravenous 2 times per day Duglas Lozano MD   10 mL at 08/11/21 0933    sodium chloride flush 0.9 % injection 5-40 mL  5-40 mL Intravenous PRN Duglas Lozano MD        0.9 % sodium chloride infusion  25 mL Intravenous PRN Duglas Lozano  mL/hr at 08/05/21 1436 25 mL at 08/05/21 1436    ondansetron (ZOFRAN-ODT) disintegrating tablet 4 mg  4 mg Oral Q8H PRN Duglas Lozano MD        Or    ondansetron (ZOFRAN) injection 4 mg  4 mg Intravenous Q6H PRN Duglas Lozano MD        polyethylene glycol (GLYCOLAX) packet 17 g  17 g Oral Daily PRN Duglas Lozano MD        acetaminophen (TYLENOL) tablet 650 mg  650 mg Oral Q6H PRN Duglas Lozano MD   650 mg at 08/04/21 1955    Or    acetaminophen (TYLENOL) suppository 650 mg  650 mg Rectal Q6H PRN Duglas Lozano MD           Allergies:     Allergies   Allergen Reactions    Latex Anaphylaxis     \"have trouble with banana's kiwi, avocados- Have trouble breathing with these and trouble breathig - get asthmatic from latex gloves, bandage, anything latex     Banana Anaphylaxis    Cinnamon Anaphylaxis       Problem List:    Patient Active Problem List   Diagnosis Code    Left breast mass N63.20    Secondary cancer of bone ([address] C79.51    Essential hypertension I10    Malignant neoplasm of central portion of left female breast ([address] C50.112    Metast[facility] C50.919    Dehydration E86.0    Iron deficiency anemia D50.9    Malabsorption K90.9    Left ovarian enlargement N83.8    ELIDIA Counseling given: Not Answered      Vital Signs (Current): There were no vitals filed for this visit. BP Readings from Last 3 Encounters:   08/11/21 (!) 96/55   08/05/21 137/77   08/03/21 (!) 116/48       NPO Status:                                                                                 BMI:   Wt Readings from Last 3 Encounters:   08/11/21 141 lb 3.2 oz (64 kg)   08/03/21 140 lb (63.5 kg)   07/30/21 141 lb 9.6 oz (64.2 kg)     There is no height or weight on file to calculate BMI.    CBC:   Lab Results   Component Value Date    WBC 2.9 08/11/2021    RBC 2.90 08/11/2021    HGB 8.3 08/11/2021    HCT 27.4 08/11/2021    MCV 94.5 08/11/2021    RDW 20.1 08/11/2021     08/11/2021       CMP:   Lab Results   Component Value Date     08/11/2021    K 4.8 08/11/2021     08/11/2021    CO2 18 08/11/2021    BUN 72 08/11/2021    CREATININE 3.0 08/11/2021    GFRAA 20 08/11/2021    LABGLOM 16 08/11/2021    GLUCOSE 93 08/11/2021    PROT 4.4 08/11/2021    CALCIUM 7.6 08/11/2021    BILITOT 0.2 08/11/2021    ALKPHOS 63 08/11/2021    AST 37 08/11/2021    ALT <5 08/11/2021       POC Tests: No results for input(s): POCGLU, POCNA, POCK, POCCL, POCBUN, POCHEMO, POCHCT in the last 72 hours.     Coags:   Lab Results   Component Value Date    PROTIME 13.0 08/03/2021    INR 1.01 08/03/2021    APTT 34.8 08/03/2021       HCG (If Applicable): No results found for: PREGTESTUR, PREGSERUM, HCG, HCGQUANT     ABGs: No results found for: PHART, PO2ART, IZL6BWW, BXG9PVU, BEART, D6KVPPBD     Type & Screen (If Applicable):  No results found for: LABABO, LABRH    Drug/Infectious Status (If Applicable):  No results found for: HIV, HEPCAB    COVID-19 Screening (If Applicable):   Lab Results   Component Value Date    COVID19 NOT DETECTED 08/03/2021    COVID19 NOT DETECTED 03/11/2021           Anesthesia Evaluation  Patient summary reviewed  Airway: Mallampati: III  TM distance: <3 FB   Neck ROM: limited  Mouth opening: < 3 FB Dental:          Pulmonary:   (+) asthma:                            Cardiovascular:  Exercise tolerance: no interval change,   (+) hypertension:,       ECG reviewed      Echocardiogram reviewed               ROS comment: Conclusions      Summary   This is a limited echocardiogram.   Left ventricular systolic function is normal.   Ejection fraction is visually estimated at 55-60%. Mild pericardial effusion   Pleural effusion present. Signature      ------------------------------------------------------------------   Electronically signed by Wandy Robles MD (Interpreting   physician) on 03/24/2021 at 03:29 PM   ------------------------------------------------------------------     Neuro/Psych:               GI/Hepatic/Renal:   (+) renal disease: ESRD,           Endo/Other:    (+) malignancy/cancer. Abdominal:       Abdomen: soft. Vascular: Other Findings:               Anesthesia Plan      general     ASA 3       Induction: intravenous. arterial line  MIPS: Postoperative opioids intended, Prophylactic antiemetics administered and One-lung ventilation.                       CINDY Vicente - BERNARD   8/11/2021

## 2021-08-11 NOTE — PROGRESS NOTES
PREOPERATIVE DIAGNOSIS:  Bilateral hydronephrosis secondary to  metastatic cancer.     POSTOPERATIVE DIAGNOSIS:  Bilateral hydronephrosis secondary to  metastatic cancer.     PROCEDURE:  Cystoscopy, bilateral ureteral stent exchange, left  diagnostic ureteroscopy, and left retrograde pyelogram.       XR CHEST PORTABLE    Result Date: 8/5/2021  EXAMINATION: ONE XRAY VIEW OF THE CHEST 8/4/2021 5:44 am COMPARISON: August 3, 2021 HISTORY: ORDERING SYSTEM PROVIDED HISTORY: Post left chest tube insertion TECHNOLOGIST PROVIDED HISTORY: Reason for exam:->Post left chest tube insertion Reason for Exam: Post left chest tube insertion Acuity: Acute Type of Exam: Subsequent/Follow-up FINDINGS: Interval placement of a left chest tube. No significant interval change of a large pneumothorax. Airspace opacities are identified in the left upper lung zone. Consolidation is seen in the base. Left effusion is also seen. The right lung is grossly clear without focal consolidation or pleural effusion. No pulmonary edema. No new osseous abnormality. Stable appearance of a right-sided MediPort. Status post placement of a left pleural pigtail catheter. No significant interval change of a large pneumothorax. Stable opacities in the left lung with pleural effusion. XR CHEST PORTABLE    Result Date: 8/3/2021  EXAMINATION: ONE XRAY VIEW OF THE CHEST 8/3/2021 10:05 am COMPARISON: CT chest 07/28/2021 HISTORY: ORDERING SYSTEM PROVIDED HISTORY: post left thoracentesis. TECHNOLOGIST PROVIDED HISTORY: SDS #18 Reason for exam:->post left thoracentesis. Reason for Exam: post left thoracentesis. FINDINGS: Right arm port catheter with catheter tip right atrium. Stable cardiomediastinal silhouette. Significant decrease in size of left pleural effusion with probable small residual pleural effusion remaining.   Hazy opacification of the left lung with moderate amount of air in the left pleural space compatible with atelectasis secondary to trapped lung. Significant decrease in size in left pleural effusion with likely small residual amount of pleural fluid remaining. Hazy opacification of the left lung and moderate amount of air in the left pleural space compatible with trapped lung. CT GUIDED CHEST TUBE    Result Date: 8/3/2021  PROCEDURE: CT GUIDED CHEST TUBE PLACEMENT 8/3/2021 HISTORY: ORDERING SYSTEM PROVIDED HISTORY: Post thoracentesis       1. Successful CT guided placement of a left-sided 8 [place] chest tube. 2. Interval re-expansion of left lung since the recent CT with diffuse asymmetric left-sided pulmonary parenchymal opacities. These opacities are nonspecific but could be secondary to infection, asymmetric edema, or pulmonary hemorrhage. Atelectasis is less likely. 3. Moderate left hydropneumothorax post left chest tube placement. 4. Nodular thickening noted along the pleura at the left lung base is suspicious for metastatic disease. IR GUIDED THORACENTESIS PLEURAL    Result Date: 8/3/2021  PROCEDURE: ULTRASOUNDGUIDED left THORACENTESIS 8/3/2021 HISTORY: ORDERING SYSTEM PROVIDED HISTORY: Pleural effusion      Successful ultrasound guided left-sided thoracentesis.   Removed about 1400 cc of fluid      Scheduled Medicines   Medications:    [START ON 8/11/2021] pantoprazole  40 mg Oral QAM AC    fluticasone  1 spray Each Nostril Daily    magnesium oxide  400 mg Oral Daily    calcitRIOL  0.5 mcg Oral BID    calcium carbonate  750 mg Oral TID WC    famotidine (PEPCID) injection  20 mg Intravenous Once    cetirizine  10 mg Oral Daily    furosemide  80 mg Oral Daily    metoprolol succinate  100 mg Oral Daily    sodium bicarbonate  650 mg Oral BID    sodium chloride flush  5-40 mL Intravenous 2 times per day      Infusions:    sodium chloride 50 mL/hr at 08/10/21 1631    sodium chloride 25 mL (08/05/21 1436)         Objective:   Vitals: /66   Pulse 102   Temp 98.8 °F (37.1 °C) (Oral)   Resp 16   Ht 5' 1\" (1.549 m)   Wt 138 lb 6.4 oz (62.8 kg)   LMP 04/08/2018   SpO2 96%   BMI 26.15 kg/m²   General appearance: alert and cooperative with exam  Neck: no JVD or bruit  Thyroid : Normal lobes   Lungs: Has Vesicular Breath sounds left-sided chest tubes  Heart:  regular rate and rhythm  Abdomen: soft, non-tender; bowel sounds normal; no masses,  no organomegaly  Musculoskeletal: Normal  Extremities: extremities normal, , no edema  Neurologic:  Awake, alert, oriented to name, place and time. Cranial nerves II-XII are grossly intact. Motor is  intact. Sensory is intact. ,  and gait is normal.    Assessment:     Patient Active Problem List:     Left breast mass     Secondary cancer of bone ([address]     Essential hypertension     Malignant neoplasm of central portion of left female breast ([address]     Metastatic breast cancer (HCC)     Dehydration     Iron deficiency anemia     Malabsorption     Left ovarian enlargement     ELIDIA (acute kidney injury) ([address]     Hyperkalemia     Metabolic acidosis     Hyponatremia     Tachycardia     Acute and chronic respiratory failure (acute-on-chronic) (HCC)     COVID-19     Acute renal failure with tubular necrosis (HCC)     Bilateral hydronephrosis     Edema leg     Hypervolemia     Hypocalcemia     Pneumothorax      Plan:     1. Reviewed POC blood glucose . Labs and X ray results   2. Reviewed Current Medicines   3. Continue on PO Tums and Calcirol   4. Modify the dose as needed  5. Left-sided lung expanded after placing the second being at the chest tube  6. Reviewed notes cardiac thoracic surgeon so oncology notes  7. Thoracentesis fluid cytology is consistent with metastatic breast cancer  8. Will follow     .      Hector Womack MD, MD

## 2021-08-11 NOTE — PROGRESS NOTES
Pulmonary and Critical Care  Progress Note      VITALS:  /61   Pulse 100   Temp 98 °F (36.7 °C) (Axillary)   Resp 28   Ht 5' 1\" (1.549 m)   Wt 141 lb 3.2 oz (64 kg)   LMP 04/08/2018   SpO2 98%   BMI 26.68 kg/m²     Subjective:   CHIEF COMPLAINT :Pneumothorax s/p left thoracentesis     HPI:                The patient is lying in the bed. She is not in acute resp distress    Objective:   PHYSICAL EXAM:    LUNGS:Occasional basal crackles  Abd-soft, BS+,NT  Ext- no pedal edema  CVS-s1s2, no murmurs      DATA:    CBC:  Recent Labs     08/09/21  0343 08/10/21  0456 08/11/21  0324   WBC 2.5* 2.5* 2.9*   RBC 2.96* 2.94* 2.90*   HGB 8.2* 8.3* 8.3*   HCT 27.3* 27.1* 27.4*    291 324   MCV 92.2 92.2 94.5   MCH 27.7 28.2 28.6   MCHC 30.0* 30.6* 30.3*   RDW 19.8* 19.9* 20.1*   NRBC 1  --  1   SEGSPCT 68.0* 69.0* 61.0   BANDSPCT 5  --  17*      BMP:  Recent Labs     08/09/21  0343 08/10/21  0456 08/11/21  0324    141 143   K 4.9 4.8 4.8    107 109   CO2 19* 18* 18*   BUN 75* 77* 72*   CREATININE 3.0* 3.1* 3.0*   CALCIUM 8.3 8.0* 7.6*   GLUCOSE 92 80 93      ABG:  No results for input(s): PH, PO2ART, JSX9TDQ, HCO3, BEART, O2SAT in the last 72 hours. BNP  No results found for: BNP   D-Dimer:  Lab Results   Component Value Date    DDIMER 2537 (H) 05/13/2021      Radiology:   Interval removal of left pleural pigtail catheter. 2. Otherwise, stable chest.  Trace left apical pneumothorax. 3. Left basilar/retrocardiac opacity persists     1.        Assessment/Plan     Patient Active Problem List    Diagnosis Date Noted    Stage 4 chronic kidney disease ([address]     Obstructive uropathy     Pneumothorax 08/03/2021    Edema leg 06/23/2021    Hypervolemia 06/23/2021    Hypocalcemia 06/23/2021    Acute renal failure with tubular necrosis ([address] 06/10/2021    Bilateral hydronephrosis     COVID-19 05/12/2021    Acute and chronic respiratory failure (acute-on-chronic) ([address] 05/10/2021    Tachycardia  ELIDIA (acute kidney injury) ([facility] 03/22/2021    Hyperkalemia     Metabolic acidosis     Hyponatremia     Left ovarian enlargement 01/26/2021    Iron deficiency anemia 01/07/2021    Malabsorption 01/07/2021    Dehydration 11/20/2020    Malignant neoplasm of central portion of left female breast ([facility] 07/31/2020    Metastatic breast cancer ([facility] 07/31/2020    Secondary cancer of bone ([facility] 04/01/2020    Essential hypertension 04/01/2020    Left breast mass      Left Malignant Pleural effusion s/p left chest tube  Iatrogenic left pneumothorax s/p left chest tube- resolving  ? Entrapped lung  Metastatic left breast ca  CKD  AG Metabolic acidosis  Bilateral Hydronephrosis s/p bilateral stent       1. CT per CTS  2. Inhalers  3. Keep sats > 92%  4. ICS  5. OOB  6. Pleurx catheter insertion PER CTS  7. CXR in am  8. C.w present management  9. Sign off for now  No follow-ups on file.     Electronically signed by Kaley Siddiqui MD on 8/11/2021 at 12:04 PM

## 2021-08-11 NOTE — CARE COORDINATION
Received VM from Angela López at [facility]. He requested for CM to fax a copy , front and back of patient's health insurance card.  Card faxed

## 2021-08-11 NOTE — PROGRESS NOTES
PATIENT NAME: Drake Guadalupe    TODAY'S DATE: 08/11/21    Reason for today's visit: malignant pleural effusion    SUBJECTIVE:    Pt with minimal complaints. OBJECTIVE:   VITALS:    Vitals:    08/11/21 1100   BP: 109/61   Pulse: 100   Resp: 28   Temp: 98 °F (36.7 °C)   SpO2: 98%     INTAKE/OUTPUT:       Patient Vitals for the past 96 hrs (Last 3 readings):   Weight   08/11/21 0532 141 lb 3.2 oz (64 kg)       EXAM:  Constitutional: Blood pressure 109/61, pulse 100, temperature 98 °F (36.7 °C), temperature source Axillary, resp. rate 28, height 5' 1\" (1.549 m), weight 141 lb 3.2 oz (64 kg), last menstrual period 04/08/2018, SpO2 98 %, not currently breastfeeding. No apparent distress, appears stated age and cooperative. Neurologic: follows commands, no focal weakness noted   Lungs: Good respiratory effort. Diminished L side. Large bore CT with no air leak noted. There is copious serous drainage. CV: Regular rate/ rhythm , no peripheral edema, feet warm and well perfused  GI: Soft, non-tender in all four quadrants, non-distended, + bowel sounds, spleen and liver no palpable masses   : bladder nondistended   MSK: no obvious deformity   Skin: warm, pink and dry       Data:  CBC:   Recent Labs     08/09/21  0343 08/10/21  0456 08/11/21  0324   WBC 2.5* 2.5* 2.9*   HGB 8.2* 8.3* 8.3*   HCT 27.3* 27.1* 27.4*    291 324     BMP:    Recent Labs     08/09/21  0343 08/10/21  0456 08/11/21  0324    141 143   K 4.9 4.8 4.8    107 109   CO2 19* 18* 18*   BUN 75* 77* 72*   CREATININE 3.0* 3.1* 3.0*   GLUCOSE 92 80 93     Hepatic:   Recent Labs     08/09/21  0343 08/10/21  0456 08/11/21  0324   AST 48* 42* 37   ALT <5* <5* <5*   BILITOT 0.2 0.2 0.2   ALKPHOS 61 64 63     Mag:    No results for input(s): MG in the last 72 hours. Phos:   No results for input(s): PHOS in the last 72 hours. INR: No results for input(s): INR in the last 72 hours.     Radiology Review:  CXR independently reviewed - trace

## 2021-08-11 NOTE — CONSULTS
Nephrology Service Consultation      [address], [address] [address]  Phone: [phone]  Office Hours: 8:30AM - 4:30PM  Monday - Friday            Patient:  Troy Whitfield  MRN: [id number]  Consulting physician:  Tomer Stone MD  Reason for Consult: elevated creat      PCP: CINDY Weaver - NP    HISTORY OF PRESENT ILLNESS:   The patient is a 50 y.o. female with metastatic cancer, likely CKD from obstructive uropathy, presented a week ago for a thoracentesis and then developed a pneumothorax thus she was admitted and a chest tube was placed. Renal consult as of today for elevated cr  Her ureteral stents were exchanged this admission  She is edematous, she is on IVF    REVIEW OF SYSTEMS:  14 point ROS is Negative.  See positive ROS per HPI    Past Medical History:        Diagnosis Date    Anxiety     Asthma     last flare up 5 yrs ago    Breast lesion     \"have spot on left breast going to remove- at this time not sure if cancer or not\"    Essential hypertension 4/1/2020    Secondary cancer of bone ([address] 4/1/2020    Thyroid nodule     \"have thyroid nodules-        Past Surgical History:        Procedure Laterality Date    APPENDECTOMY  age 27   Bertell Halim BLADDER SURGERY Bilateral 3/25/2021    BILATERAL CYSTOSCOPY BILATERAL STENT INSERTION performed by Tena Cintron MD at [address] Bilateral 6/11/2021    BILATERAL CYSTOSCOPY RETROGRADE PYELOGRAM STENT EXCHANGE performed by Maggy Sorensen MD at [address] Bilateral 8/5/2021    BILATERAL CYSTOSCOPY, BILATERAL STENT EXCHANGE, LEFT URETEROSCOPYAND LEFT RETROGRADE PYELOGRAM performed by Manpreet Cain MD at [address]    CT GUIDED CHEST TUBE  8/3/2021    CT GUIDED CHEST TUBE 8/3/2021 [facility] CT SCAN    IR NONTUNNELED VASCULAR CATHETER  3/22/2021    IR NONTUNNELED VASCULAR CATHETER 3/22/2021 [facility] SPECIAL PROCEDURES    PORT SURGERY Right 3/15/2021    RIGHT PORT 06/23/2021    Hypocalcemia 06/23/2021    Acute renal failure with tubular necrosis ([facility] 06/10/2021    Bilateral hydronephrosis     COVID-19 05/12/2021    Acute and chronic respiratory failure (acute-on-chronic) ([facility] 05/10/2021    Tachycardia     ELIDIA (acute kidney injury) ([facility] 03/22/2021    Hyperkalemia     Metabolic acidosis     Hyponatremia     Left ovarian enlargement 01/26/2021    Iron deficiency anemia 01/07/2021    Malabsorption 01/07/2021    Dehydration 11/20/2020    Malignant neoplasm of central portion of left female breast ([facility] 07/31/2020    Metastatic breast cancer ([facility] 07/31/2020    [facility] 04/01/2020    Essential hypertension 04/01/2020    Left breast mass      Cr stable at 3, could be her new baseline  S/p b/l ureteral stents exchange  Eft chest tube present due to pneumothorax  On IVF, Edematous, give lasix bid then  Avoid nephrotoxins  Continue sodium bicarb for the metabolic acidosis    Electronically signed by Rocio Philip DO on 8/11/2021 at 7:53 AM    [address] ,  [address]  [place], [place] [address]  PHONE: [phone]  FAX: [phone]

## 2021-08-11 NOTE — PROGRESS NOTES
ONCOLOGY HEMATOLOGY CARE ([facility])  PROGRESS NOTE      Patient was seen and examined today. Not in any acute distress and no overnight events. Status post left thoracentesis. Pleural fluid was an exudate with LDH of 1141.  1400 cc of fluid was removed. Cytology was positive for metastatic breast cancer. She had Taxol on August 5, 2021. Chest X-ray on August 10, 2021 showed  Left-sided chest tubes in place. Trace left focal pneumothorax. Increased lung markings bilaterally, may be related to mild pulmonary   vascular congestion versus bronchitis or early pneumonia. This morning she offers no acute complaint. Likely chest tube will be removed today. If she is discharged home she may resume chemotherapy tomorrow at the cancer center. If she is still in the hospital I may consider to give the chemo tomorrow in the hospital before discharge. PHYSICAL EXAM    Vitals: /69   Pulse 96   Temp 98.1 °F (36.7 °C) (Oral)   Resp 21   Ht 5' 1\" (1.549 m)   Wt 141 lb 3.2 oz (64 kg)   LMP 04/08/2018   SpO2 95%   BMI 26.68 kg/m²   CONSTITUTIONAL: awake, alert, cooperative, no apparent distress   EYES: pupils equal, round and reactive to light, sclera clear and conjunctiva pallor  ENT: Normocephalic, without obvious abnormality, atraumatic  NECK: supple, symmetrical, no jugular venous distension and no carotid bruits   HEMATOLOGIC/LYMPHATIC: no cervical, supraclavicular or axillary lymphadenopathy   LUNGS: Fair air entry bilaterally, decreased breath sound to the left lung. Chest tube to the left chest noted   CARDIOVASCULAR: regular rate and rhythm, normal S1 and S2, no murmur  ABDOMEN: normal bowel sound, soft, non-distended, non-tender, no masses palpated, no hepatosplenomegaly   MUSCULOSKELETAL: full range of motion noted, tone is normal  NEUROLOGIC: awake, alert, oriented to name, place and time. Cranial nerves II through XII are grossly intact. SKIN: No jaundice.   No petechial

## 2021-08-11 NOTE — PROGRESS NOTES
Progress Note( Dr. Semaj Norman)  8/11/2021  Subjective:   Admit Date: 8/3/2021  PCP: CINDY Kaminski - NP    Admitted For : Left-sided pneumothorax    Consulted For: Hypocalcemia     Interval History: Feels somewhat better breathing is better though still short of breath  Serum Calcium is now normal     Had Urologic Procedure done  8/5/2021 as noted below   Left Ureteral Stent was exchanged    Reviewed CT scanning done and left-sided hydropneumothorax with collapse of the lung though patient has chest tube there  After placing this second bigger tube left lung is expanded also has some patchy airspace disease ammonia versus pulmonary edema    Patient serum calcium is getting low again. Monitor BMP    Denies any chest pains,   Yes SOB . Better  Denies nausea or vomiting. Poor appetite  No new bowel or bladder symptoms. Intake/Output Summary (Last 24 hours) at 8/11/2021 0813  Last data filed at 8/10/2021 1852  Gross per 24 hour   Intake --   Output 1100 ml   Net -1100 ml       DATA    CBC:   Recent Labs     08/09/21  0343 08/10/21  0456 08/11/21  0324   WBC 2.5* 2.5* 2.9*   HGB 8.2* 8.3* 8.3*    291 324    CMP:  Recent Labs     08/09/21  0343 08/10/21  0456 08/11/21  0324    141 143   K 4.9 4.8 4.8    107 109   CO2 19* 18* 18*   BUN 75* 77* 72*   CREATININE 3.0* 3.1* 3.0*   CALCIUM 8.3 8.0* 7.6*   PROT 4.6* 4.4* 4.4*   LABALBU 2.7* 2.5* 2.5*   BILITOT 0.2 0.2 0.2   ALKPHOS 61 64 63   AST 48* 42* 37   ALT <5* <5* <5*     Lipids:   Lab Results   Component Value Date    CHOL 135 12/11/2019    HDL 54 12/11/2019    TRIG 74 12/11/2019     Glucose:No results for input(s): POCGLU in the last 72 hours.   [id number]:  Lab Results   Component Value Date    LABA1C 6.0 06/10/2021     High Sensitivity TSH:   Lab Results   Component Value Date    TSHHS <0.010 01/07/2021     Free T3: No results found for: FT3  Free T4:No results found for: T4FREE    DATE OF PROCEDURE:  08/05/2021 Dr. Edyth Bosworth    PREOPERATIVE DIAGNOSIS:  Bilateral hydronephrosis secondary to  metastatic cancer.     POSTOPERATIVE DIAGNOSIS:  Bilateral hydronephrosis secondary to  metastatic cancer.     PROCEDURE:  Cystoscopy, bilateral ureteral stent exchange, left  diagnostic ureteroscopy, and left retrograde pyelogram.       XR CHEST PORTABLE    Result Date: 8/5/2021  EXAMINATION: ONE XRAY VIEW OF THE CHEST 8/4/2021 5:44 am COMPARISON: August 3, 2021 HISTORY: ORDERING SYSTEM PROVIDED HISTORY: Post left chest tube insertion TECHNOLOGIST PROVIDED HISTORY: Reason for exam:->Post left chest tube insertion Reason for Exam: Post left chest tube insertion Acuity: Acute Type of Exam: Subsequent/Follow-up FINDINGS: Interval placement of a left chest tube. No significant interval change of a large pneumothorax. Airspace opacities are identified in the left upper lung zone. Consolidation is seen in the base. Left effusion is also seen. The right lung is grossly clear without focal consolidation or pleural effusion. No pulmonary edema. No new osseous abnormality. Stable appearance of a right-sided MediPort. Status post placement of a left pleural pigtail catheter. No significant interval change of a large pneumothorax. Stable opacities in the left lung with pleural effusion. XR CHEST PORTABLE    Result Date: 8/3/2021  EXAMINATION: ONE XRAY VIEW OF THE CHEST 8/3/2021 10:05 am COMPARISON: CT chest 07/28/2021 HISTORY: ORDERING SYSTEM PROVIDED HISTORY: post left thoracentesis. TECHNOLOGIST PROVIDED HISTORY: SDS #18 Reason for exam:->post left thoracentesis. Reason for Exam: post left thoracentesis. FINDINGS: Right arm port catheter with catheter tip right atrium. Stable cardiomediastinal silhouette. Significant decrease in size of left pleural effusion with probable small residual pleural effusion remaining.   Hazy opacification of the left lung with moderate amount of air in the left pleural space compatible with atelectasis secondary to trapped lung. Significant decrease in size in left pleural effusion with likely small residual amount of pleural fluid remaining. Hazy opacification of the left lung and moderate amount of air in the left pleural space compatible with trapped lung. CT GUIDED CHEST TUBE    Result Date: 8/3/2021  PROCEDURE: CT GUIDED CHEST TUBE PLACEMENT 8/3/2021 HISTORY: ORDERING SYSTEM PROVIDED HISTORY: Post thoracentesis       1. Successful CT guided placement of a left-sided 8 Icelandic chest tube. 2. Interval re-expansion of left lung since the recent CT with diffuse asymmetric left-sided pulmonary parenchymal opacities. These opacities are nonspecific but could be secondary to infection, asymmetric edema, or pulmonary hemorrhage. Atelectasis is less likely. 3. Moderate left hydropneumothorax post left chest tube placement. 4. Nodular thickening noted along the pleura at the left lung base is suspicious for metastatic disease. IR GUIDED THORACENTESIS PLEURAL    Result Date: 8/3/2021  PROCEDURE: ULTRASOUNDGUIDED left THORACENTESIS 8/3/2021 HISTORY: ORDERING SYSTEM PROVIDED HISTORY: Pleural effusion      Successful ultrasound guided left-sided thoracentesis.   Removed about 1400 cc of fluid      Scheduled Medicines   Medications:    furosemide  80 mg Oral BID    calcitRIOL  1 mcg Oral BID    calcium carbonate  1,000 mg Oral TID WC    pantoprazole  40 mg Oral QAM AC    fluticasone  1 spray Each Nostril Daily    magnesium oxide  400 mg Oral Daily    famotidine (PEPCID) injection  20 mg Intravenous Once    cetirizine  10 mg Oral Daily    metoprolol succinate  100 mg Oral Daily    sodium bicarbonate  650 mg Oral BID    sodium chloride flush  5-40 mL Intravenous 2 times per day      Infusions:    sodium chloride 50 mL/hr at 08/10/21 1631    sodium chloride 25 mL (08/05/21 1436)         Objective:   Vitals: /69   Pulse 96   Temp 98.1 °F (36.7 °C) (Oral)   Resp 21   Ht 5' 1\" (1.549 m)   Wt 141 lb 3.2 oz (64 kg)   LMP 04/08/2018   SpO2 95%   BMI 26.68 kg/m²   General appearance: alert and cooperative with exam  Neck: no JVD or bruit  Thyroid : Normal lobes   Lungs: Has Vesicular Breath sounds left-sided chest tubes  Heart:  regular rate and rhythm  Abdomen: soft, non-tender; bowel sounds normal; no masses,  no organomegaly  Musculoskeletal: Normal  Extremities: extremities normal, , no edema  Neurologic:  Awake, alert, oriented to name, place and time. Cranial nerves II-XII are grossly intact. Motor is  intact. Sensory is intact. ,  and gait is normal.    Assessment:     Patient Active Problem List:     Left breast mass     Secondary cancer of bone ([address]     Essential hypertension     Malignant neoplasm of central portion of left female breast ([address]     Metastatic breast cancer (HCC)     Dehydration     Iron deficiency anemia     Malabsorption     Left ovarian enlargement     ELIDIA (acute kidney injury) ([address]     Hyperkalemia     Metabolic acidosis     Hyponatremia     Tachycardia     Acute and chronic respiratory failure (acute-on-chronic) (HCC)     COVID-19     Acute renal failure with tubular necrosis (HCC)     Bilateral hydronephrosis     Edema leg     Hypervolemia     Hypocalcemia     Pneumothorax      Plan:     1. Reviewed POC blood glucose . Labs and X ray results   2. Reviewed Current Medicines   3. Continue on PO Tums and Calcirol   4. Modify the dose as needed  5. Left-sided lung expanded after placing the second being at the chest tube  6. Reviewed notes cardiac thoracic surgeon so oncology notes  7. Thoracentesis fluid cytology is consistent with metastatic breast cancer  8. Will follow     .      Austin Domínguez MD, MD

## 2021-08-11 NOTE — CARE COORDINATION
Received call from Tash Israel at [facility] . He stated that they are not in network with patient's insurance . Phoned referral to [facility] and faxed face sheet and insurance card.  They are checking to see if they are in network and will return call to

## 2021-08-12 NOTE — CARE COORDINATION
Received VM from [facility]. She stated that they are out of network with patient's insurance. Phoned referral to [facility] and had to leave a VM.

## 2021-08-12 NOTE — PROGRESS NOTES
Progress Note( Dr. Alize Gaines)  8/12/2021  Subjective:   Admit Date: 8/3/2021  PCP: CINDY Tinoco - NP    Admitted For : Left-sided pneumothorax    Consulted For: Hypocalcemia     Interval History: Feels somewhat better breathing is better though still short of breath  Serum Calcium is now normal     Had Urologic Procedure done  8/5/2021 as noted below   Left Ureteral Stent was exchanged        Partial decortication, PleurX catheter placement none today, 8/12/2021  1 chest tube was placed    Patient serum calcium is getting low again. Monitor BMP    Denies any chest pains,   Yes SOB . Better  Denies nausea or vomiting. Poor appetite  No new bowel or bladder symptoms. Intake/Output Summary (Last 24 hours) at 8/12/2021 0755  Last data filed at 8/11/2021 1854  Gross per 24 hour   Intake --   Output 625 ml   Net -625 ml       DATA    CBC:   Recent Labs     08/10/21  0456 08/11/21  0324 08/12/21  0136   WBC 2.5* 2.9* 3.8*   HGB 8.3* 8.3* 8.1*    324 397    CMP:  Recent Labs     08/10/21  0456 08/11/21  0324 08/12/21  0136    143 140   K 4.8 4.8 4.7    109 107   CO2 18* 18* 19*   BUN 77* 72* 69*   CREATININE 3.1* 3.0* 2.8*   CALCIUM 8.0* 7.6* 7.4*   PROT 4.4* 4.4* 4.4*   LABALBU 2.5* 2.5* 2.6*   BILITOT 0.2 0.2 0.2   ALKPHOS 64 63 71   AST 42* 37 37   ALT <5* <5* <5*     Lipids:   Lab Results   Component Value Date    CHOL 135 12/11/2019    HDL 54 12/11/2019    TRIG 74 12/11/2019     Glucose:No results for input(s): POCGLU in the last 72 hours.   [id number]:  Lab Results   Component Value Date    LABA1C 6.0 06/10/2021     High Sensitivity TSH:   Lab Results   Component Value Date    TSHHS <0.010 01/07/2021     Free T3: No results found for: FT3  Free T4:No results found for: T4FREE    DATE OF PROCEDURE:  08/05/2021 Dr. Mesfin Barrientos      PREOPERATIVE DIAGNOSIS:  Bilateral hydronephrosis secondary to  metastatic cancer.     POSTOPERATIVE DIAGNOSIS:  Bilateral hydronephrosis secondary to  metastatic cancer.     PROCEDURE:  Cystoscopy, bilateral ureteral stent exchange, left  diagnostic ureteroscopy, and left retrograde pyelogram.       XR CHEST PORTABLE    Result Date: 8/5/2021  EXAMINATION: ONE XRAY VIEW OF THE CHEST 8/4/2021 5:44 am COMPARISON: August 3, 2021 HISTORY: ORDERING SYSTEM PROVIDED HISTORY: Post left chest tube insertion TECHNOLOGIST PROVIDED HISTORY: Reason for exam:->Post left chest tube insertion Reason for Exam: Post left chest tube insertion Acuity: Acute Type of Exam: Subsequent/Follow-up FINDINGS: Interval placement of a left chest tube. No significant interval change of a large pneumothorax. Airspace opacities are identified in the left upper lung zone. Consolidation is seen in the base. Left effusion is also seen. The right lung is grossly clear without focal consolidation or pleural effusion. No pulmonary edema. No new osseous abnormality. Stable appearance of a right-sided MediPort. Status post placement of a left pleural pigtail catheter. No significant interval change of a large pneumothorax. Stable opacities in the left lung with pleural effusion. XR CHEST PORTABLE    Result Date: 8/3/2021  EXAMINATION: ONE XRAY VIEW OF THE CHEST 8/3/2021 10:05 am COMPARISON: CT chest 07/28/2021 HISTORY: ORDERING SYSTEM PROVIDED HISTORY: post left thoracentesis. TECHNOLOGIST PROVIDED HISTORY: SDS #18 Reason for exam:->post left thoracentesis. Reason for Exam: post left thoracentesis. FINDINGS: Right arm port catheter with catheter tip right atrium. Stable cardiomediastinal silhouette. Significant decrease in size of left pleural effusion with probable small residual pleural effusion remaining. Hazy opacification of the left lung with moderate amount of air in the left pleural space compatible with atelectasis secondary to trapped lung. Significant decrease in size in left pleural effusion with likely small residual amount of pleural fluid remaining.   Hazy opacification of the left lung and moderate amount of air in the left pleural space compatible with trapped lung. CT GUIDED CHEST TUBE    Result Date: 8/3/2021  PROCEDURE: CT GUIDED CHEST TUBE PLACEMENT 8/3/2021 HISTORY: ORDERING SYSTEM PROVIDED HISTORY: Post thoracentesis       1. Successful CT guided placement of a left-sided 8 Micronesian chest tube. 2. Interval re-expansion of left lung since the recent CT with diffuse asymmetric left-sided pulmonary parenchymal opacities. These opacities are nonspecific but could be secondary to infection, asymmetric edema, or pulmonary hemorrhage. Atelectasis is less likely. 3. Moderate left hydropneumothorax post left chest tube placement. 4. Nodular thickening noted along the pleura at the left lung base is suspicious for metastatic disease. IR GUIDED THORACENTESIS PLEURAL    Result Date: 8/3/2021  PROCEDURE: ULTRASOUNDGUIDED left THORACENTESIS 8/3/2021 HISTORY: ORDERING SYSTEM PROVIDED HISTORY: Pleural effusion      Successful ultrasound guided left-sided thoracentesis.   Removed about 1400 cc of fluid      Scheduled Medicines   Medications:    furosemide  80 mg Oral BID    calcitRIOL  1 mcg Oral BID    calcium carbonate  1,000 mg Oral TID WC    pantoprazole  40 mg Oral QAM AC    fluticasone  1 spray Each Nostril Daily    magnesium oxide  400 mg Oral Daily    famotidine (PEPCID) injection  20 mg Intravenous Once    cetirizine  10 mg Oral Daily    metoprolol succinate  100 mg Oral Daily    sodium bicarbonate  650 mg Oral BID    sodium chloride flush  5-40 mL Intravenous 2 times per day      Infusions:    sodium chloride 50 mL/hr at 08/12/21 0731    sodium chloride 25 mL (08/05/21 1436)         Objective:   Vitals: BP (!) 114/59   Pulse 107   Temp 99.1 °F (37.3 °C)   Resp 22   Ht 5' 1\" (1.549 m)   Wt 148 lb 6.4 oz (67.3 kg)   LMP 04/08/2018   SpO2 94%   BMI 28.04 kg/m²   General appearance: alert and cooperative with exam  Neck: no JVD or bruit  Thyroid : Normal lobes   Lungs: Has Vesicular Breath sounds left-sided chest tubes  Partial decortication, PleurX catheter placement 8/12/2021  Heart:  regular rate and rhythm  Abdomen: soft, non-tender; bowel sounds normal; no masses,  no organomegaly  Musculoskeletal: Normal  Extremities: extremities normal, , no edema  Neurologic:  Awake, alert, oriented to name, place and time. Cranial nerves II-XII are grossly intact. Motor is  intact. Sensory is intact. ,  and gait is normal.    Assessment:     Patient Active Problem List:     Left breast mass     Secondary cancer of bone ([address]     Essential hypertension     Malignant neoplasm of central portion of left female breast ([address]     Metastatic breast cancer (HCC)     Dehydration     Iron deficiency anemia     Malabsorption     Left ovarian enlargement     ELIDIA (acute kidney injury) ([address]     Hyperkalemia     Metabolic acidosis     Hyponatremia     Tachycardia     Acute and chronic respiratory failure (acute-on-chronic) (HCC)     COVID-19     Acute renal failure with tubular necrosis (HCC)     Bilateral hydronephrosis     Edema leg     Hypervolemia     Hypocalcemia     Pneumothorax     Partial decortication, PleurX catheter placement 8/12/2021      Plan:     1. Reviewed POC blood glucose . Labs and X ray results   2. Reviewed Current Medicines   3. Continue on PO Tums and Calcirol   4. Modify the dose as needed  5. Left-sided lung expanded after placing the second being at the chest tube  6. Reviewed notes cardiac thoracic surgeon so oncology notes  7. Thoracentesis fluid cytology is consistent with metastatic breast cancer  8. Will follow     .      Montserrat Lee MD, MD

## 2021-08-12 NOTE — PROGRESS NOTES
Hospitalist Progress Note      Name:  Calderon Mckeon /Age/Sex: 1965  (50 y.o. female)   MRN & CSN:  [id number] & [id number] Admission Date/Time: 8/3/2021  1:46 PM   Location:  [facility]-A PCP: Rashi Tinoco 15 Day: 9    Assessment and Plan:            1. LEFT PNEUMOTHORAX  -S/P replacement with larger chest tube, lung has reinflated and it continues to drain.   -August 10: Pigtail catheter removed, to new large bore CT tube to suction  -: Left VATS and Pleurx catheter placement planned tomorrow at 7:30 AM     2. LEFT PLEURAL EFFUSION  -recurrent malignant, see above, continue to drain. Currently patient had wished to continue with treatment,     3. 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 CKD 4  -Monitor BMP     5. HYPOCALCEMIA  -treatment as per endocrinology     6. METASTATIC BREAST CANCER  -as noted. Care per oncology  -Discussed with oncology on . IV paclitaxel is due on . Fluids:  -August 10: Normal saline at 50 mL/h per Dr. Alize Gaines since   -Consider stopping IV fluids      Subjective:       . She denied complaints when I saw her  -Discussed at the interdisciplinary meeting today. August 10:    Patient came in for a thoracentesis ordered by Dr. Chano Gordon and had 1400 mL of yellow fluid drained. She developed a left pneumothorax and was directly admitted    She has breast cancer and is on palliative chemotherapy with paclitaxel. She had paclitaxel while here on     She has a nonobstructive uropathy and has bilateral ureteral stents, she had the stents exchanged on  while here    Per thoracic surgery on : Pigtail catheter clamped, continue large-bore CT to suction, plan to remove pigtail tomorrow, will need Pleurx catheter placement    Objective:        Intake/Output Summary (Last 24 hours) at 2021 2248  Last data filed at 2021 1854  Gross per 24 hour   Intake --   Output 625 ml   Net -625 ml      Vitals:   Vitals:    08/11/21 2202   BP: (!) 104/53   Pulse: 100   Resp: 20   Temp: 97.9 °F (36.6 °C)   SpO2:          Labs:   CBC:   Lab Results   Component Value Date    WBC 2.9 08/11/2021    HGB 8.3 08/11/2021    HCT 27.4 08/11/2021    MCV 94.5 08/11/2021     08/11/2021     BMP:   Lab Results   Component Value Date     08/11/2021    K 4.8 08/11/2021     08/11/2021    CO2 18 08/11/2021    PHOS 5.2 03/23/2021    BUN 72 08/11/2021    CREATININE 3.0 08/11/2021    CALCIUM 7.6 08/11/2021       Physical Exam:      Physical Exam  HENT:      Nose: No rhinorrhea. Eyes:      General:         Right eye: No discharge. Left eye: No discharge. Pulmonary:      Effort: Pulmonary effort is normal.   Skin:     Coloration: Skin is not jaundiced. Neurological:      Mental Status: She is alert. Cranial Nerves: No cranial nerve deficit.            Medications:   Medications:    furosemide  80 mg Oral BID    calcitRIOL  1 mcg Oral BID    calcium carbonate  1,000 mg Oral TID WC    pantoprazole  40 mg Oral QAM AC    fluticasone  1 spray Each Nostril Daily    magnesium oxide  400 mg Oral Daily    famotidine (PEPCID) injection  20 mg Intravenous Once    cetirizine  10 mg Oral Daily    metoprolol succinate  100 mg Oral Daily    sodium bicarbonate  650 mg Oral BID    sodium chloride flush  5-40 mL Intravenous 2 times per day      Infusions:    sodium chloride 50 mL/hr at 08/11/21 1218    sodium chloride 25 mL (08/05/21 1436)     PRN Meds: HYDROcodone-acetaminophen, 1 tablet, Q4H PRN  LORazepam, 0.5 mg, BID PRN  prochlorperazine, 10 mg, Q8H PRN  sodium chloride flush, 5-40 mL, PRN  sodium chloride, 25 mL, PRN  ondansetron, 4 mg, Q8H PRN   Or  ondansetron, 4 mg, Q6H PRN  polyethylene glycol, 17 g, Daily PRN  acetaminophen, 650 mg, Q6H PRN   Or  acetaminophen, 650 mg, Q6H PRN

## 2021-08-12 NOTE — PROGRESS NOTES
Hospitalist Progress Note      Name:  Andrea Dorantes /Age/Sex: 1965  (50 y.o. female)   MRN & CSN:  [id number] & [id number] Admission Date/Time: 8/3/2021  1:46 PM   Location:  [address] [place] [address]- PCP: Rashi Patricia 15 Day: 10    Assessment and Plan:     Surgery input appreciated:    DATE OF SURGERY: 21      DIAGNOSIS: Recurrent Pleural Effusion, Trapped Lung, Metastatic Breast Cancer         SURGEON: Boubacar Antonio MD     ASST: Jeff Downing PA-c      OPERATION: L VATS,  Partial decortication, PleurX catheter placement     TISSUES REMOVED: pleural fluid      DRAINS: One PleurX catheter      ANESTHESIA: General with double-lumen tube. FINDINGS: Trapped lung, fungating mass noted superior to prior CT site. LEFT recurrent pleural effusion, trapped lung, hydropneumothorax  -S/P replacement with larger chest tube, lung has reinflated and it continues to drain.   -August 10: Pigtail catheter removed, to new large bore CT tube to suction  -: Left VATS and Pleurx catheter placement planned tomorrow at 7:30 AM  -: Appreciate thoracic surgery consult, procedure above done     OBSTRUCTIVE UROPATHY SECONDARY TO METASTASES  -S/P stent replacement creat is stable, monitor creatinine     CKD 4  -Monitor BMP  -On  I spoke with nephrology and stop fluids     5. HYPOCALCEMIA  -treatment as per endocrinology     6. METASTATIC BREAST CANCER  -as noted. Care per oncology  -Discussed with oncology on .   -She is on IV paclitaxel as an outpatient    Fluids:  -August 10: Normal saline at 50 mL/h per   --stop fluids today      Subjective:       : I communicated with nephrology today, and was advised to stop fluids. This was done.  -She was supine in bed when I saw her after surgery. She wanted ice cream.  Spoke to the nursing staff. .   She denied complaints when I saw her  -Discussed at the interdisciplinary meeting today.      August 10:    Patient came in for a thoracentesis ordered by Dr. Manohar Tellez and had 1400 mL of yellow fluid drained. She developed a left pneumothorax and was directly admitted    She has breast cancer and is on palliative chemotherapy with paclitaxel. She had paclitaxel while here on 8/5    She has a nonobstructive uropathy and has bilateral ureteral stents, she had the stents exchanged on 8/5 while here    Per thoracic surgery on 8/9: Pigtail catheter clamped, continue large-bore CT to suction, plan to remove pigtail tomorrow, will need Pleurx catheter placement    Objective: Intake/Output Summary (Last 24 hours) at 8/12/2021 1804  Last data filed at 8/12/2021 0945  Gross per 24 hour   Intake 1595 ml   Output 630 ml   Net 965 ml      Vitals:   Vitals:    08/12/21 1709   BP:    Pulse:    Resp: 18   Temp:    SpO2: 100%         Labs:   CBC:   Lab Results   Component Value Date    WBC 3.8 08/12/2021    HGB 8.1 08/12/2021    HCT 27.3 08/12/2021    MCV 94.1 08/12/2021     08/12/2021     BMP:   Lab Results   Component Value Date     08/12/2021    K 4.7 08/12/2021     08/12/2021    CO2 19 08/12/2021    PHOS 5.2 03/23/2021    BUN 69 08/12/2021    CREATININE 2.8 08/12/2021    CALCIUM 7.4 08/12/2021       Physical Exam:      Physical Exam  HENT:      Nose: No rhinorrhea. Eyes:      General:         Right eye: No discharge. Left eye: No discharge. Pulmonary:      Effort: Pulmonary effort is normal.   Skin:     Coloration: Skin is not jaundiced. Neurological:      Mental Status: She is alert. Cranial Nerves: No cranial nerve deficit.            Medications:   Medications:    furosemide  80 mg Oral BID    calcitRIOL  1 mcg Oral BID    calcium carbonate  1,000 mg Oral TID WC    pantoprazole  40 mg Oral QAM AC    fluticasone  1 spray Each Nostril Daily    magnesium oxide  400 mg Oral Daily    famotidine (PEPCID) injection  20 mg Intravenous Once    cetirizine  10 mg Oral Daily    metoprolol succinate  100 mg Oral Daily    sodium bicarbonate  650 mg Oral BID    sodium chloride flush  5-40 mL Intravenous 2 times per day      Infusions:    sodium chloride 25 mL (08/05/21 1436)     PRN Meds: acetaminophen, 650 mg, Q4H PRN  HYDROmorphone, 0.5 mg, Q3H PRN  HYDROcodone-acetaminophen, 1 tablet, Q4H PRN  LORazepam, 0.5 mg, BID PRN  prochlorperazine, 10 mg, Q8H PRN  sodium chloride flush, 5-40 mL, PRN  sodium chloride, 25 mL, PRN  ondansetron, 4 mg, Q8H PRN   Or  ondansetron, 4 mg, Q6H PRN  polyethylene glycol, 17 g, Daily PRN  acetaminophen, 650 mg, Q6H PRN   Or  acetaminophen, 650 mg, Q6H PRN

## 2021-08-12 NOTE — CARE COORDINATION
Received return call from Kali Orosco patient's  from [facility] . He provided some names of potential [place] [address] providers in Network for patient. Referral called to [facility] [place] [address] and faxed face sheet and insurance card for them to determine if they are in Network and can accept patient.  They will return call to

## 2021-08-12 NOTE — OP NOTE
Operative Note      Patient: Sudha Preciado  YOB: 1965  MRN: [id number]    DATE OF SURGERY: 08/12/21    PREOPERATIVE DIAGNOSIS: Recurrent Pleural Effusion, Trapped Lung, Metastatic Breast Cancer      POSTOPERATIVE DIAGNOSIS: same    SURGEON: Calderon Hale MD    ASST: Nicole Reno PA-c      OPERATION: L VATS,  Partial decortication, PleurX catheter placement    TISSUES REMOVED: pleural fluid      DRAINS: One PleurX catheter      ANESTHESIA: General with double-lumen tube.      POSITION: Right lateral decubitus, left side up.      FINDINGS: Trapped lung, fungating mass noted superior to prior CT site.       DESCRIPTION OF OPERATION: The patient was brought to the operating room and intubated with a double-lumen tube. Adequate placement was verified by bronchoscopy by the anesthesia department and myself. The chest tube was removed.       The patient was then turned in the right lateral decubitus position on the beanbag with the bed flexed. The ET tube placement was then reconfirmed for adequate positioning of the double-lumen tube. The patient was then prepped and draped in that position.      The trocar sites were injected with local anesthetic. A 5 mm port was placed mid axillary  in approximately the sixth intercostal space under direct visualization.       The lung was examined. There were adhesions and extensive evidence of metastatic disease. These adhesions were taken down to allow additional port placement. Loculi were taken down and lung freed. Effusion was drained and samples sent for culture. The lung was reiniflated with a valsalva. The lower lobe did not inflate. Therefore we proceeded with pleurX catheter placement. An incision was made anteriorly. The catheter was tunneled posteriorly and inserted into the chest via the trocar site.        Thetube was secured with silk suture.  The port site was closed with UR6 for subcutaneous and a 4-0 Monocryl was used for the subcuticular suture. Sterile dressings were placed over the incisions. The patient tolerated the procedure well. Instrument and sponge counts were correct at the conclusion of the case. Estimated Blood Loss (mL): Minimal    Complications: None    Specimens:   ID Type Source Tests Collected by Time Destination   1 : mucous trap 1 - fluid analysis Body Fluid Fluid CULTURE, BODY FLUID Shivani Hurd MD 8/12/2021 3383    2 : mucous trap 2 - for culture - aerobic, anaerobic, fungal, AFB Body Fluid Fluid CULTURE, BODY FLUID Shivani Hurd MD 8/12/2021 0735        Implants:  * No implants in log *      Drains:   Chest Tube 2 Left Other (Comment) 20 Cymraes (Active)   $ Chest tube insertion $ Yes 08/07/21 1715   Suction -20 cm H2O 08/11/21 2027   Chest Tube Airleak No 08/12/21 0356   Drainage Description Serous 08/12/21 0356   Dressing Status Clean;Dry; Intact 08/12/21 0356   Site Assessment Clean;Dry; Intact 08/12/21 0356   Surrounding Skin Dry; Intact 08/12/21 0356   Output (ml) 175 ml 08/11/21 1854       [REMOVED] Chest Tube 1 Left Pleural 8 Cymraes (Removed)   Suction To water seal 08/09/21 2045   Chest Tube Airleak No 08/10/21 0917   Drainage Description Other (Comment) 08/10/21 0917   Dressing Status Clean;Dry; Intact 08/10/21 0917   Dressing Type Dry dressing 08/10/21 0917   Site Assessment Clean;Dry; Intact 08/10/21 0917   Surrounding Skin Unable to view 08/10/21 0917   Output (ml) 0 ml 08/09/21 2045       [REMOVED] Urethral Catheter Non-latex 16 fr (Removed)       [REMOVED] Urethral Catheter Non-latex;Straight-tip 16 fr (Removed)       [REMOVED] Urethral Catheter Non-latex 16 fr (Removed)       Electronically signed by Shivani Hurd MD on 8/12/2021 at 8:48 AM

## 2021-08-12 NOTE — PROGRESS NOTES
0915: Arrived to PACU from OR. Monitors applied, alarms on. [facility].  2098: Stahl Abts but arouses to name. Coughs and deep breathes with encouragement. Chest x-ray taken. Heels off bed, arms elevated on pillows. 8402: Transported to room 3127. Care assumed per [facility].

## 2021-08-12 NOTE — ANESTHESIA PRE PROCEDURE
Department of Anesthesiology  Preprocedure Note       Name:  Poli Son   Age:  50 y.o.  :  1965                                          MRN:  [id number]         Date:  2021      Surgeon: Stephen Beauchamp):  Lane Jonas MD    Procedure: Procedure(s):  LEFT THORACOSCOPY WITH LEFT PLEURX CATHETER PLACEMENT    Medications prior to admission:   Prior to Admission medications    Medication Sig Start Date End Date Taking? Authorizing Provider   HYDROcodone-acetaminophen (NORCO) 5-325 MG per tablet Take 1 tablet by mouth every 6 hours as needed for Pain.    Yes Historical Provider, MD   furosemide (LASIX) 80 MG tablet Take 1 tablet by mouth daily 21  Yes Елена Lau DO   sodium bicarbonate 650 MG tablet Take 1 tablet by mouth 2 times daily 21 Yes Елена Lau DO   Calcium-Vitamin D 500-125 MG-UNIT TABS Take 1 tablet by mouth 2 times daily   Yes Historical Provider, MD   metoprolol succinate (TOPROL XL) 100 MG extended release tablet Take 1 tablet by mouth daily 21  Yes CINDY Jarrell - CNP   ondansetron (ZOFRAN) 8 MG tablet 1 tab po q 8 hours PRN for chemo induced nausea/vomting  Patient taking differently: 8 mg every 8 hours as needed for Nausea or Vomiting  3/16/21  Yes CINDY Jarrell - CNP   docusate sodium (COLACE) 100 MG capsule Take 1 capsule by mouth daily as needed for Constipation 3/16/21  Yes Kevan Staples APRN - CNP   acetaminophen (TYLENOL) 500 MG tablet Take 500 mg by mouth every 6 hours as needed for Pain   Yes Historical Provider, MD   prochlorperazine (COMPAZINE) 10 MG tablet Take 1 tablet by mouth every 8 hours as needed (nausea)  Patient taking differently: Take 10 mg by mouth every 8 hours as needed (Nausea)  21  Yes Tamika Casillas MD   cetirizine (ZYRTEC) 10 MG tablet Take 10 mg by mouth daily   Yes Historical Provider, MD   ciprofloxacin (CIPRO) 250 MG tablet Take 1 tablet by mouth daily for 10 days 21  Tamika Casillas MD Daily Manpreet Cain MD   100 mg at 08/11/21 0933    prochlorperazine (COMPAZINE) tablet 10 mg  10 mg Oral Q8H PRN Manpreet Cain MD        sodium bicarbonate tablet 650 mg  650 mg Oral BID Manpreet Cain MD   650 mg at 08/11/21 2027    sodium chloride flush 0.9 % injection 5-40 mL  5-40 mL Intravenous 2 times per day Manpreet Cain MD   10 mL at 08/11/21 2027    sodium chloride flush 0.9 % injection 5-40 mL  5-40 mL Intravenous PRN Manpreet Cain MD        0.9 % sodium chloride infusion  25 mL Intravenous PRN Manpreet Cain  mL/hr at 08/05/21 1436 25 mL at 08/05/21 1436    ondansetron (ZOFRAN-ODT) disintegrating tablet 4 mg  4 mg Oral Q8H PRN Manpreet Cain MD        Or    ondansetron (ZOFRAN) injection 4 mg  4 mg Intravenous Q6H PRN Manpreet Cain MD        polyethylene glycol (GLYCOLAX) packet 17 g  17 g Oral Daily PRN Manpreet Cain MD        acetaminophen (TYLENOL) tablet 650 mg  650 mg Oral Q6H PRN Manpreet Cain MD   650 mg at 08/04/21 1955    Or    acetaminophen (TYLENOL) suppository 650 mg  650 mg Rectal Q6H PRN Manpreet Cain MD           Allergies:     Allergies   Allergen Reactions    Latex Anaphylaxis     \"have trouble with banana's kiwi, avocados- Have trouble breathing with these and trouble breathig - get asthmatic from latex gloves, bandage, anything latex     Banana Anaphylaxis    Cinnamon Anaphylaxis       Problem List:    Patient Active Problem List   Diagnosis Code    Left breast mass N63.20    Secondary cancer of bone ([address] C79.51    Essential hypertension I10    Malignant neoplasm of central portion of left female breast ([address] C50.112    Metastatic breast cancer ([address] C50.919    Dehydration E86.0    Iron deficiency anemia D50.9    Malabsorption K90.9    Left ovarian enlargement N83.8    ELIDIA (acute kidney injury) ([address] N17.9    Hyperkalemia S59.2    Metabolic acidosis V75.0    Hyponatremia E87.1    Tachycardia R00.0    Acute and chronic respiratory failure (acute-on-chronic) (HCC) J96.20    COVID-19 U07.1    Acute renal failure with tubular necrosis (HCC) N17.0    Bilateral hydronephrosis N13.30    Edema leg R60.0    Hypervolemia E87.70    Hypocalcemia E83.51    Pneumothorax J93.9    Stage 4 chronic kidney disease (HCC) N18.4    Obstructive uropathy N13.9       Past Medical History:        Diagnosis Date    Anxiety     Asthma     last flare up 5 yrs ago    Breast lesion     \"have spot on left breast going to remove- at this time not sure if cancer or not\"    Essential hypertension 4/1/2020    Secondary cancer of bone ([address] 4/1/2020    Thyroid nodule     \"have thyroid nodules-        Past Surgical History:        Procedure Laterality Date    APPENDECTOMY  age 27   [facility] BLADDER SURGERY Bilateral 3/25/2021    BILATERAL CYSTOSCOPY BILATERAL STENT INSERTION performed by Cynthia Jain MD at [address] Bilateral 6/11/2021    BILATERAL CYSTOSCOPY RETROGRADE PYELOGRAM STENT EXCHANGE performed by Nehemias Clark MD at [address] [facility] Bilateral 8/5/2021    BILATERAL CYSTOSCOPY, BILATERAL STENT EXCHANGE, LEFT URETEROSCOPYAND LEFT RETROGRADE PYELOGRAM performed by Maeola Gilford, MD at [address]    CT GUIDED CHEST TUBE  8/3/2021    CT GUIDED CHEST TUBE 8/3/2021 [facility] CT SCAN    IR NONTUNNELED VASCULAR CATHETER  3/22/2021    IR NONTUNNELED VASCULAR CATHETER 3/22/2021 [facility] SPECIAL PROCEDURES    PORT SURGERY Right 3/15/2021    RIGHT PORT INSERTION performed by Rocky Strange MD at [address] [facility]  age 5    T & A       Social History:    Social History     Tobacco Use    Smoking status: Never Smoker    Smokeless tobacco: Never Used   Substance Use Topics    Alcohol use: Yes     Comment: average\"one time per week\"                                Counseling given: Not Answered      Vital Signs (Current): Vitals:    08/11/21 2027 08/11/21 2202 08/12/21 0000 08/12/21 0411   BP: 122/67 (!) 104/53 112/63 (!) 100/54   Pulse: 105 100 97 98   Resp: 21 20 24 22   Temp: 36.7 °C (98 °F) 36.6 °C (97.9 °F) 36.9 °C (98.4 °F) 36.7 °C (98.1 °F)   TempSrc: Oral Oral Oral Oral   SpO2:    94%   Weight:    148 lb 6.4 oz (67.3 kg)   Height:                                                  BP Readings from Last 3 Encounters:   08/12/21 (!) 100/54   08/05/21 137/77   08/03/21 (!) 116/48       NPO Status: Time of last liquid consumption: 0000                        Time of last solid consumption: 1830                        Date of last liquid consumption: 08/11/21                        Date of last solid food consumption: 08/11/21    BMI:   Wt Readings from Last 3 Encounters:   08/12/21 148 lb 6.4 oz (67.3 kg)   08/03/21 140 lb (63.5 kg)   07/30/21 141 lb 9.6 oz (64.2 kg)     Body mass index is 28.04 kg/m². CBC:   Lab Results   Component Value Date    WBC 3.8 08/12/2021    RBC 2.90 08/12/2021    HGB 8.1 08/12/2021    HCT 27.3 08/12/2021    MCV 94.1 08/12/2021    RDW 20.3 08/12/2021     08/12/2021       CMP:   Lab Results   Component Value Date     08/12/2021    K 4.7 08/12/2021     08/12/2021    CO2 19 08/12/2021    BUN 69 08/12/2021    CREATININE 2.8 08/12/2021    GFRAA 21 08/12/2021    LABGLOM 18 08/12/2021    GLUCOSE 89 08/12/2021    PROT 4.4 08/12/2021    CALCIUM 7.4 08/12/2021    BILITOT 0.2 08/12/2021    ALKPHOS 71 08/12/2021    AST 37 08/12/2021    ALT <5 08/12/2021       POC Tests: No results for input(s): POCGLU, POCNA, POCK, POCCL, POCBUN, POCHEMO, POCHCT in the last 72 hours. Coags:   Lab Results   Component Value Date    PROTIME 13.0 08/03/2021    INR 1.01 08/03/2021    APTT 34.8 08/03/2021       HCG (If Applicable): No results found for: PREGTESTUR, PREGSERUM, HCG, HCGQUANT     ABGs: No results found for: PHART, PO2ART, MPD5BPO, HWW1VBT, BEART, B2KMBMOE     Type & Screen (If Applicable):   No results found for: Rachell Britt    Drug/Infectious Status (If Applicable):  No results found for: HIV, HEPCAB    COVID-19 Screening (If Applicable):   Lab Results   Component Value Date    COVID19 NOT DETECTED 08/03/2021    COVID19 NOT DETECTED 03/11/2021           Anesthesia Evaluation  Patient summary reviewed  Airway: Mallampati: III  TM distance: <3 FB   Neck ROM: limited  Mouth opening: < 3 FB Dental:          Pulmonary:   (+) asthma:                            Cardiovascular:  Exercise tolerance: no interval change,   (+) hypertension:,       ECG reviewed      Echocardiogram reviewed               ROS comment: Conclusions      Summary   This is a limited echocardiogram.   Left ventricular systolic function is normal.   Ejection fraction is visually estimated at 55-60%. Mild pericardial effusion   Pleural effusion present. Signature      ------------------------------------------------------------------   Electronically signed by Hua Pierre MD (Interpreting   physician) on 03/24/2021 at 03:29 PM   ------------------------------------------------------------------     Neuro/Psych:               GI/Hepatic/Renal:   (+) renal disease: ESRD,           Endo/Other:    (+) malignancy/cancer. Abdominal:       Abdomen: soft. Vascular: Other Findings:             Anesthesia Plan      general     ASA 3       Induction: intravenous. arterial line  MIPS: Postoperative opioids intended, Prophylactic antiemetics administered, Postoperative trial extubation, Postoperative ventilation and One-lung ventilation. Anesthetic plan and risks discussed with patient. Use of blood products discussed with patient whom consented to blood products. Plan discussed with CRNA.     Attending anesthesiologist reviewed and agrees with Preprocedure content            CINDY Benjamin CRNA   8/12/2021

## 2021-08-12 NOTE — CARE COORDINATION
Message received from Carine Montero [address] [place] [address] that she has received a VM from [place] [address] for patient through patient's insurance . JERRY called [facility]. Her VM stated that she is off today but can call Kevyn Hensley for assist [phone]. Phoned Kevyn Hensley . JERRY updated him on patient's discharge plan per his request and let him know trying to locate in network [address].  He is going to research and return call to JERRY

## 2021-08-12 NOTE — ANESTHESIA POSTPROCEDURE EVALUATION
Department of Anesthesiology  Postprocedure Note    Patient: Ashton Essex  MRN: [id number]  YOB: 1965  Date of evaluation: 8/12/2021  Time:  9:24 AM     Procedure Summary     Date: 08/12/21 Room / Location: [address] [place] [address]    Anesthesia Start: 2086 Anesthesia Stop: 1659    Procedure: LEFT THORACOSCOPY WITH LEFT [address] (Left Abdomen) Diagnosis: (PNEUMOTHORAX)    Surgeons: Nadya Parks MD Responsible Provider: Mony Lacy MD    Anesthesia Type: general ASA Status: 3          Anesthesia Type: general    Daniel Phase I: Daniel Score: 9    Daniel Phase II:      Last vitals: Reviewed and per EMR flowsheets.        Anesthesia Post Evaluation    Patient location during evaluation: PACU  Patient participation: complete - patient participated  Level of consciousness: responsive to verbal stimuli  Pain score: 0  Airway patency: patent  Nausea & Vomiting: no nausea and no vomiting  Complications: no  Cardiovascular status: blood pressure returned to baseline and hemodynamically stable  Respiratory status: acceptable, spontaneous ventilation, nonlabored ventilation and face mask  Hydration status: stable

## 2021-08-12 NOTE — CONSULTS
Palliative Medicine Consultation    Reason for Consult:      __X___ Advance Care Planning  __X___Transition of Care Planning  __X___ Psychosocial Support  _____ Symptom Management:     Recommendations:    1. Continue with the excellent care of this patient with metastatic breast cancer complicated by pneumothorax after a thoracentesis. 2. Full code. Patient has completed ACP documents. 3.  Discussed hospice when treatments no longer effective. Requesting Physician:  Dr. Moira Causey:  Pnuemothorax    History Obtained From:  patient, electronic medical record    HISTORY OF PRESENT ILLNESS:    50year old female who presented to the hospital after having a thoracentesis complicated by a pneumothorax. A chest tube was placed but did not drain much fluid and she was thought to have a \"trapped lung. \" Today the patient underwent L VATS, partial decortication, and PleurX catheter placement. The patient has a history of metastatic breast cancer that was diagnosed in 2018. She is currently being treated with single agent Taxol. The patient currently has metastatic disease to the bone and adenopathy in the pelvis causing bilateral hydronephrosis. The patient has bilateral ureteral stents. Patient with a history of a DVT on Eliquis. She is taking Norco PRN for pain. Palliative Care was asked to see the patient and family for goals of care and support.               Past Medical History:        Diagnosis Date    Anxiety     Asthma     last flare up 5 yrs ago    Breast lesion     \"have spot on left breast going to remove- at this time not sure if cancer or not\"    Essential hypertension 4/1/2020    Secondary cancer of bone ([address] 4/1/2020    Thyroid nodule     \"have thyroid nodules-        Past Surgical History:        Procedure Laterality Date    APPENDECTOMY  age 27   Connye Sole BLADDER SURGERY Bilateral 3/25/2021    BILATERAL CYSTOSCOPY BILATERAL STENT INSERTION performed by Kellen Lin MD at [address] [facility] OR    BLADDER SURGERY Bilateral 6/11/2021    BILATERAL CYSTOSCOPY RETROGRADE PYELOGRAM STENT EXCHANGE performed by Ann-Marie Fleming MD at [address] Bilateral 8/5/2021    BILATERAL CYSTOSCOPY, BILATERAL STENT EXCHANGE, LEFT URETEROSCOPYAND LEFT RETROGRADE PYELOGRAM performed by Constance Yu MD at [address] [facility] [address] GUIDED CHEST TUBE  8/3/2021    CT GUIDED CHEST TUBE 8/3/2021 [address] [facility] [address] SCAN    IR NONTUNNELED VASCULAR CATHETER  3/22/2021    IR NONTUNNELED VASCULAR CATHETER 3/22/2021 [facility] SPECIAL PROCEDURES    PORT SURGERY Right 3/15/2021    RIGHT PORT INSERTION performed by Dorian Robbins MD at [address] [facility]  age 5    T & A       Current Medications:    Current Facility-Administered Medications: acetaminophen (TYLENOL) tablet 650 mg, 650 mg, Oral, Q4H PRN  HYDROmorphone (DILAUDID) injection 0.5 mg, 0.5 mg, Intravenous, Q3H PRN  furosemide (LASIX) tablet 80 mg, 80 mg, Oral, BID  calcitRIOL (ROCALTROL) capsule 1 mcg, 1 mcg, Oral, BID  calcium carbonate (TUMS) chewable tablet 1,000 mg, 1,000 mg, Oral, TID WC  pantoprazole (PROTONIX) tablet 40 mg, 40 mg, Oral, QAM AC  fluticasone (FLONASE) 50 MCG/ACT nasal spray 1 spray, 1 spray, Each Nostril, Daily  HYDROcodone-acetaminophen (NORCO) 5-325 MG per tablet 1 tablet, 1 tablet, Oral, Q4H PRN  magnesium oxide (MAG-OX) tablet 400 mg, 400 mg, Oral, Daily  famotidine (PEPCID) injection 20 mg, 20 mg, Intravenous, Once  cetirizine (ZYRTEC) tablet 10 mg, 10 mg, Oral, Daily  LORazepam (ATIVAN) tablet 0.5 mg, 0.5 mg, Oral, BID PRN  metoprolol succinate (TOPROL XL) extended release tablet 100 mg, 100 mg, Oral, Daily  prochlorperazine (COMPAZINE) tablet 10 mg, 10 mg, Oral, Q8H PRN  sodium bicarbonate tablet 650 mg, 650 mg, Oral, BID  sodium chloride flush 0.9 % injection 5-40 mL, 5-40 mL, Intravenous, 2 times per day  sodium chloride flush 0.9 % injection 5-40 mL, 5-40 mL, Intravenous, PRN  0.9 % sodium chloride infusion, 25 mL, Intravenous, PRN  ondansetron (ZOFRAN-ODT) disintegrating tablet 4 mg, 4 mg, Oral, Q8H PRN **OR** ondansetron (ZOFRAN) injection 4 mg, 4 mg, Intravenous, Q6H PRN  polyethylene glycol (GLYCOLAX) packet 17 g, 17 g, Oral, Daily PRN  acetaminophen (TYLENOL) tablet 650 mg, 650 mg, Oral, Q6H PRN **OR** acetaminophen (TYLENOL) suppository 650 mg, 650 mg, Rectal, Q6H PRN    Allergies:  Latex, Banana, and Cinnamon    Social History:    Lives at home with the help of her daughter. She also has a son and  Is .     Family History:       Problem Relation Age of Onset    Diabetes Mother     Stroke Mother     High Blood Pressure Mother        REVIEW OF SYSTEMS:    CONSTITUTIONAL:  positive for  fatigue, anorexia and weight loss  EYES:  negative for  visual disturbance and irritation  HEENT:  negative for  hearing loss, nasal congestion and sore throat  RESPIRATORY:  positive for  dry cough and dyspnea  CARDIOVASCULAR:  negative for  chest pain, palpitations  GASTROINTESTINAL:  negative for nausea, vomiting and change in bowel habits  GENITOURINARY:  positive for hematuria  negative for frequency and dysuria  INTEGUMENT/BREAST:  positive for change in skin over the breast  MUSCULOSKELETAL:  positive for  muscle weakness  NEUROLOGICAL:  positive for weakness  negative for headaches, dizziness, memory problems and speech problems  BEHAVIOR/PSYCH:  positive for poor appetite and negative for depressed mood and agitated    Vitals:    Vitals:    08/12/21 1100   BP: (!) 91/59   Pulse: 106   Resp: 15   Temp:    SpO2:        PHYSICAL EXAM:  No visitors at bedside  GENERAL: lying in bed in NAD  HEENT: No cervical adenopathy, MM moist, PERRL  COR: RRR  LUNGS: scarring of chest wall, CTA, pleurX catheter in left chest wall  ABD: soft, ND/NT, +BS  SKIN: no rashes  PSYCH: cognitively intact  NEURO: CN II-XII grossly intact    DATA:    CBC with Differential:    Lab Results   Component Value Date    WBC 3.8 08/12/2021 RBC 2.90 08/12/2021    HGB 8.1 08/12/2021    HCT 27.3 08/12/2021     08/12/2021    MCV 94.1 08/12/2021    MCH 27.9 08/12/2021    MCHC 29.7 08/12/2021    RDW 20.3 08/12/2021    NRBC 1 08/11/2021    SEGSPCT 68.8 08/12/2021    BANDSPCT 17 08/11/2021    LYMPHOPCT 17.9 08/12/2021    PROMYELOPCT 1 08/06/2021    MONOPCT 7.2 08/12/2021    MYELOPCT 1 08/10/2021    BASOPCT 0.5 08/12/2021    MONOSABS 0.3 08/12/2021    LYMPHSABS 0.7 08/12/2021    EOSABS 0.0 08/12/2021    BASOSABS 0.0 08/12/2021    DIFFTYPE AUTOMATED DIFFERENTIAL 08/12/2021     CMP:    Lab Results   Component Value Date     08/12/2021    K 4.7 08/12/2021     08/12/2021    CO2 19 08/12/2021    BUN 69 08/12/2021    CREATININE 2.8 08/12/2021    GFRAA 21 08/12/2021    LABGLOM 18 08/12/2021    GLUCOSE 89 08/12/2021    PROT 4.4 08/12/2021    LABALBU 2.6 08/12/2021    CALCIUM 7.4 08/12/2021    BILITOT 0.2 08/12/2021    ALKPHOS 71 08/12/2021    AST 37 08/12/2021    ALT <5 08/12/2021     Albumin:    Lab Results   Component Value Date    LABALBU 2.6 08/12/2021     CT Chest: 08/06/2021  1. Large left hydropneumothorax with near complete collapse of the left lung. Visible portion of the left upper lobe contains a consolidation, suspicious   for pneumonia. 2. Peribronchovascular ground-glass opacities in the right upper lobe, likely   infectious or inflammatory. 3. Small right pleural effusion. 4. Nodular skin thickening along the left anterolateral chest wall and   subcutaneous fat, suspicious for metastasis. 5. Diffuse bone metastasis in the axial skeleton with chronic pathologic   fracture of T10.     CT Abd/Pelvis: 07/28/2021  1. Large left pleural effusion, increased compared to the exam of 5/10/2021. There is now complete collapse of the left lower lobe and near complete   collapse of the left upper lobe.  Nodular appearance of the left pleural   surface is highly suspicious for pleural metastasis.    2. Pelvic adenopathy, not significantly changed compared to prior exam.   Finding is highly suspicious for metastasis. 3. Nodular skin thickening of the left anterior chest wall and subcutaneous   fat, likely metastasis. 4. Diffuse bone metastasis in the axial skeleton with pathologic fracture of   T10.  This is a chronic finding. 5. Persistent eccentric bladder wall thickening with relative thickening of   the left side of the bladder wall.  Differential includes cystitis and   malignancy.  If it would alter patient's management, consider cystoscopy. 6. Stable position of bilateral ureteral stents.  Right renal   pelvocaliectasis without you hydronephrosis. 7. Diffuse skin thickening with subcutaneous edema of the chest, abdominal,   and pelvic wall.  Please correlate with clinical symptoms of anasarca         IMPRESSION:    50year old female with metastatic breast cancer complicated by pneumothorax after thoracentesis for Palliative Care encounter. The patient was seen and examined. The first thing she said to me after I introduced myself was \" I am going to find this to the end. \"  The patient did have ACP documents completed during this admission and her daughter is the HCPOA. We discussed quality of life issues and when the treatments were no longer effective that hospice would be a good option for her for symptom management. She does not have much pain now and her breathing  has improved. She is going to continue with chemotherapy for now. I encouraged her to keep talking with her children so that they know what is happening with her health.          Electronically signed by Betzaida Fermin MD on 8/12/2021 at 2:02 PM

## 2021-08-13 NOTE — CARE COORDINATION
Received return call from [address] at [facility]. She stated that even though they are in Network with patient's insurance they are not accepting it right now . She stated it is due to billing issues. Referral called to [facility] [phone] fax [phone] and spoke with Darlene Lala . She stated that they do usually take [place] and requested for face sheet and insurance card to be faxed to make sure . Face sheet and card faxed.

## 2021-08-13 NOTE — PROGRESS NOTES
PATIENT NAME: Ashton Essex    TODAY'S DATE: 08/13/21    SUBJECTIVE:    Pt is POD # 1 s/p L VATS, pleurex cathter placement. SOB is better. OBJECTIVE:   VITALS:    Vitals:    08/13/21 0945   BP: 114/62   Pulse: 104   Resp: 24   Temp: 98 °F (36.7 °C)   SpO2: 97%     INTAKE/OUTPUT:    Date 08/13/21 0000 - 08/13/21 2359   Shift 8827-7088 4522-4886 3521-6660 24 Hour Total   INTAKE   Shift Total(mL/kg)       OUTPUT   Urine(mL/kg/hr) 100(0.2)   100   Chest Tube 165   165   Shift Total(mL/kg) 265(3.9)   265(3.9)   Weight (kg) 67.3 67.3 67.3 67.3      Patient Vitals for the past 96 hrs (Last 3 readings):   Weight   08/12/21 0411 148 lb 6.4 oz (67.3 kg)   08/11/21 0532 141 lb 3.2 oz (64 kg)       EXAM:  Blood pressure 114/62, pulse 104, temperature 98 °F (36.7 °C), temperature source Oral, resp. rate 24, height 5' 1\" (1.549 m), weight 148 lb 6.4 oz (67.3 kg), last menstrual period 04/08/2018, SpO2 97 %, not currently breastfeeding. General appearance: No apparent distress, appears stated age and cooperative. Skin: unremarkable  HEENT Normocephalic, atraumatic without obvious deformity. Neck: Supple, Trachea midline   Lungs: Good respiratory effort.  Clear to auscultation, bilaterally, CT with serous drainage   Heart: Regular rate/ rhythm inc c/d/i  Abdomen: Soft, non-tender or non-distended   Extremities: min edema warm well perfused  Neurologic: Alert, grossly intact  Mental status: normal affect      Data:  CBC:   Recent Labs     08/11/21  0324 08/12/21  0136 08/13/21  0303   WBC 2.9* 3.8* 6.6   HGB 8.3* 8.1* 7.8*   HCT 27.4* 27.3* 26.2*    397 457*     BMP:    Recent Labs     08/11/21  0324 08/12/21  0136 08/13/21  0303    140 139   K 4.8 4.7 5.1    107 107   CO2 18* 19* 17*   BUN 72* 69* 69*   CREATININE 3.0* 2.8* 3.0*   GLUCOSE 93 89 152*     Hepatic:   Recent Labs     08/11/21  0324 08/12/21  0136 08/13/21  0303   AST 37 37 40*   ALT <5* <5* <5*   BILITOT 0.2 0.2 0.2   ALKPHOS 63 71 72 Mag:    No results for input(s): MG in the last 72 hours. Phos:   No results for input(s): PHOS in the last 72 hours. INR: No results for input(s): INR in the last 72 hours. Radiology Review:  CXR  Impression:     1. Left chest tube with a small left apical pneumothorax without significant   change. 2. Minimal left basilar opacity without significant change. 3. Improving pulmonary vascular congestion. ASSESSMENT AND PLAN:    Patient Active Problem List   Diagnosis    Left breast mass    Secondary cancer of bone ([address]    Essential hypertension    Malignant neoplasm of central portion of left female breast ([address]    Metastatic breast cancer (HCC)    Dehydration    Iron deficiency anemia    Malabsorption    Left ovarian enlargement    ELIDIA (acute kidney injury) ([address]    Hyperkalemia    Metabolic acidosis    Hyponatremia    Tachycardia    Acute and chronic respiratory failure (acute-on-chronic) (HCC)    COVID-19    Acute renal failure with tubular necrosis (HCC)    Bilateral hydronephrosis    Edema leg    Hypervolemia    Hypocalcemia    Pneumothorax    Stage 4 chronic kidney disease (HCC)    Obstructive uropathy       S/P L VATS, pleurex catheter placement    CT to suction, when she is ready for DC the pleurex catheter can be capped. CM working on [address] and pleurex supplies. cx NGTD.      Rema Singletary PA-C

## 2021-08-13 NOTE — ADT AUTH CERT
Pneumothorax - Care Day 10 (8/12/2021) by Luis Alfredo Padron RN       Review Status Review Entered   Completed 8/13/2021 13:49      Criteria Review      Care Day: 10 Care Date: 8/12/2021 Level of Care: Inpatient Floor    Guideline Day 2    Level Of Care    (X) Floor    Clinical Status    (X) * Hypotension absent    (X) * Pain absent or managed    (X) * Respiratory distress absent    ( ) * Chest tube air leak absent    ( ) * CXR shows resolution or improvement of pneumothorax    * Milestone   Additional Notes   8/12         Scheduled Medications   · furosemide 80 mg Oral BID   · calcitRIOL 1 mcg Oral BID   · calcium carbonate 1,000 mg Oral TID WC   · pantoprazole 40 mg Oral QAM AC   · fluticasone 1 spray Each Nostril Daily   · magnesium oxide 400 mg Oral Daily   · famotidine (PEPCID) injection 20 mg Intravenous Once   · cetirizine 10 mg Oral Daily   · metoprolol succinate 100 mg Oral Daily   · sodium bicarbonate 650 mg Oral BID   · sodium chloride flush 5-40 mL Intravenous 2 times per day          Infusions:    Infusions Meds   · sodium chloride 50 mL/hr at 08/12/21 0731   · sodium chloride 25 mL (08/05/21 1436)                  Objective:   Vitals: BP (!) 114/59   Pulse 107   Temp 99.1 °F (37.3 °C)   Resp 22           Operative Note           Patient: Poli Medellin   YOB: 1965   MRN: [id number]       DATE OF SURGERY: 08/12/21       PREOPERATIVE DIAGNOSIS: Recurrent Pleural Effusion, Trapped Lung, Metastatic Breast Cancer        POSTOPERATIVE DIAGNOSIS: same       SURGEON: Anabell Hatch MD       ASST: Daniel Caban PA-c        OPERATION: L VATS,  Partial decortication, PleurX catheter placement       TISSUES REMOVED: pleural fluid        DRAINS: One PleurX catheter        ANESTHESIA: General with double-lumen tube.        POSITION: Right lateral decubitus, left side up.        FINDINGS: Trapped lung, fungating mass noted superior to prior CT site.         DESCRIPTION OF OPERATION: The patient was brought to the operating room and intubated with a double-lumen tube. Adequate placement was verified by bronchoscopy by the anesthesia department and myself. Tim Gone chest tube was removed.         The patient was then turned in the right lateral decubitus position on the beanbag with the bed flexed. The ET tube placement was then reconfirmed for adequate positioning of the double-lumen tube. The patient was then prepped and draped in that position.        The trocar sites were injected with local anesthetic. A 5 mm port was placed mid axillary  in approximately the sixth intercostal space under direct visualization.         The lung was examined. There were adhesions and extensive evidence of metastatic disease.  Effusion was drained and samples sent for culture.   The lung was reiniflated with a valsalva.  The lower lobe did not inflate.  Therefore we proceeded with pleurX catheter placement.          An incision was made anteriorly.  The catheter was tunneled posteriorly and inserted into the chest via the trocar site.          Thetube was secured with silk suture. The port site was closed with UR6 for subcutaneous and a 4-0 Monocryl was used for the subcuticular suture. Sterile dressings were placed over the incisions. The patient tolerated the procedure well.  Instrument and sponge counts were correct at the conclusion of the case.       Estimated Blood Loss (mL): Minimal       Complications: None       Specimens:    ID Type Source Tests Collected by Time Destination   1 : mucous trap 1 - fluid analysis Body Fluid Fluid CULTURE, BODY FLUID Ty Fermin MD 8/12/2021 0781     2 : mucous trap 2 - for culture - aerobic, anaerobic, fungal, AFB Body Fluid Fluid CULTURE, BODY FLUID Ty Fermin MD 8/12/2021 0835             Implants:   * No implants in log *        Drains:    Chest Tube 2 Left Other (Comment) 20 Estonian (Active)   $ Chest tube insertion $ Yes 08/07/21 1715   Suction -20 cm H2O 08/11/21 2027 Chest Tube Airleak No 08/12/21 0356   Drainage Description Serous 08/12/21 0356   Dressing Status Clean;Dry; Intact 08/12/21 0356   Site Assessment Clean;Dry; Intact 08/12/21 0356   Surrounding Skin Dry; Intact 08/12/21 0356   Output (ml) 175 ml 08/11/21 1854       [REMOVED] Chest Tube 1 Left Pleural 8 [place] (Removed)   Suction To water seal 08/09/21 2045   Chest Tube Airleak No 08/10/21 0917   Drainage Description Other (Comment) 08/10/21 0917   Dressing Status Clean;Dry; Intact 08/10/21 0917   Dressing Type Dry dressing 08/10/21 0917   Site Assessment Clean;Dry; Intact 08/10/21 0917   Surrounding Skin Unable to view 08/10/21 0917   Output (ml) 0 ml 08/09/21 2045       [REMOVED] Urethral Catheter Non-latex 16 fr (Removed)       [REMOVED] Urethral Catheter Non-latex;Straight-tip 16 fr (Removed)       [REMOVED] Urethral Catheter Non-latex 16 fr (Removed)            Medicine:   August 12: Appreciate thoracic surgery consult, procedure above done   On August 12 I spoke with nephrology and stop fluids   She is on IV paclitaxel as an outpatient   August 12-stop fluids today         Pneumothorax - Care Day 9 (8/11/2021) by Renita Spatz, RN       Review Status Review Entered   Completed 8/13/2021 13:39      Criteria Review      Care Day: 9 Care Date: 8/11/2021 Level of Care: Inpatient Floor    Guideline Day 2    Level Of Care    (X) Floor    Clinical Status    (X) * Hypotension absent    8/13/2021 1:39 PM EDT by [facility]      Vitals: /69   Pulse 96   Temp 98.1 °F (36.7 °C) (Oral)   Resp 21    (X) * Pain absent or managed    (X) * Respiratory distress absent    ( ) * Chest tube air leak absent    ( ) * CXR shows resolution or improvement of pneumothorax    * Milestone   Additional Notes   8/11      Physical Exam:    Vitals: /69   Pulse 96   Temp 98.1 °F (36.7 °C) (Oral)   Resp 21           General appearance: in no acute distress, appears stated age   Skin: Skin color, texture, turgor normal. No Review Entered   Completed 8/13/2021 13:24      Criteria Review      Care Day: 8 Care Date: 8/10/2021 Level of Care: Inpatient Floor    Guideline Day 2    Level Of Care    (X) Floor    Clinical Status    (X) * Hypotension absent    8/13/2021 1:24 PM EDT by Jacqui Colvin      Vitals: BP (!) 115/59   Pulse 107   Temp 98.3 °F (36.8 °C) (Oral)   Resp 19    (X) * Pain absent or managed    (X) * Respiratory distress absent    ( ) * Chest tube air leak absent    ( ) * CXR shows resolution or improvement of pneumothorax    * Milestone   Additional Notes   8/10      Chest X-ray on August 10, 2021 showed   Left-sided chest tubes in place. Trace left focal pneumothorax. Increased lung markings bilaterally, may be related to mild pulmonary    vascular congestion versus bronchitis or early pneumonia.        She has dry mouth.  I recommend to drink enough fluid. Denied any pain or shortness of breath. Chest tube noted.  No nausea, vomiting or diarrhea.    She was seen by palliative care and . [facility] surgeon stated that she will likely need Pleurx catheter placement.       PHYSICAL EXAM        CONSTITUTIONAL: awake, alert, cooperative, no apparent distress    EYES: pupils equal, round and reactive to light, sclera clear and conjunctiva pallor   ENT: Normocephalic, without obvious abnormality, atraumatic   NECK: supple, symmetrical, no jugular venous distension and no carotid bruits    HEMATOLOGIC/LYMPHATIC: no cervical, supraclavicular or axillary lymphadenopathy    LUNGS: Fair air entry bilaterally, decreased breath sound to the left lung.  Chest tubes noted to the left chest.    CARDIOVASCULAR: regular rate and rhythm, normal S1 and S2, no murmur noted   ABDOMEN: normal bowel sound, soft, non-distended, non-tender, no masses palpated, no hepatosplenomegaly    MUSCULOSKELETAL: full range of motion noted, tone is normal   NEUROLOGIC: awake, alert, oriented to name, place and time.  Cranial nerves II through XII are grossly intact.     SKIN: No jaundice.  No petechial rash. EXTREMITIES: b/l LE edema. No cyanosis.  SCD noted         Recent Labs     08/08/21   0120 08/09/21   0343 08/10/21   0456   WBC 3.5* 2.5* 2.5*   HGB 8.4* 8.2* 8.3*    283 291       BMP:     Recent Labs     08/08/21   0120 08/09/21   0343 08/10/21   0456    138 141   K 5.0 4.9 4.8    107 107   CO2 20* 19* 18*   BUN 75* 75* 77*   CREATININE 3.1* 3.0* 3.1*   GLUCOSE 92 92 80            Hematology:   ASSESSMENT/RECOMMENDATION           1.  She has metastatic breast cancer.    She had Taxol on August 5, 2021.  So far she has been tolerating it well.  I may consider to give another Taxol on August 12, 2021. [facility] on August 10, 2021 was reviewed.  Creatinine was stable at 3.1.    5.  Status post left thoracentesis.  She has pneumothorax.  Pulmonologist on board. Cytology is consistent with metastatic breast cancer.  Thoracic surgeon has been on board also.  She may need Pleurx catheter placement. Medicine:Assessment and Plan:                   1. LEFT PNEUMOTHORAX   -S/P replacement with larger chest tube, lung has reinflated and it continues to drain.    -August 10: Pigtail catheter removed, to new large bore CT tube to suction       2. LEFT PLEURAL EFFUSION   -recurrent malignant, see above, continue to drain. Currently patient had wished to continue with treatment, suspect effusion will continue. -CT surgery mentions that she will likely need a Pleurx catheter       3. 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 CKD 4   -Monitor BMP       5. HYPOCALCEMIA   -treatment as per endocrinology       6. METASTATIC BREAST CANCER   -as noted.  Care per oncology       Fluids:   -August 10: Normal saline at 50 mL/h per Dr. Su Caballero since 8/7   -Consider stopping IV fluids       Chronic kidney disease             Endo:   Patient serum calcium is getting low again.  Monitor BMP   1. Reviewed notes cardiac thoracic surgeon so oncology notes   2. Thoracentesis fluid cytology is consistent with metastatic breast cancer         Pulm:   Left Malignant Pleural effusion s/p left chest tube   Iatrogenic left pneumothorax s/p left chest tube- resolving   ? Entrapped lung   Metastatic left breast ca   CKD   AG Metabolic acidosis   Bilateral Hydronephrosis s/p bilateral stent   Large left Hydropneumothorax           1. CT per CTS   2. If still air leak after clamping the left chest tube, patient may need Pleurodesis   3. Inhalers   4. Keep sats > 92%   5. ICS   6. OOB   7. C/w present management            Cardio Thoracic:   L malignant pleural effusion s/p CT placement.        PLAN:        Total CT ouput 500 cc/ 24 hrs. Pigtail catheter removed. Continue large bore CT so suction. There is a small intermittent air leak noted.     Will likely need pleurex catheter placement.              Pneumothorax - Care Day 7 (8/9/2021) by Kacy Navarro RN       Review Status Review Entered   Completed 8/13/2021 13:15      Criteria Review      Care Day: 7 Care Date: 8/9/2021 Level of Care: Inpatient Floor    Guideline Day 2    Level Of Care    (X) Floor    Clinical Status    (X) * Hypotension absent    8/13/2021 1:15 PM EDT by Lexis Zavala      Vitals: /60   Pulse 95   Temp 98.9 °F (37.2 °C) (Axillary)   Resp 20    (X) * Pain absent or managed    (X) * Respiratory distress absent    (X) * Chest tube air leak absent    ( ) * CXR shows resolution or improvement of pneumothorax    * Milestone   Additional Notes   8/9      Scheduled Medications   · magnesium oxide 400 mg Oral Daily   · calcitRIOL 0.5 mcg Oral BID   · calcium carbonate 750 mg Oral TID WC   · famotidine (PEPCID) injection 20 mg Intravenous Once   · cetirizine 10 mg Oral Daily   · furosemide 80 mg Oral Daily   · metoprolol succinate 100 mg Oral Daily   · sodium bicarbonate 650 mg Oral BID   · sodium chloride flush 5-40 mL Intravenous 2 times per day          Infusions:    Infusions Meds   · sodium chloride 50 mL/hr at 08/08/21 2149   · sodium chloride 25 mL (08/05/21 1436)         Endo :   Plan:       1. Reviewed POC blood glucose . Labs and X ray results    2. Reviewed Current Medicines    3. Continue on PO Tums and Calcirol    4. Modify the dose as needed   5. Left-sided lung expanded after placing the second being at the chest tube   6. Reviewed notes cardiac thoracic surgeon   7. Will follow              Hematology:   Status post left thoracentesis.  Pleural fluid was an exudate with LDH of 1141.  1400 cc of fluid was removed. Cytology was positive for metastatic breast cancer. She had Taxol on August 5, 2021.     No acute complaint today.     She still has the chest tube.  Resting comfortably.  Denied any chest pain or shortness of breath. ASSESSMENT/RECOMMENDATION           1.  She has metastatic breast cancer.    She had Taxol on August 5, 2021.  So far she has been tolerating it well.       2.  She has hydronephrosis related to metastatic breast cancer. Doroteo Morning will need renal stent replacement.  She has cystoscopy with bilateral renal stent replacement on August 5, 2021.       3.  She has chronic kidney disease.  She has been followed by nephrologist.    She has chronic anemia related to malignancy and chemotherapy.  I will continue to monitor CBC.  To transfuse as needed.       4.  She is on magnesium and calcium supplementation.  She is on Lasix for bilateral lower extremity edema.       5.  Status post left thoracentesis.  She has pneumothorax.  Pulmonologist on board. Cytology is consistent with metastatic breast cancer.       To continue with PT/OT. Medicine:   Assessment and Plan:   Eric Bangura is a 50 y. oLinwood Rings presents with <principal problem not specified>       1. LEFT PNEUMOTHORAX   -S/P replacement with larger chest tube, lung has reinflated and it continues to drain.  Will continue tube       2. LEFT PLEURAL EFFUSION   -recurrent malignant, see above, continue to drain. Currently patient had wished to continue with treatment, suspect effusion will continue. Patient will need to discuss treatment options with CT surgery       3. 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 CKD 4   -sec to above stable, will monitor       5. HYPOCALCEMIA   -treatment as per endocrinology       6. METASTATIC BREAST CANCER   -as noted. Care per oncology            Pulm:   The patient is lying in the bed. She is not in acute resp distress. She has good output from the chest tube. She still has air leak. She is sleepy but arousable.       Left Malignant Pleural effusion s/p left chest tube   Iatrogenic left pneumothorax s/p left chest tube- resolved   ? Entrapped lung   Metastatic left breast ca   CKD   AG Metabolic acidosis   Bilateral Hydronephrosis s/p bilateral stent   Large left Hydropneumothorax           1. CT per CTS   2. May need pleurx if there is recurrent left pleural effusion   3. Inhalers   4. Keep sats > 92%   5. C/w present management   No follow-ups on file            Palliative care:   Initial consult for Palliative care. Patient is resting in bed and arouses to name. Patient is lethargic and closes her eyes intermittently during conversation. She is oriented to person, place and situation. Inquired about Goals of care. Patient states that she intends on continuing with chemo therapy and wants to do everything possible. I had asked if she had discussed with her children about her wishes if her breathing continued to worsen or if her heart were to stop would she want to be put on a ventilator or have CPR. It was explained to her that with her terminal Illness the out come of a code situation would most likely not be good and the alternative would be to treat her symptoms with medications and allow her to have a peaceful death . The patient was very Adamant and stated \"resuscitate me \". I informed her that we would respect her wishes and asked if she had ACP docs. She does not , but would like to complete them while she is here. Spoke with patient's daughter Meri via telephone whom stated she will be in here soon, Meri would like to meet in person with her mom and myself to discuss goals of care. Meri is supportive of her moms wishes she just would like some clarification as to how far and how much her mom wants to go through. Will follow up later today         Cardio thoracic:   PLAN:        Total CT ouput 700 cc/ 24 hrs. Pigtail catheter clamped. Continue large bore CT so suction.     Plan to remove pigtail tomorrow am.    Will likely need pleurex catheter placement.           Pneumothorax - Care Day 6 (8/8/2021) by Chrissy Kay RN       Review Status Review Entered   Completed 8/13/2021 13:11      Criteria Review      Care Day: 6 Care Date: 8/8/2021 Level of Care: Inpatient Floor    Guideline Day 2    Level Of Care    (X) Floor    Clinical Status    ( ) * Hypotension absent    8/13/2021 1:11 PM EDT by Yodit Hou      Vitals: BP (!) 87/55   Pulse 96   Temp 98.5 °F (36.9 °C) (Oral)   Resp 18    (X) * Pain absent or managed    (X) * Respiratory distress absent    (X) * Chest tube air leak absent    8/13/2021 1:11 PM EDT by Yodit Hou      Continue chest tube to underwater seal and suction    ( ) * CXR shows resolution or improvement of pneumothorax    * Milestone   Additional Notes   8/8         After placing this second bigger tube left lung is expanded also has some patchy airspace disease ammonia versus pulmonary edema         Scheduled Medications   · magnesium oxide 400 mg Oral Daily   · calcitRIOL 0.5 mcg Oral BID   · calcium carbonate 750 mg Oral TID WC   · famotidine (PEPCID) injection 20 mg Intravenous Once   · cetirizine 10 mg Oral Daily   · furosemide 80 mg Oral Daily   · metoprolol succinate 100 mg Oral Daily   · sodium bicarbonate 650 mg Oral BID   · sodium chloride flush 5-40 mL Intravenous 2 times per day          Infusions:    Infusions Meds   · sodium chloride 50 mL/hr at 08/08/21 0138   · sodium chloride 25 mL (08/05/21 1436)                  Objective:   Vitals: BP (!) 87/55   Pulse 96   Temp 98.5 °F (36.9 °C) (Oral)   Resp 18        Lab Results   Component Value Date     WBC 3.5 (L) 08/08/2021     HGB 8.4 (L) 08/08/2021     HCT 28.8 (L) 08/08/2021     MCV 93.8 08/08/2021      08/08/2021       Lab Results   Component Value Date     CREATININE 3.1 (H) 08/08/2021     BUN 75 (H) 08/08/2021      08/08/2021     K 5.0 08/08/2021      08/08/2021     CO2 20 (L) 08/08/2021             Endocrinology:   Plan:       1. Reviewed POC blood glucose . Labs and X ray results    2. Reviewed Current Medicines    3. Continue on PO Tums and Calcirol    4. Modify the dose as needed   5. Left-sided lung expanded after placing the second being at the chest tube   6. Reviewed notes cardiac thoracic surgeon   7. Will follow            Pulm:   ASSESMENT   Left pl effusion   Left ptx   Metastatic left breast ca               PLAN   1. Chest tube drainage   2. Cpm         Medicine:   Assessment and Plan:   Drake Guadalupe is a 50 y. oOliva Josh presents with <principal problem not specified>       1. LEFT PNEUMOTHORAX   -S/P replacement with larger chest tube, lung re inflated, will continue with current care       2. LEFT PLEURAL EFFUSION   -recurrent malignant, see above, 1200ml removed with placement of new tube. Continue suction       3. 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 CKD 4   -sec to above stable, will monitor       5. HYPOCALCEMIA   -treatment as per endocrinology       6.  METASTATIC BREAST CANCER   -as noted, patient is continuing chemo.              Cardio Thoracic:   PLAN:        Continue chest tube to underwater seal and suction   Continue serial chest x-rays

## 2021-08-13 NOTE — PROGRESS NOTES
Nephrology Progress Note        [address], [address] [address]  Phone: [phone]  Office Hours: 8:30AM - 4:30PM  Monday - Friday 8/13/2021 7:29 AM  Subjective:   Admit Date: 8/3/2021  PCP: CINDY Perez NP  Interval History: UOP unclear  Doing ok  S/p vats yesterday    Diet: ADULT DIET; Regular      Data:   Scheduled Meds:   citric acid-sodium citrate  30 mL Oral 4x Daily    furosemide  80 mg Oral BID    calcitRIOL  1 mcg Oral BID    calcium carbonate  1,000 mg Oral TID WC    pantoprazole  40 mg Oral QAM AC    fluticasone  1 spray Each Nostril Daily    magnesium oxide  400 mg Oral Daily    famotidine (PEPCID) injection  20 mg Intravenous Once    cetirizine  10 mg Oral Daily    metoprolol succinate  100 mg Oral Daily    sodium bicarbonate  650 mg Oral BID    sodium chloride flush  5-40 mL Intravenous 2 times per day     Continuous Infusions:   sodium chloride 25 mL (08/05/21 1436)     PRN Meds:acetaminophen, HYDROmorphone, HYDROcodone-acetaminophen, LORazepam, prochlorperazine, sodium chloride flush, sodium chloride, ondansetron **OR** ondansetron, polyethylene glycol, acetaminophen **OR** acetaminophen  I/O last 3 completed shifts: In: 2594 [P.O.:945; I.V.:650]  Out: 270 [Urine:100; Blood:5; Chest Tube:165]  No intake/output data recorded.     Intake/Output Summary (Last 24 hours) at 8/13/2021 0729  Last data filed at 8/13/2021 0649  Gross per 24 hour   Intake 1595 ml   Output 270 ml   Net 1325 ml       CBC:   Recent Labs     08/11/21 0324 08/12/21 0136 08/13/21  0303   WBC 2.9* 3.8* 6.6   HGB 8.3* 8.1* 7.8*    397 457*       BMP:    Recent Labs     08/11/21 0324 08/12/21 0136 08/13/21  0303    140 139   K 4.8 4.7 5.1    107 107   CO2 18* 19* 17*   BUN 72* 69* 69*   CREATININE 3.0* 2.8* 3.0*   GLUCOSE 93 89 152*     Hepatic:   Recent Labs     08/11/21  0324 08/12/21  0136 08/13/21  0303   AST 37 37 40*   ALT <5* <5* <5*   BILITOT 0.2 0.2 0.2   ALKPHOS 63 71 72       Objective:   Vitals: /67   Pulse 102   Temp 97.4 °F (36.3 °C) (Oral)   Resp 21   Ht 5' 1\" (1.549 m)   Wt 148 lb 6.4 oz (67.3 kg)   LMP 04/08/2018   SpO2 98%   BMI 28.04 kg/m²   General appearance: alert and cooperative with exam, in no acute distress  HEENT: normocephalic, atraumatic,   Neck: supple, trachea midline  Lungs: breathing comfortably on nc  Heart[de-identified] regular rate and rhythm, S1, S2 normal,  Extremities: ble edema  Neurologic: alert, oriented, follows commands, interactive    Assessment and Plan:     Patient Active Problem List     Diagnosis Date Noted    Pneumothorax 08/03/2021    Edema leg 06/23/2021    Hypervolemia 06/23/2021    Hypocalcemia 06/23/2021    Acute renal failure with tubular necrosis ([address] 06/10/2021    Bilateral hydronephrosis      COVID-19 05/12/2021    Acute and chronic respiratory failure (acute-on-chronic) ([address] 05/10/2021    Tachycardia      ELIDIA (acute kidney injury) ([address] 03/22/2021    Hyperkalemia      Metabolic acidosis      Hyponatremia      Left ovarian enlargement 01/26/2021    Iron deficiency anemia 01/07/2021    Malabsorption 01/07/2021    Dehydration 11/20/2020    Malignant neoplasm of central portion of left female breast ([address] 07/31/2020    Metastatic breast cancer ([address] 07/31/2020    Secondary cancer of bone ([address] 04/01/2020    Essential hypertension 04/01/2020    Left breast mass        Cr stable at 3, could be her new baseline  S/p b/l ureteral stents exchange  S/p left vats and pleurex cath on 8/12/21  Continue diuretics, will give iv today  Continue po bicarb but give few doses of bicitra today as remains acidotic                         Electronically signed by Everton Klein DO on 8/13/2021 at 7:29 AM    [facility]  MD Redd Rodriguez DO  [facility] [address]  [place], [place]  PHONE: [phone]  FAX: [phone]

## 2021-08-13 NOTE — PROGRESS NOTES
Occupational Therapy  Occupational Therapy Treatment Note    Date:  2021   Room:  [address] [place] [address]    Danielle Amaya :   1965   MRN: [id number] Admission Date: 8/3/2021     Diagnosis:  The encounter diagnosis was Malignant neoplasm of central portion of left breast in female, estrogen receptor positive ([facility] [address] Restrictions/Precautions:                     Communication with other providers:  RN    Subjective:  Patient states: \"I am weak\"  Pain:   Location, Type, Intensity (0/10 to 10/10): None stated    Objective:    Orientation: WFL  Observation: Pt received in bed. Alert and cooperative  Objective Measures:  VSS    Treatment, including education:  Therapeutic Activity Training:   Therapeutic activity training was instructed today. Cues were given for safety, sequence, UE/LE placement, visual cues, and balance. Supine to sit: MaxA for trunk upright, leg mobility and cues for hand placement c HOB elevated using log roll technique    Sit to stand: CGA 2 reps EOB to RW, very effortful and quickly fatiguing with increased time    Stand to sit: CGA 2 reps to EOB    Dressing   LB: DEP for mary jane/doff of socks    Sit to supine: MaxA for trunk and leg mobility    Assessment / Impression:      Patient's tolerance of treatment:  Limited d/t endurance and quick fatigue   Significant change in status and impact:  None  Barriers to improvement:  Metastatic breast cancer  Recommendations: SNF    Plan for Next Session:    Cont POC addressing goals:    By d/c or goals met:  Pt will perform all bed mobility with ModA in prep for EOB/OOB activity. Pt will perform all functional transfers with SBA and appropriate use of LRD to bed, toilet, chair in prep for increased functional independence. Pt will perform UB ADLs with CGA to increase functional independence. Pt will perform LB ADLs with ModA to increase functional independence.     Pt will perform all aspects of toileting with Diaz to increase functional independence. Pt will participate in therex/therax c emphasis on strength, activity tolerance,  safety, YAMILA tasks.       Safety: Left in bed with all needs in reach. Gait belt used for transfer and mobility.       Time in:  940  Time out:  1005  Timed treatment minutes:  25  Total treatment time:  25    Electronically signed by:    Jeanie Rodriguez S/OT    LISANDRO George/STEVIE

## 2021-08-13 NOTE — CARE COORDINATION
Confirmed with Himanshu Becker from [facility] that they can accept patient for Kamran 78. Plan for patient to discharge to home tomorrow. CM faxed Kamran 78 order , and H/P to [facility] .    Will need to notify [facility] on discharge tomorrow at [phone] and fax AVS/Discharge order to [phone]

## 2021-08-13 NOTE — CARE COORDINATION
Received return call from Jerica at [facility]. She stated that they cannot accept referral due to patient being out of their service area. Orders for PleurX faxed to [facility] [place] [facility] and 4 drainage containers delivered to patient's room from [facility] for patient to use at home.  Notified patient's nurse Araceli Douglas that the containers are in patient's room

## 2021-08-13 NOTE — CARE COORDINATION
Phoned [facility] to check on referral . Had to leave message with  and she stated that she will have [facility] in intake return call to JERRY

## 2021-08-13 NOTE — CARE COORDINATION
Phoned [facility] to make sure fax went through and to see if they can accept patient and had to leave a VM.

## 2021-08-13 NOTE — PROGRESS NOTES
Dr Ilya Brito has requested inpatient chemotherapy today. Email sent to hospital team and pharmacy to arrange chemotherapy. The hospitalist talked with Dr Ilya Brito and a decision was made to delay chemotherapy until next week. Patient may be discharged form [facility] and will need a f/u OV and treatment at [facility] if discharged. Informed [facility].

## 2021-08-14 PROBLEM — J91.0 PLEURAL EFFUSION, MALIGNANT: Status: ACTIVE | Noted: 2021-01-01

## 2021-08-14 NOTE — PROGRESS NOTES
Hospitalist Progress Note      Name:  Troy Whitfield /Age/Sex: 1965  (50 y.o. female)   MRN & CSN:  [id number] & [id number] Admission Date/Time: 8/3/2021  1:46 PM   Location:  [address] [place] [address] PCP: Rashi Weaver 15 Day: 11    Assessment and Plan:     Surgery input appreciated:    DATE OF SURGERY: 21      DIAGNOSIS: Recurrent Pleural Effusion, Trapped Lung, Metastatic Breast Cancer         SURGEON: Kimberlee Cruz MD     ASST: Alina Unger PA-c      OPERATION: L VATS,  Partial decortication, PleurX catheter placement     TISSUES REMOVED: pleural fluid      DRAINS: One PleurX catheter      ANESTHESIA: General with double-lumen tube. FINDINGS: Trapped lung, fungating mass noted superior to prior CT site. LEFT recurrent pleural effusion, trapped lung, hydropneumothorax  -S/P replacement with larger chest tube, lung has reinflated and it continues to drain.   -August 10: Pigtail catheter removed, to new large bore CT tube to suction  -: Left VATS and PleurX catheter placement planned tomorrow at 7:30 AM  -: Spoke to . The patient will have everything set up and ready to go home tomorrow     [facility]  -S/P bilateral ureteral stents. CKD 4  -On  I spoke with nephrology and stop fluids  -Monitor BMP. Continue sodium bicarb for acidosis. HYPOCALCEMIA  -treatment as per endocrinology  -Continue calcitriol and calcium carbonate     METASTATIC BREAST CANCER  -as noted. Care per oncology  -Discussed with oncology on .   -She is on IV paclitaxel as an outpatient  -She did receive paclitaxel during the  days that she was here, and will receive her next dose as an outpatient    Fluids:  -August 10: Normal saline at 50 mL/h per   --stop fluids today      Subjective:     August 3: Discussed that the interdisciplinary meeting today.   When I saw her she states that she would be ready to go home tomorrow. She had the video thoracoscopy yesterday. August 12: I communicated with nephrology today, and was advised to stop fluids. This was done.  -She was supine in bed when I saw her after surgery. She wanted ice cream.  Spoke to the nursing staff. August 11. She denied complaints when I saw her  -Discussed at the interdisciplinary meeting today. August 10:    Patient came in for a thoracentesis ordered by Dr. Erick Shaikh and had 1400 mL of yellow fluid drained. She developed a left pneumothorax and was directly admitted    She has breast cancer and is on palliative chemotherapy with paclitaxel. She had paclitaxel while here on 8/5    She has a nonobstructive uropathy and has bilateral ureteral stents, she had the stents exchanged on 8/5 while here    Per thoracic surgery on 8/9: Pigtail catheter clamped, continue large-bore CT to suction, plan to remove pigtail tomorrow, will need PleurX catheter placement    Objective: Intake/Output Summary (Last 24 hours) at 8/13/2021 2229  Last data filed at 8/13/2021 1617  Gross per 24 hour   Intake 111 ml   Output 750 ml   Net -639 ml      Vitals:   Vitals:    08/13/21 1617   BP: 104/67   Pulse: 94   Resp: 20   Temp: 98 °F (36.7 °C)   SpO2: 96%         Labs:   CBC:   Lab Results   Component Value Date    WBC 6.6 08/13/2021    HGB 7.8 08/13/2021    HCT 26.2 08/13/2021    MCV 95.6 08/13/2021     08/13/2021     BMP:   Lab Results   Component Value Date     08/13/2021    K 5.1 08/13/2021     08/13/2021    CO2 17 08/13/2021    PHOS 5.2 03/23/2021    BUN 69 08/13/2021    CREATININE 3.0 08/13/2021    CALCIUM 7.0 08/13/2021       Physical Exam:      Physical Exam  HENT:      Nose: No rhinorrhea. Eyes:      General:         Right eye: No discharge. Left eye: No discharge. Pulmonary:      Effort: Pulmonary effort is normal.   Skin:     Coloration: Skin is not jaundiced.    Neurological:      Mental Status: She is alert. Cranial Nerves: No cranial nerve deficit.            Medications:   Medications:    [START ON 8/14/2021] furosemide  80 mg Oral BID    calcitRIOL  1 mcg Oral BID    calcium carbonate  1,000 mg Oral TID WC    pantoprazole  40 mg Oral QAM AC    fluticasone  1 spray Each Nostril Daily    magnesium oxide  400 mg Oral Daily    cetirizine  10 mg Oral Daily    metoprolol succinate  100 mg Oral Daily    sodium bicarbonate  650 mg Oral BID    sodium chloride flush  5-40 mL Intravenous 2 times per day      Infusions:    sodium chloride 25 mL (08/05/21 1436)     PRN Meds: acetaminophen, 650 mg, Q4H PRN  HYDROmorphone, 0.5 mg, Q3H PRN  HYDROcodone-acetaminophen, 1 tablet, Q4H PRN  LORazepam, 0.5 mg, BID PRN  prochlorperazine, 10 mg, Q8H PRN  sodium chloride flush, 5-40 mL, PRN  sodium chloride, 25 mL, PRN  ondansetron, 4 mg, Q8H PRN   Or  ondansetron, 4 mg, Q6H PRN  polyethylene glycol, 17 g, Daily PRN  acetaminophen, 650 mg, Q6H PRN   Or  acetaminophen, 650 mg, Q6H PRN

## 2021-08-14 NOTE — PROGRESS NOTES
Hospitalist    Plan to dc home tomorrow with [facility]. Patient stated she is ready for that.

## 2021-08-14 NOTE — PROGRESS NOTES
Progress Note( Dr. Shankar Lozano)  8/13/2021  Subjective:   Admit Date: 8/3/2021  PCP: CINDY Fitzgerald NP    Admitted For : Left-sided pneumothorax    Consulted For: Hypocalcemia     Interval History: Feels somewhat better breathing is better though still short of breath  Serum Calcium is now normal     Had Urologic Procedure done  8/5/2021 as noted below   Left Ureteral Stent was exchanged        Partial decortication, PleurX catheter placement none today, 8/12/2021  1 chest tube was placed    Patient serum calcium is getting low again. We will give IV calcium infusion  Continue oral calcium and vitamin D Calcitrol   Monitor BMP    Denies any chest pains,   Yes SOB . Better  Denies nausea or vomiting. Poor appetite  No new bowel or bladder symptoms. Intake/Output Summary (Last 24 hours) at 8/13/2021 2027  Last data filed at 8/13/2021 1617  Gross per 24 hour   Intake 111 ml   Output 750 ml   Net -639 ml       DATA    CBC:   Recent Labs     08/11/21  0324 08/12/21  0136 08/13/21  0303   WBC 2.9* 3.8* 6.6   HGB 8.3* 8.1* 7.8*    397 457*    CMP:  Recent Labs     08/11/21  0324 08/12/21  0136 08/13/21  0303    140 139   K 4.8 4.7 5.1    107 107   CO2 18* 19* 17*   BUN 72* 69* 69*   CREATININE 3.0* 2.8* 3.0*   CALCIUM 7.6* 7.4* 7.0*   PROT 4.4* 4.4* 4.7*   LABALBU 2.5* 2.6* 2.6*   BILITOT 0.2 0.2 0.2   ALKPHOS 63 71 72   AST 37 37 40*   ALT <5* <5* <5*     Lipids:   Lab Results   Component Value Date    CHOL 135 12/11/2019    HDL 54 12/11/2019    TRIG 74 12/11/2019     Glucose:No results for input(s): POCGLU in the last 72 hours.   [id number]:  Lab Results   Component Value Date    LABA1C 6.0 06/10/2021     High Sensitivity TSH:   Lab Results   Component Value Date    TSHHS <0.010 01/07/2021     Free T3: No results found for: FT3  Free T4:No results found for: T4FREE    DATE OF PROCEDURE:  08/05/2021 Dr. Dione Bailey      PREOPERATIVE DIAGNOSIS:  Bilateral hydronephrosis secondary to  metastatic cancer.     POSTOPERATIVE DIAGNOSIS:  Bilateral hydronephrosis secondary to  metastatic cancer.     PROCEDURE:  Cystoscopy, bilateral ureteral stent exchange, left  diagnostic ureteroscopy, and left retrograde pyelogram.       XR CHEST PORTABLE    Result Date: 8/5/2021  EXAMINATION: ONE XRAY VIEW OF THE CHEST 8/4/2021 5:44 am COMPARISON: August 3, 2021 HISTORY: ORDERING SYSTEM PROVIDED HISTORY: Post left chest tube insertion TECHNOLOGIST PROVIDED HISTORY: Reason for exam:->Post left chest tube insertion Reason for Exam: Post left chest tube insertion Acuity: Acute Type of Exam: Subsequent/Follow-up FINDINGS: Interval placement of a left chest tube. No significant interval change of a large pneumothorax. Airspace opacities are identified in the left upper lung zone. Consolidation is seen in the base. Left effusion is also seen. The right lung is grossly clear without focal consolidation or pleural effusion. No pulmonary edema. No new osseous abnormality. Stable appearance of a right-sided MediPort. Status post placement of a left pleural pigtail catheter. No significant interval change of a large pneumothorax. Stable opacities in the left lung with pleural effusion. XR CHEST PORTABLE    Result Date: 8/3/2021  EXAMINATION: ONE XRAY VIEW OF THE CHEST 8/3/2021 10:05 am COMPARISON: CT chest 07/28/2021 HISTORY: ORDERING SYSTEM PROVIDED HISTORY: post left thoracentesis. TECHNOLOGIST PROVIDED HISTORY: SDS #18 Reason for exam:->post left thoracentesis. Reason for Exam: post left thoracentesis. FINDINGS: Right arm port catheter with catheter tip right atrium. Stable cardiomediastinal silhouette. Significant decrease in size of left pleural effusion with probable small residual pleural effusion remaining. Hazy opacification of the left lung with moderate amount of air in the left pleural space compatible with atelectasis secondary to trapped lung.      Significant decrease in size in left pleural cooperative with exam  Neck: no JVD or bruit  Thyroid : Normal lobes   Lungs: Has Vesicular Breath sounds left-sided chest tubes  Partial decortication, PleurX catheter placement 8/12/2021  Heart:  regular rate and rhythm  Abdomen: soft, non-tender; bowel sounds normal; no masses,  no organomegaly  Musculoskeletal: Normal  Extremities: extremities normal, , no edema  Neurologic:  Awake, alert, oriented to name, place and time. Cranial nerves II-XII are grossly intact. Motor is  intact. Sensory is intact. ,  and gait is normal.    Assessment:     Patient Active Problem List:     Left breast mass     Secondary cancer of bone ([address]     Essential hypertension     Malignant neoplasm of central portion of left female breast ([address]     Metastatic breast cancer (HCC)     Dehydration     Iron deficiency anemia     Malabsorption     Left ovarian enlargement     ELIDIA (acute kidney injury) ([address]     Hyperkalemia     Metabolic acidosis     Hyponatremia     Tachycardia     Acute and chronic respiratory failure (acute-on-chronic) (HCC)     COVID-19     Acute renal failure with tubular necrosis (HCC)     Bilateral hydronephrosis     Edema leg     Hypervolemia     Hypocalcemia     Pneumothorax     Partial decortication, PleurX catheter placement 8/12/2021      Plan:     1. Reviewed POC blood glucose . Labs and X ray results   2. Reviewed Current Medicines   3. Continue on PO Tums and Calcirol   4. Modify the dose as needed  5. Left-sided lung expanded after placing the second being at the chest tube  6. Reviewed notes cardiac thoracic surgeon so oncology notes  7. Thoracentesis fluid cytology is consistent with metastatic breast cancer  8. We will give IV calcium infusion  9. Plan to discharge her home with home health care  10. Will follow     .      Bianca Mccoy MD, MD

## 2021-08-14 NOTE — CARE COORDINATION
Received call from Dr. Karyna Morris that he would like to discharge pt today and would like Kamran Ch visit tomorrow as pt going home with filippo SPAIN to [facility]. with [facility], confirmed they can do start of care tomorrow, faxed Kamran Ch order and AVS to [phone].

## 2021-08-14 NOTE — PROGRESS NOTES
Nephrology Progress Note        [address] [address]  Phone: [phone]  Office Hours: 8:30AM - 4:30PM  Monday - Friday 8/14/2021 9:36 AM  Subjective:   Admit Date: 8/3/2021  PCP: CINDY Castellano NP  Interval History:  ml plus  Covering for Dr Nabeel Dunaway  S/p alex yesterday    Diet: ADULT DIET; Regular      Data:   Scheduled Meds:   calcium gluconate IVPB  2,000 mg Intravenous Once    furosemide  80 mg Oral BID    calcitRIOL  1 mcg Oral BID    calcium carbonate  1,000 mg Oral TID WC    pantoprazole  40 mg Oral QAM AC    fluticasone  1 spray Each Nostril Daily    magnesium oxide  400 mg Oral Daily    cetirizine  10 mg Oral Daily    metoprolol succinate  100 mg Oral Daily    sodium bicarbonate  650 mg Oral BID    sodium chloride flush  5-40 mL Intravenous 2 times per day     Continuous Infusions:   sodium chloride 25 mL (08/05/21 1436)     PRN Meds:acetaminophen, HYDROmorphone, HYDROcodone-acetaminophen, LORazepam, prochlorperazine, sodium chloride flush, sodium chloride, ondansetron **OR** ondansetron, polyethylene glycol, acetaminophen **OR** acetaminophen  I/O last 3 completed shifts: In: 111 [I.V.:111]  Out: 935 [Urine:750; Chest Tube:185]  No intake/output data recorded.     Intake/Output Summary (Last 24 hours) at 8/14/2021 0936  Last data filed at 8/14/2021 0028  Gross per 24 hour   Intake 111 ml   Output 935 ml   Net -824 ml       CBC:   Recent Labs     08/12/21 0136 08/13/21  0303 08/14/21  0155   WBC 3.8* 6.6 5.5   HGB 8.1* 7.8* 7.6*    457* 494*       BMP:    Recent Labs     08/12/21 0136 08/13/21  0303 08/14/21  0155    139 139   K 4.7 5.1 4.3    107 103   CO2 19* 17* 19*   BUN 69* 69* 68*   CREATININE 2.8* 3.0* 3.0*   GLUCOSE 89 152* 109*     Hepatic:   Recent Labs     08/12/21  0136 08/13/21  0303 08/14/21  0155   AST 37 40* 38*   ALT <5* <5* <5*   BILITOT 0.2 0.2 0.1   ALKPHOS 71 72 73       Objective:   Vitals: BP (!) 111/56   Pulse 108   Temp 98 °F (36.7 °C) (Oral)   Resp 25   Ht 5' 1\" (1.549 m)   Wt 148 lb 6.4 oz (67.3 kg)   LMP 04/08/2018   SpO2 98%   BMI 28.04 kg/m²   General appearance: alert and cooperative with exam, in no acute distress  HEENT: normocephalic, atraumatic,   Neck: supple, trachea midline  Lungs: breathing comfortably on nc  Heart[de-identified] regular rate and rhythm, S1, S2 normal,  Extremities: ble edema  Neurologic: alert, oriented, follows commands, interactive    Assessment and Plan:     Patient Active Problem List     Diagnosis Date Noted    Pneumothorax 08/03/2021    Edema leg 06/23/2021    Hypervolemia 06/23/2021    Hypocalcemia 06/23/2021    Acute renal failure with tubular necrosis ([address] 06/10/2021    Bilateral hydronephrosis      COVID-19 05/12/2021    Acute and chronic respiratory failure (acute-on-chronic) ([address] 05/10/2021    Tachycardia      ELIDIA (acute kidney injury) ([facility] [address] 03/22/2021    Hyperkalemia      Metabolic acidosis      Hyponatremia      Left ovarian enlargement 01/26/2021    Iron deficiency anemia 01/07/2021    Malabsorption 01/07/2021    Dehydration 11/20/2020    Malignant neoplasm of central portion of left female breast ([address] 07/31/2020    Metastatic breast cancer ([address] 07/31/2020    Secondary cancer of bone ([facility] [address] 04/01/2020    Essential hypertension 04/01/2020    Left breast mass        Cr stable at 3- her new baseline  S/p b/l ureteral stents exchange  S/p left vats and pleurex cath on 8/12/21  Continue diuretics  Continue po bicarb BID                         Electronically signed by Stephanie Canela MD on 8/14/2021 at 9:36 AM    [facility]  MD Shana Herman DO [address]  [place], [place] [address]  PHONE: [phone]  FAX: [phone]

## 2021-08-16 NOTE — TELEPHONE ENCOUNTER
Sujatha from [facility]. called to find out what physician is going to write orders for the drainage of the pleurx cath and supplies, please call [phone]

## 2021-08-17 NOTE — TELEPHONE ENCOUNTER
Sujatha nurse from [facility] [place]. called to find out orders for the drainage of the pleurx cath and supplies. How often and what volume should be drained?   Edyta Martinez  [phone]

## 2021-08-18 NOTE — TELEPHONE ENCOUNTER
Returned call to home health nurse from [facility]. She stated that Dr Tomasa Agnel office provided directions for pleurx drainage. Per nurse patient is to have drainage every 3 days with an order for 500 ml each time. Informed nurse that this will be noted in patient's chart.

## 2021-08-20 PROBLEM — J98.19 TRAPPED LUNG: Status: ACTIVE | Noted: 2021-01-01

## 2021-08-20 NOTE — CARE COORDINATION
LSW received a call from Pt's daughter regarding Pt's Monday appointment at [facility]. Pt is recently released from the hospital and daughter states is too weak to get out of bed. She requested help with arranging transportation but ERIC's attempt was unsuccessful. GORDOW spoke to the  for Medical Oncology. Since this is an office visit with Pt's oncologist it was determined a telehealth visit could be an option. GORDOW spoke to Pt's daughter and she was in agreement.  will call Pt's daughter on Monday to make the arrangements.

## 2021-08-20 NOTE — PROGRESS NOTES
Prior to discharge, the After Visit Summary Discharge Instructions were reviewed with the patient. She was offered a printed version of the AVS, but declined the offer.      Orders Placed This Encounter   Medications    leuprolide (LUPRON) injection 7.5 mg    zoledronic acid (ZOMETA) 4 mg in sodium chloride 0.9 % 100 mL IVPB    sodium chloride flush 0.9 % injection 10 mL Name band;

## 2021-08-21 NOTE — PROGRESS NOTES
Progress Note( Dr. Dima Ruiz)  8/21/2021  Subjective:   Admit Date: 8/3/2021  PCP: CINDY Rodriguez NP    Admitted For : Left-sided pneumothorax    Consulted For: Hypocalcemia     Interval History: Feels somewhat better breathing is better though still short of breath  Serum Calcium is now normal     Had Urologic Procedure done  8/5/2021 as noted below   Left Ureteral Stent was exchanged        Partial decortication, PleurX catheter placement none today, 8/12/2021  1 chest tube was placed    Patient serum calcium is getting low again. We will give IV calcium infusion  Continue oral calcium and vitamin D Calcitrol   Monitor BMP    Denies any chest pains,   Yes SOB . Better  Denies nausea or vomiting. Poor appetite  No new bowel or bladder symptoms. No intake or output data in the 24 hours ending 08/21/21 1631    DATA    CBC:   No results for input(s): WBC, HGB, PLT in the last 72 hours. CMP:  No results for input(s): NA, K, CL, CO2, BUN, CREATININE, CALCIUM, PROT, LABALBU, BILITOT, ALKPHOS, AST, ALT in the last 72 hours. Invalid input(s): GLU  Lipids:   Lab Results   Component Value Date    CHOL 135 12/11/2019    HDL 54 12/11/2019    TRIG 74 12/11/2019     Glucose:No results for input(s): POCGLU in the last 72 hours.   [id number]:  Lab Results   Component Value Date    LABA1C 6.0 06/10/2021     High Sensitivity TSH:   Lab Results   Component Value Date    TSHHS <0.010 01/07/2021     Free T3: No results found for: FT3  Free T4:No results found for: T4FREE    DATE OF PROCEDURE:  08/05/2021 Dr. Dai Vizcarra      PREOPERATIVE DIAGNOSIS:  Bilateral hydronephrosis secondary to  metastatic cancer.     POSTOPERATIVE DIAGNOSIS:  Bilateral hydronephrosis secondary to  metastatic cancer.     PROCEDURE:  Cystoscopy, bilateral ureteral stent exchange, left  diagnostic ureteroscopy, and left retrograde pyelogram.       XR CHEST PORTABLE    Result Date: 8/5/2021  EXAMINATION: ONE XRAY VIEW OF THE CHEST 8/4/2021 5:44 am COMPARISON: August 3, 2021 HISTORY: ORDERING SYSTEM PROVIDED HISTORY: Post left chest tube insertion TECHNOLOGIST PROVIDED HISTORY: Reason for exam:->Post left chest tube insertion Reason for Exam: Post left chest tube insertion Acuity: Acute Type of Exam: Subsequent/Follow-up FINDINGS: Interval placement of a left chest tube. No significant interval change of a large pneumothorax. Airspace opacities are identified in the left upper lung zone. Consolidation is seen in the base. Left effusion is also seen. The right lung is grossly clear without focal consolidation or pleural effusion. No pulmonary edema. No new osseous abnormality. Stable appearance of a right-sided MediPort. Status post placement of a left pleural pigtail catheter. No significant interval change of a large pneumothorax. Stable opacities in the left lung with pleural effusion. XR CHEST PORTABLE    Result Date: 8/3/2021  EXAMINATION: ONE XRAY VIEW OF THE CHEST 8/3/2021 10:05 am COMPARISON: CT chest 07/28/2021 HISTORY: ORDERING SYSTEM PROVIDED HISTORY: post left thoracentesis. TECHNOLOGIST PROVIDED HISTORY: SDS #[id number] Reason for exam:->post left thoracentesis. Reason for Exam: post left thoracentesis. FINDINGS: Right arm port catheter with catheter tip right atrium. Stable cardiomediastinal silhouette. Significant decrease in size of left pleural effusion with probable small residual pleural effusion remaining. Hazy opacification of the left lung with moderate amount of air in the left pleural space compatible with atelectasis secondary to trapped lung. Significant decrease in size in left pleural effusion with likely small residual amount of pleural fluid remaining. Hazy opacification of the left lung and moderate amount of air in the left pleural space compatible with trapped lung.      CT GUIDED CHEST TUBE    Result Date: 8/3/2021  PROCEDURE: CT GUIDED CHEST TUBE PLACEMENT 8/3/2021 HISTORY: ORDERING SYSTEM PROVIDED HISTORY: Post thoracentesis       1. Successful CT guided placement of a left-sided 8 German chest tube. 2. Interval re-expansion of left lung since the recent CT with diffuse asymmetric left-sided pulmonary parenchymal opacities. These opacities are nonspecific but could be secondary to infection, asymmetric edema, or pulmonary hemorrhage. Atelectasis is less likely. 3. Moderate left hydropneumothorax post left chest tube placement. 4. Nodular thickening noted along the pleura at the left lung base is suspicious for metastatic disease. IR GUIDED THORACENTESIS PLEURAL    Result Date: 8/3/2021  PROCEDURE: ULTRASOUNDGUIDED left THORACENTESIS 8/3/2021 HISTORY: ORDERING SYSTEM PROVIDED HISTORY: Pleural effusion      Successful ultrasound guided left-sided thoracentesis. Removed about 1400 cc of fluid      Scheduled Medicines   Medications:      Infusions:         Objective:   Vitals: BP (!) 105/51   Pulse 103   Temp 97.9 °F (36.6 °C) (Oral)   Resp 23   Ht 5' 1\" (1.549 m)   Wt 148 lb 6.4 oz (67.3 kg)   LMP 04/08/2018   SpO2 92%   BMI 28.04 kg/m²   General appearance: alert and cooperative with exam  Neck: no JVD or bruit  Thyroid : Normal lobes   Lungs: Has Vesicular Breath sounds left-sided chest tubes  Partial decortication, PleurX catheter placement 8/12/2021  Heart:  regular rate and rhythm  Abdomen: soft, non-tender; bowel sounds normal; no masses,  no organomegaly  Musculoskeletal: Normal  Extremities: extremities normal, , no edema  Neurologic:  Awake, alert, oriented to name, place and time. Cranial nerves II-XII are grossly intact. Motor is  intact. Sensory is intact. ,  and gait is normal.    Assessment:     Patient Active Problem List:     Left breast mass     Secondary cancer of bone [facility])     Essential hypertension     Malignant neoplasm of central portion of left female breast ([facility] [address]     Metastatic breast cancer (HCC)     Dehydration     Iron deficiency anemia     Malabsorption     Left ovarian enlargement     ELIDIA (acute kidney injury) ([facility] [address]     Hyperkalemia     Metabolic acidosis     Hyponatremia     Tachycardia     Acute and chronic respiratory failure (acute-on-chronic) (HCC)     COVID-19     Acute renal failure with tubular necrosis (HCC)     Bilateral hydronephrosis     Edema leg     Hypervolemia     Hypocalcemia     Pneumothorax     Partial decortication, PleurX catheter placement 8/12/2021      Plan:     1. Reviewed POC blood glucose . Labs and X ray results   2. Reviewed Current Medicines   3. Continue on PO Tums and Calcirol   4. Modify the dose as needed  5. Left-sided lung expanded after placing the second being at the chest tube  6. Reviewed notes cardiac thoracic surgeon so oncology notes  7. Thoracentesis fluid cytology is consistent with metastatic breast cancer  8. We will give IV calcium infusion  9. Plan to discharge her home with home health care  10. Will follow     .      Cammie Jansen MD, MD

## 2021-08-21 NOTE — DISCHARGE SUMMARY
Discharge Summary    Name:  Rosie Rojo /Age/Sex: 1965  (50 y.o. female)   MRN & CSN:  [id number] & [id number] Admission Date/Time: 8/3/2021  1:46 PM   Attending:  No att. providers found Discharging Physician: Tono Cabral MD     HPI:     Per H&P: Rosie Rojo is a 50 y.o.  female  who presents with pneumothorax. Patient had elective thoracocentesis with drainage of 1400 cc of clear yellow fluid. Postprocedural x-ray concerning for left-sided pneumothorax. Chest tube was placed and patient admitted for management. Patient currently denied any SOB, chest pain, palpitations, dizziness, cough. No fever or chills     Hospital Course:     Raina Santacruz is a pleasant 61-year-old with metastatic breast cancer on palliative chemotherapy with paclitaxel. She presented to the interventional radiology on August 3 for a thoracentesis. She had 1400 mL of fluid removed. Chest x-ray done after the procedure showed a moderate amount of air in the left pleural space compatible with trapped lung. She was admitted. While here on  she was seen by thoracic surgery. On  because of trapped lung and recurrent pleural effusion she had a left video-assisted thoracoscopy, partial decortication and a PleurX catheter placed. She is doing better now, and has been released back home with home health care after an 11-day inpatient stay. Problem list    Malignant left pleural effusion with recurrence, and trapped lung in the setting of metastatic breast cancer  -Status post left video-assisted thoracoscopy, partial decortication, and PleurX catheter placement on    -Operative findings:Trapped lung, fungating mass noted superior to prior CT site (she already had a large bore chest tube prior to surgery).    -Discharged home with home health care in stable condition on     Metastatic breast cancer  -She is apparently currently on second line chemotherapy  -She is on weekly paclitaxel, apparently it was started in March 2021  -She is also supposed to be on denosumab Lisbethminoo Johnston) every 12 weeks  -Appreciate oncology consult  -Hemoglobin is 7.7 upon discharge        Other problems    Obstructive uropathy secondary to metastatic breast cancer with chronic kidney disease stage IV  -She is status post bilateral ureteral stents and had the stents exchanged while here on August 5 by Dr. Ravi Dunlap. This was a routine stent exchange. She would likely need another stent exchange in  2 months rather than 3 months due to the high-grade nature and progression of her cancer per urology. -Appreciate nephrology consult  -Continue Lasix 80 mg p.o. twice daily  -Continue sodium bicarb 650 mg twice daily    Hypertension  -Continue metoprolol succinate 100 mg daily    Body mass index is 28.04 kg/m². Hypocalcemia  -Vitamin D level was low at 19  -She was seen by endocrinology and has been started on calcitriol and calcium carbonate    History of COVID-19 infection  -She was hospitalized for this here in May for 4 days  -She has not been vaccinated yet    The patient expressed appropriate understanding of and agreement with the discharge recommendations, medications, and plan.      Consults this admission:  IP CONSULT TO PULMONOLOGY  IP CONSULT TO UROLOGY  IP CONSULT TO ENDOCRINOLOGY  IP CONSULT TO CARDIOTHORACIC SURGERY  IP CONSULT TO PALLIATIVE CARE  IP CONSULT TO SPIRITUAL SERVICES  IP CONSULT TO NEPHROLOGY  IP CONSULT TO CASE MANAGEMENT  IP CONSULT TO NEPHROLOGY  IP CONSULT TO [facility] Jenelle Bland    Discharge Instruction:   Follow up appointments: Nephrology, urology, and oncology as well as thoracic surgery  Primary care physician:  within 2 weeks    Diet:  regular diet   Activity: activity as tolerated  Disposition: Discharged to:   []Home, [x]HHC, []SNF, []Acute Rehab, []Hospice   Condition on discharge: Stable    Discharge Medications:    [address], [address] Medication Instructions ARIN:[id number]    Printed on: 08/20/21 2052   Medication Information                      acetaminophen (TYLENOL) 500 MG tablet  Take 500 mg by mouth every 6 hours as needed for Pain             calcitRIOL (ROCALTROL) 0.5 MCG capsule  Take 2 capsules by mouth 2 times daily             calcium carbonate (TUMS) 500 MG chewable tablet  Take 2 tablets by mouth 3 times daily (with meals)             cetirizine (ZYRTEC) 10 MG tablet  Take 10 mg by mouth daily             dexamethasone (DECADRON) 4 MG tablet  TAKE ONE TABLET BY MOUTH DAILY FOR 4 DAYS STARTING THE DAY AFTER CHEMOTHERAPY             docusate sodium (COLACE) 100 MG capsule  Take 1 capsule by mouth daily as needed for Constipation             fluticasone (FLONASE) 50 MCG/ACT nasal spray  1 spray by Each Nostril route daily             furosemide (LASIX) 80 MG tablet  Take 1 tablet by mouth 2 times daily             HYDROcodone-acetaminophen (NORCO) 5-325 MG per tablet  Take 1 tablet by mouth every 6 hours as needed for Pain.             magnesium oxide (MAG-OX) 400 MG tablet  Take 1 tablet by mouth daily             metoprolol succinate (TOPROL XL) 100 MG extended release tablet  Take 1 tablet by mouth daily             Nutritional Supplement LIQD  Take 1 Can by mouth 2 times daily             OMEPRAZOLE PO  Take by mouth             ondansetron (ZOFRAN) 8 MG tablet  1 tab po q 8 hours PRN for chemo induced nausea/vomting             prochlorperazine (COMPAZINE) 10 MG tablet  Take 1 tablet by mouth every 8 hours as needed (nausea)             promethazine (PHENERGAN) 25 MG tablet  TAKE ONE TABLET BY MOUTH EVERY 6 HOURS AS NEEDED FOR NAUSEA             sodium bicarbonate 650 MG tablet  Take 1 tablet by mouth 2 times daily                 Objective Findings at Discharge:   BP (!) 105/51   Pulse 103   Temp 97.9 °F (36.6 °C) (Oral)   Resp 23   Ht 5' 1\" (1.549 m)   Wt 148 lb 6.4 oz (67.3 kg)   LMP 04/08/2018   SpO2 92%   BMI 28.04 kg/m²            PHYSICAL EXAM   GEN Awake female, sitting upright in bed in no apparent distress. Appears given age. LABS:    CBC:   Lab Results   Component Value Date    WBC 5.5 08/14/2021    HGB 7.6 08/14/2021    HCT 26.4 08/14/2021    MCV 95.0 08/14/2021     08/14/2021     BMP:   Lab Results   Component Value Date     08/14/2021    K 4.3 08/14/2021     08/14/2021    CO2 19 08/14/2021    PHOS 5.2 03/23/2021    BUN 68 08/14/2021    CREATININE 3.0 08/14/2021    CALCIUM 7.4 08/14/2021     IMAGING:    XR CHEST PORTABLE [[id number]] Collected: 08/13/21 0640      Order Status: Completed Updated: 08/14/21 1118     Narrative:       EXAMINATION:   ONE XRAY VIEW OF THE CHEST     8/13/2021 5:04 am     COMPARISON:   Chest radiograph dated August 12, 2021. HISTORY:   ORDERING SYSTEM PROVIDED HISTORY: lung resection   TECHNOLOGIST PROVIDED HISTORY:   Reason for exam:->lung resection   Reason for Exam: lung resection   Acuity: Acute   Type of Exam: Initial   Mechanism of Injury: lung resection   Relevant Medical/Surgical History: lung resection     FINDINGS:   The cardiomediastinal silhouette is stable. Left chest tube is seen. There   is a small left apical pneumothorax without significant change. Right-sided   chest port is noted. There has been interval improvement in the previously   noted congestion. Minimal left basilar opacity is noted. Impression:       1. Left chest tube with a small left apical pneumothorax without significant   change. 2. Minimal left basilar opacity without significant change. 3. Improving pulmonary vascular congestion.       XR CHEST PORTABLE [[id number]] Collected: 08/12/21 1133     Order Status: Completed Updated: 08/12/21 1321     Narrative:       EXAMINATION:   ONE XRAY VIEW OF THE CHEST     8/12/2021 9:35 am     COMPARISON:   8/11/2021     HISTORY:   ORDERING SYSTEM PROVIDED HISTORY: lung resection   TECHNOLOGIST PROVIDED HISTORY:   Reason for exam:->lung resection   Reason for Exam: lung resection FINDINGS:   The cardiac silhouette and mediastinal contours are normal.  Right chest   MediPort is stable. There is a left chest tube. Pulmonary vascular   congestion is worse. There is a small right pleural effusion with associated   right basilar atelectasis. There is a questionable left apical pneumothorax. There is a probable small   left subpulmonic pneumothorax, more conspicuous. Subcutaneous emphysema   along the lower left chest wall laterally is noted. Impression:       1. Worsening pulmonary vascular congestion. 2. Persistent left chest tube with a slightly larger left pneumothorax, more   pronounced apically and in the left lateral subpulmonic region. 3. Small right pleural effusion with associated atelectasis in the right lung   base, increased. XR CHEST PORTABLE [[id number]] Collected: 08/11/21 0751     Order Status: Completed Updated: 08/11/21 0756     Narrative:       EXAMINATION:   ONE XRAY VIEW OF THE CHEST     8/11/2021 5:29 am     COMPARISON:   08/10/2021 5:13 a.m. HISTORY:   ORDERING SYSTEM PROVIDED HISTORY: ptx   TECHNOLOGIST PROVIDED HISTORY:   Reason for exam:->ptx   Reason for Exam: ptx   Acuity: Acute   Type of Exam: Subsequent/Follow-up     FINDINGS:   Monitor wires project over the chest.  Right subclavian port tip at the   cavoatrial junction. Left chest tube is present. Interval removal of   pigtail drainage catheter. Trace left apical pneumothorax. Small left   pleural effusion. Left basilar/retrocardiac opacity. Heart size and   mediastinal contours are stable. Pulmonary vascularity is within normal   limits. Impression:       1. Interval removal of left pleural pigtail catheter. 2. Otherwise, stable chest.  Trace left apical pneumothorax.    3. Left basilar/retrocardiac opacity persists      XR CHEST PORTABLE [[id number]] Collected: 08/10/21 0731     Order Status: Completed Updated: 08/10/21 0736     Narrative:       EXAMINATION:   ONE XRAY VIEW OF THE CHEST     8/10/2021 5:28 am     COMPARISON:   August 9, 2021     HISTORY:   ORDERING SYSTEM PROVIDED HISTORY: pigtail catheter clamped   TECHNOLOGIST PROVIDED HISTORY:   Reason for exam:->pigtail catheter clamped   Reason for Exam: pigtail catheter clamped   Acuity: Acute   Type of Exam: Subsequent/Follow-up     FINDINGS:   There is a right-sided chest port with its tip in the right atrium. There   are left-sided chest tubes in place. The cardiomediastinal silhouette is   stable. There are increased lung markings bilaterally, may be related to   mild pulmonary vascular congestion versus bronchitis or early pneumonia. There is no pleural effusion. There is trace left apical pneumothorax. The   osseous structures are stable. Impression:       Left-sided chest tubes in place. Trace left focal pneumothorax. Increased lung markings bilaterally, may be related to mild pulmonary   vascular congestion versus bronchitis or early pneumonia. XR CHEST PORTABLE [[id number]] Collected: 08/09/21 1314     Order Status: Completed Updated: 08/09/21 1318     Narrative:       EXAMINATION:   ONE XRAY VIEW OF THE CHEST     8/9/2021 10:20 am     COMPARISON:   Yesterday     HISTORY:   ORDERING SYSTEM PROVIDED HISTORY: pleural effusion   TECHNOLOGIST PROVIDED HISTORY:   Reason for exam:->pleural effusion   Reason for Exam: pleural effusion     FINDINGS:   Left chest tube and left chest drain unchanged in position. Right chest port   with catheter tip at the cavoatrial junction. Monitor wires overlie the   patient. No pneumothorax. Mild left basilar atelectasis and trace pleural   effusion are stable. Heart size is normal.      Impression:       No significant change from prior exam.  Trace left pleural effusion and left   basilar atelectasis remain.       XR CHEST PORTABLE [[id number]] Collected: 08/08/21 0759     Order Status: Completed Updated: 08/09/21 1201     Narrative:       EXAMINATION:   ONE X-RAY VIEW OF THE CHEST     8/8/2021 4:36 am     COMPARISON:   08/07/2021     HISTORY:   ORDERING SYSTEM PROVIDED HISTORY: fu pneumothorax   TECHNOLOGIST PROVIDED HISTORY:   Reason for exam:->fu pneumothorax   Reason for Exam: fu pneumothorax   Acuity: Unknown   Type of Exam: Unknown     FINDINGS:   Two left-sided chest tubes are present. There is a partially loculated left   basilar pneumothorax again noted unchanged since prior examination. Right   subclavian line is present with tip at the cavoatrial junction. No right   pneumothorax. Left lower lobe airspace opacities noted. There is also patchy right upper   lobe airspace disease. Impression:       Stable examination with partially loculated left basilar pneumothorax and   left chest tubes. Multifocal patchy airspace disease noted throughout both lungs may represent   pneumonia or pulmonary edema. XR CHEST PORTABLE [[id number]] Collected: 08/07/21 1932     Order Status: Completed Updated: 08/07/21 1937     Narrative:       EXAMINATION:   ONE XRAY VIEW OF THE CHEST     8/7/2021 6:28 pm     COMPARISON:   Chest radiograph and CT chest 08/06/2021. HISTORY:   ORDERING SYSTEM PROVIDED HISTORY: chest tube placement   TECHNOLOGIST PROVIDED HISTORY:   Reason for exam:->chest tube placement   Reason for Exam: chest tube placement     FINDINGS:   There is been interval placement of an additional left chest tube. Small   residual left pleural effusion. Considerable re-expansion of the left lung. Small left basilar pneumothorax. Mild left basilar opacities. The   cardiomediastinal silhouette is unchanged. A right upper extremity PICC   terminates at the cavoatrial junction. No acute osseous abnormality. Impression:       1. Interval placement of a 2nd left chest tube with a small residual left   basilar hydropneumothorax. Considerable re-expansion of the left lung. 2. Mild left basilar opacities compatible with atelectasis or pneumonia. CT CHEST WO CONTRAST [[id number]] Collected: 08/06/21 0951     Order Status: Completed Updated: 08/06/21 1900     Narrative:       EXAMINATION:   CT OF THE CHEST WITHOUT CONTRAST 8/6/2021 8:56 am     TECHNIQUE:   CT of the chest was performed without the administration of intravenous   contrast. Multiplanar reformatted images are provided for review. Dose   modulation, iterative reconstruction, and/or weight based adjustment of the   mA/kV was utilized to reduce the radiation dose to as low as reasonably   achievable. COMPARISON:   August 3, 2021     HISTORY:   ORDERING SYSTEM PROVIDED HISTORY: pneumothorax   TECHNOLOGIST PROVIDED HISTORY:   Reason for exam:->pneumothorax   Is the patient pregnant?->No   Reason for Exam: pneumothorax     FINDINGS:   Mediastinum: Heart size is normal.  No pericardial effusion. Right central   catheter tip extends to the right atrium. Within the limitations of a   noncontrast exam, there is no evidence of hilar adenopathy. Multiple   hypodensities in the spleen measure up to 1.2 cm. Similar finding was seen   on prior CT of January 3, 2020. Lungs/pleura: Large left hydropneumothorax. There is a pleural catheter. Near complete collapse of the left lower lobe and left upper lobe. The   non-collapsed portion of the left upper lobe contains a consolidation. Small   right pleural effusion with dependent atelectasis of the right lower lobe. There are peribronchovascular ground-glass opacities in the right upper lobe. Central tracheobronchial airways are unremarkable. Pleural nodularity, seen   on prior CT, is not well seen on this exam.     Upper Abdomen: No acute abnormality in the upper abdomen. Soft Tissues/Bones: Diffuse sclerotic lesions throughout the axial skeleton. Chronic pathologic compression fracture T10. Skin thickening along the   anterior chest wall with subcutaneous edema/inflammation.   Nodularity of the   skin along the left anterolateral chest wall. Impression:       1. Large left hydropneumothorax with near complete collapse of the left lung. Visible portion of the left upper lobe contains a consolidation, suspicious   for pneumonia. 2. Peribronchovascular ground-glass opacities in the right upper lobe, likely   infectious or inflammatory. 3. Small right pleural effusion. 4. Nodular skin thickening along the left anterolateral chest wall and   subcutaneous fat, suspicious for metastasis. 5. Diffuse bone metastasis in the axial skeleton with chronic pathologic   fracture of T10. XR CHEST PORTABLE [[id number]] Collected: 08/06/21 0738     Order Status: Completed Updated: 08/06/21 0759     Narrative:       EXAMINATION:   ONE XRAY VIEW OF THE CHEST     8/6/2021 5:45 am     COMPARISON:   08/05/2021     HISTORY:   ORDERING SYSTEM PROVIDED HISTORY: Hypoxia and left pneumothorax   TECHNOLOGIST PROVIDED HISTORY:   Reason for exam:->Hypoxia and left pneumothorax   Reason for Exam: Hypoxia and left pneumothorax   Acuity: Unknown   Type of Exam: Subsequent/Follow-up   Mechanism of Injury: Hypoxia and left pneumothorax   Relevant Medical/Surgical History: Hypoxia and left pneumothorax     FINDINGS:   There is a right-sided chest port with the catheter tip overlying the   cavoatrial junction. Cardiac mediastinal silhouettes appear similar. Worsening opacification in the left mid chest.  A small bore chest tube is   identified. Multilevel degenerative changes are seen in the spine. Paucity   of lung markings at the apex may be related to persistent pneumothorax. No   new infiltrate identified on the right. Impression:       Paucity of lung markings at the left lung apex with mild increased left to   right midline shift of the cardiac silhouette and increasing opacification in   the left chest.  Consider further evaluation with a dedicated CT scan of the   chest to evaluate for possible developing tension.   I discussed this with

## 2021-08-22 PROBLEM — R06.02 SOB (SHORTNESS OF BREATH): Status: ACTIVE | Noted: 2021-01-01

## 2021-08-22 NOTE — ED PROVIDER NOTES
Triage Chief Complaint:   Shortness of Breath (hx stage 4 breast cancer, catheter in place draining fluid from chest )    [place]:  Neil Ta is a 50 y.o. female history of metastatic breast cancer with a Pleurx catheter in place who presents with increasing shortness of breath today. She has a mild cough. She is not on oxygen at home. She denies any chest pain or fevers. The Pleurx catheter drained 300 mL today. She is getting it drained every 3 days but it had been draining 500 mL a day. She has a remote history of a blood clot but she does not know what it was associated with her where was. She has chronic swelling in her bilateral lower extremities which is unchanged. ROS:   Review of Systems   Constitutional: Positive for fatigue. Negative for chills and fever. HENT: Negative for congestion, rhinorrhea and sore throat. Eyes: Negative for redness and visual disturbance. Respiratory: Positive for cough and shortness of breath. Cardiovascular: Positive for leg swelling. Negative for chest pain. Gastrointestinal: Negative for abdominal pain, nausea and vomiting. Genitourinary: Negative for dysuria and frequency. Musculoskeletal: Negative for arthralgias and back pain. Skin: Negative for rash and wound. Neurological: Positive for weakness (generalized, unchanged). Negative for syncope and headaches. Psychiatric/Behavioral: Negative. Negative for hallucinations and suicidal ideas.        Past Medical History:   Diagnosis Date    Anxiety     Asthma     last flare up 5 yrs ago    Breast lesion     \"have spot on left breast going to remove- at this time not sure if cancer or not\"    Essential hypertension 4/1/2020    Secondary cancer of bone ([address] 4/1/2020    Thyroid nodule     \"have thyroid nodules-      Past Surgical History:   Procedure Laterality Date    APPENDECTOMY  age 27   [facility] BL[facility] Bilateral 3/25/2021    BILATERAL CYSTOSCOPY BILATERAL STENT INSERTION performed by Megha Castañeda MD at [address] [facility] Bilateral 6/11/2021    BILATERAL CYSTOSCOPY RETROGRADE PYELOGRAM STENT EXCHANGE performed by Edith Christie MD at [address] [facility] Bilateral 8/5/2021    BILATERAL CYSTOSCOPY, BILATERAL STENT EXCHANGE, LEFT URETEROSCOPYAND LEFT RETROGRADE PYELOGRAM performed by Nelson Welch MD at [address] [place] [address] CT GUIDED CHEST TUBE  8/3/2021    CT GUIDED CHEST TUBE 8/3/2021 [facility] CT SCAN    IR NONTUNNELED VASCULAR CATHETER  3/22/2021    IR NONTUNNELED VASCULAR CATHETER 3/22/2021 [facility] SPECIAL PROCEDURES    PORT SURGERY Right 3/15/2021    RIGHT PORT INSERTION performed by Ira Lambert MD at [place] [address] Left 8/12/2021    LEFT THORACOSCOPY WITH LEFT PLEURX CATHETER PLACEMENT performed by Brittney Conrad MD at [address] [place] [address]  age 4    T & A     Family History   Problem Relation Age of Onset    Diabetes Mother     Stroke Mother     High Blood Pressure Mother      Social History     Socioeconomic History    Marital status:      Spouse name: Not on file    Number of children: 2    Years of education: 15    Highest education level: Not on file   Occupational History    Occupation: dental assistant     Comment: [address] [employer]   Tobacco Use    Smoking status: Never Smoker    Smokeless tobacco: Never Used   Vaping Use    Vaping Use: Never used   Substance and Sexual Activity    Alcohol use: Yes     Comment: average\"one time per week\"    Drug use: No    Sexual activity: Yes     Partners: Male   Other Topics Concern    Not on file   Social History Narrative    Lives in her own home with her children     Social Determinants of Health     Financial Resource Strain:     Difficulty of Paying Living Expenses:    Food Insecurity:     Worried About Running Out of Food in the Last Year:     Ran Out of Food in the Last Year:    Transportation Needs:     Lack of Transportation (Medical):  Lack of Transportation (Non-Medical):    Physical Activity:     Days of Exercise per Week:     Minutes of Exercise per Session:    Stress:     Feeling of Stress :    Social Connections:     Frequency of Communication with Friends and Family:     Frequency of Social Gatherings with Friends and Family:     Attends Jewish Services:     Active Member of Clubs or Organizations:     Attends Club or Organization Meetings:     Marital Status:    Intimate Partner Violence:     Fear of Current or Ex-Partner:     Emotionally Abused:     Physically Abused:     Sexually Abused:      No current facility-administered medications for this encounter. Current Outpatient Medications   Medication Sig Dispense Refill    furosemide (LASIX) 80 MG tablet Take 1 tablet by mouth 2 times daily 60 tablet 0    calcitRIOL (ROCALTROL) 0.5 MCG capsule Take 2 capsules by mouth 2 times daily 30 capsule 3    calcium carbonate (TUMS) 500 MG chewable tablet Take 2 tablets by mouth 3 times daily (with meals) 180 tablet 0    magnesium oxide (MAG-OX) 400 MG tablet Take 1 tablet by mouth daily 30 tablet 1    fluticasone (FLONASE) 50 MCG/ACT nasal spray 1 spray by Each Nostril route daily      OMEPRAZOLE PO Take by mouth      HYDROcodone-acetaminophen (NORCO) 5-325 MG per tablet Take 1 tablet by mouth every 6 hours as needed for Pain.       sodium bicarbonate 650 MG tablet Take 1 tablet by mouth 2 times daily 60 tablet 11    dexamethasone (DECADRON) 4 MG tablet TAKE ONE TABLET BY MOUTH DAILY FOR 4 DAYS STARTING THE DAY AFTER CHEMOTHERAPY 16 tablet 0    Nutritional Supplement LIQD Take 1 Can by mouth 2 times daily 60 Can 5    metoprolol succinate (TOPROL XL) 100 MG extended release tablet Take 1 tablet by mouth daily 30 tablet 3    ondansetron (ZOFRAN) 8 MG tablet 1 tab po q 8 hours PRN for chemo induced nausea/vomting (Patient taking differently: 8 mg every 8 hours as needed for Nausea or Vomiting ) 30 tablet 0    Tachycardia present. Pulses: Normal pulses. Radial pulses are 2+ on the right side and 2+ on the left side. Pulmonary:      Effort: Pulmonary effort is normal. No respiratory distress. Breath sounds: Examination of the left-middle field reveals decreased breath sounds. Examination of the left-lower field reveals decreased breath sounds. Decreased breath sounds present. No wheezing or rales. Abdominal:      General: There is no distension. Tenderness: There is no abdominal tenderness. Musculoskeletal:         General: No swelling or tenderness. Normal range of motion. Right lower leg: Edema (2+ pitting) present. Left lower leg: Edema (2+ pitting) present. Skin:     General: Skin is warm and dry. Neurological:      General: No focal deficit present. Mental Status: She is alert. Cranial Nerves: No cranial nerve deficit.    Psychiatric:         Mood and Affect: Mood normal.         Behavior: Behavior normal.         I have reviewed and interpreted all of the currently available lab results from this visit (if applicable):  Results for orders placed or performed during the hospital encounter of 08/22/21   COVID-19, Rapid    Specimen: Nasopharyngeal   Result Value Ref Range    Source THROAT     SARS-CoV-2, NAAT NOT DETECTED NOT DETECTED   CBC Auto Differential   Result Value Ref Range    WBC 13.7 (H) 4.0 - 10.5 K/CU MM    RBC 3.21 (L) 4.2 - 5.4 M/CU MM    Hemoglobin 8.7 (L) 12.5 - 16.0 GM/DL    Hematocrit 31.1 (L) 37 - 47 %    MCV 96.9 78 - 100 FL    MCH 27.1 27 - 31 PG    MCHC 28.0 (L) 32.0 - 36.0 %    RDW 20.7 (H) 11.7 - 14.9 %    Platelets 832 (H) 534 - 440 K/CU MM    MPV 8.5 7.5 - 11.1 FL    Differential Type AUTOMATED DIFFERENTIAL     Segs Relative 76.9 (H) 36 - 66 %    Lymphocytes % 9.2 (L) 24 - 44 %    Monocytes % 10.4 (H) 0 - 4 %    Eosinophils % 0.4 0 - 3 %    Basophils % 0.5 0 - 1 %    Segs Absolute 10.5 K/CU MM    Lymphocytes Absolute 1.3 K/CU MM Monocytes Absolute 1.4 K/CU MM    Eosinophils Absolute 0.1 K/CU MM    Basophils Absolute 0.1 K/CU MM    Nucleated RBC % 0.7 %    Total Nucleated RBC 0.1 K/CU MM    Total Immature Neutrophil 0.35 K/CU MM    Immature Neutrophil % 2.6 (H) 0 - 0.43 %   Comprehensive Metabolic Panel w/ Reflex to MG   Result Value Ref Range    Sodium 139 135 - 145 MMOL/L    Potassium 5.1 3.5 - 5.1 MMOL/L    Chloride 101 99 - 110 mMol/L    CO2 13 (L) 21 - 32 MMOL/L    BUN 77 (H) 6 - 23 MG/DL    CREATININE 3.5 (H) 0.6 - 1.1 MG/DL    Glucose 84 70 - 99 MG/DL    Calcium 7.1 (L) 8.3 - 10.6 MG/DL    Albumin 2.6 (L) 3.4 - 5.0 GM/DL    Total Protein 6.0 (L) 6.4 - 8.2 GM/DL    Total Bilirubin 0.2 0.0 - 1.0 MG/DL    ALT <5 (L) 10 - 40 U/L    AST 38 (H) 15 - 37 IU/L    Alkaline Phosphatase 92 40 - 128 IU/L    GFR Non- 14 (L) >60 mL/min/1.73m2    GFR  17 (L) >60 mL/min/1.73m2    Anion Gap 25 (H) 4 - 16   Troponin   Result Value Ref Range    Troponin T 0.037 (H) <0.01 NG/ML   Brain Natriuretic Peptide   Result Value Ref Range    Pro-BNP 4,230 (H) <300 PG/ML   Blood Gas, Venous   Result Value Ref Range    pH, Saw 7.30 (L) 7.32 - 7.42    pCO2, Saw 30 (L) 38 - 52 mmHG    pO2, Saw 169 (H) 28 - 48 mmHG    Base Excess 10 (H) 0 - 2.4    HCO3, Venous 14.8 (L) 19 - 25 MMOL/L    O2 Sat, Saw 94.6 (H) 50 - 70 %    Comment BG    EKG 12 Lead   Result Value Ref Range    Ventricular Rate 103 BPM    Atrial Rate 103 BPM    P-R Interval 142 ms    QRS Duration 70 ms    Q-T Interval 370 ms    QTc Calculation (Bazett) 484 ms    P Axis 54 degrees    R Axis 49 degrees    T Axis 40 degrees    Diagnosis       Sinus tachycardia  Low voltage QRS  Borderline ECG  When compared with ECG of 09-JUN-2021 18:41,  No significant change was found        Radiographs (if obtained):  [] The following radiograph was interpreted by myself in the absence of a radiologist:  [x]Radiologist's Report Reviewed:  XR CHEST PORTABLE   Preliminary Result   1.  Increased diffuse interstitial prominence suggests pulmonary edema. 2. Unchanged left hydropneumothorax with a left chest tube in place. 3. Unchanged right port catheter and sclerotic lesions in the scapulae   bilaterally. EKG (if obtained): (All EKG's are interpreted by myself in the absence of a cardiologist)  Sinus tachycardia with a rate of 103. RI interval 142, QRS 70, QTc 484. No ST elevations or depressions. Normal T waves. Low voltage QRS. Impression: Abnormal EKG. When compared to previous EKG from 6/9/2021, the tachycardia is improving though still present, otherwise no significant changes. MDM:  Differential diagnoses considered include but are not limited to pneumonia, pneumothorax, pulmonary edema, pulmonary embolism, worsening pleural effusion. Upon arrival patient appears to feel unwell. Tachycardic upon arrival.  Basic labs were obtained and show mild leukocytosis. Her creatinine is chronically elevated and appears to be elevated today. She also has a slightly decreased bicarb compared to previous. Her troponin is detectable. COVID-19 is negative. VBG is slightly decreased that her PCO2 is decreased. I am not concerned for a respiratory acidosis. Chest x-ray shows increased diffuse interstitial prominence suggesting pulmonary edema. There is unchanged left hydropneumothorax with a left chest tube in place. Patient's BNP is elevated. I suspect there is some fluid overload contributing to her symptoms. She is continue to maintain normal oxygen saturations on room air. Admit her to the hospital for further evaluation and treatment of her increased edema and shortness of breath. I spoke with Dr. Jasmin Fox, who graciously agreed to admit the patient. Plan of care explained to patient. All questions and concerns were addressed to the patient's satisfaction. Patient understood and agreed with plan.     I did don appropriate PPE (including face mask, protective eye

## 2021-08-22 NOTE — CONSULTS
-ELIDIA on CKD  -Metabolic acidosis  -B/L HN and recent ureteral stents exchange  -Advanced metastatic breast cancer  -Malignant pleural effusion s/p pleurex drain  -DVTs: new    Plan:  -Place benítez  -Start bicarb gtt 150meq/L  -Give po bicarb  -Resume calcium carbonate  -Give bumex 2mg iv bid  -Long term goal discussion

## 2021-08-22 NOTE — ED NOTES
Patient updated on the POC and asked about pain and other needs the patient denies pain at this time      Star Perez RN  08/22/21 0895

## 2021-08-22 NOTE — H&P
History and Physical      Name:  Stanford Ray /Age/Sex: 1965  (50 y.o. female)   MRN & CSN:  [id number] & [id number] Admission Date/Time: 2021  1:16 AM   Location:  [facility] PCP: Rolla Kanner, APRN - NP       Hospital Day: 1    Assessment and Plan:   Stanford Ray is a 50 y.o.  female with a past medical history of stage IV breast cancer, CKD 4, due to obstructive uropathy s/p bilateral ureteral stents placement most recently exchanged on 2021, malignant pleural effusion s/p recent VATS plus decortication plus Pleurx catheter placement 2021, hospitalized from 8/3/2021 through 2021, who presents with worsening shortness of breath over the last 3 days. 1.  Shortness of breath: In a patient with malignant loculated pleural effusion. X-rays reviewed, not much change in hydropneumothorax compared to prior. Concern for thromboembolism in this patient with metastatic cancer. Check D-dimer stat  Check vascular Doppler ultrasounds bilateral lower extremity as well as left upper extremity    2. Hydropneumothorax s/p chest tube: Stable  Continue as needed drainage    3. Severe metabolic acidosis: Due to #4 below. May be compounded by malnutrition  Check ABG  Initiate sodium bicarb gtt. Recheck BMP in 6 hours    4. Acute kidney injury superimposed on CKD 4: Likely due to intravascular volume depletion  Baseline creatinine 2.8-3.0 in the last week. Creatinine up to 3.5 today. Hold diuretic for now  Trial gentle hydration for 1 L    5. Stage IV breast cancer: On palliative chemo outpatient  No encounter with hospice as yet  Consult hospice for information sharing. Patient and family agreeable     6. Chronic prescription benzodiazepine dependence: Resume Ativan twice daily as needed  7.   Intermittent change in mental status: Concern for nonepileptic seizures prior to arrival.  Check MRI brain with and without contrast.    8.  Advance care planning: Patient does have advanced care directives. Has not had any discussion with hospice. Daughter is POA, she is interested in hospice for information sharing and possibly signing up to hospice. She will discuss with her mother with regards to keeping her comfortable. In the meantime, maintain full code. Addendum 10:19 AM  Extensive occlusive venous thrombus of the left upper extremity involving the   left internal jugular, subclavian, cephalic, and basilic veins.       Additional nonocclusive thrombus of the left axillary vein.       Extensive subcutaneous edema throughout the forearm. Plan: Start heparin drip. Will discuss with patient, she will likely need lifelong anticoagulation  Elevated left upper extremity  Addendum: 11:50 AM.  Discussed with patient, daughter not at bedside. Patient is agreeable to start anticoagulation. She understands the benefits of preventing thromboembolism and sudden death and also understand the risk of life-threatening bleeds. Can transition to NOAC at discharge. ICM to run benefit check    Diet ADULT DIET; Regular   DVT Prophylaxis [x] Lovenox, []  Heparin, [] SCDs, [] Ambulation   GI Prophylaxis [] PPI,  [] H2 Blocker,  [] Carafate,  [x] Diet/Tube Feeds   Code Status Prior   Disposition Patient requires continued admission due to ELIDIA/CKD from   MDM [] Low, [] Moderate,[x]  High  Patient's risk as above due to medical comorbidities     History of Present Illness:     Chief Complaint: Shortness of breath    Jose Phelps is a 50 y.o.  female the above past medical history who presents with shortness of breath. She is unable to provide a history but daughter at bedside and assist with providing history. Reports that patient was doing well post discharge a week ago but over the last 3 days, has had increasing shortness of breath. She has had increased output from the Pleurx drain. No fevers or chills. No change in bowel habit. No urinary symptoms.   Left upper extremity is chronically swollen CHEST TUBE (8/3/2021); Bladder surgery (Bilateral, 8/5/2021); and Thoracoscopy (Left, 8/12/2021). Allergies: Allergies   Allergen Reactions    Latex Anaphylaxis     \"have trouble with banana's kiwi, avocados- Have trouble breathing with these and trouble breathig - get asthmatic from latex gloves, bandage, anything latex     Banana Anaphylaxis    Cinnamon Anaphylaxis       FAM HX: family history includes Diabetes in her mother; High Blood Pressure in her mother; Stroke in her mother. Soc HX:   Social History     Socioeconomic History    Marital status:      Spouse name: None    Number of children: 2    Years of education: 15    Highest education level: None   Occupational History    Occupation: dental assistant     Comment: [employer]   Tobacco Use    Smoking status: Never Smoker    Smokeless tobacco: Never Used   Vaping Use    Vaping Use: Never used   Substance and Sexual Activity    Alcohol use: Yes     Comment: average\"one time per week\"    Drug use: No    Sexual activity: Yes     Partners: Male   Other Topics Concern    None   Social History Narrative    Lives in her own home with her children     Social Determinants of Health     Financial Resource Strain:     Difficulty of Paying Living Expenses:    Food Insecurity:     Worried About Running Out of Food in the Last Year:     [address] in the Last Year:    Transportation Needs:     Lack of Transportation (Medical):      Lack of Transportation (Non-Medical):    Physical Activity:     Days of Exercise per Week:     Minutes of Exercise per Session:    Stress:     Feeling of Stress :    Social Connections:     Frequency of Communication with Friends and Family:     Frequency of Social Gatherings with Friends and Family:     Attends Restoration[employer] Services:     Active Member of Clubs or Organizations:     Attends Club or Organization Meetings:     Marital Status:    Intimate Partner Violence:     Fear of Current or Ex-Partner:     Emotionally Abused:     Physically Abused:     Sexually Abused:        Medications:   Medications:    Infusions:    sodium bicarbonate infusion       PRN Meds: LORazepam, 0.5 mg, Q12H PRN          Electronically signed by Doris Apodaca MD on 8/22/2021 at 9:28 AM

## 2021-08-22 NOTE — ED NOTES
Narrative   EXAMINATION:   DUPLEX VENOUS ULTRASOUND OF THE BILATERAL LOWER EXTREMITIES, 8/22/2021 8:19 am       TECHNIQUE:   Duplex ultrasound using B-mode/gray scaled imaging and Doppler spectral   analysis and color flow was obtained of the bilateral lower extremities.       COMPARISON:   None       HISTORY:   ORDERING SYSTEM PROVIDED HISTORY: 3+ edema and stage 4 cancer   TECHNOLOGIST PROVIDED HISTORY:   Reason for exam:->3+ edema and stage 4 cancer   Acuity: Acute   Type of Exam: Initial       FINDINGS:   The visualized deep veins of the bilateral lower extremities are patent and   free of echogenic thrombus. The veins demonstrate good compressibility with   normal color flow study and spectral analysis.  Of note, evaluation of the   veins of the calf is limited due to patient body habitus.  Diffuse   subcutaneous edema is noted.       There is occlusive thrombus within the left greater saphenous vein at the   junction with the common femoral vein, and extending through the proximal and   mid portions.  The distal greater saphenous vein is patent with associated   absence of Doppler flow.           Impression   No evidence of DVT in either lower extremity.       Occlusive superficial venous thrombus within the left greatewr saphenous vein   extending from the junction of the greater saphenous and common femoral veins   through the proximal and mid portions of the greater saphenous vein.           Randi Rodriges RN  08/22/21 1115

## 2021-08-22 NOTE — ED NOTES
1226 perfect serve message sent to Dr Salazar Padron on in pt consult. Crystal Lester  08/22/21 122  1222 Dr Salazar Padron acknowledged perfect serve message.  Added to treatment team.      Crystal Lester  08/22/21 3148

## 2021-08-22 NOTE — PLAN OF CARE
[facility] (official report to follow, utilizing caution) left upper extremity, per Dain Salas ok to move blood pressure cuff to right upper extremity

## 2021-08-22 NOTE — ED NOTES
Patient to CT scan and US at this time.  Daughter to the dest citing she is going home to rest     [facility]aine CasasJames [facility]  08/22/21 8806

## 2021-08-23 NOTE — PROGRESS NOTES
Kiana Krishna MD, [address]                Internal Medicine Hospitalist             Daily Progress  Note   Subjective:     Chief Complaint   Patient presents with    Shortness of Breath     hx stage 4 breast cancer, catheter in place draining fluid from chest      Ms. Carvajal Complains of breath improved, no chest pain. Quite drowsy. Objective:    /61   Pulse 105   Temp 97.5 °F (36.4 °C) (Oral)   Resp 24   Ht 5' 1\" (1.549 m)   Wt 140 lb (63.5 kg)   LMP 04/08/2018   SpO2 96%   BMI 26.45 kg/m²      Intake/Output Summary (Last 24 hours) at 8/23/2021 1255  Last data filed at 8/23/2021 0838  Gross per 24 hour   Intake 10 ml   Output 1000 ml   Net -990 ml      Physical Exam:  Heart: Irregular, normal S1 and S2 in all 4 auscultatory areas. No rubs  Murmurs or gallops heard. Lungs: Mostly bronchial breathing to auscultation, decreased breath sounds at bases. No wheezes appreciated no crackles heard. Very poor effort  Abdomen: Soft, non distended. Bowel sounds not appreciated. No obvious liver or spleen enlargement. Non tender, no rebound noted. Extremities: Non tender, +2-3 swelling noted, strength 5/5 both legs. CNS: Grossly intact. While drowsy barely awake.      Labs:  CBC with Differential:    Lab Results   Component Value Date    WBC 15.1 08/22/2021    RBC 3.06 08/22/2021    HGB 8.7 08/22/2021    HCT 28.8 08/22/2021     08/22/2021    MCV 94.1 08/22/2021    MCH 28.4 08/22/2021    MCHC 30.2 08/22/2021    RDW 20.7 08/22/2021    NRBC 1 08/14/2021    SEGSPCT 76.9 08/22/2021    BANDSPCT 11 08/14/2021    LYMPHOPCT 9.2 08/22/2021    PROMYELOPCT 1 08/06/2021    MONOPCT 10.4 08/22/2021    MYELOPCT 1 08/10/2021    BASOPCT 0.5 08/22/2021    MONOSABS 1.4 08/22/2021    LYMPHSABS 1.3 08/22/2021    EOSABS 0.1 08/22/2021    BASOSABS 0.1 08/22/2021    DIFFTYPE AUTOMATED DIFFERENTIAL 08/22/2021     CMP:    Lab Results   Component Value Date     08/23/2021    K 4.2 08/23/2021     08/23/2021 ([facility] 07/31/2020    Secondary cancer of bone ([facility] [address] 04/01/2020    Essential hypertension 04/01/2020    Left breast mass        Plan:     Problems being addressed this admission:   Acute pulmonary edema / acute HFpEF / acute hypoxemic respiratory failure  Left upper extremity DVT  Left lower extremity SVT  Hydropneumothorax s/p chest tube  ELIDIA on CKD 4  Hematuria  CA breast stage 4     Patient was quite dyspneic on admission seems to be improved on that. Her chest x-ray was read as acute pulmonary edema she probably also has acute diastolic dysfunction. To me the chest x-ray did not seem like having severe pulmonary edema,  on Bumex drip for 24 hours as per nephrology. Will consult cardiology as she is tachycardic also has irregular heart rate need to rule out atrial fibrillation. We will also check cardiac enzymes. She has been started on IV heparin for her upper extremity DVT along with lower extremity superficial vein thrombosis. He is having hematuria now on IV heparin need to stop heparin I have asked the nurse to check with oncology nurse and urology assistant. Patient has hydropneumothorax has a chest tube in. As per ER note, she has a Pleurx catheter which drained 300 ml today she is getting it drained every 3 days has been draining 500 before. Pulmonary or CT surgery to address it if needed. Chino Polanco consulted. Her BUN is 74 creatinine 3.8 is being seen by nephrology stop the bicarb IV once urology evaluation for gross hematuria. May need to have hemodialysis done. It is possible that he might need to stop the IV heparin due to hematuria as it might get worse. Oncology nurse saw her has metastatic breast cancer last treatment with palliative Taxol 8/5/2021. Due to declining performance status she is not a candidate for systemic treatment at this time we agree with hospice consult patient agreeable as well.   Oncology note mentions seizures and so she ordered an MRI of the brain to look for metastasis cannot see that being mentioned anywhere else nor in the admit note nor in the ER physician note. Prognosis: Guarded    Consultants:  Oncology  Pulmonary  Cardiology  Urology  Hospice    General Orders:  Repeat basic labs again in am.  I have explained to the patient and discussed with him/her the treatment plan. The above chart was generated partly using Dragon dictation system, it may contain dictation errors given the limitations of this technology.      Baltazar Garcia MD, Bird Mejia

## 2021-08-23 NOTE — CONSULTS
CARDIOLOGY CONSULT NOTE   Reason for consultation:  SOB    Referring physician:  Sunita Vann MD     Primary care physician: Anthony Stinson, APRN - NP      Dear  Dr. Sunita Vann MD   Thanks for the consult. Chief Complaints :  Chief Complaint   Patient presents with    Shortness of Breath     hx stage 4 breast cancer, catheter in place draining fluid from chest         History of present illness:Emmy is a 50 y. o.year old who presents with increased shortness of breath lower extremity swelling and renal failure. 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 Chest x-ray is concerning for pulmonary edema with elevated BNP level she is short of breath unable to move much without getting winded lower extremity has 2+ edema she says breathing has been getting worse for last few weeks  Overnight started on Bumex drip in the setting of renal failure she is currently on heparin  Has been noted to have persistent tachycardia    Past medical history:    has a past medical history of Anxiety, Asthma, Breast lesion, Essential hypertension, Secondary cancer of bone ([address] and Thyroid nodule. Past surgical history:   has a past surgical history that includes  section (); Appendectomy (age 27); Tonsillectomy (age 5); Port Surgery (Right, 3/15/2021); IR NONTUNNELED VASCULAR CATHETER > 5 YEARS (3/22/2021); Bladder surgery (Bilateral, 3/25/2021); Bladder surgery (Bilateral, 2021); CT GUIDED CHEST TUBE (8/3/2021); Bladder surgery (Bilateral, 2021); and Thoracoscopy (Left, 2021). Social History:   reports that she has never smoked. She has never used smokeless tobacco. She reports current alcohol use. She reports that she does not use drugs.   Family history:   no family history of CAD, STROKE of DM at early age    Allergies   Allergen Reactions    Latex Anaphylaxis     \"have trouble with banana's kiwi, avocados- Have trouble breathing with these and trouble breathig - get asthmatic from latex gloves, bandage, anything latex     Banana Anaphylaxis    Cinnamon Anaphylaxis       sodium bicarbonate tablet 650 mg, TID  bumetanide (BUMEX) 12.5 mg in sodium chloride 0.9 % 125 mL infusion, Continuous  cetirizine (ZYRTEC) tablet 10 mg, Daily  docusate sodium (COLACE) capsule 100 mg, Daily PRN  [Held by provider] furosemide (LASIX) tablet 80 mg, BID  calcitRIOL (ROCALTROL) capsule 1 mcg, BID  magnesium oxide (MAG-OX) tablet 400 mg, Daily  fluticasone (FLONASE) 50 MCG/ACT nasal spray 1 spray, Daily  sodium chloride flush 0.9 % injection 10 mL, 2 times per day  sodium chloride flush 0.9 % injection 10 mL, PRN  0.9 % sodium chloride infusion, PRN  ondansetron (ZOFRAN-ODT) disintegrating tablet 4 mg, Q8H PRN   Or  ondansetron (ZOFRAN) injection 4 mg, Q6H PRN  bisacodyl (DULCOLAX) EC tablet 5 mg, Daily PRN  acetaminophen (TYLENOL) tablet 650 mg, Q6H PRN   Or  acetaminophen (TYLENOL) suppository 650 mg, Q6H PRN  LORazepam (ATIVAN) tablet 0.5 mg, Q12H PRN  heparin (porcine) injection 5,080 Units, PRN  heparin (porcine) injection 2,540 Units, PRN  heparin 25,000 units in dextrose 5% 250 mL (premix) infusion, Continuous  calcium carbonate (TUMS) chewable tablet 500 mg, Q6H  calcium gluconate 2000 mg in sodium chloride 100 mL, BID      Current Facility-Administered Medications   Medication Dose Route Frequency Provider Last Rate Last Admin    sodium bicarbonate tablet 650 mg  650 mg Oral TID Ademorales Mckinney [place], DO   650 mg at 08/23/21 1443    bumetanide (BUMEX) 12.5 mg in sodium chloride 0.9 % 125 mL infusion  2 mg/hr Intravenous Continuous Ademorales Mckinney [place], DO 20 mL/hr at 08/23/21 1253 2 mg/hr at 08/23/21 1253    cetirizine (ZYRTEC) tablet 10 mg  10 mg Oral Daily Lyndon Espinosa MD   10 mg at 08/23/21 3581    docusate sodium (COLACE) capsule 100 mg  100 mg Oral Daily PRN Héctor Blair MD        [Held by provider] furosemide (LASIX) tablet 80 mg  80 mg Oral BID Lyndon [place] MD   80 mg at 08/23/21 0030    calcitRIOL (ROCALTROL) capsule 1 mcg  1 mcg Oral BID Andrea DAWKINS MD   1 mcg at 08/23/21 0837    magnesium oxide (MAG-OX) tablet 400 mg  400 mg Oral Daily Andrea DAWKINS MD   400 mg at 08/23/21 0836    fluticasone (FLONASE) 50 MCG/ACT nasal spray 1 spray  1 spray Each Nostril Daily Andrea DAWKINS MD   1 spray at 08/23/21 0840    sodium chloride flush 0.9 % injection 10 mL  10 mL Intravenous 2 times per day Héctor Blair MD   10 mL at 08/23/21 0838    sodium chloride flush 0.9 % injection 10 mL  10 mL Intravenous PRN Lyndon [place] MD        0.9 % sodium chloride infusion  25 mL Intravenous PRN Lyndon [place] MD        ondansetron (ZOFRAN-ODT) disintegrating tablet 4 mg  4 mg Oral Q8H PRN Lyndon [place] MD        Or    ondansetron (ZOFRAN) injection 4 mg  4 mg Intravenous Q6H PRN Lyndon [place] MD        bisacodyl (DULCOLAX) EC tablet 5 mg  5 mg Oral Daily PRN Lyndon [place] MD        acetaminophen (TYLENOL) tablet 650 mg  650 mg Oral Q6H PRN Lyndon [place] MD   650 mg at 08/23/21 3771    Or    acetaminophen (TYLENOL) suppository 650 mg  650 mg Rectal Q6H PRN Lyndon [place] MD        LORazepam (ATIVAN) tablet 0.5 mg  0.5 mg Oral Q12H PRN Camelia Vanegas MD   0.5 mg at 08/23/21 0030    heparin (porcine) injection 5,080 Units  80 Units/kg Intravenous PRN Camelia Vanegas MD        heparin (porcine) injection 2,540 Units  40 Units/kg Intravenous PRN Camelia Vanegas MD        heparin 25,000 units in dextrose 5% 250 mL (premix) infusion  5-30 Units/kg/hr Intravenous Continuous Camelia Vanegas MD 11.4 mL/hr at 08/23/21 0848 18 Units/kg/hr at 08/23/21 0848    calcium carbonate (TUMS) chewable tablet 500 mg  500 mg Oral Q6H Елена Lau DO   500 mg at 08/23/21 1443    calcium gluconate 2000 mg in sodium chloride 100 mL  2,000 mg Intravenous BID Елена Lau DO   Stopped at 08/23/21 1050     Review of Systems:   · Constitutional: No Fever or Weight Loss   · Eyes: No Decreased Vision  · ENT: No Headaches, Hearing Loss or Vertigo  · Cardiovascular: As per HPI  · Respiratory: As per HPI  · Gastrointestinal: No abdominal pain, appetite loss, blood in stools, constipation, diarrhea or heartburn  · Genitourinary: No dysuria, trouble voiding, or hematuria  · Musculoskeletal:  No gait disturbance, weakness or joint complaints  · Integumentary: No rash or pruritis  · Neurological: No TIA or stroke symptoms  · Psychiatric: No anxiety or depression  · Endocrine: No malaise, fatigue or temperature intolerance  · Hematologic/Lymphatic: No bleeding problems, blood clots or swollen lymph nodes  · Allergic/Immunologic: No nasal congestion or hives  All systems negative except as marked. Physical Examination:    Vitals:    08/23/21 0000 08/23/21 0400 08/23/21 0832 08/23/21 1148   BP: 131/62 107/63 114/61    Pulse: 107 115 105    Resp: 25 25 29 24   Temp: 98.6 °F (37 °C) 97.4 °F (36.3 °C) 97.5 °F (36.4 °C)    TempSrc: Oral Oral Oral    SpO2: 97% 93% 95% 96%   Weight:       Height:           General Appearance:  No distress, conversant    Constitutional:  Well developed, Well nourished, No acute distress, Non-toxic appearance. HENT:  Normocephalic, Atraumatic, Bilateral external ears normal, Oropharynx moist, No oral exudates, Nose normal. Neck- Normal range of motion, No tenderness, Supple, No stridor,no apical-carotid delay  Lymphatics : no palpable lymph nodes  Eyes:  PERRL, EOMI, Conjunctiva normal, No discharge. Respiratory:  Normal breath sounds, No respiratory distress, No wheezing, extensive macerated chronic changes of the chest wall and breast tissue. ,no use of accessory muscles, crackles Present  Cardiovascular: (PMI) apex non displaced,no lifts no thrills, ankle swelling Present , 1+, s1 and s2 audible,Murmur. Present, JVD not noted    Abdomen /GI:  Bowel sounds normal, Soft, No tenderness, No masses, No gross visceromegaly   :  No costovertebral angle tenderness   Musculoskeletal:  No edema, no tenderness, no deformities. Back- no tenderness  Integument: Chest wall has chronic inflammatory changes postradiation skin discoloration and scarring  Lymphatic:  No lymphadenopathy noted   Neurologic:  Alert & oriented x 3, CN 2-12 normal, normal motor function, normal sensory function, no focal deficits noted           Medical decision making and Data review:    Lab Review   Recent Labs     08/22/21 2214   WBC 15.1*   HGB 8.7*   HCT 28.8*         Recent Labs     08/23/21  0424      K 4.2      CO2 23   BUN 74*   CREATININE 3.8*     Recent Labs     08/22/21  0243   AST 38*   ALT <5*   BILITOT 0.2   ALKPHOS 92     Recent Labs     08/22/21  0243   TROPONINT 0.037*       Recent Labs     08/22/21  0243   PROBNP 4,230*     Lab Results   Component Value Date    INR 1.01 08/03/2021    PROTIME 13.0 08/03/2021       EKG: (reviewed by myself)    ECHO:(reviewed by myself)    Chest Xray:(reviewed by myself)  CT ABDOMEN PELVIS WO CONTRAST Additional Contrast? None    Result Date: 7/31/2021  EXAMINATION: CT OF THE CHEST WITHOUT CONTRAST; CT OF THE ABDOMEN AND PELVIS WITHOUT CONTRAST 7/28/2021 10:53 am; 7/28/2021 10:52 am TECHNIQUE: CT of the chest was performed without the administration of intravenous contrast. Multiplanar reformatted images are provided for review. Dose modulation, iterative reconstruction, and/or weight based adjustment of the mA/kV was utilized to reduce the radiation dose to as low as reasonably achievable.; CT of the abdomen and pelvis was performed without the administration of intravenous contrast. Multiplanar reformatted images are provided for review.  Dose modulation, iterative reconstruction, and/or weight based adjustment of the mA/kV was utilized to reduce the radiation dose to as low as reasonably achievable. COMPARISON: 5/10/2021, 3/24/2021 CT chest, 3/23/2021 CT abdomen and pelvis. Correlation is made to PET-CT dated 5/7/2020 HISTORY: ORDERING SYSTEM PROVIDED HISTORY: Malignant neoplasm of breast in female, estrogen receptor positive, unspecified laterality, unspecified site of breast [facility]) TECHNOLOGIST PROVIDED HISTORY: Is the patient pregnant?->No Reason for Exam: Recheck Breast Cancer, asthma, heartburn, gas, bloating, nausea, fatigue, back pain, swelling; ORDERING SYSTEM PROVIDED HISTORY: Malignant neoplasm of breast in female, estrogen receptor positive, unspecified laterality, unspecified site of breast [facility]) TECHNOLOGIST PROVIDED HISTORY: Additional Contrast?->None Is the patient pregnant?->No Reason for Exam: Recheck Breast Cancer, asthma, heartburn, gas, bloating, nausea, fatigue, back pain, swelling FINDINGS: Chest: Mediastinum: Heart size is normal.  Relative hypoattenuation of the blood pool compared to myocardium is suggestive of anemia. Within the limitations of a noncontrast exam, there is no evidence of right hilar adenopathy. Left hilum is not adequately seen for evaluation of adenopathy. 1.5 x 0.9 cm precarinal node, minimally decreased in size compared to the prior exam, at which time it measured 2.0 x 1.3 cm. Previously seen subcarinal lymph node has decreased in size. Lungs/pleura: Large left pleural effusion, increased compared to the prior exam, with collapse of the left lower lobe and near complete collapse of the left upper lobe. Although not well seen, the pleural surface has a slightly nodular appearance, suspicious for pleural metastasis. Soft Tissues/Bones: Skin thickening of the anterior chest wall. 1.7 x 1.1 cm right axillary lymph node has not significantly changed. Hypodensity in the thyroid gland with rim calcification is stable but not well seen by CT. The isthmus of the thyroid gland appears thickened.   Diffuse bone metastasis in the axial skeleton. Thoracic alignment is maintained. There is a pathologic fracture of T10, chronic. No evidence of bony fragment retropulsion into the central canal.  Nodular thickening of the left anterior chest wall skin and subcutaneous soft tissue. Abdomen/Pelvis: Organs: Unenhanced liver, gallbladder, spleen, pancreas, and adrenal glands are unremarkable. Bilateral ureteral stents with proximal loops in the renal collecting system and distal loops in the bladder. Right renal pelvicaliectasis without you hydronephrosis. GI/Bowel: No evidence of bowel obstruction or free intraperitoneal air. Pelvis: Eccentric bladder wall thickening with relative thickening of the left lateral wall compared to the right. Small amount of free fluid in the pelvis. Pelvic adenopathy, including 1.9 x 1.7 cm right external iliac node and 1.6 x 1.2 cm left external iliac node. Peritoneum/Retroperitoneum: Abdominal aortic caliber is normal.  No retroperitoneal adenopathy. Bones/Soft Tissues: Circumferential subcutaneous edema of the abdominal and pelvic wall. Diffuse sclerotic bone metastasis in the axial skeleton. 1. Large left pleural effusion, increased compared to the exam of 5/10/2021. There is now complete collapse of the left lower lobe and near complete collapse of the left upper lobe. Nodular appearance of the left pleural surface is highly suspicious for pleural metastasis. 2. Pelvic adenopathy, not significantly changed compared to prior exam. Finding is highly suspicious for metastasis. 3. Nodular skin thickening of the left anterior chest wall and subcutaneous fat, likely metastasis. 4. 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 This is a chronic finding. 5. Persistent eccentric bladder wall thickening with relative thickening of the left side of the bladder wall. Differential includes cystitis and malignancy.   If it would alter patient's management, consider cystoscopy. 6. Stable position of bilateral ureteral stents. Right renal pelvocaliectasis without you hydronephrosis. 7. Diffuse skin thickening with subcutaneous edema of the chest, abdominal, and pelvic wall. Please correlate with clinical symptoms of anasarca. CT HEAD WO CONTRAST    Result Date: 8/22/2021  EXAMINATION: CT OF THE HEAD WITHOUT CONTRAST  8/22/2021 9:26 am TECHNIQUE: CT of the head was performed without the administration of intravenous contrast. Dose modulation, iterative reconstruction, and/or weight based adjustment of the mA/kV was utilized to reduce the radiation dose to as low as reasonably achievable. COMPARISON: None. HISTORY: ORDERING SYSTEM PROVIDED HISTORY: change in MS, stage 4 cancer TECHNOLOGIST PROVIDED HISTORY: Reason for exam:->change in MS, stage 4 cancer Has a \"code stroke\" or \"stroke alert\" been called? ->No Is the patient pregnant?->No Reason for Exam: CHANGE IN MS, STAGE 4 CANCER Acuity: Acute Type of Exam: Initial FINDINGS: BRAIN/VENTRICLES: There is no acute intracranial hemorrhage, mass effect or midline shift. No abnormal extra-axial fluid collection. The gray-white differentiation is maintained without evidence of an acute infarct. There is no evidence of hydrocephalus. ORBITS: The visualized portion of the orbits demonstrate no acute abnormality. SINUSES: No acute abnormality of the sinuses. Mucous retention cyst or polyp in the left maxillary sinus. SOFT TISSUES/SKULL:  No acute abnormality of the visualized skull or soft tissues. No acute intracranial abnormality. No acute intracranial hemorrhage or mass effect. If there is persistent clinical concern for multiple sclerosis flare and/or metastatic disease, further evaluation with MRI of the brain with and without contrast is recommended.      CT CHEST WO CONTRAST    Result Date: 8/6/2021  EXAMINATION: CT OF THE CHEST WITHOUT CONTRAST 8/6/2021 8:56 am TECHNIQUE: CT of the chest was performed without the administration of intravenous contrast. Multiplanar reformatted images are provided for review. Dose modulation, iterative reconstruction, and/or weight based adjustment of the mA/kV was utilized to reduce the radiation dose to as low as reasonably achievable. COMPARISON: August 3, 2021 HISTORY: ORDERING SYSTEM PROVIDED HISTORY: pneumothorax TECHNOLOGIST PROVIDED HISTORY: Reason for exam:->pneumothorax Is the patient pregnant?->No Reason for Exam: pneumothorax FINDINGS: Mediastinum: Heart size is normal.  No pericardial effusion. Right central catheter tip extends to the right atrium. Within the limitations of a noncontrast exam, there is no evidence of hilar adenopathy. Multiple hypodensities in the spleen measure up to 1.2 cm. Similar finding was seen on prior CT of January 3, 2020. Lungs/pleura: Large left hydropneumothorax. There is a pleural catheter. Near complete collapse of the left lower lobe and left upper lobe. The non-collapsed portion of the left upper lobe contains a consolidation. Small right pleural effusion with dependent atelectasis of the right lower lobe. There are peribronchovascular ground-glass opacities in the right upper lobe. Central tracheobronchial airways are unremarkable. Pleural nodularity, seen on prior CT, is not well seen on this exam. Upper Abdomen: No acute abnormality in the upper abdomen. Soft Tissues/Bones: Diffuse sclerotic lesions throughout the axial skeleton. Chronic pathologic compression fracture T10. Skin thickening along the anterior chest wall with subcutaneous edema/inflammation. Nodularity of the skin along the left anterolateral chest wall. 1. Large left hydropneumothorax with near complete collapse of the left lung. Visible portion of the left upper lobe contains a consolidation, suspicious for pneumonia. 2. Peribronchovascular ground-glass opacities in the right upper lobe, likely infectious or inflammatory.  3. Small right pleural effusion. 4. Nodular skin thickening along the left anterolateral chest wall and subcutaneous fat, suspicious for metastasis. 5. Diffuse bone metastasis in the axial skeleton with chronic pathologic fracture of T10.     CT CHEST WO CONTRAST    Result Date: 7/31/2021  EXAMINATION: CT OF THE CHEST WITHOUT CONTRAST; CT OF THE ABDOMEN AND PELVIS WITHOUT CONTRAST 7/28/2021 10:53 am; 7/28/2021 10:52 am TECHNIQUE: CT of the chest was performed without the administration of intravenous contrast. Multiplanar reformatted images are provided for review. Dose modulation, iterative reconstruction, and/or weight based adjustment of the mA/kV was utilized to reduce the radiation dose to as low as reasonably achievable.; CT of the abdomen and pelvis was performed without the administration of intravenous contrast. Multiplanar reformatted images are provided for review. Dose modulation, iterative reconstruction, and/or weight based adjustment of the mA/kV was utilized to reduce the radiation dose to as low as reasonably achievable. COMPARISON: 5/10/2021, 3/24/2021 CT chest, 3/23/2021 CT abdomen and pelvis.   Correlation is made to PET-CT dated 5/7/2020 HISTORY: ORDERING SYSTEM PROVIDED HISTORY: Malignant neoplasm of breast in female, estrogen receptor positive, unspecified laterality, unspecified site of breast [facility]) TECHNOLOGIST PROVIDED HISTORY: Is the patient pregnant?->No Reason for Exam: Recheck Breast Cancer, asthma, heartburn, gas, bloating, nausea, fatigue, back pain, swelling; ORDERING SYSTEM PROVIDED HISTORY: Malignant neoplasm of breast in female, estrogen receptor positive, unspecified laterality, unspecified site of breast ([facility] [address] TECHNOLOGIST PROVIDED HISTORY: Additional Contrast?->None Is the patient pregnant?->No Reason for Exam: Recheck Breast Cancer, asthma, heartburn, gas, bloating, nausea, fatigue, back pain, swelling FINDINGS: Chest: Mediastinum: Heart size is normal.  Relative hypoattenuation of the blood pool compared to myocardium is suggestive of anemia. Within the limitations of a noncontrast exam, there is no evidence of right hilar adenopathy. Left hilum is not adequately seen for evaluation of adenopathy. 1.5 x 0.9 cm precarinal node, minimally decreased in size compared to the prior exam, at which time it measured 2.0 x 1.3 cm. Previously seen subcarinal lymph node has decreased in size. Lungs/pleura: Large left pleural effusion, increased compared to the prior exam, with collapse of the left lower lobe and near complete collapse of the left upper lobe. Although not well seen, the pleural surface has a slightly nodular appearance, suspicious for pleural metastasis. Soft Tissues/Bones: Skin thickening of the anterior chest wall. 1.7 x 1.1 cm right axillary lymph node has not significantly changed. Hypodensity in the thyroid gland with rim calcification is stable but not well seen by CT. The isthmus of the thyroid gland appears thickened. Diffuse bone metastasis in the axial skeleton. Thoracic alignment is maintained. There is a pathologic fracture of T10, chronic. No evidence of bony fragment retropulsion into the central canal.  Nodular thickening of the left anterior chest wall skin and subcutaneous soft tissue. Abdomen/Pelvis: Organs: Unenhanced liver, gallbladder, spleen, pancreas, and adrenal glands are unremarkable. Bilateral ureteral stents with proximal loops in the renal collecting system and distal loops in the bladder. Right renal pelvicaliectasis without you hydronephrosis. GI/Bowel: No evidence of bowel obstruction or free intraperitoneal air. Pelvis: Eccentric bladder wall thickening with relative thickening of the left lateral wall compared to the right. Small amount of free fluid in the pelvis. Pelvic adenopathy, including 1.9 x 1.7 cm right external iliac node and 1.6 x 1.2 cm left external iliac node.  Peritoneum/Retroperitoneum: Abdominal aortic caliber is normal.  No retroperitoneal adenopathy. Bones/Soft Tissues: Circumferential subcutaneous edema of the abdominal and pelvic wall. Diffuse sclerotic bone metastasis in the axial skeleton. 1. Large left pleural effusion, increased compared to the exam of 5/10/2021. There is now complete collapse of the left lower lobe and near complete collapse of the left upper lobe. Nodular appearance of the left pleural surface is highly suspicious for pleural metastasis. 2. Pelvic adenopathy, not significantly changed compared to prior exam. Finding is highly suspicious for metastasis. 3. Nodular skin thickening of the left anterior chest wall and subcutaneous fat, likely metastasis. 4. 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 This is a chronic finding. 5. Persistent eccentric bladder wall thickening with relative thickening of the left side of the bladder wall. Differential includes cystitis and malignancy. If it would alter patient's management, consider cystoscopy. 6. Stable position of bilateral ureteral stents. Right renal pelvocaliectasis without you hydronephrosis. 7. Diffuse skin thickening with subcutaneous edema of the chest, abdominal, and pelvic wall. Please correlate with clinical symptoms of anasarca. FL LESS THAN 1 HOUR    Result Date: 8/5/2021  Radiology exam is complete. No Radiologist dictation. Please follow up with ordering provider. XR CHEST PORTABLE    Result Date: 8/22/2021  EXAMINATION: ONE XRAY VIEW OF THE CHEST 8/22/2021 1:30 am COMPARISON: 8/13/2021 HISTORY: ORDERING SYSTEM PROVIDED HISTORY: shortness of breath TECHNOLOGIST PROVIDED HISTORY: Reason for exam:->shortness of breath Reason for Exam: shortness of breath Acuity: Unknown Type of Exam: Initial FINDINGS: Unchanged right port catheter. Left chest tube remains in place. Persistent small left hydropneumothorax, stable.   Left-sided airspace opacities appear similar to the prior exam.  Increased diffuse interstitial prominence. The cardiac silhouette and osseous structures are stable. Stable sclerotic lesions in the scapula bilaterally. 1. Increased diffuse interstitial prominence suggests pulmonary edema. 2. Unchanged left hydropneumothorax with a left chest tube in place. 3. Unchanged right port catheter and sclerotic lesions in the scapulae bilaterally. XR CHEST PORTABLE    Result Date: 8/14/2021  EXAMINATION: ONE XRAY VIEW OF THE CHEST 8/13/2021 5:04 am COMPARISON: Chest radiograph dated August 12, 2021. HISTORY: ORDERING SYSTEM PROVIDED HISTORY: lung resection TECHNOLOGIST PROVIDED HISTORY: Reason for exam:->lung resection Reason for Exam: lung resection Acuity: Acute Type of Exam: Initial Mechanism of Injury: lung resection Relevant Medical/Surgical History: lung resection FINDINGS: The cardiomediastinal silhouette is stable. Left chest tube is seen. There is a small left apical pneumothorax without significant change. Right-sided chest port is noted. There has been interval improvement in the previously noted congestion. Minimal left basilar opacity is noted. 1. Left chest tube with a small left apical pneumothorax without significant change. 2. Minimal left basilar opacity without significant change. 3. Improving pulmonary vascular congestion. XR CHEST PORTABLE    Result Date: 8/12/2021  EXAMINATION: ONE XRAY VIEW OF THE CHEST 8/12/2021 9:35 am COMPARISON: 8/11/2021 HISTORY: ORDERING SYSTEM PROVIDED HISTORY: lung resection TECHNOLOGIST PROVIDED HISTORY: Reason for exam:->lung resection Reason for Exam: lung resection FINDINGS: The cardiac silhouette and mediastinal contours are normal.  Right chest MediPort is stable. There is a left chest tube. Pulmonary vascular congestion is worse. There is a small right pleural effusion with associated right basilar atelectasis. There is a questionable left apical pneumothorax.   There is a probable small left subpulmonic pneumothorax, more conspicuous. Subcutaneous emphysema along the lower left chest wall laterally is noted. 1. Worsening pulmonary vascular congestion. 2. Persistent left chest tube with a slightly larger left pneumothorax, more pronounced apically and in the left lateral subpulmonic region. 3. Small right pleural effusion with associated atelectasis in the right lung base, increased. XR CHEST PORTABLE    Result Date: 8/11/2021  EXAMINATION: ONE XRAY VIEW OF THE CHEST 8/11/2021 5:29 am COMPARISON: 08/10/2021 5:13 a.m. HISTORY: ORDERING SYSTEM PROVIDED HISTORY: ptx TECHNOLOGIST PROVIDED HISTORY: Reason for exam:->ptx Reason for Exam: ptx Acuity: Acute Type of Exam: Subsequent/Follow-up FINDINGS: Monitor wires project over the chest.  Right subclavian port tip at the cavoatrial junction. Left chest tube is present. Interval removal of pigtail drainage catheter. Trace left apical pneumothorax. Small left pleural effusion. Left basilar/retrocardiac opacity. Heart size and mediastinal contours are stable. Pulmonary vascularity is within normal limits. 1. Interval removal of left pleural pigtail catheter. 2. Otherwise, stable chest.  Trace left apical pneumothorax. 3. Left basilar/retrocardiac opacity persists     XR CHEST PORTABLE    Result Date: 8/10/2021  EXAMINATION: ONE XRAY VIEW OF THE CHEST 8/10/2021 5:28 am COMPARISON: August 9, 2021 HISTORY: ORDERING SYSTEM PROVIDED HISTORY: pigtail catheter clamped TECHNOLOGIST PROVIDED HISTORY: Reason for exam:->pigtail catheter clamped Reason for Exam: pigtail catheter clamped Acuity: Acute Type of Exam: Subsequent/Follow-up FINDINGS: There is a right-sided chest port with its tip in the right atrium. There are left-sided chest tubes in place. The cardiomediastinal silhouette is stable. There are increased lung markings bilaterally, may be related to mild pulmonary vascular congestion versus bronchitis or early pneumonia. There is no pleural effusion.   There is trace left apical pneumothorax. The osseous structures are stable. Left-sided chest tubes in place. Trace left focal pneumothorax. Increased lung markings bilaterally, may be related to mild pulmonary vascular congestion versus bronchitis or early pneumonia. XR CHEST PORTABLE    Result Date: 8/9/2021  EXAMINATION: ONE XRAY VIEW OF THE CHEST 8/9/2021 10:20 am COMPARISON: Yesterday HISTORY: ORDERING SYSTEM PROVIDED HISTORY: pleural effusion TECHNOLOGIST PROVIDED HISTORY: Reason for exam:->pleural effusion Reason for Exam: pleural effusion FINDINGS: Left chest tube and left chest drain unchanged in position. Right chest port with catheter tip at the cavoatrial junction. Monitor wires overlie the patient. No pneumothorax. Mild left basilar atelectasis and trace pleural effusion are stable. Heart size is normal.     No significant change from prior exam.  Trace left pleural effusion and left basilar atelectasis remain. XR CHEST PORTABLE    Result Date: 8/9/2021  EXAMINATION: ONE X-RAY VIEW OF THE CHEST 8/8/2021 4:36 am COMPARISON: 08/07/2021 HISTORY: ORDERING SYSTEM PROVIDED HISTORY: fu pneumothorax TECHNOLOGIST PROVIDED HISTORY: Reason for exam:->fu pneumothorax Reason for Exam: fu pneumothorax Acuity: Unknown Type of Exam: Unknown FINDINGS: Two left-sided chest tubes are present. There is a partially loculated left basilar pneumothorax again noted unchanged since prior examination. Right subclavian line is present with tip at the cavoatrial junction. No right pneumothorax. Left lower lobe airspace opacities noted. There is also patchy right upper lobe airspace disease. Stable examination with partially loculated left basilar pneumothorax and left chest tubes. Multifocal patchy airspace disease noted throughout both lungs may represent pneumonia or pulmonary edema.      XR CHEST PORTABLE    Result Date: 8/7/2021  EXAMINATION: ONE XRAY VIEW OF THE CHEST 8/7/2021 6:28 pm COMPARISON: Chest radiograph and CT chest 08/06/2021. HISTORY: ORDERING SYSTEM PROVIDED HISTORY: chest tube placement TECHNOLOGIST PROVIDED HISTORY: Reason for exam:->chest tube placement Reason for Exam: chest tube placement FINDINGS: There is been interval placement of an additional left chest tube. Small residual left pleural effusion. Considerable re-expansion of the left lung. Small left basilar pneumothorax. Mild left basilar opacities. The cardiomediastinal silhouette is unchanged. A right upper extremity PICC terminates at the cavoatrial junction. No acute osseous abnormality. 1. Interval placement of a 2nd left chest tube with a small residual left basilar hydropneumothorax. Considerable re-expansion of the left lung. 2. Mild left basilar opacities compatible with atelectasis or pneumonia. XR CHEST PORTABLE    Result Date: 8/6/2021  EXAMINATION: ONE XRAY VIEW OF THE CHEST 8/6/2021 5:45 am COMPARISON: 08/05/2021 HISTORY: ORDERING SYSTEM PROVIDED HISTORY: Hypoxia and left pneumothorax TECHNOLOGIST PROVIDED HISTORY: Reason for exam:->Hypoxia and left pneumothorax Reason for Exam: Hypoxia and left pneumothorax Acuity: Unknown Type of Exam: Subsequent/Follow-up Mechanism of Injury: Hypoxia and left pneumothorax Relevant Medical/Surgical History: Hypoxia and left pneumothorax FINDINGS: There is a right-sided chest port with the catheter tip overlying the cavoatrial junction. Cardiac mediastinal silhouettes appear similar. Worsening opacification in the left mid chest.  A small bore chest tube is identified. Multilevel degenerative changes are seen in the spine. Paucity of lung markings at the apex may be related to persistent pneumothorax. No new infiltrate identified on the right.      Paucity of lung markings at the left lung apex with mild increased left to right midline shift of the cardiac silhouette and increasing opacification in the left chest.  Consider further evaluation with a dedicated CT scan of the chest to evaluate for possible developing tension. I discussed this with Dr. Nikole Brumfield at 7:56 a.m. on 08/06/2021. XR CHEST PORTABLE    Result Date: 8/5/2021  EXAMINATION: ONE XRAY VIEW OF THE CHEST 8/5/2021 5:45 am COMPARISON: Multiple prior chest radiograph, most recently 8/4/2021 HISTORY: ORDERING SYSTEM PROVIDED HISTORY: Hypoxia TECHNOLOGIST PROVIDED HISTORY: Reason for exam:->Hypoxia Reason for Exam: Hypoxia Acuity: Unknown Type of Exam: Subsequent/Follow-up Additional signs and symptoms: Hypoxia Relevant Medical/Surgical History: Hypoxia FINDINGS: Left chest tube remains in place, with interval decrease in size of left pneumothorax, with resolution of the left lateral chest and basilar component. No definite residual pneumothorax identified. Similar left pleural effusion and dense airspace opacities throughout the left lung. The cardiomediastinal silhouette is partially obscured and unchanged. Right lung is clear. No pneumothorax or pleural effusion. No acute osseous abnormality. 1.  Left chest tube remains in place with interval resolution of left lateral chest wall and basilar pneumothorax. No definite residual pneumothorax is identified. 2.  Similar appearing left pleural effusion with airspace opacities in the left mid lung. XR CHEST PORTABLE    Result Date: 8/5/2021  EXAMINATION: ONE XRAY VIEW OF THE CHEST 8/4/2021 5:44 am COMPARISON: August 3, 2021 HISTORY: ORDERING SYSTEM PROVIDED HISTORY: Post left chest tube insertion TECHNOLOGIST PROVIDED HISTORY: Reason for exam:->Post left chest tube insertion Reason for Exam: Post left chest tube insertion Acuity: Acute Type of Exam: Subsequent/Follow-up FINDINGS: Interval placement of a left chest tube. No significant interval change of a large pneumothorax. Airspace opacities are identified in the left upper lung zone. Consolidation is seen in the base. Left effusion is also seen.   The right lung is grossly clear without focal consolidation or pleural effusion. No pulmonary edema. No new osseous abnormality. Stable appearance of a right-sided MediPort. Status post placement of a left pleural pigtail catheter. No significant interval change of a large pneumothorax. Stable opacities in the left lung with pleural effusion. XR CHEST PORTABLE    Result Date: 8/3/2021  EXAMINATION: ONE XRAY VIEW OF THE CHEST 8/3/2021 10:05 am COMPARISON: CT chest 07/28/2021 HISTORY: ORDERING SYSTEM PROVIDED HISTORY: post left thoracentesis. TECHNOLOGIST PROVIDED HISTORY: SDS #[id number] Reason for exam:->post left thoracentesis. Reason for Exam: post left thoracentesis. FINDINGS: Right arm port catheter with catheter tip right atrium. Stable cardiomediastinal silhouette. Significant decrease in size of left pleural effusion with probable small residual pleural effusion remaining. Hazy opacification of the left lung with moderate amount of air in the left pleural space compatible with atelectasis secondary to trapped lung. Significant decrease in size in left pleural effusion with likely small residual amount of pleural fluid remaining. Hazy opacification of the left lung and moderate amount of air in the left pleural space compatible with trapped lung. CT GUIDED CHEST TUBE    Result Date: 8/3/2021  PROCEDURE: CT GUIDED CHEST TUBE PLACEMENT 8/3/2021 HISTORY: ORDERING SYSTEM PROVIDED HISTORY: Post thoracentesis TECHNOLOGIST PROVIDED HISTORY: Reason for exam:->Post thoracentesis Which side should the procedure be performed? ->Left Is the patient pregnant?->No Reason for Exam: post thoracentesis chest tube Relevant Medical/Surgical History: ATTN: DR TREVIÑO Pneumothorax SEDATION: None TECHNIQUE: Informed consent was obtained after a detailed explanation of the procedure including risks, benefits, and alternatives. Universal protocol was followed. A suitable skin site was prepped and draped in sterile fashion following CT localization.   The patient was initially scanned supine, however, the patient has extensive post radiation change on anterior left chest wall. Patient was then placed right lateral decubitus to minimize needle path through the radiation change. An 18 gauge needle was advanced into the left pleural space and a 0.035 guidewire was used to place a 8 Georgian chest tube after the fascial tract was dilated. The catheter was sutured to the skin and the patient tolerated the procedure well. The catheter was attached to Pleurevac drainage. The patient tolerated the procedure well and there were no immediate complications. EBL: Less than 5 cc FINDINGS: 1. When compared with the chest CT from 7/28/2021 there has been interval re-expansion of the left lung. There are diffuse nonspecific parenchymal opacities seen throughout visualized left lung. 2. Moderate left hydropneumothorax status post chest tube placement. 3. Nonspecific diffuse soft tissue nodularity seen throughout the left chest wall soft tissues with dermal thickening, which is not significantly changed since the recent CT. This could represent post radiation change, however, superimposed malignancy cannot be excluded. 4. Limited evaluation demonstrates sclerotic lesions seen throughout the spine, sternum and ribs. 5. Nodular thickening noted along the left pleura at the lung base. 1. Successful CT guided placement of a left-sided 8 Georgian chest tube. 2. Interval re-expansion of left lung since the recent CT with diffuse asymmetric left-sided pulmonary parenchymal opacities. These opacities are nonspecific but could be secondary to infection, asymmetric edema, or pulmonary hemorrhage. Atelectasis is less likely. 3. Moderate left hydropneumothorax post left chest tube placement. 4. Nodular thickening noted along the pleura at the left lung base is suspicious for metastatic disease.      VL DUP LOWER EXTREMITY VENOUS BILATERAL    Result Date: 8/22/2021  EXAMINATION: DUPLEX VENOUS ULTRASOUND OF THE of Doppler flow. A nonocclusive thrombus is identified within the left axillary vein, with minimal central venous flow noted. Additional occlusive, non compressible, hyperechogenic thrombus is seen within the cephalic and basilic veins. The left brachial vein is patent with normal Doppler flow. The radial and ulnar veins appear patent. There is extensive subcutaneous edema, most prominently over the ulnar aspect of the forearm. Extensive occlusive venous thrombus of the left upper extremity involving the left internal jugular, subclavian, cephalic, and basilic veins. Additional nonocclusive thrombus of the left axillary vein. Extensive subcutaneous edema throughout the forearm. IR GUIDED THORACENTESIS PLEURAL    Result Date: 8/3/2021  PROCEDURE: ULTRASOUNDGUIDED left THORACENTESIS 8/3/2021 HISTORY: ORDERING SYSTEM PROVIDED HISTORY: Pleural effusion TECHNOLOGIST PROVIDED HISTORY: Which side should the procedure be performed? ->Left Reason for exam:->Pleural Effusion Is the patient pregnant?->No Shortness of breath TECHNIQUE: Informed consent was obtained after a detailed explanation of the procedure including risks, benefits, and alternatives. Universal protocol was performed. The left chest was prepped and draped in sterile fashion and local anesthesia was achieved with lidocaine. A 5 [place] needle sheath was advanced under ultrasound guidance into pleural effusion and thoracentesis was performed. The patient tolerated the procedure well. EBL: Less than 5 cc FINDINGS: A total of 1400 cc clear yellow fluid was removed. Fluid was sent for analysis     Successful ultrasound guided left-sided thoracentesis. All labs, medications and tests reviewed by myself including data  from outside source , patient and available family . Continue all other medications of all above medical condition listed as is.      Impression:  Active Problems:    Essential hypertension    Metastatic breast cancer ([facility] [address] Tachycardia    SOB (shortness of breath)  Resolved Problems:    * No resolved hospital problems. *      Assessment: 50 y. o.year old with PMH of  has a past medical history of Anxiety, Asthma, Breast lesion, Essential hypertension, Secondary cancer of bone ([address] and Thyroid nodule. Plan and Recommendations:    Decompensated heart failure with preserved EF in the setting of metastatic breast cancer pleural effusion which is malignant s/p pleural tap and extensive DVT of the upper extremity  She is high risk for PE may have already had PE contributing to tachycardia? CHF: Acute Systolic/ Diastolic decompensated heart failure. Appears to be volume overloaded . Agree with diuresis. Started on Bumex drip managed by nephrology agree with diuresis  Continue metoprolol  Extensive DVT consider switching to Lovenox or Xarelto  DVT prophylaxis if no contraindication  6. We will get limited echo          Thank you  much for consult and giving us the opportunity in contributing in the care of this patient. Please feel free to call me for any questions.        Reynaldo Hernandez MD, 8/23/2021 3:25 PM

## 2021-08-23 NOTE — CONSULTS
[facility], [address]   Consult Note  [facility] 1 2 3 4 5    Date: 2021   Patient: Jes Hopkins   : 1965   DOA: 2021   MRN: [id number]   ROOM#: [id number]     Reason for Consult: Ashtyn Castillo hematuria  Requesting Physician:  Dr. Josefina Acosta  Collaborating Urologist on Call at time of admission: Dr. Ye Cai: Shortness of breath    History Obtained From:  patient, electronic medical record    HISTORY OF PRESENT ILLNESS:                The patient is a 50 y.o. female with significant past medical history of metastatic breast cancer w resultant bilateral hydronephrosis managed with chronic indwelling ureteral stents and undergoing palliative chemotherapy who presented with shortness of breath. Pt was found to have a extensive occlusive venous thrombus of the left upper extremity involving the left internal jugular, subclavian, cephalic, and basilic veins with additional nonocclusive thrombus of the left axillary vein, so she was started on a Heparin drip overnight. She also has a Pleurx drain with increased output recently. A benítez catheter was inserted this morning with bloody urine return. Of note, Hospice has been consulted. Benítez catheter irrigated with 200cc sterile water with 2 medium-sized clots returned. Urine light pink by end of irrigation. Pt tolerated well. ED Provider's HPI 21: Jes Hopkins is a 50 y.o. female history of metastatic breast cancer with a Pleurx catheter in place who presents with increasing shortness of breath today. She has a mild cough. She is not on oxygen at home. She denies any chest pain or fevers. The Pleurx catheter drained 300 mL today. She is getting it drained every 3 days but it had been draining 500 mL a day. She has a remote history of a blood clot but she does not know what it was associated with her where was. She has chronic swelling in her bilateral lower extremities which is unchanged.     Past Medical History: Diagnosis Date    Anxiety     Asthma     last flare up 5 yrs ago    Breast lesion     \"have spot on left breast going to remove- at this time not sure if cancer or not\"    Essential hypertension 4/1/2020    Secondary cancer of bone ([address] 4/1/2020    Thyroid nodule     \"have thyroid nodules-      Past Surgical History:        Procedure Laterality Date    APPENDECTOMY  age 27   [employer] BLADDER SURGERY Bilateral 3/25/2021    BILATERAL CYSTOSCOPY BILATERAL STENT INSERTION performed by Kaitlin Wu MD at [address] [facility] Bilateral 6/11/2021    BILATERAL CYSTOSCOPY RETROGRADE PYELOGRAM STENT EXCHANGE performed by Ann-Marie Fleming MD at [address] Bilateral 8/5/2021    BILATERAL CYSTOSCOPY, BILATERAL STENT EXCHANGE, LEFT URETEROSCOPYAND LEFT RETROGRADE PYELOGRAM performed by Constance Yu MD at [address] GUIDED CHEST TUBE  8/3/2021    CT GUIDED CHEST TUBE 8/3/2021 [facility] CT SCAN    IR NONTUNNELED VASCULAR CATHETER  3/22/2021    IR NONTUNNELED VASCULAR CATHETER 3/22/2021 [facility] SPECIAL PROCEDURES    PORT SURGERY Right 3/15/2021    RIGHT PORT INSERTION performed by Dorian Robbins MD at [address] [facility] [address] Left 8/12/2021    LEFT THORACOSCOPY WITH LEFT [address] performed by Ananya Dial MD at [address] [facility]  age 4    T & A     Current Medications:   Current Facility-Administered Medications: sodium bicarbonate tablet 650 mg, 650 mg, Oral, TID  bumetanide (BUMEX) injection 2 mg, 2 mg, Intravenous, Once  bumetanide (BUMEX) 12.5 mg in sodium chloride 0.9 % 125 mL infusion, 2 mg/hr, Intravenous, Continuous  cetirizine (ZYRTEC) tablet 10 mg, 10 mg, Oral, Daily  docusate sodium (COLACE) capsule 100 mg, 100 mg, Oral, Daily PRN  [Held by provider] furosemide (LASIX) tablet 80 mg, 80 mg, Oral, BID  calcitRIOL (ROCALTROL) capsule 1 mcg, 1 mcg, Oral, BID  magnesium oxide (MAG-OX) tablet 400 mg, 400 mg, Oral, Daily  fluticasone (FLONASE) 50 MCG/ACT nasal spray 1 spray, 1 spray, Each Nostril, Daily  sodium chloride flush 0.9 % injection 10 mL, 10 mL, Intravenous, 2 times per day  sodium chloride flush 0.9 % injection 10 mL, 10 mL, Intravenous, PRN  0.9 % sodium chloride infusion, 25 mL, Intravenous, PRN  ondansetron (ZOFRAN-ODT) disintegrating tablet 4 mg, 4 mg, Oral, Q8H PRN **OR** ondansetron (ZOFRAN) injection 4 mg, 4 mg, Intravenous, Q6H PRN  bisacodyl (DULCOLAX) EC tablet 5 mg, 5 mg, Oral, Daily PRN  acetaminophen (TYLENOL) tablet 650 mg, 650 mg, Oral, Q6H PRN **OR** acetaminophen (TYLENOL) suppository 650 mg, 650 mg, Rectal, Q6H PRN  LORazepam (ATIVAN) tablet 0.5 mg, 0.5 mg, Oral, Q12H PRN  heparin (porcine) injection 5,080 Units, 80 Units/kg, Intravenous, PRN  heparin (porcine) injection 2,540 Units, 40 Units/kg, Intravenous, PRN  heparin 25,000 units in dextrose 5% 250 mL (premix) infusion, 5-30 Units/kg/hr, Intravenous, Continuous  citric acid-sodium citrate (BICITRA) solution 30 mL, 30 mL, Oral, 4x Daily  calcium carbonate (TUMS) chewable tablet 500 mg, 500 mg, Oral, Q6H  calcium gluconate 2000 mg in sodium chloride 100 mL, 2,000 mg, Intravenous, BID    Allergies:  Latex, Banana, and Cinnamon    Social History:   TOBACCO:   reports that she has never smoked. She has never used smokeless tobacco.  ETOH:   reports current alcohol use. DRUGS:   reports no history of drug use.     Family History:       Problem Relation Age of Onset    Diabetes Mother     Stroke Mother     High Blood Pressure Mother        REVIEW OF SYSTEMS:     CONSTITUTIONAL:  positive for  fatigue  RESPIRATORY:  positive for  dyspnea  CARDIOVASCULAR:  negative  GASTROINTESTINAL:  negative  GENITOURINARY:  positive for hematuria    PHYSICAL EXAM:      VITALS:  /63   Pulse 115   Temp 97.5 °F (36.4 °C) (Oral)   Resp 25   Ht 5' 1\" (1.549 m)   Wt 140 lb (63.5 kg)   LMP 04/08/2018   SpO2 95%   BMI 26.45 kg/m²      TEMPERATURE:  Current - Temp: 97.5 °F (36.4 °C); Max - Temp  Av.8 °F (36.6 °C)  Min: 97.4 °F (36.3 °C)  Max: 98.6 °F (37 °C)  24HR BLOOD PRESSURE RANGE:  Systolic (15XGX), AWM:374 , Min:107 , INU:898   ; Diastolic (86MBH), RYE:02, Min:60, Max:73    General appearance: alert, appears stated age, cooperative, fatigued, no distress and mildly obese  Head: Normocephalic, without obvious abnormality, atraumatic  Back: No CVA tenderness  Abdomen: Soft, non-tender, non-distended    DATA:    WBC:    Lab Results   Component Value Date    WBC 15.1 2021     Hemoglobin/Hematocrit:    Lab Results   Component Value Date    HGB 8.7 2021    HCT 28.8 2021     BMP:    Lab Results   Component Value Date     2021    K 4.2 2021     2021    CO2 23 2021    BUN 74 2021    LABALBU 2.6 2021    CREATININE 3.8 2021    CALCIUM 7.1 2021    GFRAA 15 2021    LABGLOM 12 2021     PT/INR:    Lab Results   Component Value Date    PROTIME 13.0 2021    INR 1.01 2021     Imaging:  CT ABDOMEN PELVIS WO CONTRAST Additional Contrast? None    Result Date: 2021  EXAMINATION: CT OF THE CHEST WITHOUT CONTRAST; CT OF THE ABDOMEN AND PELVIS WITHOUT CONTRAST 2021 10:53 am; 2021 10:52 am TECHNIQUE: CT of the chest was performed without the administration of intravenous contrast. Multiplanar reformatted images are provided for review. Dose modulation, iterative reconstruction, and/or weight based adjustment of the mA/kV was utilized to reduce the radiation dose to as low as reasonably achievable.; CT of the abdomen and pelvis was performed without the administration of intravenous contrast. Multiplanar reformatted images are provided for review. Dose modulation, iterative reconstruction, and/or weight based adjustment of the mA/kV was utilized to reduce the radiation dose to as low as reasonably achievable.  COMPARISON: 5/10/2021, 3/24/2021 CT chest, 3/23/2021 CT abdomen and pelvis. Correlation is made to PET-CT dated 5/7/2020 HISTORY: ORDERING SYSTEM PROVIDED HISTORY: Malignant neoplasm of breast in female, estrogen receptor positive, unspecified laterality, unspecified site of breast [facility]) TECHNOLOGIST PROVIDED HISTORY: Is the patient pregnant?->No Reason for Exam: Recheck Breast Cancer, asthma, heartburn, gas, bloating, nausea, fatigue, back pain, swelling; ORDERING SYSTEM PROVIDED HISTORY: Malignant neoplasm of breast in female, estrogen receptor positive, unspecified laterality, unspecified site of breast [facility]) TECHNOLOGIST PROVIDED HISTORY: Additional Contrast?->None Is the patient pregnant?->No Reason for Exam: Recheck Breast Cancer, asthma, heartburn, gas, bloating, nausea, fatigue, back pain, swelling FINDINGS: Chest: Mediastinum: Heart size is normal.  Relative hypoattenuation of the blood pool compared to myocardium is suggestive of anemia. Within the limitations of a noncontrast exam, there is no evidence of right hilar adenopathy. Left hilum is not adequately seen for evaluation of adenopathy. 1.5 x 0.9 cm precarinal node, minimally decreased in size compared to the prior exam, at which time it measured 2.0 x 1.3 cm. Previously seen subcarinal lymph node has decreased in size. Lungs/pleura: Large left pleural effusion, increased compared to the prior exam, with collapse of the left lower lobe and near complete collapse of the left upper lobe. Although not well seen, the pleural surface has a slightly nodular appearance, suspicious for pleural metastasis. Soft Tissues/Bones: Skin thickening of the anterior chest wall. 1.7 x 1.1 cm right axillary lymph node has not significantly changed. Hypodensity in the thyroid gland with rim calcification is stable but not well seen by CT. The isthmus of the thyroid gland appears thickened. Diffuse bone metastasis in the axial skeleton. Thoracic alignment is maintained.  There is a pathologic fracture of T10, chronic. No evidence of bony fragment retropulsion into the central canal.  Nodular thickening of the left anterior chest wall skin and subcutaneous soft tissue. Abdomen/Pelvis: Organs: Unenhanced liver, gallbladder, spleen, pancreas, and adrenal glands are unremarkable. Bilateral ureteral stents with proximal loops in the renal collecting system and distal loops in the bladder. Right renal pelvicaliectasis without you hydronephrosis. GI/Bowel: No evidence of bowel obstruction or free intraperitoneal air. Pelvis: Eccentric bladder wall thickening with relative thickening of the left lateral wall compared to the right. Small amount of free fluid in the pelvis. Pelvic adenopathy, including 1.9 x 1.7 cm right external iliac node and 1.6 x 1.2 cm left external iliac node. Peritoneum/Retroperitoneum: Abdominal aortic caliber is normal.  No retroperitoneal adenopathy. Bones/Soft Tissues: Circumferential subcutaneous edema of the abdominal and pelvic wall. Diffuse sclerotic bone metastasis in the axial skeleton. 1. Large left pleural effusion, increased compared to the exam of 5/10/2021. There is now complete collapse of the left lower lobe and near complete collapse of the left upper lobe. Nodular appearance of the left pleural surface is highly suspicious for pleural metastasis. 2. Pelvic adenopathy, not significantly changed compared to prior exam. Finding is highly suspicious for metastasis. 3. Nodular skin thickening of the left anterior chest wall and subcutaneous fat, likely metastasis. 4. 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 This is a chronic finding. 5. Persistent eccentric bladder wall thickening with relative thickening of the left side of the bladder wall. Differential includes cystitis and malignancy. If it would alter patient's management, consider cystoscopy. 6. Stable position of bilateral ureteral stents.   Right renal pelvocaliectasis without you hydronephrosis. 7. Diffuse skin thickening with subcutaneous edema of the chest, abdominal, and pelvic wall. Please correlate with clinical symptoms of anasarca. CT HEAD WO CONTRAST    Result Date: 8/22/2021  EXAMINATION: CT OF THE HEAD WITHOUT CONTRAST  8/22/2021 9:26 am TECHNIQUE: CT of the head was performed without the administration of intravenous contrast. Dose modulation, iterative reconstruction, and/or weight based adjustment of the mA/kV was utilized to reduce the radiation dose to as low as reasonably achievable. COMPARISON: None. HISTORY: ORDERING SYSTEM PROVIDED HISTORY: change in MS, stage 4 cancer TECHNOLOGIST PROVIDED HISTORY: Reason for exam:->change in MS, stage 4 cancer Has a \"code stroke\" or \"stroke alert\" been called? ->No Is the patient pregnant?->No Reason for Exam: CHANGE IN MS, STAGE 4 CANCER Acuity: Acute Type of Exam: Initial FINDINGS: BRAIN/VENTRICLES: There is no acute intracranial hemorrhage, mass effect or midline shift. No abnormal extra-axial fluid collection. The gray-white differentiation is maintained without evidence of an acute infarct. There is no evidence of hydrocephalus. ORBITS: The visualized portion of the orbits demonstrate no acute abnormality. SINUSES: No acute abnormality of the sinuses. Mucous retention cyst or polyp in the left maxillary sinus. SOFT TISSUES/SKULL:  No acute abnormality of the visualized skull or soft tissues. No acute intracranial abnormality. No acute intracranial hemorrhage or mass effect. If there is persistent clinical concern for multiple sclerosis flare and/or metastatic disease, further evaluation with MRI of the brain with and without contrast is recommended. CT CHEST WO CONTRAST    Result Date: 8/6/2021  EXAMINATION: CT OF THE CHEST WITHOUT CONTRAST 8/6/2021 8:56 am TECHNIQUE: CT of the chest was performed without the administration of intravenous contrast. Multiplanar reformatted images are provided for review.  Dose modulation, iterative reconstruction, and/or weight based adjustment of the mA/kV was utilized to reduce the radiation dose to as low as reasonably achievable. COMPARISON: August 3, 2021 HISTORY: ORDERING SYSTEM PROVIDED HISTORY: pneumothorax TECHNOLOGIST PROVIDED HISTORY: Reason for exam:->pneumothorax Is the patient pregnant?->No Reason for Exam: pneumothorax FINDINGS: Mediastinum: Heart size is normal.  No pericardial effusion. Right central catheter tip extends to the right atrium. Within the limitations of a noncontrast exam, there is no evidence of hilar adenopathy. Multiple hypodensities in the spleen measure up to 1.2 cm. Similar finding was seen on prior CT of January 3, 2020. Lungs/pleura: Large left hydropneumothorax. There is a pleural catheter. Near complete collapse of the left lower lobe and left upper lobe. The non-collapsed portion of the left upper lobe contains a consolidation. Small right pleural effusion with dependent atelectasis of the right lower lobe. There are peribronchovascular ground-glass opacities in the right upper lobe. Central tracheobronchial airways are unremarkable. Pleural nodularity, seen on prior CT, is not well seen on this exam. Upper Abdomen: No acute abnormality in the upper abdomen. Soft Tissues/Bones: Diffuse sclerotic lesions throughout the axial skeleton. Chronic pathologic compression fracture T10. Skin thickening along the anterior chest wall with subcutaneous edema/inflammation. Nodularity of the skin along the left anterolateral chest wall. 1. Large left hydropneumothorax with near complete collapse of the left lung. Visible portion of the left upper lobe contains a consolidation, suspicious for pneumonia. 2. Peribronchovascular ground-glass opacities in the right upper lobe, likely infectious or inflammatory. 3. Small right pleural effusion.  4. Nodular skin thickening along the left anterolateral chest wall and subcutaneous fat, suspicious for metastasis. 5. Diffuse bone metastasis in the axial skeleton with chronic pathologic fracture of T10.     CT CHEST WO CONTRAST    Result Date: 7/31/2021  EXAMINATION: CT OF THE CHEST WITHOUT CONTRAST; CT OF THE ABDOMEN AND PELVIS WITHOUT CONTRAST 7/28/2021 10:53 am; 7/28/2021 10:52 am TECHNIQUE: CT of the chest was performed without the administration of intravenous contrast. Multiplanar reformatted images are provided for review. Dose modulation, iterative reconstruction, and/or weight based adjustment of the mA/kV was utilized to reduce the radiation dose to as low as reasonably achievable.; CT of the abdomen and pelvis was performed without the administration of intravenous contrast. Multiplanar reformatted images are provided for review. Dose modulation, iterative reconstruction, and/or weight based adjustment of the mA/kV was utilized to reduce the radiation dose to as low as reasonably achievable. COMPARISON: 5/10/2021, 3/24/2021 CT chest, 3/23/2021 CT abdomen and pelvis. Correlation is made to PET-CT dated 5/7/2020 HISTORY: ORDERING SYSTEM PROVIDED HISTORY: Malignant neoplasm of breast in female, estrogen receptor positive, unspecified laterality, unspecified site of breast [facility]) TECHNOLOGIST PROVIDED HISTORY: Is the patient pregnant?->No Reason for Exam: Recheck Breast Cancer, asthma, heartburn, gas, bloating, nausea, fatigue, back pain, swelling; ORDERING SYSTEM PROVIDED HISTORY: Malignant neoplasm of breast in female, estrogen receptor positive, unspecified laterality, unspecified site of breast [facility]) TECHNOLOGIST PROVIDED HISTORY: Additional Contrast?->None Is the patient pregnant?->No Reason for Exam: Recheck Breast Cancer, asthma, heartburn, gas, bloating, nausea, fatigue, back pain, swelling FINDINGS: Chest: Mediastinum: Heart size is normal.  Relative hypoattenuation of the blood pool compared to myocardium is suggestive of anemia.   Within the limitations of a noncontrast exam, there is no evidence of right hilar adenopathy. Left hilum is not adequately seen for evaluation of adenopathy. 1.5 x 0.9 cm precarinal node, minimally decreased in size compared to the prior exam, at which time it measured 2.0 x 1.3 cm. Previously seen subcarinal lymph node has decreased in size. Lungs/pleura: Large left pleural effusion, increased compared to the prior exam, with collapse of the left lower lobe and near complete collapse of the left upper lobe. Although not well seen, the pleural surface has a slightly nodular appearance, suspicious for pleural metastasis. Soft Tissues/Bones: Skin thickening of the anterior chest wall. 1.7 x 1.1 cm right axillary lymph node has not significantly changed. Hypodensity in the thyroid gland with rim calcification is stable but not well seen by CT. The isthmus of the thyroid gland appears thickened. Diffuse bone metastasis in the axial skeleton. Thoracic alignment is maintained. There is a pathologic fracture of T10, chronic. No evidence of bony fragment retropulsion into the central canal.  Nodular thickening of the left anterior chest wall skin and subcutaneous soft tissue. Abdomen/Pelvis: Organs: Unenhanced liver, gallbladder, spleen, pancreas, and adrenal glands are unremarkable. Bilateral ureteral stents with proximal loops in the renal collecting system and distal loops in the bladder. Right renal pelvicaliectasis without you hydronephrosis. GI/Bowel: No evidence of bowel obstruction or free intraperitoneal air. Pelvis: Eccentric bladder wall thickening with relative thickening of the left lateral wall compared to the right. Small amount of free fluid in the pelvis. Pelvic adenopathy, including 1.9 x 1.7 cm right external iliac node and 1.6 x 1.2 cm left external iliac node. Peritoneum/Retroperitoneum: Abdominal aortic caliber is normal.  No retroperitoneal adenopathy. Bones/Soft Tissues: Circumferential subcutaneous edema of the abdominal and pelvic wall.   Diffuse sclerotic bone metastasis in the axial skeleton. 1. Large left pleural effusion, increased compared to the exam of 5/10/2021. There is now complete collapse of the left lower lobe and near complete collapse of the left upper lobe. Nodular appearance of the left pleural surface is highly suspicious for pleural metastasis. 2. Pelvic adenopathy, not significantly changed compared to prior exam. Finding is highly suspicious for metastasis. 3. Nodular skin thickening of the left anterior chest wall and subcutaneous fat, likely metastasis. 4. 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 This is a chronic finding. 5. Persistent eccentric bladder wall thickening with relative thickening of the left side of the bladder wall. Differential includes cystitis and malignancy. If it would alter patient's management, consider cystoscopy. 6. Stable position of bilateral ureteral stents. Right renal pelvocaliectasis without you hydronephrosis. 7. Diffuse skin thickening with subcutaneous edema of the chest, abdominal, and pelvic wall. Please correlate with clinical symptoms of anasarca. [place] LESS THAN 1 HOUR    Result Date: 8/5/2021  Radiology exam is complete. No Radiologist dictation. Please follow up with ordering provider. XR CHEST PORTABLE    Result Date: 8/22/2021  EXAMINATION: ONE XRAY VIEW OF THE CHEST 8/22/2021 1:30 am COMPARISON: 8/13/2021 HISTORY: ORDERING SYSTEM PROVIDED HISTORY: shortness of breath TECHNOLOGIST PROVIDED HISTORY: Reason for exam:->shortness of breath Reason for Exam: shortness of breath Acuity: Unknown Type of Exam: Initial FINDINGS: Unchanged right port catheter. Left chest tube remains in place. Persistent small left hydropneumothorax, stable. Left-sided airspace opacities appear similar to the prior exam.  Increased diffuse interstitial prominence. The cardiac silhouette and osseous structures are stable.   Stable sclerotic lesions in the scapula bilaterally. 1. Increased diffuse interstitial prominence suggests pulmonary edema. 2. Unchanged left hydropneumothorax with a left chest tube in place. 3. Unchanged right port catheter and sclerotic lesions in the scapulae bilaterally. CT GUIDED CHEST TUBE    Result Date: 8/3/2021  PROCEDURE: CT GUIDED CHEST TUBE PLACEMENT 8/3/2021 HISTORY: ORDERING SYSTEM PROVIDED HISTORY: Post thoracentesis TECHNOLOGIST PROVIDED HISTORY: Reason for exam:->Post thoracentesis Which side should the procedure be performed? ->Left Is the patient pregnant?->No Reason for Exam: post thoracentesis chest tube Relevant Medical/Surgical History: ATTN: DR TREVIÑO Pneumothorax SEDATION: None TECHNIQUE: Informed consent was obtained after a detailed explanation of the procedure including risks, benefits, and alternatives. Universal protocol was followed. A suitable skin site was prepped and draped in sterile fashion following CT localization. The patient was initially scanned supine, however, the patient has extensive post radiation change on anterior left chest wall. Patient was then placed right lateral decubitus to minimize needle path through the radiation change. An 18 gauge needle was advanced into the left pleural space and a 0.035 guidewire was used to place a 8 [place] chest tube after the fascial tract was dilated. The catheter was sutured to the skin and the patient tolerated the procedure well. The catheter was attached to Pleurevac drainage. The patient tolerated the procedure well and there were no immediate complications. EBL: Less than 5 cc FINDINGS: 1. When compared with the chest CT from 7/28/2021 there has been interval re-expansion of the left lung. There are diffuse nonspecific parenchymal opacities seen throughout visualized left lung. 2. Moderate left hydropneumothorax status post chest tube placement.  3. Nonspecific diffuse soft tissue nodularity seen throughout the left chest wall soft tissues with dermal thickening, which is not significantly changed since the recent CT. This could represent post radiation change, however, superimposed malignancy cannot be excluded. 4. Limited evaluation demonstrates sclerotic lesions seen throughout the spine, sternum and ribs. 5. Nodular thickening noted along the left pleura at the lung base. 1. Successful CT guided placement of a left-sided 8 [place] chest tube. 2. Interval re-expansion of left lung since the recent CT with diffuse asymmetric left-sided pulmonary parenchymal opacities. These opacities are nonspecific but could be secondary to infection, asymmetric edema, or pulmonary hemorrhage. Atelectasis is less likely. 3. Moderate left hydropneumothorax post left chest tube placement. 4. Nodular thickening noted along the pleura at the left lung base is suspicious for metastatic disease. VL DUP LOWER EXTREMITY VENOUS BILATERAL    Result Date: 8/22/2021  EXAMINATION: DUPLEX VENOUS ULTRASOUND OF THE BILATERAL LOWER EXTREMITIES, 8/22/2021 8:19 am TECHNIQUE: Duplex ultrasound using B-mode/gray scaled imaging and Doppler spectral analysis and color flow was obtained of the bilateral lower extremities. COMPARISON: None HISTORY: ORDERING SYSTEM PROVIDED HISTORY: 3+ edema and stage 4 cancer TECHNOLOGIST PROVIDED HISTORY: Reason for exam:->3+ edema and stage 4 cancer Acuity: Acute Type of Exam: Initial FINDINGS: The visualized deep veins of the bilateral lower extremities are patent and free of echogenic thrombus. The veins demonstrate good compressibility with normal color flow study and spectral analysis. Of note, evaluation of the veins of the calf is limited due to patient body habitus. Diffuse subcutaneous edema is noted. There is occlusive thrombus within the left greater saphenous vein at the junction with the common femoral vein, and extending through the proximal and mid portions.   The distal greater saphenous vein is patent with associated absence of Doppler flow. No evidence of DVT in either lower extremity. Occlusive superficial venous thrombus within the left greater saphenous vein extending from the junction of the greater saphenous and common femoral veins through the proximal and mid portions of the greater saphenous vein. VL DUP UPPER EXTREMITY VENOUS LEFT    Result Date: 8/22/2021  EXAMINATION: DUPLEX ULTRASOUND OF THE LEFT UPPER EXTREMITY FOR DVT, 8/22/2021 8:19 am TECHNIQUE: Duplex ultrasound using B-mode/gray scaled imaging and Doppler spectral analysis and color flow was obtained of the deep venous structures of the upper extremity. COMPARISON: None. HISTORY: ORDERING SYSTEM PROVIDED HISTORY: SOB, stage 4 breast cancer TECHNOLOGIST PROVIDED HISTORY: Reason for exam:->SOB, stage 4 breast cancer Acuity: Acute Type of Exam: Initial FINDINGS: Hyperechogenic, noncompressive, occlusive thrombus is seen throughout the left internal jugular and subclavian veins. There is corresponding absence of Doppler flow. A nonocclusive thrombus is identified within the left axillary vein, with minimal central venous flow noted. Additional occlusive, non compressible, hyperechogenic thrombus is seen within the cephalic and basilic veins. The left brachial vein is patent with normal Doppler flow. The radial and ulnar veins appear patent. There is extensive subcutaneous edema, most prominently over the ulnar aspect of the forearm. Extensive occlusive venous thrombus of the left upper extremity involving the left internal jugular, subclavian, cephalic, and basilic veins. Additional nonocclusive thrombus of the left axillary vein. Extensive subcutaneous edema throughout the forearm. IR GUIDED THORACENTESIS PLEURAL    Result Date: 8/3/2021  PROCEDURE: ULTRASOUNDGUIDED left THORACENTESIS 8/3/2021 HISTORY: ORDERING SYSTEM PROVIDED HISTORY: Pleural effusion TECHNOLOGIST PROVIDED HISTORY: Which side should the procedure be performed? ->Left Reason for exam:->Pleural Effusion Is the patient pregnant?->No Shortness of breath TECHNIQUE: Informed consent was obtained after a detailed explanation of the procedure including risks, benefits, and alternatives. Universal protocol was performed. The left chest was prepped and draped in sterile fashion and local anesthesia was achieved with lidocaine. A 5 Polish needle sheath was advanced under ultrasound guidance into pleural effusion and thoracentesis was performed. The patient tolerated the procedure well. EBL: Less than 5 cc FINDINGS: A total of 1400 cc clear yellow fluid was removed. Fluid was sent for analysis     Successful ultrasound guided left-sided thoracentesis. Assessment & Plan:      Andrea Dorantes is a 50y.o. year old female admitted 8/22/2021 for shortness of breath. 1) Gross Hematuria: secondary to indwelling ureteral stents and Heparin   Hgb 8.7; recommend PRBC transfusion if <7.0. On Heparin due to extensive left upper extremity venous thrombus. Nursing staff to manually irrigate bladder q4hrs and prn clots; will hold off on CBI for now. 2) Bilateral Hydronephrosis: secondary to bilateral ureteral obstruction due to metastatic breast cancer              S/p cystoscopy, bilateral ureteral stent exchange, left RGPG/ureteroscopy 8/5/21              S/p cystoscopy, bilateral RGPG, bilateral ureteral stent exchange 6/11/21              S/p cystoscopy, bilateral ureteral stent placement on 3/25/21               CT a/p 6/9/21: Unchanged moderate-large amount of bilateral hydronephrosis greater on the right. Ureteral stents in good position.              Recommend bilateral ureteral stent exchange in 2 months due to high-grade nature and progression of her cancer. 3) ELIDIA: Cr 3.8; Nephrology following    Will continue to follow. Patient seen and examined, chart reviewed.      Electronically signed by Elidia Coy PA-C on 8/23/2021 at 9:32 AM

## 2021-08-23 NOTE — PROGRESS NOTES
Nephrology Service Consultation      [address], [address] [address]  Phone: [phone]  Office Hours: 8:30AM - 4:30PM  Monday - Friday            Patient:  Jeannine Delaney  MRN: [id number]  Consulting physician:  Stephany Bamberger, MD  Reason for Consult: elevated creat      PCP: CINDY Laird - NP    HISTORY OF PRESENT ILLNESS:   The patient is a 50 y.o. female with metastatic breast cancer presented with soa. She has a pleurex cath for malignant effusion drainage  cxr showed pulm edema  Renal consult for cr 3.5 from 3 at her recent discharge. She is also acidotic  She is on NC  She was found to have dvts so she is now on heparin gtt  Mart cath shows bloody urine    REVIEW OF SYSTEMS:  14 point ROS is Negative.  See positive ROS per HPI    Past Medical History:        Diagnosis Date    Anxiety     Asthma     last flare up 5 yrs ago    Breast lesion     \"have spot on left breast going to remove- at this time not sure if cancer or not\"    Essential hypertension 4/1/2020    Secondary cancer of bone ([address] 4/1/2020    Thyroid nodule     \"have thyroid nodules-        Past Surgical History:        Procedure Laterality Date    APPENDECTOMY  age 27   [facility] BLADDER SURGERY Bilateral 3/25/2021    BILATERAL CYSTOSCOPY BILATERAL STENT INSERTION performed by Yifan Esteban MD at [address] Bilateral 6/11/2021    BILATERAL CYSTOSCOPY RETROGRADE PYELOGRAM STENT EXCHANGE performed by Gurpreet Quezada MD at [address] [facility] Bilateral 8/5/2021    BILATERAL CYSTOSCOPY, BILATERAL STENT EXCHANGE, LEFT URETEROSCOPYAND LEFT RETROGRADE PYELOGRAM performed by Zahira Blanco MD at [address] [facility]    CT GUIDED CHEST TUBE  8/3/2021    CT GUIDED CHEST TUBE 8/3/2021 [facility] CT SCAN    IR NONTUNNELED VASCULAR CATHETER  3/22/2021    IR NONTUNNELED VASCULAR CATHETER 3/22/2021 [facility] SPECIAL PROCEDURES    PORT SURGERY Right 3/15/2021    RIGHT PORT INSERTION performed by Yarelis Dowd MD [address] Left 8/12/2021    LEFT THORACOSCOPY WITH LEFT PLEURX CATHETER PLACEMENT performed by Kaushal Doe MD at [address] [facility]  age 4    T & A       Medications:   Prior to Admission medications    Medication Sig Start Date End Date Taking? Authorizing Provider   furosemide (LASIX) 80 MG tablet Take 1 tablet by mouth 2 times daily 8/14/21   Barbie Leonard MD   calcitRIOL (ROCALTROL) 0.5 MCG capsule Take 2 capsules by mouth 2 times daily 8/14/21   Barbie Leonard MD   calcium carbonate (TUMS) 500 MG chewable tablet Take 2 tablets by mouth 3 times daily (with meals) 8/14/21 9/13/21  Barbie Leonard MD   magnesium oxide (MAG-OX) 400 MG tablet Take 1 tablet by mouth daily 8/15/21   Barbie Leonard MD   fluticasone [facility]) 50 MCG/ACT nasal spray 1 spray by Each Nostril route daily    Historical Provider, MD   OMEPRAZOLE PO Take by mouth    Historical Provider, MD   HYDROcodone-acetaminophen (NORCO) 5-325 MG per tablet Take 1 tablet by mouth every 6 hours as needed for Pain.     Historical Provider, MD   sodium bicarbonate 650 MG tablet Take 1 tablet by mouth 2 times daily 7/30/21 7/30/22  Елена Lau DO   dexamethasone (DECADRON) 4 MG tablet TAKE ONE TABLET BY MOUTH DAILY FOR 4 DAYS STARTING THE DAY AFTER CHEMOTHERAPY 7/30/21   CINDY Obregon CNP   Nutritional Supplement LIQD Take 1 Can by mouth 2 times daily 6/18/21   Babar Mahan MD   metoprolol succinate (TOPROL XL) 100 MG extended release tablet Take 1 tablet by mouth daily 4/26/21   CINDY Obregon CNP   ondansetron (ZOFRAN) 8 MG tablet 1 tab po q 8 hours PRN for chemo induced nausea/vomting  Patient taking differently: 8 mg every 8 hours as needed for Nausea or Vomiting  3/16/21   CINDY Obregon CNP   docusate sodium (COLACE) 100 MG capsule Take 1 capsule by mouth daily as needed for Constipation 3/16/21   CINDY Obregon CNP acetaminophen (TYLENOL) 500 MG tablet Take 500 mg by mouth every 6 hours as needed for Pain    Historical Provider, MD   prochlorperazine (COMPAZINE) 10 MG tablet Take 1 tablet by mouth every 8 hours as needed (nausea)  Patient taking differently: Take 10 mg by mouth every 8 hours as needed (Nausea)  1/25/21   Vickie Doyle MD   promethazine (PHENERGAN) 25 MG tablet TAKE ONE TABLET BY MOUTH EVERY 6 HOURS AS NEEDED FOR NAUSEA 12/22/20   Vickie Doyle MD   cetirizine (ZYRTEC) 10 MG tablet Take 10 mg by mouth daily    Historical Provider, MD        Allergies:  Latex, Banana, and Cinnamon    Social History:   TOBACCO:   reports that she has never smoked. She has never used smokeless tobacco.  ETOH:   reports current alcohol use.   OCCUPATION:      Family History:       Problem Relation Age of Onset    Diabetes Mother     Stroke Mother     High Blood Pressure Mother            Physical Exam:    Vitals: /63   Pulse 115   Temp 97.4 °F (36.3 °C) (Oral)   Resp 25   Ht 5' 1\" (1.549 m)   Wt 140 lb (63.5 kg)   LMP 04/08/2018   SpO2 93%   BMI 26.45 kg/m²   General appearance: in no acute distress, appears stated age  Skin: Skin color, texture, turgor normal. No rashes or lesions  HEENT: normocephalic, atraumatic  Neck: supple, trachea midline  Lungs: , breathing comfortably on nc  Heart[de-identified] regular rate and rhythm, S1, S2 normal,  Abdomen: soft, non-tender; bowel sounds normal; no masses,   Extremities: 2+ pitting ble edema  Neurologic: Mental status: alert, oriented, interactive, following commands  Psychiatric: mood and affect appropriate    CBC:   Recent Labs     08/22/21  0243 08/22/21  2214   WBC 13.7* 15.1*   HGB 8.7* 8.7*   * 339     BMP:    Recent Labs     08/22/21  0243 08/22/21  1225 08/23/21  0424    137 142   K 5.1 4.7 4.2    99 100   CO2 13* 16* 23   BUN 77* 75* 74*   CREATININE 3.5* 3.6* 3.8*   GLUCOSE 84 139* 129*     Hepatic:   Recent Labs     08/22/21  0243   AST 38*   ALT <5*   BILITOT 0.2   ALKPHOS 92       Assessment and Recommendations       Patient Active Problem List    Diagnosis Date Noted    SOB (shortness of breath) 08/22/2021    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    Pleural effusion, malignant 08/14/2021    Stage 4 chronic kidney disease [facility]     Obstructive uropathy     Pneumothorax 08/03/2021    Edema leg 06/23/2021    Hypervolemia 06/23/2021    Hypocalcemia 06/23/2021    Acute renal failure with tubular necrosis (HCC) 06/10/2021    Bilateral hydronephrosis     COVID-19 05/12/2021    Acute and chronic respiratory failure (acute-on-chronic) ([facility] 05/10/2021    Tachycardia     ELIDIA (acute kidney injury) ([facility] 03/22/2021    Hyperkalemia     Metabolic acidosis     Hyponatremia     Left ovarian enlargement 01/26/2021    Iron deficiency anemia 01/07/2021    Malabsorption 01/07/2021    Dehydration 11/20/2020    Malignant neoplasm of central portion of left female breast ([facility] 07/31/2020    Metastatic breast cancer ([facility] 07/31/2020    Secondary cancer of bone ([facility] 04/01/2020    Essential hypertension 04/01/2020    Left breast mass    -ELIDIA on CKD  -Metabolic acidosis  -B/L HN and recent ureteral stents exchange  -Advanced metastatic breast cancer  -Malignant pleural effusion s/p pleurex drain  -DVTs: new  -Gross hematuria  -Fluid overload     Plan:  -start bumex gtt for 24hrs  -Stop bicarb gtt since bicarb level wnl  -Continue tums  -Urology eval for the gross hematuria, per nursing, Urine was clear yellow immediately after benítez was placed, now boody  -Oncology consult //goals of care discussion  -Will not do well on HD considering all the comorbidities  -avoid nephrotoxins  -monitor UOP    Electronically signed by Henna Vázquez DO on 8/23/2021 at 7:54 AM    [address]  [place], [place] [address]  PHONE: [phone]  FAX: [phone]

## 2021-08-23 NOTE — CONSULTS
ONCOLOGY HEMATOLOGY CARE ([facility])  CONSULTATION REPORT    REASON FOR CONSULT    Breast cancer on active treatment/new DVT    CHIEF COMPLAINT    Chief Complaint   Patient presents with    Shortness of Breath     hx stage 4 breast cancer, catheter in place draining fluid from chest        HISTORY OF PRESENT ILLNESS   Nette Lofton is a 50 y.o. female who presents with worsening shortness of breath. Chest xray with pulmonary edema, unchanged left and hydropneumothorax. D- dimer 8/22/21 was 960. Noted to have LUE swelling, Venous doppler:  Impression   Extensive occlusive venous thrombus of the left upper extremity involving the   left internal jugular, subclavian, cephalic, and basilic veins. Additional nonocclusive thrombus of the left axillary vein. Extensive subcutaneous edema throughout the forearm. Chronic lower extremity edema with negative doppler 8/22/21. She was started on heparin. Reportedly she is draining 300-500 ml from pleurx every other day. She has metastatic breast cancer and has been unable tor receive palliative taxol due to multiple hospitalizations with worsening performance status. Last taxol 8/5/21. Of note, cytology from thoracentesis 8/3/21 showed metastatic triple negative breast cancer.       PAST MEDICAL HISTORY    Past Medical History:   Diagnosis Date    Anxiety     Asthma     last flare up 5 yrs ago    Breast lesion     \"have spot on left breast going to remove- at this time not sure if cancer or not\"    Essential hypertension 4/1/2020    Secondary cancer of bone ([address] 4/1/2020    Thyroid nodule     \"have thyroid nodules-        SURGICAL HISTORY    Past Surgical History:   Procedure Laterality Date    APPENDECTOMY  age 27    BLADDER SURGERY Bilateral 3/25/2021    BILATERAL CYSTOSCOPY BILATERAL STENT INSERTION performed by Kaitlin Wu MD at [address] [place] [address] Bilateral 6/11/2021    BILATERAL CYSTOSCOPY RETROGRADE PYELOGRAM STENT EXCHANGE performed by Tomasa Acharya MD at [address] [place] [address] Way Bilateral 8/5/2021    BILATERAL CYSTOSCOPY, BILATERAL STENT EXCHANGE, LEFT URETEROSCOPYAND LEFT RETROGRADE PYELOGRAM performed by Arpit Barth MD at [address]  8/3/2021    CT GUIDED CHEST TUBE 8/3/2021 [facility] CT SCAN    IR NONTUNNELED VASCULAR CATHETER  3/22/2021    IR NONTUNNELED VASCULAR CATHETER 3/22/2021 [facility] SPECIAL PROCEDURES    PORT SURGERY Right 3/15/2021    RIGHT PORT INSERTION performed by Lori Goldstein MD at [address] Left 8/12/2021    LEFT THORACOSCOPY WITH LEFT PLEURX CATHETER PLACEMENT performed by Dennie Purl, MD at [address] [facility]  age 4    T & A       FAMILY HISTORY    Family History   Problem Relation Age of Onset    Diabetes Mother     Stroke Mother     High Blood Pressure Mother        SOCIAL HISTORY    Social History     Socioeconomic History    Marital status:      Spouse name: None    Number of children: 2    Years of education: 13    Highest education level: None   Occupational History    Occupation: dental assistant     Comment: [employer]   Tobacco Use    Smoking status: Never Smoker    Smokeless tobacco: Never Used   Vaping Use    Vaping Use: Never used   Substance and Sexual Activity    Alcohol use: Yes     Comment: average\"one time per week\"    Drug use: No    Sexual activity: Yes     Partners: Male   Other Topics Concern    None   Social History Narrative    Lives in her own home with her children     Social Determinants of Health     Financial Resource Strain:     Difficulty of Paying Living Expenses:    Food Insecurity:     Worried About Running Out of Food in the Last Year:     Ran Out of Food in the Last Year:    Transportation Needs:     Lack of Transportation (Medical):     Lack of Transportation (Non-Medical):    Physical Activity:     Days of Exercise per Week:     Minutes of Exercise per Session: Stress:     Feeling of Stress :    Social Connections:     Frequency of Communication with Friends and Family:     Frequency of Social Gatherings with Friends and Family:     Attends [employer] Services: Active Member of Clubs or Organizations:     Attends Club or Organization Meetings:     Marital Status:    Intimate Partner Violence:     Fear of Current or Ex-Partner:     Emotionally Abused:     Physically Abused:     Sexually Abused:        REVIEW OF SYSTEMS    Constitutional:  Denies fever, chills, loss of appetite, weight loss,   +tiredness + fatigue + weakness   HEENT:  Denies swelling of neck glands  Respiratory:  Denies cough, + shortness of breath or hemoptysis  Cardiovascular:  Denies chest pain, palpitations or swelling   GI:  Denies abdominal pain, nausea, vomiting, constipation or diarrhea   Musculoskeletal:  Denies back pain  +BLE and LUE edema  Skin:  Denies rash   Neurologic:  Denies headache, focal weakness or sensory changes   All systems negative except as marked. PHYSICAL EXAM    Vitals: /63   Pulse 115   Temp 97.4 °F (36.3 °C) (Oral)   Resp 25   Ht 5' 1\" (1.549 m)   Wt 140 lb (63.5 kg)   LMP 04/08/2018   SpO2 93%   BMI 26.45 kg/m²   CONSTITUTIONAL: awake, alert, cooperative, no apparent distress; appears ill  EYES: sclera clear and conjunctiva normal  ENT: Normocephalic, without obvious abnormality, atraumatic; dry mm  NECK: supple, symmetrical  HEMATOLOGIC/LYMPHATIC: no cervical, supraclavicular or axillary lymphadenopathy   LUNGS: diminished; CT in place  CARDIOVASCULAR: regular rate and rhythm, normal S1 and S2, no murmur noted  ABDOMEN: normal bowel sounds x 4, soft, non-distended, non-tender,  MUSCULOSKELETAL: full range of motion noted, tone is normal  NEUROLOGIC: letheragic, oriented to name, place and time. Motor skills grossly intact. SKIN: Normal skin color, texture, turgor and no jaundice.  Nodules to bilateral chest wall   EXTREMITIES:++LE edema, LUE edema +    LABORATORY RESULTS  CBC:   Recent Labs     08/22/21  0243 08/22/21  2214   WBC 13.7* 15.1*   HGB 8.7* 8.7*   * 339     BMP:    Recent Labs     08/22/21  0243 08/22/21  1225 08/23/21  0424    137 142   K 5.1 4.7 4.2    99 100   CO2 13* 16* 23   BUN 77* 75* 74*   CREATININE 3.5* 3.6* 3.8*   GLUCOSE 84 139* 129*     Hepatic:   Recent Labs     08/22/21  0243   AST 38*   ALT <5*   BILITOT 0.2   ALKPHOS 92     INR: No results for input(s): INR in the last 72 hours. ASSESSMENT/RECOMMENDATION    Metastatic breast cancer- last treatment with palliative taxol 8/5/21. Due to decline in performance status she is not a candidate for systemic treatment at this time. We agree with hospice consult, patient is agreeable as well.     ? Seizure activity prior to arrival- MRI brain pending    DVT- continue heparin gtt; further management pending result of MRI    ELIDIA/CKD- nephrology consulted. Hematuria- urology consulted. Monitor CBC    I have independently evaluated and examined this patient today. I have reviewed radiologic and biochemical tests on this patient. Management Plan is developed mutually with Wilber Mayen NP. I have reviewed above note and agree with assessment and plan      We will follow the patient. Thank you for allowing me to participate in the care of this very pleasant patient.

## 2021-08-24 NOTE — PROGRESS NOTES
[facility], [address]   Progress Note  [facility] 0 1 2      Date: 2021   Patient: Govind Nj   : 1965   DOA: 2021   MRN: [id number]   ROOM#: [id number]     Admit Date: 2021     Collaborating Urologist on Call at time of admission: Dr. Malathi Vasquez  CC: Shortness of breath  Reason for Consult: Gross hematuria    Subjective:     Pain: none, no nausea and no vomiting,   Bowel Movement/Flatus:   Yes  Voidinfr benítez catheter in place, urine fany-colored, clearing     Pt rest in bed, denies any pain. Urine clearing in tubing.     Objective:    Vitals:    /73   Pulse 135   Temp 98.2 °F (36.8 °C) (Axillary)   Resp 20   Ht 5' 1\" (1.549 m)   Wt 140 lb (63.5 kg)   LMP 2018   SpO2 96%   BMI 26.45 kg/m²    Temp  Av.2 °F (36.8 °C)  Min: 97.5 °F (36.4 °C)  Max: 98.5 °F (36.9 °C)     Intake/Output Summary (Last 24 hours) at 2021 0715  Last data filed at 2021 0510  Gross per 24 hour   Intake 10 ml   Output 2150 ml   Net -2140 ml       Physical Exam:   General appearance: alert, appears stated age, cooperative, fatigued, no distress and mildly obese  Head: Normocephalic, without obvious abnormality, atraumatic  Back: No CVA tenderness  Abdomen: Soft, non-tender, non-distended  : 16fr benítez catheter in place, urine fany-colored, clearing     Labs:   WBC:    Lab Results   Component Value Date    WBC 15.1 2021      Hemoglobin/Hematocrit:    Lab Results   Component Value Date    HGB 8.7 2021    HCT 28.8 2021      BMP:   Lab Results   Component Value Date     2021    K 4.2 2021     2021    CO2 23 2021    BUN 74 2021    LABALBU 2.6 2021    CREATININE 3.8 2021    CALCIUM 7.1 2021    GFRAA 15 2021    LABGLOM 12 2021      PT/INR:    Lab Results   Component Value Date    PROTIME 13.0 2021    INR 1.01 2021      PTT:    Lab Results   Component Value Date    APTT 107.1 08/23/2021     Imaging:  CT ABDOMEN PELVIS WO CONTRAST Additional Contrast? None    Result Date: 7/31/2021  EXAMINATION: CT OF THE CHEST WITHOUT CONTRAST; CT OF THE ABDOMEN AND PELVIS WITHOUT CONTRAST 7/28/2021 10:53 am; 7/28/2021 10:52 am TECHNIQUE: CT of the chest was performed without the administration of intravenous contrast. Multiplanar reformatted images are provided for review. Dose modulation, iterative reconstruction, and/or weight based adjustment of the mA/kV was utilized to reduce the radiation dose to as low as reasonably achievable.; CT of the abdomen and pelvis was performed without the administration of intravenous contrast. Multiplanar reformatted images are provided for review. Dose modulation, iterative reconstruction, and/or weight based adjustment of the mA/kV was utilized to reduce the radiation dose to as low as reasonably achievable. COMPARISON: 5/10/2021, 3/24/2021 CT chest, 3/23/2021 CT abdomen and pelvis. Correlation is made to PET-CT dated 5/7/2020 HISTORY: ORDERING SYSTEM PROVIDED HISTORY: Malignant neoplasm of breast in female, estrogen receptor positive, unspecified laterality, unspecified site of breast [facility]) TECHNOLOGIST PROVIDED HISTORY: Is the patient pregnant?->No Reason for Exam: Recheck Breast Cancer, asthma, heartburn, gas, bloating, nausea, fatigue, back pain, swelling; ORDERING SYSTEM PROVIDED HISTORY: Malignant neoplasm of breast in female, estrogen receptor positive, unspecified laterality, unspecified site of breast [facility]) TECHNOLOGIST PROVIDED HISTORY: Additional Contrast?->None Is the patient pregnant?->No Reason for Exam: Recheck Breast Cancer, asthma, heartburn, gas, bloating, nausea, fatigue, back pain, swelling FINDINGS: Chest: Mediastinum: Heart size is normal.  Relative hypoattenuation of the blood pool compared to myocardium is suggestive of anemia. Within the limitations of a noncontrast exam, there is no evidence of right hilar adenopathy.   Left hilum is not adequately seen for evaluation of adenopathy. 1.5 x 0.9 cm precarinal node, minimally decreased in size compared to the prior exam, at which time it measured 2.0 x 1.3 cm. Previously seen subcarinal lymph node has decreased in size. Lungs/pleura: Large left pleural effusion, increased compared to the prior exam, with collapse of the left lower lobe and near complete collapse of the left upper lobe. Although not well seen, the pleural surface has a slightly nodular appearance, suspicious for pleural metastasis. Soft Tissues/Bones: Skin thickening of the anterior chest wall. 1.7 x 1.1 cm right axillary lymph node has not significantly changed. Hypodensity in the thyroid gland with rim calcification is stable but not well seen by CT. The isthmus of the thyroid gland appears thickened. Diffuse bone metastasis in the axial skeleton. Thoracic alignment is maintained. There is a pathologic fracture of T10, chronic. No evidence of bony fragment retropulsion into the central canal.  Nodular thickening of the left anterior chest wall skin and subcutaneous soft tissue. Abdomen/Pelvis: Organs: Unenhanced liver, gallbladder, spleen, pancreas, and adrenal glands are unremarkable. Bilateral ureteral stents with proximal loops in the renal collecting system and distal loops in the bladder. Right renal pelvicaliectasis without you hydronephrosis. GI/Bowel: No evidence of bowel obstruction or free intraperitoneal air. Pelvis: Eccentric bladder wall thickening with relative thickening of the left lateral wall compared to the right. Small amount of free fluid in the pelvis. Pelvic adenopathy, including 1.9 x 1.7 cm right external iliac node and 1.6 x 1.2 cm left external iliac node. Peritoneum/Retroperitoneum: Abdominal aortic caliber is normal.  No retroperitoneal adenopathy. Bones/Soft Tissues: Circumferential subcutaneous edema of the abdominal and pelvic wall.   Diffuse sclerotic bone metastasis in the axial skeleton. 1. Large left pleural effusion, increased compared to the exam of 5/10/2021. There is now complete collapse of the left lower lobe and near complete collapse of the left upper lobe. Nodular appearance of the left pleural surface is highly suspicious for pleural metastasis. 2. Pelvic adenopathy, not significantly changed compared to prior exam. Finding is highly suspicious for metastasis. 3. Nodular skin thickening of the left anterior chest wall and subcutaneous fat, likely metastasis. 4. 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 This is a chronic finding. 5. Persistent eccentric bladder wall thickening with relative thickening of the left side of the bladder wall. Differential includes cystitis and malignancy. If it would alter patient's management, consider cystoscopy. 6. Stable position of bilateral ureteral stents. Right renal pelvocaliectasis without you hydronephrosis. 7. Diffuse skin thickening with subcutaneous edema of the chest, abdominal, and pelvic wall. Please correlate with clinical symptoms of anasarca. CT HEAD WO CONTRAST    Result Date: 8/22/2021  EXAMINATION: CT OF THE HEAD WITHOUT CONTRAST  8/22/2021 9:26 am TECHNIQUE: CT of the head was performed without the administration of intravenous contrast. Dose modulation, iterative reconstruction, and/or weight based adjustment of the mA/kV was utilized to reduce the radiation dose to as low as reasonably achievable. COMPARISON: None. HISTORY: ORDERING SYSTEM PROVIDED HISTORY: change in MS, stage 4 cancer TECHNOLOGIST PROVIDED HISTORY: Reason for exam:->change in MS, stage 4 cancer Has a \"code stroke\" or \"stroke alert\" been called? ->No Is the patient pregnant?->No Reason for Exam: CHANGE IN MS, STAGE 4 CANCER Acuity: Acute Type of Exam: Initial FINDINGS: BRAIN/VENTRICLES: There is no acute intracranial hemorrhage, mass effect or midline shift. No abnormal extra-axial fluid collection.   The gray-white differentiation is maintained without evidence of an acute infarct. There is no evidence of hydrocephalus. ORBITS: The visualized portion of the orbits demonstrate no acute abnormality. SINUSES: No acute abnormality of the sinuses. Mucous retention cyst or polyp in the left maxillary sinus. SOFT TISSUES/SKULL:  No acute abnormality of the visualized skull or soft tissues. No acute intracranial abnormality. No acute intracranial hemorrhage or mass effect. If there is persistent clinical concern for multiple sclerosis flare and/or metastatic disease, further evaluation with MRI of the brain with and without contrast is recommended. CT GUIDED CHEST TUBE    Result Date: 8/3/2021  PROCEDURE: CT GUIDED CHEST TUBE PLACEMENT 8/3/2021 HISTORY: ORDERING SYSTEM PROVIDED HISTORY: Post thoracentesis TECHNOLOGIST PROVIDED HISTORY: Reason for exam:->Post thoracentesis Which side should the procedure be performed? ->Left Is the patient pregnant?->No Reason for Exam: post thoracentesis chest tube Relevant Medical/Surgical History: ATTN: DR TREVIÑO Pneumothorax SEDATION: None TECHNIQUE: Informed consent was obtained after a detailed explanation of the procedure including risks, benefits, and alternatives. Universal protocol was followed. A suitable skin site was prepped and draped in sterile fashion following CT localization. The patient was initially scanned supine, however, the patient has extensive post radiation change on anterior left chest wall. Patient was then placed right lateral decubitus to minimize needle path through the radiation change. An 18 gauge needle was advanced into the left pleural space and a 0.035 guidewire was used to place a 8 Kinyarwanda chest tube after the fascial tract was dilated. The catheter was sutured to the skin and the patient tolerated the procedure well. The catheter was attached to Pleurevac drainage.    The patient tolerated the procedure well and there were no immediate complications. EBL: Less than 5 cc FINDINGS: 1. When compared with the chest CT from 7/28/2021 there has been interval re-expansion of the left lung. There are diffuse nonspecific parenchymal opacities seen throughout visualized left lung. 2. Moderate left hydropneumothorax status post chest tube placement. 3. Nonspecific diffuse soft tissue nodularity seen throughout the left chest wall soft tissues with dermal thickening, which is not significantly changed since the recent CT. This could represent post radiation change, however, superimposed malignancy cannot be excluded. 4. Limited evaluation demonstrates sclerotic lesions seen throughout the spine, sternum and ribs. 5. Nodular thickening noted along the left pleura at the lung base. 1. Successful CT guided placement of a left-sided 8 Tamazight chest tube. 2. Interval re-expansion of left lung since the recent CT with diffuse asymmetric left-sided pulmonary parenchymal opacities. These opacities are nonspecific but could be secondary to infection, asymmetric edema, or pulmonary hemorrhage. Atelectasis is less likely. 3. Moderate left hydropneumothorax post left chest tube placement. 4. Nodular thickening noted along the pleura at the left lung base is suspicious for metastatic disease. VL DUP LOWER EXTREMITY VENOUS BILATERAL    Result Date: 8/22/2021  EXAMINATION: DUPLEX VENOUS ULTRASOUND OF THE BILATERAL LOWER EXTREMITIES, 8/22/2021 8:19 am TECHNIQUE: Duplex ultrasound using B-mode/gray scaled imaging and Doppler spectral analysis and color flow was obtained of the bilateral lower extremities. COMPARISON: None HISTORY: ORDERING SYSTEM PROVIDED HISTORY: 3+ edema and stage 4 cancer TECHNOLOGIST PROVIDED HISTORY: Reason for exam:->3+ edema and stage 4 cancer Acuity: Acute Type of Exam: Initial FINDINGS: The visualized deep veins of the bilateral lower extremities are patent and free of echogenic thrombus.  The veins demonstrate good compressibility with normal color flow study and spectral analysis. Of note, evaluation of the veins of the calf is limited due to patient body habitus. Diffuse subcutaneous edema is noted. There is occlusive thrombus within the left greater saphenous vein at the junction with the common femoral vein, and extending through the proximal and mid portions. The distal greater saphenous vein is patent with associated absence of Doppler flow. No evidence of DVT in either lower extremity. Occlusive superficial venous thrombus within the left greater saphenous vein extending from the junction of the greater saphenous and common femoral veins through the proximal and mid portions of the greater saphenous vein. VL DUP UPPER EXTREMITY VENOUS LEFT    Result Date: 8/22/2021  EXAMINATION: DUPLEX ULTRASOUND OF THE LEFT UPPER EXTREMITY FOR DVT, 8/22/2021 8:19 am TECHNIQUE: Duplex ultrasound using B-mode/gray scaled imaging and Doppler spectral analysis and color flow was obtained of the deep venous structures of the upper extremity. COMPARISON: None. HISTORY: ORDERING SYSTEM PROVIDED HISTORY: SOB, stage 4 breast cancer TECHNOLOGIST PROVIDED HISTORY: Reason for exam:->SOB, stage 4 breast cancer Acuity: Acute Type of Exam: Initial FINDINGS: Hyperechogenic, noncompressive, occlusive thrombus is seen throughout the left internal jugular and subclavian veins. There is corresponding absence of Doppler flow. A nonocclusive thrombus is identified within the left axillary vein, with minimal central venous flow noted. Additional occlusive, non compressible, hyperechogenic thrombus is seen within the cephalic and basilic veins. The left brachial vein is patent with normal Doppler flow. The radial and ulnar veins appear patent. There is extensive subcutaneous edema, most prominently over the ulnar aspect of the forearm.      Extensive occlusive venous thrombus of the left upper extremity involving the left internal jugular, subclavian, cephalic, and basilic veins. Additional nonocclusive thrombus of the left axillary vein. Extensive subcutaneous edema throughout the forearm. IR GUIDED THORACENTESIS PLEURAL    Result Date: 8/3/2021  PROCEDURE: ULTRASOUNDGUIDED left THORACENTESIS 8/3/2021 HISTORY: ORDERING SYSTEM PROVIDED HISTORY: Pleural effusion TECHNOLOGIST PROVIDED HISTORY: Which side should the procedure be performed? ->Left Reason for exam:->Pleural Effusion Is the patient pregnant?->No Shortness of breath TECHNIQUE: Informed consent was obtained after a detailed explanation of the procedure including risks, benefits, and alternatives. Universal protocol was performed. The left chest was prepped and draped in sterile fashion and local anesthesia was achieved with lidocaine. A 5 Wolof needle sheath was advanced under ultrasound guidance into pleural effusion and thoracentesis was performed. The patient tolerated the procedure well. EBL: Less than 5 cc FINDINGS: A total of 1400 cc clear yellow fluid was removed. Fluid was sent for analysis     Successful ultrasound guided left-sided thoracentesis. Assessment & Plan:      Eric Bangura is a 50y.o. year old female admitted 8/22/2021 for shortness of breath.     1) Gross Hematuria: Improving. Secondary to indwelling ureteral stents and Heparin              Hgb 8.5; recommend PRBC transfusion if <7.0. On Heparin upon admission due to extensive left upper extremity venous thrombus. Nursing staff to manually irrigate bladder q4hrs and prn clots; OK to discontinue if urine remains clear.   2) Bilateral Hydronephrosis: secondary to bilateral ureteral obstruction due to metastatic breast cancer              S/p cystoscopy, bilateral ureteral stent exchange, left RGPG/ureteroscopy 8/5/21              S/p cystoscopy, bilateral RGPG, bilateral ureteral stent exchange 6/11/21              S/p cystoscopy, bilateral ureteral stent placement on 3/25/21               CT a/p 6/9/21: Unchanged moderate-large amount of bilateral hydronephrosis greater on the right. Ureteral stents in good position.              Recommend bilateral ureteral stent exchange in 1-2 months due to high-grade nature and progression of her cancer. 3) ELIDIA: Cr 3.5; Nephrology following     Will continue to follow. Patient seen and examined, chart reviewed.      Electronically signed by Lou Gutiérrez PA-C on 8/24/2021 at 7:15 AM

## 2021-08-24 NOTE — PROGRESS NOTES
[facility] ([facility])  PROGRESS NOTE      Patient was seen and examined today. Not in any acute distress and no overnight events. She is weak. I again discussed with her about hospice and she seems to be okay with the hospice evaluation. I will talk to Dr. Nikole Arcos who will be seeing her today. Overall prognosis is unfortunately poor. PHYSICAL EXAM    Vitals: /73   Pulse 135   Temp 98.2 °F (36.8 °C) (Axillary)   Resp 24   Ht 5' 1\" (1.549 m)   Wt 140 lb (63.5 kg)   LMP 04/08/2018   SpO2 92%   BMI 26.45 kg/m²   CONSTITUTIONAL: awake, alert, cooperative, lethargic  EYES: pupils equal, round and reactive to light, sclera clear and conjunctiva pallor   ENT: Normocephalic, without obvious abnormality, atraumatic  NECK: supple, symmetrical, no jugular venous distension and no carotid bruits   LUNGS: no increased work of breathing and clear to auscultation   CARDIOVASCULAR: regular rate and rhythm, normal S1 and S2, no murmur   ABDOMEN: normal bowel sound, soft, non-distended, non-tender, no masses palpated, no hepatosplenomegaly. She has indwelling ureteral stent. NEUROLOGIC: awake, alert, oriented to name, place and time. Cranial nerves II through XII are grossly intact. SKIN: Nodular lesion to anterior chest wall. EXTREMITIES: + LE edema     LABORATORY RESULTS  CBC: Recent Labs     08/22/21  0243 08/22/21  2214 08/24/21  0643   WBC 13.7* 15.1* 15.9*   HGB 8.7* 8.7* 8.5*   * 339 342     BMP:    Recent Labs     08/22/21  1225 08/23/21  0424 08/24/21  0643    142 143   K 4.7 4.2 4.5   CL 99 100 99   CO2 16* 23 25   BUN 75* 74* 72*   CREATININE 3.6* 3.8* 3.5*   GLUCOSE 139* 129* 102*     Hepatic:   Recent Labs     08/22/21  0243   AST 38*   ALT <5*   BILITOT 0.2   ALKPHOS 92     ASSESSMENT/RECOMMENDATION    1. Metastatic breast cancer- last treatment with palliative taxol 8/5/21.  Due to decline in performance status, she is not a candidate for systemic treatment at this time. We agree with hospice consult, patient is agreeable as well.       2. Left upper extremity DVT-Heparin was held due to hematuria.     3. ELIDIA/CKD- nephrology consulted.      4. Hematuria- urology consulted. Monitor CBC    We will continue to follow the patient. Thank you.

## 2021-08-24 NOTE — PROGRESS NOTES
RRR  Pulm: Clear bilaterally. No crackles/wheezes  Gastrointestinal: Soft, NT/ND, +BS  Musculoskeletal: No edema. Warm  Neuro: No focal deficit. Moves extremity spontaneously. Psychiatry: Appropriate affect. Not agitated. Skin: Warm, dry with normal turgor. No rash  Capillary refill: Brisk,< 3 seconds   Peripheral Pulses: +2 palpable, equal bilaterally     24HR INTAKE/OUTPUT:      Intake/Output Summary (Last 24 hours) at 8/24/2021 0950  Last data filed at 8/24/2021 0510  Gross per 24 hour   Intake --   Output 2150 ml   Net -2150 ml     I/O last 3 completed shifts: In: 10 [I.V.:10]  Out: 2150 [Urine:2150]  No intake/output data recorded.   Meds:    sodium bicarbonate  650 mg Oral TID    metoprolol tartrate  25 mg Oral BID    cetirizine  10 mg Oral Daily    [Held by provider] furosemide  80 mg Oral BID    calcitRIOL  1 mcg Oral BID    magnesium oxide  400 mg Oral Daily    fluticasone  1 spray Each Nostril Daily    sodium chloride flush  10 mL Intravenous 2 times per day    calcium carbonate  500 mg Oral Q6H     Infusions:    sodium chloride       PRN Meds: HYDROmorphone, docusate sodium, sodium chloride flush, sodium chloride, ondansetron **OR** ondansetron, bisacodyl, acetaminophen **OR** acetaminophen, LORazepam    Labs/imaging:  CBC:   Recent Labs     08/22/21  0243 08/22/21  2214 08/24/21  0643   WBC 13.7* 15.1* 15.9*   HGB 8.7* 8.7* 8.5*   * 339 342     BMP:    Recent Labs     08/22/21  1225 08/23/21  0424 08/24/21  0643    142 143   K 4.7 4.2 4.5   CL 99 100 99   CO2 16* 23 25   BUN 75* 74* 72*   CREATININE 3.6* 3.8* 3.5*   GLUCOSE 139* 129* 102*     Hepatic:   Recent Labs     08/22/21  0243   AST 38*   ALT <5*   BILITOT 0.2   ALKPHOS 92       Nonso MD Latoya  -------------------------------  [facility] Hospitalist

## 2021-08-24 NOTE — PROGRESS NOTES
irragated bladder via benítez cath q4. Bloody clots visible until 1500ml output maintained and charted.

## 2021-08-24 NOTE — PROGRESS NOTES
distress  HEENT: normocephalic, atraumatic,   Neck: supple, trachea midline  Lungs:breathing comfortably on nc  Abdomen: soft, non-tender; non distended,   Extremities: ble edema      Assessment and Plan:     ELIDIA on CKD  -Metabolic acidosis  -B/L HN and recent ureteral stents exchange  -Advanced metastatic breast cancer  -Malignant pleural effusion s/p pleurex drain  -DVTs: new  -Gross hematuria  -Fluid overload     Plan;  -continue bumex gtt until it expires  -Will not do well on HD considering all the comorbidities  -avoid nephrotoxins  -monitor UOP                  Electronically signed by Chaitanya Nails DO on 8/24/2021 at 6:52 AM    [address] Doris Bar DO [address],  [facility]  [id number]  PHONE: [phone]  FAX: [phone]

## 2021-08-24 NOTE — CARE COORDINATION
Pt is agreeable for Hospice referral to be made per Dr Craig Dress note. Dr Manuelito Morton from [facility] met with pt this am. Referral made to [address] [place] [address]. They will arrange a meeting time with pt and daughter.   TE

## 2021-08-24 NOTE — CONSULTS
[facility] Consult Note    Date: 8/24/2021  Name: Imelda Conley  MRN: [id number]  YOB: 1965   Patient's PCP: CINDY Martinez - NP  Referring Physician: Dr Itzel Sandoval and Daniel Ayala CNP  Acute care admission date: 8/22/2021 and 8/3 to 8/14/21     Informant: Chart reviewed, discussed with Dr Iztel Sandoval and I met with the patient at the bedside, who is mildly slow to respond. Discussed with the patient's nurse and the hospice nurse liaison. CC: breast cancer    [place]: This is a 50 y.o. female with Stage IV left breast cancer diagnosed May 2018 with osseous metastases, malignant left pleural effusion, bilateral hydronephrosis with ureteral stents, on palliative chemotherapy, who was readmitted on 8/22/2021 with shortness of breath. The patient had a recent admission and had thoracic surgery consultation and subsequent VATS and left PleurX catheter placement on 8/12/21. Dr Itzel Sandoval does not feel that additional palliative chemotherapy would be tolerated or beneficial, and has recommended hospice. The patient tells me that she lives alone, and has 2 children. She has low back pain. She denies any current dyspnea. Her appetite is fair to poor, and she knows that she has lost weight, but not the amount. Per chart review, in April 2018 her office weight was 183 pounds and current weight is 140 pounds. The patient does not believe that she has been out of bed while here. Hospice philosophy was briefly discussed regarding care and comfort at the end of life. The patient wants her daughter to be involved. She is aware that Hospice does not provide for the patients 24 hour care giving needs. The patient is a full code, and will need to discuss with family.      Past Medical History:   Diagnosis Date    Anxiety     Asthma     last flare up 5 yrs ago    Breast lesion     \"have spot on left breast going to remove- at this time not sure if cancer or not\"    appetite   :  bilateral hydronephrosis with stents  Musculoskeletal:  ++ edema left arm and bilateral lower extremities  Neuro: No seizures, syncope,   Skin:  Diffuse nodular eruption across chest    Weight:    Wt Readings from Last 3 Encounters:   08/22/21 140 lb (63.5 kg)   08/12/21 148 lb 6.4 oz (67.3 kg)   08/03/21 140 lb (63.5 kg)       Data reviewed 8/24/2021:  Final Pathologic Diagnosis 5/9/18:   A. Biopsy of skin and breast tissue, left breast:       INFILTRATING PLEOMORPHIC LOBULAR CARCINOMA, SKIN AND   BREAST TISSUE. Benign keratosis. B.  Biopsy of breast tissue, left breast:       INFILTRATING PLEOMORPHIC LOBULAR CARCINOMA, SKIN AND   BREAST TISSUE. Chest X-ray  8/22/21:  Unchanged right port catheter. Left chest tube remains in place. Persistent small left hydropneumothorax, stable. Left-sided airspace opacities appear similar to the prior exam.  Increased diffuse interstitial prominence. The cardiac silhouette and osseous structures are stable. Stable sclerotic lesions in the scapula bilaterally. US lower extremity and left upper extremity 8/22/21  No evidence of DVT in either lower extremity. Occlusive superficial venous thrombus within the left greater saphenous vein extending from the junction of the greater saphenous and common femoral veins through the proximal and mid portions of the greater saphenous vein. Extensive occlusive venous thrombus of the left upper extremity involving the left internal jugular, subclavian, cephalic, and basilic veins. Additional nonocclusive thrombus of the left axillary vein. Extensive subcutaneous edema throughout the forearm. CT-scan of the brain 8/22/21  No acute intracranial abnormality. No acute intracranial hemorrhage or mass effect. If there is persistent clinical concern for multiple sclerosis flare and/or metastatic disease, further evaluation with MRI of the brain with and without contrast is recommended.      CA 27.29 10/5/20: 409.2    Hepatic Function Panel:    Lab Results   Component Value Date    ALKPHOS 92 08/22/2021    ALT <5 08/22/2021    AST 38 08/22/2021    PROT 6.0 08/22/2021    BILITOT 0.2 08/22/2021    LABALBU 2.6 08/22/2021     CBC:   Recent Labs     08/22/21  0243 08/22/21  2214 08/24/21  0643   WBC 13.7* 15.1* 15.9*   HGB 8.7* 8.7* 8.5*   HCT 31.1* 28.8* 29.1*   MCV 96.9 94.1 96.4   * 339 342     BMP:   Recent Labs     08/22/21  1225 08/23/21  0424 08/24/21  0643    142 143   K 4.7 4.2 4.5   CL 99 100 99   CO2 16* 23 25   BUN 75* 74* 72*   CREATININE 3.6* 3.8* 3.5*   GLUCOSE 139* 129* 102*       Physical Exam:   BP (!) 115/45   Pulse 135   Temp 98.7 °F (37.1 °C) (Axillary)   Resp 24   Ht 5' 1\" (1.549 m)   Wt 140 lb (63.5 kg)   LMP 04/08/2018   SpO2 93%   BMI 26.45 kg/m²   General: alert, slow to respond, in no distress, chronically ill appearing  Skin: sallow with diffuse nodular lesions across anterior chest  HEENT: Mucous membranes are mildly dry, sclerae are clear   Chest: Mediport on right anterior shoulder area, nodular lesions across anterior chest, left thoracic Pleurx  Heart: distant tones, tachycardic RRR, S1S2, no murmurs  Lungs:  Distant breath sounds bilaterally, diminished at the bases, with basilar rales, scattered rhonchi   Abdomen: soft, bowel sounds present, no apparent tenderness, nondistended  Extremities:  No mottling, +++ left arm and bilateral lower extremity edema  Neurologic: alert, generally weak. Assessment/Plan:  1. Stage IV left breast cancer diagnosed May 2018 with osseous metastases, malignant left pleural effusion, obstructive uropathy with ureteral stents and progressive disease. The patient is Hospice eligible, and a meeting will be arranged to discuss. 2. Malignant left pleural effusion with Pleurx   3. DVT left upper extremity  4. Acute Kidney Injury on CKD with volume overload  5.  Hematuria      Patient Active Problem List   Diagnosis Code    Left breast mass N63.20    Secondary cancer of bone ([facility] [address] C79.51    Essential hypertension I10    Malignant neoplasm of central portion of left female breast ([facility] [address] C50.112    Metastatic breast cancer (HCC) C50.919    Dehydration E86.0    Iron deficiency anemia D50.9    Malabsorption K90.9    Left ovarian enlargement N83.8    ELIDIA (acute kidney injury) ([facility] [address] N17.9    Hyperkalemia U87.5    Metabolic acidosis V54.7    Hyponatremia E87.1    Tachycardia R00.0    Acute and chronic respiratory failure (acute-on-chronic) (HCC) J96.20    COVID-19 U07.1    Acute renal failure with tubular necrosis (HCC) N17.0    Bilateral hydronephrosis N13.30    Edema leg R60.0    Hypervolemia E87.70    Hypocalcemia E83.51    Pneumothorax J93.9    Stage 4 chronic kidney disease (HCC) N18.4    Obstructive uropathy N13.9    Pleural effusion, malignant J91.0    Trapped lung J98.19    SOB (shortness of breath) R06.02    Shortness of breath I14.24       Certification of Terminal Illness: I certify that this patient is eligible for Hospice services for a terminal diagnosis of Stage IV left breast cancer with a life expectancy predicted to be less than 6 months if this illness follows its expected course.       Dori Cr MD

## 2021-08-24 NOTE — PROGRESS NOTES
Patient's current declining condition discussed with the children of patient I also made them aware of conversation she had with Dr. Edgar Last this morning, as she agreed for hospice care. Daughter and son have stated that they still are trying to decide on code status for their mother and as of now, they still want her as full code. They verbalized that they will let me know when they change their mind, as they are going to decide tomorrow. Patient is currently not very alert and cannot answer for herself at this time. Javid Rodas RN.

## 2021-08-24 NOTE — PROGRESS NOTES
a past surgical history that includes  section (); Appendectomy (age 27); Tonsillectomy (age 5); Port Surgery (Right, 3/15/2021); IR NONTUNNELED VASCULAR CATHETER > 5 YEARS (3/22/2021); Bladder surgery (Bilateral, 3/25/2021); Bladder surgery (Bilateral, 2021); CT GUIDED CHEST TUBE (8/3/2021); Bladder surgery (Bilateral, 2021); and Thoracoscopy (Left, 2021). Social History:   reports that she has never smoked. She has never used smokeless tobacco. She reports current alcohol use. She reports that she does not use drugs. Family history:  family history includes Diabetes in her mother; High Blood Pressure in her mother; Stroke in her mother. Allergies   Allergen Reactions    Latex Anaphylaxis     \"have trouble with banana's kiwi, avocados- Have trouble breathing with these and trouble breathig - get asthmatic from latex gloves, bandage, anything latex     Banana Anaphylaxis    Cinnamon Anaphylaxis       Review of Systems   All 14 systems were reviewed and are negative  Except for the positive findings  which as documented     /73   Pulse 135   Temp 98.2 °F (36.8 °C) (Axillary)   Resp 24   Ht 5' 1\" (1.549 m)   Wt 140 lb (63.5 kg)   LMP 2018   SpO2 92%   BMI 26.45 kg/m²       Intake/Output Summary (Last 24 hours) at 2021 0814  Last data filed at 2021 0510  Gross per 24 hour   Intake 10 ml   Output 2150 ml   Net -2140 ml       Physical Exam  Vitals reviewed. Constitutional:       General: She is not in acute distress. Appearance: Normal appearance. She is not ill-appearing. HENT:      Head: Atraumatic. Neck:      Vascular: No carotid bruit. Cardiovascular:      Rate and Rhythm: Regular rhythm. Tachycardia present. Pulses: Normal pulses. Heart sounds: Normal heart sounds. No murmur heard. Pulmonary:      Effort: Pulmonary effort is normal. No respiratory distress. Breath sounds: Decreased breath sounds present.    Musculoskeletal: General: No swelling or deformity. Cervical back: Neck supple. No muscular tenderness. Skin:     Comments: Scaring across chest.    Neurological:      Mental Status: She is alert. Telemetry Reviewed:   Sinus tachycardia    Medications:    sodium bicarbonate  650 mg Oral TID    metoprolol tartrate  25 mg Oral BID    cetirizine  10 mg Oral Daily    [Held by provider] furosemide  80 mg Oral BID    calcitRIOL  1 mcg Oral BID    magnesium oxide  400 mg Oral Daily    fluticasone  1 spray Each Nostril Daily    sodium chloride flush  10 mL Intravenous 2 times per day    calcium carbonate  500 mg Oral Q6H      sodium chloride       HYDROmorphone, docusate sodium, sodium chloride flush, sodium chloride, ondansetron **OR** ondansetron, bisacodyl, acetaminophen **OR** acetaminophen, LORazepam    Lab Data:  CBC:   Recent Labs     08/22/21  0243 08/22/21  2214 08/24/21  0643   WBC 13.7* 15.1* 15.9*   HGB 8.7* 8.7* 8.5*   HCT 31.1* 28.8* 29.1*   MCV 96.9 94.1 96.4   * 339 342     BMP:   Recent Labs     08/22/21  1225 08/23/21  0424 08/24/21  0643    142 143   K 4.7 4.2 4.5   CL 99 100 99   CO2 16* 23 25   BUN 75* 74* 72*   CREATININE 3.6* 3.8* 3.5*     PT/INR: No results for input(s): PROTIME, INR in the last 72 hours. BNP:    Recent Labs     08/22/21  0243   PROBNP 4,230*     TROPONIN:   Recent Labs     08/22/21  0243 08/24/21  0643   TROPONINT 0.037* 0.043*        ECHO :   Echocardiogram 3/24/2021  Summary   This is a limited echocardiogram.   Left ventricular systolic function is normal.   Ejection fraction is visually estimated at 55-60%. Mild pericardial effusion   Pleural effusion present. All labs, medications and tests reviewed by myself , continue all other medications of all above medical condition listed as is except for changes mentioned above. Thank you very much for consult , please call with questions.     Electronically signed by CINDY Smith - TRACY on 8/24/2021 at [facility]    I have seen ,spoken to  and examined this patient personally, independently of the nurse practitioner. I have reviewed the hospital care given to date and reviewed all pertinent labs and imaging. The plan was developed mutually at the time of the visit with the patient,  NP   and myself. I have spoken with patient, nursing staff and provided written and verbal instructions . The above note has been reviewed and I agree with the assessment, diagnosis, and treatment plan with changes made by me as follows     HPI:  I have reviewed the above HPI  And agree with above   Josué Fuller is a 50 y. o.year old who and presents with had concerns including Shortness of Breath (hx stage 4 breast cancer, catheter in place draining fluid from chest ). Chief Complaint   Patient presents with    Shortness of Breath     hx stage 4 breast cancer, catheter in place draining fluid from chest      Please review addendum/changes made to note above   Interval history:      Physical Exam:  General:  Awake, alert, NAD  Head:normal  Eye:normal  Neck:  No JVD   Chest:  Clear to auscultation, respiration easy  Cardiovascular:  S1 and S2 audible, No added heart sounds, No signs of ankle edema, or volume overload, No evidence of JVD, No crackles  Abdomen:   nontender  Extremities:  No  edema  Pulses; palpable  Neuro: grossly normal      MEDICAL DECISION MAKING;    I agree with the above plan, which was planned by myself and discussed with NP.   Heparin on hold due to hematuria   Breathing is stable  Hospice eval  Supportive care   Echo shows preserved EF no further recommendations call with questions      Slim You MD [facility] 08/24/21

## 2021-08-25 PROBLEM — N83.8 LEFT OVARIAN ENLARGEMENT: Status: RESOLVED | Noted: 2021-01-01 | Resolved: 2021-01-01

## 2021-08-25 PROBLEM — R06.02 SOB (SHORTNESS OF BREATH): Status: RESOLVED | Noted: 2021-01-01 | Resolved: 2021-01-01

## 2021-08-25 PROBLEM — U07.1 COVID-19: Status: RESOLVED | Noted: 2021-01-01 | Resolved: 2021-01-01

## 2021-08-25 PROBLEM — Z51.5 HOSPICE CARE: Status: ACTIVE | Noted: 2021-01-01

## 2021-08-25 NOTE — PROGRESS NOTES
Patient examined at bedside. She is somnolent and unresponsive to pain. Pupils minimally reactive. Noted to have apneic breaths with respiratory rate of 12. Also tachycardic with heart rate in 130s. SBP in low 90s. Would require intubation for airway protection at this point. I called and discussed possible intubation with the patient's daughter Chang Diane. The daughter admits that the patient has been in that state since this afternoon. She requested to change CODE STATUS to DNR CC at this point. No intubation. Confirmed with Dr. Elo Cosme who is also at bedside. CODE STATUS changed. Will DC telemetry. Hospice already consulted and has seen the patient.

## 2021-08-25 NOTE — PROGRESS NOTES
Hospitalist paged to assess patient at bedside. Patient RR 10. Only responding to voice and unable to verbalize or follow commands. . Patient in this condition when this nurse arrived on shift. Also temperature reading 102.6 rectally. Patient was given tylenol suppository. Patient reportedly awake, alert, and oriented this morning. Has been slowly declining throughout the day. Family was called concerning patient code status and their wishes. Hospitalist stated patient needs intubated if patient remains a full code. Patient daughter Jamar Cortes), would like to go ahead and make patient a DNR-CC. Dr. Michelle Lynch talked to daughter regarding this status as well and code status was changed. 0115  Patient more awake, alert, and answering simple questions. Following some commands but with difficulty. Patient in a lot of pain and moaning/yelling out. Will administer dilaudid as ordered. Patient BP low at 78/39 (47). Hospitalist ordered midodrine but patient unable to follow swallowing commands. Explained to family that giving pain medication may continue to decrease her BP but will make her more comfortable. Family would like to continue with pain medication as frequently as needed to keep patient comfortable. Will continue to monitor.

## 2021-08-25 NOTE — PROGRESS NOTES
[facility] Katerine [facility] [address], [address]   Progress Note  [facility] 0 1 2      Date: 2021   Patient: Danielle Amaya   : 1965   DOA: 2021   MRN: [id number]   ROOM#: [id number]     Admit Date: 2021     Collaborating Urologist on Call at time of admission: Dr. Andrew Gr  CC: Shortness of breath  Reason for Consult: Gross hematuria    Subjective:     Pain: none, no nausea and no vomiting,   Bowel Movement/Flatus:  Yes  Voidinfr benítez catheter in place, urine clear     Pt code status changed to [facility] and is now on Hospice.     Objective:    Vitals:    BP (!) 93/50   Pulse 135   Temp 97.3 °F (36.3 °C) (Axillary)   Resp 14   Ht 5' 1\" (1.549 m)   Wt 140 lb (63.5 kg)   LMP 2018   SpO2 98%   BMI 26.45 kg/m²    Temp  Av.1 °F (37.3 °C)  Min: 97.3 °F (36.3 °C)  Max: 102.6 °F (39.2 °C)       Intake/Output Summary (Last 24 hours) at 2021 0736  Last data filed at 2021 0545  Gross per 24 hour   Intake --   Output 1900 ml   Net -1900 ml       Physical Exam:   General appearance: alert, appears stated age, cooperative, fatigued, no distress and mildly obese  Head: Normocephalic, without obvious abnormality, atraumatic  Back: No CVA tenderness  Abdomen: Soft, non-tender, non-distended  : 16fr benítez catheter in place, urine fany-colored, clearing     Labs:   WBC:    Lab Results   Component Value Date    WBC 15.9 2021      Hemoglobin/Hematocrit:    Lab Results   Component Value Date    HGB 8.5 2021    HCT 29.1 2021      BMP:   Lab Results   Component Value Date     2021    K 4.5 2021    CL 99 2021    CO2 25 2021    BUN 72 2021    LABALBU 2.6 2021    CREATININE 3.5 2021    CALCIUM 7.7 2021    GFRAA 17 2021    LABGLOM 14 2021      PT/INR:    Lab Results   Component Value Date    PROTIME 13.0 2021    INR 1.01 2021      PTT:    Lab Results   Component Value Date    APTT 107.1 2021 Imaging:  CT ABDOMEN PELVIS WO CONTRAST Additional Contrast? None    Result Date: 7/31/2021  EXAMINATION: CT OF THE CHEST WITHOUT CONTRAST; CT OF THE ABDOMEN AND PELVIS WITHOUT CONTRAST 7/28/2021 10:53 am; 7/28/2021 10:52 am TECHNIQUE: CT of the chest was performed without the administration of intravenous contrast. Multiplanar reformatted images are provided for review. Dose modulation, iterative reconstruction, and/or weight based adjustment of the mA/kV was utilized to reduce the radiation dose to as low as reasonably achievable.; CT of the abdomen and pelvis was performed without the administration of intravenous contrast. Multiplanar reformatted images are provided for review. Dose modulation, iterative reconstruction, and/or weight based adjustment of the mA/kV was utilized to reduce the radiation dose to as low as reasonably achievable. COMPARISON: 5/10/2021, 3/24/2021 CT chest, 3/23/2021 CT abdomen and pelvis. Correlation is made to PET-CT dated 5/7/2020 HISTORY: ORDERING SYSTEM PROVIDED HISTORY: Malignant neoplasm of breast in female, estrogen receptor positive, unspecified laterality, unspecified site of breast [facility]) TECHNOLOGIST PROVIDED HISTORY: Is the patient pregnant?->No Reason for Exam: Recheck Breast Cancer, asthma, heartburn, gas, bloating, nausea, fatigue, back pain, swelling; ORDERING SYSTEM PROVIDED HISTORY: Malignant neoplasm of breast in female, estrogen receptor positive, unspecified laterality, unspecified site of breast [facility]) TECHNOLOGIST PROVIDED HISTORY: Additional Contrast?->None Is the patient pregnant?->No Reason for Exam: Recheck Breast Cancer, asthma, heartburn, gas, bloating, nausea, fatigue, back pain, swelling FINDINGS: Chest: Mediastinum: Heart size is normal.  Relative hypoattenuation of the blood pool compared to myocardium is suggestive of anemia. Within the limitations of a noncontrast exam, there is no evidence of right hilar adenopathy.   Left hilum is not adequately seen for evaluation of adenopathy. 1.5 x 0.9 cm precarinal node, minimally decreased in size compared to the prior exam, at which time it measured 2.0 x 1.3 cm. Previously seen subcarinal lymph node has decreased in size. Lungs/pleura: Large left pleural effusion, increased compared to the prior exam, with collapse of the left lower lobe and near complete collapse of the left upper lobe. Although not well seen, the pleural surface has a slightly nodular appearance, suspicious for pleural metastasis. Soft Tissues/Bones: Skin thickening of the anterior chest wall. 1.7 x 1.1 cm right axillary lymph node has not significantly changed. Hypodensity in the thyroid gland with rim calcification is stable but not well seen by CT. The isthmus of the thyroid gland appears thickened. Diffuse bone metastasis in the axial skeleton. Thoracic alignment is maintained. There is a pathologic fracture of T10, chronic. No evidence of bony fragment retropulsion into the central canal.  Nodular thickening of the left anterior chest wall skin and subcutaneous soft tissue. Abdomen/Pelvis: Organs: Unenhanced liver, gallbladder, spleen, pancreas, and adrenal glands are unremarkable. Bilateral ureteral stents with proximal loops in the renal collecting system and distal loops in the bladder. Right renal pelvicaliectasis without you hydronephrosis. GI/Bowel: No evidence of bowel obstruction or free intraperitoneal air. Pelvis: Eccentric bladder wall thickening with relative thickening of the left lateral wall compared to the right. Small amount of free fluid in the pelvis. Pelvic adenopathy, including 1.9 x 1.7 cm right external iliac node and 1.6 x 1.2 cm left external iliac node. Peritoneum/Retroperitoneum: Abdominal aortic caliber is normal.  No retroperitoneal adenopathy. Bones/Soft Tissues: Circumferential subcutaneous edema of the abdominal and pelvic wall. Diffuse sclerotic bone metastasis in the axial skeleton.      1. Large left pleural effusion, increased compared to the exam of 5/10/2021. There is now complete collapse of the left lower lobe and near complete collapse of the left upper lobe. Nodular appearance of the left pleural surface is highly suspicious for pleural metastasis. 2. Pelvic adenopathy, not significantly changed compared to prior exam. Finding is highly suspicious for metastasis. 3. Nodular skin thickening of the left anterior chest wall and subcutaneous fat, likely metastasis. 4. 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 This is a chronic finding. 5. Persistent eccentric bladder wall thickening with relative thickening of the left side of the bladder wall. Differential includes cystitis and malignancy. If it would alter patient's management, consider cystoscopy. 6. Stable position of bilateral ureteral stents. Right renal pelvocaliectasis without you hydronephrosis. 7. Diffuse skin thickening with subcutaneous edema of the chest, abdominal, and pelvic wall. Please correlate with clinical symptoms of anasarca. CT HEAD WO CONTRAST    Result Date: 8/22/2021  EXAMINATION: CT OF THE HEAD WITHOUT CONTRAST  8/22/2021 9:26 am TECHNIQUE: CT of the head was performed without the administration of intravenous contrast. Dose modulation, iterative reconstruction, and/or weight based adjustment of the mA/kV was utilized to reduce the radiation dose to as low as reasonably achievable. COMPARISON: None. HISTORY: ORDERING SYSTEM PROVIDED HISTORY: change in MS, stage 4 cancer TECHNOLOGIST PROVIDED HISTORY: Reason for exam:->change in MS, stage 4 cancer Has a \"code stroke\" or \"stroke alert\" been called? ->No Is the patient pregnant?->No Reason for Exam: CHANGE IN MS, STAGE 4 CANCER Acuity: Acute Type of Exam: Initial FINDINGS: BRAIN/VENTRICLES: There is no acute intracranial hemorrhage, mass effect or midline shift. No abnormal extra-axial fluid collection.   The gray-white differentiation is maintained without evidence of an acute infarct. There is no evidence of hydrocephalus. ORBITS: The visualized portion of the orbits demonstrate no acute abnormality. SINUSES: No acute abnormality of the sinuses. Mucous retention cyst or polyp in the left maxillary sinus. SOFT TISSUES/SKULL:  No acute abnormality of the visualized skull or soft tissues. No acute intracranial abnormality. No acute intracranial hemorrhage or mass effect. If there is persistent clinical concern for multiple sclerosis flare and/or metastatic disease, further evaluation with MRI of the brain with and without contrast is recommended. CT GUIDED CHEST TUBE    Result Date: 8/3/2021  PROCEDURE: CT GUIDED CHEST TUBE PLACEMENT 8/3/2021 HISTORY: ORDERING SYSTEM PROVIDED HISTORY: Post thoracentesis TECHNOLOGIST PROVIDED HISTORY: Reason for exam:->Post thoracentesis Which side should the procedure be performed? ->Left Is the patient pregnant?->No Reason for Exam: post thoracentesis chest tube Relevant Medical/Surgical History: ATTN: DR TREVIÑO Pneumothorax SEDATION: None TECHNIQUE: Informed consent was obtained after a detailed explanation of the procedure including risks, benefits, and alternatives. Universal protocol was followed. A suitable skin site was prepped and draped in sterile fashion following CT localization. The patient was initially scanned supine, however, the patient has extensive post radiation change on anterior left chest wall. Patient was then placed right lateral decubitus to minimize needle path through the radiation change. An 18 gauge needle was advanced into the left pleural space and a 0.035 guidewire was used to place a 8 Urdu chest tube after the fascial tract was dilated. The catheter was sutured to the skin and the patient tolerated the procedure well. The catheter was attached to Pleurevac drainage.    The patient tolerated the procedure well and there were no immediate complications. EBL: Less than 5 cc FINDINGS: 1. When compared with the chest CT from 7/28/2021 there has been interval re-expansion of the left lung. There are diffuse nonspecific parenchymal opacities seen throughout visualized left lung. 2. Moderate left hydropneumothorax status post chest tube placement. 3. Nonspecific diffuse soft tissue nodularity seen throughout the left chest wall soft tissues with dermal thickening, which is not significantly changed since the recent CT. This could represent post radiation change, however, superimposed malignancy cannot be excluded. 4. Limited evaluation demonstrates sclerotic lesions seen throughout the spine, sternum and ribs. 5. Nodular thickening noted along the left pleura at the lung base. 1. Successful CT guided placement of a left-sided 8 Danish chest tube. 2. Interval re-expansion of left lung since the recent CT with diffuse asymmetric left-sided pulmonary parenchymal opacities. These opacities are nonspecific but could be secondary to infection, asymmetric edema, or pulmonary hemorrhage. Atelectasis is less likely. 3. Moderate left hydropneumothorax post left chest tube placement. 4. Nodular thickening noted along the pleura at the left lung base is suspicious for metastatic disease. VL DUP LOWER EXTREMITY VENOUS BILATERAL    Result Date: 8/22/2021  EXAMINATION: DUPLEX VENOUS ULTRASOUND OF THE BILATERAL LOWER EXTREMITIES, 8/22/2021 8:19 am TECHNIQUE: Duplex ultrasound using B-mode/gray scaled imaging and Doppler spectral analysis and color flow was obtained of the bilateral lower extremities. COMPARISON: None HISTORY: ORDERING SYSTEM PROVIDED HISTORY: 3+ edema and stage 4 cancer TECHNOLOGIST PROVIDED HISTORY: Reason for exam:->3+ edema and stage 4 cancer Acuity: Acute Type of Exam: Initial FINDINGS: The visualized deep veins of the bilateral lower extremities are patent and free of echogenic thrombus.  The veins demonstrate good compressibility with normal color flow study and spectral analysis. Of note, evaluation of the veins of the calf is limited due to patient body habitus. Diffuse subcutaneous edema is noted. There is occlusive thrombus within the left greater saphenous vein at the junction with the common femoral vein, and extending through the proximal and mid portions. The distal greater saphenous vein is patent with associated absence of Doppler flow. No evidence of DVT in either lower extremity. Occlusive superficial venous thrombus within the left greater saphenous vein extending from the junction of the greater saphenous and common femoral veins through the proximal and mid portions of the greater saphenous vein. VL DUP UPPER EXTREMITY VENOUS LEFT    Result Date: 8/22/2021  EXAMINATION: DUPLEX ULTRASOUND OF THE LEFT UPPER EXTREMITY FOR DVT, 8/22/2021 8:19 am TECHNIQUE: Duplex ultrasound using B-mode/gray scaled imaging and Doppler spectral analysis and color flow was obtained of the deep venous structures of the upper extremity. COMPARISON: None. HISTORY: ORDERING SYSTEM PROVIDED HISTORY: SOB, stage 4 breast cancer TECHNOLOGIST PROVIDED HISTORY: Reason for exam:->SOB, stage 4 breast cancer Acuity: Acute Type of Exam: Initial FINDINGS: Hyperechogenic, noncompressive, occlusive thrombus is seen throughout the left internal jugular and subclavian veins. There is corresponding absence of Doppler flow. A nonocclusive thrombus is identified within the left axillary vein, with minimal central venous flow noted. Additional occlusive, non compressible, hyperechogenic thrombus is seen within the cephalic and basilic veins. The left brachial vein is patent with normal Doppler flow. The radial and ulnar veins appear patent. There is extensive subcutaneous edema, most prominently over the ulnar aspect of the forearm.      Extensive occlusive venous thrombus of the left upper extremity involving the left internal jugular, subclavian, cephalic, and basilic veins. Additional nonocclusive thrombus of the left axillary vein. Extensive subcutaneous edema throughout the forearm. IR GUIDED THORACENTESIS PLEURAL    Result Date: 8/3/2021  PROCEDURE: ULTRASOUNDGUIDED left THORACENTESIS 8/3/2021 HISTORY: ORDERING SYSTEM PROVIDED HISTORY: Pleural effusion TECHNOLOGIST PROVIDED HISTORY: Which side should the procedure be performed? ->Left Reason for exam:->Pleural Effusion Is the patient pregnant?->No Shortness of breath TECHNIQUE: Informed consent was obtained after a detailed explanation of the procedure including risks, benefits, and alternatives. Universal protocol was performed. The left chest was prepped and draped in sterile fashion and local anesthesia was achieved with lidocaine. A 5 Upper sorbian needle sheath was advanced under ultrasound guidance into pleural effusion and thoracentesis was performed. The patient tolerated the procedure well. EBL: Less than 5 cc FINDINGS: A total of 1400 cc clear yellow fluid was removed. Fluid was sent for analysis     Successful ultrasound guided left-sided thoracentesis. Assessment & Plan:      Destiney Manley is a 50y.o. year old female admitted 8/22/2021 for shortness of breath.      1) Gross Hematuria: Resolved. Secondary to indwelling ureteral stents and Heparin              Hgb 8.5; recommend PRBC transfusion if <7.0. On Heparin upon admission due to extensive left upper extremity venous thrombus. This has since been discontinued. 2) Bilateral Hydronephrosis: secondary to bilateral ureteral obstruction due to metastatic breast cancer              S/p cystoscopy, bilateral ureteral stent exchange, left RGPG/ureteroscopy 8/5/21              S/p cystoscopy, bilateral RGPG, bilateral ureteral stent exchange 6/11/21              S/p cystoscopy, bilateral ureteral stent placement on 3/25/21               CT a/p 6/9/21: Unchanged moderate-large amount of bilateral hydronephrosis greater on the right.  Ureteral stents in good position. 3) ELIDIA: Cr 3.5; Nephrology following     Code status made DNR-CC overnight. Family has decided to proceed with Hospice care. Will sign off, please call with any questions. Patient seen and examined, chart reviewed.      Electronically signed by Sarah Mukherjee PA-C on 8/25/2021 at 7:36 AM

## 2021-08-25 NOTE — DISCHARGE SUMMARY
Discharge Summary    Name:  Destiney Manley /Age/Sex: 1965  (50 y.o. female)   MRN & CSN:  [id number] & [id number] Admission Date/Time: 2021  1:16 AM   Attending:  No att. providers found Discharging Physician: Sammy Jenkins MD     Hospital Course:   Patient is a 50 y.o. female with an unfortunate history of stage IV left breast cancer diagnosed May 2018 with osseous metastases, malignant left pleural effusion, bilateral hydronephrosis with ureteral stents, on palliative chemotherapy, who was admitted to the hospital due to shortness of breath. The patient had a recent admission and had thoracic surgery consultation and subsequent VATS and left PleurX catheter placement on 21. Patient was receiving palliative Taxol however patient has continued to decline and oncology has recommended comfort care/hospice care. Patient did however overnight have deterioration, with significant shortness of breath and altered mental status, intubation was recommended, however covering physician discussed with patient's family who recommended to make patient DNR CC and transition to hospice care. Patient has now been discharged into hospice. The patient expressed appropriate understanding of and agreement with the discharge recommendations, medications, and plan.      Consults this admission:  IP CONSULT TO NEPHROLOGY  IP CONSULT TO IV TEAM  IP CONSULT TO UROLOGY  IP CONSULT TO HOSPICE  IP CONSULT TO CARDIOLOGY    Discharge Instruction:     Diet:  regular diet   Activity: With assistance  Disposition: Discharged to:   []Home, []C, []SNF, []Acute Rehab, [x]Hospice     Condition on discharge: Stable    Discharge Medications:      [facility] Medication Instructions [facility]:[id number]    Printed on:21 1428   Medication Information                      acetaminophen (TYLENOL) 500 MG tablet  Take 500 mg by mouth every 6 hours as needed for Pain             calcitRIOL (ROCALTROL) 0.5 MCG capsule  Take 2 capsules by mouth 2 times daily             calcium carbonate (TUMS) 500 MG chewable tablet  Take 2 tablets by mouth 3 times daily (with meals)             cetirizine (ZYRTEC) 10 MG tablet  Take 10 mg by mouth daily             dexamethasone (DECADRON) 4 MG tablet  TAKE ONE TABLET BY MOUTH DAILY FOR 4 DAYS STARTING THE DAY AFTER CHEMOTHERAPY             docusate sodium (COLACE) 100 MG capsule  Take 1 capsule by mouth daily as needed for Constipation             fluticasone (FLONASE) 50 MCG/ACT nasal spray  1 spray by Each Nostril route daily             furosemide (LASIX) 80 MG tablet  Take 1 tablet by mouth 2 times daily             HYDROcodone-acetaminophen (NORCO) 5-325 MG per tablet  Take 1 tablet by mouth every 6 hours as needed for Pain.             magnesium oxide (MAG-OX) 400 MG tablet  Take 1 tablet by mouth daily             metoprolol succinate (TOPROL XL) 100 MG extended release tablet  Take 1 tablet by mouth daily             Nutritional Supplement LIQD  Take 1 Can by mouth 2 times daily             OMEPRAZOLE PO  Take by mouth             ondansetron (ZOFRAN) 8 MG tablet  1 tab po q 8 hours PRN for chemo induced nausea/vomting             prochlorperazine (COMPAZINE) 10 MG tablet  Take 1 tablet by mouth every 8 hours as needed (nausea)             promethazine (PHENERGAN) 25 MG tablet  TAKE ONE TABLET BY MOUTH EVERY 6 HOURS AS NEEDED FOR NAUSEA             sodium bicarbonate 650 MG tablet  Take 1 tablet by mouth 2 times daily                 Objective Findings at Discharge:   BP (!) 92/44   Pulse 108   Temp 103 °F (39.4 °C) (Axillary)   Resp 16   Ht 5' 1\" (1.549 m)   Wt 140 lb (63.5 kg)   LMP 04/08/2018   SpO2 92%   BMI 26.45 kg/m²            PHYSICAL EXAM   GEN minimally responsive, laying in bed  EYES Pupils are equally round. No scleral erythema, discharge, or conjunctivitis.   HENT Mucous membranes are moist. Oral pharynx without exudates, no evidence of thrush. NECK Supple, no apparent thyromegaly or masses. RESP Clear to auscultation, no wheezes, rales or rhonchi. Symmetric chest movement while on room air. CARDIO/VASC S1/S2 auscultated. Regular rate without appreciable murmurs, rubs, or gallops. No JVD or carotid bruits. Peripheral pulses equal bilaterally and palpable. No peripheral edema. GI Abdomen is soft without significant tenderness, masses, or guarding. Bowel sounds are normoactive. Rectal exam deferred.  No costovertebral angle tenderness. Normal appearing external genitalia. Mart catheter is not present. HEME/LYMPH No palpable cervical lymphadenopathy and no hepatosplenomegaly. No petechiae or ecchymoses. MSK No gross joint deformities. SKIN chest lesions, normal coloration, warm, dry.         BMP/CBC  Recent Labs     08/22/21  2214 08/23/21  0424 08/24/21  0643   NA  --  142 143   K  --  4.2 4.5   CL  --  100 99   CO2  --  23 25   BUN  --  74* 72*   CREATININE  --  3.8* 3.5*   WBC 15.1*  --  15.9*   HCT 28.8*  --  29.1*     --  342       IMAGING:      Discharge Time of 35 minutes    Electronically signed by Oracio Vizcarra MD on 8/25/2021 at 2:28 PM

## 2021-08-25 NOTE — H&P
[address] [place] [address]+    Date: 8/25/2021  Name: Nette Lofton  MRN: [id number]  YOB: 1965   Patient's PCP: CINDY Lau NP  Oncology: Dr Bob Desai care admission date: 8/22 to 8/25/2021 and 8/3 to 8/14/21   Admit to General Inpatient Hospice: 8/25/2021    Informant: Chart reviewed, discussed with Dr Daniel Roy and I met with the patient at the bedside on 8/24/21 and she was able to provide some information. On 8//25/21, the patient is unresponsive. Discussed with the patient's nurse and the hospice nurse liaison, and the family at the bedside on 8/25/21. CC: breast cancer    [place]: This is a 50 y.o. female with Stage IV left breast cancer diagnosed May 2018 with osseous metastases, malignant left pleural effusion, bilateral hydronephrosis with ureteral stents, on palliative chemotherapy, who was readmitted on 8/22/2021 with shortness of breath. The patient had a recent admission and had thoracic surgery consultation and subsequent VATS and left PleurX catheter placement on 8/12/21. Dr Daniel Roy does not feel that additional palliative chemotherapy would be tolerated or beneficial, and has recommended hospice. The patient told me on 8//24/21 that she lives alone, and has 2 children. She has low back pain. She denies any current dyspnea. Her appetite is fair to poor, and she knows that she has lost weight, but not the amount. Per chart review, in April 2018 her office weight was 183 pounds and current weight is 140 pounds. The patient does not believe that she has been out of bed while here. There has been a major decline since 8/24 morning when I visited. The patient is obtunded with shallow respirations. The hospice nurse liaison met with the patient's 2 children on 8/24/21 afternoon, and they wanted to continue as a full code and medical care. The patient continued to decline, and intubation was discussed.  The daughter has changed status to DNR-comfort care and now focused on comfort. The patient has received Hydromorphone x 5 doses, and also Lorazepam with benefit. The patient is currently moaning softly. There is no response to my exam. Discussed with close friend, Brandi Skaggs, at the bedside, and the patient's significant other, Olivier Hong, is asleep on the couch. The children are not here yet. Hospice philosophy was discussed regarding care and comfort at the end of life. The hospice nurse liaison met with children on 8/24/21 and will follow up. The patient is now DNR-comfort care.      Past Medical History:   Diagnosis Date    Anxiety     Asthma     last flare up 5 yrs ago    Breast lesion     \"have spot on left breast going to remove- at this time not sure if cancer or not\"    Essential hypertension 4/1/2020    Malignant neoplasm of central portion of left female breast ([facility] 7/31/2020    Secondary cancer of bone ([facility] 4/1/2020    Thyroid nodule     \"have thyroid nodules-        Past Surgical History:   Procedure Laterality Date    APPENDECTOMY  age 26    BLADDER SURGERY Bilateral 3/25/2021    BILATERAL CYSTOSCOPY BILATERAL STENT INSERTION performed by Sudarshan Menendez MD at [address] [facility] Bilateral 6/11/2021    BILATERAL CYSTOSCOPY RETROGRADE PYELOGRAM STENT EXCHANGE performed by Kindra Crockett MD at [address] [facility] Bilateral 8/5/2021    BILATERAL CYSTOSCOPY, BILATERAL STENT EXCHANGE, LEFT URETEROSCOPYAND LEFT RETROGRADE PYELOGRAM performed by Alejandro Joy MD at [place] [address]  8/3/2021    CT GUIDED CHEST TUBE 8/3/2021 [facility] CT SCAN    IR NONTUNNELED VASCULAR CATHETER  3/22/2021    IR NONTUNNELED VASCULAR CATHETER 3/22/2021 [facility] SPECIAL PROCEDURES    PORT SURGERY Right 3/15/2021    RIGHT PORT INSERTION performed by Ferna Homans, MD at [place] [id number] Left 8/12/2021    LEFT THORACOSCOPY WITH LEFT [address] performed by Zoraida Riley anything latex     Banana Anaphylaxis    Cinnamon Anaphylaxis       Medication list reviewed  Prior to Admission medications    Medication Sig Start Date End Date Taking? Authorizing Provider   furosemide (LASIX) 80 MG tablet Take 1 tablet by mouth 2 times daily 8/14/21   Delia Domingo MD   calcitRIOL (ROCALTROL) 0.5 MCG capsule Take 2 capsules by mouth 2 times daily 8/14/21   Delia Domingo MD   calcium carbonate (TUMS) 500 MG chewable tablet Take 2 tablets by mouth 3 times daily (with meals) 8/14/21 9/13/21  Delia Domingo MD   magnesium oxide (MAG-OX) 400 MG tablet Take 1 tablet by mouth daily 8/15/21   Delia Domingo MD   fluticasone [facility]) 50 MCG/ACT nasal spray 1 spray by Each Nostril route daily    Historical Provider, MD   OMEPRAZOLE PO Take by mouth    Historical Provider, MD   HYDROcodone-acetaminophen (NORCO) 5-325 MG per tablet Take 1 tablet by mouth every 6 hours as needed for Pain.     Historical Provider, MD   sodium bicarbonate 650 MG tablet Take 1 tablet by mouth 2 times daily 7/30/21 7/30/22  Елена Lau DO   dexamethasone (DECADRON) 4 MG tablet TAKE ONE TABLET BY MOUTH DAILY FOR 4 DAYS STARTING THE DAY AFTER CHEMOTHERAPY 7/30/21   CINDY James CNP   Nutritional Supplement LIQD Take 1 Can by mouth 2 times daily 6/18/21   Cathy eDl Real MD   metoprolol succinate (TOPROL XL) 100 MG extended release tablet Take 1 tablet by mouth daily 4/26/21   CINDY James CNP   ondansetron (ZOFRAN) 8 MG tablet 1 tab po q 8 hours PRN for chemo induced nausea/vomting  Patient taking differently: 8 mg every 8 hours as needed for Nausea or Vomiting  3/16/21   CINDY James CNP   docusate sodium (COLACE) 100 MG capsule Take 1 capsule by mouth daily as needed for Constipation 3/16/21   CINDY James CNP   acetaminophen (TYLENOL) 500 MG tablet Take 500 mg by mouth every 6 hours as needed for Pain    Historical Provider, MD   prochlorperazine (COMPAZINE) 10 MG tablet Take 1 tablet by mouth every 8 hours as needed (nausea)  Patient taking differently: Take 10 mg by mouth every 8 hours as needed (Nausea)  1/25/21   Michelle Madden MD   promethazine (PHENERGAN) 25 MG tablet TAKE ONE TABLET BY MOUTH EVERY 6 HOURS AS NEEDED FOR NAUSEA 12/22/20   Michelle Madden MD   cetirizine (ZYRTEC) 10 MG tablet Take 10 mg by mouth daily    Historical Provider, MD       ROS: As noted in [address], all other systems are limited due to the patient's clinical condition (the following based on my interview 8//24/21). On 8/25/21, the patient provides no information due to her clinical status. Constitutional: generally weak, no fever, chills, + weight loss  HEENT:  No acute visual changes, nasal drainage,   CV:  No chest pain, palpitations  PULM:  Occasional cough, no current shortness of breath at rest, no hemoptysis  GI:  Decreased appetite   :  bilateral hydronephrosis with stents  Musculoskeletal:  ++ edema left arm and bilateral lower extremities  Neuro: No seizures, syncope,   Skin:  Diffuse nodular eruption across chest    Weight:    Wt Readings from Last 3 Encounters:   08/22/21 140 lb (63.5 kg)   08/12/21 148 lb 6.4 oz (67.3 kg)   08/03/21 140 lb (63.5 kg)       Data reviewed 8/25/2021:  Transthoracic Echocardiogram 8/24/21:   Technically difficult examination due to poor windows and patient unable to    tolerate pressure.    Left ventricular systolic function is hyperdynamic.    Ejection fraction is visually estimated at >60%.  Severe tricuspid regurgitation; RVSP: 59 mmHg.    Right pleural effusion.    No evidence of any pericardial effusion. Final Pathologic Diagnosis 5/9/18:   A. Biopsy of skin and breast tissue, left breast:       INFILTRATING PLEOMORPHIC LOBULAR CARCINOMA, SKIN AND   BREAST TISSUE. Benign keratosis. B.  Biopsy of breast tissue, left breast:       INFILTRATING PLEOMORPHIC LOBULAR CARCINOMA, SKIN AND   BREAST TISSUE.     MRI brain 8/24/21:  1.  Diffusely brain with and without contrast is recommended. CA 27.29 10/5/20: 409.2    Hepatic Function Panel:    Lab Results   Component Value Date    ALKPHOS 92 08/22/2021    ALT <5 08/22/2021    AST 38 08/22/2021    PROT 6.0 08/22/2021    BILITOT 0.2 08/22/2021    LABALBU 2.6 08/22/2021     CBC:   Recent Labs     08/22/21  2214 08/24/21  0643   WBC 15.1* 15.9*   HGB 8.7* 8.5*   HCT 28.8* 29.1*   MCV 94.1 96.4    342     BMP:   Recent Labs     08/23/21  0424 08/24/21  0643    143   K 4.2 4.5    99   CO2 23 25   BUN 74* 72*   CREATININE 3.8* 3.5*   GLUCOSE 129* 102*       Physical Exam:   BP (!) 93/50   Pulse 135   Temp 97.3 °F (36.3 °C) (Axillary)   Resp 14   Ht 5' 1\" (1.549 m)   Wt 140 lb (63.5 kg)   LMP 04/08/2018   SpO2 98%   BMI 26.45 kg/m²   General: obtunded, chronically ill appearing, moaning softly, mild furrowed brow  Skin: sallow with diffuse nodular lesions across anterior chest  HEENT: Mucous membranes are dry, sclerae are clear   Chest: Mediport on right anterior shoulder area, nodular lesions across anterior chest, left thoracic Pleurx  Heart: distant tones, tachycardic IRRR, S1S2, no murmurs  Lungs:  Distant breath sounds bilaterally, diminished at the bases, with basilar rales, scattered rhonchi   Abdomen: soft, bowel sounds quiet, no apparent tenderness, nondistended  Extremities:  No mottling, +++ left arm and bilateral lower extremity edema  Neurologic: obtunded.      Assessment/Plan:  1. Stage IV left breast cancer diagnosed May 2018 with osseous metastases, malignant left pleural effusion, obstructive uropathy with ureteral stents and progressive disease. The patient has actively declined, and family is now agreeable to comfort care. Admit to [address] [place] [address] for symptom management of pain,, restlessness, agitation and probable end of life care. PPS 10%, and prognosis is grim. 2. Malignant left pleural effusion with Pleurx catheter  3.  DVT left upper

## 2021-08-25 NOTE — PROGRESS NOTES
[facility] ([facility])  PROGRESS NOTE      Patient was seen and examined today. Last night she was complaining of pain. She received Dilaudid and Ativan. She is lethargic this morning. I appreciated hospice evaluation. She may need GIP. Overall prognosis is poor. Currently she is DNR CC. To continue with her comfort care.

## 2021-08-25 NOTE — PROGRESS NOTES
Patient still yelling out/moaning in a lot of pain. Patient very out of it again but will open her eyes to voice and nod appropriately to questions. Hospitalist notified of the continued pain. Told this RN to give next dilaudid dose early. Will continue to monitor.

## 2021-08-25 NOTE — PROGRESS NOTES
[address] [place] [address]  Progress Note    Date: 8/25/2021  Name: Essence Issa  MRN: [id number]  YOB: 1965   Patient's PCP: CINDY Rice NP  Oncology: Dr Juan Bradley care admission date: 8/22/2021 and 8/3 to 8/14/21       Subjective: There has been a major decline since 8/24 morning when I visited. The patient is obtunded with shallow respirations. The hospice nurse liaison met with the patient's 2 children 8/24/21 afternoon, and they wanted to continue as a full code and medical care. The patient continued to decline, and intubation was discussed. The daughter has changed to DNR-comfort care and now focused on comfort. The patient has received Hydromorphone x 5 doses, and also Lorazepam with benefit. The patient is currently moaning softly. There is no response to my exam. Discussed with close friend, [facility], at the bedside, and the patient's significant other, Chela Milton, is asleep on the couch. The children ar not here yet. Objective:   Pain is managed with Hydromorphone 0.5 mg IV prn with 5 doses in the past 24 hours, will add Glycopyrrolate IV is available for secretions, and Lorazepam is available for anxiety. Data reviewed 8/25/2021:  Transthoracic Echocardiogram 8/24/21:   Technically difficult examination due to poor windows and patient unable to    tolerate pressure. Left ventricular systolic function is hyperdynamic. Ejection fraction is visually estimated at >60%. Severe tricuspid regurgitation; RVSP: 59 mmHg. Right pleural effusion. No evidence of any pericardial effusion. Final Pathologic Diagnosis 5/9/18:   A.  Biopsy of skin and breast tissue, left breast:       INFILTRATING PLEOMORPHIC LOBULAR CARCINOMA, SKIN AND   BREAST TISSUE.       Benign keratosis. Gordan  of breast tissue, left breast:       INFILTRATING PLEOMORPHIC LOBULAR CARCINOMA, SKIN AND   BREAST TISSUE.      MRI brain 8/24/21:  1.  Diffusely low marrow signal on all sequences with areas of ill-defined   enhancement in the bilateral parietal bones concerning for osseous metastatic disease. 2. No evidence of intracranial metastatic disease. 3. Tiny foci of left cerebellar encephalomalacia in keeping with sequela of prior infarct. 4. Left mastoid effusion. Chest X-ray  8/24/21:  Monitor wires overlie the chest.  There is a right subclavian central venous line with its tip in the distal SVC/right atrial junction. The trachea is midline. The cardiac silhouette is unremarkable. There are bilateral patchy pulmonary opacities which could represent edema or infiltrates. Small right effusion. There is a left-sided chest tube with a small hydropneumothorax on the left.      CA 27.29 10/5/20: 409.2  Hepatic Function Panel:    Lab Results   Component Value Date    ALKPHOS 92 08/22/2021    ALT <5 08/22/2021    AST 38 08/22/2021    PROT 6.0 08/22/2021    BILITOT 0.2 08/22/2021    LABALBU 2.6 08/22/2021     CBC: Recent Labs     08/22/21  2214 08/24/21  0643   WBC 15.1* 15.9*   HGB 8.7* 8.5*   HCT 28.8* 29.1*   MCV 94.1 96.4    342     BMP:   Recent Labs     08/22/21  1225 08/23/21  0424 08/24/21  0643    142 143   K 4.7 4.2 4.5   CL 99 100 99   CO2 16* 23 25   BUN 75* 74* 72*   CREATININE 3.6* 3.8* 3.5*   GLUCOSE 139* 129* 102*     Physical Exam:   BP (!) 93/50   Pulse 135   Temp 97.3 °F (36.3 °C) (Axillary)   Resp 14   Ht 5' 1\" (1.549 m)   Wt 140 lb (63.5 kg)   LMP 04/08/2018   SpO2 98%   BMI 26.45 kg/m²   General: obtunded, chronically ill appearing, moaning softly, mild furrowed brow  Skin: sallow with diffuse nodular lesions across anterior chest  HEENT: Mucous membranes are dry, sclerae are clear   Chest: Mediport on right anterior shoulder area, nodular lesions across anterior chest, left thoracic Pleurx  Heart: distant tones, tachycardic IRRR, S1S2, no murmurs  Lungs:  Distant breath sounds bilaterally, diminished at the bases, with basilar rales, scattered rhonchi   Abdomen: soft, bowel sounds quiet, no apparent tenderness, nondistended  Extremities:  No mottling, +++ left arm and bilateral lower extremity edema  Neurologic: obtunded.      Assessment/Plan:  1. Stage IV left breast cancer diagnosed May 2018 with osseous metastases, malignant left pleural effusion, obstructive uropathy with ureteral stents and progressive disease. The patient has actively declined, and family is now agreeable to comfort care. Plan to admit to [address] [place] [address] for symptom management of pain,, restlessness, agitation and probable end of life care. PPS 10%, and prognosis is grim. 2. Malignant left pleural effusion with Pleurx   3. DVT left upper extremity  4. Acute Kidney Injury on CKD with volume overload  5. Hematuria  6. DNR-comfort care      Patient Active Problem List   Diagnosis Code    Left breast mass N63.20    Secondary cancer of bone ([address] C79.51    Essential hypertension I10    Malignant neoplasm of central portion of left female breast ([address] C50.112    Metastatic breast cancer ([address] C50.919    Dehydration E86.0    Iron deficiency anemia D50.9    Malabsorption K90.9    Left ovarian enlargement N83.8    ELIDIA (acute kidney injury) ([address] N17.9    Hyperkalemia T22.1    Metabolic acidosis Q15.1    Hyponatremia E87.1    Tachycardia R00.0    Acute and chronic respiratory failure (acute-on-chronic) (HCC) J96.20    COVID-19 U07.1    Acute renal failure with tubular necrosis (HCC) N17.0    Bilateral hydronephrosis N13.30    Edema leg R60.0    Hypervolemia E87.70    Hypocalcemia E83.51    Pneumothorax J93.9    Stage 4 chronic kidney disease (HCC) N18.4    Obstructive uropathy N13.9    Pleural effusion, malignant J91.0    Trapped lung J98.19    SOB (shortness of breath) R06.02    Shortness of breath R06.02       BRAULIO Hua MD  8/25/2021

## 2021-08-26 NOTE — DISCHARGE SUMMARY
[facility]    Death Summary    Date: 8/26/2021  Name: Dannielle Waite  MRN: [id number]  YOB: 1965     Patient's PCP: CINDY Carolina NP   Oncology: Dr Flores [facility] care admission date: 8/22 to 8/25/2021 and 8/3 to 8/14/21   Admit Date: 8/25/2021 to [facility]  Date of Death: 8/26/2021  Time: 0025  Admitting Physician: Chelsea Jorge MD to [facility]  Discharge Physician: Saniya Evans MD  Consultation: none during General Inpatient Hospice stay    Invasive procedures: none during General Inpatient Hospice stay    Discharge Diagnoses:  1. Stage IV left breast cancer diagnosed May 2018 with osseous metastases, malignant left pleural effusion, obstructive uropathy with ureteral stents and progressive disease.    2. Malignant left pleural effusion  3. DVT left upper extremity  4. Acute Kidney Injury on CKD with volume overload  5.  Hematuria      Patient Active Problem List   Diagnosis Code    Left breast mass N63.20    Secondary cancer of bone ([address] C79.51    Essential hypertension I10    Malignant neoplasm of central portion of left female breast ([address] C50.112    Metastatic breast cancer ([address] C50.919    Dehydration E86.0    Iron deficiency anemia D50.9    Malabsorption K90.9    ELIDIA (acute kidney injury) ([address] N17.9    Hyperkalemia W41.1    Metabolic acidosis X70.2    Hyponatremia E87.1    Tachycardia R00.0    Acute and chronic respiratory failure (acute-on-chronic) (HCC) J96.20    Acute renal failure with tubular necrosis (HCC) N17.0    Bilateral hydronephrosis N13.30    Edema leg R60.0    Hypervolemia E87.70    Hypocalcemia E83.51    Pneumothorax J93.9    Stage 4 chronic kidney disease (HCC) N18.4    Obstructive uropathy N13.9    Pleural effusion, malignant J91.0    Trapped lung J98.19    Shortness of breath R06.02    Hospice care Z51.5       Brief History, reason for admission: Please see the acute care H+P, now DNR-comfort care. Hospital Course: The patient was admitted to [facility] with the above, for complete details, please see the acute care History and Physical, progress notes, consultant notes and discharge summary. The patient was treated with comfort medications, and symptoms were managed. Emotional and spiritual support was provided to the patient and family. The patient  as noted above.     Cause of death: metastatic left breast cancer    Significant Diagnostic Studies:  See computerized record in Lilly Britt MD  2021

## 2021-08-26 NOTE — PROGRESS NOTES
Patient death report completed and signed by family next of kin. Copy sent with patient to [facility], copy given to nursing supervisor, and copy placed in patient chart.

## 2021-08-26 NOTE — PROGRESS NOTES
Marco A Zimmerman NP aware of patient passing. Time of death called at 46 with this RN and Caron RN. Family at bedside.

## 2021-09-13 LAB
CULTURE: NORMAL
FUNGUS STAIN: NORMAL
Lab: NORMAL
SPECIMEN: NORMAL

## 2021-09-28 LAB
AFB SMEAR: NORMAL
CULTURE: NORMAL
Lab: NORMAL
SPECIMEN: NORMAL

## 2022-07-27 NOTE — PLAN OF CARE
Problem: Falls - Risk of:  Goal: Will remain free from falls  Description: Will remain free from falls  8/8/2021 0009 by Onesimo Talavera RN  Outcome: Met This Shift  8/7/2021 1240 by Juan F Sheehan RN  Outcome: Ongoing  Goal: Absence of physical injury  Description: Absence of physical injury  8/8/2021 0009 by Onesimo Talavera RN  Outcome: Met This Shift  8/7/2021 1240 by Juan F Sheehan RN  Outcome: Ongoing     Problem: Pain:  Goal: Pain level will decrease  Description: Pain level will decrease  8/8/2021 0009 by Onesimo Talavera RN  Outcome: Met This Shift  8/7/2021 1240 by Juan F Sheehan RN  Outcome: Ongoing  Goal: Control of acute pain  Description: Control of acute pain  8/8/2021 0009 by Onesimo Talavera RN  Outcome: Met This Shift  8/7/2021 1240 by Juan F Sheehan RN  Outcome: Ongoing  Goal: Control of chronic pain  Description: Control of chronic pain  8/8/2021 0009 by Onesimo Talavera RN  Outcome: Met This Shift  8/7/2021 1240 by Juan F Sheehan RN  Outcome: Ongoing     Problem: Skin Integrity:  Goal: Will show no infection signs and symptoms  Description: Will show no infection signs and symptoms  8/8/2021 0009 by Onesimo Talavera RN  Outcome: Ongoing  8/7/2021 1240 by Juan F Sheehan RN  Outcome: Ongoing  Goal: Absence of new skin breakdown  Description: Absence of new skin breakdown  8/8/2021 0009 by Onesimo Talavera RN  Outcome: Ongoing  8/7/2021 1240 by Juan F Sheehan RN  Outcome: Ongoing
Problem: Falls - Risk of:  Goal: Will remain free from falls  Description: Will remain free from falls  8/8/2021 0949 by Juan F Sheehan RN  Outcome: Ongoing  8/8/2021 0009 by Onesimo Talavera RN  Outcome: Met This Shift  Goal: Absence of physical injury  Description: Absence of physical injury  8/8/2021 0949 by Juan F Sheehan RN  Outcome: Ongoing  8/8/2021 0009 by Onesimo Talavera RN  Outcome: Met This Shift     Problem: Pain:  Description: Pain management should include both nonpharmacologic and pharmacologic interventions.   Goal: Pain level will decrease  Description: Pain level will decrease  8/8/2021 0949 by Juan F Sheehan RN  Outcome: Ongoing  8/8/2021 0009 by Onesimo Talavera RN  Outcome: Met This Shift  Goal: Control of acute pain  Description: Control of acute pain  8/8/2021 0949 by Juan F Sheehan RN  Outcome: Ongoing  8/8/2021 0009 by Onesimo Talavera RN  Outcome: Met This Shift  Goal: Control of chronic pain  Description: Control of chronic pain  8/8/2021 0949 by Juan F Sheehan RN  Outcome: Ongoing  8/8/2021 0009 by Onesimo Talavera RN  Outcome: Met This Shift     Problem: Skin Integrity:  Goal: Will show no infection signs and symptoms  Description: Will show no infection signs and symptoms  8/8/2021 0949 by Juan F Sheehan RN  Outcome: Ongoing  8/8/2021 0009 by Onesimo Talavera RN  Outcome: Ongoing  Goal: Absence of new skin breakdown  Description: Absence of new skin breakdown  8/8/2021 0949 by Juan F Sheehan RN  Outcome: Ongoing  8/8/2021 0009 by Onesimo Talavera RN  Outcome: Ongoing
Problem: Falls - Risk of:  Goal: Will remain free from falls  Description: Will remain free from falls  Outcome: Met This Shift  Goal: Absence of physical injury  Description: Absence of physical injury  Outcome: Met This Shift     Problem: Pain:  Goal: Pain level will decrease  Description: Pain level will decrease  Outcome: Met This Shift  Goal: Control of acute pain  Description: Control of acute pain  Outcome: Met This Shift  Goal: Control of chronic pain  Description: Control of chronic pain  Outcome: Met This Shift     Problem: Skin Integrity:  Goal: Will show no infection signs and symptoms  Description: Will show no infection signs and symptoms  Outcome: Ongoing  Goal: Absence of new skin breakdown  Description: Absence of new skin breakdown  Outcome: Ongoing
Problem: Falls - Risk of:  Goal: Will remain free from falls  Description: Will remain free from falls  Outcome: Ongoing  Goal: Absence of physical injury  Description: Absence of physical injury  Outcome: Ongoing     Problem: Pain:  Description: Pain management should include both nonpharmacologic and pharmacologic interventions.   Goal: Pain level will decrease  Description: Pain level will decrease  Outcome: Ongoing  Goal: Control of acute pain  Description: Control of acute pain  Outcome: Ongoing  Goal: Control of chronic pain  Description: Control of chronic pain  Outcome: Ongoing     Problem: Skin Integrity:  Goal: Will show no infection signs and symptoms  Description: Will show no infection signs and symptoms  Outcome: Ongoing  Goal: Absence of new skin breakdown  Description: Absence of new skin breakdown  Outcome: Ongoing
Problem: Falls - Risk of:  Goal: Will remain free from falls  Description: Will remain free from falls  Outcome: Ongoing  Goal: Absence of physical injury  Description: Absence of physical injury  Outcome: Ongoing     Problem: Pain:  Goal: Pain level will decrease  Description: Pain level will decrease  Outcome: Ongoing  Goal: Control of acute pain  Description: Control of acute pain  Outcome: Ongoing  Goal: Control of chronic pain  Description: Control of chronic pain  Outcome: Ongoing
Problem: Falls - Risk of:  Goal: Will remain free from falls  Description: Will remain free from falls  Outcome: Ongoing  Goal: Absence of physical injury  Description: Absence of physical injury  Outcome: Ongoing     Problem: Pain:  Goal: Pain level will decrease  Description: Pain level will decrease  Outcome: Ongoing  Goal: Control of acute pain  Description: Control of acute pain  Outcome: Ongoing  Goal: Control of chronic pain  Description: Control of chronic pain  Outcome: Ongoing     Problem: Skin Integrity:  Goal: Will show no infection signs and symptoms  Description: Will show no infection signs and symptoms  Outcome: Ongoing  Goal: Absence of new skin breakdown  Description: Absence of new skin breakdown  Outcome: Ongoing
Problem: Falls - Risk of:  Goal: Will remain free from falls  Description: Will remain free from falls  Outcome: Ongoing  Goal: Absence of physical injury  Description: Absence of physical injury  Outcome: Ongoing     Problem: Pain:  Goal: Pain level will decrease  Description: Pain level will decrease  Outcome: Ongoing  Goal: Control of acute pain  Description: Control of acute pain  Outcome: Ongoing  Goal: Control of chronic pain  Description: Control of chronic pain  Outcome: Ongoing     Problem: Skin Integrity:  Goal: Will show no infection signs and symptoms  Description: Will show no infection signs and symptoms  Outcome: Ongoing  Goal: Absence of new skin breakdown  Description: Absence of new skin breakdown  Outcome: Ongoing     Problem: Nutrition  Goal: Optimal nutrition therapy  Outcome: Ongoing
Problem: Falls - Risk of:  Goal: Will remain free from falls  Description: Will remain free from falls  Outcome: Ongoing  Goal: Absence of physical injury  Description: Absence of physical injury  Outcome: Ongoing     Problem: Pain:  Goal: Pain level will decrease  Description: Pain level will decrease  Outcome: Ongoing  Goal: Control of acute pain  Description: Control of acute pain  Outcome: Ongoing  Goal: Control of chronic pain  Description: Control of chronic pain  Outcome: Ongoing     Problem: Skin Integrity:  Goal: Will show no infection signs and symptoms  Description: Will show no infection signs and symptoms  Outcome: Ongoing  Goal: Absence of new skin breakdown  Description: Absence of new skin breakdown  Outcome: Ongoing     Problem: Nutrition  Goal: Optimal nutrition therapy  Outcome: Ongoing     Problem: Fluid Volume:  Goal: Ability to achieve a balanced intake and output will improve  Description: Ability to achieve a balanced intake and output will improve  Outcome: Ongoing     Problem: Physical Regulation:  Goal: Ability to maintain clinical measurements within normal limits will improve  Description: Ability to maintain clinical measurements within normal limits will improve  Outcome: Ongoing  Goal: Will show no signs and symptoms of electrolyte imbalance  Description: Will show no signs and symptoms of electrolyte imbalance  Outcome: Ongoing
67

## 2023-05-09 NOTE — ED PROVIDER NOTES
Chief complaint:   Chief Complaint   Patient presents with   • Physical   • Hip Pain     Bilateral hip pain X 15 years   • Nicotine Dependence     Discuss quitting smoking       Vitals:  Visit Vitals  /78 (BP Location: RUE - Right upper extremity, Patient Position: Sitting, Cuff Size: Large Adult)   Pulse (!) 58   Wt 101.6 kg (224 lb)   LMP 06/17/2022   BMI 35.61 kg/m²       HISTORY OF PRESENT ILLNESS     HPI  Analisa Thorne is a 37 year old female who presents for complete physical examination.  In general she admits she has been struggling a little bit.  She does feel that she is feeling more down and depressed ever since having her last child who is over a year old now.  She does feel though that the last 6 months been getting worse.  She does feel that the medications she is currently on are controlling her anxiety very well.  She denies any thoughts to harm herself but just really has no desire to do things.  She also is interested in smoking cessation.  She states she smoking half a pack to a pack a day.  She has tried patches and other things and nothing seems to help.      She also struggling with hip pain.  She admits bilateral hip pain but the right seems to be worse than the left.  She states she also has pain that shoots down both of her legs.  States this is been an ongoing problem teen years.  She did do some pelvic floor therapy after her 1st child which was about 9 years ago and she did feel it helped but did not completely resolve it.  She is not done any pelvic floor therapy after her last child.  She does admit the pain seems to be worse when she simply sitting.  She does feel that she struggles at times when she is lying in bed and she simply can not will over needs help to get out of bed.  In general she feels this pain seems to be getting worse and is affecting her daily life as well.    Reviewed past medical, surgical and family history with her today    Other significant  I independently examined and evaluated Nikolas Escamilla. HPI:  In brief, patient is a 50 y.o. female that presents to the emergency department for evaluation of difficulty in breathing. For the past week, patient has had worsening fatigue and shortness of breath. Patient was diagnosed with COVID-19 virus on May 1, 2021. Initially, her only symptom was fatigue. Over the past week, she has developed shortness of breath as well. Shortness of breath is worse with exertion. No orthopnea or paroxysmal nocturnal dyspnea. No history DVT or PE. Patient is recently being treated with chemotherapy for breast cancer. Denies history of blood clotting disorders. Denies fevers, chills, diaphoresis. Denies syncope, dizziness, lightheadedness. Denies chest pain or pleuritic pain. Denies abdominal pain, nausea, vomiting, diarrhea, constipation, hematochezia, melena. .  Denies additional precipitating, modifying, alleviating features. Patient follows with Dr. Rosalba Groves oncology. Physical Exam:  Triage VS:    ED Triage Vitals [05/10/21 1406]   Enc Vitals Group      BP (!) 154/75      Pulse 115      Resp (!) 40      Temp 98.8 °F (37.1 °C)      Temp Source Oral      SpO2 94 % 3L NC      Weight 118 lb (53.5 kg)      Height 5' 1\" (1.549 m)       My pulse ox interpretation is - hypoxic requiring 3L NC    General appearance:  Patient is awake, alert, oriented. Following commands and answering questions. GCS is 15. Non-toxic in appearance. Skin:  Warm. Dry. Intact. No rashes, petechiae, purpura. Eye:  Pupils are equal, round, reactive. Extraocular movements intact. No nystagmus. No gaze deviation. Head, ears, nose, mouth and throat:  Head is normocephalic, atraumatic. No external masses or lesions. No nasal drainage. Pharynx is clear, non-erythematous. Airway is patent. Mucous membranes are dry  Neck:  Supple. No nuchal rigidity. Trachea midline. No masses, thyromegaly or lymphadenopathy. No JVD.  No carotid thrills or bruits. Extremity:  No clubbing, cyanosis, or edema. No joint swelling. Normal muscle tone. Full range of motion in extremities. Heart:  Tachycardic rate and sinus rhythm. Audible S1 & S2. No audible murmurs, rubs, or gallops. Perfusion:  Symmetric peripheral pulses. Brisk capillary refill. Respiratory:  Lungs clear to auscultation bilaterally. No rales, rhonchi or wheezes. Respirations nonlabored. Abdominal:  Soft. Nontender. Non distended. Normal bowel sounds. No midline pulsatile abdominal masses, thrills or bruits. Back:  No CVA tenderness to palpation. No midline tenderness to palpation. Neurological:  Alert and oriented times 3. No focal or lateralizing neurological deficits. Psychiatric:  Cooperative. EKG: Interpreted by myself, independent of cardiology. EKG performed on 5/10/2021 at 1416 demonstrates ventricular rate of 118 bpm sinus tachycardia. Low QRS voltage. No signs of acute ST segment ovation microinfarction. MDM:  Patient was seen and evaluated in the emergency department by myself and ELLY Thania Rogers. A thorough history and physical exam were performed and prior medical records were reviewed. Upon arrival, patient's vital signs were noted. Patient is tachycardic 115 bpm.  Tachypneic at 40 respirations per minute. Temperature is afebrile. Blood pressure 154/75. She is hypoxic requiring 3 L of supplemental oxygen via nasal cannula to maintain O2 saturation 94%. Patient was connected to continuous cardiopulmonary monitoring. Rhythm strip interpreted by myself demonstrating sinus rhythm. EKG was interpreted by myself, as detailed above. Differential diagnoses and treatment plan were discussed. IV access established and the patient is initiated on 1 L bolus of 0 point episode normal saline. Pertinent labs were drawn and radiographic studies were performed. Once results were available, they were reviewed by myself. Labs demonstrate No leukocytosis. problems:  Patient Active Problem List    Diagnosis Date Noted   • Right hip pain 10/21/2016     Priority: Low   • Nausea and vomiting in pregnancy 07/09/2016     Priority: Low       PAST MEDICAL, FAMILY AND SOCIAL HISTORY     Medications:  Current Outpatient Medications   Medication Sig Dispense Refill   • IUD'S IU IUD inserted appx Feb 2022     • buPROPion XL (WELLBUTRIN XL) 150 MG 24 hr tablet Take 1 tablet by mouth daily. 90 tablet 3   • escitalopram (LEXAPRO) 10 MG tablet Take 2 tablets daily to equal 20 mg 180 tablet 3   • valACYclovir (VALTREX) 500 MG tablet Take 1 tablet by mouth daily. 90 tablet 3   • diclofenac (Voltaren) 1 % gel Apply 4 g topically 4 times daily. To right ankle 350 g 3   • hydroCORTisone (ANUSOL-HC) 2.5 % rectal cream Place 1 application rectally 2 times daily. 30 g 3   • fluticasone (FLONASE) 50 MCG/ACT nasal spray Spray 2 sprays in each nostril daily. 1 Bottle 0   • ibuprofen (MOTRIN) 800 MG tablet Take 1 tablet by mouth every 8 hours as needed for Pain. Take with food 15 tablet 0     No current facility-administered medications for this visit.       Allergies:  ALLERGIES:   Allergen Reactions   • Sulfa Antibiotics HIVES       Past Medical  History/Surgeries:  Past Medical History:   Diagnosis Date   • Chlamydia 2006   • Gonorrhea 2006   • Herpes     type 1 and 2   • Migraines        Past Surgical History:   Procedure Laterality Date   • Carpal tunnel release     • Insert intrauterine device  01/11/2018    Mirena    • Nail removal      Big toes done   • Pap smear  2014 with Dr Atif Pelaez of abnormal with repeat few months later       Family History:  Family History   Problem Relation Age of Onset   • Cancer Mother         hodgkins and non hodkins few years later   • Diabetes Mother         type 2   • Heart Mother         CHF   • Cancer Father         lung cancer-stage 4   • Heart Father         Heart attack X3   • Stroke Father    • Cancer Maternal Grandmother         breast cancer        Social History:  Social History     Tobacco Use   • Smoking status: Every Day     Current packs/day: 0.50     Types: Cigarettes     Passive exposure: Current   • Smokeless tobacco: Never   • Tobacco comments:     refused quit line info 4/11/19   Vaping Use   • Vaping status: never used   Substance Use Topics   • Alcohol use: Yes     Alcohol/week: 0.0 standard drinks of alcohol     Comment: RARERLY       REVIEW OF SYSTEMS     Review of Systems   Constitutional: Negative for activity change, chills, fatigue and fever.   HENT: Negative for congestion, ear pain, nosebleeds, postnasal drip, sinus pain and sore throat.    Respiratory: Negative for cough, chest tightness, shortness of breath and wheezing.    Cardiovascular: Negative for chest pain and palpitations.   Gastrointestinal: Negative for abdominal pain, constipation, diarrhea, nausea and vomiting.   Genitourinary: Negative for dysuria and menstrual problem.   Musculoskeletal: Positive for arthralgias, back pain and myalgias.   Skin: Negative for rash.   Neurological: Negative for dizziness, light-headedness and headaches.   All other systems reviewed and are negative.      PHYSICAL EXAM     Physical Exam  Vitals reviewed.   Constitutional:       General: She is not in acute distress.     Appearance: Normal appearance. She is not ill-appearing or toxic-appearing.   HENT:      Head: Normocephalic and atraumatic.      Right Ear: Tympanic membrane, ear canal and external ear normal.      Left Ear: Tympanic membrane, ear canal and external ear normal.      Nose: Nose normal.      Mouth/Throat:      Mouth: Mucous membranes are moist.      Pharynx: Oropharynx is clear. No oropharyngeal exudate or posterior oropharyngeal erythema.      Neck: Normal range of motion and neck supple. No rigidity or tenderness.   Eyes:      Extraocular Movements: Extraocular movements intact.      Conjunctiva/sclera: Conjunctivae normal.      Pupils: Pupils are equal, round, and  Patient does have normocytic anemia hemoglobin 9.8. No thrombocytopenia per electrolytes demonstrate hyponatremia sodium 133. Patient is hyperkalemic with a potassium of 5.6. Bicarb is 13. BUN elevated at 65. Creatinine 3.7. Glucose 110. Calcium is hypocalcemic at 7.0. AST is elevated at 51, ALT is 6. Anion gap is 17. Troponin is 0.013. proBNP 1511. Lactic acid 2.6. Urinalysis does demonstrate 116 white blood cells, 2185 red blood cells, small leukocyte esterase. Chest x-ray radiology report reads interval development of patchy infiltrates within the lungs, especially the right concerning for multifocal pneumonia. Continued evidence of large left pleural effusion with adjacent airspace disease, some commendation of atelectasis pneumonia and asymmetric edema. .     On repeat evaluations, patient remains tachycardic with a pulse of 122. Tachypnea has slightly improved, at 29 respirations per minute. Requires 2 L of oxygen via nasal cannula. Review of labs demonstrates a leukocytosis. Patient does have sirs criteria with severe sepsis. Does have signs of acute kidney injury as well. Case discussed with nephrology Dr. Farhana Myers who is agreeable to consultation. Is agreeable with initiating calcium gluconate for hyperkalemia and hypocalcemia. No recommendation for emergent dialysis. Case is also discussed with urology [facility] who recommends no acute surgical intervention. Is agreeable with antibiotics for possible urinary tract infection. Is agreeable with obtaining CT for further evaluation of ureteral stent and possible hydronephrosis. CT chest, abdomen, pelvis is ordered. Radiology report reads similar appearing large volume left pleural effusion with associated relaxation atelectasis mid to lower left lung. Right pleural effusion has resolved. Diffuse patchy interstitial and alveolar densities as well as new nodular density in the upper right lung are in keeping with history of COVID-19. Diffuse osteoblastic metastatic disease. Diffuse nodularity in the left breast may be related patient's primary malignancy or metastatic disease. Left axillary lymph node enlargement. CT abdomen pelvis read as reports unchanged bilateral hydronephrosis status post bilateral ureteral stent placement. Image degradation results from breathing motion blurring detail. Results of laboratory and radiographic data along differential diagnosis and treatment plan are discussed with the patient. Presents with shortness of breath, symptoms concerning for hypoxic respiratory failure requiring supplemental oxygen. Patient does have sirs criteria with severe sepsis and acute kidney injury. Does have multifocal pneumonia for which vancomycin, cefepime, azithromycin is initiated. Does have COVID-19 as well. Troponin is elevated, 324 mg chewable aspirin is given. Heparin is not initiated as troponin elevated likely secondary to acute kidney injury. Patient does also have metastatic breast carcinoma and is currently immunocompromise due to chemotherapy. Is also hyperkalemic for which calcium is provided. Given her symptoms, we recommend admission. Patient is agreeable. Case discussed admitting hospitalist who is agreeable to treatment plan and accepts admission. Patient is updated bedside. Questions were answered. Patient is currently in the emergency department, in stable condition, waiting admission orders and bed placement. Clinical Impressions:  1. Acute respiratory failure with hypoxia ([address]    2. SIRS (systemic inflammatory response syndrome) (HCC)    3. Severe sepsis with acute organ dysfunction ([address]    4. Multifocal pneumonia    5. COVID-19    6. NSTEMI (non-ST elevated myocardial infarction) ([address]    7. ELIDIA (acute kidney injury) ([address]    8. Metastatic breast cancer ([address]    9. Immunocompromised state due to drug therapy    10. Hyperkalemia    11. Pleural effusion on left    12.  Hydronephrosis, reactive to light.   Cardiovascular:      Rate and Rhythm: Normal rate and regular rhythm.      Pulses: Normal pulses.      Heart sounds: Normal heart sounds. No murmur heard.  Pulmonary:      Effort: Pulmonary effort is normal. No respiratory distress.      Breath sounds: Normal breath sounds and air entry. No decreased breath sounds or wheezing.   Abdominal:      General: Bowel sounds are normal.      Palpations: Abdomen is soft. There is no mass.      Tenderness: There is no abdominal tenderness.   Musculoskeletal:         General: No tenderness or deformity. Normal range of motion.   Lymphadenopathy:      Head:      Right side of head: No posterior auricular adenopathy.      Left side of head: No posterior auricular adenopathy.      Cervical: No cervical adenopathy.      Right cervical: No superficial or posterior cervical adenopathy.     Left cervical: No superficial or posterior cervical adenopathy.      Upper Body:      Right upper body: No supraclavicular adenopathy.      Left upper body: No supraclavicular adenopathy.   Skin:     General: Skin is warm.      Findings: No erythema or rash.   Neurological:      General: No focal deficit present.      Mental Status: She is alert and oriented to person, place, and time. Mental status is at baseline.      Cranial Nerves: Cranial nerves 2-12 are intact. No cranial nerve deficit.      Sensory: Sensation is intact. No sensory deficit.      Motor: Motor function is intact. No weakness.      Coordination: Coordination is intact. Coordination normal.      Gait: Gait is intact. Gait normal.      Deep Tendon Reflexes: Reflexes are normal and symmetric. Reflexes normal.   Psychiatric:         Attention and Perception: Attention and perception normal.         Mood and Affect: Mood and affect normal.         Speech: Speech normal.         Behavior: Behavior normal. Behavior is cooperative.         Thought Content: Thought content normal.         Cognition and Memory:  Cognition and memory normal.         Judgment: Judgment normal.         ASSESSMENT/PLAN     ASSESSMENT:  1. Annual physical exam    2. Moderate major depression (CMD)    3. Anxiety    4. Tobacco abuse    5. Chronic bilateral low back pain with bilateral sciatica    6. Herpes simplex virus infection      PLAN:  Orders Placed This Encounter   • SERVICE TO PHYSICAL THERAPY   • IUD'S IU   • buPROPion XL (WELLBUTRIN XL) 150 MG 24 hr tablet   • escitalopram (LEXAPRO) 10 MG tablet   • valACYclovir (VALTREX) 500 MG tablet     She is happy with the anxiety being controlled at this point but I do feel we need more control over her depression.  She also desires smoking cessation.  I do feel we could use Wellbutrin to see if this would help us if the Lexapro to help more on her depression but also help with the smoking cessation.  She is willing to try this so will start her at the 150 mg daily.  Recommended we give this 6-8 weeks and see how she is doing and can return sooner with other concerns.  Also I did refill her Valtrex that she uses consistently.  I do feel that she would benefit from some physical therapy for both low back and pelvic floor.  She did have some improvement when she did the previous pelvic floor therapy.  I do feel this would help it would also help us differentiate if it is truly back verses hip pain.  She does agree to trying some physical therapy as well.    Return if symptoms worsen or fail to improve.    Recent PHQ 2/9 Score    PHQ 2:  Date Adult PHQ 2 Score Adult PHQ 2 Interpretation   5/9/2023 5 Further screening needed       PHQ 9:  Date Adult PHQ 9 Score Adult PHQ 9 Interpretation   5/9/2023 14 Moderate Depression       DEPRESSION ASSESSMENT/PLAN:  Start and/or continue medication.  See orders for details.        unspecified hydronephrosis type        Critical Care Note:  Total critical care time provided today was 38 minutes. This excludes seperately billable procedures and family discussion time. Critical care time provided for obtaining history, conducting a physical exam, performing and monitoring interventions, ordering, collecting and interpreting tests, and establishing medical decision-making. There was a potential for life/limb threatening pathology requiring close evaluation and intervention with concern for patient decompensation. Disposition:  DISPOSITION Admitted 05/10/2021 05:39:19 PM        All diagnostic, treatment, and disposition decisions were made by myself in conjunction with the advanced practice provider. For all further details of the patient's emergency department visit, please see the advanced practice provider's documentation. Comment: Please note this report has been produced using speech recognition software and may contain errors related to that system including errors in grammar, punctuation, and spelling, as well as words and phrases that may be inappropriate. If there are any questions or concerns please feel free to contact the dictating provider for clarification.        [address] [facility],   05/10/21 [id number]

## 2024-03-13 NOTE — PROGRESS NOTES
Pt taken to room 2, oriented to room, bed/chair controls, and call light. Needs met at present. Call light in reach. Pt agreeable for plan of care. 2 = A lot of assistance

## (undated) DEVICE — YANKAUER,FLEXIBLE HANDLE,REGLR CAPACITY: Brand: MEDLINE INDUSTRIES, INC.

## (undated) DEVICE — JELLY,LUBE,STERILE,FLIP TOP,TUBE,2-OZ: Brand: MEDLINE

## (undated) DEVICE — SET IRRIG L94IN ID0.281IN W/ 4.5IN DST FLX CONN 2 LD ON OFF

## (undated) DEVICE — SUTURE VCRL SZ 3-0 L27IN ABSRB UD L17MM RB-1 1/2 CIR J215H

## (undated) DEVICE — SUTURE MCRYL SZ 4-0 L27IN ABSRB UD L24MM PS-1 3/8 CIR PRIM Y935H

## (undated) DEVICE — MARKER SURG SKIN UTIL REGULAR/FINE 2 TIP W/ RUL AND 9 LBL

## (undated) DEVICE — SUTURE PROL SZ 2-0 L30IN NONABSORBABLE BLU L26MM SH 1/2 CIR 8833H

## (undated) DEVICE — HIGH CAPACITY 12" INTRAMEDULLARY TIP: Brand: PULSAVAC®

## (undated) DEVICE — KIT BNE CEM PREP FEM QUIK-USE 1 PER CA

## (undated) DEVICE — HOOD, PEEL-AWAY: Brand: FLYTE

## (undated) DEVICE — FAN SPRAY KIT: Brand: PULSAVAC®

## (undated) DEVICE — TROCAR: Brand: KII FIOS FIRST ENTRY

## (undated) DEVICE — TOWEL,OR,DSP,ST,BLUE,STD,6/PK,12PK/CS: Brand: MEDLINE

## (undated) DEVICE — TROCAR: Brand: KII® SLEEVE

## (undated) DEVICE — CATHETER URET 5FR L70CM TIP 8FR OPN END CONE TIP INJ HUB

## (undated) DEVICE — Z INACTIVE USE 2660664 SOLUTION IRRIG 3000ML 0.9% SOD CHL USP UROMATIC PLAS CONT

## (undated) DEVICE — ADHESIVE SKIN CLSR 0.7ML TOP DERMBND ADV

## (undated) DEVICE — GUIDEWIRE ENDOSCP L150CM DIA0.035IN TIP 3CM PTFE NIT

## (undated) DEVICE — E-Z CLEAN, NON-STICK, PTFE COATED, ELECTROSURGICAL BLADE ELECTRODE, 2.75 INCH (7 CM): Brand: MEGADYNE

## (undated) DEVICE — MAT FLOOR ULTRA ABS 28X48IN

## (undated) DEVICE — DRAPE,UTILITY,XL,4/PK,STERILE: Brand: MEDLINE

## (undated) DEVICE — KIT DRNGE PD W4XL4IN 500ML CATH VAC BTL W/ DRNGE LN DSG GZ

## (undated) DEVICE — TUBING INSUFFLATOR HEAT HI FLO SET PNEUMOCLEAR

## (undated) DEVICE — DUAL CUT SAGITTAL BLADE

## (undated) DEVICE — SOLUTION IRRIG 3000ML STRL H2O USP UROMATIC PLAS CONT

## (undated) DEVICE — TRAY PREP DRY W/ PREM GLV 2 APPL 6 SPNG 2 UNDPD 1 OVERWRAP

## (undated) DEVICE — NITINOL STONE RETRIEVAL BASKET: Brand: ESCAPE

## (undated) DEVICE — OPEN-END FLEXI-TIP URETERAL CATHETER: Brand: FLEXI-TIP

## (undated) DEVICE — TUBING, SUCTION, 9/32" X 20', STRAIGHT: Brand: MEDLINE INDUSTRIES, INC.

## (undated) DEVICE — UROLOGIC DRAIN BAG: Brand: UNBRANDED

## (undated) DEVICE — GOWN,SIRUS,POLYRNF,BRTHSLV,XLN/XL,20/CS: Brand: MEDLINE

## (undated) DEVICE — GLOVE SURG SZ 65 CRM LTX FREE POLYISOPRENE POLYMER BEAD ANTI

## (undated) DEVICE — GLOVE ORANGE PI 8   MSG9080

## (undated) DEVICE — ELECTRODE ES AD CRDLSS PT RET REM POLYHESIVE

## (undated) DEVICE — APPLICATOR MEDICATED 26 CC SOLUTION HI LT ORNG CHLORAPREP

## (undated) DEVICE — CATHETER,FOLEY,100%SILICONE,16FR,10ML,LF: Brand: MEDLINE

## (undated) DEVICE — BAG DRNGE W 8MM ADPT AND SUCT HOSE CYSTO UROLOGICAL FOR

## (undated) DEVICE — INTENDED FOR TISSUE SEPARATION, AND OTHER PROCEDURES THAT REQUIRE A SHARP SURGICAL BLADE TO PUNCTURE OR CUT.: Brand: BARD-PARKER ® STAINLESS STEEL BLADES

## (undated) DEVICE — SYRINGE IRRIG 60ML SFT PLIABLE BLB EZ TO GRP 1 HND USE W/

## (undated) DEVICE — GAUZE,SPONGE,4"X4",16PLY,XRAY,STRL,LF: Brand: MEDLINE

## (undated) DEVICE — SOLUTION IV IRRIG POUR BRL 0.9% SODIUM CHL 2F7124

## (undated) DEVICE — PAD,NON-ADHERENT,3X8,STERILE,LF,1/PK: Brand: MEDLINE

## (undated) DEVICE — GOWN,ECLIPSE,POLYRNF,BRTHSLV,L,30/CS: Brand: MEDLINE

## (undated) DEVICE — GOWN,SIRUS,FABRNF,RAGLAN,L,ST,30/CS: Brand: MEDLINE

## (undated) DEVICE — SUTURE VCRL SZ 5-0 L18IN ABSRB UD L13MM P-3 3/8 CIR PRIM J493G

## (undated) DEVICE — STERILE POLYISOPRENE POWDER-FREE SURGICAL GLOVES: Brand: PROTEXIS

## (undated) DEVICE — SET ADMIN 25ML L117IN PMP MOD CK VLV RLER CLMP 2 SMRTSITE

## (undated) DEVICE — DRAINBAG,ANTI-REFLUX TOWER,L/F,2000ML,LL: Brand: MEDLINE

## (undated) DEVICE — DRESSING TRNSPAR W2XL2.75IN FLM SHT SEMIPERMEABLE WIND

## (undated) DEVICE — DECANTER FLD 9IN ST BG FOR ASEP TRNSF OF FLD

## (undated) DEVICE — Device: Brand: POWER-FLO®

## (undated) DEVICE — SYRINGE MED 20ML STD CLR PLAS LUERLOCK TIP N CTRL DISP

## (undated) DEVICE — DRAPE SHEET ULTRAGARD: Brand: MEDLINE

## (undated) DEVICE — PENCIL ES CRD L10FT HND SWCHING ROCK SWCH W/ EDGE COAT BLDE

## (undated) DEVICE — 3M™ STERI-DRAPE™ U-DRAPE 1015: Brand: STERI-DRAPE™

## (undated) DEVICE — GOWN,ECLIPSE,POLYRNF,BRTHSLV,XL,30/CS: Brand: MEDLINE

## (undated) DEVICE — TUBING, SUCTION, 9/32" X 10', STRAIGHT: Brand: MEDLINE

## (undated) DEVICE — GUIDEWIRE UROLOGICAL STR 3 CM 0.038 INX150 CM NIT SENSOR

## (undated) DEVICE — SINGLE PORT MANIFOLD: Brand: NEPTUNE 2

## (undated) DEVICE — Z INACTIVE USE 2635503 SOLUTION IRRIG 3000ML ST H2O USP UROMATIC PLAS CONT

## (undated) DEVICE — GLOVE ORANGE PI 7 1/2   MSG9075

## (undated) DEVICE — URETERAL ACCESS SHEATH SET: Brand: NAVIGATOR HD

## (undated) DEVICE — CONTAINER,SPECIMEN,OR STERILE,4OZ: Brand: MEDLINE

## (undated) DEVICE — SOLUTION IRRIG 3000ML 0.9% SOD CHL USP UROMATIC PLAS CONT

## (undated) DEVICE — COUNTER NDL 30 COUNT FOAM STRP SGL MAG

## (undated) DEVICE — DRESSING TRNSPAR W5XL4.5IN FLM SHT SEMIPERMEABLE WIND